# Patient Record
Sex: FEMALE | Race: WHITE | NOT HISPANIC OR LATINO | Employment: OTHER | URBAN - NONMETROPOLITAN AREA
[De-identification: names, ages, dates, MRNs, and addresses within clinical notes are randomized per-mention and may not be internally consistent; named-entity substitution may affect disease eponyms.]

---

## 2017-04-19 RX ORDER — CILOSTAZOL 50 MG/1
TABLET ORAL
Qty: 60 TABLET | Refills: 5 | Status: SHIPPED | OUTPATIENT
Start: 2017-04-19 | End: 2017-10-26 | Stop reason: SDUPTHER

## 2017-06-29 DIAGNOSIS — I73.9 PVD (PERIPHERAL VASCULAR DISEASE) (HCC): Primary | ICD-10-CM

## 2017-08-09 ENCOUNTER — OFFICE VISIT (OUTPATIENT)
Dept: CARDIAC SURGERY | Facility: CLINIC | Age: 47
End: 2017-08-09

## 2017-08-09 VITALS
HEIGHT: 63 IN | BODY MASS INDEX: 21.09 KG/M2 | DIASTOLIC BLOOD PRESSURE: 77 MMHG | WEIGHT: 119 LBS | HEART RATE: 104 BPM | SYSTOLIC BLOOD PRESSURE: 111 MMHG

## 2017-08-09 DIAGNOSIS — I73.9 PVD (PERIPHERAL VASCULAR DISEASE) (HCC): Primary | ICD-10-CM

## 2017-08-09 PROCEDURE — 99212 OFFICE O/P EST SF 10 MIN: CPT | Performed by: THORACIC SURGERY (CARDIOTHORACIC VASCULAR SURGERY)

## 2017-08-09 NOTE — PROGRESS NOTES
08/09/2017  Patient Information  Saskia Groves                                                                                          [address] [facility] [address]   1970  'PCP/Referring Physician'  Meagan Barlow, APRN  [phone]  No ref. provider found    Chief Complaint   Patient presents with   • Follow-up     6 month follow up with [facility], complains of legs and feet hurting.   • Peripheral Vascular Disease       History of Present Illness:  The patient returns today for follow-up of her peripheral vascular disease.  She states her legs are doing about the same as they were before.  She is smoking still a little bit.  She has had a hard time controlling her diabetes.  She has had no other new problems.      Patient Active Problem List   Diagnosis   • PVD (peripheral vascular disease)   • Lumbar radiculopathy   • Paresthesia   • Causalgia of lower limb   • Hiatal hernia   • Current smoker     Past Medical History:   Diagnosis Date   • Anxiety    • Arthritis    • COPD (chronic obstructive pulmonary disease)    • Depression    • Diabetes mellitus    • Disease of thyroid gland    • Dyslipidemia    • GERD (gastroesophageal reflux disease)    • Goiter    • Goiter    • Hiatal hernia    • Hyperlipidemia    • Hypertension    • Infectious viral hepatitis    • Lower back pain    • Migraine      Past Surgical History:   Procedure Laterality Date   • BACK SURGERY     • CHOLECYSTECTOMY     • HYSTERECTOMY     • KNEE SURGERY Left    • WRIST SURGERY Left        Current Outpatient Prescriptions:   •  albuterol (PROVENTIL) (2.5 MG/3ML) 0.083% nebulizer solution, Albuterol Sulfate (2.5 MG/3ML) 0.083% Inhalation Nebulization Solution; Patient Sig: Albuterol Sulfate (2.5 MG/3ML) 0.083% Inhalation Nebulization Solution ; 0; 01-Apr-2014; Active, Disp: , Rfl:   •  ALBUTEROL SULFATE HFA IN, Albuterol Sulfate HFA AERS; Patient Sig: Albuterol Sulfate HFA AERS ; 0; 01-Apr-2014; Active, Disp: , Rfl:   •  amLODIPine  "(NORVASC) 10 MG tablet, Take  by mouth 3 (three) times a day., Disp: , Rfl:   •  aspirin 81 MG tablet, Take 1 tablet by mouth daily., Disp: , Rfl:   •  beclomethasone (QVAR) 40 MCG/ACT inhaler, Qvar 40 MCG/ACT Inhalation Aerosol Solution; Patient Sig: Qvar 40 MCG/ACT Inhalation Aerosol Solution ; 0; 01-Apr-2014; Active, Disp: , Rfl:   •  benzonatate (TESSALON) 200 MG capsule, Take  by mouth., Disp: , Rfl:   •  cetirizine (ZYRTEC ALLERGY) 10 MG tablet, Take 1 tablet by mouth daily., Disp: , Rfl:   •  cilostazol (PLETAL) 50 MG tablet, TAKE ONE TABLET BY MOUTH TWICE A DAY, Disp: 60 tablet, Rfl: 5  •  citalopram (CELEXA) 40 MG tablet, Take 1 tablet by mouth daily., Disp: , Rfl:   •  fluticasone (FLONASE) 50 MCG/ACT nasal spray, into each nostril. Use as directed, Disp: , Rfl:   •  gabapentin (NEURONTIN) 600 MG tablet, Take  by mouth., Disp: , Rfl:   •  HYDROcodone-acetaminophen (LORTAB)  MG per tablet, Take  by mouth., Disp: , Rfl:   •  hydrOXYzine (ATARAX) 25 MG tablet, Take  by mouth., Disp: , Rfl:   •  insulin glargine (LANTUS) 100 UNIT/ML injection, 36 units at night, Disp: 10 mL, Rfl: 5  •  insulin lispro (HUMALOG) 100 UNIT/ML injection, 8 U ac bkfst, 16 U ac lunch & 18 U ac supper, Disp: 20 mL, Rfl: 5  •  Insulin Syringe-Needle U-100 30G X 5/16\" 0.5 ML misc, Uses X 4/day, Disp: 200 each, Rfl: 5  •  lansoprazole (PREVACID) 30 MG capsule, Take  by mouth., Disp: , Rfl:   •  losartan (COZAAR) 25 MG tablet, Take 1 tablet by mouth daily., Disp: , Rfl:   •  lovastatin (MEVACOR) 20 MG tablet, Take 1 tablet by mouth every night. At bedtime, Disp: , Rfl:   •  metFORMIN (GLUCOPHAGE) 500 MG tablet, Take 1 tablet by mouth daily., Disp: , Rfl:   •  metoprolol succinate XL (TOPROL XL) 50 MG 24 hr tablet, Take 1 tablet by mouth daily., Disp: , Rfl:   •  mometasone-formoterol (DULERA) 100-5 MCG/ACT inhaler, Dulera 100-5 MCG/ACT Inhalation Aerosol; Patient Sig: Dulera 100-5 MCG/ACT Inhalation Aerosol ; 0; 01-Apr-2014; Active, " Disp: , Rfl:   •  montelukast (SINGULAIR) 10 MG tablet, Take  by mouth., Disp: , Rfl:   •  oxybutynin XL (DITROPAN-XL) 5 MG 24 hr tablet, Take  by mouth., Disp: , Rfl:   •  pregabalin (LYRICA) 50 MG capsule, Take  by mouth., Disp: , Rfl:   •  rOPINIRole (REQUIP) 4 MG tablet, Take  by mouth., Disp: , Rfl:   •  theophylline (UNIPHYL) 400 MG 24 hr tablet, Take 1 tablet by mouth daily. As directed, Disp: , Rfl:   •  tiotropium (SPIRIVA HANDIHALER) 18 MCG per inhalation capsule, Spiriva HandiHaler 18 MCG Inhalation Capsule; Patient Sig: Spiriva HandiHaler 18 MCG Inhalation Capsule ; 0; 01-Apr-2014; Active, Disp: , Rfl:   •  tiZANidine (ZANAFLEX) 4 MG tablet, Take  by mouth., Disp: , Rfl:   Allergies   Allergen Reactions   • Codeine    • Penicillins      Social History     Social History   • Marital status:      Spouse name: N/A   • Number of children: 2   • Years of education: N/A     Occupational History   •  Disabled     Social History Main Topics   • Smoking status: Current Every Day Smoker     Packs/day: 1.50     Years: 25.00     Types: Cigarettes   • Smokeless tobacco: Never Used   • Alcohol use No   • Drug use: No   • Sexual activity: Not on file     Other Topics Concern   • Not on file     Social History Narrative     Family History   Problem Relation Age of Onset   • Diabetes Mother    • Hypertension Mother    • Hypertension Father    • Lung cancer Father    • Stroke Other      Review of Systems   Constitution: Positive for malaise/fatigue and night sweats. Negative for chills, fever and weight loss.   HENT: Positive for headaches. Negative for hearing loss, odynophagia and sore throat.    Cardiovascular: Positive for claudication, dyspnea on exertion and leg swelling. Negative for chest pain, orthopnea and palpitations.   Respiratory: Positive for cough. Negative for hemoptysis.    Endocrine: Negative for cold intolerance, heat intolerance, polydipsia, polyphagia and polyuria.   Hematologic/Lymphatic:  "Does not bruise/bleed easily.   Skin: Negative for itching and rash.   Musculoskeletal: Positive for back pain, joint pain and muscle cramps. Negative for joint swelling and myalgias.   Gastrointestinal: Positive for dysphagia, nausea and vomiting. Negative for abdominal pain, constipation, diarrhea, hematemesis, hematochezia and melena.   Genitourinary: Negative for dysuria, frequency and hematuria.   Neurological: Positive for loss of balance. Negative for focal weakness, numbness and seizures.   Psychiatric/Behavioral: Positive for depression. Negative for suicidal ideas.   All other systems reviewed and are negative.    Vitals:    08/09/17 0744   BP: 111/77   BP Location: Right arm   Pulse: 104   Weight: 119 lb (54 kg)   Height: 63\" (160 cm)      Physical Exam   Neck: Normal range of motion. Neck supple. No JVD present. No tracheal deviation present. No thyromegaly present.   Cardiovascular: Normal rate and regular rhythm.  Exam reveals no gallop and no friction rub.    No murmur heard.  Pulmonary/Chest: No stridor. No respiratory distress. She has no wheezes. She has no rales. She exhibits no tenderness.   Abdominal: Soft. Bowel sounds are normal. There is no tenderness.   Lymphadenopathy:     She has no cervical adenopathy.     Assessment/Plan:   I reviewed the patient's PV arterial study in detail.  She appears to be stable at this point.  She has good cap refill with no evidence of ulcer on her feet.  I have reviewed the importance of not smoking.  I will see her back in one year with a PV arterial study.        Patient Active Problem List   Diagnosis   • PVD (peripheral vascular disease)   • Lumbar radiculopathy   • Paresthesia   • Causalgia of lower limb   • Hiatal hernia   • Current smoker     Signed by: Deandre Oseguera M.D.    8/9/2017    CC:  LUIS Bravo, , editing for Deandre Oseguera M.D.    I, Deandre Oseguera MD, have read and agree with the editing done " by Tete Whittaker, .

## 2017-10-27 RX ORDER — CILOSTAZOL 50 MG/1
TABLET ORAL
Qty: 60 TABLET | Refills: 5 | Status: SHIPPED | OUTPATIENT
Start: 2017-10-27 | End: 2018-04-23 | Stop reason: SDUPTHER

## 2018-01-10 ENCOUNTER — OFFICE VISIT (OUTPATIENT)
Dept: CARDIAC SURGERY | Facility: CLINIC | Age: 48
End: 2018-01-10

## 2018-01-10 VITALS
WEIGHT: 120 LBS | SYSTOLIC BLOOD PRESSURE: 136 MMHG | HEART RATE: 113 BPM | BODY MASS INDEX: 21.26 KG/M2 | DIASTOLIC BLOOD PRESSURE: 88 MMHG | HEIGHT: 63 IN

## 2018-01-10 DIAGNOSIS — I65.23 BILATERAL CAROTID ARTERY STENOSIS: Primary | ICD-10-CM

## 2018-01-10 PROCEDURE — 99214 OFFICE O/P EST MOD 30 MIN: CPT | Performed by: THORACIC SURGERY (CARDIOTHORACIC VASCULAR SURGERY)

## 2018-01-10 PROCEDURE — 99406 BEHAV CHNG SMOKING 3-10 MIN: CPT | Performed by: THORACIC SURGERY (CARDIOTHORACIC VASCULAR SURGERY)

## 2018-01-10 NOTE — PROGRESS NOTES
01/10/2018  Patient Information  Saskia Groves                                                                                          [address] [facility] [address]   1970  'PCP/Referring Physician'  LUIS Deshpande  [phone]  No ref. provider found    Chief Complaint   Patient presents with   • Carotid Artery Disease     Possible carotid stenosis per LUIS Bravo- eye doctor saw plaque in eye       History of Present Illness:  The patient is referred at this time for evaluation of a amaurosis fugax to her right eye.  She was found to have abnormal vision in her right eye and was found to have a plaque on ophthalmologic exam of her eye.  She had a carotid duplex which reveals plaque.  She denies previous history of stroke or TIA events.  She has continued to smoke about a pack and half per day of cigarettes.      Patient Active Problem List   Diagnosis   • PVD (peripheral vascular disease)   • Lumbar radiculopathy   • Paresthesia   • Causalgia of lower limb   • Hiatal hernia   • Current smoker   • Bilateral carotid artery stenosis     Past Medical History:   Diagnosis Date   • Anxiety    • Arthritis    • COPD (chronic obstructive pulmonary disease)    • Depression    • Diabetes mellitus    • Disease of thyroid gland    • Dyslipidemia    • GERD (gastroesophageal reflux disease)    • Goiter    • Goiter    • Hiatal hernia    • Hyperlipidemia    • Hypertension    • Infectious viral hepatitis    • Lower back pain    • Migraine      Past Surgical History:   Procedure Laterality Date   • BACK SURGERY     • CHOLECYSTECTOMY     • HYSTERECTOMY     • KNEE SURGERY Left    • WRIST SURGERY Left        Current Outpatient Prescriptions:   •  albuterol (PROVENTIL) (2.5 MG/3ML) 0.083% nebulizer solution, Albuterol Sulfate (2.5 MG/3ML) 0.083% Inhalation Nebulization Solution; Patient Sig: Albuterol Sulfate (2.5 MG/3ML) 0.083% Inhalation Nebulization Solution ; 0; 01-Apr-2014; Active, Disp: , Rfl:   •   "ALBUTEROL SULFATE HFA IN, Albuterol Sulfate HFA AERS; Patient Sig: Albuterol Sulfate HFA AERS ; 0; 01-Apr-2014; Active, Disp: , Rfl:   •  amLODIPine (NORVASC) 10 MG tablet, Take  by mouth 3 (three) times a day., Disp: , Rfl:   •  aspirin 81 MG tablet, Take 1 tablet by mouth daily., Disp: , Rfl:   •  beclomethasone (QVAR) 40 MCG/ACT inhaler, Qvar 40 MCG/ACT Inhalation Aerosol Solution; Patient Sig: Qvar 40 MCG/ACT Inhalation Aerosol Solution ; 0; 01-Apr-2014; Active, Disp: , Rfl:   •  benzonatate (TESSALON) 200 MG capsule, Take  by mouth., Disp: , Rfl:   •  cetirizine (ZYRTEC ALLERGY) 10 MG tablet, Take 1 tablet by mouth daily., Disp: , Rfl:   •  cilostazol (PLETAL) 50 MG tablet, TAKE ONE TABLET BY MOUTH TWICE A DAY, Disp: 60 tablet, Rfl: 5  •  citalopram (CELEXA) 40 MG tablet, Take 1 tablet by mouth daily., Disp: , Rfl:   •  fluticasone (FLONASE) 50 MCG/ACT nasal spray, into each nostril. Use as directed, Disp: , Rfl:   •  gabapentin (NEURONTIN) 600 MG tablet, Take  by mouth., Disp: , Rfl:   •  HYDROcodone-acetaminophen (LORTAB)  MG per tablet, Take  by mouth., Disp: , Rfl:   •  hydrOXYzine (ATARAX) 25 MG tablet, Take  by mouth., Disp: , Rfl:   •  insulin lispro (HUMALOG) 100 UNIT/ML injection, 8 U ac bkfst, 16 U ac lunch & 18 U ac supper, Disp: 20 mL, Rfl: 5  •  Insulin Syringe-Needle U-100 30G X 5/16\" 0.5 ML misc, Uses X 4/day, Disp: 200 each, Rfl: 5  •  lansoprazole (PREVACID) 30 MG capsule, Take  by mouth., Disp: , Rfl:   •  losartan (COZAAR) 25 MG tablet, Take 1 tablet by mouth daily., Disp: , Rfl:   •  lovastatin (MEVACOR) 20 MG tablet, Take 1 tablet by mouth every night. At bedtime, Disp: , Rfl:   •  metFORMIN (GLUCOPHAGE) 500 MG tablet, Take 1 tablet by mouth daily., Disp: , Rfl:   •  metoprolol succinate XL (TOPROL XL) 50 MG 24 hr tablet, Take 1 tablet by mouth daily., Disp: , Rfl:   •  mometasone-formoterol (DULERA) 100-5 MCG/ACT inhaler, Dulera 100-5 MCG/ACT Inhalation Aerosol; Patient Sig: Dulera " 100-5 MCG/ACT Inhalation Aerosol ; 0; 01-Apr-2014; Active, Disp: , Rfl:   •  montelukast (SINGULAIR) 10 MG tablet, Take  by mouth., Disp: , Rfl:   •  oxybutynin XL (DITROPAN-XL) 5 MG 24 hr tablet, Take  by mouth., Disp: , Rfl:   •  pregabalin (LYRICA) 50 MG capsule, Take  by mouth., Disp: , Rfl:   •  rOPINIRole (REQUIP) 4 MG tablet, Take  by mouth., Disp: , Rfl:   •  theophylline (UNIPHYL) 400 MG 24 hr tablet, Take 1 tablet by mouth daily. As directed, Disp: , Rfl:   •  tiotropium (SPIRIVA HANDIHALER) 18 MCG per inhalation capsule, Spiriva HandiHaler 18 MCG Inhalation Capsule; Patient Sig: Spiriva HandiHaler 18 MCG Inhalation Capsule ; 0; 01-Apr-2014; Active, Disp: , Rfl:   •  tiZANidine (ZANAFLEX) 4 MG tablet, Take  by mouth., Disp: , Rfl:   Allergies   Allergen Reactions   • Codeine    • Penicillins      Social History     Social History   • Marital status:      Spouse name: N/A   • Number of children: 2   • Years of education: N/A     Occupational History   • [employer] Work Disabled     Back and Leg Pain     Social History Main Topics   • Smoking status: Current Every Day Smoker     Packs/day: 1.50     Years: 25.00     Types: Cigarettes   • Smokeless tobacco: Never Used   • Alcohol use No   • Drug use: No   • Sexual activity: Not on file     Other Topics Concern   • Not on file     Social History Narrative    Lives in [place], [place] alone     Family History   Problem Relation Age of Onset   • Diabetes Mother    • Hypertension Mother    • Hypertension Father    • Lung cancer Father    • Stroke Other      Review of Systems   Constitution: Positive for diaphoresis and weight loss (60-70 lbs over 4 years). Negative for chills, fever, malaise/fatigue and night sweats.   HENT: Negative for hearing loss, odynophagia and sore throat.    Eyes: Positive for blurred vision.   Cardiovascular: Positive for dyspnea on exertion. Negative for chest pain, leg swelling, orthopnea and palpitations.        Feet- numb and  "cold     Respiratory: Positive for cough. Negative for hemoptysis.    Endocrine: Negative for cold intolerance, heat intolerance, polydipsia, polyphagia and polyuria.   Hematologic/Lymphatic: Does not bruise/bleed easily.   Skin: Positive for poor wound healing. Negative for itching and rash.   Musculoskeletal: Positive for back pain and neck pain. Negative for joint pain, joint swelling and myalgias.   Gastrointestinal: Positive for diarrhea. Negative for abdominal pain, constipation, hematemesis, hematochezia, melena, nausea and vomiting.   Genitourinary: Negative for dysuria, frequency and hematuria.   Neurological: Negative for focal weakness, headaches, numbness and seizures.   Psychiatric/Behavioral: Negative for suicidal ideas.   All other systems reviewed and are negative.    Vitals:    01/10/18 0855   BP: 136/88   BP Location: Right arm   Patient Position: Sitting   Pulse: 113   Weight: 54.4 kg (120 lb)   Height: 160 cm (63\")      Physical Exam   Constitutional: She is oriented to person, place, and time. She appears well-developed and well-nourished. No distress.   HENT:   Head: Normocephalic.   Eyes: EOM are normal. Pupils are equal, round, and reactive to light.   Neck: Normal range of motion. Carotid bruit is not present. No thyromegaly present.   Cardiovascular: Normal rate and regular rhythm.  Exam reveals no gallop and no friction rub.    No murmur heard.  Pulses:       Carotid pulses are on the right side with bruit       Dorsalis pedis pulses are 1+ on the right side, and 1+ on the left side.        Posterior tibial pulses are 1+ on the right side, and 1+ on the left side.   Pulmonary/Chest: She has no wheezes. She has no rales.   Abdominal: Soft. Bowel sounds are normal. She exhibits no distension and no mass. There is no hepatomegaly. There is no tenderness.   Musculoskeletal: Normal range of motion. She exhibits no deformity.   Neurological: She is alert and oriented to person, place, and time. " She has normal strength. No cranial nerve deficit or sensory deficit.   Skin: No bruising and no petechiae noted. No cyanosis. Nails show no clubbing.   Psychiatric: She has a normal mood and affect.     Assessment/Plan:   I have obtained and reviewed her carotid ultrasound.  I concur with plaque being in a carotid arteries.  At this point, in view of her what appears to be an embolus to her right eye with plaque or possible amaurosis, we need to check her carotid arteries out.  I have recommended a carotid CTA to be done.  I have also spent a great deal of time, 3-5 minutes, talking to her about the importance of smoking cessation as well.  She has multiple medical problems which are currently outlined carefully above.  At this point we will proceed with CTA and I will see her back.  Her legs appear to be stable at this point.          Patient Active Problem List   Diagnosis   • PVD (peripheral vascular disease)   • Lumbar radiculopathy   • Paresthesia   • Causalgia of lower limb   • Hiatal hernia   • Current smoker   • Bilateral carotid artery stenosis     Signed by: Deandre Oseguera M.D.    1/10/2018    CC:  LUIS Bravo, , editing for Deandre Oseguera M.D.    I, Deandre Oseguera MD, have read and agree with the editing done by Tete Whittaker, .

## 2018-01-11 DIAGNOSIS — I65.23 CAROTID STENOSIS, BILATERAL: Primary | ICD-10-CM

## 2018-01-24 ENCOUNTER — OFFICE VISIT (OUTPATIENT)
Dept: CARDIAC SURGERY | Facility: CLINIC | Age: 48
End: 2018-01-24

## 2018-01-24 VITALS
DIASTOLIC BLOOD PRESSURE: 80 MMHG | OXYGEN SATURATION: 98 % | HEIGHT: 63 IN | HEART RATE: 96 BPM | SYSTOLIC BLOOD PRESSURE: 120 MMHG | BODY MASS INDEX: 21.26 KG/M2 | WEIGHT: 120 LBS

## 2018-01-24 DIAGNOSIS — I65.23 BILATERAL CAROTID ARTERY STENOSIS: Primary | ICD-10-CM

## 2018-01-24 PROCEDURE — 99212 OFFICE O/P EST SF 10 MIN: CPT | Performed by: THORACIC SURGERY (CARDIOTHORACIC VASCULAR SURGERY)

## 2018-01-24 RX ORDER — LACTULOSE 10 G/15ML
10 SOLUTION ORAL DAILY
COMMUNITY

## 2018-01-24 RX ORDER — BUDESONIDE AND FORMOTEROL FUMARATE DIHYDRATE 160; 4.5 UG/1; UG/1
2 AEROSOL RESPIRATORY (INHALATION)
Status: ON HOLD | COMMUNITY
End: 2020-01-17

## 2018-01-24 RX ORDER — INSULIN GLARGINE 100 [IU]/ML
INJECTION, SOLUTION SUBCUTANEOUS DAILY
Status: ON HOLD | COMMUNITY
End: 2020-01-17

## 2018-01-24 RX ORDER — IBUPROFEN 600 MG/1
600 TABLET ORAL EVERY 6 HOURS PRN
Status: ON HOLD | COMMUNITY
End: 2020-02-23

## 2018-01-24 RX ORDER — FLUCONAZOLE 150 MG/1
150 TABLET ORAL AS NEEDED
Status: ON HOLD | COMMUNITY
End: 2020-01-17

## 2018-01-24 RX ORDER — RANITIDINE 150 MG/1
200 TABLET ORAL 2 TIMES DAILY
COMMUNITY
End: 2021-04-13

## 2018-01-24 RX ORDER — FEXOFENADINE HCL 180 MG/1
180 TABLET ORAL DAILY
Status: ON HOLD | COMMUNITY
End: 2020-01-17

## 2018-01-24 RX ORDER — KETOTIFEN FUMARATE 0.35 MG/ML
1 SOLUTION/ DROPS OPHTHALMIC 2 TIMES DAILY
COMMUNITY

## 2018-01-24 RX ORDER — LANOLIN ALCOHOL/MO/W.PET/CERES
1000 CREAM (GRAM) TOPICAL DAILY
COMMUNITY
End: 2020-02-12

## 2018-01-24 NOTE — PROGRESS NOTES
01/24/2018  Patient Information  Saskia Groves                                                                                          [address] [facility] [address]   1970  'PCP/Referring Physician'  Meagan Barlow, APRN  [phone]  No ref. provider found    Chief Complaint   Patient presents with   • Follow-up     2 WK FU with CTA Carotids for Carotid Stenosis       History of Present Illness:  The patient returns today for follow-up of her carotid stenosis.  Since last visit she has been doing reasonably well.  She denies any amaurosis type events since last visit.  She did obtain her CTA of her carotid arteries.  She is continuing to smoke despite all my admonition.      Patient Active Problem List   Diagnosis   • PVD (peripheral vascular disease)   • Lumbar radiculopathy   • Paresthesia   • Causalgia of lower limb   • Hiatal hernia   • Current smoker   • Bilateral carotid artery stenosis     Past Medical History:   Diagnosis Date   • Anxiety    • Arthritis    • Carotid artery stenosis    • COPD (chronic obstructive pulmonary disease)    • Depression    • Diabetes mellitus    • Disease of thyroid gland    • Dyslipidemia    • GERD (gastroesophageal reflux disease)    • Goiter    • Goiter    • Hiatal hernia    • Hyperlipidemia    • Hypertension    • Infectious viral hepatitis    • Lower back pain    • Migraine      Past Surgical History:   Procedure Laterality Date   • BACK SURGERY     • CHOLECYSTECTOMY     • HYSTERECTOMY     • KNEE SURGERY Left    • WRIST SURGERY Left        Current Outpatient Prescriptions:   •  aclidinium bromide (TUDORZA PRESSAIR) 400 MCG/ACT aerosol powder  powder for inhalation, Inhale 1 puff 2 (Two) Times a Day., Disp: , Rfl:   •  albuterol (PROVENTIL) (2.5 MG/3ML) 0.083% nebulizer solution, Albuterol Sulfate (2.5 MG/3ML) 0.083% Inhalation Nebulization Solution; Patient Sig: Albuterol Sulfate (2.5 MG/3ML) 0.083% Inhalation Nebulization Solution ; 0; 01-Apr-2014; Active,  "Disp: , Rfl:   •  aspirin 81 MG tablet, Take 1 tablet by mouth daily., Disp: , Rfl:   •  benzonatate (TESSALON) 200 MG capsule, Take  by mouth., Disp: , Rfl:   •  budesonide-formoterol (SYMBICORT) 160-4.5 MCG/ACT inhaler, Inhale 2 puffs 2 (Two) Times a Day., Disp: , Rfl:   •  cilostazol (PLETAL) 50 MG tablet, TAKE ONE TABLET BY MOUTH TWICE A DAY, Disp: 60 tablet, Rfl: 5  •  fexofenadine (ALLEGRA) 180 MG tablet, Take 180 mg by mouth Daily., Disp: , Rfl:   •  fluconazole (DIFLUCAN) 150 MG tablet, Take 150 mg by mouth 1 (One) Time., Disp: , Rfl:   •  fluticasone (FLONASE) 50 MCG/ACT nasal spray, into each nostril. Use as directed, Disp: , Rfl:   •  HYDROcodone-acetaminophen (LORTAB)  MG per tablet, Take  by mouth., Disp: , Rfl:   •  hydrOXYzine (ATARAX) 25 MG tablet, Take  by mouth., Disp: , Rfl:   •  ibuprofen (ADVIL,MOTRIN) 600 MG tablet, Take 600 mg by mouth Every 6 (Six) Hours As Needed for Mild Pain ., Disp: , Rfl:   •  insulin glargine (LANTUS) 100 UNIT/ML injection, Inject  under the skin Daily., Disp: , Rfl:   •  insulin lispro (HUMALOG) 100 UNIT/ML injection, 8 U ac bkfst, 16 U ac lunch & 18 U ac supper, Disp: 20 mL, Rfl: 5  •  Insulin Syringe-Needle U-100 30G X 5/16\" 0.5 ML misc, Uses X 4/day, Disp: 200 each, Rfl: 5  •  ketotifen (ZADITOR) 0.025 % ophthalmic solution, 1 drop 2 (Two) Times a Day., Disp: , Rfl:   •  Lactobacillus (ACIDOPHILUS PO), Take  by mouth., Disp: , Rfl:   •  lactulose (CHRONULAC) 10 GM/15ML solution, Take 20 g by mouth 2 (Two) Times a Day As Needed., Disp: , Rfl:   •  losartan (COZAAR) 25 MG tablet, Take 1 tablet by mouth daily., Disp: , Rfl:   •  metFORMIN (GLUCOPHAGE) 500 MG tablet, Take 1 tablet by mouth daily., Disp: , Rfl:   •  metoprolol succinate XL (TOPROL XL) 50 MG 24 hr tablet, Take 1 tablet by mouth daily., Disp: , Rfl:   •  NYSTATIN PO, Take  by mouth., Disp: , Rfl:   •  pregabalin (LYRICA) 50 MG capsule, Take  by mouth., Disp: , Rfl:   •  raNITIdine (ZANTAC) 150 MG " tablet, Take 150 mg by mouth 2 (Two) Times a Day., Disp: , Rfl:   •  rOPINIRole (REQUIP) 4 MG tablet, Take  by mouth., Disp: , Rfl:   •  theophylline (UNIPHYL) 400 MG 24 hr tablet, Take 1 tablet by mouth daily. As directed, Disp: , Rfl:   •  tiZANidine (ZANAFLEX) 4 MG tablet, Take  by mouth., Disp: , Rfl:   •  vitamin B-12 (CYANOCOBALAMIN) 1000 MCG tablet, Take 1,000 mcg by mouth Daily., Disp: , Rfl:   •  ALBUTEROL SULFATE HFA IN, Albuterol Sulfate HFA AERS; Patient Sig: Albuterol Sulfate HFA AERS ; 0; 01-Apr-2014; Active, Disp: , Rfl:   •  amLODIPine (NORVASC) 10 MG tablet, Take  by mouth 3 (three) times a day., Disp: , Rfl:   •  beclomethasone (QVAR) 40 MCG/ACT inhaler, Qvar 40 MCG/ACT Inhalation Aerosol Solution; Patient Sig: Qvar 40 MCG/ACT Inhalation Aerosol Solution ; 0; 01-Apr-2014; Active, Disp: , Rfl:   •  cetirizine (ZYRTEC ALLERGY) 10 MG tablet, Take 1 tablet by mouth daily., Disp: , Rfl:   •  citalopram (CELEXA) 40 MG tablet, Take 1 tablet by mouth daily., Disp: , Rfl:   •  gabapentin (NEURONTIN) 600 MG tablet, Take  by mouth., Disp: , Rfl:   •  lansoprazole (PREVACID) 30 MG capsule, Take  by mouth., Disp: , Rfl:   •  lovastatin (MEVACOR) 20 MG tablet, Take 1 tablet by mouth every night. At bedtime, Disp: , Rfl:   •  mometasone-formoterol (DULERA) 100-5 MCG/ACT inhaler, Dulera 100-5 MCG/ACT Inhalation Aerosol; Patient Sig: Dulera 100-5 MCG/ACT Inhalation Aerosol ; 0; 01-Apr-2014; Active, Disp: , Rfl:   •  montelukast (SINGULAIR) 10 MG tablet, Take  by mouth., Disp: , Rfl:   •  oxybutynin XL (DITROPAN-XL) 5 MG 24 hr tablet, Take  by mouth., Disp: , Rfl:   •  tiotropium (SPIRIVA HANDIHALER) 18 MCG per inhalation capsule, Spiriva HandiHaler 18 MCG Inhalation Capsule; Patient Sig: Spiriva HandiHaler 18 MCG Inhalation Capsule ; 0; 01-Apr-2014; Active, Disp: , Rfl:   Allergies   Allergen Reactions   • Codeine    • Penicillins      Social History     Social History   • Marital status:      Spouse name:  N/A   • Number of children: 2   • Years of education: N/A     Occupational History   • 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 Negative for chills, fever, malaise/fatigue and weight loss.   HENT: Negative for hearing loss, odynophagia and sore throat.    Eyes: Positive for blurred vision.   Cardiovascular: Positive for dyspnea on exertion and palpitations. Negative for chest pain, leg swelling and orthopnea.   Respiratory: Negative for cough and hemoptysis.    Endocrine: Positive for cold intolerance. Negative for heat intolerance, polydipsia, polyphagia and polyuria.   Hematologic/Lymphatic: Does not bruise/bleed easily.   Skin: Negative for itching and rash.   Musculoskeletal: Positive for joint pain and muscle cramps. Negative for joint swelling and myalgias.   Gastrointestinal: Positive for diarrhea. Negative for abdominal pain, constipation, hematemesis, hematochezia, melena, nausea and vomiting.   Genitourinary: Positive for nocturia and urgency. Negative for dysuria, frequency and hematuria.   Neurological: Negative for focal weakness, headaches, numbness and seizures.   Psychiatric/Behavioral: Negative for suicidal ideas.   Allergic/Immunologic: Positive for persistent infections (yeast infections).   All other systems reviewed and are negative.    Vitals:    01/24/18 0938   BP: 120/80   BP Location: Left arm   Patient Position: Sitting  "  Pulse: 96   SpO2: 98%   Weight: 54.4 kg (120 lb)   Height: 160 cm (63\")      Physical Exam   Neck: Normal range of motion. Neck supple. No JVD present. No tracheal deviation present. No thyromegaly present.   Cardiovascular: Normal rate and regular rhythm.  Exam reveals no gallop and no friction rub.    No murmur heard.  Pulmonary/Chest: No stridor. No respiratory distress. She has no wheezes. She has no rales. She exhibits no tenderness.   Abdominal: Soft. Bowel sounds are normal. There is no tenderness.   Lymphadenopathy:     She has no cervical adenopathy.     Assessment/Plan:   Patient returns today for follow-up of her carotid stenosis.  Since last visit she has been stable.  She is on her 81 mg aspirin.  I have obtained and  reviewed her CT scan.  There appears be no evidence of any critical stenosis.  I think we will place her on Plavix 75 mg and aspirin and continue to follow her.  I have strongly encouraged her not smoke.  We will see her back in 4 months.      Patient Active Problem List   Diagnosis   • PVD (peripheral vascular disease)   • Lumbar radiculopathy   • Paresthesia   • Causalgia of lower limb   • Hiatal hernia   • Current smoker   • Bilateral carotid artery stenosis     Signed by: Deandre Oseguera M.D.    1/24/2018    CC:  LUIS Bravo, , editing for Deandre Oseguera M.D.    I, Deandre Oseguera MD, have read and agree with the editing done by Tete Whittaker, .  "

## 2018-01-26 ENCOUNTER — TRANSCRIBE ORDERS (OUTPATIENT)
Dept: CARDIAC SURGERY | Facility: CLINIC | Age: 48
End: 2018-01-26

## 2018-01-26 DIAGNOSIS — I65.23 CAROTID STENOSIS, BILATERAL: Primary | ICD-10-CM

## 2018-02-16 ENCOUNTER — TELEPHONE (OUTPATIENT)
Dept: CARDIAC SURGERY | Facility: CLINIC | Age: 48
End: 2018-02-16

## 2018-02-16 NOTE — TELEPHONE ENCOUNTER
Patient called asking for clearance at a surgical standpoint to have Left Knee surgery due to a torn meniscus?      UPDATE:    Per Dr. Oseguera it is okay for patient to proceed with surgery at a Carotid point of view. I called and explained this to the patient. Surgical clearance was faxed to [phone].       Please reference Dr. Oseguera response scanned into media.

## 2018-04-25 RX ORDER — CILOSTAZOL 50 MG/1
TABLET ORAL
Qty: 60 TABLET | Refills: 5 | Status: SHIPPED | OUTPATIENT
Start: 2018-04-25 | End: 2018-11-07 | Stop reason: SDUPTHER

## 2018-05-09 ENCOUNTER — TELEPHONE (OUTPATIENT)
Dept: CARDIAC SURGERY | Facility: CLINIC | Age: 48
End: 2018-05-09

## 2018-05-14 ENCOUNTER — TELEPHONE (OUTPATIENT)
Dept: CARDIAC SURGERY | Facility: CLINIC | Age: 48
End: 2018-05-14

## 2018-06-27 ENCOUNTER — OFFICE VISIT (OUTPATIENT)
Dept: CARDIAC SURGERY | Facility: CLINIC | Age: 48
End: 2018-06-27

## 2018-06-27 VITALS
HEART RATE: 97 BPM | DIASTOLIC BLOOD PRESSURE: 66 MMHG | SYSTOLIC BLOOD PRESSURE: 97 MMHG | BODY MASS INDEX: 20.55 KG/M2 | WEIGHT: 116 LBS | HEIGHT: 63 IN

## 2018-06-27 DIAGNOSIS — I65.23 BILATERAL CAROTID ARTERY STENOSIS: Primary | ICD-10-CM

## 2018-06-27 PROCEDURE — 99406 BEHAV CHNG SMOKING 3-10 MIN: CPT | Performed by: THORACIC SURGERY (CARDIOTHORACIC VASCULAR SURGERY)

## 2018-06-27 PROCEDURE — 99212 OFFICE O/P EST SF 10 MIN: CPT | Performed by: THORACIC SURGERY (CARDIOTHORACIC VASCULAR SURGERY)

## 2018-06-27 NOTE — PROGRESS NOTES
06/27/2018  Patient Information  Saskia Groves                                                                                          [address] [facility] [address]   1970  'PCP/Referring Physician'  Meagan Barlow, APRN  [phone]  No ref. provider found    Chief Complaint   Patient presents with   • Follow-up     Follow up for carotid stenosis,complains of neck pain.   • Carotid Artery Disease       History of Present Illness:  Patient is referred back at this time for follow-up of carotid artery disease.  She denies any TIA type of events.  She denies any strokes.  She does have some right neck pain.  She continues to smoke despite all my admonitions.  She has had no other new problems.  Patient Active Problem List   Diagnosis   • PVD (peripheral vascular disease)   • Lumbar radiculopathy   • Paresthesia   • Causalgia of lower limb   • Hiatal hernia   • Current smoker   • Bilateral carotid artery stenosis     Past Medical History:   Diagnosis Date   • Anxiety    • Arthritis    • Carotid artery stenosis    • COPD (chronic obstructive pulmonary disease)    • Depression    • Diabetes mellitus    • Disease of thyroid gland    • Dyslipidemia    • GERD (gastroesophageal reflux disease)    • Goiter    • Goiter    • Hiatal hernia    • Hyperlipidemia    • Hypertension    • Infectious viral hepatitis    • Lower back pain    • Migraine      Past Surgical History:   Procedure Laterality Date   • BACK SURGERY     • CHOLECYSTECTOMY     • HYSTERECTOMY     • KNEE SURGERY Left    • WRIST SURGERY Left        Current Outpatient Prescriptions:   •  aclidinium bromide (TUDORZA PRESSAIR) 400 MCG/ACT aerosol powder  powder for inhalation, Inhale 1 puff 2 (Two) Times a Day., Disp: , Rfl:   •  albuterol (PROVENTIL) (2.5 MG/3ML) 0.083% nebulizer solution, Albuterol Sulfate (2.5 MG/3ML) 0.083% Inhalation Nebulization Solution; Patient Sig: Albuterol Sulfate (2.5 MG/3ML) 0.083% Inhalation Nebulization Solution ; 0;  "01-Apr-2014; Active, Disp: , Rfl:   •  ALBUTEROL SULFATE HFA IN, Albuterol Sulfate HFA AERS; Patient Sig: Albuterol Sulfate HFA AERS ; 0; 01-Apr-2014; Active, Disp: , Rfl:   •  amLODIPine (NORVASC) 10 MG tablet, Take  by mouth 3 (three) times a day., Disp: , Rfl:   •  aspirin 81 MG tablet, Take 1 tablet by mouth daily., Disp: , Rfl:   •  beclomethasone (QVAR) 40 MCG/ACT inhaler, Qvar 40 MCG/ACT Inhalation Aerosol Solution; Patient Sig: Qvar 40 MCG/ACT Inhalation Aerosol Solution ; 0; 01-Apr-2014; Active, Disp: , Rfl:   •  benzonatate (TESSALON) 200 MG capsule, Take  by mouth., Disp: , Rfl:   •  budesonide-formoterol (SYMBICORT) 160-4.5 MCG/ACT inhaler, Inhale 2 puffs 2 (Two) Times a Day., Disp: , Rfl:   •  cetirizine (ZYRTEC ALLERGY) 10 MG tablet, Take 1 tablet by mouth daily., Disp: , Rfl:   •  cilostazol (PLETAL) 50 MG tablet, TAKE ONE TABLET BY MOUTH TWICE A DAY, Disp: 60 tablet, Rfl: 5  •  citalopram (CELEXA) 40 MG tablet, Take 1 tablet by mouth daily., Disp: , Rfl:   •  fexofenadine (ALLEGRA) 180 MG tablet, Take 180 mg by mouth Daily., Disp: , Rfl:   •  fluconazole (DIFLUCAN) 150 MG tablet, Take 150 mg by mouth 1 (One) Time., Disp: , Rfl:   •  fluticasone (FLONASE) 50 MCG/ACT nasal spray, into each nostril. Use as directed, Disp: , Rfl:   •  gabapentin (NEURONTIN) 600 MG tablet, Take  by mouth., Disp: , Rfl:   •  HYDROcodone-acetaminophen (LORTAB)  MG per tablet, Take  by mouth., Disp: , Rfl:   •  hydrOXYzine (ATARAX) 25 MG tablet, Take  by mouth., Disp: , Rfl:   •  ibuprofen (ADVIL,MOTRIN) 600 MG tablet, Take 600 mg by mouth Every 6 (Six) Hours As Needed for Mild Pain ., Disp: , Rfl:   •  insulin glargine (LANTUS) 100 UNIT/ML injection, Inject  under the skin Daily., Disp: , Rfl:   •  insulin lispro (HUMALOG) 100 UNIT/ML injection, 8 U ac bkfst, 16 U ac lunch & 18 U ac supper, Disp: 20 mL, Rfl: 5  •  Insulin Syringe-Needle U-100 30G X 5/16\" 0.5 ML misc, Uses X 4/day, Disp: 200 each, Rfl: 5  •  ketotifen " (ZADITOR) 0.025 % ophthalmic solution, 1 drop 2 (Two) Times a Day., Disp: , Rfl:   •  Lactobacillus (ACIDOPHILUS PO), Take  by mouth., Disp: , Rfl:   •  lactulose (CHRONULAC) 10 GM/15ML solution, Take 20 g by mouth 2 (Two) Times a Day As Needed., Disp: , Rfl:   •  lansoprazole (PREVACID) 30 MG capsule, Take  by mouth., Disp: , Rfl:   •  losartan (COZAAR) 25 MG tablet, Take 1 tablet by mouth daily., Disp: , Rfl:   •  lovastatin (MEVACOR) 20 MG tablet, Take 1 tablet by mouth every night. At bedtime, Disp: , Rfl:   •  metFORMIN (GLUCOPHAGE) 500 MG tablet, Take 1 tablet by mouth daily., Disp: , Rfl:   •  metoprolol succinate XL (TOPROL XL) 50 MG 24 hr tablet, Take 1 tablet by mouth daily., Disp: , Rfl:   •  mometasone-formoterol (DULERA) 100-5 MCG/ACT inhaler, Dulera 100-5 MCG/ACT Inhalation Aerosol; Patient Sig: Dulera 100-5 MCG/ACT Inhalation Aerosol ; 0; 01-Apr-2014; Active, Disp: , Rfl:   •  montelukast (SINGULAIR) 10 MG tablet, Take  by mouth., Disp: , Rfl:   •  NYSTATIN PO, Take  by mouth., Disp: , Rfl:   •  oxybutynin XL (DITROPAN-XL) 5 MG 24 hr tablet, Take  by mouth., Disp: , Rfl:   •  pregabalin (LYRICA) 50 MG capsule, Take  by mouth., Disp: , Rfl:   •  raNITIdine (ZANTAC) 150 MG tablet, Take 150 mg by mouth 2 (Two) Times a Day., Disp: , Rfl:   •  rOPINIRole (REQUIP) 4 MG tablet, Take  by mouth., Disp: , Rfl:   •  theophylline (UNIPHYL) 400 MG 24 hr tablet, Take 1 tablet by mouth daily. As directed, Disp: , Rfl:   •  tiotropium (SPIRIVA HANDIHALER) 18 MCG per inhalation capsule, Spiriva HandiHaler 18 MCG Inhalation Capsule; Patient Sig: Spiriva HandiHaler 18 MCG Inhalation Capsule ; 0; 01-Apr-2014; Active, Disp: , Rfl:   •  tiZANidine (ZANAFLEX) 4 MG tablet, Take  by mouth., Disp: , Rfl:   •  vitamin B-12 (CYANOCOBALAMIN) 1000 MCG tablet, Take 1,000 mcg by mouth Daily., Disp: , Rfl:   Allergies   Allergen Reactions   • Codeine Nausea Only   • Penicillins Nausea And Vomiting     Social History     Social  History   • Marital status:      Spouse name: N/A   • Number of children: 2   • Years of education: N/A     Occupational History   • Phone Cable Work Disabled     Back and Leg Pain     Social History Main Topics   • Smoking status: Current Every Day Smoker     Packs/day: 1.50     Years: 25.00     Types: Cigarettes   • Smokeless tobacco: Never Used   • Alcohol use No   • Drug use: No   • Sexual activity: Not on file     Other Topics Concern   • Not on file     Social History Narrative    Lives in [place], [facility]     Family History   Problem Relation Age of Onset   • Diabetes Mother    • Hypertension Mother    • Hypertension Father    • Lung cancer Father    • Stroke Other      Review of Systems   Constitution: Positive for weight loss. Negative for chills, fever, malaise/fatigue and night sweats.   HENT: Positive for sore throat. Negative for hearing loss and odynophagia.    Cardiovascular: Positive for dyspnea on exertion. Negative for chest pain, leg swelling, orthopnea and palpitations.   Respiratory: Positive for shortness of breath. Negative for cough and hemoptysis.    Endocrine: Negative for cold intolerance, heat intolerance, polydipsia, polyphagia and polyuria.   Hematologic/Lymphatic: Does not bruise/bleed easily.   Skin: Negative for itching and rash.   Musculoskeletal: Positive for back pain, falls, joint pain, joint swelling, muscle cramps, muscle weakness, neck pain and stiffness. Negative for myalgias.   Gastrointestinal: Positive for abdominal pain and vomiting. Negative for constipation, diarrhea, hematemesis, hematochezia, melena and nausea.   Genitourinary: Positive for incomplete emptying, nocturia and urgency. Negative for dysuria, frequency and hematuria.   Neurological: Positive for dizziness, headaches and numbness. Negative for focal weakness and seizures.   Psychiatric/Behavioral: Positive for depression. Negative for suicidal ideas. The patient is nervous/anxious.   "  Allergic/Immunologic: Positive for environmental allergies and persistent infections.   All other systems reviewed and are negative.    Vitals:    06/27/18 0936   BP: 97/66   BP Location: Right arm   Patient Position: Sitting   Pulse: 97   Weight: 52.6 kg (116 lb)   Height: 160 cm (63\")      Physical Exam   Neck: Normal range of motion. Neck supple. No JVD present. No tracheal deviation present. No thyromegaly present.   Cardiovascular: Normal rate and regular rhythm.  Exam reveals no gallop and no friction rub.    No murmur heard.  Pulmonary/Chest: No stridor. No respiratory distress. She has no wheezes. She has no rales. She exhibits no tenderness.   Abdominal: Soft. Bowel sounds are normal. There is no tenderness.   Lymphadenopathy:     She has no cervical adenopathy.     Assessment/Plan:   Patient appears to be stable from her carotid disease at this time with no new problems.  I spent a great deal of time, 3-5 minutes, talking to her about the importance of smoking cessation.  I'll see her back in one year with carotid duplex.      Patient Active Problem List   Diagnosis   • PVD (peripheral vascular disease)   • Lumbar radiculopathy   • Paresthesia   • Causalgia of lower limb   • Hiatal hernia   • Current smoker   • Bilateral carotid artery stenosis     CC: LUIS Bravo, , editing for Deandre Oseguera M.D.    I, Deandre Oseguera MD, have read and agree with the editing done by Tete Whittaker, .  "

## 2018-07-24 RX ORDER — CLOPIDOGREL BISULFATE 75 MG/1
TABLET ORAL
Qty: 30 TABLET | Refills: 4 | Status: SHIPPED | OUTPATIENT
Start: 2018-07-24 | End: 2019-01-10 | Stop reason: SDUPTHER

## 2018-11-08 RX ORDER — CILOSTAZOL 50 MG/1
TABLET ORAL
Qty: 60 TABLET | Refills: 4 | Status: SHIPPED | OUTPATIENT
Start: 2018-11-08 | End: 2019-04-02 | Stop reason: SDUPTHER

## 2019-01-11 RX ORDER — CLOPIDOGREL BISULFATE 75 MG/1
TABLET ORAL
Qty: 30 TABLET | Refills: 3 | Status: SHIPPED | OUTPATIENT
Start: 2019-01-11 | End: 2019-04-28 | Stop reason: SDUPTHER

## 2019-04-04 RX ORDER — CILOSTAZOL 50 MG/1
TABLET ORAL
Qty: 60 TABLET | Refills: 3 | Status: ON HOLD | OUTPATIENT
Start: 2019-04-04 | End: 2020-01-17

## 2019-04-29 RX ORDER — CLOPIDOGREL BISULFATE 75 MG/1
TABLET ORAL
Qty: 30 TABLET | Refills: 5 | Status: SHIPPED | OUTPATIENT
Start: 2019-04-29

## 2019-06-10 ENCOUNTER — TELEPHONE (OUTPATIENT)
Dept: CARDIAC SURGERY | Facility: CLINIC | Age: 49
End: 2019-06-10

## 2019-06-10 NOTE — TELEPHONE ENCOUNTER
PT RECEIVED RECALL LETTER FOR 1 yr f/u per enc 6/27/18 w/carotid duplex w/agr. Pt verified demo and ins.

## 2019-06-19 DIAGNOSIS — I65.23 BILATERAL CAROTID ARTERY STENOSIS: Primary | ICD-10-CM

## 2019-07-10 ENCOUNTER — OFFICE VISIT (OUTPATIENT)
Dept: CARDIAC SURGERY | Facility: CLINIC | Age: 49
End: 2019-07-10

## 2019-07-10 VITALS
BODY MASS INDEX: 23.04 KG/M2 | DIASTOLIC BLOOD PRESSURE: 64 MMHG | HEART RATE: 97 BPM | WEIGHT: 130 LBS | OXYGEN SATURATION: 99 % | SYSTOLIC BLOOD PRESSURE: 130 MMHG | HEIGHT: 63 IN

## 2019-07-10 DIAGNOSIS — I65.23 BILATERAL CAROTID ARTERY STENOSIS: Primary | ICD-10-CM

## 2019-07-10 PROCEDURE — 99212 OFFICE O/P EST SF 10 MIN: CPT | Performed by: THORACIC SURGERY (CARDIOTHORACIC VASCULAR SURGERY)

## 2019-07-10 PROCEDURE — 99406 BEHAV CHNG SMOKING 3-10 MIN: CPT | Performed by: THORACIC SURGERY (CARDIOTHORACIC VASCULAR SURGERY)

## 2019-07-10 NOTE — PROGRESS NOTES
07/10/2019  Patient Information  Saskia Groves                                                                                          [address] [facility] [address]   1970  'PCP/Referring Physician'  Meagan Barlow, APRN  [phone]  No ref. provider found    Chief Complaint   Patient presents with   • Follow-up     1 year follow up with carotid duplex for carotid artery disease   • Carotid Artery Disease       History of Present Illness:   The patient returns for follow-up of her carotid artery disease.  She continues to smoke.  She denies any TIA or CVA symptoms.  Her legs continue to give her some problems.      Patient Active Problem List   Diagnosis   • PVD (peripheral vascular disease) (CMS/Formerly [facility])   • Lumbar radiculopathy   • Paresthesia   • Causalgia of lower limb   • Hiatal hernia   • Current smoker   • Bilateral carotid artery stenosis     Past Medical History:   Diagnosis Date   • Anxiety    • Arthritis    • Carotid artery stenosis    • COPD (chronic obstructive pulmonary disease) (CMS/[facility]ly [facility])    • Depression    • Diabetes mellitus (CMS/[facility]ly [facility])    • Disease of thyroid gland    • Dyslipidemia    • GERD (gastroesophageal reflux disease)    • Goiter    • Goiter    • Hiatal hernia    • Hyperlipidemia    • Hypertension    • Infectious viral hepatitis    • Lower back pain    • Migraine      Past Surgical History:   Procedure Laterality Date   • BACK SURGERY     • CHOLECYSTECTOMY     • HYSTERECTOMY     • KNEE SURGERY Left    • WRIST SURGERY Left        Current Outpatient Medications:   •  aclidinium bromide (TUDORZA PRESSAIR) 400 MCG/ACT aerosol powder  powder for inhalation, Inhale 1 puff 2 (Two) Times a Day., Disp: , Rfl:   •  albuterol (PROVENTIL) (2.5 MG/3ML) 0.083% nebulizer solution, Albuterol Sulfate (2.5 MG/3ML) 0.083% Inhalation Nebulization Solution; Patient Sig: Albuterol Sulfate (2.5 MG/3ML) 0.083% Inhalation Nebulization Solution ; 0; 01-Apr-2014; Active, Disp: , Rfl:   •  ALBUTEROL  "SULFATE HFA IN, Albuterol Sulfate HFA AERS; Patient Sig: Albuterol Sulfate HFA AERS ; 0; 01-Apr-2014; Active, Disp: , Rfl:   •  amLODIPine (NORVASC) 10 MG tablet, Take  by mouth 3 (three) times a day., Disp: , Rfl:   •  aspirin 81 MG tablet, Take 1 tablet by mouth daily., Disp: , Rfl:   •  beclomethasone (QVAR) 40 MCG/ACT inhaler, Qvar 40 MCG/ACT Inhalation Aerosol Solution; Patient Sig: Qvar 40 MCG/ACT Inhalation Aerosol Solution ; 0; 01-Apr-2014; Active, Disp: , Rfl:   •  benzonatate (TESSALON) 200 MG capsule, Take  by mouth., Disp: , Rfl:   •  budesonide-formoterol (SYMBICORT) 160-4.5 MCG/ACT inhaler, Inhale 2 puffs 2 (Two) Times a Day., Disp: , Rfl:   •  cetirizine (ZYRTEC ALLERGY) 10 MG tablet, Take 1 tablet by mouth daily., Disp: , Rfl:   •  cilostazol (PLETAL) 50 MG tablet, TAKE ONE TABLET BY MOUTH TWICE A DAY, Disp: 60 tablet, Rfl: 3  •  citalopram (CELEXA) 40 MG tablet, Take 1 tablet by mouth daily., Disp: , Rfl:   •  clopidogrel (PLAVIX) 75 MG tablet, TAKE ONE TABLET BY MOUTH DAILY, Disp: 30 tablet, Rfl: 5  •  fexofenadine (ALLEGRA) 180 MG tablet, Take 180 mg by mouth Daily., Disp: , Rfl:   •  fluconazole (DIFLUCAN) 150 MG tablet, Take 150 mg by mouth 1 (One) Time., Disp: , Rfl:   •  fluticasone (FLONASE) 50 MCG/ACT nasal spray, into each nostril. Use as directed, Disp: , Rfl:   •  HYDROcodone-acetaminophen (LORTAB)  MG per tablet, Take  by mouth., Disp: , Rfl:   •  hydrOXYzine (ATARAX) 25 MG tablet, Take  by mouth., Disp: , Rfl:   •  ibuprofen (ADVIL,MOTRIN) 600 MG tablet, Take 600 mg by mouth Every 6 (Six) Hours As Needed for Mild Pain ., Disp: , Rfl:   •  insulin glargine (LANTUS) 100 UNIT/ML injection, Inject  under the skin Daily., Disp: , Rfl:   •  insulin lispro (HUMALOG) 100 UNIT/ML injection, 8 U ac bkfst, 16 U ac lunch & 18 U ac supper, Disp: 20 mL, Rfl: 5  •  Insulin Syringe-Needle U-100 30G X 5/16\" 0.5 ML misc, Uses X 4/day, Disp: 200 each, Rfl: 5  •  ketotifen (ZADITOR) 0.025 % " ophthalmic solution, 1 drop 2 (Two) Times a Day., Disp: , Rfl:   •  Lactobacillus (ACIDOPHILUS PO), Take  by mouth., Disp: , Rfl:   •  lansoprazole (PREVACID) 30 MG capsule, Take  by mouth., Disp: , Rfl:   •  losartan (COZAAR) 25 MG tablet, Take 1 tablet by mouth daily., Disp: , Rfl:   •  lovastatin (MEVACOR) 20 MG tablet, Take 1 tablet by mouth every night. At bedtime, Disp: , Rfl:   •  metFORMIN (GLUCOPHAGE) 500 MG tablet, Take 1 tablet by mouth daily., Disp: , Rfl:   •  metoprolol succinate XL (TOPROL XL) 50 MG 24 hr tablet, Take 1 tablet by mouth daily., Disp: , Rfl:   •  mometasone-formoterol (DULERA) 100-5 MCG/ACT inhaler, Dulera 100-5 MCG/ACT Inhalation Aerosol; Patient Sig: Dulera 100-5 MCG/ACT Inhalation Aerosol ; 0; 01-Apr-2014; Active, Disp: , Rfl:   •  montelukast (SINGULAIR) 10 MG tablet, Take  by mouth., Disp: , Rfl:   •  NYSTATIN PO, Take  by mouth., Disp: , Rfl:   •  oxybutynin XL (DITROPAN-XL) 5 MG 24 hr tablet, Take  by mouth., Disp: , Rfl:   •  pregabalin (LYRICA) 50 MG capsule, Take  by mouth., Disp: , Rfl:   •  raNITIdine (ZANTAC) 150 MG tablet, Take 150 mg by mouth 2 (Two) Times a Day., Disp: , Rfl:   •  rOPINIRole (REQUIP) 4 MG tablet, Take  by mouth., Disp: , Rfl:   •  tiotropium (SPIRIVA HANDIHALER) 18 MCG per inhalation capsule, Spiriva HandiHaler 18 MCG Inhalation Capsule; Patient Sig: Spiriva HandiHaler 18 MCG Inhalation Capsule ; 0; 01-Apr-2014; Active, Disp: , Rfl:   •  tiZANidine (ZANAFLEX) 4 MG tablet, Take  by mouth., Disp: , Rfl:   •  vitamin B-12 (CYANOCOBALAMIN) 1000 MCG tablet, Take 1,000 mcg by mouth Daily., Disp: , Rfl:   •  gabapentin (NEURONTIN) 600 MG tablet, Take  by mouth., Disp: , Rfl:   •  lactulose (CHRONULAC) 10 GM/15ML solution, Take 20 g by mouth 2 (Two) Times a Day As Needed., Disp: , Rfl:   •  theophylline (UNIPHYL) 400 MG 24 hr tablet, Take 1 tablet by mouth daily. As directed, Disp: , Rfl:   Allergies   Allergen Reactions   • Codeine Nausea Only   • Penicillins  Nausea And Vomiting     Social History     Socioeconomic History   • Marital status:      Spouse name: Not on file   • Number of children: 2   • Years of education: Not on file   • Highest education level: Not on file   Occupational History   • 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     Employer: DISABLED     Comment: Back and Leg Pain   Tobacco Use   • Smoking status: Current Every Day Smoker     Packs/day: 1.50     Years: 25.00     Pack years: 37.50     Types: Cigarettes   • Smokeless tobacco: Never Used   Substance and Sexual Activity   • Alcohol use: No   • Drug use: No   Social History Narrative    L[facility] in [facility]     Family History   Problem Relation Age of Onset   • Diabetes Mother    • Hypertension Mother    • Hypertension Father    • Lung cancer Father    • Stroke Other      Review of Systems   Constitution: Positive for malaise/fatigue and weight loss. Negative for chills, diaphoresis and fever.   HENT: Negative for congestion, hearing loss, hoarse voice and sore throat.    Eyes: Negative for blurred vision and double vision.   Cardiovascular: Positive for claudication, leg swelling and palpitations. Negative for chest pain, dyspnea on exertion and syncope.   Respiratory: Positive for cough and shortness of breath. Negative for hemoptysis and wheezing.    Hematologic/Lymphatic: Does not bruise/bleed easily.   Skin: Negative for itching, poor wound healing and rash.   Musculoskeletal: Positive for back pain, falls and muscle cramps. Negative for arthritis, joint pain, joint swelling, muscle weakness and neck pain.   Gastrointestinal: Positive for nausea and vomiting. Negative for abdominal pain, constipation, diarrhea and hematemesis.   Genitourinary: Negative for dysuria, frequency, hematuria, hesitancy, incomplete emptying and urgency.   Neurological: Positive for loss of balance and numbness. Negative for dizziness, headaches, light-headedness and vertigo.   Psychiatric/Behavioral: Negative for  "depression, memory loss and substance abuse. The patient is not nervous/anxious.    Allergic/Immunologic: Negative for environmental allergies and HIV exposure.     Vitals:    07/10/19 0803   BP: 130/64   Pulse: 97   SpO2: 99%   Weight: 59 kg (130 lb)   Height: 160 cm (63\")      Physical Exam   Neck: Normal range of motion. Neck supple. No JVD present. No tracheal deviation present. No thyromegaly present.   Cardiovascular: Normal rate and regular rhythm. Exam reveals no gallop and no friction rub.   No murmur heard.  Pulmonary/Chest: No stridor. No respiratory distress. She has no wheezes. She has no rales. She exhibits no tenderness.   Abdominal: Soft. Bowel sounds are normal. There is no tenderness.   Lymphadenopathy:     She has no cervical adenopathy.     Assessment/Plan:   The patient returns today for follow-up of her carotid stenosis.  She continues to smoke.  I spent 3 to 5 minutes talking to her about the importance of smoking cessation.  Her carotid duplex looks satisfactory.  At this point, I will continue to follow her and see her back in 1 year with a PV arterial and carotid duplex.    Patient Active Problem List   Diagnosis   • PVD (peripheral vascular disease) (CMS/HCC)   • Lumbar radiculopathy   • Paresthesia   • Causalgia of lower limb   • Hiatal hernia   • Current smoker   • Bilateral carotid artery stenosis     CC: LUIS Bravo, , editing for Deandre Oseguera M.D.    I, Deandre Oseguera MD, have read and agree with the editing done by Tete Whittaker, .  "

## 2020-01-06 ENCOUNTER — HOSPITAL ENCOUNTER (OUTPATIENT)
Dept: CARDIOLOGY | Facility: HOSPITAL | Age: 50
Discharge: HOME | End: 2020-01-06

## 2020-01-06 ENCOUNTER — OFFICE VISIT (OUTPATIENT)
Dept: CARDIAC SURGERY | Facility: CLINIC | Age: 50
End: 2020-01-06

## 2020-01-06 VITALS
DIASTOLIC BLOOD PRESSURE: 70 MMHG | HEIGHT: 63 IN | HEART RATE: 71 BPM | BODY MASS INDEX: 20.91 KG/M2 | WEIGHT: 118 LBS | OXYGEN SATURATION: 98 % | SYSTOLIC BLOOD PRESSURE: 118 MMHG | TEMPERATURE: 98.6 F

## 2020-01-06 DIAGNOSIS — Z00.6 ENCOUNTER FOR EXAMINATION FOR NORMAL COMPARISON OR CONTROL IN CLINICAL RESEARCH PROGRAM: ICD-10-CM

## 2020-01-06 DIAGNOSIS — I73.9 PVD (PERIPHERAL VASCULAR DISEASE) (HCC): Primary | ICD-10-CM

## 2020-01-06 DIAGNOSIS — Z00.6 EXAMINATION FOR NORMAL COMPARISON OR CONTROL IN CLINICAL RESEARCH: ICD-10-CM

## 2020-01-06 PROCEDURE — 99212 OFFICE O/P EST SF 10 MIN: CPT | Performed by: THORACIC SURGERY (CARDIOTHORACIC VASCULAR SURGERY)

## 2020-01-06 PROCEDURE — 99406 BEHAV CHNG SMOKING 3-10 MIN: CPT | Performed by: THORACIC SURGERY (CARDIOTHORACIC VASCULAR SURGERY)

## 2020-01-06 RX ORDER — LOVASTATIN 40 MG/1
40 TABLET ORAL NIGHTLY
COMMUNITY
Start: 2019-12-09

## 2020-01-06 RX ORDER — BENZONATATE 100 MG/1
100 CAPSULE ORAL 3 TIMES DAILY PRN
COMMUNITY
Start: 2019-12-13

## 2020-01-06 RX ORDER — ASPIRIN 81 MG/1
TABLET ORAL
Status: ON HOLD | COMMUNITY
Start: 2019-12-27 | End: 2020-01-17

## 2020-01-06 RX ORDER — LANCETS 33 GAUGE
EACH MISCELLANEOUS
COMMUNITY
Start: 2019-10-14

## 2020-01-06 RX ORDER — LEVOCETIRIZINE DIHYDROCHLORIDE 5 MG/1
TABLET, FILM COATED ORAL
Status: ON HOLD | COMMUNITY
Start: 2019-12-13 | End: 2020-01-17

## 2020-01-06 RX ORDER — ERGOCALCIFEROL 1.25 MG/1
50000 CAPSULE ORAL WEEKLY
COMMUNITY
Start: 2020-01-04

## 2020-01-06 RX ORDER — OMEGA-3-ACID ETHYL ESTERS 1 G/1
2 CAPSULE, LIQUID FILLED ORAL 2 TIMES DAILY
COMMUNITY
Start: 2019-12-22

## 2020-01-06 RX ORDER — TIOTROPIUM BROMIDE INHALATION SPRAY 3.12 UG/1
2 SPRAY, METERED RESPIRATORY (INHALATION)
COMMUNITY
Start: 2019-12-27

## 2020-01-06 RX ORDER — PREGABALIN 150 MG/1
150 CAPSULE ORAL 3 TIMES DAILY
COMMUNITY
Start: 2019-12-30 | End: 2021-04-13 | Stop reason: SDUPTHER

## 2020-01-06 RX ORDER — PANTOPRAZOLE SODIUM 40 MG/1
40 TABLET, DELAYED RELEASE ORAL DAILY
COMMUNITY
Start: 2019-12-27

## 2020-01-06 RX ORDER — CYANOCOBALAMIN 1000 UG/ML
1000 INJECTION, SOLUTION INTRAMUSCULAR; SUBCUTANEOUS
COMMUNITY
Start: 2019-12-07

## 2020-01-06 NOTE — H&P (VIEW-ONLY)
01/06/2020  Patient Information  Saskia Groves                                                                                          [address] [facility] [address]   1970  'PCP/Referring Physician'  Meagan Barlow, APRN  [phone]  No ref. provider found    Chief Complaint   Patient presents with   • Follow-up     F/U had a aortagram on 11/15/19 for CAD. Pt states that her legs are hard to use, they feel heavy and are very painful.  Pt is seeking surgery consult.        History of Present Illness:   The patient is a 49-year-old female who gives a history recently of increasing claudication of the left leg greater than the right.  However, both legs continue to bother her.  She is smoking 1/2 packs a day of cigarettes.  She apparently was seen by Dr. Donaldson and referred to Dr. Zamorano.  He apparently tried to cross the right SFA occlusion but was unsuccessful.  He tried to refer to another doctor for bypass surgery but she has been followed by me in the past and has come back to see me.      Patient Active Problem List   Diagnosis   • PVD (peripheral vascular disease) (CMS/HCC)   • Lumbar radiculopathy   • Paresthesia   • Causalgia of lower limb   • Hiatal hernia   • Current smoker   • Bilateral carotid artery stenosis     Past Medical History:   Diagnosis Date   • Anxiety    • Arthritis    • Carotid artery stenosis    • COPD (chronic obstructive pulmonary disease) (CMS/HCC)    • Depression    • Diabetes mellitus (CMS/HCC)    • Disease of thyroid gland    • Dyslipidemia    • GERD (gastroesophageal reflux disease)    • Goiter    • Goiter    • Hiatal hernia    • Hyperlipidemia    • Hypertension    • Infectious viral hepatitis    • Lower back pain    • Migraine      Past Surgical History:   Procedure Laterality Date   • BACK SURGERY     • CHOLECYSTECTOMY     • HYSTERECTOMY     • KNEE SURGERY Left    • WRIST SURGERY Left        Current Outpatient Medications:   •  albuterol (PROVENTIL) (2.5 MG/3ML)  "0.083% nebulizer solution, Albuterol Sulfate (2.5 MG/3ML) 0.083% Inhalation Nebulization Solution; Patient Sig: Albuterol Sulfate (2.5 MG/3ML) 0.083% Inhalation Nebulization Solution ; 0; 01-Apr-2014; Active, Disp: , Rfl:   •  ALBUTEROL SULFATE HFA IN, Albuterol Sulfate HFA AERS; Patient Sig: Albuterol Sulfate HFA AERS ; 0; 01-Apr-2014; Active, Disp: , Rfl:   •  amLODIPine (NORVASC) 10 MG tablet, Take  by mouth 3 (three) times a day., Disp: , Rfl:   •  aspirin 81 MG tablet, Take 1 tablet by mouth daily., Disp: , Rfl:   •  beclomethasone (QVAR) 40 MCG/ACT inhaler, Qvar 40 MCG/ACT Inhalation Aerosol Solution; Patient Sig: Qvar 40 MCG/ACT Inhalation Aerosol Solution ; 0; 01-Apr-2014; Active, Disp: , Rfl:   •  benzonatate (TESSALON) 200 MG capsule, Take  by mouth., Disp: , Rfl:   •  cetirizine (ZYRTEC ALLERGY) 10 MG tablet, Take 1 tablet by mouth daily., Disp: , Rfl:   •  citalopram (CELEXA) 40 MG tablet, Take 1 tablet by mouth daily., Disp: , Rfl:   •  clopidogrel (PLAVIX) 75 MG tablet, TAKE ONE TABLET BY MOUTH DAILY, Disp: 30 tablet, Rfl: 5  •  cyanocobalamin 1000 MCG/ML injection, , Disp: , Rfl:   •  fexofenadine (ALLEGRA) 180 MG tablet, Take 180 mg by mouth Daily., Disp: , Rfl:   •  fluticasone (FLONASE) 50 MCG/ACT nasal spray, into each nostril. Use as directed, Disp: , Rfl:   •  fluticasone-salmeterol (ADVAIR) 250-50 MCG/DOSE DISKUS, , Disp: , Rfl:   •  HYDROcodone-acetaminophen (LORTAB)  MG per tablet, Take  by mouth., Disp: , Rfl:   •  hydrOXYzine (ATARAX) 25 MG tablet, Take  by mouth., Disp: , Rfl:   •  insulin glargine (LANTUS) 100 UNIT/ML injection, Inject  under the skin Daily., Disp: , Rfl:   •  Insulin Syringe-Needle U-100 30G X 5/16\" 0.5 ML misc, Uses X 4/day, Disp: 200 each, Rfl: 5  •  ketotifen (ZADITOR) 0.025 % ophthalmic solution, 1 drop 2 (Two) Times a Day., Disp: , Rfl:   •  Lactobacillus (ACIDOPHILUS PO), Take  by mouth., Disp: , Rfl:   •  lactulose (CHRONULAC) 10 GM/15ML solution, Take 20 g " by mouth 2 (Two) Times a Day As Needed., Disp: , Rfl:   •  Lancets (ONETOUCH DELICA PLUS [id number]) misc, , Disp: , Rfl:   •  lansoprazole (PREVACID) 30 MG capsule, Take  by mouth., Disp: , Rfl:   •  levocetirizine (XYZAL) 5 MG tablet, , Disp: , Rfl:   •  lovastatin (MEVACOR) 20 MG tablet, Take 1 tablet by mouth every night. At bedtime, Disp: , Rfl:   •  lovastatin (MEVACOR) 40 MG tablet, , Disp: , Rfl:   •  metFORMIN (GLUCOPHAGE) 500 MG tablet, Take 1 tablet by mouth daily., Disp: , Rfl:   •  metoprolol succinate XL (TOPROL XL) 50 MG 24 hr tablet, Take 12 tablets by mouth Daily., Disp: , Rfl:   •  montelukast (SINGULAIR) 10 MG tablet, Take  by mouth., Disp: , Rfl:   •  omega-3 acid ethyl esters (LOVAZA) 1 g capsule, , Disp: , Rfl:   •  ONE TOUCH ULTRA TEST test strip, , Disp: , Rfl:   •  oxybutynin XL (DITROPAN-XL) 5 MG 24 hr tablet, Take  by mouth., Disp: , Rfl:   •  pantoprazole (PROTONIX) 40 MG EC tablet, , Disp: , Rfl:   •  pregabalin (LYRICA) 150 MG capsule, , Disp: , Rfl:   •  pregabalin (LYRICA) 50 MG capsule, Take  by mouth., Disp: , Rfl:   •  raNITIdine (ZANTAC) 150 MG tablet, Take 150 mg by mouth 2 (Two) Times a Day., Disp: , Rfl:   •  rOPINIRole (REQUIP) 4 MG tablet, Take  by mouth., Disp: , Rfl:   •  SPIRIVA RESPIMAT 2.5 MCG/ACT aerosol solution inhaler, , Disp: , Rfl:   •  tiotropium (SPIRIVA HANDIHALER) 18 MCG per inhalation capsule, Spiriva HandiHaler 18 MCG Inhalation Capsule; Patient Sig: Spiriva HandiHaler 18 MCG Inhalation Capsule ; 0; 01-Apr-2014; Active, Disp: , Rfl:   •  tiZANidine (ZANAFLEX) 4 MG tablet, Take  by mouth., Disp: , Rfl:   •  vitamin B-12 (CYANOCOBALAMIN) 1000 MCG tablet, Take 1,000 mcg by mouth Daily., Disp: , Rfl:   •  vitamin D (ERGOCALCIFEROL) 1.25 MG (86280 UT) capsule capsule, , Disp: , Rfl:   •  aclidinium bromide (TUDORZA PRESSAIR) 400 MCG/ACT aerosol powder  powder for inhalation, Inhale 1 puff 2 (Two) Times a Day., Disp: , Rfl:   •  ASPIRIN ADULT LOW STRENGTH 81 MG EC  tablet, , Disp: , Rfl:   •  benzonatate (TESSALON) 100 MG capsule, , Disp: , Rfl:   •  budesonide-formoterol (SYMBICORT) 160-4.5 MCG/ACT inhaler, Inhale 2 puffs 2 (Two) Times a Day., Disp: , Rfl:   •  cilostazol (PLETAL) 50 MG tablet, TAKE ONE TABLET BY MOUTH TWICE A DAY, Disp: 60 tablet, Rfl: 3  •  fluconazole (DIFLUCAN) 150 MG tablet, Take 150 mg by mouth 1 (One) Time., Disp: , Rfl:   •  gabapentin (NEURONTIN) 600 MG tablet, Take  by mouth., Disp: , Rfl:   •  ibuprofen (ADVIL,MOTRIN) 600 MG tablet, Take 600 mg by mouth Every 6 (Six) Hours As Needed for Mild Pain ., Disp: , Rfl:   •  insulin lispro (HUMALOG) 100 UNIT/ML injection, 8 U ac bkfst, 16 U ac lunch & 18 U ac supper, Disp: 20 mL, Rfl: 5  •  losartan (COZAAR) 25 MG tablet, Take 1 tablet by mouth daily., Disp: , Rfl:   •  mometasone-formoterol (DULERA) 100-5 MCG/ACT inhaler, Dulera 100-5 MCG/ACT Inhalation Aerosol; Patient Sig: Dulera 100-5 MCG/ACT Inhalation Aerosol ; 0; 01-Apr-2014; Active, Disp: , Rfl:   •  NYSTATIN PO, Take  by mouth., Disp: , Rfl:   •  theophylline (UNIPHYL) 400 MG 24 hr tablet, Take 1 tablet by mouth daily. 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     Employer: DISABLED     Comment: Back and Leg Pain   Tobacco Use   • Smoking status: Current Every Day Smoker     Packs/day: 1.00     Years: 25.00     Pack years: 25.00     Types: Cigarettes   • Smokeless tobacco: Never Used   Substance and Sexual Activity   • Alcohol use: No   • Drug use: No   Social History Narrative    Lives in [place], [place] alone     Family History   Problem Relation Age of Onset   •  "Diabetes Mother    • Hypertension Mother    • Hypertension Father    • Lung cancer Father    • Stroke Other      Review of Systems   Constitution: Positive for malaise/fatigue and night sweats. Negative for chills, fever and weight loss.   HENT: Positive for odynophagia. Negative for congestion, hearing loss and nosebleeds.    Cardiovascular: Positive for dyspnea on exertion, irregular heartbeat and leg swelling (bilateral leg the left is the worse). Negative for chest pain, claudication, orthopnea, palpitations and syncope.   Respiratory: Positive for cough. Negative for hemoptysis, shortness of breath and wheezing.    Endocrine: Positive for cold intolerance and heat intolerance. Negative for polydipsia, polyphagia and polyuria.   Hematologic/Lymphatic: Does not bruise/bleed easily.   Skin: Positive for poor wound healing. Negative for itching and rash.   Musculoskeletal: Positive for arthritis, back pain and joint pain. Negative for joint swelling and myalgias.   Gastrointestinal: Positive for diarrhea. Negative for abdominal pain, constipation, hematemesis, melena, nausea and vomiting.   Genitourinary: Negative for dysuria, frequency, hematuria, nocturia and urgency.   Neurological: Positive for loss of balance and numbness. Negative for dizziness and light-headedness.   Psychiatric/Behavioral: Positive for memory loss. Negative for depression and suicidal ideas. The patient is not nervous/anxious.    Allergic/Immunologic: Negative for environmental allergies and HIV exposure.     Vitals:    01/06/20 1542   BP: 118/70   Pulse: 71   Temp: 98.6 °F (37 °C)   TempSrc: Temporal   SpO2: 98%   Weight: 53.5 kg (118 lb)   Height: 160 cm (63\")      Physical Exam   Neck: Normal range of motion. Neck supple. No JVD present. No tracheal deviation present. No thyromegaly present.   Cardiovascular: Normal rate and regular rhythm. Exam reveals no gallop and no friction rub.   No murmur heard.  Pulses:       Dorsalis pedis " pulses are 0 on the right side, and 0 on the left side.        Posterior tibial pulses are 0 on the right side, and 0 on the left side.   Pulmonary/Chest: No stridor. No respiratory distress. She has no wheezes. She has no rales. She exhibits no tenderness.   Abdominal: Soft. Bowel sounds are normal. There is no tenderness.   Lymphadenopathy:     She has no cervical adenopathy.     Assessment/Plan:   The patient returns today with a new problem.  The patient appears to have decreased pulses bilaterally.  She has claudication, left greater than right.  I have obtained her abdominal aortogram which demonstrates bilateral common femoral arteries to be occluded and the iliacs bilaterally are occluded.  I recommended a right femoral endarterectomy with fem-fem bypass.  I discussed the procedure, risks and benefits including risk to her life, bleeding, infection as well as other risk factors.  She agreed to proceed with the procedure.  I extensively discussed consequences of smoking, namely cardiovascular, metabolic, lung cancer, mouth cancer, pulmonary disorder including COPD and the reduced quality of life.  At the end of the conversation, the patient voices understanding of the risks involved in smoking, and also understands the benefits of quitting.  During this visit, I spent 3-5 minutes counseling the patient regarding smoking cessation.    Patient Active Problem List   Diagnosis   • PVD (peripheral vascular disease) (CMS/HCC)   • Lumbar radiculopathy   • Paresthesia   • Causalgia of lower limb   • Hiatal hernia   • Current smoker   • Bilateral carotid artery stenosis     CC: LUIS Bravo, , editing for Deandre Oseguera M.D.    I, Deandre Oseguera MD, have read and agree with the editing done by Tete Whittaker, .

## 2020-01-06 NOTE — PROGRESS NOTES
01/06/2020  Patient Information  Saskia Groves                                                                                          [address] [facility] [address]   1970  'PCP/Referring Physician'  Meagan Barlow, APRN  [phone]  No ref. provider found    Chief Complaint   Patient presents with   • Follow-up     F/U had a aortagram on 11/15/19 for CAD. Pt states that her legs are hard to use, they feel heavy and are very painful.  Pt is seeking surgery consult.        History of Present Illness:   The patient is a 49-year-old female who gives a history recently of increasing claudication of the left leg greater than the right.  However, both legs continue to bother her.  She is smoking 1/2 packs a day of cigarettes.  She apparently was seen by Dr. Donaldson and referred to Dr. Zamorano.  He apparently tried to cross the right SFA occlusion but was unsuccessful.  He tried to refer to another doctor for bypass surgery but she has been followed by me in the past and has come back to see me.      Patient Active Problem List   Diagnosis   • PVD (peripheral vascular disease) (CMS/HCC)   • Lumbar radiculopathy   • Paresthesia   • Causalgia of lower limb   • Hiatal hernia   • Current smoker   • Bilateral carotid artery stenosis     Past Medical History:   Diagnosis Date   • Anxiety    • Arthritis    • Carotid artery stenosis    • COPD (chronic obstructive pulmonary disease) (CMS/HCC)    • Depression    • Diabetes mellitus (CMS/HCC)    • Disease of thyroid gland    • Dyslipidemia    • GERD (gastroesophageal reflux disease)    • Goiter    • Goiter    • Hiatal hernia    • Hyperlipidemia    • Hypertension    • Infectious viral hepatitis    • Lower back pain    • Migraine      Past Surgical History:   Procedure Laterality Date   • BACK SURGERY     • CHOLECYSTECTOMY     • HYSTERECTOMY     • KNEE SURGERY Left    • WRIST SURGERY Left        Current Outpatient Medications:   •  albuterol (PROVENTIL) (2.5 MG/3ML)  "0.083% nebulizer solution, Albuterol Sulfate (2.5 MG/3ML) 0.083% Inhalation Nebulization Solution; Patient Sig: Albuterol Sulfate (2.5 MG/3ML) 0.083% Inhalation Nebulization Solution ; 0; 01-Apr-2014; Active, Disp: , Rfl:   •  ALBUTEROL SULFATE HFA IN, Albuterol Sulfate HFA AERS; Patient Sig: Albuterol Sulfate HFA AERS ; 0; 01-Apr-2014; Active, Disp: , Rfl:   •  amLODIPine (NORVASC) 10 MG tablet, Take  by mouth 3 (three) times a day., Disp: , Rfl:   •  aspirin 81 MG tablet, Take 1 tablet by mouth daily., Disp: , Rfl:   •  beclomethasone (QVAR) 40 MCG/ACT inhaler, Qvar 40 MCG/ACT Inhalation Aerosol Solution; Patient Sig: Qvar 40 MCG/ACT Inhalation Aerosol Solution ; 0; 01-Apr-2014; Active, Disp: , Rfl:   •  benzonatate (TESSALON) 200 MG capsule, Take  by mouth., Disp: , Rfl:   •  cetirizine (ZYRTEC ALLERGY) 10 MG tablet, Take 1 tablet by mouth daily., Disp: , Rfl:   •  citalopram (CELEXA) 40 MG tablet, Take 1 tablet by mouth daily., Disp: , Rfl:   •  clopidogrel (PLAVIX) 75 MG tablet, TAKE ONE TABLET BY MOUTH DAILY, Disp: 30 tablet, Rfl: 5  •  cyanocobalamin 1000 MCG/ML injection, , Disp: , Rfl:   •  fexofenadine (ALLEGRA) 180 MG tablet, Take 180 mg by mouth Daily., Disp: , Rfl:   •  fluticasone (FLONASE) 50 MCG/ACT nasal spray, into each nostril. Use as directed, Disp: , Rfl:   •  fluticasone-salmeterol (ADVAIR) 250-50 MCG/DOSE DISKUS, , Disp: , Rfl:   •  HYDROcodone-acetaminophen (LORTAB)  MG per tablet, Take  by mouth., Disp: , Rfl:   •  hydrOXYzine (ATARAX) 25 MG tablet, Take  by mouth., Disp: , Rfl:   •  insulin glargine (LANTUS) 100 UNIT/ML injection, Inject  under the skin Daily., Disp: , Rfl:   •  Insulin Syringe-Needle U-100 30G X 5/16\" 0.5 ML misc, Uses X 4/day, Disp: 200 each, Rfl: 5  •  ketotifen (ZADITOR) 0.025 % ophthalmic solution, 1 drop 2 (Two) Times a Day., Disp: , Rfl:   •  Lactobacillus (ACIDOPHILUS PO), Take  by mouth., Disp: , Rfl:   •  lactulose (CHRONULAC) 10 GM/15ML solution, Take 20 g " by mouth 2 (Two) Times a Day As Needed., Disp: , Rfl:   •  Lancets (ONETOUCH DELICA PLUS [id number]) misc, , Disp: , Rfl:   •  lansoprazole (PREVACID) 30 MG capsule, Take  by mouth., Disp: , Rfl:   •  levocetirizine (XYZAL) 5 MG tablet, , Disp: , Rfl:   •  lovastatin (MEVACOR) 20 MG tablet, Take 1 tablet by mouth every night. At bedtime, Disp: , Rfl:   •  lovastatin (MEVACOR) 40 MG tablet, , Disp: , Rfl:   •  metFORMIN (GLUCOPHAGE) 500 MG tablet, Take 1 tablet by mouth daily., Disp: , Rfl:   •  metoprolol succinate XL (TOPROL XL) 50 MG 24 hr tablet, Take 12 tablets by mouth Daily., Disp: , Rfl:   •  montelukast (SINGULAIR) 10 MG tablet, Take  by mouth., Disp: , Rfl:   •  omega-3 acid ethyl esters (LOVAZA) 1 g capsule, , Disp: , Rfl:   •  ONE TOUCH ULTRA TEST test strip, , Disp: , Rfl:   •  oxybutynin XL (DITROPAN-XL) 5 MG 24 hr tablet, Take  by mouth., Disp: , Rfl:   •  pantoprazole (PROTONIX) 40 MG EC tablet, , Disp: , Rfl:   •  pregabalin (LYRICA) 150 MG capsule, , Disp: , Rfl:   •  pregabalin (LYRICA) 50 MG capsule, Take  by mouth., Disp: , Rfl:   •  raNITIdine (ZANTAC) 150 MG tablet, Take 150 mg by mouth 2 (Two) Times a Day., Disp: , Rfl:   •  rOPINIRole (REQUIP) 4 MG tablet, Take  by mouth., Disp: , Rfl:   •  SPIRIVA RESPIMAT 2.5 MCG/ACT aerosol solution inhaler, , Disp: , Rfl:   •  tiotropium (SPIRIVA HANDIHALER) 18 MCG per inhalation capsule, Spiriva HandiHaler 18 MCG Inhalation Capsule; Patient Sig: Spiriva HandiHaler 18 MCG Inhalation Capsule ; 0; 01-Apr-2014; Active, Disp: , Rfl:   •  tiZANidine (ZANAFLEX) 4 MG tablet, Take  by mouth., Disp: , Rfl:   •  vitamin B-12 (CYANOCOBALAMIN) 1000 MCG tablet, Take 1,000 mcg by mouth Daily., Disp: , Rfl:   •  vitamin D (ERGOCALCIFEROL) 1.25 MG (24999 UT) capsule capsule, , Disp: , Rfl:   •  aclidinium bromide (TUDORZA PRESSAIR) 400 MCG/ACT aerosol powder  powder for inhalation, Inhale 1 puff 2 (Two) Times a Day., Disp: , Rfl:   •  ASPIRIN ADULT LOW STRENGTH 81 MG EC  tablet, , Disp: , Rfl:   •  benzonatate (TESSALON) 100 MG capsule, , Disp: , Rfl:   •  budesonide-formoterol (SYMBICORT) 160-4.5 MCG/ACT inhaler, Inhale 2 puffs 2 (Two) Times a Day., Disp: , Rfl:   •  cilostazol (PLETAL) 50 MG tablet, TAKE ONE TABLET BY MOUTH TWICE A DAY, Disp: 60 tablet, Rfl: 3  •  fluconazole (DIFLUCAN) 150 MG tablet, Take 150 mg by mouth 1 (One) Time., Disp: , Rfl:   •  gabapentin (NEURONTIN) 600 MG tablet, Take  by mouth., Disp: , Rfl:   •  ibuprofen (ADVIL,MOTRIN) 600 MG tablet, Take 600 mg by mouth Every 6 (Six) Hours As Needed for Mild Pain ., Disp: , Rfl:   •  insulin lispro (HUMALOG) 100 UNIT/ML injection, 8 U ac bkfst, 16 U ac lunch & 18 U ac supper, Disp: 20 mL, Rfl: 5  •  losartan (COZAAR) 25 MG tablet, Take 1 tablet by mouth daily., Disp: , Rfl:   •  mometasone-formoterol (DULERA) 100-5 MCG/ACT inhaler, Dulera 100-5 MCG/ACT Inhalation Aerosol; Patient Sig: Dulera 100-5 MCG/ACT Inhalation Aerosol ; 0; 01-Apr-2014; Active, Disp: , Rfl:   •  NYSTATIN PO, Take  by mouth., Disp: , Rfl:   •  theophylline (UNIPHYL) 400 MG 24 hr tablet, Take 1 tablet by mouth daily. 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     Employer: DISABLED     Comment: Back and Leg Pain   Tobacco Use   • Smoking status: Current Every Day Smoker     Packs/day: 1.00     Years: 25.00     Pack years: 25.00     Types: Cigarettes   • Smokeless tobacco: Never Used   Substance and Sexual Activity   • Alcohol use: No   • Drug use: No   Social History Narrative    Lives in [place], [place] alone     Family History   Problem Relation Age of Onset   •  "Diabetes Mother    • Hypertension Mother    • Hypertension Father    • Lung cancer Father    • Stroke Other      Review of Systems   Constitution: Positive for malaise/fatigue and night sweats. Negative for chills, fever and weight loss.   HENT: Positive for odynophagia. Negative for congestion, hearing loss and nosebleeds.    Cardiovascular: Positive for dyspnea on exertion, irregular heartbeat and leg swelling (bilateral leg the left is the worse). Negative for chest pain, claudication, orthopnea, palpitations and syncope.   Respiratory: Positive for cough. Negative for hemoptysis, shortness of breath and wheezing.    Endocrine: Positive for cold intolerance and heat intolerance. Negative for polydipsia, polyphagia and polyuria.   Hematologic/Lymphatic: Does not bruise/bleed easily.   Skin: Positive for poor wound healing. Negative for itching and rash.   Musculoskeletal: Positive for arthritis, back pain and joint pain. Negative for joint swelling and myalgias.   Gastrointestinal: Positive for diarrhea. Negative for abdominal pain, constipation, hematemesis, melena, nausea and vomiting.   Genitourinary: Negative for dysuria, frequency, hematuria, nocturia and urgency.   Neurological: Positive for loss of balance and numbness. Negative for dizziness and light-headedness.   Psychiatric/Behavioral: Positive for memory loss. Negative for depression and suicidal ideas. The patient is not nervous/anxious.    Allergic/Immunologic: Negative for environmental allergies and HIV exposure.     Vitals:    01/06/20 1542   BP: 118/70   Pulse: 71   Temp: 98.6 °F (37 °C)   TempSrc: Temporal   SpO2: 98%   Weight: 53.5 kg (118 lb)   Height: 160 cm (63\")      Physical Exam   Neck: Normal range of motion. Neck supple. No JVD present. No tracheal deviation present. No thyromegaly present.   Cardiovascular: Normal rate and regular rhythm. Exam reveals no gallop and no friction rub.   No murmur heard.  Pulses:       Dorsalis pedis " pulses are 0 on the right side, and 0 on the left side.        Posterior tibial pulses are 0 on the right side, and 0 on the left side.   Pulmonary/Chest: No stridor. No respiratory distress. She has no wheezes. She has no rales. She exhibits no tenderness.   Abdominal: Soft. Bowel sounds are normal. There is no tenderness.   Lymphadenopathy:     She has no cervical adenopathy.     Assessment/Plan:   The patient returns today with a new problem.  The patient appears to have decreased pulses bilaterally.  She has claudication, left greater than right.  I have obtained her abdominal aortogram which demonstrates bilateral common femoral arteries to be occluded and the iliacs bilaterally are occluded.  I recommended a right femoral endarterectomy with fem-fem bypass.  I discussed the procedure, risks and benefits including risk to her life, bleeding, infection as well as other risk factors.  She agreed to proceed with the procedure.  I extensively discussed consequences of smoking, namely cardiovascular, metabolic, lung cancer, mouth cancer, pulmonary disorder including COPD and the reduced quality of life.  At the end of the conversation, the patient voices understanding of the risks involved in smoking, and also understands the benefits of quitting.  During this visit, I spent 3-5 minutes counseling the patient regarding smoking cessation.    Patient Active Problem List   Diagnosis   • PVD (peripheral vascular disease) (CMS/HCC)   • Lumbar radiculopathy   • Paresthesia   • Causalgia of lower limb   • Hiatal hernia   • Current smoker   • Bilateral carotid artery stenosis     CC: LUIS Bravo, , editing for Deandre Oseguera M.D.    I, Deandre Oseguera MD, have read and agree with the editing done by Tete Whittaker, .

## 2020-01-07 DIAGNOSIS — I73.9 PVD (PERIPHERAL VASCULAR DISEASE) (HCC): Primary | ICD-10-CM

## 2020-01-09 ENCOUNTER — PREP FOR SURGERY (OUTPATIENT)
Dept: OTHER | Facility: HOSPITAL | Age: 50
End: 2020-01-09

## 2020-01-09 DIAGNOSIS — I73.9 PVD (PERIPHERAL VASCULAR DISEASE) (HCC): Primary | ICD-10-CM

## 2020-01-09 RX ORDER — CHLORHEXIDINE GLUCONATE 500 MG/1
1 CLOTH TOPICAL EVERY 12 HOURS PRN
Status: CANCELLED | OUTPATIENT
Start: 2020-01-14

## 2020-01-14 ENCOUNTER — HOSPITAL ENCOUNTER (OUTPATIENT)
Dept: GENERAL RADIOLOGY | Facility: HOSPITAL | Age: 50
Discharge: HOME | End: 2020-01-14
Admitting: PHYSICIAN ASSISTANT

## 2020-01-14 ENCOUNTER — APPOINTMENT (OUTPATIENT)
Dept: PREADMISSION TESTING | Facility: HOSPITAL | Age: 50
End: 2020-01-14

## 2020-01-14 VITALS — HEIGHT: 63 IN | BODY MASS INDEX: 21.4 KG/M2 | OXYGEN SATURATION: 100 % | WEIGHT: 120.8 LBS

## 2020-01-14 DIAGNOSIS — I73.9 PVD (PERIPHERAL VASCULAR DISEASE) (HCC): ICD-10-CM

## 2020-01-14 LAB
ABO GROUP BLD: NORMAL
ANION GAP SERPL CALCULATED.3IONS-SCNC: 9 MMOL/L (ref 5–15)
APTT PPP: 27.7 SECONDS (ref 24–37)
BLD GP AB SCN SERPL QL: NEGATIVE
BUN BLD-MCNC: 13 MG/DL (ref 6–20)
BUN/CREAT SERPL: 21.7 (ref 7–25)
CALCIUM SPEC-SCNC: 9.8 MG/DL (ref 8.6–10.5)
CHLORIDE SERPL-SCNC: 98 MMOL/L (ref 98–107)
CO2 SERPL-SCNC: 27 MMOL/L (ref 22–29)
CREAT BLD-MCNC: 0.6 MG/DL (ref 0.57–1)
DEPRECATED RDW RBC AUTO: 41.3 FL (ref 37–54)
ERYTHROCYTE [DISTWIDTH] IN BLOOD BY AUTOMATED COUNT: 12 % (ref 12.3–15.4)
GFR SERPL CREATININE-BSD FRML MDRD: 106 ML/MIN/1.73
GLUCOSE BLD-MCNC: 424 MG/DL (ref 65–99)
HBA1C MFR BLD: 12.1 % (ref 4.8–5.6)
HCT VFR BLD AUTO: 41.2 % (ref 34–46.6)
HGB BLD-MCNC: 13.8 G/DL (ref 12–15.9)
INR PPP: 1.02 (ref 0.85–1.16)
MCH RBC QN AUTO: 31.2 PG (ref 26.6–33)
MCHC RBC AUTO-ENTMCNC: 33.5 G/DL (ref 31.5–35.7)
MCV RBC AUTO: 93 FL (ref 79–97)
PLATELET # BLD AUTO: 157 10*3/MM3 (ref 140–450)
PMV BLD AUTO: 11.1 FL (ref 6–12)
POTASSIUM BLD-SCNC: 5.2 MMOL/L (ref 3.5–5.2)
PROTHROMBIN TIME: 12.9 SECONDS (ref 11.2–14.3)
RBC # BLD AUTO: 4.43 10*6/MM3 (ref 3.77–5.28)
RH BLD: POSITIVE
SODIUM BLD-SCNC: 134 MMOL/L (ref 136–145)
T&S EXPIRATION DATE: NORMAL
WBC NRBC COR # BLD: 7.25 10*3/MM3 (ref 3.4–10.8)

## 2020-01-14 PROCEDURE — 85730 THROMBOPLASTIN TIME PARTIAL: CPT | Performed by: PHYSICIAN ASSISTANT

## 2020-01-14 PROCEDURE — 93005 ELECTROCARDIOGRAM TRACING: CPT

## 2020-01-14 PROCEDURE — 85027 COMPLETE CBC AUTOMATED: CPT | Performed by: PHYSICIAN ASSISTANT

## 2020-01-14 PROCEDURE — 85610 PROTHROMBIN TIME: CPT | Performed by: PHYSICIAN ASSISTANT

## 2020-01-14 PROCEDURE — 36415 COLL VENOUS BLD VENIPUNCTURE: CPT

## 2020-01-14 PROCEDURE — 83036 HEMOGLOBIN GLYCOSYLATED A1C: CPT | Performed by: PHYSICIAN ASSISTANT

## 2020-01-14 PROCEDURE — 93010 ELECTROCARDIOGRAM REPORT: CPT | Performed by: INTERNAL MEDICINE

## 2020-01-14 PROCEDURE — 86900 BLOOD TYPING SEROLOGIC ABO: CPT | Performed by: PHYSICIAN ASSISTANT

## 2020-01-14 PROCEDURE — 80048 BASIC METABOLIC PNL TOTAL CA: CPT | Performed by: PHYSICIAN ASSISTANT

## 2020-01-14 PROCEDURE — 86923 COMPATIBILITY TEST ELECTRIC: CPT

## 2020-01-14 PROCEDURE — 71046 X-RAY EXAM CHEST 2 VIEWS: CPT

## 2020-01-14 PROCEDURE — 86901 BLOOD TYPING SEROLOGIC RH(D): CPT | Performed by: PHYSICIAN ASSISTANT

## 2020-01-14 PROCEDURE — 86850 RBC ANTIBODY SCREEN: CPT | Performed by: PHYSICIAN ASSISTANT

## 2020-01-14 NOTE — PAT
Patient to apply Chlorhexadine wipes  to surgical area (as instructed) the night before procedure and the AM of procedure. Wipes provided.    BACTROBAN APPLIED TO EACH NOSTRIL DURING PAT VISIT

## 2020-01-14 NOTE — PAT
"Paged Vasyl VASQUEZ.  Avis VASQUEZ was covering for Vasyl and responded.  Reported glucose of 424 and a1c of 12.1 to Avis VASQUEZ.  Patient states blood sugar 'usually runs 200-400 this time of day but in the morning it will be .\"  Avis requested that the glucose be rechecked in preop upon patient arrival in the am, which is standard protocol for all diabetics to have a fingerstick glucose in preop.  No further testing at this time.  "

## 2020-01-15 ENCOUNTER — HOSPITAL ENCOUNTER (INPATIENT)
Facility: HOSPITAL | Age: 50
LOS: 2 days | Discharge: HOME | End: 2020-01-17
Attending: THORACIC SURGERY (CARDIOTHORACIC VASCULAR SURGERY) | Admitting: THORACIC SURGERY (CARDIOTHORACIC VASCULAR SURGERY)

## 2020-01-15 ENCOUNTER — ANESTHESIA (OUTPATIENT)
Dept: PERIOP | Facility: HOSPITAL | Age: 50
End: 2020-01-15

## 2020-01-15 ENCOUNTER — ANESTHESIA EVENT (OUTPATIENT)
Dept: PERIOP | Facility: HOSPITAL | Age: 50
End: 2020-01-15

## 2020-01-15 DIAGNOSIS — I73.9 PVD (PERIPHERAL VASCULAR DISEASE) (HCC): ICD-10-CM

## 2020-01-15 PROBLEM — J44.9 COPD (CHRONIC OBSTRUCTIVE PULMONARY DISEASE) (HCC): Status: ACTIVE | Noted: 2020-01-15

## 2020-01-15 PROBLEM — I25.10 CORONARY ARTERY DISEASE: Status: ACTIVE | Noted: 2020-01-15

## 2020-01-15 PROBLEM — E78.5 HYPERLIPIDEMIA: Status: ACTIVE | Noted: 2020-01-15

## 2020-01-15 PROBLEM — I10 HYPERTENSION: Status: ACTIVE | Noted: 2020-01-15

## 2020-01-15 PROBLEM — E11.9 DIABETES MELLITUS: Status: ACTIVE | Noted: 2020-01-15

## 2020-01-15 LAB
ABO GROUP BLD: NORMAL
GLUCOSE BLDC GLUCOMTR-MCNC: 114 MG/DL (ref 70–130)
GLUCOSE BLDC GLUCOMTR-MCNC: 170 MG/DL (ref 70–130)
GLUCOSE BLDC GLUCOMTR-MCNC: 207 MG/DL (ref 70–130)
GLUCOSE BLDC GLUCOMTR-MCNC: 233 MG/DL (ref 70–130)
RH BLD: POSITIVE

## 2020-01-15 PROCEDURE — 86900 BLOOD TYPING SEROLOGIC ABO: CPT

## 2020-01-15 PROCEDURE — 25010000002 HYDROMORPHONE PER 4 MG: Performed by: NURSE ANESTHETIST, CERTIFIED REGISTERED

## 2020-01-15 PROCEDURE — 88304 TISSUE EXAM BY PATHOLOGIST: CPT | Performed by: THORACIC SURGERY (CARDIOTHORACIC VASCULAR SURGERY)

## 2020-01-15 PROCEDURE — 35661 BPG FEMORAL-FEMORAL: CPT | Performed by: THORACIC SURGERY (CARDIOTHORACIC VASCULAR SURGERY)

## 2020-01-15 PROCEDURE — 25010000002 PROPOFOL 10 MG/ML EMULSION: Performed by: NURSE ANESTHETIST, CERTIFIED REGISTERED

## 2020-01-15 PROCEDURE — 86901 BLOOD TYPING SEROLOGIC RH(D): CPT

## 2020-01-15 PROCEDURE — 25010000002 FENTANYL CITRATE (PF) 100 MCG/2ML SOLUTION: Performed by: NURSE ANESTHETIST, CERTIFIED REGISTERED

## 2020-01-15 PROCEDURE — 25010000002 VANCOMYCIN PER 500 MG: Performed by: PHYSICIAN ASSISTANT

## 2020-01-15 PROCEDURE — 25010000002 HEPARIN (PORCINE) PER 1000 UNITS: Performed by: THORACIC SURGERY (CARDIOTHORACIC VASCULAR SURGERY)

## 2020-01-15 PROCEDURE — 25010000002 ONDANSETRON PER 1 MG: Performed by: NURSE ANESTHETIST, CERTIFIED REGISTERED

## 2020-01-15 PROCEDURE — 041L0JJ BYPASS LEFT FEMORAL ARTERY TO LEFT FEMORAL ARTERY WITH SYNTHETIC SUBSTITUTE, OPEN APPROACH: ICD-10-PCS | Performed by: THORACIC SURGERY (CARDIOTHORACIC VASCULAR SURGERY)

## 2020-01-15 PROCEDURE — 041K0JH BYPASS RIGHT FEMORAL ARTERY TO RIGHT FEMORAL ARTERY WITH SYNTHETIC SUBSTITUTE, OPEN APPROACH: ICD-10-PCS | Performed by: THORACIC SURGERY (CARDIOTHORACIC VASCULAR SURGERY)

## 2020-01-15 PROCEDURE — C1768 GRAFT, VASCULAR: HCPCS | Performed by: THORACIC SURGERY (CARDIOTHORACIC VASCULAR SURGERY)

## 2020-01-15 PROCEDURE — 04CK0ZZ EXTIRPATION OF MATTER FROM RIGHT FEMORAL ARTERY, OPEN APPROACH: ICD-10-PCS | Performed by: THORACIC SURGERY (CARDIOTHORACIC VASCULAR SURGERY)

## 2020-01-15 PROCEDURE — 63710000001 INSULIN LISPRO (HUMAN) PER 5 UNITS: Performed by: PHYSICIAN ASSISTANT

## 2020-01-15 PROCEDURE — 04CL0ZZ EXTIRPATION OF MATTER FROM LEFT FEMORAL ARTERY, OPEN APPROACH: ICD-10-PCS | Performed by: THORACIC SURGERY (CARDIOTHORACIC VASCULAR SURGERY)

## 2020-01-15 PROCEDURE — 25010000002 PHENYLEPHRINE PER 1 ML: Performed by: NURSE ANESTHETIST, CERTIFIED REGISTERED

## 2020-01-15 PROCEDURE — 88311 DECALCIFY TISSUE: CPT | Performed by: THORACIC SURGERY (CARDIOTHORACIC VASCULAR SURGERY)

## 2020-01-15 PROCEDURE — 25010000002 NEOSTIGMINE 10 MG/10ML SOLUTION: Performed by: NURSE ANESTHETIST, CERTIFIED REGISTERED

## 2020-01-15 PROCEDURE — 25010000002 HEPARIN (PORCINE) PER 1000 UNITS: Performed by: NURSE ANESTHETIST, CERTIFIED REGISTERED

## 2020-01-15 PROCEDURE — 25010000002 DEXAMETHASONE PER 1 MG: Performed by: NURSE ANESTHETIST, CERTIFIED REGISTERED

## 2020-01-15 PROCEDURE — 99232 SBSQ HOSP IP/OBS MODERATE 35: CPT | Performed by: INTERNAL MEDICINE

## 2020-01-15 PROCEDURE — 25010000002 PROTAMINE SULFATE PER 10 MG: Performed by: NURSE ANESTHETIST, CERTIFIED REGISTERED

## 2020-01-15 PROCEDURE — 82962 GLUCOSE BLOOD TEST: CPT

## 2020-01-15 DEVICE — GRFT VASC PROPAT THNSTRCH REMVRNG8X30X40: Type: IMPLANTABLE DEVICE | Site: GROIN | Status: FUNCTIONAL

## 2020-01-15 RX ORDER — CLOPIDOGREL BISULFATE 75 MG/1
75 TABLET ORAL DAILY
Status: DISCONTINUED | OUTPATIENT
Start: 2020-01-15 | End: 2020-01-17 | Stop reason: HOSPADM

## 2020-01-15 RX ORDER — ONDANSETRON 2 MG/ML
INJECTION INTRAMUSCULAR; INTRAVENOUS AS NEEDED
Status: DISCONTINUED | OUTPATIENT
Start: 2020-01-15 | End: 2020-01-15 | Stop reason: SURG

## 2020-01-15 RX ORDER — ONDANSETRON 2 MG/ML
4 INJECTION INTRAMUSCULAR; INTRAVENOUS EVERY 6 HOURS PRN
Status: DISCONTINUED | OUTPATIENT
Start: 2020-01-15 | End: 2020-01-17 | Stop reason: HOSPADM

## 2020-01-15 RX ORDER — HYDROMORPHONE HYDROCHLORIDE 1 MG/ML
0.5 INJECTION, SOLUTION INTRAMUSCULAR; INTRAVENOUS; SUBCUTANEOUS
Status: DISCONTINUED | OUTPATIENT
Start: 2020-01-15 | End: 2020-01-15 | Stop reason: HOSPADM

## 2020-01-15 RX ORDER — SODIUM CHLORIDE 0.9 % (FLUSH) 0.9 %
10 SYRINGE (ML) INJECTION AS NEEDED
Status: DISCONTINUED | OUTPATIENT
Start: 2020-01-15 | End: 2020-01-15 | Stop reason: HOSPADM

## 2020-01-15 RX ORDER — PROTAMINE SULFATE 10 MG/ML
INJECTION, SOLUTION INTRAVENOUS AS NEEDED
Status: DISCONTINUED | OUTPATIENT
Start: 2020-01-15 | End: 2020-01-15 | Stop reason: SURG

## 2020-01-15 RX ORDER — ONDANSETRON 4 MG/1
4 TABLET, FILM COATED ORAL EVERY 6 HOURS PRN
Status: DISCONTINUED | OUTPATIENT
Start: 2020-01-15 | End: 2020-01-17 | Stop reason: HOSPADM

## 2020-01-15 RX ORDER — FAMOTIDINE 20 MG/1
20 TABLET, FILM COATED ORAL
Status: COMPLETED | OUTPATIENT
Start: 2020-01-15 | End: 2020-01-15

## 2020-01-15 RX ORDER — L.ACID,PARA/B.BIFIDUM/S.THERM 8B CELL
1 CAPSULE ORAL DAILY
Status: DISCONTINUED | OUTPATIENT
Start: 2020-01-15 | End: 2020-01-17 | Stop reason: HOSPADM

## 2020-01-15 RX ORDER — FENTANYL CITRATE 50 UG/ML
50 INJECTION, SOLUTION INTRAMUSCULAR; INTRAVENOUS
Status: DISCONTINUED | OUTPATIENT
Start: 2020-01-15 | End: 2020-01-15 | Stop reason: HOSPADM

## 2020-01-15 RX ORDER — MONTELUKAST SODIUM 10 MG/1
10 TABLET ORAL DAILY
Status: DISCONTINUED | OUTPATIENT
Start: 2020-01-15 | End: 2020-01-17 | Stop reason: HOSPADM

## 2020-01-15 RX ORDER — BUDESONIDE AND FORMOTEROL FUMARATE DIHYDRATE 160; 4.5 UG/1; UG/1
2 AEROSOL RESPIRATORY (INHALATION)
Status: DISCONTINUED | OUTPATIENT
Start: 2020-01-15 | End: 2020-01-17 | Stop reason: HOSPADM

## 2020-01-15 RX ORDER — HEPARIN SODIUM 1000 [USP'U]/ML
INJECTION, SOLUTION INTRAVENOUS; SUBCUTANEOUS AS NEEDED
Status: DISCONTINUED | OUTPATIENT
Start: 2020-01-15 | End: 2020-01-15 | Stop reason: SURG

## 2020-01-15 RX ORDER — ASPIRIN 81 MG/1
81 TABLET ORAL DAILY
Status: DISCONTINUED | OUTPATIENT
Start: 2020-01-15 | End: 2020-01-17 | Stop reason: HOSPADM

## 2020-01-15 RX ORDER — NEOSTIGMINE METHYLSULFATE 1 MG/ML
INJECTION, SOLUTION INTRAVENOUS AS NEEDED
Status: DISCONTINUED | OUTPATIENT
Start: 2020-01-15 | End: 2020-01-15 | Stop reason: SURG

## 2020-01-15 RX ORDER — SODIUM CHLORIDE, SODIUM LACTATE, POTASSIUM CHLORIDE, CALCIUM CHLORIDE 600; 310; 30; 20 MG/100ML; MG/100ML; MG/100ML; MG/100ML
INJECTION, SOLUTION INTRAVENOUS CONTINUOUS PRN
Status: DISCONTINUED | OUTPATIENT
Start: 2020-01-15 | End: 2020-01-15 | Stop reason: SURG

## 2020-01-15 RX ORDER — DEXTROSE MONOHYDRATE 25 G/50ML
25 INJECTION, SOLUTION INTRAVENOUS
Status: DISCONTINUED | OUTPATIENT
Start: 2020-01-15 | End: 2020-01-17 | Stop reason: HOSPADM

## 2020-01-15 RX ORDER — CETIRIZINE HYDROCHLORIDE 10 MG/1
10 TABLET ORAL DAILY
Status: DISCONTINUED | OUTPATIENT
Start: 2020-01-15 | End: 2020-01-17 | Stop reason: HOSPADM

## 2020-01-15 RX ORDER — SODIUM CHLORIDE 0.9 % (FLUSH) 0.9 %
10 SYRINGE (ML) INJECTION EVERY 12 HOURS SCHEDULED
Status: DISCONTINUED | OUTPATIENT
Start: 2020-01-15 | End: 2020-01-15 | Stop reason: HOSPADM

## 2020-01-15 RX ORDER — HYDROXYZINE HYDROCHLORIDE 25 MG/1
25 TABLET, FILM COATED ORAL NIGHTLY
Status: DISCONTINUED | OUTPATIENT
Start: 2020-01-15 | End: 2020-01-17 | Stop reason: HOSPADM

## 2020-01-15 RX ORDER — ROCURONIUM BROMIDE 10 MG/ML
INJECTION, SOLUTION INTRAVENOUS AS NEEDED
Status: DISCONTINUED | OUTPATIENT
Start: 2020-01-15 | End: 2020-01-15 | Stop reason: SURG

## 2020-01-15 RX ORDER — LIDOCAINE HYDROCHLORIDE 10 MG/ML
0.5 INJECTION, SOLUTION EPIDURAL; INFILTRATION; INTRACAUDAL; PERINEURAL ONCE AS NEEDED
Status: COMPLETED | OUTPATIENT
Start: 2020-01-15 | End: 2020-01-15

## 2020-01-15 RX ORDER — METOPROLOL SUCCINATE 25 MG/1
12.5 TABLET, EXTENDED RELEASE ORAL DAILY
Status: DISCONTINUED | OUTPATIENT
Start: 2020-01-15 | End: 2020-01-17 | Stop reason: HOSPADM

## 2020-01-15 RX ORDER — GLYCOPYRROLATE 0.2 MG/ML
INJECTION INTRAMUSCULAR; INTRAVENOUS AS NEEDED
Status: DISCONTINUED | OUTPATIENT
Start: 2020-01-15 | End: 2020-01-15 | Stop reason: SURG

## 2020-01-15 RX ORDER — PREGABALIN 75 MG/1
150 CAPSULE ORAL EVERY 8 HOURS SCHEDULED
Status: DISCONTINUED | OUTPATIENT
Start: 2020-01-15 | End: 2020-01-17 | Stop reason: HOSPADM

## 2020-01-15 RX ORDER — SODIUM CHLORIDE, SODIUM LACTATE, POTASSIUM CHLORIDE, CALCIUM CHLORIDE 600; 310; 30; 20 MG/100ML; MG/100ML; MG/100ML; MG/100ML
9 INJECTION, SOLUTION INTRAVENOUS CONTINUOUS PRN
Status: DISCONTINUED | OUTPATIENT
Start: 2020-01-15 | End: 2020-01-15 | Stop reason: SDUPTHER

## 2020-01-15 RX ORDER — FENTANYL CITRATE 50 UG/ML
INJECTION, SOLUTION INTRAMUSCULAR; INTRAVENOUS AS NEEDED
Status: DISCONTINUED | OUTPATIENT
Start: 2020-01-15 | End: 2020-01-15 | Stop reason: SURG

## 2020-01-15 RX ORDER — PANTOPRAZOLE SODIUM 40 MG/1
40 TABLET, DELAYED RELEASE ORAL
Status: DISCONTINUED | OUTPATIENT
Start: 2020-01-15 | End: 2020-01-17 | Stop reason: HOSPADM

## 2020-01-15 RX ORDER — KETOTIFEN FUMARATE 0.35 MG/ML
1 SOLUTION/ DROPS OPHTHALMIC 2 TIMES DAILY
Status: DISCONTINUED | OUTPATIENT
Start: 2020-01-15 | End: 2020-01-17 | Stop reason: HOSPADM

## 2020-01-15 RX ORDER — SODIUM CHLORIDE 450 MG/100ML
75 INJECTION, SOLUTION INTRAVENOUS CONTINUOUS
Status: DISCONTINUED | OUTPATIENT
Start: 2020-01-15 | End: 2020-01-16

## 2020-01-15 RX ORDER — LIDOCAINE HYDROCHLORIDE 10 MG/ML
INJECTION, SOLUTION EPIDURAL; INFILTRATION; INTRACAUDAL; PERINEURAL AS NEEDED
Status: DISCONTINUED | OUTPATIENT
Start: 2020-01-15 | End: 2020-01-15 | Stop reason: SURG

## 2020-01-15 RX ORDER — MORPHINE SULFATE 4 MG/ML
2 INJECTION, SOLUTION INTRAMUSCULAR; INTRAVENOUS
Status: DISCONTINUED | OUTPATIENT
Start: 2020-01-15 | End: 2020-01-17 | Stop reason: HOSPADM

## 2020-01-15 RX ORDER — PROPOFOL 10 MG/ML
VIAL (ML) INTRAVENOUS AS NEEDED
Status: DISCONTINUED | OUTPATIENT
Start: 2020-01-15 | End: 2020-01-15 | Stop reason: SURG

## 2020-01-15 RX ORDER — ESMOLOL HYDROCHLORIDE 10 MG/ML
INJECTION INTRAVENOUS AS NEEDED
Status: DISCONTINUED | OUTPATIENT
Start: 2020-01-15 | End: 2020-01-15 | Stop reason: SURG

## 2020-01-15 RX ORDER — ISOSORBIDE DINITRATE 30 MG/1
30 TABLET ORAL NIGHTLY
Status: ON HOLD | COMMUNITY
End: 2020-01-17

## 2020-01-15 RX ORDER — LANOLIN ALCOHOL/MO/W.PET/CERES
1000 CREAM (GRAM) TOPICAL DAILY
Status: DISCONTINUED | OUTPATIENT
Start: 2020-01-15 | End: 2020-01-17 | Stop reason: HOSPADM

## 2020-01-15 RX ORDER — ALBUTEROL SULFATE 2.5 MG/3ML
2.5 SOLUTION RESPIRATORY (INHALATION) EVERY 6 HOURS PRN
Status: DISCONTINUED | OUTPATIENT
Start: 2020-01-15 | End: 2020-01-17 | Stop reason: HOSPADM

## 2020-01-15 RX ORDER — FLUTICASONE PROPIONATE 50 MCG
2 SPRAY, SUSPENSION (ML) NASAL DAILY
Status: DISCONTINUED | OUTPATIENT
Start: 2020-01-15 | End: 2020-01-17 | Stop reason: HOSPADM

## 2020-01-15 RX ORDER — LABETALOL HYDROCHLORIDE 5 MG/ML
5 INJECTION, SOLUTION INTRAVENOUS
Status: DISCONTINUED | OUTPATIENT
Start: 2020-01-15 | End: 2020-01-15 | Stop reason: HOSPADM

## 2020-01-15 RX ORDER — FAMOTIDINE 20 MG/1
20 TABLET, FILM COATED ORAL DAILY
Status: DISCONTINUED | OUTPATIENT
Start: 2020-01-15 | End: 2020-01-15

## 2020-01-15 RX ORDER — HYDROCODONE BITARTRATE AND ACETAMINOPHEN 7.5; 325 MG/1; MG/1
1 TABLET ORAL EVERY 4 HOURS PRN
Status: DISCONTINUED | OUTPATIENT
Start: 2020-01-15 | End: 2020-01-17 | Stop reason: HOSPADM

## 2020-01-15 RX ORDER — ATORVASTATIN CALCIUM 10 MG/1
10 TABLET, FILM COATED ORAL DAILY
Status: DISCONTINUED | OUTPATIENT
Start: 2020-01-15 | End: 2020-01-17 | Stop reason: HOSPADM

## 2020-01-15 RX ORDER — ROPINIROLE 2 MG/1
4 TABLET, FILM COATED ORAL NIGHTLY
Status: DISCONTINUED | OUTPATIENT
Start: 2020-01-15 | End: 2020-01-17 | Stop reason: HOSPADM

## 2020-01-15 RX ORDER — NICOTINE POLACRILEX 4 MG
15 LOZENGE BUCCAL
Status: DISCONTINUED | OUTPATIENT
Start: 2020-01-15 | End: 2020-01-17 | Stop reason: HOSPADM

## 2020-01-15 RX ORDER — CHLORHEXIDINE GLUCONATE 500 MG/1
1 CLOTH TOPICAL EVERY 12 HOURS PRN
Status: DISCONTINUED | OUTPATIENT
Start: 2020-01-15 | End: 2020-01-15 | Stop reason: HOSPADM

## 2020-01-15 RX ORDER — CITALOPRAM 40 MG/1
40 TABLET ORAL DAILY
Status: DISCONTINUED | OUTPATIENT
Start: 2020-01-15 | End: 2020-01-17 | Stop reason: HOSPADM

## 2020-01-15 RX ORDER — FAMOTIDINE 20 MG/1
20 TABLET, FILM COATED ORAL DAILY
Status: DISCONTINUED | OUTPATIENT
Start: 2020-01-16 | End: 2020-01-17 | Stop reason: HOSPADM

## 2020-01-15 RX ORDER — DEXAMETHASONE SODIUM PHOSPHATE 4 MG/ML
INJECTION, SOLUTION INTRA-ARTICULAR; INTRALESIONAL; INTRAMUSCULAR; INTRAVENOUS; SOFT TISSUE AS NEEDED
Status: DISCONTINUED | OUTPATIENT
Start: 2020-01-15 | End: 2020-01-15 | Stop reason: SURG

## 2020-01-15 RX ADMIN — PROPOFOL 200 MG: 10 INJECTION, EMULSION INTRAVENOUS at 12:51

## 2020-01-15 RX ADMIN — ISOSORBIDE DINITRATE 30 MG: 20 TABLET ORAL at 21:34

## 2020-01-15 RX ADMIN — PHENYLEPHRINE HYDROCHLORIDE 100 MCG: 10 INJECTION INTRAVENOUS at 13:43

## 2020-01-15 RX ADMIN — FENTANYL CITRATE 50 MCG: 50 INJECTION, SOLUTION INTRAMUSCULAR; INTRAVENOUS at 13:13

## 2020-01-15 RX ADMIN — FENTANYL CITRATE 50 MCG: 0.05 INJECTION, SOLUTION INTRAMUSCULAR; INTRAVENOUS at 16:15

## 2020-01-15 RX ADMIN — FLUTICASONE PROPIONATE 2 SPRAY: 50 SPRAY, METERED NASAL at 21:27

## 2020-01-15 RX ADMIN — PANTOPRAZOLE SODIUM 40 MG: 40 TABLET, DELAYED RELEASE ORAL at 21:16

## 2020-01-15 RX ADMIN — ROCURONIUM BROMIDE 50 MG: 10 INJECTION INTRAVENOUS at 12:51

## 2020-01-15 RX ADMIN — PHENYLEPHRINE HYDROCHLORIDE 100 MCG: 10 INJECTION INTRAVENOUS at 13:08

## 2020-01-15 RX ADMIN — CYANOCOBALAMIN TAB 1000 MCG 1000 MCG: 1000 TAB at 21:15

## 2020-01-15 RX ADMIN — KETOTIFEN FUMARATE 1 DROP: 0.35 SOLUTION/ DROPS OPHTHALMIC at 21:29

## 2020-01-15 RX ADMIN — PREGABALIN 150 MG: 75 CAPSULE ORAL at 21:15

## 2020-01-15 RX ADMIN — CLOPIDOGREL BISULFATE 75 MG: 75 TABLET ORAL at 21:16

## 2020-01-15 RX ADMIN — HYDROCODONE BITARTRATE AND ACETAMINOPHEN 1 TABLET: 7.5; 325 TABLET ORAL at 21:34

## 2020-01-15 RX ADMIN — HYDROXYZINE HYDROCHLORIDE 25 MG: 25 TABLET, FILM COATED ORAL at 21:16

## 2020-01-15 RX ADMIN — PHENYLEPHRINE HYDROCHLORIDE 100 MCG: 10 INJECTION INTRAVENOUS at 14:26

## 2020-01-15 RX ADMIN — FENTANYL CITRATE 50 MCG: 0.05 INJECTION, SOLUTION INTRAMUSCULAR; INTRAVENOUS at 16:05

## 2020-01-15 RX ADMIN — PHENYLEPHRINE HYDROCHLORIDE 50 MCG: 10 INJECTION INTRAVENOUS at 13:52

## 2020-01-15 RX ADMIN — SODIUM CHLORIDE 75 ML/HR: 4.5 INJECTION, SOLUTION INTRAVENOUS at 15:39

## 2020-01-15 RX ADMIN — HEPARIN SODIUM 8000 UNITS: 1000 INJECTION, SOLUTION INTRAVENOUS; SUBCUTANEOUS at 13:27

## 2020-01-15 RX ADMIN — LIDOCAINE HYDROCHLORIDE 50 MG: 10 INJECTION, SOLUTION EPIDURAL; INFILTRATION; INTRACAUDAL; PERINEURAL at 12:51

## 2020-01-15 RX ADMIN — ESMOLOL HYDROCHLORIDE 20 MG: 10 INJECTION INTRAVENOUS at 13:06

## 2020-01-15 RX ADMIN — CETIRIZINE HYDROCHLORIDE 10 MG: 10 TABLET, FILM COATED ORAL at 21:16

## 2020-01-15 RX ADMIN — ONDANSETRON 4 MG: 2 INJECTION INTRAMUSCULAR; INTRAVENOUS at 14:10

## 2020-01-15 RX ADMIN — PHENYLEPHRINE HYDROCHLORIDE 100 MCG: 10 INJECTION INTRAVENOUS at 14:20

## 2020-01-15 RX ADMIN — PHENYLEPHRINE HYDROCHLORIDE 100 MCG: 10 INJECTION INTRAVENOUS at 14:15

## 2020-01-15 RX ADMIN — CITALOPRAM HYDROBROMIDE 40 MG: 40 TABLET ORAL at 21:14

## 2020-01-15 RX ADMIN — FENTANYL CITRATE 50 MCG: 0.05 INJECTION, SOLUTION INTRAMUSCULAR; INTRAVENOUS at 15:37

## 2020-01-15 RX ADMIN — ATORVASTATIN CALCIUM 10 MG: 10 TABLET, FILM COATED ORAL at 21:16

## 2020-01-15 RX ADMIN — SODIUM CHLORIDE, POTASSIUM CHLORIDE, SODIUM LACTATE AND CALCIUM CHLORIDE: 600; 310; 30; 20 INJECTION, SOLUTION INTRAVENOUS at 12:45

## 2020-01-15 RX ADMIN — PHENYLEPHRINE HYDROCHLORIDE 100 MCG: 10 INJECTION INTRAVENOUS at 14:00

## 2020-01-15 RX ADMIN — PHENYLEPHRINE HYDROCHLORIDE 0.2 MCG/KG/MIN: 10 INJECTION INTRAVENOUS at 14:20

## 2020-01-15 RX ADMIN — INSULIN LISPRO 5 UNITS: 100 INJECTION, SOLUTION INTRAVENOUS; SUBCUTANEOUS at 21:28

## 2020-01-15 RX ADMIN — PHENYLEPHRINE HYDROCHLORIDE 100 MCG: 10 INJECTION INTRAVENOUS at 13:35

## 2020-01-15 RX ADMIN — MONTELUKAST SODIUM 10 MG: 10 TABLET, COATED ORAL at 21:15

## 2020-01-15 RX ADMIN — ROPINIROLE HYDROCHLORIDE 4 MG: 2 TABLET, FILM COATED ORAL at 21:27

## 2020-01-15 RX ADMIN — SODIUM CHLORIDE, POTASSIUM CHLORIDE, SODIUM LACTATE AND CALCIUM CHLORIDE 9 ML/HR: 600; 310; 30; 20 INJECTION, SOLUTION INTRAVENOUS at 10:23

## 2020-01-15 RX ADMIN — GLYCOPYRROLATE 0.4 MG: 0.2 INJECTION, SOLUTION INTRAMUSCULAR; INTRAVENOUS at 14:38

## 2020-01-15 RX ADMIN — FENTANYL CITRATE 50 MCG: 50 INJECTION, SOLUTION INTRAMUSCULAR; INTRAVENOUS at 14:05

## 2020-01-15 RX ADMIN — Medication 1 CAPSULE: at 21:14

## 2020-01-15 RX ADMIN — VANCOMYCIN HYDROCHLORIDE 750 MG: 750 INJECTION, SOLUTION INTRAVENOUS at 10:35

## 2020-01-15 RX ADMIN — ESMOLOL HYDROCHLORIDE 20 MG: 10 INJECTION INTRAVENOUS at 12:55

## 2020-01-15 RX ADMIN — PROTAMINE SULFATE 75 MG: 10 INJECTION, SOLUTION INTRAVENOUS at 14:26

## 2020-01-15 RX ADMIN — NEOSTIGMINE METHYLSULFATE 2.5 MG: 1 INJECTION, SOLUTION INTRAVENOUS at 14:38

## 2020-01-15 RX ADMIN — LIDOCAINE HYDROCHLORIDE 0.3 ML: 10 INJECTION, SOLUTION EPIDURAL; INFILTRATION; INTRACAUDAL; PERINEURAL at 10:23

## 2020-01-15 RX ADMIN — FAMOTIDINE 20 MG: 20 TABLET ORAL at 10:23

## 2020-01-15 RX ADMIN — DEXAMETHASONE SODIUM PHOSPHATE 8 MG: 4 INJECTION, SOLUTION INTRAMUSCULAR; INTRAVENOUS at 12:56

## 2020-01-15 RX ADMIN — HYDROMORPHONE HYDROCHLORIDE 0.5 MG: 1 INJECTION, SOLUTION INTRAMUSCULAR; INTRAVENOUS; SUBCUTANEOUS at 15:50

## 2020-01-15 RX ADMIN — PHENYLEPHRINE HYDROCHLORIDE 100 MCG: 10 INJECTION INTRAVENOUS at 12:51

## 2020-01-15 RX ADMIN — PHENYLEPHRINE HYDROCHLORIDE 100 MCG: 10 INJECTION INTRAVENOUS at 14:13

## 2020-01-15 RX ADMIN — ESMOLOL HYDROCHLORIDE 10 MG: 10 INJECTION INTRAVENOUS at 13:14

## 2020-01-15 RX ADMIN — FENTANYL CITRATE 50 MCG: 0.05 INJECTION, SOLUTION INTRAMUSCULAR; INTRAVENOUS at 15:30

## 2020-01-15 NOTE — ANESTHESIA PROCEDURE NOTES
Airway  Urgency: elective    Date/Time: 1/15/2020 12:53 PM  Airway not difficult    General Information and Staff    Patient location during procedure: OR  CRNA: Suzanna West CRNA    Indications and Patient Condition  Indications for airway management: airway protection    Preoxygenated: yes  MILS not maintained throughout  Mask difficulty assessment: 1 - vent by mask    Final Airway Details  Final airway type: endotracheal airway      Successful airway: ETT  Cuffed: yes   Successful intubation technique: direct laryngoscopy  Facilitating devices/methods: intubating stylet  Endotracheal tube insertion site: oral  Blade: Alhaji  Blade size: 3  ETT size (mm): 7.0  Cormack-Lehane Classification: grade I - full view of glottis  Placement verified by: chest auscultation and capnometry   Measured from: lips  ETT/EBT  to lips (cm): 21  Number of attempts at approach: 1    Additional Comments  Negative epigastric sounds, Breath sound equal bilaterally with symmetric chest rise and fall

## 2020-01-15 NOTE — INTERVAL H&P NOTE
"[facility] Pre-op    Full history and physical note from office is up to date.  See office note attached.    /94 (BP Location: Right arm, Patient Position: Lying)   Pulse 100   Temp 97.9 °F (36.6 °C) (Temporal)   Resp 18   Ht 160 cm (63\")   Wt 54.8 kg (120 lb 12.8 oz)   SpO2 99%   BMI 21.40 kg/m²        LAB Results:  Lab Results   Component Value Date    WBC 7.25 01/14/2020    HGB 13.8 01/14/2020    HCT 41.2 01/14/2020    MCV 93.0 01/14/2020     01/14/2020    GLUCOSE 424 (C) 01/14/2020    BUN 13 01/14/2020    CREATININE 0.60 01/14/2020    EGFRIFNONA 106 01/14/2020     (L) 01/14/2020    K 5.2 01/14/2020    CL 98 01/14/2020    CO2 27.0 01/14/2020    CALCIUM 9.8 01/14/2020     *Recheck of patient's blood glucose on 1/15/20= 114    Cancer Staging (if applicable)  Cancer Patient: __ yes _X_no __unknown__N/A; If yes, clinical stage T:__ N:__M:__, stage group or __N/A      Adamaris Garcia, APRN 1/15/2020 10:27 AM  "

## 2020-01-15 NOTE — ANESTHESIA POSTPROCEDURE EVALUATION
Patient: Saskia Groves    Procedure Summary     Date:  01/15/20 Room / Location:   [facility] [address] [place] [address] [facility]    Anesthesia Start:  1245 Anesthesia Stop:  1501    Procedures:       BILATERAL FEMORAL ENDARTERECTOMY (Bilateral Groin)      FEMORAL FEMORAL BYPASS (Right Groin) Diagnosis:       PVD (peripheral vascular disease) (CMS/HCC)      (PVD (peripheral vascular disease) (CMS/HCC) [I73.9])    Surgeon:  Deandre Oseguera MD Provider:  Sage Romero MD    Anesthesia Type:  general ASA Status:  4          Anesthesia Type: general    Vitals  Vitals Value Taken Time   BP     Temp     Pulse 110 1/15/2020  3:00 PM   Resp     SpO2     Vitals shown include unvalidated device data.        Post Anesthesia Care and Evaluation    Patient location during evaluation: PACU  Patient participation: complete - patient participated  Level of consciousness: awake and alert  Pain score: 0  Pain management: adequate  Airway patency: patent  Anesthetic complications: No anesthetic complications  PONV Status: none  Cardiovascular status: hemodynamically stable and acceptable  Respiratory status: nonlabored ventilation, acceptable and nasal cannula  Hydration status: acceptable

## 2020-01-15 NOTE — PROGRESS NOTES
INTENSIVIST   INITIAL HOSPITAL VISIT NOTE     Hospital:  LOS: 0 days     Ms. Saskia Groves, 49 y.o. female is seen for a Chief Complaint of:      PVD (peripheral vascular disease) (CMS/HCC)    COPD (chronic obstructive pulmonary disease) (CMS/HCC)    Coronary artery disease    Hypertension    Diabetes mellitus (CMS/HCC)    Current smoker    Hyperlipidemia      Subjective   S     Saskia Groves is a 49 y.o. female, current smoker, who has had worsening claudication which affected both legs L>R. She underwent right SFA bypass with Dr. Zamorano, but this was not successful. She was seen by Dr. Oseguera for consideration of surgical intervention and recommended right femoral endarterectomy with fem-fem bypass. Yesterday she was found with a blood sugar of 424, and A1c 12.1.     She underwent a right femoral endarterectomy, left femoral endarterectomy, and fem-fem bypass. She is transferred to ICU for ongoing care.    She has a past medical history of CAD, PVD, carotid stenosis, HTN, Diabetes, HL, and is a current smoker 1/2 ppd.    She currently has no complaints except that she is hungry.        PMH: She  has a past medical history of Anxiety, Arthritis, Carotid artery stenosis, COPD (chronic obstructive pulmonary disease) (CMS/HCC), Coronary artery disease, Depression, Diabetes mellitus (CMS/HCC), Disease of thyroid gland, Dyslipidemia, GERD (gastroesophageal reflux disease), Goiter, Goiter, Hiatal hernia, Hyperlipidemia, Hypertension, Infectious viral hepatitis, Lower back pain, Migraine, and Sleep apnea.   PSxH: She  has a past surgical history that includes Cholecystectomy; Hysterectomy; Knee surgery (Left); Wrist surgery (Left); Anterior cervical discectomy w/ fusion; Colonoscopy; Esophagogastroduodenoscopy; and Back surgery.      Medications:  No current facility-administered medications on file prior to encounter.      Current Outpatient Medications on File Prior to Encounter   Medication Sig   •  aclidinium bromide (TUDORZA PRESSAIR) 400 MCG/ACT aerosol powder  powder for inhalation Inhale 1 puff 2 (Two) Times a Day.   • albuterol (PROVENTIL) (2.5 MG/3ML) 0.083% nebulizer solution Take  by nebulization Every 6 (Six) Hours As Needed.   • ALBUTEROL SULFATE HFA IN Albuterol Sulfate HFA AERS; Patient Sig: Albuterol Sulfate HFA AERS ; 0; 01-Apr-2014; Active   • aspirin 81 MG tablet Take 1 tablet by mouth daily.   • beclomethasone (QVAR) 40 MCG/ACT inhaler Qvar 40 MCG/ACT Inhalation Aerosol Solution; Patient Sig: Qvar 40 MCG/ACT Inhalation Aerosol Solution ; 0; 01-Apr-2014; Active   • benzonatate (TESSALON) 100 MG capsule Take 100 mg by mouth 3 (Three) Times a Day As Needed.   • cetirizine (ZYRTEC ALLERGY) 10 MG tablet Take 10 mg by mouth Daily.   • citalopram (CELEXA) 40 MG tablet Take 40 mg by mouth Daily.   • clopidogrel (PLAVIX) 75 MG tablet TAKE ONE TABLET BY MOUTH DAILY (Patient taking differently: Take 75 mg by mouth Daily. Per Dr. Castaneda's office, do not stop for surgery.)   • fexofenadine (ALLEGRA) 180 MG tablet Take 180 mg by mouth Daily.   • fluconazole (DIFLUCAN) 150 MG tablet Take 150 mg by mouth Every 30 (Thirty) Days.   • fluticasone (FLONASE) 50 MCG/ACT nasal spray 2 sprays into the nostril(s) as directed by provider Daily. Use as directed   • fluticasone-salmeterol (ADVAIR) 250-50 MCG/DOSE DISKUS Inhale 1 puff 2 (Two) Times a Day.   • HYDROcodone-acetaminophen (LORTAB)  MG per tablet Take 1 tablet by mouth Every 8 (Eight) Hours As Needed.   • hydrOXYzine (ATARAX) 25 MG tablet Take 25 mg by mouth Every Night.   • ibuprofen (ADVIL,MOTRIN) 600 MG tablet Take 600 mg by mouth Every 6 (Six) Hours As Needed for Mild Pain .   • insulin aspart protamine & aspart (NOVOLOG) 70/30 100 unit/mL Inject 35 Units under the skin into the appropriate area as directed 2 (Two) Times a Day Before Meals.   • isosorbide dinitrate (ISORDIL) 30 MG tablet Take 30 mg by mouth Every Night.   • ketotifen (ZADITOR) 0.025 %  ophthalmic solution Administer 1 drop to both eyes 2 (Two) Times a Day.   • Lactobacillus (ACIDOPHILUS PO) Take  by mouth Daily.   • lactulose (CHRONULAC) 10 GM/15ML solution Take 20 g by mouth 2 (Two) Times a Day As Needed.   • lansoprazole (PREVACID) 30 MG capsule Take  by mouth.   • levocetirizine (XYZAL) 5 MG tablet    • lovastatin (MEVACOR) 40 MG tablet    • metFORMIN (GLUCOPHAGE) 500 MG tablet Take 1 tablet by mouth 2 (Two) Times a Day With Meals.   • metoprolol succinate XL (TOPROL XL) 25 MG 24 hr tablet Take 12.5 mg by mouth Daily.   • montelukast (SINGULAIR) 10 MG tablet Take 10 mg by mouth.   • omega-3 acid ethyl esters (LOVAZA) 1 g capsule 1 g Daily.   • pantoprazole (PROTONIX) 40 MG EC tablet Take 40 mg by mouth Daily.   • pregabalin (LYRICA) 150 MG capsule Take 150 mg by mouth 3 (Three) Times a Day.   • raNITIdine (ZANTAC) 150 MG tablet Take 150 mg by mouth 2 (Two) Times a Day.   • rOPINIRole (REQUIP) 4 MG tablet Take 4 mg by mouth Every Night.   • SPIRIVA RESPIMAT 2.5 MCG/ACT aerosol solution inhaler Inhale 2 puffs Daily.   • tiZANidine (ZANAFLEX) 4 MG tablet Take 4 mg by mouth Every 8 (Eight) Hours As Needed.   • vitamin B-12 (CYANOCOBALAMIN) 1000 MCG tablet Take 1,000 mcg by mouth Daily.   • vitamin D (ERGOCALCIFEROL) 1.25 MG (35351 UT) capsule capsule 50,000 Units 1 (One) Time Per Week. wednesdays   • amLODIPine (NORVASC) 10 MG tablet Take 10 mg by mouth 3 (Three) Times a Day.   • ASPIRIN ADULT LOW STRENGTH 81 MG EC tablet    • benzonatate (TESSALON) 200 MG capsule Take  by mouth.   • budesonide-formoterol (SYMBICORT) 160-4.5 MCG/ACT inhaler Inhale 2 puffs 2 (Two) Times a Day.   • cilostazol (PLETAL) 50 MG tablet TAKE ONE TABLET BY MOUTH TWICE A DAY   • cyanocobalamin 1000 MCG/ML injection 1,000 mcg Every 28 (Twenty-Eight) Days.   • gabapentin (NEURONTIN) 600 MG tablet Take  by mouth.   • insulin glargine (LANTUS) 100 UNIT/ML injection Inject  under the skin Daily.   • insulin lispro (HUMALOG) 100  "UNIT/ML injection 8 U ac bkfst, 16 U ac lunch & 18 U ac supper   • Insulin Syringe-Needle U-100 30G X 5/16\" 0.5 ML misc Uses X 4/day   • Lancets (ONETOUCH DELICA PLUS [id number]) misc    • losartan (COZAAR) 25 MG tablet Take 25 mg by mouth Daily.   • lovastatin (MEVACOR) 20 MG tablet Take 20 mg by mouth Every Night. At bedtime   • mometasone-formoterol (DULERA) 100-5 MCG/ACT inhaler Inhale 1 puff.   • NYSTATIN PO Take  by mouth As Needed.   • ONE TOUCH ULTRA TEST test strip    • oxybutynin XL (DITROPAN-XL) 5 MG 24 hr tablet Take 5 mg by mouth.   • pregabalin (LYRICA) 50 MG capsule Take  by mouth.   • theophylline (UNIPHYL) 400 MG 24 hr tablet Take 1 tablet by mouth daily. As directed   • tiotropium (SPIRIVA HANDIHALER) 18 MCG per inhalation capsule Spiriva HandiHaler 18 MCG Inhalation Capsule; Patient Sig: Spiriva HandiHaler 18 MCG Inhalation Capsule ; 0; 01-Apr-2014; Active       Allergies: She is allergic to levaquin [levofloxacin]; penicillins; codeine; and flagyl [metronidazole].   FH: Her family history includes Diabetes in her mother; Hypertension in her father and mother; Lung cancer in her father; Stroke in an other family member.   SH: She  reports that she has been smoking cigarettes. She has a 35.00 pack-year smoking history. She has never used smokeless tobacco. She reports that she drank alcohol. She reports that she does not use drugs.     The patient's relevant past medical, surgical and social history were reviewed and updated in Epic as appropriate.     ROS (14):   Review of Systems   Constitutional: Negative.    HENT: Negative.    Eyes: Negative.    Respiratory: Negative.    Cardiovascular: Negative.    Gastrointestinal: Negative.    Endocrine: Negative.    Genitourinary: Negative.    Musculoskeletal: Positive for gait problem and myalgias.   Skin: Negative.    Allergic/Immunologic: Negative.    Hematological: Negative.    Psychiatric/Behavioral: Negative.        Objective   O     Vitals    Temp  " Min: 97.4 °F (36.3 °C)  Max: 98 °F (36.7 °C)  BP  Min: 91/65  Max: 147/94  Pulse  Min: 100  Max: 114  Resp  Min: 16  Max: 18  SpO2  Min: 97 %  Max: 100 % No data recorded     I/O 24 hrs (7:00AM - 6:59 AM)  Intake/Output       01/15/20 0700 - 01/16/20 0659    Intake (ml) 700    Output (ml) --    Net (ml) 700    Last Weight  54.8 kg (120 lb 12.8 oz)          Medications (drips):    lactated ringers Last Rate: 9 mL/hr (01/15/20 1023)   niCARdipine    sodium chloride Last Rate: 75 mL/hr (01/15/20 1539)       Physical Examination    Telemetry: Normal sinus rhythm.    Constitutional:  No acute distress.  Conversant. Resting in bed.    HEENT: Normocephalic and atraumatic.   Neck:  Normal range of motion. Neck supple.   No JVD present.    Cardiovascular: Regular rate and rhythm.  No murmurs, rub or gallop.  No peripheral edema.   Respiratory: Normal respiratory effort.  Clear to ascultation.   Abdominal:  Soft. No masses.   Non-tender. No distension.   No hepatosplenomegaly.   MSK: Normal muscle strength and tone.   Extremities: No digital cyanosis or clubbing.   Skin: Skin is warm and dry.  No rashes, lesions or ulcers noted.    Neurological:   Alert and Oriented to person, place, and time.   Moves all extremities.   Psychiatric:  Normal affect.   Normal judgment.            Results from last 7 days   Lab Units 01/14/20  1415   WBC 10*3/mm3 7.25   HEMOGLOBIN g/dL 13.8   MCV fL 93.0   PLATELETS 10*3/mm3 157     Results from last 7 days   Lab Units 01/14/20  1415   SODIUM mmol/L 134*   POTASSIUM mmol/L 5.2   CO2 mmol/L 27.0   CREATININE mg/dL 0.60     Estimated Creatinine Clearance: 97 mL/min (by C-G formula based on SCr of 0.6 mg/dL).            Images:    Imaging Results (Last 24 Hours)     ** No results found for the last 24 hours. **        ECG/EMG Results (last 24 hours)     ** No results found for the last 24 hours. **          I reviewed the patient's new laboratory and imaging results.  I independently reviewed the  patient's new images.    Assessment/Plan   A / P     Ms. Groves is a 48yo F with a history of peripheral artery disease and tobacco abuse who presented with worsening claudication. She previously underwent a right SFA bypass with Dr. Zamorano but this was unsuccessful. She was evaluated by Dr. Oseguera who recommended surgical intervention. She presented on 1/15/20 and underwent a right femoral endarterectomy and femoral-femoral bypass. She is a recently diagnosed diabetic as she was found to have a blood sugar of 424 and an A1c of 12.1 on 1/14/20. She continues to smoke.     Nutrition:   No diet orders on file  Advance Directives:   There are no questions and answers to display.       Active Hospital Problems    Diagnosis   • **PVD (peripheral vascular disease) (CMS/[facility])   • COPD (chronic obstructive pulmonary disease) (CMS/[facility])   • Coronary artery disease     2 vessels with 50% blockage.  Sees Dr. Donaldson     • Hypertension   • Diabetes mellitus (CMS/[facility])   • Hyperlipidemia   • Current smoker       Assessment / Plan:    1. Follow in ICU  2. Orders per CT Surgery  3. Monitory BP, Nicardipine to keep SBP less than 140 mmHg  4. Monitor Blood Sugar, S/S insulin. May need to add on Levemir 10 units if blood glucose is high.    5. Tobacco Cessation Counseling  6. AM labs  7. GI Prophylaxis: Famotidine  8. Home medications reviewed and reordered as appropriate.     I discussed the patient's findings and my recommendations with patient, family and nursing staff      LUIS Romero  Pulmonary and Critical Care Medicine    I have personally seen, interviewed and examined the patient and verified all the key components of the history, physical examination, assessment and plan with LUIS Carmona, ACNP-BC and reviewed the note, which reflects my changes and contributions.    Briana V Manuel, DO  Pulmonary and Critical Care Medicine

## 2020-01-15 NOTE — ANESTHESIA PREPROCEDURE EVALUATION
Anesthesia Evaluation     NPO Solid Status: > 8 hours  NPO Liquid Status: > 8 hours           Airway   Mallampati: II  TM distance: >3 FB  Neck ROM: full  No difficulty expected  Dental    (+) edentulous    Pulmonary    (+) a smoker Current, COPD, asthma (uses 2 inhalers daily, lungs feel ok today),decreased breath sounds,   Cardiovascular     ECG reviewed  Rhythm: regular  Rate: normal    (+) hypertension, PVD,   (-) pacemaker, valvular problems/murmurs, angina, murmur, cardiac stents      Neuro/Psych  (-) seizures, TIA, CVA  GI/Hepatic/Renal/Endo    (+)   diabetes mellitus (IDDM),     Musculoskeletal     Abdominal    Substance History      OB/GYN          Other        ROS/Med Hx Other: Dr Donaldson is her cardiologist, seen 6 weeks ago and told to go ahead and get her circulation problem worked on .  She has two 50% coronary blockages she states.  Denies CP or pressure.  She always has a fast Heart rate she says                  Anesthesia Plan    ASA 4     general   (GA    Dr Donaldson note and echo reviewed.   %, CAD treated medically)  intravenous induction     Anesthetic plan, all risks, benefits, and alternatives have been provided, discussed and informed consent has been obtained with: patient.    Plan discussed with CRNA.

## 2020-01-15 NOTE — OP NOTE
Operative Report  Saskia Groves  [id number]  1970    Preop Diagnosis: Atherosclerotic peripheral vascular disease, bilateral claudication        Postop Diagnosis same        Procedure: #1 right femoral endarterectomy #2 left femoral endarterectomy #3 femoral-femoral bypass with 8 mm [place]-Slade graft        Surgeons: Deandre Oseguera        Assistant: Edgar Machado        Operative Findings:         Description: The patient was brought to the operating room placed under general anesthesia.  The patient's abdomen groins were prepped and draped in usual sterile fashion.  Vertical incision was made in both groins were proceeded to isolate the common profunda and superficial femoral arteries.  At this time the patient had a tunnel made across her suprapubic pubic area.  A 8 mm [place]-Slade graft was brought through the tunnel.  Patient was given 8000's of heparin.  The right common femoral profunda and superficial femoral arteries were clamped.  The arteries open extended down the SFA.  At this time a long endarterectomy was then done using endarterectomy spatula.  After completion of this all floaters were removed.  The graft was spatulated and end-to-side anastomosis with a lara over the endarterectomy was then done.  This was done with a running 6-0 Prolene suture.  After completion of this the Joaquina clamp was placed on the graft.  There was excellent continuous flow Doppler demonstrated in the right SFA and the right profunda.  The left common femoral profunda and superficial femoral arteries were clamped.  The left common femoral arteries open extended down the left SFA.  A routine endarterectomy is carried out using endarterectomy spatula.  After completion of this all floaters were removed.  The graft was spatulated the appropriate length and end-to-side anastomosis running 6-0 Prolene suture was constructed was tied down after it been flushed.  There was excellent continuous flow Doppler demonstrated  demonstrated in the left superficial femoral and left profunda.  The patient was reversed with 75 mg of protamine.  The groins were closed in 4 layers of running 2-0 Vicryl, 3-0 Vicryl, 3-0 Vicryl, 3-0 Monocryl subcuticular stitch.        EBL: 200 cc      Please note that portions of this note were completed with a voice recognition program. Efforts were made to edit the dictations, but words may be mistranscribed      Deandre Oseguera MD  01/15/20 3:08 PM

## 2020-01-16 LAB
ANION GAP SERPL CALCULATED.3IONS-SCNC: 9 MMOL/L (ref 5–15)
BASOPHILS # BLD AUTO: 0.03 10*3/MM3 (ref 0–0.2)
BASOPHILS NFR BLD AUTO: 0.3 % (ref 0–1.5)
BUN BLD-MCNC: 19 MG/DL (ref 6–20)
BUN/CREAT SERPL: 29.2 (ref 7–25)
CALCIUM SPEC-SCNC: 8.1 MG/DL (ref 8.6–10.5)
CHLORIDE SERPL-SCNC: 102 MMOL/L (ref 98–107)
CO2 SERPL-SCNC: 21 MMOL/L (ref 22–29)
CREAT BLD-MCNC: 0.65 MG/DL (ref 0.57–1)
DEPRECATED RDW RBC AUTO: 42.3 FL (ref 37–54)
EOSINOPHIL # BLD AUTO: 0.03 10*3/MM3 (ref 0–0.4)
EOSINOPHIL NFR BLD AUTO: 0.3 % (ref 0.3–6.2)
ERYTHROCYTE [DISTWIDTH] IN BLOOD BY AUTOMATED COUNT: 12.2 % (ref 12.3–15.4)
GFR SERPL CREATININE-BSD FRML MDRD: 96 ML/MIN/1.73
GLUCOSE BLD-MCNC: 291 MG/DL (ref 65–99)
GLUCOSE BLDC GLUCOMTR-MCNC: 270 MG/DL (ref 70–130)
GLUCOSE BLDC GLUCOMTR-MCNC: 327 MG/DL (ref 70–130)
GLUCOSE BLDC GLUCOMTR-MCNC: 360 MG/DL (ref 70–130)
GLUCOSE BLDC GLUCOMTR-MCNC: 381 MG/DL (ref 70–130)
HCT VFR BLD AUTO: 30.6 % (ref 34–46.6)
HGB BLD-MCNC: 10.1 G/DL (ref 12–15.9)
IMM GRANULOCYTES # BLD AUTO: 0.07 10*3/MM3 (ref 0–0.05)
IMM GRANULOCYTES NFR BLD AUTO: 0.8 % (ref 0–0.5)
LYMPHOCYTES # BLD AUTO: 0.9 10*3/MM3 (ref 0.7–3.1)
LYMPHOCYTES NFR BLD AUTO: 9.8 % (ref 19.6–45.3)
MCH RBC QN AUTO: 30.9 PG (ref 26.6–33)
MCHC RBC AUTO-ENTMCNC: 33 G/DL (ref 31.5–35.7)
MCV RBC AUTO: 93.6 FL (ref 79–97)
MONOCYTES # BLD AUTO: 0.61 10*3/MM3 (ref 0.1–0.9)
MONOCYTES NFR BLD AUTO: 6.6 % (ref 5–12)
NEUTROPHILS # BLD AUTO: 7.54 10*3/MM3 (ref 1.7–7)
NEUTROPHILS NFR BLD AUTO: 82.2 % (ref 42.7–76)
NRBC BLD AUTO-RTO: 0 /100 WBC (ref 0–0.2)
PLATELET # BLD AUTO: 130 10*3/MM3 (ref 140–450)
PMV BLD AUTO: 11.5 FL (ref 6–12)
POTASSIUM BLD-SCNC: 5.1 MMOL/L (ref 3.5–5.2)
RBC # BLD AUTO: 3.27 10*6/MM3 (ref 3.77–5.28)
SODIUM BLD-SCNC: 132 MMOL/L (ref 136–145)
TSH SERPL DL<=0.05 MIU/L-ACNC: 0.02 UIU/ML (ref 0.27–4.2)
WBC NRBC COR # BLD: 9.18 10*3/MM3 (ref 3.4–10.8)

## 2020-01-16 PROCEDURE — 80048 BASIC METABOLIC PNL TOTAL CA: CPT | Performed by: PHYSICIAN ASSISTANT

## 2020-01-16 PROCEDURE — 63710000001 INSULIN LISPRO (HUMAN) PER 5 UNITS: Performed by: INTERNAL MEDICINE

## 2020-01-16 PROCEDURE — 94799 UNLISTED PULMONARY SVC/PX: CPT

## 2020-01-16 PROCEDURE — 85025 COMPLETE CBC W/AUTO DIFF WBC: CPT | Performed by: PHYSICIAN ASSISTANT

## 2020-01-16 PROCEDURE — 25010000002 MORPHINE PER 10 MG: Performed by: PHYSICIAN ASSISTANT

## 2020-01-16 PROCEDURE — 99024 POSTOP FOLLOW-UP VISIT: CPT | Performed by: THORACIC SURGERY (CARDIOTHORACIC VASCULAR SURGERY)

## 2020-01-16 PROCEDURE — 63710000001 INSULIN LISPRO (HUMAN) PER 5 UNITS: Performed by: PHYSICIAN ASSISTANT

## 2020-01-16 PROCEDURE — 94640 AIRWAY INHALATION TREATMENT: CPT

## 2020-01-16 PROCEDURE — 99232 SBSQ HOSP IP/OBS MODERATE 35: CPT | Performed by: INTERNAL MEDICINE

## 2020-01-16 PROCEDURE — 84443 ASSAY THYROID STIM HORMONE: CPT | Performed by: NURSE PRACTITIONER

## 2020-01-16 PROCEDURE — 82962 GLUCOSE BLOOD TEST: CPT

## 2020-01-16 RX ADMIN — SODIUM CHLORIDE 75 ML/HR: 4.5 INJECTION, SOLUTION INTRAVENOUS at 06:25

## 2020-01-16 RX ADMIN — MORPHINE SULFATE 2 MG: 4 INJECTION, SOLUTION INTRAMUSCULAR; INTRAVENOUS at 00:47

## 2020-01-16 RX ADMIN — INSULIN LISPRO 10 UNITS: 100 INJECTION, SOLUTION INTRAVENOUS; SUBCUTANEOUS at 16:42

## 2020-01-16 RX ADMIN — ATORVASTATIN CALCIUM 10 MG: 10 TABLET, FILM COATED ORAL at 09:07

## 2020-01-16 RX ADMIN — PANTOPRAZOLE SODIUM 40 MG: 40 TABLET, DELAYED RELEASE ORAL at 06:16

## 2020-01-16 RX ADMIN — FAMOTIDINE 20 MG: 20 TABLET, FILM COATED ORAL at 09:06

## 2020-01-16 RX ADMIN — IPRATROPIUM BROMIDE 0.5 MG: 0.5 SOLUTION RESPIRATORY (INHALATION) at 16:30

## 2020-01-16 RX ADMIN — CITALOPRAM HYDROBROMIDE 40 MG: 40 TABLET ORAL at 09:07

## 2020-01-16 RX ADMIN — IPRATROPIUM BROMIDE 0.5 MG: 0.5 SOLUTION RESPIRATORY (INHALATION) at 12:33

## 2020-01-16 RX ADMIN — IPRATROPIUM BROMIDE 0.5 MG: 0.5 SOLUTION RESPIRATORY (INHALATION) at 07:26

## 2020-01-16 RX ADMIN — ROPINIROLE HYDROCHLORIDE 4 MG: 2 TABLET, FILM COATED ORAL at 20:38

## 2020-01-16 RX ADMIN — PREGABALIN 150 MG: 75 CAPSULE ORAL at 06:15

## 2020-01-16 RX ADMIN — HYDROCODONE BITARTRATE AND ACETAMINOPHEN 1 TABLET: 7.5; 325 TABLET ORAL at 06:25

## 2020-01-16 RX ADMIN — CETIRIZINE HYDROCHLORIDE 10 MG: 10 TABLET, FILM COATED ORAL at 09:08

## 2020-01-16 RX ADMIN — MONTELUKAST SODIUM 10 MG: 10 TABLET, COATED ORAL at 09:07

## 2020-01-16 RX ADMIN — INSULIN LISPRO 12 UNITS: 100 INJECTION, SOLUTION INTRAVENOUS; SUBCUTANEOUS at 11:50

## 2020-01-16 RX ADMIN — BUDESONIDE AND FORMOTEROL FUMARATE DIHYDRATE 2 PUFF: 160; 4.5 AEROSOL RESPIRATORY (INHALATION) at 07:26

## 2020-01-16 RX ADMIN — ASPIRIN 81 MG: 81 TABLET, COATED ORAL at 09:07

## 2020-01-16 RX ADMIN — Medication 1 CAPSULE: at 09:07

## 2020-01-16 RX ADMIN — IPRATROPIUM BROMIDE 0.5 MG: 0.5 SOLUTION RESPIRATORY (INHALATION) at 20:00

## 2020-01-16 RX ADMIN — INSULIN LISPRO 8 UNITS: 100 INJECTION, SOLUTION INTRAVENOUS; SUBCUTANEOUS at 09:04

## 2020-01-16 RX ADMIN — CYANOCOBALAMIN TAB 1000 MCG 1000 MCG: 1000 TAB at 09:08

## 2020-01-16 RX ADMIN — ISOSORBIDE DINITRATE 30 MG: 20 TABLET ORAL at 20:38

## 2020-01-16 RX ADMIN — HYDROCODONE BITARTRATE AND ACETAMINOPHEN 1 TABLET: 7.5; 325 TABLET ORAL at 11:49

## 2020-01-16 RX ADMIN — PREGABALIN 150 MG: 75 CAPSULE ORAL at 21:46

## 2020-01-16 RX ADMIN — BUDESONIDE AND FORMOTEROL FUMARATE DIHYDRATE 2 PUFF: 160; 4.5 AEROSOL RESPIRATORY (INHALATION) at 20:00

## 2020-01-16 RX ADMIN — HYDROXYZINE HYDROCHLORIDE 25 MG: 25 TABLET, FILM COATED ORAL at 20:33

## 2020-01-16 RX ADMIN — INSULIN LISPRO 12 UNITS: 100 INJECTION, SOLUTION INTRAVENOUS; SUBCUTANEOUS at 20:34

## 2020-01-16 RX ADMIN — KETOTIFEN FUMARATE 1 DROP: 0.35 SOLUTION/ DROPS OPHTHALMIC at 20:39

## 2020-01-16 RX ADMIN — HYDROCODONE BITARTRATE AND ACETAMINOPHEN 1 TABLET: 7.5; 325 TABLET ORAL at 20:33

## 2020-01-16 RX ADMIN — CLOPIDOGREL BISULFATE 75 MG: 75 TABLET ORAL at 09:07

## 2020-01-16 RX ADMIN — KETOTIFEN FUMARATE 1 DROP: 0.35 SOLUTION/ DROPS OPHTHALMIC at 09:11

## 2020-01-16 RX ADMIN — HYDROCODONE BITARTRATE AND ACETAMINOPHEN 1 TABLET: 7.5; 325 TABLET ORAL at 16:14

## 2020-01-16 RX ADMIN — PREGABALIN 150 MG: 75 CAPSULE ORAL at 16:13

## 2020-01-16 NOTE — PLAN OF CARE
Problem: Patient Care Overview  Goal: Plan of Care Review  Outcome: Ongoing (interventions implemented as appropriate)  Flowsheets (Taken 1/15/2020 1936)  Progress: improving  Outcome Summary: bp tends to be low other vs stable-drowsy upon arrival-chest clear-urine output per fc-pulses strong with doppler

## 2020-01-16 NOTE — PROGRESS NOTES
"                  Clinical Nutrition     Nutrition Education  Reason for Visit:   MDR, HgbA1C noted       Patient Name: Saskia Groves  YOB: 1970  MRN: [id number]  Date of Encounter: 01/16/20 12:23 PM  Admission date: 1/15/2020        Nutrition Assessment   Assessment       Admission diagnosis  PVD (peripheral vascular disease) (CMS/[facility])    Additional applicable diagnosis/conditions/procedures this adm:  1/15) s/p Bilateral femoral endarterectomies with femoral-femoral bypass    Applicable PMH/PSxH:   Current smoker  COPD (chronic obstructive pulmonary disease) (CMS/[facility])  Coronary artery disease  Hypertension  Hyperlipidemia  Diabetes mellitus (CMS/[facility])  CCY        Reported/Observed/Food/Nutrition Related History:      Pt states she is not newly diagnosed and that she was told she had diabetes when she was pregnant with her daughter. She states she takes her medications as prescribed and checks her blood sugar at home. She states her blood sugars run 200-500mg/dL. She states she knows what she is supposed to eat for her diabetes, is familiar with  Carbohydrate counting. She states she is not interested in receiving any further nutrition education at this time.       Anthropometrics     Height: 160 cm (63\")  Last filed wt: Weight: 54.8 kg (120 lb 12.8 oz) (01/15/20 1008)  Weight Method: (confirmed with pt)    BMI: BMI (Calculated): 21.4  Normal: 18.5-24.9kg/m2    Ideal Body Weight (IBW) (kg): 52.72      Labs reviewed     Results from last 7 days   Lab Units 01/16/20  0319 01/14/20  1415   GLUCOSE mg/dL 291* 424*   BUN mg/dL 19 13   CREATININE mg/dL 0.65 0.60   SODIUM mmol/L 132* 134*   CHLORIDE mmol/L 102 98   POTASSIUM mmol/L 5.1 5.2           Invalid input(s): PLAT      Results from last 7 days   Lab Units 01/16/20  1128 01/16/20  0705 01/15/20  2028 01/15/20  1818 01/15/20  1513 01/15/20  0940   GLUCOSE mg/dL 360* 270* 233* 207* 170* 114     Lab Results   Lab Value Date/Time    HGBA1C 12.10 (H) " 01/14/2020 1415    HGBA1C 12.8 08/22/2016 1000    HGBA1C 11.4 (H) 04/21/2016 1015         Medications reviewed   Pertinent:  SSI, B12, protonix, probiotic     Current Nutrition Prescription     PO: Diet Regular; Consistent Carbohydrate, Cardiac           Nutrition Diagnosis     1/16  Problem Altered nutrition related laboratory values   Etiology T2DM/?lifestyle and dietary choices   Signs/Symptoms HgbA1C 12.1%       Nutrition Intervention     1.  Follow treatment progress, Care plan reviewed  2. Education offered/refused. Education material from the [employer] provided to patient. RD contact information provided. Short and long term effect of hyperglycemia discussed with pt. She expressed understanding.       Goal:   General: Nutrition support treatment  Additional goals:  FSBG 140-180mg/dL  Trend toward HgbA1c of <7%    Monitoring/Evaluation:   Per protocol, Pertinent labs      Will Continue to follow per protocol      Lacie Rubalcava RDN, LD, CNSC  Time Spent: 30min

## 2020-01-16 NOTE — PROGRESS NOTES
Discharge Planning Assessment  [facility]     Patient Name: Saskia Groves  MRN: [id number]  Today's Date: 1/16/2020    Admit Date: 1/15/2020    Discharge Needs Assessment     Row Name 01/16/20 1221       Living Environment    Lives With  alone    Current Living Arrangements  home/apartment/condo    Primary Care Provided by  self    Provides Primary Care For  no one, unable/limited ability to care for self    Family Caregiver if Needed  child(mat), adult    Quality of Family Relationships  supportive    Able to Return to Prior Arrangements  yes       Resource/Environmental Concerns    Resource/Environmental Concerns  none    Transportation Concerns  car, none       Transition Planning    Patient/Family Anticipates Transition to  home    Patient/Family Anticipated Services at Transition         Discharge Needs Assessment    Readmission Within the Last 30 Days  no previous admission in last 30 days    Concerns to be Addressed  denies needs/concerns at this time    Equipment Currently Used at Home  glucometer;rollator;wheelchair;cane, straight    Anticipated Changes Related to Illness  none    Equipment Needed After Discharge  none        Discharge Plan     Row Name 01/16/20 1222       Plan    Plan  Home    Patient/Family in Agreement with Plan  yes    Plan Comments  Spoke with patient at bedside.  Patient resides in [facility] and she lives alone.  Patient states her  lives next door as well as her daughter, plus she has another daughter that lives down the street.  Prior to admission patient states she was independent with ADL's and mobility using an assistive device.  Patient states she has a walker, wheelchair, and cane at home and that she uses the cane the majority of the time.  Patient states she is hopeful now that she can feel her feet she won't need the cane.  Patient states she has never used home health and she will have assistance from her family as needed. Patient plans to go  home upon discharge and denies any needs.  CM will continue to follow.        Destination      Coordination has not been started for this encounter.      Durable Medical Equipment      Coordination has not been started for this encounter.      Dialysis/Infusion      Coordination has not been started for this encounter.      Home Medical Care      Coordination has not been started for this encounter.      Therapy      Coordination has not been started for this encounter.      Community Resources      Coordination has not been started for this encounter.        Expected Discharge Date and Time     Expected Discharge Date Expected Discharge Time    Jan 18, 2020         Demographic Summary     Row Name 01/16/20 1220       General Information    Admission Type  inpatient    Arrived From  home    Referral Source  admission list    Reason for Consult  discharge planning    Preferred Language  English       Contact Information    Permission Granted to Share Info With          Functional Status    No documentation.       Psychosocial    No documentation.       Abuse/Neglect    No documentation.       Legal    No documentation.       Substance Abuse    No documentation.       Patient Forms    No documentation.           Salud Valdes RN

## 2020-01-16 NOTE — PROGRESS NOTES
Cardiothoracic Surgery Progress Note      POD #:     LOS: 1 day      Subjective:    Chief Complaint:     Objective:  Vital Signs  Temp:  [98 °F (36.7 °C)-98.7 °F (37.1 °C)] 98.7 °F (37.1 °C)  Heart Rate:  [] 114  Resp:  [15-18] 16  BP: ()/(51-90) 104/72    Physical Exam:   General Appearance:    Lungs:   Heart:   Skin:   Incision:     Results:  Results from last 7 days   Lab Units 01/16/20  0319   WBC 10*3/mm3 9.18   HEMOGLOBIN g/dL 10.1*   HEMATOCRIT % 30.6*   PLATELETS 10*3/mm3 130*     Results from last 7 days   Lab Units 01/16/20  0319   SODIUM mmol/L 132*   POTASSIUM mmol/L 5.1   CHLORIDE mmol/L 102   CO2 mmol/L 21.0*   BUN mg/dL 19   CREATININE mg/dL 0.65   GLUCOSE mg/dL 291*   CALCIUM mg/dL 8.1*         Assessment:      Plan:      Louie Sam MD - 01/16/20 - 8:49 PM    CTS Progress Note      POD #1 s/p Bilateral femoral endarterectomies with femoral-femoral bypass      Subjective  Patient sitting in bed states that overall she is feeling well however does admit to incisional pain.  Patient denies any chest pain, dyspnea abdominal or low back pain.  Patient reports she is regaining sensation in her feet and is now able to wiggle her toes where she previously had been unable to do so.      Objective    Physical Exam:   Vital Signs   Temp:  [97.4 °F (36.3 °C)-98.9 °F (37.2 °C)] 98 °F (36.7 °C)  Heart Rate:  [] 92  Resp:  [16-18] 16  BP: ()/(51-94) 108/58   GEN: NAD   RESP: Clear to auscultation bilaterally no wheezes, rales or rhonchi    CV: Regular rate and rhythm no murmurs, rubs or gallops   ABD: Soft, nontender/nondistended with normoactive bowel sounds    EXT: Warm good color and well-perfused with no bilateral lower extremity edema with dopplerable biphasic dorsalis pedis and posterior tibialis pulses bilaterally   INT: Incision dressings c/d/i  Warm feet bilaterally excellent capillary fill of toes    Intake/Output Summary (Last 24 hours) at 1/16/2020 7660  Last data filed at  1/16/2020 0625  Gross per 24 hour   Intake 1264 ml   Output 795 ml   Net 469 ml     Results     Results from last 7 days   Lab Units 01/16/20  0319   WBC 10*3/mm3 9.18   HEMOGLOBIN g/dL 10.1*   HEMATOCRIT % 30.6*   PLATELETS 10*3/mm3 130*     Results from last 7 days   Lab Units 01/16/20  0319   SODIUM mmol/L 132*   POTASSIUM mmol/L 5.1   CHLORIDE mmol/L 102   CO2 mmol/L 21.0*   BUN mg/dL 19   CREATININE mg/dL 0.65   GLUCOSE mg/dL 291*   CALCIUM mg/dL 8.1*     Results from last 7 days   Lab Units 01/14/20  1415   INR  1.02   APTT seconds 27.7         Assessment/Plan       PVD (peripheral vascular disease) (CMS/HCC)    Current smoker    COPD (chronic obstructive pulmonary disease) (CMS/HCC)    Coronary artery disease    Hypertension    Hyperlipidemia    Diabetes mellitus (CMS/HCC)        Plan   Ambulate in the hallway with assistance  PT/OT  Aggressive pulmonary toilet  Leave in ICU today  Discharge home in a.m.-I agree    PEDRO Colindres  01/16/20  8:25 AM

## 2020-01-16 NOTE — PAYOR COMM NOTE
"Attention: Mirela Thornton, RN Utilization Review [phone]  Fax # [phone]    Ref # [id number]    Pt admitted for a scheduled surgery. Please see clinicals for continued IP stay.        Saskia Arteaga (49 y.o. Female)     Date of Birth Social Security Number Address Home Phone MRN    1970  [address] [place] [address] [phone] [id number]    Jew Marital Status          None        Admission Date Admission Type Admitting Provider Attending Provider Department, Room/Bed    1/15/20 Elective Deandre Oseguera MD Rogers, Anthony G, MD [facility] 2A ICU, N211/1    Discharge Date Discharge Disposition Discharge Destination                       Attending Provider:  Deandre Oseguera MD    Allergies:  Levaquin [Levofloxacin], Penicillins, Codeine, Flagyl [Metronidazole]    Isolation:  None   Infection:  None   Code Status:  CPR    Ht:  160 cm (63\")   Wt:  54.8 kg (120 lb 12.8 oz)    Admission Cmt:  None   Principal Problem:  PVD (peripheral vascular disease) (CMS/[facility]) [I73.9]                 Active Insurance as of 1/15/2020     Primary Coverage     Payor Plan Insurance Group Employer/Plan Group    [employer] [place] [employer] [place]      Payor Plan Address Payor Plan Phone Number Payor Plan Fax Number Effective Dates    [address] [address] [phone]  1/6/2020 - None Entered    [facility] [id number]       Subscriber Name Subscriber Birth Date Member ID       SASKIA ARTEAGA 1970 [id number]                 Emergency Contacts      (Rel.) Home Phone Work Phone Mobile Phone    Thuan Arteaga (Spouse) [phone] -- --               History & Physical      Adamaris Garcia, APRN at 01/15/20 1008          [facility] Pre-op    Full history and physical note from office is up to date.  See office note attached.    /94 (BP Location: Right arm, Patient Position: Lying)   Pulse 100   Temp 97.9 °F (36.6 °C) (Temporal)   Resp " "18   Ht 160 cm (63\")   Wt 54.8 kg (120 lb 12.8 oz)   SpO2 99%   BMI 21.40 kg/m²         LAB Results:  Lab Results   Component Value Date    WBC 7.25 01/14/2020    HGB 13.8 01/14/2020    HCT 41.2 01/14/2020    MCV 93.0 01/14/2020     01/14/2020    GLUCOSE 424 (C) 01/14/2020    BUN 13 01/14/2020    CREATININE 0.60 01/14/2020    EGFRIFNONA 106 01/14/2020     (L) 01/14/2020    K 5.2 01/14/2020    CL 98 01/14/2020    CO2 27.0 01/14/2020    CALCIUM 9.8 01/14/2020     *Recheck of patient's blood glucose on 1/15/20= 114    Cancer Staging (if applicable)  Cancer Patient: __ yes _X_no __unknown__N/A; If yes, clinical stage T:__ N:__M:__, stage group or __N/A      Adamaris Garcia, APRN 1/15/2020 10:27 AM    Electronically signed by Deandre Oseguera MD at 01/15/20 1514   Source Note          01/06/2020  Patient Information  Saskia GARCIA Germain                                                                                          [address] [id number]   1970  'PCP/Referring Physician'  Meagan Barlow, APRN  [phone]  No ref. provider found    Chief Complaint   Patient presents with   • Follow-up     F/U had a aortagram on 11/15/19 for CAD. Pt states that her legs are hard to use, they feel heavy and are very painful.  Pt is seeking surgery consult.        History of Present Illness:   The patient is a 49-year-old female who gives a history recently of increasing claudication of the left leg greater than the right.  However, both legs continue to bother her.  She is smoking 1/2 packs a day of cigarettes.  She apparently was seen by Dr. Donaldson and referred to Dr. Zamorano.  He apparently tried to cross the right SFA occlusion but was unsuccessful.  He tried to refer to another doctor for bypass surgery but she has been followed by me in the past and has come back to see me.      Patient Active Problem List   Diagnosis   • PVD (peripheral vascular disease) (CMS/HCC)   • Lumbar " radiculopathy   • Paresthesia   • Causalgia of lower limb   • Hiatal hernia   • Current smoker   • Bilateral carotid artery stenosis     Past Medical History:   Diagnosis Date   • Anxiety    • Arthritis    • Carotid artery stenosis    • COPD (chronic obstructive pulmonary disease) (CMS/HCC)    • Depression    • Diabetes mellitus (CMS/HCC)    • Disease of thyroid gland    • Dyslipidemia    • GERD (gastroesophageal reflux disease)    • Goiter    • Goiter    • Hiatal hernia    • Hyperlipidemia    • Hypertension    • Infectious viral hepatitis    • Lower back pain    • Migraine      Past Surgical History:   Procedure Laterality Date   • BACK SURGERY     • CHOLECYSTECTOMY     • HYSTERECTOMY     • KNEE SURGERY Left    • WRIST SURGERY Left        Current Outpatient Medications:   •  albuterol (PROVENTIL) (2.5 MG/3ML) 0.083% nebulizer solution, Albuterol Sulfate (2.5 MG/3ML) 0.083% Inhalation Nebulization Solution; Patient Sig: Albuterol Sulfate (2.5 MG/3ML) 0.083% Inhalation Nebulization Solution ; 0; 01-Apr-2014; Active, Disp: , Rfl:   •  ALBUTEROL SULFATE HFA IN, Albuterol Sulfate HFA AERS; Patient Sig: Albuterol Sulfate HFA AERS ; 0; 01-Apr-2014; Active, Disp: , Rfl:   •  amLODIPine (NORVASC) 10 MG tablet, Take  by mouth 3 (three) times a day., Disp: , Rfl:   •  aspirin 81 MG tablet, Take 1 tablet by mouth daily., Disp: , Rfl:   •  beclomethasone (QVAR) 40 MCG/ACT inhaler, Qvar 40 MCG/ACT Inhalation Aerosol Solution; Patient Sig: Qvar 40 MCG/ACT Inhalation Aerosol Solution ; 0; 01-Apr-2014; Active, Disp: , Rfl:   •  benzonatate (TESSALON) 200 MG capsule, Take  by mouth., Disp: , Rfl:   •  cetirizine (ZYRTEC ALLERGY) 10 MG tablet, Take 1 tablet by mouth daily., Disp: , Rfl:   •  citalopram (CELEXA) 40 MG tablet, Take 1 tablet by mouth daily., Disp: , Rfl:   •  clopidogrel (PLAVIX) 75 MG tablet, TAKE ONE TABLET BY MOUTH DAILY, Disp: 30 tablet, Rfl: 5  •  cyanocobalamin 1000 MCG/ML injection, , Disp: , Rfl:   •   "fexofenadine (ALLEGRA) 180 MG tablet, Take 180 mg by mouth Daily., Disp: , Rfl:   •  fluticasone (FLONASE) 50 MCG/ACT nasal spray, into each nostril. Use as directed, Disp: , Rfl:   •  fluticasone-salmeterol (ADVAIR) 250-50 MCG/DOSE DISKUS, , Disp: , Rfl:   •  HYDROcodone-acetaminophen (LORTAB)  MG per tablet, Take  by mouth., Disp: , Rfl:   •  hydrOXYzine (ATARAX) 25 MG tablet, Take  by mouth., Disp: , Rfl:   •  insulin glargine (LANTUS) 100 UNIT/ML injection, Inject  under the skin Daily., Disp: , Rfl:   •  Insulin Syringe-Needle U-100 30G X 5/16\" 0.5 ML misc, Uses X 4/day, Disp: 200 each, Rfl: 5  •  ketotifen (ZADITOR) 0.025 % ophthalmic solution, 1 drop 2 (Two) Times a Day., Disp: , Rfl:   •  Lactobacillus (ACIDOPHILUS PO), Take  by mouth., Disp: , Rfl:   •  lactulose (CHRONULAC) 10 GM/15ML solution, Take 20 g by mouth 2 (Two) Times a Day As Needed., Disp: , Rfl:   •  Lancets (ONETOUCH DELICA PLUS [id number]) misc, , Disp: , Rfl:   •  lansoprazole (PREVACID) 30 MG capsule, Take  by mouth., Disp: , Rfl:   •  levocetirizine (XYZAL) 5 MG tablet, , Disp: , Rfl:   •  lovastatin (MEVACOR) 20 MG tablet, Take 1 tablet by mouth every night. At bedtime, Disp: , Rfl:   •  lovastatin (MEVACOR) 40 MG tablet, , Disp: , Rfl:   •  metFORMIN (GLUCOPHAGE) 500 MG tablet, Take 1 tablet by mouth daily., Disp: , Rfl:   •  metoprolol succinate XL (TOPROL XL) 50 MG 24 hr tablet, Take 12 tablets by mouth Daily., Disp: , Rfl:   •  montelukast (SINGULAIR) 10 MG tablet, Take  by mouth., Disp: , Rfl:   •  omega-3 acid ethyl esters (LOVAZA) 1 g capsule, , Disp: , Rfl:   •  ONE TOUCH ULTRA TEST test strip, , Disp: , Rfl:   •  oxybutynin XL (DITROPAN-XL) 5 MG 24 hr tablet, Take  by mouth., Disp: , Rfl:   •  pantoprazole (PROTONIX) 40 MG EC tablet, , Disp: , Rfl:   •  pregabalin (LYRICA) 150 MG capsule, , Disp: , Rfl:   •  pregabalin (LYRICA) 50 MG capsule, Take  by mouth., Disp: , Rfl:   •  raNITIdine (ZANTAC) 150 MG tablet, Take 150 mg " by mouth 2 (Two) Times a Day., Disp: , Rfl:   •  rOPINIRole (REQUIP) 4 MG tablet, Take  by mouth., Disp: , Rfl:   •  SPIRIVA RESPIMAT 2.5 MCG/ACT aerosol solution inhaler, , Disp: , Rfl:   •  tiotropium (SPIRIVA HANDIHALER) 18 MCG per inhalation capsule, Spiriva HandiHaler 18 MCG Inhalation Capsule; Patient Sig: Spiriva HandiHaler 18 MCG Inhalation Capsule ; 0; 01-Apr-2014; Active, Disp: , Rfl:   •  tiZANidine (ZANAFLEX) 4 MG tablet, Take  by mouth., Disp: , Rfl:   •  vitamin B-12 (CYANOCOBALAMIN) 1000 MCG tablet, Take 1,000 mcg by mouth Daily., Disp: , Rfl:   •  vitamin D (ERGOCALCIFEROL) 1.25 MG (23271 UT) capsule capsule, , Disp: , Rfl:   •  aclidinium bromide (TUDORZA PRESSAIR) 400 MCG/ACT aerosol powder  powder for inhalation, Inhale 1 puff 2 (Two) Times a Day., Disp: , Rfl:   •  ASPIRIN ADULT LOW STRENGTH 81 MG EC tablet, , Disp: , Rfl:   •  benzonatate (TESSALON) 100 MG capsule, , Disp: , Rfl:   •  budesonide-formoterol (SYMBICORT) 160-4.5 MCG/ACT inhaler, Inhale 2 puffs 2 (Two) Times a Day., Disp: , Rfl:   •  cilostazol (PLETAL) 50 MG tablet, TAKE ONE TABLET BY MOUTH TWICE A DAY, Disp: 60 tablet, Rfl: 3  •  fluconazole (DIFLUCAN) 150 MG tablet, Take 150 mg by mouth 1 (One) Time., Disp: , Rfl:   •  gabapentin (NEURONTIN) 600 MG tablet, Take  by mouth., Disp: , Rfl:   •  ibuprofen (ADVIL,MOTRIN) 600 MG tablet, Take 600 mg by mouth Every 6 (Six) Hours As Needed for Mild Pain ., Disp: , Rfl:   •  insulin lispro (HUMALOG) 100 UNIT/ML injection, 8 U ac bkfst, 16 U ac lunch & 18 U ac supper, Disp: 20 mL, Rfl: 5  •  losartan (COZAAR) 25 MG tablet, Take 1 tablet by mouth daily., Disp: , Rfl:   •  mometasone-formoterol (DULERA) 100-5 MCG/ACT inhaler, Dulera 100-5 MCG/ACT Inhalation Aerosol; Patient Sig: Dulera 100-5 MCG/ACT Inhalation Aerosol ; 0; 01-Apr-2014; Active, Disp: , Rfl:   •  NYSTATIN PO, Take  by mouth., Disp: , Rfl:   •  theophylline (UNIPHYL) 400 MG 24 hr tablet, Take 1 tablet by mouth daily. 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     Employer: DISABLED     Comment: Back and Leg Pain   Tobacco Use   • Smoking status: Current Every Day Smoker     Packs/day: 1.00     Years: 25.00     Pack years: 25.00     Types: Cigarettes   • Smokeless tobacco: Never Used   Substance and Sexual Activity   • Alcohol use: No   • Drug use: No   Social History Narrative    Lives in [facility]     Family History   Problem Relation Age of Onset   • Diabetes Mother    • Hypertension Mother    • Hypertension Father    • Lung cancer Father    • Stroke Other      Review of Systems   Constitution: Positive for malaise/fatigue and night sweats. Negative for chills, fever and weight loss.   HENT: Positive for odynophagia. Negative for congestion, hearing loss and nosebleeds.    Cardiovascular: Positive for dyspnea on exertion, irregular heartbeat and leg swelling (bilateral leg the left is the worse). Negative for chest pain, claudication, orthopnea, palpitations and syncope.   Respiratory: Positive for cough. Negative for hemoptysis, shortness of breath and wheezing.    Endocrine: Positive for cold intolerance and heat intolerance. Negative for polydipsia, polyphagia and polyuria.   Hematologic/Lymphatic: Does not bruise/bleed easily.   Skin: Positive for poor wound healing. Negative for itching and rash.   Musculoskeletal: Positive for arthritis, back pain and joint pain. Negative for joint swelling and myalgias.   Gastrointestinal: Positive for diarrhea. Negative for abdominal pain, constipation, hematemesis, melena, nausea and vomiting.   Genitourinary:  "Negative for dysuria, frequency, hematuria, nocturia and urgency.   Neurological: Positive for loss of balance and numbness. Negative for dizziness and light-headedness.   Psychiatric/Behavioral: Positive for memory loss. Negative for depression and suicidal ideas. The patient is not nervous/anxious.    Allergic/Immunologic: Negative for environmental allergies and HIV exposure.     Vitals:    01/06/20 1542   BP: 118/70   Pulse: 71   Temp: 98.6 °F (37 °C)   TempSrc: Temporal   SpO2: 98%   Weight: 53.5 kg (118 lb)   Height: 160 cm (63\")      Physical Exam   Neck: Normal range of motion. Neck supple. No JVD present. No tracheal deviation present. No thyromegaly present.   Cardiovascular: Normal rate and regular rhythm. Exam reveals no gallop and no friction rub.   No murmur heard.  Pulses:       Dorsalis pedis pulses are 0 on the right side, and 0 on the left side.        Posterior tibial pulses are 0 on the right side, and 0 on the left side.   Pulmonary/Chest: No stridor. No respiratory distress. She has no wheezes. She has no rales. She exhibits no tenderness.   Abdominal: Soft. Bowel sounds are normal. There is no tenderness.   Lymphadenopathy:     She has no cervical adenopathy.     Assessment/Plan:   The patient returns today with a new problem.  The patient appears to have decreased pulses bilaterally.  She has claudication, left greater than right.  I have obtained her abdominal aortogram which demonstrates bilateral common femoral arteries to be occluded and the iliacs bilaterally are occluded.  I recommended a right femoral endarterectomy with fem-fem bypass.  I discussed the procedure, risks and benefits including risk to her life, bleeding, infection as well as other risk factors.  She agreed to proceed with the procedure.  I extensively discussed consequences of smoking, namely cardiovascular, metabolic, lung cancer, mouth cancer, pulmonary disorder including COPD and the reduced quality of life.  At the " end of the conversation, the patient voices understanding of the risks involved in smoking, and also understands the benefits of quitting.  During this visit, I spent 3-5 minutes counseling the patient regarding smoking cessation.    Patient Active Problem List   Diagnosis   • PVD (peripheral vascular disease) (CMS/HCC)   • Lumbar radiculopathy   • Paresthesia   • Causalgia of lower limb   • Hiatal hernia   • Current smoker   • Bilateral carotid artery stenosis     CC: LUIS Bravo, , editing for Deandre Oseguera M.D.    I, Deandre Oseguera MD, have read and agree with the editing done by Tete Whittaker, .    Electronically signed by Deandre Oseguera MD at 01/07/20 0962             Deandre Oseguera MD at 01/06/20 1515          01/06/2020  Patient Information  Saskia Groves                                                                                          [address] [facility] [address]   1970  'PCP/Referring Physician'  Meagan Barlow APRN  [phone]  No ref. provider found    Chief Complaint   Patient presents with   • Follow-up     F/U had a aortagram on 11/15/19 for CAD. Pt states that her legs are hard to use, they feel heavy and are very painful.  Pt is seeking surgery consult.        History of Present Illness:   The patient is a 49-year-old female who gives a history recently of increasing claudication of the left leg greater than the right.  However, both legs continue to bother her.  She is smoking 1/2 packs a day of cigarettes.  She apparently was seen by Dr. Donaldson and referred to Dr. Zamorano.  He apparently tried to cross the right SFA occlusion but was unsuccessful.  He tried to refer to another doctor for bypass surgery but she has been followed by me in the past and has come back to see me.      Patient Active Problem List   Diagnosis   • PVD (peripheral vascular disease) (CMS/HCC)   • Lumbar radiculopathy   •  Paresthesia   • Causalgia of lower limb   • Hiatal hernia   • Current smoker   • Bilateral carotid artery stenosis     Past Medical History:   Diagnosis Date   • Anxiety    • Arthritis    • Carotid artery stenosis    • COPD (chronic obstructive pulmonary disease) (CMS/[facility])    • Depression    • Diabetes mellitus (CMS/HCC)    • Disease of thyroid gland    • Dyslipidemia    • GERD (gastroesophageal reflux disease)    • Goiter    • Goiter    • Hiatal hernia    • Hyperlipidemia    • Hypertension    • Infectious viral hepatitis    • Lower back pain    • Migraine      Past Surgical History:   Procedure Laterality Date   • BACK SURGERY     • CHOLECYSTECTOMY     • HYSTERECTOMY     • KNEE SURGERY Left    • WRIST SURGERY Left        Current Outpatient Medications:   •  albuterol (PROVENTIL) (2.5 MG/3ML) 0.083% nebulizer solution, Albuterol Sulfate (2.5 MG/3ML) 0.083% Inhalation Nebulization Solution; Patient Sig: Albuterol Sulfate (2.5 MG/3ML) 0.083% Inhalation Nebulization Solution ; 0; 01-Apr-2014; Active, Disp: , Rfl:   •  ALBUTEROL SULFATE HFA IN, Albuterol Sulfate HFA AERS; Patient Sig: Albuterol Sulfate HFA AERS ; 0; 01-Apr-2014; Active, Disp: , Rfl:   •  amLODIPine (NORVASC) 10 MG tablet, Take  by mouth 3 (three) times a day., Disp: , Rfl:   •  aspirin 81 MG tablet, Take 1 tablet by mouth daily., Disp: , Rfl:   •  beclomethasone (QVAR) 40 MCG/ACT inhaler, Qvar 40 MCG/ACT Inhalation Aerosol Solution; Patient Sig: Qvar 40 MCG/ACT Inhalation Aerosol Solution ; 0; 01-Apr-2014; Active, Disp: , Rfl:   •  benzonatate (TESSALON) 200 MG capsule, Take  by mouth., Disp: , Rfl:   •  cetirizine (ZYRTEC ALLERGY) 10 MG tablet, Take 1 tablet by mouth daily., Disp: , Rfl:   •  citalopram (CELEXA) 40 MG tablet, Take 1 tablet by mouth daily., Disp: , Rfl:   •  clopidogrel (PLAVIX) 75 MG tablet, TAKE ONE TABLET BY MOUTH DAILY, Disp: 30 tablet, Rfl: 5  •  cyanocobalamin 1000 MCG/ML injection, , Disp: , Rfl:   •  fexofenadine (ALLEGRA) 180  "MG tablet, Take 180 mg by mouth Daily., Disp: , Rfl:   •  fluticasone (FLONASE) 50 MCG/ACT nasal spray, into each nostril. Use as directed, Disp: , Rfl:   •  fluticasone-salmeterol (ADVAIR) 250-50 MCG/DOSE DISKUS, , Disp: , Rfl:   •  HYDROcodone-acetaminophen (LORTAB)  MG per tablet, Take  by mouth., Disp: , Rfl:   •  hydrOXYzine (ATARAX) 25 MG tablet, Take  by mouth., Disp: , Rfl:   •  insulin glargine (LANTUS) 100 UNIT/ML injection, Inject  under the skin Daily., Disp: , Rfl:   •  Insulin Syringe-Needle U-100 30G X 5/16\" 0.5 ML misc, Uses X 4/day, Disp: 200 each, Rfl: 5  •  ketotifen (ZADITOR) 0.025 % ophthalmic solution, 1 drop 2 (Two) Times a Day., Disp: , Rfl:   •  Lactobacillus (ACIDOPHILUS PO), Take  by mouth., Disp: , Rfl:   •  lactulose (CHRONULAC) 10 GM/15ML solution, Take 20 g by mouth 2 (Two) Times a Day As Needed., Disp: , Rfl:   •  Lancets (ONETOUCH DELICA PLUS [id number]) misc, , Disp: , Rfl:   •  lansoprazole (PREVACID) 30 MG capsule, Take  by mouth., Disp: , Rfl:   •  levocetirizine (XYZAL) 5 MG tablet, , Disp: , Rfl:   •  lovastatin (MEVACOR) 20 MG tablet, Take 1 tablet by mouth every night. At bedtime, Disp: , Rfl:   •  lovastatin (MEVACOR) 40 MG tablet, , Disp: , Rfl:   •  metFORMIN (GLUCOPHAGE) 500 MG tablet, Take 1 tablet by mouth daily., Disp: , Rfl:   •  metoprolol succinate XL (TOPROL XL) 50 MG 24 hr tablet, Take 12 tablets by mouth Daily., Disp: , Rfl:   •  montelukast (SINGULAIR) 10 MG tablet, Take  by mouth., Disp: , Rfl:   •  omega-3 acid ethyl esters (LOVAZA) 1 g capsule, , Disp: , Rfl:   •  ONE TOUCH ULTRA TEST test strip, , Disp: , Rfl:   •  oxybutynin XL (DITROPAN-XL) 5 MG 24 hr tablet, Take  by mouth., Disp: , Rfl:   •  pantoprazole (PROTONIX) 40 MG EC tablet, , Disp: , Rfl:   •  pregabalin (LYRICA) 150 MG capsule, , Disp: , Rfl:   •  pregabalin (LYRICA) 50 MG capsule, Take  by mouth., Disp: , Rfl:   •  raNITIdine (ZANTAC) 150 MG tablet, Take 150 mg by mouth 2 (Two) Times a " Day., Disp: , Rfl:   •  rOPINIRole (REQUIP) 4 MG tablet, Take  by mouth., Disp: , Rfl:   •  SPIRIVA RESPIMAT 2.5 MCG/ACT aerosol solution inhaler, , Disp: , Rfl:   •  tiotropium (SPIRIVA HANDIHALER) 18 MCG per inhalation capsule, Spiriva HandiHaler 18 MCG Inhalation Capsule; Patient Sig: Spiriva HandiHaler 18 MCG Inhalation Capsule ; 0; 01-Apr-2014; Active, Disp: , Rfl:   •  tiZANidine (ZANAFLEX) 4 MG tablet, Take  by mouth., Disp: , Rfl:   •  vitamin B-12 (CYANOCOBALAMIN) 1000 MCG tablet, Take 1,000 mcg by mouth Daily., Disp: , Rfl:   •  vitamin D (ERGOCALCIFEROL) 1.25 MG (13617 UT) capsule capsule, , Disp: , Rfl:   •  aclidinium bromide (TUDORZA PRESSAIR) 400 MCG/ACT aerosol powder  powder for inhalation, Inhale 1 puff 2 (Two) Times a Day., Disp: , Rfl:   •  ASPIRIN ADULT LOW STRENGTH 81 MG EC tablet, , Disp: , Rfl:   •  benzonatate (TESSALON) 100 MG capsule, , Disp: , Rfl:   •  budesonide-formoterol (SYMBICORT) 160-4.5 MCG/ACT inhaler, Inhale 2 puffs 2 (Two) Times a Day., Disp: , Rfl:   •  cilostazol (PLETAL) 50 MG tablet, TAKE ONE TABLET BY MOUTH TWICE A DAY, Disp: 60 tablet, Rfl: 3  •  fluconazole (DIFLUCAN) 150 MG tablet, Take 150 mg by mouth 1 (One) Time., Disp: , Rfl:   •  gabapentin (NEURONTIN) 600 MG tablet, Take  by mouth., Disp: , Rfl:   •  ibuprofen (ADVIL,MOTRIN) 600 MG tablet, Take 600 mg by mouth Every 6 (Six) Hours As Needed for Mild Pain ., Disp: , Rfl:   •  insulin lispro (HUMALOG) 100 UNIT/ML injection, 8 U ac bkfst, 16 U ac lunch & 18 U ac supper, Disp: 20 mL, Rfl: 5  •  losartan (COZAAR) 25 MG tablet, Take 1 tablet by mouth daily., Disp: , Rfl:   •  mometasone-formoterol (DULERA) 100-5 MCG/ACT inhaler, Dulera 100-5 MCG/ACT Inhalation Aerosol; Patient Sig: Dulera 100-5 MCG/ACT Inhalation Aerosol ; 0; 01-Apr-2014; Active, Disp: , Rfl:   •  NYSTATIN PO, Take  by mouth., Disp: , Rfl:   •  theophylline (UNIPHYL) 400 MG 24 hr tablet, Take 1 tablet by mouth daily. As directed, Disp: , Rfl:   Allergies    Allergen Reactions   • Codeine Nausea Only   • Flagyl [Metronidazole] Nausea And Vomiting   • Penicillins Nausea And Vomiting     Social History     Socioeconomic History   • Marital status:      Spouse name: Not on file   • Number of children: 2   • Years of education: Not on file   • Highest education level: Not on file   Occupational History   • 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     Employer: DISABLED     Comment: Back and Leg Pain   Tobacco Use   • Smoking status: Current Every Day Smoker     Packs/day: 1.00     Years: 25.00     Pack years: 25.00     Types: Cigarettes   • Smokeless tobacco: Never Used   Substance and Sexual Activity   • Alcohol use: No   • Drug use: No   Social History Narrative    L[facility] in [facility]     Family History   Problem Relation Age of Onset   • Diabetes Mother    • Hypertension Mother    • Hypertension Father    • Lung cancer Father    • Stroke Other      Review of Systems   Constitution: Positive for malaise/fatigue and night sweats. Negative for chills, fever and weight loss.   HENT: Positive for odynophagia. Negative for congestion, hearing loss and nosebleeds.    Cardiovascular: Positive for dyspnea on exertion, irregular heartbeat and leg swelling (bilateral leg the left is the worse). Negative for chest pain, claudication, orthopnea, palpitations and syncope.   Respiratory: Positive for cough. Negative for hemoptysis, shortness of breath and wheezing.    Endocrine: Positive for cold intolerance and heat intolerance. Negative for polydipsia, polyphagia and polyuria.   Hematologic/Lymphatic: Does not bruise/bleed easily.   Skin: Positive for poor wound healing. Negative for itching and rash.   Musculoskeletal: Positive for arthritis, back pain and joint pain. Negative for joint swelling and myalgias.   Gastrointestinal: Positive for diarrhea. Negative for abdominal pain, constipation, hematemesis, melena, nausea and vomiting.   Genitourinary: Negative for dysuria,  "frequency, hematuria, nocturia and urgency.   Neurological: Positive for loss of balance and numbness. Negative for dizziness and light-headedness.   Psychiatric/Behavioral: Positive for memory loss. Negative for depression and suicidal ideas. The patient is not nervous/anxious.    Allergic/Immunologic: Negative for environmental allergies and HIV exposure.     Vitals:    01/06/20 1542   BP: 118/70   Pulse: 71   Temp: 98.6 °F (37 °C)   TempSrc: Temporal   SpO2: 98%   Weight: 53.5 kg (118 lb)   Height: 160 cm (63\")      Physical Exam   Neck: Normal range of motion. Neck supple. No JVD present. No tracheal deviation present. No thyromegaly present.   Cardiovascular: Normal rate and regular rhythm. Exam reveals no gallop and no friction rub.   No murmur heard.  Pulses:       Dorsalis pedis pulses are 0 on the right side, and 0 on the left side.        Posterior tibial pulses are 0 on the right side, and 0 on the left side.   Pulmonary/Chest: No stridor. No respiratory distress. She has no wheezes. She has no rales. She exhibits no tenderness.   Abdominal: Soft. Bowel sounds are normal. There is no tenderness.   Lymphadenopathy:     She has no cervical adenopathy.     Assessment/Plan:   The patient returns today with a new problem.  The patient appears to have decreased pulses bilaterally.  She has claudication, left greater than right.  I have obtained her abdominal aortogram which demonstrates bilateral common femoral arteries to be occluded and the iliacs bilaterally are occluded.  I recommended a right femoral endarterectomy with fem-fem bypass.  I discussed the procedure, risks and benefits including risk to her life, bleeding, infection as well as other risk factors.  She agreed to proceed with the procedure.  I extensively discussed consequences of smoking, namely cardiovascular, metabolic, lung cancer, mouth cancer, pulmonary disorder including COPD and the reduced quality of life.  At the end of the " conversation, the patient voices understanding of the risks involved in smoking, and also understands the benefits of quitting.  During this visit, I spent 3-5 minutes counseling the patient regarding smoking cessation.    Patient Active Problem List   Diagnosis   • PVD (peripheral vascular disease) (CMS/HCC)   • Lumbar radiculopathy   • Paresthesia   • Causalgia of lower limb   • Hiatal hernia   • Current smoker   • Bilateral carotid artery stenosis     CC: LUIS Bravo, , editing for Deandre Oseguera M.D.    I, Deandre Oseguera MD, have read and agree with the editing done by Tete Whittaker, .    Electronically signed by Deandre Oseguera MD at 01/07/20 0998       Vital Signs (last day)     Date/Time   Temp   Temp src   Pulse   Resp   BP   Patient Position   SpO2    01/16/20 0726   --   --   92   16   --   --   99    01/16/20 0645   --   --   94   --   108/58   --   98    01/16/20 0630   --   --   98   --   110/90   --   100    01/16/20 0615   --   --   96   --   105/81   --   99    01/16/20 0600   --   --   90   16   92/51   Lying   98    01/16/20 0545   --   --   92   --   93/54   --   98    01/16/20 0530   --   --   89   --   91/54   --   98    01/16/20 0445   --   --   96   --   104/65   --   98    01/16/20 0430   --   --   95   --   108/66   --   98    01/16/20 0415   --   --   95   --   102/66   --   98    01/16/20 0400   98 (36.7)   Oral   98   16   103/65   Lying   98    01/16/20 0345   --   --   94   --   96/64   --   98    01/16/20 0330   --   --   94   --   95/64   --   98    01/16/20 0315   --   --   95   --   94/66   --   98    01/16/20 0300   --   --   97   18   93/62   Lying   96    01/16/20 0245   --   --   96   --   93/62   --   97    01/16/20 0230   --   --   98   --   92/63   --   96    01/16/20 0215   --   --   97   --   93/63   --   97    01/16/20 0200   --   --   101   16   92/62   Lying   96 01/16/20 0130   --   --   99   --   (!)  82/56   --   96    01/16/20 0115   --   --   98   --   (!) 86/60   --   97    01/16/20 0100   --   --   101   16   101/62   Lying   96    01/16/20 0045   --   --   107   --   106/64   --   97    01/16/20 0015   --   --   106   --   111/54   --   98    01/15/20 2345   --   --   100   --   91/58   --   99    01/15/20 2215   --   --   102   --   103/54   --   97    01/15/20 2200   --   --   106   16   112/70   Lying   98    01/15/20 2145   --   --   105   --   137/86   --   100    01/15/20 2134   --   --   96   --   100/71   --   --    01/15/20 2130   --   --   92   --   100/71   --   100    01/15/20 2115   --   --   89   --   97/67   --   100    01/15/20 2100   --   --   89   18   95/67   Lying   100    01/15/20 2000   --   --   90   16   91/64   Lying   100    01/15/20 1915   --   --   107   --   114/91   --   100    01/15/20 1900   --   --   95   16   (!) 83/58   --   100    01/15/20 1845   --   --   102   --   (!) 87/61   --   100    01/15/20 1830   --   --   97   --   (!) 85/56   --   100    01/15/20 1815   --   --   99   --   (!) 84/57   --   100    01/15/20 1800   --   --   104   16   (!) 87/62   --   100    01/15/20 1745   --   --   106   --   (!) 75/57   --   100    01/15/20 1730   98.9 (37.2)   Oral   106   16   94/61   --   --    01/15/20 1723   --   --   107   16   95/67   --   98    01/15/20 1645   --   --   102   16   103/67   Lying   99    01/15/20 1630   97.8 (36.6)   Temporal   105   17   99/70   Lying   98    01/15/20 1615   --   --   101   16   91/65   Lying   100    01/15/20 1600   97.9 (36.6)   Temporal   104   17   95/70   Lying   100    01/15/20 1545   98 (36.7)   Temporal   104   16   99/66   Lying   97    01/15/20 1530   --   --   114   18   100/83   Lying   100    01/15/20 1515   97.6 (36.4)   Temporal   108   17   125/84   Lying   100    01/15/20 1505   --   --   106   17   107/74   Lying   98    01/15/20 1500   97.4 (36.3)   Temporal   104   16   110/67   Lying   --    01/15/20 1458   97.4  (36.3)   Temporal   110   16   96/74   Lying   99    01/15/20 1008   97.9 (36.6)   Temporal   100   18   147/94   Lying   99                Current Facility-Administered Medications   Medication Dose Route Frequency Provider Last Rate Last Dose   • albuterol (PROVENTIL) nebulizer solution 0.083% 2.5 mg/3mL  2.5 mg Nebulization Q6H PRN Edgar Machado PA       • aspirin EC tablet 81 mg  81 mg Oral Daily Edgar Machado PA   81 mg at 01/16/20 0907   • atorvastatin (LIPITOR) tablet 10 mg  10 mg Oral Daily Edgar Machado PA   10 mg at 01/16/20 0907   • budesonide-formoterol (SYMBICORT) 160-4.5 MCG/ACT inhaler 2 puff  2 puff Inhalation BID - RT Edgar Machado PA   2 puff at 01/16/20 0726   • cetirizine (zyrTEC) tablet 10 mg  10 mg Oral Daily Edgar Machado PA   10 mg at 01/16/20 0908   • citalopram (CeleXA) tablet 40 mg  40 mg Oral Daily Edgar Machado PA   40 mg at 01/16/20 0907   • clopidogrel (PLAVIX) tablet 75 mg  75 mg Oral Daily Edgar Machado PA   75 mg at 01/16/20 0907   • dextrose (D50W) 25 g/ 50mL Intravenous Solution 25 g  25 g Intravenous Q15 Min PRN Edgar Machado PA       • dextrose (GLUTOSE) oral gel 15 g  15 g Oral Q15 Min PRN Edgar Machado PA       • famotidine (PEPCID) tablet 20 mg  20 mg Oral Daily Deandre Oseguera MD   20 mg at 01/16/20 0906   • fluticasone (FLONASE) 50 MCG/ACT nasal spray 2 spray  2 spray Nasal Daily dEgar Machado PA   2 spray at 01/15/20 2127   • glucagon (human recombinant) (GLUCAGEN DIAGNOSTIC) injection 1 mg  1 mg Subcutaneous Q15 Min PRN Edgar Machado PA       • HYDROcodone-acetaminophen (NORCO) 7.5-325 MG per tablet 1 tablet  1 tablet Oral Q4H PRN Edgar Machado PA   1 tablet at 01/16/20 0625   • hydrOXYzine (ATARAX) tablet 25 mg  25 mg Oral Nightly Edgar Machado PA   25 mg at 01/15/20 2116   • insulin lispro (humaLOG) injection 0-14 Units  0-14 Units Subcutaneous 4x Daily With Meals & Nightly Edgar Machado PA   8 Units at 01/16/20 0904   • ipratropium  (ATROVENT) nebulizer solution 0.5 mg  0.5 mg Nebulization 4x Daily - RT Edgar Machado PA   0.5 mg at 01/16/20 0726   • isosorbide dinitrate (ISORDIL) tablet 30 mg  30 mg Oral Nightly Edgar Machado PA   30 mg at 01/15/20 2134   • ketotifen (ZADITOR) 0.025 % ophthalmic solution 1 drop  1 drop Both Eyes BID Edgar Machado PA   1 drop at 01/16/20 0911   • lactobacillus acidophilus (RISAQUAD) capsule 1 capsule  1 capsule Oral Daily Edgar Machado PA   1 capsule at 01/16/20 0907   • metoprolol succinate XL (TOPROL-XL) 24 hr tablet 12.5 mg  12.5 mg Oral Daily Edgar Machado PA   Stopped at 01/16/20 0910   • montelukast (SINGULAIR) tablet 10 mg  10 mg Oral Daily Edgar Machado PA   10 mg at 01/16/20 0907   • Morphine sulfate (PF) injection 2 mg  2 mg Intravenous Q3H PRN Edgar Machado PA   2 mg at 01/16/20 0047   • mupirocin (BACTROBAN) 2 % nasal ointment 1 application  1 application Each Nare Q12H Avis Richter PA-C       • niCARdipine (CARDENE) 25 mg in 250 mL NS (0.1 mg/mL) infusion  5-15 mg/hr Intravenous Once Edgar Machado PA       • ondansetron (ZOFRAN) tablet 4 mg  4 mg Oral Q6H PRN Edgar Machado PA        Or   • ondansetron (ZOFRAN) injection 4 mg  4 mg Intravenous Q6H PRN Edgar Machado PA       • pantoprazole (PROTONIX) EC tablet 40 mg  40 mg Oral Q AM Edgar Machado PA   40 mg at 01/16/20 0616   • pregabalin (LYRICA) capsule 150 mg  150 mg Oral Q8H Edgar Machado PA   150 mg at 01/16/20 0615   • rOPINIRole (REQUIP) tablet 4 mg  4 mg Oral Nightly Edgar Machado PA   4 mg at 01/15/20 2127   • sodium chloride 0.45 % infusion  75 mL/hr Intravenous Continuous Edgar Machado PA 75 mL/hr at 01/16/20 0625 75 mL/hr at 01/16/20 0625   • vitamin B-12 (CYANOCOBALAMIN) tablet 1,000 mcg  1,000 mcg Oral Daily Edgar Machado PA   1,000 mcg at 01/16/20 0908     Ventilator/Non-Invasive Ventilation Settings (From admission, onward)    None           Operative/Procedure Notes (all)      Deandre Oseguera MD at  01/15/20 1309  Version 1 of 1       Operative Report  Saskia Groves  [id number]  1970    Preop Diagnosis: Atherosclerotic peripheral vascular disease, bilateral claudication        Postop Diagnosis same        Procedure: #1 right femoral endarterectomy #2 left femoral endarterectomy #3 femoral-femoral bypass with 8 mm [place] graft        Surgeons: Deandre Oseguera        Assistant: Edgar Machado        Operative Findings:         Description: The patient was brought to the operating room placed under general anesthesia.  The patient's abdomen groins were prepped and draped in usual sterile fashion.  Vertical incision was made in both groins were proceeded to isolate the common profunda and superficial femoral arteries.  At this time the patient had a tunnel made across her suprapubic pubic area.  A 8 mm [place] graft was brought through the tunnel.  Patient was given 8000's of heparin.  The right common femoral profunda and superficial femoral arteries were clamped.  The arteries open extended down the SFA.  At this time a long endarterectomy was then done using endarterectomy spatula.  After completion of this all floaters were removed.  The graft was spatulated and end-to-side anastomosis with a lara over the endarterectomy was then done.  This was done with a running 6-0 Prolene suture.  After completion of this the Joaquina clamp was placed on the graft.  There was excellent continuous flow Doppler demonstrated in the right SFA and the right profunda.  The left common femoral profunda and superficial femoral arteries were clamped.  The left common femoral arteries open extended down the left SFA.  A routine endarterectomy is carried out using endarterectomy spatula.  After completion of this all floaters were removed.  The graft was spatulated the appropriate length and end-to-side anastomosis running 6-0 Prolene suture was constructed was tied down after it been flushed.  There was excellent continuous  flow Doppler demonstrated demonstrated in the left superficial femoral and left profunda.  The patient was reversed with 75 mg of protamine.  The groins were closed in 4 layers of running 2-0 Vicryl, 3-0 Vicryl, 3-0 Vicryl, 3-0 Monocryl subcuticular stitch.        EBL: 200 cc      Please note that portions of this note were completed with a voice recognition program. Efforts were made to edit the dictations, but words may be mistranscribed      Deandre Oseguera MD  01/15/20 3:08 PM    Electronically signed by Deandre Oseguera MD at 01/15/20 1511          Physician Progress Notes (all)      Vasyl Guadalupe PA at 01/16/20 0825          CTS Progress Note      POD #1 s/p Bilateral femoral endarterectomies with femoral-femoral bypass      Subjective  Patient sitting in bed states that overall she is feeling well however does admit to incisional pain.  Patient denies any chest pain, dyspnea abdominal or low back pain.  Patient reports she is regaining sensation in her feet and is now able to wiggle her toes where she previously had been unable to do so.      Objective    Physical Exam:   Vital Signs   Temp:  [97.4 °F (36.3 °C)-98.9 °F (37.2 °C)] 98 °F (36.7 °C)  Heart Rate:  [] 92  Resp:  [16-18] 16  BP: ()/(51-94) 108/58   GEN: NAD   RESP: Clear to auscultation bilaterally no wheezes, rales or rhonchi    CV: Regular rate and rhythm no murmurs, rubs or gallops   ABD: Soft, nontender/nondistended with normoactive bowel sounds    EXT: Warm good color and well-perfused with no bilateral lower extremity edema with dopplerable biphasic dorsalis pedis and posterior tibialis pulses bilaterally   INT: Incision dressings c/d/i      Intake/Output Summary (Last 24 hours) at 1/16/2020 0825  Last data filed at 1/16/2020 0625  Gross per 24 hour   Intake 1264 ml   Output 795 ml   Net 469 ml     Results     Results from last 7 days   Lab Units 01/16/20  0319   WBC 10*3/mm3 9.18   HEMOGLOBIN g/dL 10.1*   HEMATOCRIT %  30.6*   PLATELETS 10*3/mm3 130*     Results from last 7 days   Lab Units 01/16/20  0319   SODIUM mmol/L 132*   POTASSIUM mmol/L 5.1   CHLORIDE mmol/L 102   CO2 mmol/L 21.0*   BUN mg/dL 19   CREATININE mg/dL 0.65   GLUCOSE mg/dL 291*   CALCIUM mg/dL 8.1*     Results from last 7 days   Lab Units 01/14/20  1415   INR  1.02   APTT seconds 27.7         Assessment/Plan       PVD (peripheral vascular disease) (CMS/HCC)    Current smoker    COPD (chronic obstructive pulmonary disease) (CMS/HCC)    Coronary artery disease    Hypertension    Hyperlipidemia    Diabetes mellitus (CMS/HCC)        Plan   Ambulate in the hallway with assistance  PT/OT  Aggressive pulmonary toilet  Leave in ICU today  Discharge home in a.m.    PEDRO Colindres  01/16/20  8:25 AM                    Electronically signed by Vasyl Guadalupe PA at 01/16/20 0841     Briana Jackson DO at 01/15/20 1656          INTENSIVIST   INITIAL HOSPITAL VISIT NOTE     Hospital:  LOS: 0 days     Ms. Saskia Groves, 49 y.o. female is seen for a Chief Complaint of:      PVD (peripheral vascular disease) (CMS/HCC)    COPD (chronic obstructive pulmonary disease) (CMS/[facility])    Coronary artery disease    Hypertension    Diabetes mellitus (CMS/[facility])    Current smoker    Hyperlipidemia      Subjective   S     Saskia Groves is a 49 y.o. female, current smoker, who has had worsening claudication which affected both legs L>R. She underwent right SFA bypass with Dr. Zamorano, but this was not successful. She was seen by Dr. Oseguera for consideration of surgical intervention and recommended right femoral endarterectomy with fem-fem bypass. Yesterday she was found with a blood sugar of 424, and A1c 12.1.     She underwent a right femoral endarterectomy, left femoral endarterectomy, and fem-fem bypass. She is transferred to ICU for ongoing care.    She has a past medical history of CAD, PVD, carotid stenosis, HTN, Diabetes, HL, and is a current smoker 1/2 ppd.    She  currently has no complaints except that she is hungry.        PMH: She  has a past medical history of Anxiety, Arthritis, Carotid artery stenosis, COPD (chronic obstructive pulmonary disease) (CMS/HCC), Coronary artery disease, Depression, Diabetes mellitus (CMS/HCC), Disease of thyroid gland, Dyslipidemia, GERD (gastroesophageal reflux disease), Goiter, Goiter, Hiatal hernia, Hyperlipidemia, Hypertension, Infectious viral hepatitis, Lower back pain, Migraine, and Sleep apnea.   PSxH: She  has a past surgical history that includes Cholecystectomy; Hysterectomy; Knee surgery (Left); Wrist surgery (Left); Anterior cervical discectomy w/ fusion; Colonoscopy; Esophagogastroduodenoscopy; and Back surgery.      Medications:  No current facility-administered medications on file prior to encounter.      Current Outpatient Medications on File Prior to Encounter   Medication Sig   • aclidinium bromide (TUDORZA PRESSAIR) 400 MCG/ACT aerosol powder  powder for inhalation Inhale 1 puff 2 (Two) Times a Day.   • albuterol (PROVENTIL) (2.5 MG/3ML) 0.083% nebulizer solution Take  by nebulization Every 6 (Six) Hours As Needed.   • ALBUTEROL SULFATE HFA IN Albuterol Sulfate HFA AERS; Patient Sig: Albuterol Sulfate HFA AERS ; 0; 01-Apr-2014; Active   • aspirin 81 MG tablet Take 1 tablet by mouth daily.   • beclomethasone (QVAR) 40 MCG/ACT inhaler Qvar 40 MCG/ACT Inhalation Aerosol Solution; Patient Sig: Qvar 40 MCG/ACT Inhalation Aerosol Solution ; 0; 01-Apr-2014; Active   • benzonatate (TESSALON) 100 MG capsule Take 100 mg by mouth 3 (Three) Times a Day As Needed.   • cetirizine (ZYRTEC ALLERGY) 10 MG tablet Take 10 mg by mouth Daily.   • citalopram (CELEXA) 40 MG tablet Take 40 mg by mouth Daily.   • clopidogrel (PLAVIX) 75 MG tablet TAKE ONE TABLET BY MOUTH DAILY (Patient taking differently: Take 75 mg by mouth Daily. Per Dr. Castaneda's office, do not stop for surgery.)   • fexofenadine (ALLEGRA) 180 MG tablet Take 180 mg by mouth  Daily.   • fluconazole (DIFLUCAN) 150 MG tablet Take 150 mg by mouth Every 30 (Thirty) Days.   • fluticasone (FLONASE) 50 MCG/ACT nasal spray 2 sprays into the nostril(s) as directed by provider Daily. Use as directed   • fluticasone-salmeterol (ADVAIR) 250-50 MCG/DOSE DISKUS Inhale 1 puff 2 (Two) Times a Day.   • HYDROcodone-acetaminophen (LORTAB)  MG per tablet Take 1 tablet by mouth Every 8 (Eight) Hours As Needed.   • hydrOXYzine (ATARAX) 25 MG tablet Take 25 mg by mouth Every Night.   • ibuprofen (ADVIL,MOTRIN) 600 MG tablet Take 600 mg by mouth Every 6 (Six) Hours As Needed for Mild Pain .   • insulin aspart protamine & aspart (NOVOLOG) 70/30 100 unit/mL Inject 35 Units under the skin into the appropriate area as directed 2 (Two) Times a Day Before Meals.   • isosorbide dinitrate (ISORDIL) 30 MG tablet Take 30 mg by mouth Every Night.   • ketotifen (ZADITOR) 0.025 % ophthalmic solution Administer 1 drop to both eyes 2 (Two) Times a Day.   • Lactobacillus (ACIDOPHILUS PO) Take  by mouth Daily.   • lactulose (CHRONULAC) 10 GM/15ML solution Take 20 g by mouth 2 (Two) Times a Day As Needed.   • lansoprazole (PREVACID) 30 MG capsule Take  by mouth.   • levocetirizine (XYZAL) 5 MG tablet    • lovastatin (MEVACOR) 40 MG tablet    • metFORMIN (GLUCOPHAGE) 500 MG tablet Take 1 tablet by mouth 2 (Two) Times a Day With Meals.   • metoprolol succinate XL (TOPROL XL) 25 MG 24 hr tablet Take 12.5 mg by mouth Daily.   • montelukast (SINGULAIR) 10 MG tablet Take 10 mg by mouth.   • omega-3 acid ethyl esters (LOVAZA) 1 g capsule 1 g Daily.   • pantoprazole (PROTONIX) 40 MG EC tablet Take 40 mg by mouth Daily.   • pregabalin (LYRICA) 150 MG capsule Take 150 mg by mouth 3 (Three) Times a Day.   • raNITIdine (ZANTAC) 150 MG tablet Take 150 mg by mouth 2 (Two) Times a Day.   • rOPINIRole (REQUIP) 4 MG tablet Take 4 mg by mouth Every Night.   • SPIRIVA RESPIMAT 2.5 MCG/ACT aerosol solution inhaler Inhale 2 puffs Daily.   •  "tiZANidine (ZANAFLEX) 4 MG tablet Take 4 mg by mouth Every 8 (Eight) Hours As Needed.   • vitamin B-12 (CYANOCOBALAMIN) 1000 MCG tablet Take 1,000 mcg by mouth Daily.   • vitamin D (ERGOCALCIFEROL) 1.25 MG (86761 UT) capsule capsule 50,000 Units 1 (One) Time Per Week. wednesdays   • amLODIPine (NORVASC) 10 MG tablet Take 10 mg by mouth 3 (Three) Times a Day.   • ASPIRIN ADULT LOW STRENGTH 81 MG EC tablet    • benzonatate (TESSALON) 200 MG capsule Take  by mouth.   • budesonide-formoterol (SYMBICORT) 160-4.5 MCG/ACT inhaler Inhale 2 puffs 2 (Two) Times a Day.   • cilostazol (PLETAL) 50 MG tablet TAKE ONE TABLET BY MOUTH TWICE A DAY   • cyanocobalamin 1000 MCG/ML injection 1,000 mcg Every 28 (Twenty-Eight) Days.   • gabapentin (NEURONTIN) 600 MG tablet Take  by mouth.   • insulin glargine (LANTUS) 100 UNIT/ML injection Inject  under the skin Daily.   • insulin lispro (HUMALOG) 100 UNIT/ML injection 8 U ac bkfst, 16 U ac lunch & 18 U ac supper   • Insulin Syringe-Needle U-100 30G X 5/16\" 0.5 ML misc Uses X 4/day   • Lancets (ONETOUCH DELICA PLUS [id number]) misc    • losartan (COZAAR) 25 MG tablet Take 25 mg by mouth Daily.   • lovastatin (MEVACOR) 20 MG tablet Take 20 mg by mouth Every Night. At bedtime   • mometasone-formoterol (DULERA) 100-5 MCG/ACT inhaler Inhale 1 puff.   • NYSTATIN PO Take  by mouth As Needed.   • ONE TOUCH ULTRA TEST test strip    • oxybutynin XL (DITROPAN-XL) 5 MG 24 hr tablet Take 5 mg by mouth.   • pregabalin (LYRICA) 50 MG capsule Take  by mouth.   • theophylline (UNIPHYL) 400 MG 24 hr tablet Take 1 tablet by mouth daily. As directed   • tiotropium (SPIRIVA HANDIHALER) 18 MCG per inhalation capsule Spiriva HandiHaler 18 MCG Inhalation Capsule; Patient Sig: Spiriva HandiHaler 18 MCG Inhalation Capsule ; 0; 01-Apr-2014; Active       Allergies: She is allergic to levaquin [levofloxacin]; penicillins; codeine; and flagyl [metronidazole].   FH: Her family history includes Diabetes in her mother; " Hypertension in her father and mother; Lung cancer in her father; Stroke in an other family member.   SH: She  reports that she has been smoking cigarettes. She has a 35.00 pack-year smoking history. She has never used smokeless tobacco. She reports that she drank alcohol. She reports that she does not use drugs.     The patient's relevant past medical, surgical and social history were reviewed and updated in Epic as appropriate.     ROS (14):   Review of Systems   Constitutional: Negative.    HENT: Negative.    Eyes: Negative.    Respiratory: Negative.    Cardiovascular: Negative.    Gastrointestinal: Negative.    Endocrine: Negative.    Genitourinary: Negative.    Musculoskeletal: Positive for gait problem and myalgias.   Skin: Negative.    Allergic/Immunologic: Negative.    Hematological: Negative.    Psychiatric/Behavioral: Negative.        Objective   O     Vitals    Temp  Min: 97.4 °F (36.3 °C)  Max: 98 °F (36.7 °C)  BP  Min: 91/65  Max: 147/94  Pulse  Min: 100  Max: 114  Resp  Min: 16  Max: 18  SpO2  Min: 97 %  Max: 100 % No data recorded     I/O 24 hrs (7:00AM - 6:59 AM)  Intake/Output       01/15/20 0700 - 01/16/20 0659    Intake (ml) 700    Output (ml) --    Net (ml) 700    Last Weight  54.8 kg (120 lb 12.8 oz)          Medications (drips):    lactated ringers Last Rate: 9 mL/hr (01/15/20 1023)   niCARdipine    sodium chloride Last Rate: 75 mL/hr (01/15/20 1539)       Physical Examination    Telemetry: Normal sinus rhythm.    Constitutional:  No acute distress.  Conversant. Resting in bed.    HEENT: Normocephalic and atraumatic.   Neck:  Normal range of motion. Neck supple.   No JVD present.    Cardiovascular: Regular rate and rhythm.  No murmurs, rub or gallop.  No peripheral edema.   Respiratory: Normal respiratory effort.  Clear to ascultation.   Abdominal:  Soft. No masses.   Non-tender. No distension.   No hepatosplenomegaly.   MSK: Normal muscle strength and tone.   Extremities: No digital cyanosis  or clubbing.   Skin: Skin is warm and dry.  No rashes, lesions or ulcers noted.    Neurological:   Alert and Oriented to person, place, and time.   Moves all extremities.   Psychiatric:  Normal affect.   Normal judgment.            Results from last 7 days   Lab Units 01/14/20  1415   WBC 10*3/mm3 7.25   HEMOGLOBIN g/dL 13.8   MCV fL 93.0   PLATELETS 10*3/mm3 157     Results from last 7 days   Lab Units 01/14/20  1415   SODIUM mmol/L 134*   POTASSIUM mmol/L 5.2   CO2 mmol/L 27.0   CREATININE mg/dL 0.60     Estimated Creatinine Clearance: 97 mL/min (by C-G formula based on SCr of 0.6 mg/dL).            Images:    Imaging Results (Last 24 Hours)     ** No results found for the last 24 hours. **        ECG/EMG Results (last 24 hours)     ** No results found for the last 24 hours. **          I reviewed the patient's new laboratory and imaging results.  I independently reviewed the patient's new images.    Assessment/Plan   A / P     Ms. Groves is a 50yo F with a history of peripheral artery disease and tobacco abuse who presented with worsening claudication. She previously underwent a right SFA bypass with Dr. Zamorano but this was unsuccessful. She was evaluated by Dr. Oseguera who recommended surgical intervention. She presented on 1/15/20 and underwent a right femoral endarterectomy and femoral-femoral bypass. She is a recently diagnosed diabetic as she was found to have a blood sugar of 424 and an A1c of 12.1 on 1/14/20. She continues to smoke.     Nutrition:   No diet orders on file  Advance Directives:   There are no questions and answers to display.       Active Hospital Problems    Diagnosis   • **PVD (peripheral vascular disease) (CMS/HCC)   • COPD (chronic obstructive pulmonary disease) (CMS/HCC)   • Coronary artery disease     2 vessels with 50% blockage.  Sees Dr. Donaldson     • Hypertension   • Diabetes mellitus (CMS/HCC)   • Hyperlipidemia   • Current smoker       Assessment / Plan:    1. Follow in  ICU  2. Orders per CT Surgery  3. Monitory BP, Nicardipine to keep SBP less than 140 mmHg  4. Monitor Blood Sugar, S/S insulin. May need to add on Levemir 10 units if blood glucose is high.    5. Tobacco Cessation Counseling  6. AM labs  7. GI Prophylaxis: Famotidine  8. Home medications reviewed and reordered as appropriate.     I discussed the patient's findings and my recommendations with patient, family and nursing staff      LUIS Romero  Pulmonary and Critical Care Medicine    I have personally seen, interviewed and examined the patient and verified all the key components of the history, physical examination, assessment and plan with LUIS Carmona, ACNP-BC and reviewed the note, which reflects my changes and contributions.    Briana Jackson DO  Pulmonary and Critical Care Medicine    Electronically signed by Briana Jackson DO at 01/15/20 1935       Consult Notes (all)    No notes of this type exist for this encounter.

## 2020-01-16 NOTE — PLAN OF CARE
Problem: Patient Care Overview  Goal: Plan of Care Review  Outcome: Ongoing (interventions implemented as appropriate)  Flowsheets  Taken 1/16/2020 0351  Progress: improving  Outcome Summary: VSS. Pain managed well with prn medications.UOP adequate. Fem sites c/d/i. Pulses +2 with doppler. Will continue to monitor closely.

## 2020-01-16 NOTE — CONSULTS
Attempted to see patient for diabetes education. Patient refused education at this time. Please re consult should patient request our services in the future . Thank you for this consult.

## 2020-01-16 NOTE — PROGRESS NOTES
"Critical Care Note     LOS: 1 day   Patient Care Team:  Meagan Barlow APRN as PCP - General (Family Medicine)  Tyson Donaldson DO as Consulting Physician (Cardiology)    Chief Complaint/Reason for visit:    Peripheral vascular disease  Uncontrolled diabetes  COPD  Hypertension  Coronary artery disease    Subjective     49-year-old woman, current smoker presenting with lower extremity claudication.  She underwent right SFA bypass that was unsuccessful.  January 15 she underwent right femoral endarterectomy with femorofemoral bypass.  This factors include tobacco use, high blood pressure, dyslipidemia, uncontrolled diabetes.  Her preop hemoglobin A1c was 12.1.    Interval History:     Up in the chair this morning with minimal pain  Denies chest pain, edema, cough, wheeze  Denies nausea or vomiting  Afebrile.  Room air saturation 100%  Bilateral Doppler pulses    Review of Systems:    All systems were reviewed and negative except as noted in subjective.    Medical history, surgical history, social history, family history reviewed    Objective     Intake/Output:    Intake/Output Summary (Last 24 hours) at 1/16/2020 1603  Last data filed at 1/16/2020 1033  Gross per 24 hour   Intake 1905 ml   Output 795 ml   Net 1110 ml       Nutrition:  Diet Regular; Consistent Carbohydrate, Cardiac    Infusions:       Telemetry: Sinus rhythm             Vital Signs  Blood pressure 119/78, pulse 111, temperature 98.4 °F (36.9 °C), temperature source Oral, resp. rate 16, height 160 cm (63\"), weight 54.8 kg (120 lb 12.8 oz), SpO2 100 %.    Physical Exam:  General Appearance:   Thin middle-aged woman in no distress   Head:   Normocephalic, atraumatic   Eyes:          Nipples equal and reactive to light   Ears:     Throat:  Oral mucosa moist   Neck:  Trachea midline, neck veins flat   Back:      Lungs:    Symmetric chest expansion.  Prolonged expiration.  No wheeze or rhonchi    Heart:   Regular rhythm, S1, S2 auscultated, without " murmur   Abdomen:    Nondistended, bowel sounds present, soft   Rectal:   Deferred   Extremities:  Femoral incisions intact without hematoma, no pitting edema   Pulses:  Doppler pedal pulses   Skin:  Warm and dry   Lymph nodes:    Neurologic:  Alert and cooperative, no focal weakness      Results Review:     I reviewed the patient's new clinical results.   Results from last 7 days   Lab Units 01/16/20  0319 01/14/20  1415   SODIUM mmol/L 132* 134*   POTASSIUM mmol/L 5.1 5.2   CHLORIDE mmol/L 102 98   CO2 mmol/L 21.0* 27.0   BUN mg/dL 19 13   CREATININE mg/dL 0.65 0.60   CALCIUM mg/dL 8.1* 9.8   GLUCOSE mg/dL 291* 424*     Results from last 7 days   Lab Units 01/16/20  0319 01/14/20  1415   WBC 10*3/mm3 9.18 7.25   HEMOGLOBIN g/dL 10.1* 13.8   HEMATOCRIT % 30.6* 41.2   PLATELETS 10*3/mm3 130* 157         No results found for: BLOODCX  No results found for: URINECX    I reviewed the patient's new imaging including images and reports.  No new x-rays    All medications reviewed.     aspirin 81 mg Oral Daily   atorvastatin 10 mg Oral Daily   budesonide-formoterol 2 puff Inhalation BID - RT   cetirizine 10 mg Oral Daily   citalopram 40 mg Oral Daily   clopidogrel 75 mg Oral Daily   famotidine 20 mg Oral Daily   fluticasone 2 spray Nasal Daily   hydrOXYzine 25 mg Oral Nightly   insulin lispro 0-14 Units Subcutaneous 4x Daily With Meals & Nightly   ipratropium 0.5 mg Nebulization 4x Daily - RT   isosorbide dinitrate 30 mg Oral Nightly   ketotifen 1 drop Both Eyes BID   lactobacillus acidophilus 1 capsule Oral Daily   metoprolol succinate XL 12.5 mg Oral Daily   montelukast 10 mg Oral Daily   mupirocin 1 application Each Nare Q12H   niCARdipine 5-15 mg/hr Intravenous Once   pantoprazole 40 mg Oral Q AM   pregabalin 150 mg Oral Q8H   rOPINIRole 4 mg Oral Nightly   vitamin B-12 1,000 mcg Oral Daily         Assessment/Plan       PVD (peripheral vascular disease) (CMS/HCC)    Current smoker    COPD (chronic obstructive pulmonary  disease) (CMS/[facility])    Coronary artery disease    Hypertension    Hyperlipidemia    Diabetes mellitus (CMS/[facility])    49-year-old woman with peripheral vascular disease who underwent endarterectomy and femoral-femoral bypass.  She does have bilateral pedal pulses.  Extremities are warm and viable.  Postoperative hemoglobin is 10.1.  Risk factors include ongoing tobacco abuse, high blood pressure, dyslipidemia and uncontrolled diabetes.  Sadly her A1c is 12.1.  She readily admits that her blood sugars run 3 and 400 at home.  She has a poor understanding of her disease and no interest in education.      PLAN:  Add long-acting insulin and meal coverage  Aspirin, statin, Plavix, beta-blocker  Symbicort inhaler  Nebulized bronchodilators  Control blood pressure, ideally systolic less than 1 20-1 30  Diabetes educator  Monitor incision sites for bleeding        Yessenia Valero MD  01/16/20  4:03 PM      Time: 20 minutes

## 2020-01-17 VITALS
BODY MASS INDEX: 21.4 KG/M2 | HEIGHT: 63 IN | OXYGEN SATURATION: 98 % | HEART RATE: 113 BPM | RESPIRATION RATE: 18 BRPM | DIASTOLIC BLOOD PRESSURE: 80 MMHG | TEMPERATURE: 98.1 F | SYSTOLIC BLOOD PRESSURE: 116 MMHG | WEIGHT: 120.8 LBS

## 2020-01-17 LAB
CYTO UR: NORMAL
GLUCOSE BLDC GLUCOMTR-MCNC: 273 MG/DL (ref 70–130)
GLUCOSE BLDC GLUCOMTR-MCNC: 338 MG/DL (ref 70–130)
LAB AP CASE REPORT: NORMAL
LAB AP CLINICAL INFORMATION: NORMAL
PATH REPORT.FINAL DX SPEC: NORMAL
PATH REPORT.GROSS SPEC: NORMAL

## 2020-01-17 PROCEDURE — 99024 POSTOP FOLLOW-UP VISIT: CPT | Performed by: PHYSICIAN ASSISTANT

## 2020-01-17 PROCEDURE — 63710000001 INSULIN LISPRO (HUMAN) PER 5 UNITS: Performed by: INTERNAL MEDICINE

## 2020-01-17 PROCEDURE — 94799 UNLISTED PULMONARY SVC/PX: CPT

## 2020-01-17 PROCEDURE — 82962 GLUCOSE BLOOD TEST: CPT

## 2020-01-17 RX ORDER — FLUCONAZOLE 100 MG/1
100 TABLET ORAL DAILY
COMMUNITY
End: 2020-01-17 | Stop reason: HOSPADM

## 2020-01-17 RX ORDER — ISOSORBIDE MONONITRATE 30 MG/1
30 TABLET, EXTENDED RELEASE ORAL DAILY
COMMUNITY

## 2020-01-17 RX ORDER — LOSARTAN POTASSIUM 25 MG/1
25 TABLET ORAL DAILY
COMMUNITY
End: 2020-03-11 | Stop reason: HOSPADM

## 2020-01-17 RX ORDER — LEVOCETIRIZINE DIHYDROCHLORIDE 5 MG/1
5 TABLET, FILM COATED ORAL EVERY EVENING
COMMUNITY

## 2020-01-17 RX ORDER — AMITRIPTYLINE HYDROCHLORIDE 10 MG/1
10 TABLET, FILM COATED ORAL NIGHTLY
COMMUNITY

## 2020-01-17 RX ADMIN — ASPIRIN 81 MG: 81 TABLET, COATED ORAL at 08:05

## 2020-01-17 RX ADMIN — CLOPIDOGREL BISULFATE 75 MG: 75 TABLET ORAL at 08:05

## 2020-01-17 RX ADMIN — METOPROLOL SUCCINATE 12.5 MG: 25 TABLET, EXTENDED RELEASE ORAL at 08:05

## 2020-01-17 RX ADMIN — INSULIN LISPRO 10 UNITS: 100 INJECTION, SOLUTION INTRAVENOUS; SUBCUTANEOUS at 08:06

## 2020-01-17 RX ADMIN — MONTELUKAST SODIUM 10 MG: 10 TABLET, COATED ORAL at 08:05

## 2020-01-17 RX ADMIN — INSULIN LISPRO 8 UNITS: 100 INJECTION, SOLUTION INTRAVENOUS; SUBCUTANEOUS at 12:24

## 2020-01-17 RX ADMIN — FLUTICASONE PROPIONATE 2 SPRAY: 50 SPRAY, METERED NASAL at 08:07

## 2020-01-17 RX ADMIN — PANTOPRAZOLE SODIUM 40 MG: 40 TABLET, DELAYED RELEASE ORAL at 06:07

## 2020-01-17 RX ADMIN — BUDESONIDE AND FORMOTEROL FUMARATE DIHYDRATE 2 PUFF: 160; 4.5 AEROSOL RESPIRATORY (INHALATION) at 07:01

## 2020-01-17 RX ADMIN — HYDROCODONE BITARTRATE AND ACETAMINOPHEN 1 TABLET: 7.5; 325 TABLET ORAL at 06:07

## 2020-01-17 RX ADMIN — CYANOCOBALAMIN TAB 1000 MCG 1000 MCG: 1000 TAB at 08:08

## 2020-01-17 RX ADMIN — Medication 1 CAPSULE: at 08:05

## 2020-01-17 RX ADMIN — IPRATROPIUM BROMIDE 0.5 MG: 0.5 SOLUTION RESPIRATORY (INHALATION) at 07:00

## 2020-01-17 RX ADMIN — IPRATROPIUM BROMIDE 0.5 MG: 0.5 SOLUTION RESPIRATORY (INHALATION) at 12:17

## 2020-01-17 RX ADMIN — CETIRIZINE HYDROCHLORIDE 10 MG: 10 TABLET, FILM COATED ORAL at 08:05

## 2020-01-17 RX ADMIN — ATORVASTATIN CALCIUM 10 MG: 10 TABLET, FILM COATED ORAL at 08:05

## 2020-01-17 RX ADMIN — KETOTIFEN FUMARATE 1 DROP: 0.35 SOLUTION/ DROPS OPHTHALMIC at 08:07

## 2020-01-17 RX ADMIN — PREGABALIN 150 MG: 75 CAPSULE ORAL at 06:07

## 2020-01-17 RX ADMIN — FAMOTIDINE 20 MG: 20 TABLET, FILM COATED ORAL at 08:05

## 2020-01-17 RX ADMIN — HYDROCODONE BITARTRATE AND ACETAMINOPHEN 1 TABLET: 7.5; 325 TABLET ORAL at 02:08

## 2020-01-17 NOTE — PROGRESS NOTES
Clinical Nutrition     Multidisciplinary Rounds      Patient Name: Saskia Groves  Date of Encounter: 01/17/20 9:12 AM  MRN: [id number]  Admission date: 1/15/2020      Reason for visit: MDR. RD to continue to follow per protocol.     Additional information obtained during MDR: Plans to d/c pt home today.     Current diet: Diet Regular; Consistent Carbohydrate, Cardiac  No active supplement orders      EMR reviewed     Intervention:  Follow treatment plan  Care plan reviewed    Follow up:   Per protocol      Lacie Rubalcava RDN, AVE  9:12 AM  Time: 10min

## 2020-01-17 NOTE — PROGRESS NOTES
Case Management Discharge Note      Final Note: Home, family to transport.         Destination      No service has been selected for the patient.      Durable Medical Equipment      No service has been selected for the patient.      Dialysis/Infusion      No service has been selected for the patient.      Home Medical Care      No service has been selected for the patient.      Therapy      No service has been selected for the patient.      Community Resources      No service has been selected for the patient.             Final Discharge Disposition Code: 01 - home or self-care

## 2020-01-17 NOTE — PLAN OF CARE
Problem: Patient Care Overview  Goal: Plan of Care Review  Outcome: Ongoing (interventions implemented as appropriate)  Flowsheets (Taken 1/16/2020 1952)  Progress: improving  Plan of Care Reviewed With: patient; spouse  Outcome Summary: vs stable -alert and eating well with good apetite-up to BSC to void adequate amounts-iv fluids disc today-was up in chair all day then ambulated 2 laps around unit this afternoon-medicated x2 for groin soreness-wanting to go home in am-blood sugars remain elevated in spite of insulin coverage

## 2020-01-17 NOTE — PROGRESS NOTES
"Saskia Groves  [id number]  1970     LOS: 2 days   Patient Care Team:  Meagan Barlow APRN as PCP - General (Family Medicine)  Tyson Donaldson DO as Consulting Physician (Cardiology)    Chief Complaint: Peripheral vascular disease      Subjective: No complaints wants to go home    Objective:     Vital Sign Min/Max for last 24 hours  Temp  Min: 97.7 °F (36.5 °C)  Max: 98.9 °F (37.2 °C)   BP  Min: 88/55  Max: 136/99   Pulse  Min: 92  Max: 130   Resp  Min: 14  Max: 22   SpO2  Min: 90 %  Max: 100 %   No data recorded   No data recorded     Flowsheet Rows      First Filed Value   Admission Height  160 cm (63\") Documented at 01/15/2020 1008   Admission Weight  54.8 kg (120 lb 12.8 oz) Documented at 01/15/2020 1008          Physical Exam:    Wound: Satisfactory pulses intact    Pulses:     Mediastinal and Chest Tube Drainage:       Results Review:   Results from last 7 days   Lab Units 01/16/20  0319   WBC 10*3/mm3 9.18   HEMOGLOBIN g/dL 10.1*   HEMATOCRIT % 30.6*   PLATELETS 10*3/mm3 130*     Results from last 7 days   Lab Units 01/16/20  0319   SODIUM mmol/L 132*   POTASSIUM mmol/L 5.1   CHLORIDE mmol/L 102   CO2 mmol/L 21.0*   BUN mg/dL 19   CREATININE mg/dL 0.65   GLUCOSE mg/dL 291*   CALCIUM mg/dL 8.1*             Assessment      PVD (peripheral vascular disease) (CMS/[facility])    Current smoker    COPD (chronic obstructive pulmonary disease) (CMS/[facility])    Coronary artery disease    Hypertension    Hyperlipidemia    Diabetes mellitus (CMS/[facility])      Discharge patient.  See back in my [place] clinic in 2 weeks.        Deandre Oseguera MD  01/17/20  6:58 AM      Please note that portions of this note were completed with a voice recognition program. Efforts were made to edit the dictations, but words may be mistranscribed  "

## 2020-01-17 NOTE — PLAN OF CARE
Problem: Patient Care Overview  Goal: Plan of Care Review  Outcome: Ongoing (interventions implemented as appropriate)  Flowsheets  Taken 1/16/2020 1952 by Bette Guerin RN  Progress: improving  Taken 1/17/2020 0400 by Fermín Gr, RN  Plan of Care Reviewed With: patient  Taken 1/17/2020 0445 by Fermín Gr, RN  Outcome Summary: VSS on RA. Pain controlled with prn lortab. Palpable pedal pulses. Bilateral groin aquacels C/D/I. Pt walked 800 feet in hallway prior to bed and rested well.

## 2020-01-17 NOTE — DISCHARGE INSTR - APPOINTMENTS
Meagan Barlow, LUIS  PCP - General Family Medicine  Meagan Barlow, LUIS  PCP - General Family Medicine [phone] [address] [facility] [address]     Next Steps: Follow up on 1/31/2020      Instructions: Appointment scheduled on 01/31/2019 at 09:15 AM EDT. Please arrive 15 minutes prior to your appointment.          [phone] [address] [facility] [address]     Next Steps: Follow up on 1/31/2020      Instructions: Appointment scheduled on 01/31/2019 at 09:15 AM EDT. Please arrive 15 minutes prior to your appointment.

## 2020-01-18 ENCOUNTER — READMISSION MANAGEMENT (OUTPATIENT)
Dept: CALL CENTER | Facility: HOSPITAL | Age: 50
End: 2020-01-18

## 2020-01-18 LAB
ABO + RH BLD: NORMAL
ABO + RH BLD: NORMAL
BH BB BLOOD EXPIRATION DATE: NORMAL
BH BB BLOOD EXPIRATION DATE: NORMAL
BH BB BLOOD TYPE BARCODE: 5100
BH BB BLOOD TYPE BARCODE: 5100
BH BB DISPENSE STATUS: NORMAL
BH BB DISPENSE STATUS: NORMAL
BH BB PRODUCT CODE: NORMAL
BH BB PRODUCT CODE: NORMAL
BH BB UNIT NUMBER: NORMAL
BH BB UNIT NUMBER: NORMAL
UNIT  ABO: NORMAL
UNIT  ABO: NORMAL
UNIT  RH: NORMAL
UNIT  RH: NORMAL

## 2020-01-18 NOTE — OUTREACH NOTE
Prep Survey      Responses   Facility patient discharged from?  [facility]   Is patient eligible?  Yes   Discharge diagnosis  PVD (type of surgery not listed)   Does the patient have one of the following disease processes/diagnoses(primary or secondary)?  General Surgery   Does the patient have Home health ordered?  No   Is there a DME ordered?  No   Comments regarding appointments  see AVS   Prep survey completed?  Yes          Otilia Oliver RN

## 2020-01-20 ENCOUNTER — READMISSION MANAGEMENT (OUTPATIENT)
Dept: CALL CENTER | Facility: HOSPITAL | Age: 50
End: 2020-01-20

## 2020-01-20 NOTE — OUTREACH NOTE
General Surgery Week 1 Survey      Responses   Facility patient discharged from?  [facility]   Does the patient have one of the following disease processes/diagnoses(primary or secondary)?  General Surgery   Is there a successful TCM telephone encounter documented?  No   Week 1 attempt successful?  Yes   Call start time  1238   Call end time  1241   Discharge diagnosis  PVD (type of surgery not listed)   Is patient permission given to speak with other caregiver?  No   Meds reviewed with patient/caregiver?  Yes   Is the patient having any side effects they believe may be caused by any medication additions or changes?  No   Does the patient have all medications related to this admission filled (includes all antibiotics, pain medications, etc.)  N/A   Is the patient taking all medications as directed (includes completed medication regime)?  Yes   Has home health visited the patient within 72 hours of discharge?  N/A   Psychosocial issues?  No   Did the patient receive a copy of their discharge instructions?  Yes   Nursing interventions  Reviewed instructions with patient, Educated on MyChart   What is the patient's perception of their health status since discharge?  Improving   Is the patient /caregiver able to teach back basic post-op care?  Practice 'cough and deep breath', Continue use of incentive spirometry at least 1 week post discharge, Drive as instructed by MD in discharge instructions, Take showers only when approved by MD-sponge bathe until then, No tub bath, swimming, or hot tub until instructed by MD, Keep incision areas clean,dry and protected, Lifting as instructed by MD in discharge instructions   Is the patient/caregiver able to teach back signs and symptoms of incisional infection?  Increased redness, swelling or pain at the incisonal site, Increased drainage or bleeding, Incisional warmth, Pus or odor from incision, Fever   Is the patient/caregiver able to teach back steps to recovery at home?  Make a  list of questions for surgeon's appointment   If the patient is a current smoker, are they able to teach back resources for cessation?  Smoking cessation medications   Is the patient/caregiver able to teach back the hierarchy of who to call/visit for symptoms/problems? PCP, Specialist, Home health nurse, Urgent Care, ED, 911  Yes   Week 1 call completed?  Yes          Bianka Aldridge RN

## 2020-01-20 NOTE — DISCHARGE SUMMARY
CTS Discharge Summary    Patient Care Team:  Meagan Barlow APRN as PCP - General (Family Medicine)  Tyson Donaldson DO as Consulting Physician (Cardiology)  Consults:   Consults     No orders found from 12/17/2019 to 1/16/2020.          Date of Admission: 1/15/2020  8:55 AM  Date of Discharge:  1/17/2020    Discharge Diagnosis  Patient Active Problem List   Diagnosis   • PVD (peripheral vascular disease) (CMS/HCC)   • Lumbar radiculopathy   • Paresthesia   • Causalgia of lower limb   • Hiatal hernia   • Current smoker   • Bilateral carotid artery stenosis   • COPD (chronic obstructive pulmonary disease) (CMS/HCC)   • Coronary artery disease   • Hypertension   • Hyperlipidemia   • Diabetes mellitus (CMS/HCC)       Past Medical History:   Diagnosis Date   • Anxiety    • Arthritis    • Carotid artery stenosis    • COPD (chronic obstructive pulmonary disease) (CMS/HCC)    • Coronary artery disease     2 vessels with 50% blockage.  Sees Dr. Donaldson   • Depression    • Diabetes mellitus (CMS/HCC)    • Disease of thyroid gland    • Dyslipidemia    • GERD (gastroesophageal reflux disease)    • Goiter    • Goiter    • Hiatal hernia    • Hyperlipidemia    • Hypertension    • Infectious viral hepatitis    • Lower back pain    • Migraine    • Sleep apnea     can't tolerate the cpap mask     PVD (peripheral vascular disease) (CMS/HCC) [I73.9]  PVD (peripheral vascular disease) (CMS/HCC) [I73.9]     Procedures Performed 01/15/20   Procedure: #1 right femoral endarterectomy #2 left femoral endarterectomy #3 femoral-femoral bypass with 8 mm Roscoe-Slade graft           Surgeons: Deandre Oseguera  Operative Pathology:  Final Diagnosis    1. PLAQUE, RIGHT FEMORAL, REMOVAL:  Atheromatous plaque present.   2. PLAQUE, LEFT FEMORAL, REMOVAL:  Atheromatous plaque with thrombus and recanalization.            History of Present Illness  Patient is a 49 y.o. female who gives a history recently of increasing claudication of the left leg  greater than the right.  However, both legs continue to bother her.  She is smoking 1/2 packs a day of cigarettes.  She apparently was seen by Dr. Donaldson and referred to Dr. Zamorano.  He apparently tried to cross the right SFA occlusion but was unsuccessful.  He tried to refer to another doctor for bypass surgery but she has been followed by me in the past and has come back to see me.The patient returns today with a new problem.  The patient appears to have decreased pulses bilaterally.  She has claudication, left greater than right.  I have obtained her abdominal aortogram which demonstrates bilateral common femoral arteries to be occluded and the iliacs bilaterally are occluded.  I recommended a right femoral endarterectomy with fem-fem bypass.  I discussed the procedure, risks and benefits including risk to her life, bleeding, infection as well as other risk factors.  She agreed to proceed with the procedure.  I extensively discussed consequences of smoking, namely cardiovascular, metabolic, lung cancer, mouth cancer, pulmonary disorder including COPD and the reduced quality of life.  At the end of the conversation, the patient voices understanding of the risks involved in smoking, and also understands the benefits of quitting.  During this visit, I spent 3-5 minutes counseling the patient regarding smoking cessation.         Hospital Course  Admitted on 1/15/2020 and underwent the above-mentioned surgical procedure.  Tolerated procedure well was transferred to the recovery room, extubated.  Once PACU criteria was met the patient was transferred to the ICU.  Once in the ICU the patient was followed by the hospital intensivist service per hospital protocol.  Postoperative IV medicines ordered to maintain tight blood pressure parameters.  Followed on daily rounds.  Postoperative day1: Patient found to be in satisfactory condition.  Orders for ambulation placed.  Physical and Occupational Therapy orders.  Case  manager evaluation in anticipation of discharge needs.  Diabetes educator asked to see.  Dopplerable pulses noted.  POD 2: Patient remained in satisfactory postoperative condition.  Again pulses noted.  Patient requesting to be discharged home.  Discharge orders placed follow-up appointments made.      Discharge Medications     Discharge Medications      Continue These Medications      Instructions Start Date   ACIDOPHILUS PO  Notes to patient:  Restart tomorrow morning 1/18/2020   1 tablet, Oral, Daily      albuterol (2.5 MG/3ML) 0.083% nebulizer solution  Commonly known as:  PROVENTIL  Notes to patient:  As needed    Nebulization, Every 6 Hours PRN      ALBUTEROL SULFATE HFA IN  Notes to patient:  As needed   1 puff, Inhalation, Every 6 Hours PRN      amitriptyline 10 MG tablet  Commonly known as:  ELAVIL  Notes to patient:  Restart tonight 1/17/2020   10 mg, Oral, Nightly      aspirin 81 MG tablet  Notes to patient:  Restart tomorrow morning 1/18/2020   1 tablet, Oral, Daily      benzonatate 100 MG capsule  Commonly known as:  TESSALON  Notes to patient:  As needed    100 mg, Oral, 3 Times Daily PRN      clopidogrel 75 MG tablet  Commonly known as:  PLAVIX  Notes to patient:  Restart tomorrow morning 1/18/2020   TAKE ONE TABLET BY MOUTH DAILY      FLONASE 50 MCG/ACT nasal spray  Generic drug:  fluticasone  Notes to patient:  Restart tomorrow morning 1/18/2020   2 sprays, Nasal, Daily, Use as directed      fluticasone-salmeterol 250-50 MCG/DOSE DISKUS  Commonly known as:  ADVAIR  Notes to patient:  Restart tomorrow morning 1/18/2020   1 puff, Inhalation, 2 Times Daily - RT      hydrOXYzine 25 MG tablet  Commonly known as:  ATARAX  Notes to patient:  Restart tonight 1/17/2020   25 mg, Oral, Nightly      ibuprofen 600 MG tablet  Commonly known as:  ADVIL,MOTRIN  Notes to patient:  As needed    600 mg, Oral, Every 6 Hours PRN      insulin aspart prot & aspart (70-30) 100 UNIT/ML suspension pen-injector  "injection  Commonly known as:  NOVOLOG  Notes to patient:  Restart tonight 1/17/2020   35 Units, Subcutaneous, 2 Times Daily Before Meals      Insulin Syringe-Needle U-100 30G X 5/16\" 0.5 ML misc  Notes to patient:  As previous   Uses X 4/day      isosorbide mononitrate 30 MG 24 hr tablet  Commonly known as:  IMDUR  Notes to patient:  Restart tomorrow morning 1/18/2020   30 mg, Oral, Daily      ketotifen 0.025 % ophthalmic solution  Commonly known as:  ZADITOR  Notes to patient:  Restart tonight 1/17/2020   1 drop, Both Eyes, 2 Times Daily      lactulose 10 GM/15ML solution  Commonly known as:  CHRONULAC  Notes to patient:  Restart tomorrow morning 1/18/2020   10 g, Oral, Daily      levocetirizine 5 MG tablet  Commonly known as:  XYZAL  Notes to patient:  Restart tonight 1/17/2020   5 mg, Oral, Every Evening      LORTAB  MG per tablet  Generic drug:  HYDROcodone-acetaminophen  Notes to patient:  As needed   1 tablet, Oral, Every 8 Hours PRN      losartan 25 MG tablet  Commonly known as:  COZAAR  Notes to patient:  Restart tomorrow morning 1/18/2020   25 mg, Oral, Daily      lovastatin 40 MG tablet  Commonly known as:  MEVACOR  Notes to patient:  Restart tonight 1/17/2020   40 mg, Oral, Nightly      metFORMIN 500 MG tablet  Commonly known as:  GLUCOPHAGE  Notes to patient:  Restart tonight 1/17/2020   1 tablet, Oral, 2 Times Daily With Meals      metoprolol tartrate 25 MG tablet  Commonly known as:  LOPRESSOR  Notes to patient:  Restart tonight 1/17/2020   25 mg, Oral, 2 Times Daily      omega-3 acid ethyl esters 1 g capsule  Commonly known as:  LOVAZA  Notes to patient:  Restart tomorrow morning 1/18/2020   2 g, 2 Times Daily      ONE TOUCH ULTRA TEST test strip  Generic drug:  glucose blood  Notes to patient:  Resume as previous   No dose, route, or frequency recorded.      ONETOUCH DELICA PLUS [id number] misc  Notes to patient:  Resume as previous   No dose, route, or frequency recorded.      pantoprazole 40 " MG EC tablet  Commonly known as:  PROTONIX  Notes to patient:  Restart tomorrow morning 1/18/2020   40 mg, Oral, Daily      pregabalin 150 MG capsule  Commonly known as:  LYRICA  Notes to patient:  Next dose this evening 1/17/2020   150 mg, Oral, 3 Times Daily      raNITIdine 150 MG tablet  Commonly known as:  ZANTAC  Notes to patient:  Restart tonight 1/17/2020   150 mg, Oral, 2 Times Daily      rOPINIRole 4 MG tablet  Commonly known as:  REQUIP  Notes to patient:  Restart tonight 1/17/2020   4 mg, Oral, Nightly      SINGULAIR 10 MG tablet  Generic drug:  montelukast   10 mg, Oral, Nightly      SPIRIVA RESPIMAT 2.5 MCG/ACT aerosol solution inhaler  Generic drug:  tiotropium bromide monohydrate  Notes to patient:  Restart tomorrow morning 1/18/2020   2 puffs, Inhalation, Daily - RT      vitamin B-12 1000 MCG tablet  Commonly known as:  CYANOCOBALAMIN  Notes to patient:  Restart tomorrow morning 1/18/2020   1,000 mcg, Oral, Daily      cyanocobalamin 1000 MCG/ML injection  Notes to patient:  Resume as previous   1,000 mcg, Every 28 Days      vitamin D 1.25 MG (33612 UT) capsule capsule  Commonly known as:  ERGOCALCIFEROL  Notes to patient:  Resume as previous    50,000 Units, Weekly, wednesdays      ZANAFLEX 4 MG tablet  Generic drug:  tiZANidine  Notes to patient:  As needed    4 mg, Oral, Every 8 Hours PRN         Stop These Medications    fluconazole 100 MG tablet  Commonly known as:  DIFLUCAN     NYSTATIN PO            Discharge Diet:   Diet Instructions     Diet: Regular, Consistent Carbohydrate, Cardiac      Discharge Diet:   Regular  Consistent Carbohydrate  Cardiac             Activity at Discharge:   Activity Instructions     Driving Restrictions      Type of Restriction:  Driving    Driving Restrictions:  No Driving While Taking Narcotics    Lifting Restrictions      Type of Restriction:  Lifting    Lifting Restrictions:  Lifting Restriction (Indicate Limit)    Weight Limit (Pounds):  10    Length of Lifting  Restriction:  Until released and follow-up appointment    Work Restrictions      Type of Restriction:  Work    May Return to Work:  After Next Appointment    With / Without Restrictions:  With Restrictions    Explain Work Restrictions:  To be addressed in follow-up appointment          Follow-up Appointments  Follow-up with Dr. Oseguera in 2 weeks following discharge in [facility].  If patient has not heard from Dr. Oseguera office in 1 week patient should call Dr. Oseguera office and inquire.  Future Appointments   Date Time Provider Department [place]   2/12/2020 12:15 PM Pamella Donohue MD [facility] None        WOUND CARE:Monitor surgical wounds daily. Keep clean and dry. Call Dr Oseguera office with questions or concerns.  Specifically let Dr. Oseguera office know if noted redness, drainage, open up incision or increasing pain.    PEDRO Calloway  01/20/20  12:45 PM

## 2020-01-22 ENCOUNTER — TELEPHONE (OUTPATIENT)
Dept: CARDIAC SURGERY | Facility: CLINIC | Age: 50
End: 2020-01-22

## 2020-01-22 NOTE — TELEPHONE ENCOUNTER
Mrs. Groves called today wanting to know when she would be able to take a tub bath. I explained to her that she could take a five minute shower and that she should not let the water saturate her incision site. Furthermore, I explained to her that she should pat any incision sites and that no tub bath should be taken until after her f/u appt. With  Dr. Oseguera on 2/12/2020. She agreed that would be the best thing to do. I encouraged her to call our office if she had any more problems.

## 2020-01-28 ENCOUNTER — READMISSION MANAGEMENT (OUTPATIENT)
Dept: CALL CENTER | Facility: HOSPITAL | Age: 50
End: 2020-01-28

## 2020-01-28 NOTE — OUTREACH NOTE
General Surgery Week 2 Survey      Responses   Facility patient discharged from?  [facility]   Does the patient have one of the following disease processes/diagnoses(primary or secondary)?  General Surgery   Week 2 attempt successful?  Yes   Call start time  0828   Call end time  0831   Meds reviewed with patient/caregiver?  Yes   Is the patient taking all medications as directed (includes completed medication regime)?  Yes   Has the patient kept scheduled appointments due by today?  N/A   What is the patient's perception of their health status since discharge?  Improving   Is the patient /caregiver able to teach back basic post-op care?  Practice 'cough and deep breath', Continue use of incentive spirometry at least 1 week post discharge, Drive as instructed by MD in discharge instructions, Take showers only when approved by MD-sponge bathe until then, No tub bath, swimming, or hot tub until instructed by MD, Keep incision areas clean,dry and protected, Lifting as instructed by MD in discharge instructions   Is the patient/caregiver able to teach back signs and symptoms of incisional infection?  Increased redness, swelling or pain at the incisonal site, Increased drainage or bleeding, Incisional warmth, Pus or odor from incision, Fever   If the patient is a current smoker, are they able to teach back resources for cessation?  [id number]   Week 2 call completed?  Yes   Wrap up additional comments  She states that she went to the ER due to her incision being red.  She states that she was given an antibiotic cream, denies drainage or fever.  Instructed her call surgeon for earlier appointment if the incision appearance does not improve.          Ranjana Coelho RN

## 2020-01-30 ENCOUNTER — OFFICE VISIT (OUTPATIENT)
Dept: CARDIAC SURGERY | Facility: CLINIC | Age: 50
End: 2020-01-30

## 2020-01-30 VITALS
TEMPERATURE: 98.3 F | HEART RATE: 106 BPM | BODY MASS INDEX: 21.26 KG/M2 | DIASTOLIC BLOOD PRESSURE: 79 MMHG | SYSTOLIC BLOOD PRESSURE: 111 MMHG | HEIGHT: 63 IN | WEIGHT: 120 LBS | OXYGEN SATURATION: 98 %

## 2020-01-30 DIAGNOSIS — I73.9 PVD (PERIPHERAL VASCULAR DISEASE) (HCC): Primary | ICD-10-CM

## 2020-01-30 PROCEDURE — 99024 POSTOP FOLLOW-UP VISIT: CPT | Performed by: NURSE PRACTITIONER

## 2020-01-30 RX ORDER — DOXYCYCLINE HYCLATE 100 MG/1
100 CAPSULE ORAL 2 TIMES DAILY
Qty: 14 CAPSULE | Refills: 0 | Status: ON HOLD | OUTPATIENT
Start: 2020-01-30 | End: 2020-02-23

## 2020-01-30 NOTE — PROGRESS NOTES
[facility] Cardiothoracic Surgery Follow-Up Note    Name:  Saskia Groves  MRN Number:  [id number]  Date of Encounter:  01/30/2020    Referred By:  No ref. provider found  PCP:  Meagan Barlow APRN    Chief Complaint:    Chief Complaint   Patient presents with   • Wound Check     Hospital follow-up s/p bilateral femoral endarterectomy 1/15/20. Follow up per [facility] to evaluate right groin incision. Went to [facility] ER for redness, yellow-green drainage from right groin incision       History of Present Illness:    Ms. Saskia Groves is a pleasant 49 y.o. female with a history of peripheral vascular disease status post bilateral femoral endarterectomy with femoral-femoral bypass 1/15/2020 per Dr. Oseguera who returns the office today for postoperative exam.  Since the time of her surgery, the patient has had trouble with pain in her right groin incision.  She was recently seen at the [facility] emergency department for erythema and yellow-green drainage from the right groin incision site.  She denies fever, chills or difficulty with ambulation.  She is scheduled to see Dr. Oseguera 2/12/2020 in [facility].    Review of Systems:  Review of Systems   Constitution: Negative for chills and fever.   Cardiovascular: Positive for leg swelling. Negative for claudication.   Respiratory: Negative for shortness of breath.    Hematologic/Lymphatic: Negative for adenopathy.   Skin: Positive for poor wound healing.   Gastrointestinal: Negative for abdominal pain.   Psychiatric/Behavioral: Negative for altered mental status.       Past Medical History:    Past Medical History:   Diagnosis Date   • Anxiety    • Arthritis    • Carotid artery stenosis    • COPD (chronic obstructive pulmonary disease) (CMS/HCC)    • Coronary artery disease     2 vessels with 50% blockage.  Sees Dr. Donaldson   • Depression    • Diabetes mellitus (CMS/HCC)    • Disease of thyroid gland    • Dyslipidemia    • GERD  (gastroesophageal reflux disease)    • Goiter    • Goiter    • Hiatal hernia    • Hyperlipidemia    • Hypertension    • Infectious viral hepatitis    • Lower back pain    • Migraine    • Sleep apnea     can't tolerate the cpap mask       Past Surgical History:    Past Surgical History:   Procedure Laterality Date   • ANTERIOR CERVICAL DISCECTOMY W/ FUSION     • BACK SURGERY      lumbar   • CHOLECYSTECTOMY     • COLONOSCOPY     • ENDOSCOPY     • FEMORAL ENDARTERECTOMY Bilateral 1/15/2020    Procedure: FEMORAL ENDARTERECTOMY BILATERAL;  Surgeon: Deandre Oseguera MD;  Location:  [facility];  Service: Vascular   • FEMORAL FEMORAL BYPASS Right 1/15/2020    Procedure: FEMORAL FEMORAL BYPASS;  Surgeon: Deandre Oseguera MD;  Location:  [facility];  Service: Vascular   • HYSTERECTOMY     • KNEE SURGERY Left    • WRIST SURGERY Left        Patient Active Problem List   Diagnosis   • PVD (peripheral vascular disease) (CMS/[facility])   • Lumbar radiculopathy   • Paresthesia   • Causalgia of lower limb   • Hiatal hernia   • Current smoker   • Bilateral carotid artery stenosis   • COPD (chronic obstructive pulmonary disease) (CMS/[facility])   • Coronary artery disease   • Hypertension   • Hyperlipidemia   • Diabetes mellitus (CMS/[facility])     Social History     Tobacco Use   • Smoking status: Current Every Day Smoker     Packs/day: 1.00     Years: 35.00     Pack years: 35.00     Types: Cigarettes   • Smokeless tobacco: Never Used   Substance Use Topics   • Alcohol use: Not Currently   • Drug use: No     Family History   Problem Relation Age of Onset   • Diabetes Mother    • Hypertension Mother    • Hypertension Father    • Lung cancer Father    • Stroke Other        Medications:      Current Outpatient Medications:   •  albuterol (PROVENTIL) (2.5 MG/3ML) 0.083% nebulizer solution, Take  by nebulization Every 6 (Six) Hours As Needed., Disp: , Rfl:   •  ALBUTEROL SULFATE HFA IN, Inhale 1 puff Every 6 (Six) Hours As Needed (as needed for wheezing  "and sob)., Disp: , Rfl:   •  amitriptyline (ELAVIL) 10 MG tablet, Take 10 mg by mouth Every Night., Disp: , Rfl:   •  aspirin 81 MG tablet, Take 1 tablet by mouth daily., Disp: , Rfl:   •  benzonatate (TESSALON) 100 MG capsule, Take 100 mg by mouth 3 (Three) Times a Day As Needed., Disp: , Rfl:   •  clopidogrel (PLAVIX) 75 MG tablet, TAKE ONE TABLET BY MOUTH DAILY, Disp: 30 tablet, Rfl: 5  •  cyanocobalamin 1000 MCG/ML injection, 1,000 mcg Every 28 (Twenty-Eight) Days., Disp: , Rfl:   •  fluticasone (FLONASE) 50 MCG/ACT nasal spray, 2 sprays into the nostril(s) as directed by provider Daily. Use as directed, Disp: , Rfl:   •  fluticasone-salmeterol (ADVAIR) 250-50 MCG/DOSE DISKUS, Inhale 1 puff 2 (Two) Times a Day., Disp: , Rfl:   •  HYDROcodone-acetaminophen (LORTAB)  MG per tablet, Take 1 tablet by mouth Every 8 (Eight) Hours As Needed., Disp: , Rfl:   •  hydrOXYzine (ATARAX) 25 MG tablet, Take 25 mg by mouth Every Night., Disp: , Rfl:   •  ibuprofen (ADVIL,MOTRIN) 600 MG tablet, Take 600 mg by mouth Every 6 (Six) Hours As Needed for Mild Pain ., Disp: , Rfl:   •  insulin aspart protamine & aspart (NOVOLOG) 70/30 100 unit/mL, Inject 35 Units under the skin into the appropriate area as directed 2 (Two) Times a Day Before Meals., Disp: , Rfl:   •  Insulin Syringe-Needle U-100 30G X 5/16\" 0.5 ML misc, Uses X 4/day, Disp: 200 each, Rfl: 5  •  isosorbide mononitrate (IMDUR) 30 MG 24 hr tablet, Take 30 mg by mouth Daily., Disp: , Rfl:   •  ketotifen (ZADITOR) 0.025 % ophthalmic solution, Administer 1 drop to both eyes 2 (Two) Times a Day., Disp: , Rfl:   •  Lactobacillus (ACIDOPHILUS PO), Take 1 tablet by mouth Daily., Disp: , Rfl:   •  lactulose (CHRONULAC) 10 GM/15ML solution, Take 10 g by mouth Daily., Disp: , Rfl:   •  Lancets (ONETOUCH DELICA PLUS [id number]) misc, , Disp: , Rfl:   •  levocetirizine (XYZAL) 5 MG tablet, Take 5 mg by mouth Every Evening., Disp: , Rfl:   •  losartan (COZAAR) 25 MG tablet, Take " "25 mg by mouth Daily., Disp: , Rfl:   •  lovastatin (MEVACOR) 40 MG tablet, Take 40 mg by mouth Every Night., Disp: , Rfl:   •  metFORMIN (GLUCOPHAGE) 500 MG tablet, Take 1 tablet by mouth 2 (Two) Times a Day With Meals., Disp: , Rfl:   •  metoprolol tartrate (LOPRESSOR) 25 MG tablet, Take 25 mg by mouth 2 (Two) Times a Day., Disp: , Rfl:   •  montelukast (SINGULAIR) 10 MG tablet, Take 10 mg by mouth Every Night., Disp: , Rfl:   •  omega-3 acid ethyl esters (LOVAZA) 1 g capsule, 2 g 2 (Two) Times a Day., Disp: , Rfl:   •  ONE TOUCH ULTRA TEST test strip, , Disp: , Rfl:   •  pantoprazole (PROTONIX) 40 MG EC tablet, Take 40 mg by mouth Daily., Disp: , Rfl:   •  pregabalin (LYRICA) 150 MG capsule, Take 150 mg by mouth 3 (Three) Times a Day., Disp: , Rfl:   •  raNITIdine (ZANTAC) 150 MG tablet, Take 150 mg by mouth 2 (Two) Times a Day., Disp: , Rfl:   •  rOPINIRole (REQUIP) 4 MG tablet, Take 4 mg by mouth Every Night., Disp: , Rfl:   •  SPIRIVA RESPIMAT 2.5 MCG/ACT aerosol solution inhaler, Inhale 2 puffs Daily., Disp: , Rfl:   •  tiZANidine (ZANAFLEX) 4 MG tablet, Take 4 mg by mouth Every 8 (Eight) Hours As Needed., Disp: , Rfl:   •  vitamin B-12 (CYANOCOBALAMIN) 1000 MCG tablet, Take 1,000 mcg by mouth Daily., Disp: , Rfl:   •  vitamin D (ERGOCALCIFEROL) 1.25 MG (52782 UT) capsule capsule, 50,000 Units 1 (One) Time Per Week. wednesdays, Disp: , Rfl:     Allergies:  Allergies   Allergen Reactions   • Levaquin [Levofloxacin] Nausea And Vomiting   • Penicillins Nausea And Vomiting   • Codeine Nausea Only   • Flagyl [Metronidazole] Nausea And Vomiting       Physical Exam:  Vital Signs:    Vitals:    01/30/20 1110   BP: 111/79   BP Location: Right arm   Patient Position: Sitting   Pulse: 106   Temp: 98.3 °F (36.8 °C)   TempSrc: Temporal   SpO2: 98%   Weight: 54.4 kg (120 lb)   Height: 160 cm (63\")       Physical Exam   Gen- NAD, pleasant, cooperative  CV- Regular rate and rhythm, no murmur, gallop or rub  Pulm- Clear to " auscultation bilateral without wheeze or rhonchi  GI- Soft, normoactive bowel sounds, non-tender  Ext- Without edema,   Neuro- CN II- XII grossly intact, tongue midline, voice normal.    Labs/Imaging:  No imaging was obtained in the office today.    Assessment / Plan:  Ms. Saskia Groves is a pleasant 49 y.o. female with a history of peripheral vascular disease status post bilateral femoral endarterectomy with femoral-femoral bypass 1/15/2020 per Dr. Oseguera who returns the office today for postoperative exam.  On physical exam, the left groin incision is intact with no erythema, drainage or dehiscence noted.  The right groin incision does have area of dehiscence with fibrinous exudate noted at the top of the incision with some maureen-wound erythema.  No drainage is noted on exam.  The area is extremely tender and sensitive to the touch.  The patient has been applying Neosporin to this area as she was directed to do at the [facility] emergency department.  The patient was advised to stop using the neosporin and to only wash the area with soap and water.  She may cover with a dry gauze if she notices drainage, otherwise she can leave this open to air.  I will prescribe a 7 day course of Doxycycline.  She is to keep her regularly scheduled follow up appointment with Dr Oseguera.    Patient Education:  Post-Op Education:  I have advised the patient to avoid sitting at a 90 degree angle and to keep her legs elevated while not in use.  I have encouraged the patient to be up and walking as much as possible.  Wound Care:  Patient educated regarding care of post-operative wounds.  Patient is to wash with plain soap and water.  Patient is not to use salves on the incision sites.  Patient instructed regarding the signs and symptoms of infection and when to call the office.    Follow Up:  As regularly scheduled.    Please note, this document was produced using voice recognition software.    LUIS Wall  Buddhist  [facility] Cardiothoracic Surgery

## 2020-02-05 ENCOUNTER — READMISSION MANAGEMENT (OUTPATIENT)
Dept: CALL CENTER | Facility: HOSPITAL | Age: 50
End: 2020-02-05

## 2020-02-05 NOTE — OUTREACH NOTE
"General Surgery Week 3 Survey      Responses   Facility patient discharged from?  [facility]   Does the patient have one of the following disease processes/diagnoses(primary or secondary)?  General Surgery   Week 3 attempt successful?  Yes   Call start time  1343   Call end time  1348   Discharge diagnosis  Femoral Endarterectomy   Meds reviewed with patient/caregiver?  Yes   Is the patient having any side effects they believe may be caused by any medication additions or changes?  No   Does the patient have all medications related to this admission filled (includes all antibiotics, pain medications, etc.)  N/A   Is the patient taking all medications as directed (includes completed medication regime)?  Yes   Does the patient have a follow up appointment scheduled with their surgeon?  Yes   Has the patient kept scheduled appointments due by today?  Yes   Has home health visited the patient within 72 hours of discharge?  N/A   Psychosocial issues?  No   Comments  Pt reports she is having yellow drainage out of surgical wound. Reports she takes her \"last antibiotic pill\" tonight. Denies any fevers. Advised to calll Dr. Oseguera office to let them know her wound status and that she is finishing her antibiotic regime.    Did the patient receive a copy of their discharge instructions?  Yes   Nursing interventions  Reviewed instructions with patient, Educated on MyChart   What is the patient's perception of their health status since discharge?  Improving   Nursing interventions  Nurse provided patient education   Is the patient /caregiver able to teach back basic post-op care?  Practice 'cough and deep breath', Continue use of incentive spirometry at least 1 week post discharge, Drive as instructed by MD in discharge instructions, Take showers only when approved by MD-sponge bathe until then, No tub bath, swimming, or hot tub until instructed by MD, Keep incision areas clean,dry and protected, Lifting as instructed by MD in " discharge instructions   Is the patient/caregiver able to teach back signs and symptoms of incisional infection?  Increased redness, swelling or pain at the incisonal site, Increased drainage or bleeding, Incisional warmth, Pus or odor from incision, Fever   Is the patient/caregiver able to teach back steps to recovery at home?  Make a list of questions for surgeon's appointment   If the patient is a current smoker, are they able to teach back resources for cessation?  [id number]   Is the patient/caregiver able to teach back the hierarchy of who to call/visit for symptoms/problems? PCP, Specialist, Home health nurse, Urgent Care, ED, 911  Yes   Week 3 call completed?  Yes          Zack Harper, RN

## 2020-02-06 ENCOUNTER — TELEPHONE (OUTPATIENT)
Dept: CARDIAC SURGERY | Facility: CLINIC | Age: 50
End: 2020-02-06

## 2020-02-06 NOTE — TELEPHONE ENCOUNTER
Mrs. Groves received a call from one of [facility]'s outreach nurses who in turn wanted her to call our office to let us know that she has finished the round of antibiotic and the incision has not gotten better or worse. Still has opening and small amount of drainage from incision. I told Mrs. Groves to keep incision clean and dry and to come to appointment with Dr. Oseguera on 2/12/2020 in [facility]. She understood, and will call the office back if there are any worsening changes in wound.

## 2020-02-12 ENCOUNTER — OFFICE VISIT (OUTPATIENT)
Dept: ENDOCRINOLOGY | Facility: CLINIC | Age: 50
End: 2020-02-12

## 2020-02-12 ENCOUNTER — OFFICE VISIT (OUTPATIENT)
Dept: CARDIAC SURGERY | Facility: CLINIC | Age: 50
End: 2020-02-12

## 2020-02-12 VITALS
HEIGHT: 63 IN | SYSTOLIC BLOOD PRESSURE: 130 MMHG | WEIGHT: 126 LBS | DIASTOLIC BLOOD PRESSURE: 76 MMHG | HEART RATE: 101 BPM | OXYGEN SATURATION: 99 % | BODY MASS INDEX: 22.32 KG/M2

## 2020-02-12 VITALS
DIASTOLIC BLOOD PRESSURE: 70 MMHG | OXYGEN SATURATION: 99 % | SYSTOLIC BLOOD PRESSURE: 158 MMHG | HEIGHT: 63 IN | BODY MASS INDEX: 21.26 KG/M2 | HEART RATE: 114 BPM | WEIGHT: 120 LBS

## 2020-02-12 DIAGNOSIS — I73.9 PVD (PERIPHERAL VASCULAR DISEASE) (HCC): Primary | ICD-10-CM

## 2020-02-12 DIAGNOSIS — E04.2 MULTINODULAR GOITER: Primary | ICD-10-CM

## 2020-02-12 DIAGNOSIS — E05.90 SUBCLINICAL HYPERTHYROIDISM: ICD-10-CM

## 2020-02-12 PROCEDURE — 99204 OFFICE O/P NEW MOD 45 MIN: CPT | Performed by: INTERNAL MEDICINE

## 2020-02-12 PROCEDURE — 99024 POSTOP FOLLOW-UP VISIT: CPT | Performed by: THORACIC SURGERY (CARDIOTHORACIC VASCULAR SURGERY)

## 2020-02-12 PROCEDURE — 76536 US EXAM OF HEAD AND NECK: CPT | Performed by: INTERNAL MEDICINE

## 2020-02-12 NOTE — PROGRESS NOTES
02/12/2020  Patient Information  Saskia Groves                                                                                          [address] [facility] [address]   1970  'PCP/Referring Physician'  Meagan Barlow, APRN  [phone]  No ref. provider found    Chief Complaint   Patient presents with   • Post-op Follow-up     FU for Bilateral Femoral Endarterectomies 1/15/2020-Eval Right Groin Incision-Numbness in 4 Left Lower Extremity Digits   • Peripheral Vascular Disease       History of Present Illness:   The patient returns today for follow-up of her peripheral vascular disease.  Since last visit, she has been taking care of her wounds religiously and getting a shower and washing with Dial soap.  She has had no fever and chills or other major problems.      Patient Active Problem List   Diagnosis   • PVD (peripheral vascular disease) (CMS/HCC)   • Lumbar radiculopathy   • Paresthesia   • Causalgia of lower limb   • Hiatal hernia   • Current smoker   • Bilateral carotid artery stenosis   • COPD (chronic obstructive pulmonary disease) (CMS/HCC)   • Coronary artery disease   • Hypertension   • Hyperlipidemia   • Diabetes mellitus (CMS/HCC)     Past Medical History:   Diagnosis Date   • Anxiety    • Arthritis    • Asthma    • Carotid artery stenosis    • Chronic bronchitis (CMS/HCC)    • COPD (chronic obstructive pulmonary disease) (CMS/HCC)    • Coronary artery disease     2 vessels with 50% blockage.  Sees Dr. Donaldson   • Depression    • Diabetes mellitus (CMS/HCC)    • Disease of thyroid gland    • Dyslipidemia    • GERD (gastroesophageal reflux disease)    • Goiter    • Goiter    • Hiatal hernia    • Hyperlipidemia    • Hypertension    • Infectious viral hepatitis    • Lower back pain    • Migraine    • Sleep apnea     can't tolerate the cpap mask   • Thyroid nodule      Past Surgical History:   Procedure Laterality Date   • ANTERIOR CERVICAL DISCECTOMY W/ FUSION     • BACK SURGERY       lumbar   •  SECTION  19940    x 2    • CHOLECYSTECTOMY     • COLONOSCOPY     • ENDOSCOPY     • FEMORAL ENDARTERECTOMY Bilateral 1/15/2020    Procedure: FEMORAL ENDARTERECTOMY BILATERAL;  Surgeon: Deandre Oseguera MD;  Location:  [facility];  Service: Vascular   • FEMORAL FEMORAL BYPASS Right 1/15/2020    Procedure: FEMORAL FEMORAL BYPASS;  Surgeon: Deandre Oseguera MD;  Location:  [facility];  Service: Vascular   • HYSTERECTOMY     • KNEE SURGERY Left    • WRIST SURGERY Left        Current Outpatient Medications:   •  albuterol (PROVENTIL) (2.5 MG/3ML) 0.083% nebulizer solution, Take  by nebulization Every 6 (Six) Hours As Needed., Disp: , Rfl:   •  ALBUTEROL SULFATE HFA IN, Inhale 1 puff Every 6 (Six) Hours As Needed (as needed for wheezing and sob)., Disp: , Rfl:   •  amitriptyline (ELAVIL) 10 MG tablet, Take 10 mg by mouth Every Night., Disp: , Rfl:   •  aspirin 81 MG tablet, Take 1 tablet by mouth daily., Disp: , Rfl:   •  benzonatate (TESSALON) 100 MG capsule, Take 100 mg by mouth 3 (Three) Times a Day As Needed., Disp: , Rfl:   •  clopidogrel (PLAVIX) 75 MG tablet, TAKE ONE TABLET BY MOUTH DAILY, Disp: 30 tablet, Rfl: 5  •  cyanocobalamin 1000 MCG/ML injection, 1,000 mcg Every 28 (Twenty-Eight) Days., Disp: , Rfl:   •  fluticasone (FLONASE) 50 MCG/ACT nasal spray, 2 sprays into the nostril(s) as directed by provider Daily. Use as directed, Disp: , Rfl:   •  fluticasone-salmeterol (ADVAIR) 250-50 MCG/DOSE DISKUS, Inhale 1 puff 2 (Two) Times a Day., Disp: , Rfl:   •  HYDROcodone-acetaminophen (LORTAB)  MG per tablet, Take 1 tablet by mouth Every 8 (Eight) Hours As Needed., Disp: , Rfl:   •  hydrOXYzine (ATARAX) 25 MG tablet, Take 25 mg by mouth Every Night., Disp: , Rfl:   •  ibuprofen (ADVIL,MOTRIN) 600 MG tablet, Take 600 mg by mouth Every 6 (Six) Hours As Needed for Mild Pain ., Disp: , Rfl:   •  insulin aspart protamine & aspart (NOVOLOG) 70/30 100 unit/mL, Inject 35 Units under the skin  "into the appropriate area as directed 2 (Two) Times a Day Before Meals., Disp: , Rfl:   •  Insulin Syringe-Needle U-100 30G X 5/16\" 0.5 ML misc, Uses X 4/day, Disp: 200 each, Rfl: 5  •  isosorbide mononitrate (IMDUR) 30 MG 24 hr tablet, Take 30 mg by mouth Daily., Disp: , Rfl:   •  ketotifen (ZADITOR) 0.025 % ophthalmic solution, Administer 1 drop to both eyes 2 (Two) Times a Day., Disp: , Rfl:   •  Lactobacillus (ACIDOPHILUS PO), Take 1 tablet by mouth Daily., Disp: , Rfl:   •  lactulose (CHRONULAC) 10 GM/15ML solution, Take 10 g by mouth Daily., Disp: , Rfl:   •  Lancets (ONETOUCH DELICA PLUS [id number]) misc, , Disp: , Rfl:   •  levocetirizine (XYZAL) 5 MG tablet, Take 5 mg by mouth Every Evening., Disp: , Rfl:   •  losartan (COZAAR) 25 MG tablet, Take 25 mg by mouth Daily., Disp: , Rfl:   •  lovastatin (MEVACOR) 40 MG tablet, Take 40 mg by mouth Every Night., Disp: , Rfl:   •  metFORMIN (GLUCOPHAGE) 500 MG tablet, Take 1 tablet by mouth 2 (Two) Times a Day With Meals., Disp: , Rfl:   •  metoprolol tartrate (LOPRESSOR) 25 MG tablet, Take 25 mg by mouth 2 (Two) Times a Day., Disp: , Rfl:   •  montelukast (SINGULAIR) 10 MG tablet, Take 10 mg by mouth Every Night., Disp: , Rfl:   •  omega-3 acid ethyl esters (LOVAZA) 1 g capsule, 2 g 2 (Two) Times a Day., Disp: , Rfl:   •  ONE TOUCH ULTRA TEST test strip, , Disp: , Rfl:   •  pantoprazole (PROTONIX) 40 MG EC tablet, Take 40 mg by mouth Daily., Disp: , Rfl:   •  pregabalin (LYRICA) 150 MG capsule, Take 150 mg by mouth 3 (Three) Times a Day., Disp: , Rfl:   •  raNITIdine (ZANTAC) 150 MG tablet, Take 150 mg by mouth 2 (Two) Times a Day., Disp: , Rfl:   •  rOPINIRole (REQUIP) 4 MG tablet, Take 4 mg by mouth Every Night., Disp: , Rfl:   •  SPIRIVA RESPIMAT 2.5 MCG/ACT aerosol solution inhaler, Inhale 2 puffs Daily., Disp: , Rfl:   •  tiZANidine (ZANAFLEX) 4 MG tablet, Take 4 mg by mouth Every 8 (Eight) Hours As Needed., Disp: , Rfl:   •  vitamin D (ERGOCALCIFEROL) 1.25 MG " "(25555 UT) capsule capsule, 50,000 Units 1 (One) Time Per Week. 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     Employer: DISABLED     Comment: Back and Leg Pain   Tobacco Use   • Smoking status: Current Every Day Smoker     Packs/day: 1.00     Years: 35.00     Pack years: 35.00     Types: Cigarettes   • Smokeless tobacco: Never Used   Substance and Sexual Activity   • Alcohol use: Not Currently   • Drug use: No   • Sexual activity: Yes     Partners: Male     Comment:    Social History Narrative    Lives in [facility]     Family History   Problem Relation Age of Onset   • Diabetes Mother    • Hypertension Mother    • Hypertension Father    • Lung cancer Father    • Stroke Other      ROS  Vitals:    02/12/20 0830   BP: 158/70   BP Location: Right arm   Patient Position: Sitting   Pulse: 114   SpO2: 99%   Weight: 54.4 kg (120 lb)   Height: 160 cm (63\")      Physical Exam   Neck: Normal range of motion. Neck supple. No JVD present. No tracheal deviation present. No thyromegaly present.   Cardiovascular: Normal rate and regular rhythm. Exam reveals no gallop and no friction rub.   No murmur heard.  Pulses:       Dorsalis pedis pulses are 1+ on the right side, and 1+ on the left side.        Posterior tibial pulses are 1+ on the right side, and 1+ on the left side.   Pulmonary/Chest: No stridor. No respiratory distress. She has no wheezes. She has no rales. She exhibits " no tenderness.   Abdominal: Soft. Bowel sounds are normal. There is no tenderness.   Lymphadenopathy:     She has no cervical adenopathy.     Assessment/Plan:   The patient returns for follow up of her peripheral vascular disease.  The patient's wounds look excellent today and she has pulses in her feet.  We will see her back in 3 months for follow-up.    Patient Active Problem List   Diagnosis   • PVD (peripheral vascular disease) (CMS/HCC)   • Lumbar radiculopathy   • Paresthesia   • Causalgia of lower limb   • Hiatal hernia   • Current smoker   • Bilateral carotid artery stenosis   • COPD (chronic obstructive pulmonary disease) (CMS/HCC)   • Coronary artery disease   • Hypertension   • Hyperlipidemia   • Diabetes mellitus (CMS/HCC)     CC: LUIS Bravo, , editing for Deandre Oseguera M.D.    I, Deandre Oseguera MD, have read and agree with the editing done by Tete Whittaker, .

## 2020-02-12 NOTE — PROGRESS NOTES
Chief Complaint   Patient presents with   • Thyroid nodule     Referred by Meagan SMITH        HPI:   Saskia Groves is a 49 y.o.female sent in consultation by LUIS Adames for further evaluation of her multinodular goiter. Her history is as follows:    1) multinodular goiter:  - initially found on physical exam in 2008 by [facility]. Had serial Thyroid US exam and multiple US-Guided FNA of right lobe nodules.  US-Guided FNA of right lobe nodules were performed in 11/2008, 01/2013, 04/2015, and 07/15/2019. All cytologies were consistent with benign colloid nodules.    - Pt was sent in consultation to [facility] in 2019 for possible total thyroidectomy as pt reported mild obstructive symptoms and had persistent subclinical hyperthyroidism. Surgery was not recommended until her diabetes was better control.     - She has been followed by [facility] for her Type 2 diabetes for several years. She has a long h/o poor compliance with her insulin injections. She has been on a basal-bolus regimen in the past but was then switched to pre-mixed insulin due to her difficulty with medication compliance.     FMH: no known thyroid cancer, no known thyroid disease  PMH: no h/o irradiation of head, neck, or chest      Review of Systems   Constitutional: Positive for fatigue and unexpected weight change.        Weight loss   HENT: Positive for sore throat and trouble swallowing.    Eyes: Positive for pain, redness and itching.   Respiratory: Positive for cough and shortness of breath (with exertion).    Cardiovascular: Negative for chest pain and palpitations.   Gastrointestinal: Positive for abdominal pain and diarrhea.        Heartburn   Endocrine: Positive for heat intolerance and polydipsia.   Genitourinary: Positive for frequency and pelvic pain.   Musculoskeletal: Positive for arthralgias, back pain and myalgias.   Neurological: Positive for weakness and numbness.    Psychiatric/Behavioral:        Depression, anxiety       Past Medical History:   Diagnosis Date   • Anxiety    • Arthritis    • Asthma    • Carotid artery stenosis    • Chronic bronchitis (CMS/HCC)    • COPD (chronic obstructive pulmonary disease) (CMS/HCC)    • Coronary artery disease     2 vessels with 50% blockage.  Sees Dr. Donaldson   • Depression    • Diabetes mellitus (CMS/HCC)    • Dyslipidemia    • GERD (gastroesophageal reflux disease)    • Hiatal hernia    • Hyperlipidemia    • Hypertension    • Infectious viral hepatitis    • Lower back pain    • Migraine    • Multinodular goiter    • Sleep apnea     can't tolerate the cpap mask   • Subclinical hyperthyroidism      family history includes Arthritis in her mother; Cancer in her father, maternal grandmother, and maternal uncle; Diabetes in her mother; Heart attack in her maternal aunt and maternal uncle; Hyperlipidemia in her maternal grandmother and mother; Hypertension in her father and mother; Lung cancer in her father; Migraines in her maternal aunt and mother; Obesity in her daughter and maternal grandmother; Osteoporosis in her maternal aunt; Stroke in her maternal uncle and another family member; Thyroid disease in her daughter, maternal aunt, and mother.  Past Surgical History:   Procedure Laterality Date   • ANTERIOR CERVICAL DISCECTOMY W/ FUSION     • BACK SURGERY      lumbar   •  SECTION  19940    x 2    • CHOLECYSTECTOMY     • COLONOSCOPY     • ENDOSCOPY     • FEMORAL ENDARTERECTOMY Bilateral 1/15/2020    Procedure: FEMORAL ENDARTERECTOMY BILATERAL;  Surgeon: Deandre Oseguera MD;  Location: [facility] OR;  Service: Vascular   • FEMORAL FEMORAL BYPASS Right 1/15/2020    Procedure: FEMORAL FEMORAL BYPASS;  Surgeon: Deandre Oseguera MD;  Location: [facility] OR;  Service: Vascular   • HYSTERECTOMY     • KNEE SURGERY Left    • WRIST SURGERY Left      Social History     Tobacco Use   • Smoking status: Current Every Day Smoker     Packs/day:  "1.00     Years: 35.00     Pack years: 35.00     Types: Cigarettes   • Smokeless tobacco: Never Used   Substance Use Topics   • Alcohol use: Not Currently   • Drug use: No     No facility-administered medications prior to visit.      Outpatient Medications Prior to Visit   Medication Sig Dispense Refill   • albuterol (PROVENTIL) (2.5 MG/3ML) 0.083% nebulizer solution Take  by nebulization Every 6 (Six) Hours As Needed.     • ALBUTEROL SULFATE HFA IN Inhale 1 puff Every 6 (Six) Hours As Needed (as needed for wheezing and sob).     • amitriptyline (ELAVIL) 10 MG tablet Take 10 mg by mouth Every Night.     • aspirin 81 MG tablet Take 1 tablet by mouth daily.     • benzonatate (TESSALON) 100 MG capsule Take 100 mg by mouth 3 (Three) Times a Day As Needed.     • clopidogrel (PLAVIX) 75 MG tablet TAKE ONE TABLET BY MOUTH DAILY 30 tablet 5   • cyanocobalamin 1000 MCG/ML injection 1,000 mcg Every 28 (Twenty-Eight) Days.     • fluticasone (FLONASE) 50 MCG/ACT nasal spray 2 sprays into the nostril(s) as directed by provider Daily. Use as directed     • fluticasone-salmeterol (ADVAIR) 250-50 MCG/DOSE DISKUS Inhale 1 puff 2 (Two) Times a Day.     • HYDROcodone-acetaminophen (LORTAB)  MG per tablet Take 1 tablet by mouth Every 8 (Eight) Hours As Needed.     • hydrOXYzine (ATARAX) 25 MG tablet Take 25 mg by mouth Every Night.     • insulin aspart protamine & aspart (NOVOLOG) 70/30 100 unit/mL Inject 35 Units under the skin into the appropriate area as directed 2 (Two) Times a Day Before Meals.     • Insulin Syringe-Needle U-100 30G X 5/16\" 0.5 ML misc Uses X 4/day 200 each 5   • isosorbide mononitrate (IMDUR) 30 MG 24 hr tablet Take 30 mg by mouth Daily.     • ketotifen (ZADITOR) 0.025 % ophthalmic solution Administer 1 drop to both eyes 2 (Two) Times a Day.     • lactulose (CHRONULAC) 10 GM/15ML solution Take 10 g by mouth Daily.     • Lancets (ONETOUCH DELICA PLUS [id number]) misc      • levocetirizine (XYZAL) 5 MG tablet " "Take 5 mg by mouth Every Evening.     • losartan (COZAAR) 25 MG tablet Take 25 mg by mouth Daily.     • lovastatin (MEVACOR) 40 MG tablet Take 40 mg by mouth Every Night.     • metFORMIN (GLUCOPHAGE) 500 MG tablet Take 1 tablet by mouth 2 (Two) Times a Day With Meals.     • metoprolol tartrate (LOPRESSOR) 25 MG tablet Take 12.5 mg by mouth 2 (Two) Times a Day.     • montelukast (SINGULAIR) 10 MG tablet Take 10 mg by mouth Every Night.     • omega-3 acid ethyl esters (LOVAZA) 1 g capsule 2 g 2 (Two) Times a Day.     • ONE TOUCH ULTRA TEST test strip      • pantoprazole (PROTONIX) 40 MG EC tablet Take 40 mg by mouth Daily.     • pregabalin (LYRICA) 150 MG capsule Take 150 mg by mouth 3 (Three) Times a Day.     • raNITIdine (ZANTAC) 150 MG tablet Take 150 mg by mouth 2 (Two) Times a Day.     • rOPINIRole (REQUIP) 4 MG tablet Take 4 mg by mouth Every Night.     • SPIRIVA RESPIMAT 2.5 MCG/ACT aerosol solution inhaler Inhale 2 puffs Daily.     • tiZANidine (ZANAFLEX) 4 MG tablet Take 4 mg by mouth Every 8 (Eight) Hours As Needed.     • vitamin D (ERGOCALCIFEROL) 1.25 MG (61531 UT) capsule capsule 50,000 Units 1 (One) Time Per Week. wednesdays     • doxycycline (VIBRAMYCIN) 100 MG capsule Take 1 capsule by mouth 2 (Two) Times a Day. 14 capsule 0   • ibuprofen (ADVIL,MOTRIN) 600 MG tablet Take 600 mg by mouth Every 6 (Six) Hours As Needed for Mild Pain .     • Lactobacillus (ACIDOPHILUS PO) Take 1 tablet by mouth Daily.     • vitamin B-12 (CYANOCOBALAMIN) 1000 MCG tablet Take 1,000 mcg by mouth Daily.       Allergies   Allergen Reactions   • Levaquin [Levofloxacin] Nausea And Vomiting   • Penicillins Nausea And Vomiting   • Codeine Nausea Only   • Flagyl [Metronidazole] Nausea And Vomiting       /76   Pulse 101   Ht 160 cm (63\")   Wt 57.2 kg (126 lb)   SpO2 99%   BMI 22.32 kg/m²   Physical Exam   Constitutional: She is oriented to person, place, and time. She appears well-developed. No distress.   HENT:   Head: " Normocephalic.   Mouth/Throat: Oropharynx is clear and moist.   Eyes: Pupils are equal, round, and reactive to light. Conjunctivae and EOM are normal.   Neck: No tracheal deviation present. Thyromegaly (right lobed enlarged, left lobe normal in size) present.   Multiple palpable thyroid nodules in the right lobe.      Cardiovascular: Normal rate, regular rhythm and normal heart sounds.   No murmur heard.  Pulmonary/Chest: Effort normal and breath sounds normal. No respiratory distress.   Lymphadenopathy:     She has no cervical adenopathy.   Neurological: She is alert and oriented to person, place, and time. No cranial nerve deficit.   Skin: Skin is warm and dry. She is not diaphoretic. No erythema.   Psychiatric: She has a normal mood and affect. Her behavior is normal.   Vitals reviewed.      LABS/IMAGING: outside records reviewed and summarized in HPI  Lab Results   Component Value Date    TSH 0.022 (L) 01/16/2020       PROCEDURES (in office):  Thyroid Ultrasound Report  [facility]  [place], [place]    Date: 02/12/2020  Indication: multinodular goiter   Comparison Imaging: none  Clinical History: 49 y.o. With h/o subclinical hyperthyroidism and multinodular goiter. Has had multiple outside thyroid US's at [facility]. Imaging not available for review. Has had multiple US Guided FNA of the right thyroid lobe nodules at [facility] in 11/2008, 01/2013, 04/2015, and 07/15/2019 - all cytology reports were consistent with benign colloid nodules.     Real time high resolution imaging of the thyroid gland was performed in transverse and longitudinal planes.  The right lobe measured 5.77 cm in Length x 2.32 cm in AP diameter x 2.75 cm in TV dimension.  The isthmus measured 0.19 cm in thickness.  The left thyroid lobe measured 5.08 cm in length x 1.72 cm in AP diameter x 1.98 cm in TV dimension.    The thyroid gland appeared overall isoechoic with heterogeneous echotexture.     Multiple thyroid cysts and mixed cystic and  solid  thyroid nodules were seen throughout both lobes.    In the right superior lobe, two isoechoic, mixed solid and cystic nodules were seen. The superior pole nodule measured 1.11(L) x 0.87(AP) x 0.90(T) cm. The adjacent nodule measured 1.02(L) x 1.05(AP) x 0.91(T) cm. Both nodules had a smooth margin, minimal peripheral vascularity, and no microcalcifications.     In the right mid lobe, a conglomerate of three isoechoic mixed cystic and solid nodules were identified. The predominantly cystic nodules had smooth margins, no vascularity, and no microcalcifications.    In the right mid lobe anteriorly, a 2.51(L) x 1.18(AP) x 1.73(T) cm hypoechoic, solid nodule was identified. The nodule had a smooth margin, minimal peripheral vascularity, and no microcalcifications.    Multiple similar appearing, subcentimeter, mixed cystic and solid nodules were seen scattered throughout the left lobe. The nodules had a smooth margins, minimal peripheral vascularity, and no microcalcifications.    No pathologic lymph nodes were seen.     IMPRESSION:   1) Asymetric enlarged thyroid gland with the right lobe enlarged and the left lobe normal in size.  2) Multiple thyroid cysts and mixed cystic and solid  thyroid nodules were seen throughout both lobes as described above.  3) The multiple mixed cystic and solid nodules lacked suspicious US characteristics and did not meet criteria for FNA.   4) In the right mid lobe anteriorly, a 2.51(L) x 1.18(AP) x 1.73(T) cm hypoechoic, solid nodule was identified. The nodule had a smooth margin, minimal peripheral vascularity, and no microcalcifications. This nodule did meet criteria for FNA by imaging characteristics. However, she has had multiple US Guided FNA of the right thyroid lobe nodules at [facility] in 11/2008, 01/2013, 04/2015, and 07/15/2019 - all cytology reports were consistent with benign colloid nodules. Repeat FNA not indicated.    RECOMMENDATIONS: Repeat Thyroid US recommended if  changes in the gland / neck are noted on physical exam.        Signed: Pamella Donohue MD      ASSESSMENT/PLAN:  1) multinodular goiter: Thyroid US completed today showed -   - Asymetric enlarged thyroid gland with the right lobe enlarged and the left lobe normal in size.  - Multiple thyroid cysts and mixed cystic and solid  thyroid nodules were seen throughout both lobes as described above.  - The multiple mixed cystic and solid nodules lacked suspicious US characteristics and did not meet criteria for FNA.   - In the right mid lobe anteriorly, a 2.51(L) x 1.18(AP) x 1.73(T) cm hypoechoic, solid nodule was identified. The nodule had a smooth margin, minimal peripheral vascularity, and no microcalcifications. This nodule did meet criteria for FNA by imaging characteristics. However, she has had multiple US Guided FNA of the right thyroid lobe nodules at [facility] in 11/2008, 01/2013, 04/2015, and 07/15/2019 - all cytology reports were consistent with benign colloid nodules. Repeat FNA not indicated.  RECOMMENDATIONS: Repeat Thyroid US recommended if changes in the gland / neck are noted on physical exam.        Pt with mild obstructive symptoms from her MNG and subclinical hyperthyroidism. Discussed with patient that I agree with the other providers she has seen that total thyroidectomy is a reasonable treatment option if her diabetes were better controlled. I also discussed with her that I do not know if another surgeon would be agreeable to performing a total thyroidectomy given her poorly controlled diabetes, but I would enquire with ENT.     2) subclinical hyperthyroidism due to multinodular goiter  - clinically euthyroid on exam  - no indication for medication at this time    3) uncontrolled Type 2 diabetes with complication and long term insulin use:   - although pt was not referred for management of her diabetes, I had a long discussion with patient that I agree with Endocrine Surgery that her diabetes needs to be  better controlled before pursuing total thyroidectomy. I also discussed with her that I do not know if another surgeon would be agreeable to performing a total thyroidectomy given her poorly controlled diabetes, but I would enquire with ENT.   - Pt was agreeable to retuning to clinic in April for evaluation of her diabetes    Signed: Pamella Donohue MD

## 2020-02-12 NOTE — PROGRESS NOTES
Thyroid Ultrasound Report  [facility]  [place], [place]    Date: 02/12/2020  Indication: multinodular goiter   Comparison Imaging: none  Clinical History: 49 y.o. With h/o subclinical hyperthyroidism and multinodular goiter. Has had multiple outside thyroid US's at [facility]. Imaging not available for review. Has had multiple US Guided FNA of the right thyroid lobe nodules at [facility] in 11/2008, 01/2013, 04/2015, and 07/15/2019 - all cytology reports were consistent with benign colloid nodules.     Real time high resolution imaging of the thyroid gland was performed in transverse and longitudinal planes.  The right lobe measured 5.77 cm in Length x 2.32 cm in AP diameter x 2.75 cm in TV dimension.  The isthmus measured 0.19 cm in thickness.  The left thyroid lobe measured 5.08 cm in length x 1.72 cm in AP diameter x 1.98 cm in TV dimension.    The thyroid gland appeared overall isoechoic with heterogeneous echotexture.     Multiple thyroid cysts and mixed cystic and solid  thyroid nodules were seen throughout both lobes.    In the right superior lobe, two isoechoic, mixed solid and cystic nodules were seen. The superior pole nodule measured 1.11(L) x 0.87(AP) x 0.90(T) cm. The adjacent nodule measured 1.02(L) x 1.05(AP) x 0.91(T) cm. Both nodules had a smooth margin, minimal peripheral vascularity, and no microcalcifications.     In the right mid lobe, a conglomerate of three isoechoic mixed cystic and solid nodules were identified. The predominantly cystic nodules had smooth margins, no vascularity, and no microcalcifications.    In the right mid lobe anteriorly, a 2.51(L) x 1.18(AP) x 1.73(T) cm hypoechoic, solid nodule was identified. The nodule had a smooth margin, minimal peripheral vascularity, and no microcalcifications.    Multiple similar appearing, subcentimeter, mixed cystic and solid nodules were seen scattered throughout the left lobe. The nodules had a smooth margins, minimal peripheral  vascularity, and no microcalcifications.    No pathologic lymph nodes were seen.     IMPRESSION:   1) Asymetric enlarged thyroid gland with the right lobe enlarged and the left lobe normal in size.  2) Multiple thyroid cysts and mixed cystic and solid  thyroid nodules were seen throughout both lobes as described above.  3) The multiple mixed cystic and solid nodules lacked suspicious US characteristics and did not meet criteria for FNA.   4) In the right mid lobe anteriorly, a 2.51(L) x 1.18(AP) x 1.73(T) cm hypoechoic, solid nodule was identified. The nodule had a smooth margin, minimal peripheral vascularity, and no microcalcifications. This nodule did meet criteria for FNA by imaging characteristics. However, she has had multiple US Guided FNA of the right thyroid lobe nodules at [facility] in 11/2008, 01/2013, 04/2015, and 07/15/2019 - all cytology reports were consistent with benign colloid nodules. Repeat FNA not indicated.    RECOMMENDATIONS: Repeat Thyroid US recommended if changes in the gland / neck are noted on physical exam.        Signed: Pamella Donohue MD

## 2020-02-13 ENCOUNTER — READMISSION MANAGEMENT (OUTPATIENT)
Dept: CALL CENTER | Facility: HOSPITAL | Age: 50
End: 2020-02-13

## 2020-02-13 NOTE — OUTREACH NOTE
General Surgery Week 4 Survey      Responses   Facility patient discharged from?  [facility]   Does the patient have one of the following disease processes/diagnoses(primary or secondary)?  General Surgery   Week 4 attempt successful?  Yes   Call start time  1501   Call end time  1506   Discharge diagnosis  Femoral Endarterectomy   Is the patient taking all medications as directed (includes completed medication regime)?  Yes   Has the patient kept scheduled appointments due by today?  Yes   Is the patient still receiving Home Health Services?  N/A   Psychosocial issues?  No   Comments  pt states lower extr edematous, has seen MD's and fluid will need time to resolve, incisions free of infection   What is the patient's perception of their health status since discharge?  Improving   Nursing interventions  Nurse provided patient education   Is the patient/caregiver able to teach back steps to recovery at home?  Set small, achievable goals for return to baseline health, Rest and rebuild strength, gradually increase activity   Is the patient/caregiver able to teach back the hierarchy of who to call/visit for symptoms/problems? PCP, Specialist, Home health nurse, Urgent Care, ED, 911  Yes   Additional teach back comments  swelling in lower extr continues, pt elevating legs, will call MD if worsens   Week 4 call completed?  Yes   Would the patient like one additional call?  Yes          Ana Umanzor RN

## 2020-02-20 ENCOUNTER — READMISSION MANAGEMENT (OUTPATIENT)
Dept: CALL CENTER | Facility: HOSPITAL | Age: 50
End: 2020-02-20

## 2020-02-20 NOTE — OUTREACH NOTE
Medical Week 5 Survey      Responses   Facility patient discharged from?  [place] [facility]   Does the patient have one of the following disease processes/diagnoses(primary or secondary)?  Other   Call start time  1154   Call end time  1159   Medication alerts for this patient   has been given an antibiotic   Is the patient taking all medications as directed (includes completed medication regime)?  Yes   Has the patient kept scheduled appointments due by today?  Yes   Comments  had an ultrasound yesterday,  of the leg   Is the patient still receiving Home Health Services?  N/A   What is the patient's perception of their health status since discharge?  New symptoms unrelated to diagnosis [patient had an ultrasound yesterday, now leg is swollen and painful, unable to bear weight, and has been started on an antibiotic which causes nause]   Graduated  Yes   Did the patient feel the follow up calls were helpful during their recovery period?  Yes   Was the number of calls appropriate?  Yes   Wrap up additional comments  left leg is swollen  and antibiotic is making her nauseated, patient advise to call her surgeon          Jayla Kirkland RN

## 2020-02-23 ENCOUNTER — APPOINTMENT (OUTPATIENT)
Dept: CT IMAGING | Facility: HOSPITAL | Age: 50
End: 2020-02-23

## 2020-02-23 ENCOUNTER — HOSPITAL ENCOUNTER (INPATIENT)
Facility: HOSPITAL | Age: 50
LOS: 17 days | Discharge: HOME HEALTH | End: 2020-03-11
Attending: INTERNAL MEDICINE | Admitting: INTERNAL MEDICINE

## 2020-02-23 DIAGNOSIS — J90 PLEURAL EFFUSION: ICD-10-CM

## 2020-02-23 DIAGNOSIS — I73.9 PVD (PERIPHERAL VASCULAR DISEASE) (HCC): Primary | ICD-10-CM

## 2020-02-23 DIAGNOSIS — Z95.828 S/P FEMORAL-FEMORAL BYPASS SURGERY: ICD-10-CM

## 2020-02-23 DIAGNOSIS — J41.1 MUCOPURULENT CHRONIC BRONCHITIS (HCC): ICD-10-CM

## 2020-02-23 PROBLEM — A41.9 SEPSIS (HCC): Status: ACTIVE | Noted: 2020-02-23

## 2020-02-23 PROCEDURE — 99223 1ST HOSP IP/OBS HIGH 75: CPT | Performed by: INTERNAL MEDICINE

## 2020-02-23 RX ORDER — NICOTINE POLACRILEX 4 MG
15 LOZENGE BUCCAL
Status: DISCONTINUED | OUTPATIENT
Start: 2020-02-23 | End: 2020-03-11 | Stop reason: HOSPADM

## 2020-02-23 RX ORDER — ACETAMINOPHEN 325 MG/1
650 TABLET ORAL EVERY 4 HOURS PRN
Status: DISCONTINUED | OUTPATIENT
Start: 2020-02-23 | End: 2020-03-11 | Stop reason: HOSPADM

## 2020-02-23 RX ORDER — LOSARTAN POTASSIUM 25 MG/1
25 TABLET ORAL DAILY
Status: DISCONTINUED | OUTPATIENT
Start: 2020-02-24 | End: 2020-02-29

## 2020-02-23 RX ORDER — BUDESONIDE AND FORMOTEROL FUMARATE DIHYDRATE 160; 4.5 UG/1; UG/1
2 AEROSOL RESPIRATORY (INHALATION)
Status: DISCONTINUED | OUTPATIENT
Start: 2020-02-24 | End: 2020-02-28

## 2020-02-23 RX ORDER — ROPINIROLE 2 MG/1
4 TABLET, FILM COATED ORAL NIGHTLY
Status: DISCONTINUED | OUTPATIENT
Start: 2020-02-24 | End: 2020-02-29

## 2020-02-23 RX ORDER — HYDROXYZINE HYDROCHLORIDE 25 MG/1
25 TABLET, FILM COATED ORAL NIGHTLY
Status: DISCONTINUED | OUTPATIENT
Start: 2020-02-24 | End: 2020-02-29

## 2020-02-23 RX ORDER — LACTULOSE 10 G/15ML
10 SOLUTION ORAL DAILY
Status: DISCONTINUED | OUTPATIENT
Start: 2020-02-24 | End: 2020-03-05

## 2020-02-23 RX ORDER — TIZANIDINE 4 MG/1
4 TABLET ORAL EVERY 8 HOURS PRN
Status: DISCONTINUED | OUTPATIENT
Start: 2020-02-23 | End: 2020-02-29

## 2020-02-23 RX ORDER — PREGABALIN 75 MG/1
150 CAPSULE ORAL 3 TIMES DAILY
Status: DISCONTINUED | OUTPATIENT
Start: 2020-02-24 | End: 2020-02-29

## 2020-02-23 RX ORDER — ASPIRIN 81 MG/1
81 TABLET ORAL DAILY
Status: DISCONTINUED | OUTPATIENT
Start: 2020-02-24 | End: 2020-02-29

## 2020-02-23 RX ORDER — FAMOTIDINE 20 MG/1
20 TABLET, FILM COATED ORAL
Status: DISCONTINUED | OUTPATIENT
Start: 2020-02-24 | End: 2020-03-11 | Stop reason: HOSPADM

## 2020-02-23 RX ORDER — SODIUM CHLORIDE 0.9 % (FLUSH) 0.9 %
10 SYRINGE (ML) INJECTION AS NEEDED
Status: DISCONTINUED | OUTPATIENT
Start: 2020-02-23 | End: 2020-03-01

## 2020-02-23 RX ORDER — DEXTROSE MONOHYDRATE 25 G/50ML
25 INJECTION, SOLUTION INTRAVENOUS
Status: DISCONTINUED | OUTPATIENT
Start: 2020-02-23 | End: 2020-03-11 | Stop reason: HOSPADM

## 2020-02-23 RX ORDER — MONTELUKAST SODIUM 10 MG/1
10 TABLET ORAL NIGHTLY
Status: DISCONTINUED | OUTPATIENT
Start: 2020-02-24 | End: 2020-02-29

## 2020-02-23 RX ORDER — SODIUM CHLORIDE 9 MG/ML
100 INJECTION, SOLUTION INTRAVENOUS CONTINUOUS
Status: DISCONTINUED | OUTPATIENT
Start: 2020-02-24 | End: 2020-02-25

## 2020-02-23 RX ORDER — ACETAMINOPHEN 160 MG/5ML
650 SOLUTION ORAL EVERY 4 HOURS PRN
Status: DISCONTINUED | OUTPATIENT
Start: 2020-02-23 | End: 2020-03-11 | Stop reason: HOSPADM

## 2020-02-23 RX ORDER — BENZONATATE 100 MG/1
100 CAPSULE ORAL 3 TIMES DAILY PRN
Status: DISCONTINUED | OUTPATIENT
Start: 2020-02-23 | End: 2020-02-29

## 2020-02-23 RX ORDER — ISOSORBIDE MONONITRATE 30 MG/1
30 TABLET, EXTENDED RELEASE ORAL DAILY
Status: DISCONTINUED | OUTPATIENT
Start: 2020-02-24 | End: 2020-03-02

## 2020-02-23 RX ORDER — HYDROCODONE BITARTRATE AND ACETAMINOPHEN 10; 325 MG/1; MG/1
1 TABLET ORAL EVERY 8 HOURS PRN
Status: DISCONTINUED | OUTPATIENT
Start: 2020-02-23 | End: 2020-03-11 | Stop reason: HOSPADM

## 2020-02-23 RX ORDER — SODIUM CHLORIDE 0.9 % (FLUSH) 0.9 %
10 SYRINGE (ML) INJECTION EVERY 12 HOURS SCHEDULED
Status: DISCONTINUED | OUTPATIENT
Start: 2020-02-24 | End: 2020-03-01

## 2020-02-23 RX ORDER — ONDANSETRON 4 MG/1
4 TABLET, FILM COATED ORAL EVERY 6 HOURS PRN
Status: DISCONTINUED | OUTPATIENT
Start: 2020-02-23 | End: 2020-03-01

## 2020-02-23 RX ORDER — CLOPIDOGREL BISULFATE 75 MG/1
75 TABLET ORAL DAILY
Status: DISCONTINUED | OUTPATIENT
Start: 2020-02-24 | End: 2020-03-11 | Stop reason: HOSPADM

## 2020-02-23 RX ORDER — FLUTICASONE PROPIONATE 50 MCG
2 SPRAY, SUSPENSION (ML) NASAL DAILY
Status: DISCONTINUED | OUTPATIENT
Start: 2020-02-24 | End: 2020-02-29

## 2020-02-23 RX ORDER — PANTOPRAZOLE SODIUM 40 MG/1
40 TABLET, DELAYED RELEASE ORAL DAILY
Status: DISCONTINUED | OUTPATIENT
Start: 2020-02-24 | End: 2020-02-28

## 2020-02-23 RX ORDER — ONDANSETRON 2 MG/ML
4 INJECTION INTRAMUSCULAR; INTRAVENOUS EVERY 6 HOURS PRN
Status: DISCONTINUED | OUTPATIENT
Start: 2020-02-23 | End: 2020-03-01

## 2020-02-23 RX ORDER — ACETAMINOPHEN 650 MG/1
650 SUPPOSITORY RECTAL EVERY 4 HOURS PRN
Status: DISCONTINUED | OUTPATIENT
Start: 2020-02-23 | End: 2020-03-11 | Stop reason: HOSPADM

## 2020-02-23 RX ORDER — ALBUTEROL SULFATE 2.5 MG/3ML
2.5 SOLUTION RESPIRATORY (INHALATION) EVERY 6 HOURS PRN
Status: DISCONTINUED | OUTPATIENT
Start: 2020-02-23 | End: 2020-03-11 | Stop reason: HOSPADM

## 2020-02-23 RX ORDER — ATORVASTATIN CALCIUM 10 MG/1
10 TABLET, FILM COATED ORAL DAILY
Status: DISCONTINUED | OUTPATIENT
Start: 2020-02-24 | End: 2020-03-11 | Stop reason: HOSPADM

## 2020-02-23 RX ORDER — KETOTIFEN FUMARATE 0.35 MG/ML
1 SOLUTION/ DROPS OPHTHALMIC 2 TIMES DAILY
Status: DISCONTINUED | OUTPATIENT
Start: 2020-02-24 | End: 2020-03-11 | Stop reason: HOSPADM

## 2020-02-23 RX ORDER — AMITRIPTYLINE HYDROCHLORIDE 10 MG/1
10 TABLET, FILM COATED ORAL NIGHTLY
Status: DISCONTINUED | OUTPATIENT
Start: 2020-02-24 | End: 2020-02-29

## 2020-02-24 ENCOUNTER — APPOINTMENT (OUTPATIENT)
Dept: CT IMAGING | Facility: HOSPITAL | Age: 50
End: 2020-02-24

## 2020-02-24 LAB
ALBUMIN SERPL-MCNC: 2.4 G/DL (ref 3.5–5.2)
ALBUMIN/GLOB SERPL: 0.7 G/DL
ALP SERPL-CCNC: 154 U/L (ref 39–117)
ALT SERPL W P-5'-P-CCNC: 25 U/L (ref 1–33)
ANION GAP SERPL CALCULATED.3IONS-SCNC: 10 MMOL/L (ref 5–15)
AST SERPL-CCNC: 61 U/L (ref 1–32)
BASOPHILS # BLD AUTO: 0.02 10*3/MM3 (ref 0–0.2)
BASOPHILS NFR BLD AUTO: 0.2 % (ref 0–1.5)
BILIRUB SERPL-MCNC: 0.2 MG/DL (ref 0.2–1.2)
BUN BLD-MCNC: 11 MG/DL (ref 6–20)
BUN/CREAT SERPL: 15.5 (ref 7–25)
CALCIUM SPEC-SCNC: 7.8 MG/DL (ref 8.6–10.5)
CHLORIDE SERPL-SCNC: 99 MMOL/L (ref 98–107)
CO2 SERPL-SCNC: 26 MMOL/L (ref 22–29)
CREAT BLD-MCNC: 0.71 MG/DL (ref 0.57–1)
D-LACTATE SERPL-SCNC: 0.7 MMOL/L (ref 0.5–2)
DEPRECATED RDW RBC AUTO: 40.6 FL (ref 37–54)
EOSINOPHIL # BLD AUTO: 0.05 10*3/MM3 (ref 0–0.4)
EOSINOPHIL NFR BLD AUTO: 0.5 % (ref 0.3–6.2)
ERYTHROCYTE [DISTWIDTH] IN BLOOD BY AUTOMATED COUNT: 11.9 % (ref 12.3–15.4)
GFR SERPL CREATININE-BSD FRML MDRD: 87 ML/MIN/1.73
GLOBULIN UR ELPH-MCNC: 3.6 GM/DL
GLUCOSE BLD-MCNC: 386 MG/DL (ref 65–99)
GLUCOSE BLDC GLUCOMTR-MCNC: 142 MG/DL (ref 70–130)
GLUCOSE BLDC GLUCOMTR-MCNC: 210 MG/DL (ref 70–130)
GLUCOSE BLDC GLUCOMTR-MCNC: 220 MG/DL (ref 70–130)
GLUCOSE BLDC GLUCOMTR-MCNC: 433 MG/DL (ref 70–130)
HCT VFR BLD AUTO: 27.1 % (ref 34–46.6)
HGB BLD-MCNC: 9.1 G/DL (ref 12–15.9)
IMM GRANULOCYTES # BLD AUTO: 0.07 10*3/MM3 (ref 0–0.05)
IMM GRANULOCYTES NFR BLD AUTO: 0.8 % (ref 0–0.5)
INR PPP: 1.2 (ref 0.85–1.16)
LYMPHOCYTES # BLD AUTO: 1.19 10*3/MM3 (ref 0.7–3.1)
LYMPHOCYTES NFR BLD AUTO: 13.1 % (ref 19.6–45.3)
MCH RBC QN AUTO: 31.3 PG (ref 26.6–33)
MCHC RBC AUTO-ENTMCNC: 33.6 G/DL (ref 31.5–35.7)
MCV RBC AUTO: 93.1 FL (ref 79–97)
MONOCYTES # BLD AUTO: 0.73 10*3/MM3 (ref 0.1–0.9)
MONOCYTES NFR BLD AUTO: 8 % (ref 5–12)
NEUTROPHILS # BLD AUTO: 7.05 10*3/MM3 (ref 1.7–7)
NEUTROPHILS NFR BLD AUTO: 77.4 % (ref 42.7–76)
NRBC BLD AUTO-RTO: 0 /100 WBC (ref 0–0.2)
PLATELET # BLD AUTO: 176 10*3/MM3 (ref 140–450)
PMV BLD AUTO: 10.8 FL (ref 6–12)
POTASSIUM BLD-SCNC: 3.6 MMOL/L (ref 3.5–5.2)
PROCALCITONIN SERPL-MCNC: 0.18 NG/ML (ref 0.1–0.25)
PROT SERPL-MCNC: 6 G/DL (ref 6–8.5)
PROTHROMBIN TIME: 14.6 SECONDS (ref 11.2–14.3)
RBC # BLD AUTO: 2.91 10*6/MM3 (ref 3.77–5.28)
SODIUM BLD-SCNC: 135 MMOL/L (ref 136–145)
VANCOMYCIN SERPL-MCNC: 12.3 MCG/ML (ref 5–40)
WBC NRBC COR # BLD: 9.11 10*3/MM3 (ref 3.4–10.8)

## 2020-02-24 PROCEDURE — 63710000001 INSULIN LISPRO (HUMAN) PER 5 UNITS: Performed by: NURSE PRACTITIONER

## 2020-02-24 PROCEDURE — 74176 CT ABD & PELVIS W/O CONTRAST: CPT

## 2020-02-24 PROCEDURE — 82962 GLUCOSE BLOOD TEST: CPT

## 2020-02-24 PROCEDURE — 83605 ASSAY OF LACTIC ACID: CPT | Performed by: NURSE PRACTITIONER

## 2020-02-24 PROCEDURE — 80202 ASSAY OF VANCOMYCIN: CPT

## 2020-02-24 PROCEDURE — 25010000002 CEFEPIME PER 500 MG: Performed by: NURSE PRACTITIONER

## 2020-02-24 PROCEDURE — 87040 BLOOD CULTURE FOR BACTERIA: CPT | Performed by: NURSE PRACTITIONER

## 2020-02-24 PROCEDURE — 94640 AIRWAY INHALATION TREATMENT: CPT

## 2020-02-24 PROCEDURE — 80053 COMPREHEN METABOLIC PANEL: CPT | Performed by: NURSE PRACTITIONER

## 2020-02-24 PROCEDURE — 25010000002 ENOXAPARIN PER 10 MG: Performed by: NURSE PRACTITIONER

## 2020-02-24 PROCEDURE — 84145 PROCALCITONIN (PCT): CPT | Performed by: NURSE PRACTITIONER

## 2020-02-24 PROCEDURE — 87147 CULTURE TYPE IMMUNOLOGIC: CPT | Performed by: INTERNAL MEDICINE

## 2020-02-24 PROCEDURE — 25010000002 VANCOMYCIN PER 500 MG

## 2020-02-24 PROCEDURE — 85610 PROTHROMBIN TIME: CPT | Performed by: NURSE PRACTITIONER

## 2020-02-24 PROCEDURE — 85025 COMPLETE CBC W/AUTO DIFF WBC: CPT | Performed by: NURSE PRACTITIONER

## 2020-02-24 PROCEDURE — 25010000002 MEROPENEM PER 100 MG: Performed by: NURSE PRACTITIONER

## 2020-02-24 PROCEDURE — 87186 SC STD MICRODIL/AGAR DIL: CPT | Performed by: INTERNAL MEDICINE

## 2020-02-24 PROCEDURE — 87205 SMEAR GRAM STAIN: CPT | Performed by: INTERNAL MEDICINE

## 2020-02-24 PROCEDURE — 99233 SBSQ HOSP IP/OBS HIGH 50: CPT | Performed by: INTERNAL MEDICINE

## 2020-02-24 PROCEDURE — 87070 CULTURE OTHR SPECIMN AEROBIC: CPT | Performed by: INTERNAL MEDICINE

## 2020-02-24 PROCEDURE — 94799 UNLISTED PULMONARY SVC/PX: CPT

## 2020-02-24 RX ORDER — VANCOMYCIN HYDROCHLORIDE 1 G/200ML
1000 INJECTION, SOLUTION INTRAVENOUS EVERY 12 HOURS
Status: DISCONTINUED | OUTPATIENT
Start: 2020-02-24 | End: 2020-02-25

## 2020-02-24 RX ADMIN — MEROPENEM 1 G: 1 INJECTION, POWDER, FOR SOLUTION INTRAVENOUS at 02:09

## 2020-02-24 RX ADMIN — INSULIN LISPRO 5 UNITS: 100 INJECTION, SOLUTION INTRAVENOUS; SUBCUTANEOUS at 12:36

## 2020-02-24 RX ADMIN — ROPINIROLE HYDROCHLORIDE 4 MG: 2 TABLET, FILM COATED ORAL at 00:38

## 2020-02-24 RX ADMIN — ACETAMINOPHEN 325 MG: 325 TABLET, FILM COATED ORAL at 13:13

## 2020-02-24 RX ADMIN — PREGABALIN 150 MG: 75 CAPSULE ORAL at 21:09

## 2020-02-24 RX ADMIN — ISOSORBIDE MONONITRATE 30 MG: 30 TABLET, EXTENDED RELEASE ORAL at 08:33

## 2020-02-24 RX ADMIN — HYDROCODONE BITARTRATE AND ACETAMINOPHEN 1 TABLET: 10; 325 TABLET ORAL at 00:38

## 2020-02-24 RX ADMIN — VANCOMYCIN HYDROCHLORIDE 500 MG: 500 INJECTION, POWDER, LYOPHILIZED, FOR SOLUTION INTRAVENOUS at 04:11

## 2020-02-24 RX ADMIN — ATORVASTATIN CALCIUM 10 MG: 10 TABLET, FILM COATED ORAL at 08:34

## 2020-02-24 RX ADMIN — SODIUM CHLORIDE, PRESERVATIVE FREE 10 ML: 5 INJECTION INTRAVENOUS at 00:39

## 2020-02-24 RX ADMIN — CLOPIDOGREL BISULFATE 75 MG: 75 TABLET ORAL at 08:34

## 2020-02-24 RX ADMIN — BUDESONIDE AND FORMOTEROL FUMARATE DIHYDRATE 2 PUFF: 160; 4.5 AEROSOL RESPIRATORY (INHALATION) at 20:06

## 2020-02-24 RX ADMIN — SODIUM CHLORIDE 100 ML/HR: 9 INJECTION, SOLUTION INTRAVENOUS at 00:37

## 2020-02-24 RX ADMIN — BENZONATATE 100 MG: 100 CAPSULE ORAL at 12:36

## 2020-02-24 RX ADMIN — INSULIN LISPRO 5 UNITS: 100 INJECTION, SOLUTION INTRAVENOUS; SUBCUTANEOUS at 21:09

## 2020-02-24 RX ADMIN — LOSARTAN POTASSIUM 25 MG: 25 TABLET, FILM COATED ORAL at 08:34

## 2020-02-24 RX ADMIN — KETOTIFEN FUMARATE 1 DROP: 0.35 SOLUTION/ DROPS OPHTHALMIC at 11:28

## 2020-02-24 RX ADMIN — HYDROXYZINE HYDROCHLORIDE 25 MG: 25 TABLET, FILM COATED ORAL at 21:09

## 2020-02-24 RX ADMIN — LACTULOSE 10 G: 10 SOLUTION ORAL at 08:34

## 2020-02-24 RX ADMIN — INSULIN LISPRO 14 UNITS: 100 INJECTION, SOLUTION INTRAVENOUS; SUBCUTANEOUS at 08:33

## 2020-02-24 RX ADMIN — ROPINIROLE HYDROCHLORIDE 4 MG: 2 TABLET, FILM COATED ORAL at 21:09

## 2020-02-24 RX ADMIN — FAMOTIDINE 20 MG: 20 TABLET ORAL at 06:14

## 2020-02-24 RX ADMIN — AMITRIPTYLINE HYDROCHLORIDE 10 MG: 10 TABLET, FILM COATED ORAL at 00:38

## 2020-02-24 RX ADMIN — VANCOMYCIN HYDROCHLORIDE 1000 MG: 1 INJECTION, SOLUTION INTRAVENOUS at 12:36

## 2020-02-24 RX ADMIN — PANTOPRAZOLE SODIUM 40 MG: 40 TABLET, DELAYED RELEASE ORAL at 06:14

## 2020-02-24 RX ADMIN — ASPIRIN 81 MG: 81 TABLET, COATED ORAL at 08:33

## 2020-02-24 RX ADMIN — PREGABALIN 150 MG: 75 CAPSULE ORAL at 18:49

## 2020-02-24 RX ADMIN — HYDROCODONE BITARTRATE AND ACETAMINOPHEN 1 TABLET: 10; 325 TABLET ORAL at 12:36

## 2020-02-24 RX ADMIN — MONTELUKAST SODIUM 10 MG: 10 TABLET, COATED ORAL at 00:39

## 2020-02-24 RX ADMIN — AMITRIPTYLINE HYDROCHLORIDE 10 MG: 10 TABLET, FILM COATED ORAL at 21:09

## 2020-02-24 RX ADMIN — VANCOMYCIN HYDROCHLORIDE 1000 MG: 1 INJECTION, SOLUTION INTRAVENOUS at 23:27

## 2020-02-24 RX ADMIN — IPRATROPIUM BROMIDE 0.5 MG: 0.5 SOLUTION RESPIRATORY (INHALATION) at 15:59

## 2020-02-24 RX ADMIN — IPRATROPIUM BROMIDE 0.5 MG: 0.5 SOLUTION RESPIRATORY (INHALATION) at 12:12

## 2020-02-24 RX ADMIN — MONTELUKAST SODIUM 10 MG: 10 TABLET, COATED ORAL at 21:09

## 2020-02-24 RX ADMIN — FLUTICASONE PROPIONATE 2 SPRAY: 50 SPRAY, METERED NASAL at 08:34

## 2020-02-24 RX ADMIN — HYDROXYZINE HYDROCHLORIDE 25 MG: 25 TABLET, FILM COATED ORAL at 00:38

## 2020-02-24 RX ADMIN — SODIUM CHLORIDE, PRESERVATIVE FREE 10 ML: 5 INJECTION INTRAVENOUS at 21:11

## 2020-02-24 RX ADMIN — BUDESONIDE AND FORMOTEROL FUMARATE DIHYDRATE 2 PUFF: 160; 4.5 AEROSOL RESPIRATORY (INHALATION) at 12:12

## 2020-02-24 RX ADMIN — METOPROLOL TARTRATE 12.5 MG: 25 TABLET, FILM COATED ORAL at 21:09

## 2020-02-24 RX ADMIN — CEFEPIME HYDROCHLORIDE 2 G: 2 INJECTION, POWDER, FOR SOLUTION INTRAVENOUS at 18:55

## 2020-02-24 RX ADMIN — ENOXAPARIN SODIUM 40 MG: 40 INJECTION SUBCUTANEOUS at 06:14

## 2020-02-24 RX ADMIN — KETOTIFEN FUMARATE 1 DROP: 0.35 SOLUTION/ DROPS OPHTHALMIC at 21:09

## 2020-02-24 RX ADMIN — FAMOTIDINE 20 MG: 20 TABLET ORAL at 18:49

## 2020-02-24 RX ADMIN — IPRATROPIUM BROMIDE 0.5 MG: 0.5 SOLUTION RESPIRATORY (INHALATION) at 20:06

## 2020-02-24 RX ADMIN — PREGABALIN 150 MG: 75 CAPSULE ORAL at 08:34

## 2020-02-24 RX ADMIN — METOPROLOL TARTRATE 12.5 MG: 25 TABLET, FILM COATED ORAL at 08:34

## 2020-02-24 RX ADMIN — CEFEPIME HYDROCHLORIDE 2 G: 2 INJECTION, POWDER, FOR SOLUTION INTRAVENOUS at 11:27

## 2020-02-25 LAB
AMMONIA BLD-SCNC: 43 UMOL/L (ref 11–51)
AMPHET+METHAMPHET UR QL: NEGATIVE
AMPHETAMINES UR QL: NEGATIVE
BACTERIA UR QL AUTO: ABNORMAL /HPF
BARBITURATES UR QL SCN: NEGATIVE
BASOPHILS # BLD AUTO: 0.05 10*3/MM3 (ref 0–0.2)
BASOPHILS NFR BLD AUTO: 0.5 % (ref 0–1.5)
BENZODIAZ UR QL SCN: NEGATIVE
BILIRUB UR QL STRIP: NEGATIVE
BUPRENORPHINE SERPL-MCNC: NEGATIVE NG/ML
CANNABINOIDS SERPL QL: NEGATIVE
CLARITY UR: CLEAR
COCAINE UR QL: NEGATIVE
COLOR UR: YELLOW
DEPRECATED RDW RBC AUTO: 42.6 FL (ref 37–54)
EOSINOPHIL # BLD AUTO: 0.18 10*3/MM3 (ref 0–0.4)
EOSINOPHIL NFR BLD AUTO: 1.9 % (ref 0.3–6.2)
ERYTHROCYTE [DISTWIDTH] IN BLOOD BY AUTOMATED COUNT: 12.3 % (ref 12.3–15.4)
GLUCOSE BLDC GLUCOMTR-MCNC: 213 MG/DL (ref 70–130)
GLUCOSE BLDC GLUCOMTR-MCNC: 226 MG/DL (ref 70–130)
GLUCOSE BLDC GLUCOMTR-MCNC: 252 MG/DL (ref 70–130)
GLUCOSE UR STRIP-MCNC: NEGATIVE MG/DL
HCT VFR BLD AUTO: 27.2 % (ref 34–46.6)
HGB BLD-MCNC: 8.6 G/DL (ref 12–15.9)
HGB UR QL STRIP.AUTO: ABNORMAL
HYALINE CASTS UR QL AUTO: ABNORMAL /LPF
IMM GRANULOCYTES # BLD AUTO: 0.06 10*3/MM3 (ref 0–0.05)
IMM GRANULOCYTES NFR BLD AUTO: 0.6 % (ref 0–0.5)
IRON 24H UR-MRATE: 14 MCG/DL (ref 37–145)
IRON SATN MFR SERPL: 11 % (ref 20–50)
KETONES UR QL STRIP: NEGATIVE
LEUKOCYTE ESTERASE UR QL STRIP.AUTO: ABNORMAL
LYMPHOCYTES # BLD AUTO: 1.1 10*3/MM3 (ref 0.7–3.1)
LYMPHOCYTES NFR BLD AUTO: 11.8 % (ref 19.6–45.3)
MCH RBC QN AUTO: 29.9 PG (ref 26.6–33)
MCHC RBC AUTO-ENTMCNC: 31.6 G/DL (ref 31.5–35.7)
MCV RBC AUTO: 94.4 FL (ref 79–97)
METHADONE UR QL SCN: NEGATIVE
MONOCYTES # BLD AUTO: 0.63 10*3/MM3 (ref 0.1–0.9)
MONOCYTES NFR BLD AUTO: 6.8 % (ref 5–12)
NEUTROPHILS # BLD AUTO: 7.28 10*3/MM3 (ref 1.7–7)
NEUTROPHILS NFR BLD AUTO: 78.4 % (ref 42.7–76)
NITRITE UR QL STRIP: NEGATIVE
NRBC BLD AUTO-RTO: 0 /100 WBC (ref 0–0.2)
OPIATES UR QL: POSITIVE
OXYCODONE UR QL SCN: NEGATIVE
PCP UR QL SCN: NEGATIVE
PH UR STRIP.AUTO: 5.5 [PH] (ref 5–8)
PLATELET # BLD AUTO: 212 10*3/MM3 (ref 140–450)
PMV BLD AUTO: 11 FL (ref 6–12)
PROPOXYPH UR QL: NEGATIVE
PROT UR QL STRIP: ABNORMAL
RBC # BLD AUTO: 2.88 10*6/MM3 (ref 3.77–5.28)
RBC # UR: ABNORMAL /HPF
REF LAB TEST METHOD: ABNORMAL
SP GR UR STRIP: 1.01 (ref 1–1.03)
SQUAMOUS #/AREA URNS HPF: ABNORMAL /HPF
TIBC SERPL-MCNC: 127 MCG/DL (ref 298–536)
TRANSFERRIN SERPL-MCNC: 85 MG/DL (ref 200–360)
TRICYCLICS UR QL SCN: NEGATIVE
UROBILINOGEN UR QL STRIP: ABNORMAL
VANCOMYCIN TROUGH SERPL-MCNC: 21.1 MCG/ML (ref 5–20)
WBC NRBC COR # BLD: 9.3 10*3/MM3 (ref 3.4–10.8)
WBC UR QL AUTO: ABNORMAL /HPF
YEAST URNS QL MICRO: ABNORMAL /HPF

## 2020-02-25 PROCEDURE — 81001 URINALYSIS AUTO W/SCOPE: CPT | Performed by: NURSE PRACTITIONER

## 2020-02-25 PROCEDURE — 94799 UNLISTED PULMONARY SVC/PX: CPT

## 2020-02-25 PROCEDURE — 99232 SBSQ HOSP IP/OBS MODERATE 35: CPT | Performed by: INTERNAL MEDICINE

## 2020-02-25 PROCEDURE — 25010000002 ENOXAPARIN PER 10 MG: Performed by: NURSE PRACTITIONER

## 2020-02-25 PROCEDURE — 80306 DRUG TEST PRSMV INSTRMNT: CPT | Performed by: INTERNAL MEDICINE

## 2020-02-25 PROCEDURE — 82962 GLUCOSE BLOOD TEST: CPT

## 2020-02-25 PROCEDURE — 80202 ASSAY OF VANCOMYCIN: CPT

## 2020-02-25 PROCEDURE — G0108 DIAB MANAGE TRN  PER INDIV: HCPCS

## 2020-02-25 PROCEDURE — 84466 ASSAY OF TRANSFERRIN: CPT | Performed by: INTERNAL MEDICINE

## 2020-02-25 PROCEDURE — 25010000002 VANCOMYCIN PER 500 MG

## 2020-02-25 PROCEDURE — 83540 ASSAY OF IRON: CPT | Performed by: INTERNAL MEDICINE

## 2020-02-25 PROCEDURE — 25010000002 CEFEPIME PER 500 MG: Performed by: NURSE PRACTITIONER

## 2020-02-25 PROCEDURE — 85025 COMPLETE CBC W/AUTO DIFF WBC: CPT | Performed by: INTERNAL MEDICINE

## 2020-02-25 PROCEDURE — 82140 ASSAY OF AMMONIA: CPT | Performed by: INTERNAL MEDICINE

## 2020-02-25 RX ORDER — GUAIFENESIN/DEXTROMETHORPHAN 100-10MG/5
5 SYRUP ORAL EVERY 4 HOURS PRN
Status: DISCONTINUED | OUTPATIENT
Start: 2020-02-25 | End: 2020-02-25

## 2020-02-25 RX ORDER — GUAIFENESIN/DEXTROMETHORPHAN 100-10MG/5
5 SYRUP ORAL EVERY 6 HOURS PRN
Status: DISCONTINUED | OUTPATIENT
Start: 2020-02-25 | End: 2020-03-11 | Stop reason: HOSPADM

## 2020-02-25 RX ADMIN — INSULIN LISPRO 5 UNITS: 100 INJECTION, SOLUTION INTRAVENOUS; SUBCUTANEOUS at 21:31

## 2020-02-25 RX ADMIN — ENOXAPARIN SODIUM 40 MG: 40 INJECTION SUBCUTANEOUS at 05:23

## 2020-02-25 RX ADMIN — INSULIN LISPRO 8 UNITS: 100 INJECTION, SOLUTION INTRAVENOUS; SUBCUTANEOUS at 12:29

## 2020-02-25 RX ADMIN — IPRATROPIUM BROMIDE 0.5 MG: 0.5 SOLUTION RESPIRATORY (INHALATION) at 19:24

## 2020-02-25 RX ADMIN — SODIUM CHLORIDE 100 ML/HR: 9 INJECTION, SOLUTION INTRAVENOUS at 05:23

## 2020-02-25 RX ADMIN — VANCOMYCIN HYDROCHLORIDE 750 MG: 750 INJECTION, SOLUTION INTRAVENOUS at 14:47

## 2020-02-25 RX ADMIN — HYDROCODONE BITARTRATE AND ACETAMINOPHEN 1 TABLET: 10; 325 TABLET ORAL at 14:19

## 2020-02-25 RX ADMIN — CEFEPIME HYDROCHLORIDE 2 G: 2 INJECTION, POWDER, FOR SOLUTION INTRAVENOUS at 17:54

## 2020-02-25 RX ADMIN — IPRATROPIUM BROMIDE 0.5 MG: 0.5 SOLUTION RESPIRATORY (INHALATION) at 13:12

## 2020-02-25 RX ADMIN — PREGABALIN 150 MG: 75 CAPSULE ORAL at 09:23

## 2020-02-25 RX ADMIN — AMITRIPTYLINE HYDROCHLORIDE 10 MG: 10 TABLET, FILM COATED ORAL at 21:22

## 2020-02-25 RX ADMIN — KETOTIFEN FUMARATE 1 DROP: 0.35 SOLUTION/ DROPS OPHTHALMIC at 09:25

## 2020-02-25 RX ADMIN — CLOPIDOGREL BISULFATE 75 MG: 75 TABLET ORAL at 09:23

## 2020-02-25 RX ADMIN — LOSARTAN POTASSIUM 25 MG: 25 TABLET, FILM COATED ORAL at 09:24

## 2020-02-25 RX ADMIN — ATORVASTATIN CALCIUM 10 MG: 10 TABLET, FILM COATED ORAL at 09:22

## 2020-02-25 RX ADMIN — MONTELUKAST SODIUM 10 MG: 10 TABLET, COATED ORAL at 21:22

## 2020-02-25 RX ADMIN — FLUTICASONE PROPIONATE 2 SPRAY: 50 SPRAY, METERED NASAL at 09:24

## 2020-02-25 RX ADMIN — METOPROLOL TARTRATE 12.5 MG: 25 TABLET, FILM COATED ORAL at 09:23

## 2020-02-25 RX ADMIN — ASPIRIN 81 MG: 81 TABLET, COATED ORAL at 09:22

## 2020-02-25 RX ADMIN — FAMOTIDINE 20 MG: 20 TABLET ORAL at 17:54

## 2020-02-25 RX ADMIN — INSULIN LISPRO 5 UNITS: 100 INJECTION, SOLUTION INTRAVENOUS; SUBCUTANEOUS at 09:27

## 2020-02-25 RX ADMIN — PREGABALIN 150 MG: 75 CAPSULE ORAL at 21:22

## 2020-02-25 RX ADMIN — ROPINIROLE HYDROCHLORIDE 4 MG: 2 TABLET, FILM COATED ORAL at 21:23

## 2020-02-25 RX ADMIN — CEFEPIME HYDROCHLORIDE 2 G: 2 INJECTION, POWDER, FOR SOLUTION INTRAVENOUS at 01:11

## 2020-02-25 RX ADMIN — BENZONATATE 100 MG: 100 CAPSULE ORAL at 14:54

## 2020-02-25 RX ADMIN — PREGABALIN 150 MG: 75 CAPSULE ORAL at 16:38

## 2020-02-25 RX ADMIN — IPRATROPIUM BROMIDE 0.5 MG: 0.5 SOLUTION RESPIRATORY (INHALATION) at 08:07

## 2020-02-25 RX ADMIN — METOPROLOL TARTRATE 12.5 MG: 25 TABLET, FILM COATED ORAL at 21:22

## 2020-02-25 RX ADMIN — HYDROXYZINE HYDROCHLORIDE 25 MG: 25 TABLET, FILM COATED ORAL at 21:22

## 2020-02-25 RX ADMIN — TIZANIDINE 4 MG: 4 TABLET ORAL at 21:23

## 2020-02-25 RX ADMIN — FAMOTIDINE 20 MG: 20 TABLET ORAL at 05:23

## 2020-02-25 RX ADMIN — PANTOPRAZOLE SODIUM 40 MG: 40 TABLET, DELAYED RELEASE ORAL at 05:23

## 2020-02-25 RX ADMIN — CEFEPIME HYDROCHLORIDE 2 G: 2 INJECTION, POWDER, FOR SOLUTION INTRAVENOUS at 09:22

## 2020-02-25 RX ADMIN — VANCOMYCIN HYDROCHLORIDE 750 MG: 750 INJECTION, SOLUTION INTRAVENOUS at 23:42

## 2020-02-25 RX ADMIN — ISOSORBIDE MONONITRATE 30 MG: 30 TABLET, EXTENDED RELEASE ORAL at 09:24

## 2020-02-25 RX ADMIN — SODIUM CHLORIDE, PRESERVATIVE FREE 10 ML: 5 INJECTION INTRAVENOUS at 09:29

## 2020-02-26 ENCOUNTER — APPOINTMENT (OUTPATIENT)
Dept: MRI IMAGING | Facility: HOSPITAL | Age: 50
End: 2020-02-26

## 2020-02-26 LAB
ANION GAP SERPL CALCULATED.3IONS-SCNC: 11 MMOL/L (ref 5–15)
BASOPHILS # BLD AUTO: 0.05 10*3/MM3 (ref 0–0.2)
BASOPHILS NFR BLD AUTO: 0.5 % (ref 0–1.5)
BUN BLD-MCNC: 12 MG/DL (ref 6–20)
BUN/CREAT SERPL: 21.4 (ref 7–25)
CALCIUM SPEC-SCNC: 7.9 MG/DL (ref 8.6–10.5)
CHLORIDE SERPL-SCNC: 103 MMOL/L (ref 98–107)
CO2 SERPL-SCNC: 24 MMOL/L (ref 22–29)
CREAT BLD-MCNC: 0.56 MG/DL (ref 0.57–1)
DEPRECATED RDW RBC AUTO: 42 FL (ref 37–54)
EOSINOPHIL # BLD AUTO: 0.17 10*3/MM3 (ref 0–0.4)
EOSINOPHIL NFR BLD AUTO: 1.7 % (ref 0.3–6.2)
ERYTHROCYTE [DISTWIDTH] IN BLOOD BY AUTOMATED COUNT: 12.3 % (ref 12.3–15.4)
GFR SERPL CREATININE-BSD FRML MDRD: 115 ML/MIN/1.73
GLUCOSE BLD-MCNC: 126 MG/DL (ref 65–99)
GLUCOSE BLDC GLUCOMTR-MCNC: 175 MG/DL (ref 70–130)
GLUCOSE BLDC GLUCOMTR-MCNC: 244 MG/DL (ref 70–130)
GLUCOSE BLDC GLUCOMTR-MCNC: 269 MG/DL (ref 70–130)
GLUCOSE BLDC GLUCOMTR-MCNC: 307 MG/DL (ref 70–130)
GLUCOSE BLDC GLUCOMTR-MCNC: 98 MG/DL (ref 70–130)
HCT VFR BLD AUTO: 26.5 % (ref 34–46.6)
HGB BLD-MCNC: 8.8 G/DL (ref 12–15.9)
IMM GRANULOCYTES # BLD AUTO: 0.12 10*3/MM3 (ref 0–0.05)
IMM GRANULOCYTES NFR BLD AUTO: 1.2 % (ref 0–0.5)
LYMPHOCYTES # BLD AUTO: 1.24 10*3/MM3 (ref 0.7–3.1)
LYMPHOCYTES NFR BLD AUTO: 12.3 % (ref 19.6–45.3)
MCH RBC QN AUTO: 30.9 PG (ref 26.6–33)
MCHC RBC AUTO-ENTMCNC: 33.2 G/DL (ref 31.5–35.7)
MCV RBC AUTO: 93 FL (ref 79–97)
MONOCYTES # BLD AUTO: 0.97 10*3/MM3 (ref 0.1–0.9)
MONOCYTES NFR BLD AUTO: 9.6 % (ref 5–12)
NEUTROPHILS # BLD AUTO: 7.55 10*3/MM3 (ref 1.7–7)
NEUTROPHILS NFR BLD AUTO: 74.7 % (ref 42.7–76)
NRBC BLD AUTO-RTO: 0 /100 WBC (ref 0–0.2)
PLATELET # BLD AUTO: 305 10*3/MM3 (ref 140–450)
PMV BLD AUTO: 10.4 FL (ref 6–12)
POTASSIUM BLD-SCNC: 3.8 MMOL/L (ref 3.5–5.2)
RBC # BLD AUTO: 2.85 10*6/MM3 (ref 3.77–5.28)
SODIUM BLD-SCNC: 138 MMOL/L (ref 136–145)
WBC NRBC COR # BLD: 10.1 10*3/MM3 (ref 3.4–10.8)

## 2020-02-26 PROCEDURE — 25010000002 CEFEPIME PER 500 MG: Performed by: NURSE PRACTITIONER

## 2020-02-26 PROCEDURE — 63710000001 PREDNISONE PER 1 MG: Performed by: INTERNAL MEDICINE

## 2020-02-26 PROCEDURE — 94799 UNLISTED PULMONARY SVC/PX: CPT

## 2020-02-26 PROCEDURE — 0 GADOBENATE DIMEGLUMINE 529 MG/ML SOLUTION: Performed by: INTERNAL MEDICINE

## 2020-02-26 PROCEDURE — A9577 INJ MULTIHANCE: HCPCS | Performed by: INTERNAL MEDICINE

## 2020-02-26 PROCEDURE — 82962 GLUCOSE BLOOD TEST: CPT

## 2020-02-26 PROCEDURE — 80048 BASIC METABOLIC PNL TOTAL CA: CPT

## 2020-02-26 PROCEDURE — 25010000002 ENOXAPARIN PER 10 MG: Performed by: NURSE PRACTITIONER

## 2020-02-26 PROCEDURE — 25010000002 VANCOMYCIN PER 500 MG

## 2020-02-26 PROCEDURE — 99233 SBSQ HOSP IP/OBS HIGH 50: CPT | Performed by: INTERNAL MEDICINE

## 2020-02-26 PROCEDURE — 85025 COMPLETE CBC W/AUTO DIFF WBC: CPT | Performed by: INTERNAL MEDICINE

## 2020-02-26 PROCEDURE — 72156 MRI NECK SPINE W/O & W/DYE: CPT

## 2020-02-26 RX ORDER — PREDNISONE 20 MG/1
40 TABLET ORAL
Status: DISCONTINUED | OUTPATIENT
Start: 2020-02-27 | End: 2020-02-26

## 2020-02-26 RX ORDER — PREDNISONE 20 MG/1
40 TABLET ORAL DAILY
Status: DISCONTINUED | OUTPATIENT
Start: 2020-02-26 | End: 2020-02-28

## 2020-02-26 RX ORDER — FERROUS SULFATE 325(65) MG
325 TABLET ORAL
Status: DISCONTINUED | OUTPATIENT
Start: 2020-02-27 | End: 2020-02-29

## 2020-02-26 RX ADMIN — CLOPIDOGREL BISULFATE 75 MG: 75 TABLET ORAL at 09:02

## 2020-02-26 RX ADMIN — MONTELUKAST SODIUM 10 MG: 10 TABLET, COATED ORAL at 22:10

## 2020-02-26 RX ADMIN — ROPINIROLE HYDROCHLORIDE 4 MG: 2 TABLET, FILM COATED ORAL at 22:09

## 2020-02-26 RX ADMIN — HYDROCODONE BITARTRATE AND ACETAMINOPHEN 1 TABLET: 10; 325 TABLET ORAL at 15:51

## 2020-02-26 RX ADMIN — PANTOPRAZOLE SODIUM 40 MG: 40 TABLET, DELAYED RELEASE ORAL at 05:35

## 2020-02-26 RX ADMIN — PREGABALIN 150 MG: 75 CAPSULE ORAL at 17:43

## 2020-02-26 RX ADMIN — ATORVASTATIN CALCIUM 10 MG: 10 TABLET, FILM COATED ORAL at 09:03

## 2020-02-26 RX ADMIN — CEFEPIME HYDROCHLORIDE 2 G: 2 INJECTION, POWDER, FOR SOLUTION INTRAVENOUS at 01:49

## 2020-02-26 RX ADMIN — PREDNISONE 40 MG: 20 TABLET ORAL at 17:44

## 2020-02-26 RX ADMIN — HYDROXYZINE HYDROCHLORIDE 25 MG: 25 TABLET, FILM COATED ORAL at 22:09

## 2020-02-26 RX ADMIN — CEFEPIME HYDROCHLORIDE 2 G: 2 INJECTION, POWDER, FOR SOLUTION INTRAVENOUS at 09:01

## 2020-02-26 RX ADMIN — METOPROLOL TARTRATE 25 MG: 25 TABLET, FILM COATED ORAL at 09:02

## 2020-02-26 RX ADMIN — METOPROLOL TARTRATE 25 MG: 25 TABLET, FILM COATED ORAL at 22:09

## 2020-02-26 RX ADMIN — INSULIN LISPRO 3 UNITS: 100 INJECTION, SOLUTION INTRAVENOUS; SUBCUTANEOUS at 09:01

## 2020-02-26 RX ADMIN — AMITRIPTYLINE HYDROCHLORIDE 10 MG: 10 TABLET, FILM COATED ORAL at 22:09

## 2020-02-26 RX ADMIN — FLUTICASONE PROPIONATE 2 SPRAY: 50 SPRAY, METERED NASAL at 09:02

## 2020-02-26 RX ADMIN — GADOBENATE DIMEGLUMINE 10 ML: 529 INJECTION, SOLUTION INTRAVENOUS at 22:45

## 2020-02-26 RX ADMIN — TIZANIDINE 4 MG: 4 TABLET ORAL at 22:09

## 2020-02-26 RX ADMIN — INSULIN LISPRO 8 UNITS: 100 INJECTION, SOLUTION INTRAVENOUS; SUBCUTANEOUS at 12:50

## 2020-02-26 RX ADMIN — INSULIN LISPRO 10 UNITS: 100 INJECTION, SOLUTION INTRAVENOUS; SUBCUTANEOUS at 22:21

## 2020-02-26 RX ADMIN — GUAIFENESIN AND DEXTROMETHORPHAN 5 ML: 100; 10 SYRUP ORAL at 09:02

## 2020-02-26 RX ADMIN — FAMOTIDINE 20 MG: 20 TABLET ORAL at 09:02

## 2020-02-26 RX ADMIN — LACTULOSE 10 G: 10 SOLUTION ORAL at 09:03

## 2020-02-26 RX ADMIN — INSULIN LISPRO 5 UNITS: 100 INJECTION, SOLUTION INTRAVENOUS; SUBCUTANEOUS at 17:44

## 2020-02-26 RX ADMIN — IPRATROPIUM BROMIDE 0.5 MG: 0.5 SOLUTION RESPIRATORY (INHALATION) at 19:21

## 2020-02-26 RX ADMIN — VANCOMYCIN HYDROCHLORIDE 750 MG: 750 INJECTION, SOLUTION INTRAVENOUS at 12:30

## 2020-02-26 RX ADMIN — PREGABALIN 150 MG: 75 CAPSULE ORAL at 22:09

## 2020-02-26 RX ADMIN — ASPIRIN 81 MG: 81 TABLET, COATED ORAL at 09:02

## 2020-02-26 RX ADMIN — ISOSORBIDE MONONITRATE 30 MG: 30 TABLET, EXTENDED RELEASE ORAL at 09:02

## 2020-02-26 RX ADMIN — LOSARTAN POTASSIUM 25 MG: 25 TABLET, FILM COATED ORAL at 09:06

## 2020-02-26 RX ADMIN — ENOXAPARIN SODIUM 40 MG: 40 INJECTION SUBCUTANEOUS at 05:35

## 2020-02-26 RX ADMIN — VANCOMYCIN HYDROCHLORIDE 750 MG: 750 INJECTION, SOLUTION INTRAVENOUS at 23:54

## 2020-02-26 RX ADMIN — KETOTIFEN FUMARATE 1 DROP: 0.35 SOLUTION/ DROPS OPHTHALMIC at 09:01

## 2020-02-26 RX ADMIN — FAMOTIDINE 20 MG: 20 TABLET ORAL at 17:44

## 2020-02-26 RX ADMIN — PREGABALIN 150 MG: 75 CAPSULE ORAL at 09:02

## 2020-02-27 ENCOUNTER — APPOINTMENT (OUTPATIENT)
Dept: CT IMAGING | Facility: HOSPITAL | Age: 50
End: 2020-02-27

## 2020-02-27 LAB
ABO GROUP BLD: NORMAL
ALBUMIN SERPL-MCNC: 2.5 G/DL (ref 3.5–5.2)
ALBUMIN/GLOB SERPL: 0.6 G/DL
ALP SERPL-CCNC: 103 U/L (ref 39–117)
ALT SERPL W P-5'-P-CCNC: 12 U/L (ref 1–33)
ANION GAP SERPL CALCULATED.3IONS-SCNC: 13 MMOL/L (ref 5–15)
ARTERIAL PATENCY WRIST A: ABNORMAL
AST SERPL-CCNC: 20 U/L (ref 1–32)
ATMOSPHERIC PRESS: ABNORMAL MM[HG]
BACTERIA SPEC AEROBE CULT: ABNORMAL
BASE EXCESS BLDA CALC-SCNC: -1.1 MMOL/L (ref 0–2)
BASOPHILS # BLD AUTO: 0.03 10*3/MM3 (ref 0–0.2)
BASOPHILS # BLD AUTO: 0.03 10*3/MM3 (ref 0–0.2)
BASOPHILS NFR BLD AUTO: 0.1 % (ref 0–1.5)
BASOPHILS NFR BLD AUTO: 0.2 % (ref 0–1.5)
BDY SITE: ABNORMAL
BILIRUB SERPL-MCNC: <0.2 MG/DL (ref 0.2–1.2)
BLD GP AB SCN SERPL QL: NEGATIVE
BODY TEMPERATURE: 37 C
BUN BLD-MCNC: 20 MG/DL (ref 6–20)
BUN/CREAT SERPL: 25.3 (ref 7–25)
CALCIUM SPEC-SCNC: 8.8 MG/DL (ref 8.6–10.5)
CHLORIDE SERPL-SCNC: 101 MMOL/L (ref 98–107)
CO2 BLDA-SCNC: 24 MMOL/L (ref 22–33)
CO2 SERPL-SCNC: 23 MMOL/L (ref 22–29)
COHGB MFR BLD: 0.9 % (ref 0–2)
CREAT BLD-MCNC: 0.79 MG/DL (ref 0.57–1)
DEPRECATED RDW RBC AUTO: 41.5 FL (ref 37–54)
DEPRECATED RDW RBC AUTO: 41.7 FL (ref 37–54)
EOSINOPHIL # BLD AUTO: 0 10*3/MM3 (ref 0–0.4)
EOSINOPHIL # BLD AUTO: 0.01 10*3/MM3 (ref 0–0.4)
EOSINOPHIL NFR BLD AUTO: 0 % (ref 0.3–6.2)
EOSINOPHIL NFR BLD AUTO: 0 % (ref 0.3–6.2)
EPAP: 0
ERYTHROCYTE [DISTWIDTH] IN BLOOD BY AUTOMATED COUNT: 12.4 % (ref 12.3–15.4)
ERYTHROCYTE [DISTWIDTH] IN BLOOD BY AUTOMATED COUNT: 12.5 % (ref 12.3–15.4)
GFR SERPL CREATININE-BSD FRML MDRD: 77 ML/MIN/1.73
GLOBULIN UR ELPH-MCNC: 4.2 GM/DL
GLUCOSE BLD-MCNC: 282 MG/DL (ref 65–99)
GLUCOSE BLDC GLUCOMTR-MCNC: 316 MG/DL (ref 70–130)
GLUCOSE BLDC GLUCOMTR-MCNC: 389 MG/DL (ref 70–130)
GLUCOSE BLDC GLUCOMTR-MCNC: 420 MG/DL (ref 70–130)
GLUCOSE BLDC GLUCOMTR-MCNC: 427 MG/DL (ref 70–130)
GRAM STN SPEC: ABNORMAL
GRAM STN SPEC: ABNORMAL
HCO3 BLDA-SCNC: 22.9 MMOL/L (ref 20–26)
HCT VFR BLD AUTO: 25.7 % (ref 34–46.6)
HCT VFR BLD AUTO: 27.5 % (ref 34–46.6)
HCT VFR BLD CALC: 26.2 %
HGB BLD-MCNC: 8.7 G/DL (ref 12–15.9)
HGB BLD-MCNC: 8.9 G/DL (ref 12–15.9)
HGB BLDA-MCNC: 8.5 G/DL (ref 14–18)
HOROWITZ INDEX BLD+IHG-RTO: 60 %
IMM GRANULOCYTES # BLD AUTO: 0.17 10*3/MM3 (ref 0–0.05)
IMM GRANULOCYTES # BLD AUTO: 0.24 10*3/MM3 (ref 0–0.05)
IMM GRANULOCYTES NFR BLD AUTO: 1.1 % (ref 0–0.5)
IMM GRANULOCYTES NFR BLD AUTO: 1.4 % (ref 0–0.5)
IPAP: 0
LYMPHOCYTES # BLD AUTO: 0.57 10*3/MM3 (ref 0.7–3.1)
LYMPHOCYTES # BLD AUTO: 0.81 10*3/MM3 (ref 0.7–3.1)
LYMPHOCYTES NFR BLD AUTO: 3.6 % (ref 19.6–45.3)
LYMPHOCYTES NFR BLD AUTO: 4.5 % (ref 19.6–45.3)
MCH RBC QN AUTO: 30.1 PG (ref 26.6–33)
MCH RBC QN AUTO: 30.9 PG (ref 26.6–33)
MCHC RBC AUTO-ENTMCNC: 32.4 G/DL (ref 31.5–35.7)
MCHC RBC AUTO-ENTMCNC: 33.9 G/DL (ref 31.5–35.7)
MCV RBC AUTO: 91.1 FL (ref 79–97)
MCV RBC AUTO: 92.9 FL (ref 79–97)
METHGB BLD QL: 0.7 % (ref 0–1.5)
MODALITY: ABNORMAL
MONOCYTES # BLD AUTO: 0.63 10*3/MM3 (ref 0.1–0.9)
MONOCYTES # BLD AUTO: 1.2 10*3/MM3 (ref 0.1–0.9)
MONOCYTES NFR BLD AUTO: 5 % (ref 5–12)
MONOCYTES NFR BLD AUTO: 5.3 % (ref 5–12)
NEUTROPHILS # BLD AUTO: 11.16 10*3/MM3 (ref 1.7–7)
NEUTROPHILS # BLD AUTO: 20.24 10*3/MM3 (ref 1.7–7)
NEUTROPHILS NFR BLD AUTO: 88.9 % (ref 42.7–76)
NEUTROPHILS NFR BLD AUTO: 89.9 % (ref 42.7–76)
NOTE: ABNORMAL
NRBC BLD AUTO-RTO: 0 /100 WBC (ref 0–0.2)
NRBC BLD AUTO-RTO: 0 /100 WBC (ref 0–0.2)
OXYHGB MFR BLDV: 97.5 % (ref 94–99)
PAW @ PEAK INSP FLOW SETTING VENT: 0 CMH2O
PCO2 BLDA: 34.3 MM HG (ref 35–45)
PCO2 TEMP ADJ BLD: 34.3 MM HG (ref 35–45)
PH BLDA: 7.43 PH UNITS (ref 7.35–7.45)
PH, TEMP CORRECTED: 7.43 PH UNITS
PLAT MORPH BLD: NORMAL
PLATELET # BLD AUTO: 369 10*3/MM3 (ref 140–450)
PLATELET # BLD AUTO: 416 10*3/MM3 (ref 140–450)
PMV BLD AUTO: 10.1 FL (ref 6–12)
PMV BLD AUTO: 10.4 FL (ref 6–12)
PO2 BLDA: 118 MM HG (ref 83–108)
PO2 TEMP ADJ BLD: 118 MM HG (ref 83–108)
POTASSIUM BLD-SCNC: 3.9 MMOL/L (ref 3.5–5.2)
PROT SERPL-MCNC: 6.7 G/DL (ref 6–8.5)
RBC # BLD AUTO: 2.82 10*6/MM3 (ref 3.77–5.28)
RBC # BLD AUTO: 2.96 10*6/MM3 (ref 3.77–5.28)
RBC MORPH BLD: NORMAL
RH BLD: POSITIVE
SODIUM BLD-SCNC: 137 MMOL/L (ref 136–145)
T&S EXPIRATION DATE: NORMAL
TOTAL RATE: 0 BREATHS/MINUTE
TROPONIN T SERPL-MCNC: 0.02 NG/ML (ref 0–0.03)
VANCOMYCIN TROUGH SERPL-MCNC: 19.1 MCG/ML (ref 5–20)
WBC MORPH BLD: NORMAL
WBC NRBC COR # BLD: 12.56 10*3/MM3 (ref 3.4–10.8)
WBC NRBC COR # BLD: 22.53 10*3/MM3 (ref 3.4–10.8)

## 2020-02-27 PROCEDURE — 86901 BLOOD TYPING SEROLOGIC RH(D): CPT | Performed by: PLASTIC SURGERY

## 2020-02-27 PROCEDURE — 63710000001 PREDNISONE PER 1 MG: Performed by: INTERNAL MEDICINE

## 2020-02-27 PROCEDURE — 82805 BLOOD GASES W/O2 SATURATION: CPT

## 2020-02-27 PROCEDURE — 63710000001 INSULIN DETEMIR PER 5 UNITS: Performed by: INTERNAL MEDICINE

## 2020-02-27 PROCEDURE — 86923 COMPATIBILITY TEST ELECTRIC: CPT

## 2020-02-27 PROCEDURE — 85007 BL SMEAR W/DIFF WBC COUNT: CPT | Performed by: NURSE PRACTITIONER

## 2020-02-27 PROCEDURE — 80053 COMPREHEN METABOLIC PANEL: CPT | Performed by: NURSE PRACTITIONER

## 2020-02-27 PROCEDURE — 99291 CRITICAL CARE FIRST HOUR: CPT | Performed by: NURSE PRACTITIONER

## 2020-02-27 PROCEDURE — 80202 ASSAY OF VANCOMYCIN: CPT

## 2020-02-27 PROCEDURE — 99024 POSTOP FOLLOW-UP VISIT: CPT | Performed by: THORACIC SURGERY (CARDIOTHORACIC VASCULAR SURGERY)

## 2020-02-27 PROCEDURE — 25010000002 VANCOMYCIN PER 500 MG

## 2020-02-27 PROCEDURE — 86850 RBC ANTIBODY SCREEN: CPT | Performed by: PLASTIC SURGERY

## 2020-02-27 PROCEDURE — 85025 COMPLETE CBC W/AUTO DIFF WBC: CPT | Performed by: INTERNAL MEDICINE

## 2020-02-27 PROCEDURE — 84484 ASSAY OF TROPONIN QUANT: CPT | Performed by: NURSE PRACTITIONER

## 2020-02-27 PROCEDURE — 36600 WITHDRAWAL OF ARTERIAL BLOOD: CPT

## 2020-02-27 PROCEDURE — 93005 ELECTROCARDIOGRAM TRACING: CPT | Performed by: INTERNAL MEDICINE

## 2020-02-27 PROCEDURE — 94799 UNLISTED PULMONARY SVC/PX: CPT

## 2020-02-27 PROCEDURE — 82962 GLUCOSE BLOOD TEST: CPT

## 2020-02-27 PROCEDURE — 85025 COMPLETE CBC W/AUTO DIFF WBC: CPT | Performed by: NURSE PRACTITIONER

## 2020-02-27 PROCEDURE — 83880 ASSAY OF NATRIURETIC PEPTIDE: CPT | Performed by: NURSE PRACTITIONER

## 2020-02-27 PROCEDURE — 93010 ELECTROCARDIOGRAM REPORT: CPT | Performed by: INTERNAL MEDICINE

## 2020-02-27 PROCEDURE — 99232 SBSQ HOSP IP/OBS MODERATE 35: CPT | Performed by: INTERNAL MEDICINE

## 2020-02-27 PROCEDURE — 86900 BLOOD TYPING SEROLOGIC ABO: CPT | Performed by: PLASTIC SURGERY

## 2020-02-27 PROCEDURE — 84145 PROCALCITONIN (PCT): CPT | Performed by: NURSE PRACTITIONER

## 2020-02-27 PROCEDURE — 71275 CT ANGIOGRAPHY CHEST: CPT

## 2020-02-27 PROCEDURE — 25010000002 ENOXAPARIN PER 10 MG: Performed by: NURSE PRACTITIONER

## 2020-02-27 PROCEDURE — 0 IOPAMIDOL PER 1 ML: Performed by: INTERNAL MEDICINE

## 2020-02-27 PROCEDURE — 83735 ASSAY OF MAGNESIUM: CPT | Performed by: NURSE PRACTITIONER

## 2020-02-27 RX ORDER — FUROSEMIDE 10 MG/ML
40 INJECTION INTRAMUSCULAR; INTRAVENOUS ONCE
Status: COMPLETED | OUTPATIENT
Start: 2020-02-27 | End: 2020-02-28

## 2020-02-27 RX ADMIN — KETOTIFEN FUMARATE 1 DROP: 0.35 SOLUTION/ DROPS OPHTHALMIC at 09:05

## 2020-02-27 RX ADMIN — GUAIFENESIN AND DEXTROMETHORPHAN 5 ML: 100; 10 SYRUP ORAL at 09:20

## 2020-02-27 RX ADMIN — INSULIN LISPRO 14 UNITS: 100 INJECTION, SOLUTION INTRAVENOUS; SUBCUTANEOUS at 13:02

## 2020-02-27 RX ADMIN — ROPINIROLE HYDROCHLORIDE 4 MG: 2 TABLET, FILM COATED ORAL at 21:44

## 2020-02-27 RX ADMIN — FERROUS SULFATE TAB 325 MG (65 MG ELEMENTAL FE) 325 MG: 325 (65 FE) TAB at 09:02

## 2020-02-27 RX ADMIN — FAMOTIDINE 20 MG: 20 TABLET ORAL at 09:02

## 2020-02-27 RX ADMIN — ASPIRIN 81 MG: 81 TABLET, COATED ORAL at 09:02

## 2020-02-27 RX ADMIN — INSULIN LISPRO 14 UNITS: 100 INJECTION, SOLUTION INTRAVENOUS; SUBCUTANEOUS at 17:24

## 2020-02-27 RX ADMIN — CLOPIDOGREL BISULFATE 75 MG: 75 TABLET ORAL at 09:03

## 2020-02-27 RX ADMIN — METOPROLOL TARTRATE 25 MG: 25 TABLET, FILM COATED ORAL at 09:02

## 2020-02-27 RX ADMIN — INSULIN LISPRO 10 UNITS: 100 INJECTION, SOLUTION INTRAVENOUS; SUBCUTANEOUS at 09:07

## 2020-02-27 RX ADMIN — PREGABALIN 150 MG: 75 CAPSULE ORAL at 17:23

## 2020-02-27 RX ADMIN — IPRATROPIUM BROMIDE 0.5 MG: 0.5 SOLUTION RESPIRATORY (INHALATION) at 08:17

## 2020-02-27 RX ADMIN — METOPROLOL TARTRATE 25 MG: 25 TABLET, FILM COATED ORAL at 21:44

## 2020-02-27 RX ADMIN — ISOSORBIDE MONONITRATE 30 MG: 30 TABLET, EXTENDED RELEASE ORAL at 09:02

## 2020-02-27 RX ADMIN — VANCOMYCIN HYDROCHLORIDE 750 MG: 750 INJECTION, SOLUTION INTRAVENOUS at 13:02

## 2020-02-27 RX ADMIN — PREDNISONE 40 MG: 20 TABLET ORAL at 09:03

## 2020-02-27 RX ADMIN — ATORVASTATIN CALCIUM 10 MG: 10 TABLET, FILM COATED ORAL at 09:03

## 2020-02-27 RX ADMIN — ENOXAPARIN SODIUM 40 MG: 40 INJECTION SUBCUTANEOUS at 05:16

## 2020-02-27 RX ADMIN — IOPAMIDOL 85 ML: 755 INJECTION, SOLUTION INTRAVENOUS at 22:30

## 2020-02-27 RX ADMIN — INSULIN LISPRO 12 UNITS: 100 INJECTION, SOLUTION INTRAVENOUS; SUBCUTANEOUS at 21:47

## 2020-02-27 RX ADMIN — FAMOTIDINE 20 MG: 20 TABLET ORAL at 17:23

## 2020-02-27 RX ADMIN — INSULIN DETEMIR 10 UNITS: 100 INJECTION, SOLUTION SUBCUTANEOUS at 21:48

## 2020-02-27 RX ADMIN — PREGABALIN 150 MG: 75 CAPSULE ORAL at 21:44

## 2020-02-27 RX ADMIN — AMITRIPTYLINE HYDROCHLORIDE 10 MG: 10 TABLET, FILM COATED ORAL at 21:44

## 2020-02-27 RX ADMIN — HYDROCODONE BITARTRATE AND ACETAMINOPHEN 1 TABLET: 10; 325 TABLET ORAL at 09:20

## 2020-02-27 RX ADMIN — PANTOPRAZOLE SODIUM 40 MG: 40 TABLET, DELAYED RELEASE ORAL at 05:17

## 2020-02-27 RX ADMIN — HYDROXYZINE HYDROCHLORIDE 25 MG: 25 TABLET, FILM COATED ORAL at 21:44

## 2020-02-27 RX ADMIN — MONTELUKAST SODIUM 10 MG: 10 TABLET, COATED ORAL at 21:44

## 2020-02-27 RX ADMIN — PREGABALIN 150 MG: 75 CAPSULE ORAL at 09:02

## 2020-02-27 RX ADMIN — LOSARTAN POTASSIUM 25 MG: 25 TABLET, FILM COATED ORAL at 09:03

## 2020-02-28 ENCOUNTER — APPOINTMENT (OUTPATIENT)
Dept: CARDIOLOGY | Facility: HOSPITAL | Age: 50
End: 2020-02-28

## 2020-02-28 ENCOUNTER — ANESTHESIA (OUTPATIENT)
Dept: PERIOP | Facility: HOSPITAL | Age: 50
End: 2020-02-28

## 2020-02-28 ENCOUNTER — APPOINTMENT (OUTPATIENT)
Dept: GENERAL RADIOLOGY | Facility: HOSPITAL | Age: 50
End: 2020-02-28

## 2020-02-28 ENCOUNTER — ANESTHESIA EVENT (OUTPATIENT)
Dept: PERIOP | Facility: HOSPITAL | Age: 50
End: 2020-02-28

## 2020-02-28 PROBLEM — J90 PLEURAL EFFUSION: Status: ACTIVE | Noted: 2020-02-28

## 2020-02-28 PROBLEM — J96.01 ACUTE HYPOXEMIC RESPIRATORY FAILURE (HCC): Status: ACTIVE | Noted: 2020-02-28

## 2020-02-28 PROBLEM — J81.0 ACUTE PULMONARY EDEMA: Status: ACTIVE | Noted: 2020-02-28

## 2020-02-28 LAB
ALBUMIN SERPL-MCNC: 2.4 G/DL (ref 3.5–5.2)
ALBUMIN SERPL-MCNC: 3.2 G/DL (ref 3.5–5.2)
ALBUMIN/GLOB SERPL: 0.5 G/DL
ALBUMIN/GLOB SERPL: 1 G/DL
ALP SERPL-CCNC: 103 U/L (ref 39–117)
ALP SERPL-CCNC: 139 U/L (ref 39–117)
ALT SERPL W P-5'-P-CCNC: 12 U/L (ref 1–33)
ALT SERPL W P-5'-P-CCNC: 28 U/L (ref 1–33)
AMMONIA BLD-SCNC: 41 UMOL/L (ref 11–51)
ANION GAP SERPL CALCULATED.3IONS-SCNC: 12 MMOL/L (ref 5–15)
ANION GAP SERPL CALCULATED.3IONS-SCNC: 13 MMOL/L (ref 5–15)
APTT PPP: 32.5 SECONDS (ref 24–37)
ARTERIAL PATENCY WRIST A: ABNORMAL
AST SERPL-CCNC: 24 U/L (ref 1–32)
AST SERPL-CCNC: 75 U/L (ref 1–32)
ATMOSPHERIC PRESS: ABNORMAL MM[HG]
BACTERIA UR QL AUTO: ABNORMAL /HPF
BASE EXCESS BLDA CALC-SCNC: -3.3 MMOL/L (ref 0–2)
BASE EXCESS BLDA CALC-SCNC: -4.2 MMOL/L (ref 0–2)
BASE EXCESS BLDA CALC-SCNC: 1.4 MMOL/L (ref 0–2)
BASE EXCESS BLDA CALC-SCNC: 2.7 MMOL/L (ref 0–2)
BASOPHILS # BLD AUTO: 0.05 10*3/MM3 (ref 0–0.2)
BASOPHILS NFR BLD AUTO: 0.2 % (ref 0–1.5)
BDY SITE: ABNORMAL
BH CV ECHO MEAS - AO MAX PG (FULL): 3.5 MMHG
BH CV ECHO MEAS - AO MAX PG: 6.6 MMHG
BH CV ECHO MEAS - AO ROOT AREA (BSA CORRECTED): 1.8
BH CV ECHO MEAS - AO ROOT AREA: 6.6 CM^2
BH CV ECHO MEAS - AO ROOT DIAM: 2.9 CM
BH CV ECHO MEAS - AO V2 MAX: 128 CM/SEC
BH CV ECHO MEAS - AVA(V,A): 2.4 CM^2
BH CV ECHO MEAS - AVA(V,D): 2.4 CM^2
BH CV ECHO MEAS - BSA(HAYCOCK): 1.6 M^2
BH CV ECHO MEAS - BSA: 1.6 M^2
BH CV ECHO MEAS - BZI_BMI: 23.6 KILOGRAMS/M^2
BH CV ECHO MEAS - BZI_METRIC_HEIGHT: 160 CM
BH CV ECHO MEAS - BZI_METRIC_WEIGHT: 60.3 KG
BH CV ECHO MEAS - EDV(CUBED): 75.2 ML
BH CV ECHO MEAS - EDV(MOD-SP2): 57 ML
BH CV ECHO MEAS - EDV(MOD-SP4): 77 ML
BH CV ECHO MEAS - EDV(TEICH): 79.5 ML
BH CV ECHO MEAS - EF(CUBED): 49.5 %
BH CV ECHO MEAS - EF(MOD-BP): 45 %
BH CV ECHO MEAS - EF(MOD-SP2): 43.9 %
BH CV ECHO MEAS - EF(MOD-SP4): 51.9 %
BH CV ECHO MEAS - EF(TEICH): 42 %
BH CV ECHO MEAS - ESV(CUBED): 37.9 ML
BH CV ECHO MEAS - ESV(MOD-SP2): 32 ML
BH CV ECHO MEAS - ESV(MOD-SP4): 37 ML
BH CV ECHO MEAS - ESV(TEICH): 46.1 ML
BH CV ECHO MEAS - FS: 20.4 %
BH CV ECHO MEAS - IVS/LVPW: 0.77
BH CV ECHO MEAS - IVSD: 0.7 CM
BH CV ECHO MEAS - LA DIMENSION: 3.6 CM
BH CV ECHO MEAS - LA/AO: 1.2
BH CV ECHO MEAS - LAD MAJOR: 5.5 CM
BH CV ECHO MEAS - LAT PEAK E' VEL: 10.5 CM/SEC
BH CV ECHO MEAS - LATERAL E/E' RATIO: 11.6
BH CV ECHO MEAS - LV DIASTOLIC VOL/BSA (35-75): 47.4 ML/M^2
BH CV ECHO MEAS - LV MASS(C)D: 102.5 GRAMS
BH CV ECHO MEAS - LV MASS(C)DI: 63 GRAMS/M^2
BH CV ECHO MEAS - LV MAX PG: 3.1 MMHG
BH CV ECHO MEAS - LV MEAN PG: 2 MMHG
BH CV ECHO MEAS - LV SYSTOLIC VOL/BSA (12-30): 22.8 ML/M^2
BH CV ECHO MEAS - LV V1 MAX: 88 CM/SEC
BH CV ECHO MEAS - LV V1 MEAN: 57.8 CM/SEC
BH CV ECHO MEAS - LV V1 VTI: 17.1 CM
BH CV ECHO MEAS - LVIDD: 4.2 CM
BH CV ECHO MEAS - LVIDS: 3.4 CM
BH CV ECHO MEAS - LVLD AP2: 7.7 CM
BH CV ECHO MEAS - LVLD AP4: 7.7 CM
BH CV ECHO MEAS - LVLS AP2: 6.5 CM
BH CV ECHO MEAS - LVLS AP4: 7.1 CM
BH CV ECHO MEAS - LVOT AREA (M): 3.5 CM^2
BH CV ECHO MEAS - LVOT AREA: 3.5 CM^2
BH CV ECHO MEAS - LVOT DIAM: 2.1 CM
BH CV ECHO MEAS - LVPWD: 0.91 CM
BH CV ECHO MEAS - MED PEAK E' VEL: 8.3 CM/SEC
BH CV ECHO MEAS - MEDIAL E/E' RATIO: 14.6
BH CV ECHO MEAS - MV A MAX VEL: 65.6 CM/SEC
BH CV ECHO MEAS - MV DEC TIME: 0.12 SEC
BH CV ECHO MEAS - MV E MAX VEL: 122 CM/SEC
BH CV ECHO MEAS - MV E/A: 1.9
BH CV ECHO MEAS - MV MAX PG: 7 MMHG
BH CV ECHO MEAS - MV MEAN PG: 4 MMHG
BH CV ECHO MEAS - MV V2 MAX: 132 CM/SEC
BH CV ECHO MEAS - MV V2 MEAN: 89.9 CM/SEC
BH CV ECHO MEAS - MV V2 VTI: 23.1 CM
BH CV ECHO MEAS - MVA(VTI): 2.6 CM^2
BH CV ECHO MEAS - PA ACC SLOPE: 422 CM/SEC^2
BH CV ECHO MEAS - PA ACC TIME: 0.16 SEC
BH CV ECHO MEAS - PA PR(ACCEL): 8.6 MMHG
BH CV ECHO MEAS - PULM A REVS VEL: 26.2 CM/SEC
BH CV ECHO MEAS - PULM DIAS VEL: 41.5 CM/SEC
BH CV ECHO MEAS - PULM S/D: 1.2
BH CV ECHO MEAS - PULM SYS VEL: 48.4 CM/SEC
BH CV ECHO MEAS - SI(CUBED): 22.9 ML/M^2
BH CV ECHO MEAS - SI(LVOT): 36.4 ML/M^2
BH CV ECHO MEAS - SI(MOD-SP2): 15.4 ML/M^2
BH CV ECHO MEAS - SI(MOD-SP4): 24.6 ML/M^2
BH CV ECHO MEAS - SI(TEICH): 20.5 ML/M^2
BH CV ECHO MEAS - SV(CUBED): 37.2 ML
BH CV ECHO MEAS - SV(LVOT): 59.2 ML
BH CV ECHO MEAS - SV(MOD-SP2): 25 ML
BH CV ECHO MEAS - SV(MOD-SP4): 40 ML
BH CV ECHO MEAS - SV(TEICH): 33.4 ML
BH CV ECHO MEAS - TAPSE (>1.6): 2 CM2
BH CV ECHO MEASUREMENTS AVERAGE E/E' RATIO: 12.98
BH CV VAS BP LEFT ARM: NORMAL MMHG
BH CV XLRA - RV BASE: 2.8 CM
BH CV XLRA - RV LENGTH: 7.7 CM
BH CV XLRA - RV MID: 2.9 CM
BH CV XLRA - TDI S': 9.1 CM/SEC
BILIRUB SERPL-MCNC: 0.2 MG/DL (ref 0.2–1.2)
BILIRUB SERPL-MCNC: 3.6 MG/DL (ref 0.2–1.2)
BILIRUB UR QL STRIP: NEGATIVE
BODY TEMPERATURE: 37 C
BUN BLD-MCNC: 19 MG/DL (ref 6–20)
BUN BLD-MCNC: 23 MG/DL (ref 6–20)
BUN/CREAT SERPL: 23.8 (ref 7–25)
BUN/CREAT SERPL: 26.1 (ref 7–25)
CALCIUM SPEC-SCNC: 8.9 MG/DL (ref 8.6–10.5)
CALCIUM SPEC-SCNC: 8.9 MG/DL (ref 8.6–10.5)
CHLORIDE SERPL-SCNC: 103 MMOL/L (ref 98–107)
CHLORIDE SERPL-SCNC: 99 MMOL/L (ref 98–107)
CLARITY UR: CLEAR
CO2 BLDA-SCNC: 22.2 MMOL/L (ref 22–33)
CO2 BLDA-SCNC: 26.1 MMOL/L (ref 22–33)
CO2 BLDA-SCNC: 26.9 MMOL/L (ref 22–33)
CO2 BLDA-SCNC: 27.8 MMOL/L (ref 22–33)
CO2 SERPL-SCNC: 23 MMOL/L (ref 22–29)
CO2 SERPL-SCNC: 24 MMOL/L (ref 22–29)
COHGB MFR BLD: 0.7 % (ref 0–2)
COHGB MFR BLD: 0.8 % (ref 0–2)
COHGB MFR BLD: 1.2 % (ref 0–2)
COHGB MFR BLD: 1.4 % (ref 0–2)
COLOR UR: YELLOW
CREAT BLD-MCNC: 0.8 MG/DL (ref 0.57–1)
CREAT BLD-MCNC: 0.88 MG/DL (ref 0.57–1)
D-LACTATE SERPL-SCNC: 1.5 MMOL/L (ref 0.5–2)
DEPRECATED RDW RBC AUTO: 41.7 FL (ref 37–54)
DEPRECATED RDW RBC AUTO: 50.7 FL (ref 37–54)
EOSINOPHIL # BLD AUTO: 0.08 10*3/MM3 (ref 0–0.4)
EOSINOPHIL NFR BLD AUTO: 0.3 % (ref 0.3–6.2)
EPAP: 0
ERYTHROCYTE [DISTWIDTH] IN BLOOD BY AUTOMATED COUNT: 12.7 % (ref 12.3–15.4)
ERYTHROCYTE [DISTWIDTH] IN BLOOD BY AUTOMATED COUNT: 15.6 % (ref 12.3–15.4)
FINE GRAN CASTS URNS QL MICRO: ABNORMAL /LPF
GFR SERPL CREATININE-BSD FRML MDRD: 68 ML/MIN/1.73
GFR SERPL CREATININE-BSD FRML MDRD: 76 ML/MIN/1.73
GLOBULIN UR ELPH-MCNC: 3.3 GM/DL
GLOBULIN UR ELPH-MCNC: 4.4 GM/DL
GLUCOSE BLD-MCNC: 180 MG/DL (ref 65–99)
GLUCOSE BLD-MCNC: 247 MG/DL (ref 65–99)
GLUCOSE BLDC GLUCOMTR-MCNC: 190 MG/DL (ref 70–130)
GLUCOSE BLDC GLUCOMTR-MCNC: 203 MG/DL (ref 70–130)
GLUCOSE BLDC GLUCOMTR-MCNC: 206 MG/DL (ref 70–130)
GLUCOSE BLDC GLUCOMTR-MCNC: 239 MG/DL (ref 70–130)
GLUCOSE BLDC GLUCOMTR-MCNC: 250 MG/DL (ref 70–130)
GLUCOSE UR STRIP-MCNC: ABNORMAL MG/DL
HCO3 BLDA-SCNC: 21 MMOL/L (ref 20–26)
HCO3 BLDA-SCNC: 25.1 MMOL/L (ref 20–26)
HCO3 BLDA-SCNC: 25.1 MMOL/L (ref 20–26)
HCO3 BLDA-SCNC: 26.7 MMOL/L (ref 20–26)
HCT VFR BLD AUTO: 28.1 % (ref 34–46.6)
HCT VFR BLD AUTO: 35.2 % (ref 34–46.6)
HCT VFR BLD CALC: 25.8 %
HCT VFR BLD CALC: 28.3 %
HCT VFR BLD CALC: 37 %
HCT VFR BLD CALC: 39.7 %
HGB BLD-MCNC: 11.6 G/DL (ref 12–15.9)
HGB BLD-MCNC: 9.2 G/DL (ref 12–15.9)
HGB BLDA-MCNC: 12.1 G/DL (ref 14–18)
HGB BLDA-MCNC: 13 G/DL (ref 14–18)
HGB BLDA-MCNC: 8.4 G/DL (ref 14–18)
HGB BLDA-MCNC: 9.2 G/DL (ref 14–18)
HGB UR QL STRIP.AUTO: ABNORMAL
HOROWITZ INDEX BLD+IHG-RTO: 100 %
HOROWITZ INDEX BLD+IHG-RTO: 100 %
HOROWITZ INDEX BLD+IHG-RTO: 80 %
HOROWITZ INDEX BLD+IHG-RTO: 80 %
HYALINE CASTS UR QL AUTO: ABNORMAL /LPF
IMM GRANULOCYTES # BLD AUTO: 0.25 10*3/MM3 (ref 0–0.05)
IMM GRANULOCYTES NFR BLD AUTO: 0.9 % (ref 0–0.5)
INR PPP: 1.21 (ref 0.85–1.16)
IPAP: 0
KETONES UR QL STRIP: NEGATIVE
LEFT ATRIUM VOLUME INDEX: 28.3 ML/M^2
LEFT ATRIUM VOLUME: 46 ML
LEUKOCYTE ESTERASE UR QL STRIP.AUTO: NEGATIVE
LYMPHOCYTES # BLD AUTO: 1.4 10*3/MM3 (ref 0.7–3.1)
LYMPHOCYTES NFR BLD AUTO: 5.1 % (ref 19.6–45.3)
MAGNESIUM SERPL-MCNC: 2 MG/DL (ref 1.6–2.6)
MAXIMAL PREDICTED HEART RATE: 170 BPM
MCH RBC QN AUTO: 29.2 PG (ref 26.6–33)
MCH RBC QN AUTO: 29.6 PG (ref 26.6–33)
MCHC RBC AUTO-ENTMCNC: 32.7 G/DL (ref 31.5–35.7)
MCHC RBC AUTO-ENTMCNC: 33 G/DL (ref 31.5–35.7)
MCV RBC AUTO: 88.7 FL (ref 79–97)
MCV RBC AUTO: 90.4 FL (ref 79–97)
METHGB BLD QL: 0.9 % (ref 0–1.5)
METHGB BLD QL: 0.9 % (ref 0–1.5)
METHGB BLD QL: 1 % (ref 0–1.5)
METHGB BLD QL: 1.4 % (ref 0–1.5)
MODALITY: ABNORMAL
MONOCYTES # BLD AUTO: 2.09 10*3/MM3 (ref 0.1–0.9)
MONOCYTES NFR BLD AUTO: 7.7 % (ref 5–12)
NEUTROPHILS # BLD AUTO: 23.43 10*3/MM3 (ref 1.7–7)
NEUTROPHILS NFR BLD AUTO: 85.8 % (ref 42.7–76)
NITRITE UR QL STRIP: NEGATIVE
NOTE: ABNORMAL
NRBC BLD AUTO-RTO: 0 /100 WBC (ref 0–0.2)
NT-PROBNP SERPL-MCNC: 3222 PG/ML (ref 5–900)
OXYHGB MFR BLDV: 94.4 % (ref 94–99)
OXYHGB MFR BLDV: 95.5 % (ref 94–99)
OXYHGB MFR BLDV: 96 % (ref 94–99)
OXYHGB MFR BLDV: 96 % (ref 94–99)
PAW @ PEAK INSP FLOW SETTING VENT: 0 CMH2O
PAW @ PEAK INSP FLOW SETTING VENT: 25 CMH2O
PCO2 BLDA: 34.8 MM HG (ref 35–45)
PCO2 BLDA: 37.6 MM HG (ref 35–45)
PCO2 BLDA: 38.3 MM HG (ref 35–45)
PCO2 BLDA: 60.1 MM HG (ref 35–45)
PCO2 TEMP ADJ BLD: 34.8 MM HG (ref 35–45)
PCO2 TEMP ADJ BLD: 37.6 MM HG (ref 35–45)
PCO2 TEMP ADJ BLD: 38.3 MM HG (ref 35–45)
PCO2 TEMP ADJ BLD: 60.1 MM HG (ref 35–45)
PEEP RESPIRATORY: 5 CM[H2O]
PH BLDA: 7.23 PH UNITS (ref 7.35–7.45)
PH BLDA: 7.35 PH UNITS (ref 7.35–7.45)
PH BLDA: 7.46 PH UNITS (ref 7.35–7.45)
PH BLDA: 7.47 PH UNITS (ref 7.35–7.45)
PH UR STRIP.AUTO: <=5 [PH] (ref 5–8)
PH, TEMP CORRECTED: 7.23 PH UNITS
PH, TEMP CORRECTED: 7.35 PH UNITS
PH, TEMP CORRECTED: 7.46 PH UNITS
PH, TEMP CORRECTED: 7.47 PH UNITS
PLATELET # BLD AUTO: 329 10*3/MM3 (ref 140–450)
PLATELET # BLD AUTO: 535 10*3/MM3 (ref 140–450)
PMV BLD AUTO: 10 FL (ref 6–12)
PMV BLD AUTO: 9.9 FL (ref 6–12)
PO2 BLDA: 104 MM HG (ref 83–108)
PO2 BLDA: 152 MM HG (ref 83–108)
PO2 BLDA: 72.5 MM HG (ref 83–108)
PO2 BLDA: 83 MM HG (ref 83–108)
PO2 TEMP ADJ BLD: 104 MM HG (ref 83–108)
PO2 TEMP ADJ BLD: 152 MM HG (ref 83–108)
PO2 TEMP ADJ BLD: 72.5 MM HG (ref 83–108)
PO2 TEMP ADJ BLD: 83 MM HG (ref 83–108)
POTASSIUM BLD-SCNC: 3.6 MMOL/L (ref 3.5–5.2)
POTASSIUM BLD-SCNC: 4.4 MMOL/L (ref 3.5–5.2)
PROCALCITONIN SERPL-MCNC: 0.27 NG/ML (ref 0.1–0.25)
PROT SERPL-MCNC: 6.5 G/DL (ref 6–8.5)
PROT SERPL-MCNC: 6.8 G/DL (ref 6–8.5)
PROT UR QL STRIP: ABNORMAL
PROTHROMBIN TIME: 15 SECONDS (ref 11.5–14)
RBC # BLD AUTO: 3.11 10*6/MM3 (ref 3.77–5.28)
RBC # BLD AUTO: 3.97 10*6/MM3 (ref 3.77–5.28)
RBC # UR: ABNORMAL /HPF
REF LAB TEST METHOD: ABNORMAL
SET MECH RESP RATE: 16
SODIUM BLD-SCNC: 134 MMOL/L (ref 136–145)
SODIUM BLD-SCNC: 140 MMOL/L (ref 136–145)
SP GR UR STRIP: 1.02 (ref 1–1.03)
SQUAMOUS #/AREA URNS HPF: ABNORMAL /HPF
STRESS TARGET HR: 145 BPM
TOTAL RATE: 0 BREATHS/MINUTE
TOTAL RATE: 16 BREATHS/MINUTE
TROPONIN T SERPL-MCNC: <0.01 NG/ML (ref 0–0.03)
UROBILINOGEN UR QL STRIP: ABNORMAL
VENTILATOR MODE: ABNORMAL
VT ON VENT VENT: 385 ML
WBC NRBC COR # BLD: 21.37 10*3/MM3 (ref 3.4–10.8)
WBC NRBC COR # BLD: 27.3 10*3/MM3 (ref 3.4–10.8)
WBC UR QL AUTO: ABNORMAL /HPF

## 2020-02-28 PROCEDURE — P9016 RBC LEUKOCYTES REDUCED: HCPCS

## 2020-02-28 PROCEDURE — 36600 WITHDRAWAL OF ARTERIAL BLOOD: CPT

## 2020-02-28 PROCEDURE — 87075 CULTR BACTERIA EXCEPT BLOOD: CPT | Performed by: THORACIC SURGERY (CARDIOTHORACIC VASCULAR SURGERY)

## 2020-02-28 PROCEDURE — 04UL0JZ SUPPLEMENT LEFT FEMORAL ARTERY WITH SYNTHETIC SUBSTITUTE, OPEN APPROACH: ICD-10-PCS | Performed by: THORACIC SURGERY (CARDIOTHORACIC VASCULAR SURGERY)

## 2020-02-28 PROCEDURE — 87205 SMEAR GRAM STAIN: CPT | Performed by: THORACIC SURGERY (CARDIOTHORACIC VASCULAR SURGERY)

## 2020-02-28 PROCEDURE — 0BH17EZ INSERTION OF ENDOTRACHEAL AIRWAY INTO TRACHEA, VIA NATURAL OR ARTIFICIAL OPENING: ICD-10-PCS | Performed by: INTERNAL MEDICINE

## 2020-02-28 PROCEDURE — 25010000002 FUROSEMIDE PER 20 MG: Performed by: NURSE PRACTITIONER

## 2020-02-28 PROCEDURE — 71045 X-RAY EXAM CHEST 1 VIEW: CPT

## 2020-02-28 PROCEDURE — 25010000002 PROPOFOL 10 MG/ML EMULSION: Performed by: NURSE ANESTHETIST, CERTIFIED REGISTERED

## 2020-02-28 PROCEDURE — 82962 GLUCOSE BLOOD TEST: CPT

## 2020-02-28 PROCEDURE — 25010000002 PROTAMINE SULFATE PER 10 MG: Performed by: NURSE ANESTHETIST, CERTIFIED REGISTERED

## 2020-02-28 PROCEDURE — 80053 COMPREHEN METABOLIC PANEL: CPT | Performed by: THORACIC SURGERY (CARDIOTHORACIC VASCULAR SURGERY)

## 2020-02-28 PROCEDURE — 82140 ASSAY OF AMMONIA: CPT | Performed by: NURSE PRACTITIONER

## 2020-02-28 PROCEDURE — 31622 DX BRONCHOSCOPE/WASH: CPT | Performed by: THORACIC SURGERY (CARDIOTHORACIC VASCULAR SURGERY)

## 2020-02-28 PROCEDURE — 83605 ASSAY OF LACTIC ACID: CPT | Performed by: NURSE PRACTITIONER

## 2020-02-28 PROCEDURE — P9035 PLATELET PHERES LEUKOREDUCED: HCPCS

## 2020-02-28 PROCEDURE — P9017 PLASMA 1 DONOR FRZ W/IN 8 HR: HCPCS

## 2020-02-28 PROCEDURE — 84484 ASSAY OF TROPONIN QUANT: CPT | Performed by: NURSE PRACTITIONER

## 2020-02-28 PROCEDURE — 0W9930Z DRAINAGE OF RIGHT PLEURAL CAVITY WITH DRAINAGE DEVICE, PERCUTANEOUS APPROACH: ICD-10-PCS | Performed by: THORACIC SURGERY (CARDIOTHORACIC VASCULAR SURGERY)

## 2020-02-28 PROCEDURE — C1729 CATH, DRAINAGE: HCPCS | Performed by: THORACIC SURGERY (CARDIOTHORACIC VASCULAR SURGERY)

## 2020-02-28 PROCEDURE — C1894 INTRO/SHEATH, NON-LASER: HCPCS

## 2020-02-28 PROCEDURE — C1751 CATH, INF, PER/CENT/MIDLINE: HCPCS

## 2020-02-28 PROCEDURE — 35903 EXCISION GRAFT EXTREMITY: CPT | Performed by: THORACIC SURGERY (CARDIOTHORACIC VASCULAR SURGERY)

## 2020-02-28 PROCEDURE — 87176 TISSUE HOMOGENIZATION CULTR: CPT | Performed by: THORACIC SURGERY (CARDIOTHORACIC VASCULAR SURGERY)

## 2020-02-28 PROCEDURE — 63710000001 INSULIN DETEMIR PER 5 UNITS: Performed by: PHYSICIAN ASSISTANT

## 2020-02-28 PROCEDURE — 86900 BLOOD TYPING SEROLOGIC ABO: CPT

## 2020-02-28 PROCEDURE — 80053 COMPREHEN METABOLIC PANEL: CPT | Performed by: NURSE PRACTITIONER

## 2020-02-28 PROCEDURE — 87116 MYCOBACTERIA CULTURE: CPT | Performed by: THORACIC SURGERY (CARDIOTHORACIC VASCULAR SURGERY)

## 2020-02-28 PROCEDURE — 81001 URINALYSIS AUTO W/SCOPE: CPT | Performed by: FAMILY MEDICINE

## 2020-02-28 PROCEDURE — 63710000001 INSULIN LISPRO (HUMAN) PER 5 UNITS: Performed by: PHYSICIAN ASSISTANT

## 2020-02-28 PROCEDURE — P9041 ALBUMIN (HUMAN),5%, 50ML: HCPCS | Performed by: NURSE ANESTHETIST, CERTIFIED REGISTERED

## 2020-02-28 PROCEDURE — 04UK0JZ SUPPLEMENT RIGHT FEMORAL ARTERY WITH SYNTHETIC SUBSTITUTE, OPEN APPROACH: ICD-10-PCS | Performed by: THORACIC SURGERY (CARDIOTHORACIC VASCULAR SURGERY)

## 2020-02-28 PROCEDURE — 99291 CRITICAL CARE FIRST HOUR: CPT | Performed by: INTERNAL MEDICINE

## 2020-02-28 PROCEDURE — 36430 TRANSFUSION BLD/BLD COMPNT: CPT

## 2020-02-28 PROCEDURE — 94799 UNLISTED PULMONARY SVC/PX: CPT

## 2020-02-28 PROCEDURE — 87186 SC STD MICRODIL/AGAR DIL: CPT | Performed by: THORACIC SURGERY (CARDIOTHORACIC VASCULAR SURGERY)

## 2020-02-28 PROCEDURE — 93306 TTE W/DOPPLER COMPLETE: CPT | Performed by: INTERNAL MEDICINE

## 2020-02-28 PROCEDURE — 63710000001 INSULIN LISPRO (HUMAN) PER 5 UNITS: Performed by: NURSE ANESTHETIST, CERTIFIED REGISTERED

## 2020-02-28 PROCEDURE — 94660 CPAP INITIATION&MGMT: CPT

## 2020-02-28 PROCEDURE — 25010000002 HEPARIN (PORCINE) PER 1000 UNITS: Performed by: THORACIC SURGERY (CARDIOTHORACIC VASCULAR SURGERY)

## 2020-02-28 PROCEDURE — 86927 PLASMA FRESH FROZEN: CPT

## 2020-02-28 PROCEDURE — 93306 TTE W/DOPPLER COMPLETE: CPT

## 2020-02-28 PROCEDURE — 25010000002 PROPOFOL 10 MG/ML EMULSION: Performed by: INTERNAL MEDICINE

## 2020-02-28 PROCEDURE — 25010000002 FENTANYL CITRATE (PF) 250 MCG/5ML SOLUTION: Performed by: NURSE ANESTHETIST, CERTIFIED REGISTERED

## 2020-02-28 PROCEDURE — 25010000002 PHENYLEPHRINE PER 1 ML: Performed by: NURSE ANESTHETIST, CERTIFIED REGISTERED

## 2020-02-28 PROCEDURE — 94640 AIRWAY INHALATION TREATMENT: CPT

## 2020-02-28 PROCEDURE — C1757 CATH, THROMBECTOMY/EMBOLECT: HCPCS | Performed by: THORACIC SURGERY (CARDIOTHORACIC VASCULAR SURGERY)

## 2020-02-28 PROCEDURE — 25010000002 ALBUMIN HUMAN 5% PER 50 ML: Performed by: NURSE ANESTHETIST, CERTIFIED REGISTERED

## 2020-02-28 PROCEDURE — 25010000002 MEROPENEM PER 100 MG: Performed by: INTERNAL MEDICINE

## 2020-02-28 PROCEDURE — 0BJ08ZZ INSPECTION OF TRACHEOBRONCHIAL TREE, VIA NATURAL OR ARTIFICIAL OPENING ENDOSCOPIC: ICD-10-PCS | Performed by: THORACIC SURGERY (CARDIOTHORACIC VASCULAR SURGERY)

## 2020-02-28 PROCEDURE — P9040 RBC LEUKOREDUCED IRRADIATED: HCPCS

## 2020-02-28 PROCEDURE — 99024 POSTOP FOLLOW-UP VISIT: CPT | Performed by: PHYSICIAN ASSISTANT

## 2020-02-28 PROCEDURE — 25010000002 HEPARIN (PORCINE) PER 1000 UNITS: Performed by: NURSE ANESTHETIST, CERTIFIED REGISTERED

## 2020-02-28 PROCEDURE — 85610 PROTHROMBIN TIME: CPT | Performed by: NURSE PRACTITIONER

## 2020-02-28 PROCEDURE — 0KUT07Z SUPPLEMENT LEFT LOWER LEG MUSCLE WITH AUTOLOGOUS TISSUE SUBSTITUTE, OPEN APPROACH: ICD-10-PCS | Performed by: PLASTIC SURGERY

## 2020-02-28 PROCEDURE — C1768 GRAFT, VASCULAR: HCPCS | Performed by: THORACIC SURGERY (CARDIOTHORACIC VASCULAR SURGERY)

## 2020-02-28 PROCEDURE — 85730 THROMBOPLASTIN TIME PARTIAL: CPT | Performed by: NURSE PRACTITIONER

## 2020-02-28 PROCEDURE — 82805 BLOOD GASES W/O2 SATURATION: CPT

## 2020-02-28 PROCEDURE — 02HV33Z INSERTION OF INFUSION DEVICE INTO SUPERIOR VENA CAVA, PERCUTANEOUS APPROACH: ICD-10-PCS | Performed by: INTERNAL MEDICINE

## 2020-02-28 PROCEDURE — 87070 CULTURE OTHR SPECIMN AEROBIC: CPT | Performed by: THORACIC SURGERY (CARDIOTHORACIC VASCULAR SURGERY)

## 2020-02-28 PROCEDURE — 5A1945Z RESPIRATORY VENTILATION, 24-96 CONSECUTIVE HOURS: ICD-10-PCS | Performed by: INTERNAL MEDICINE

## 2020-02-28 PROCEDURE — 25010000002 METHYLPREDNISOLONE PER 125 MG: Performed by: NURSE PRACTITIONER

## 2020-02-28 PROCEDURE — 85027 COMPLETE CBC AUTOMATED: CPT | Performed by: THORACIC SURGERY (CARDIOTHORACIC VASCULAR SURGERY)

## 2020-02-28 PROCEDURE — 87102 FUNGUS ISOLATION CULTURE: CPT | Performed by: THORACIC SURGERY (CARDIOTHORACIC VASCULAR SURGERY)

## 2020-02-28 PROCEDURE — 85025 COMPLETE CBC W/AUTO DIFF WBC: CPT | Performed by: FAMILY MEDICINE

## 2020-02-28 PROCEDURE — 25010000002 VANCOMYCIN PER 500 MG

## 2020-02-28 PROCEDURE — 87147 CULTURE TYPE IMMUNOLOGIC: CPT | Performed by: THORACIC SURGERY (CARDIOTHORACIC VASCULAR SURGERY)

## 2020-02-28 PROCEDURE — 25010000002 VANCOMYCIN PER 500 MG: Performed by: PHYSICIAN ASSISTANT

## 2020-02-28 PROCEDURE — 25010000002 KETOROLAC TROMETHAMINE PER 15 MG: Performed by: NURSE PRACTITIONER

## 2020-02-28 PROCEDURE — 87015 SPECIMEN INFECT AGNT CONCNTJ: CPT | Performed by: THORACIC SURGERY (CARDIOTHORACIC VASCULAR SURGERY)

## 2020-02-28 PROCEDURE — 87040 BLOOD CULTURE FOR BACTERIA: CPT | Performed by: PHYSICIAN ASSISTANT

## 2020-02-28 PROCEDURE — 0KUS07Z SUPPLEMENT RIGHT LOWER LEG MUSCLE WITH AUTOLOGOUS TISSUE SUBSTITUTE, OPEN APPROACH: ICD-10-PCS | Performed by: PLASTIC SURGERY

## 2020-02-28 PROCEDURE — 87206 SMEAR FLUORESCENT/ACID STAI: CPT | Performed by: THORACIC SURGERY (CARDIOTHORACIC VASCULAR SURGERY)

## 2020-02-28 PROCEDURE — 04PY0JZ REMOVAL OF SYNTHETIC SUBSTITUTE FROM LOWER ARTERY, OPEN APPROACH: ICD-10-PCS | Performed by: THORACIC SURGERY (CARDIOTHORACIC VASCULAR SURGERY)

## 2020-02-28 DEVICE — VASCU-GUARD PERIPHERAL VASCULAR PATCH IS PREPARED FROM BOVINE PERICARDIUM WHICH IS CROSS-LINKED WITH GLUTARALDEHYDE. THE PERICARDIUM IS PROCURED FROM CATTLE ORIGINATING IN THE UNITED STATES. VASCU-GUARD PERIPHERAL VASCULAR PATCH IS CHEMICALLY STERILIZED USING ETHANOL AND PROPYLENE OXIDE. VASCU-GUARD PERIPHERAL VASCULAR PATCH HAS BEEN TREATED WITH 1 MOLAR SODIUM HYDROXIDE FOR A MINIMUM OF 60 MINUTES AT 20 - 25 C.  VASCU-GUARD PERIPHERAL VASCULAR PATCH IS PACKAGED IN A CONTAINER FILLED WITH STERILE, NON-PYROGENIC WATER CONTAINING PROPYLENE OXIDE. THE CONTENTS OF THE UNOPENED, UNDAMAGED CONTAINER ARE STERILE.
Type: IMPLANTABLE DEVICE | Site: ARTERY FEMORAL | Status: FUNCTIONAL
Brand: VASCU-GUARD PERIPHERAL VASCULAR PATCH

## 2020-02-28 RX ORDER — ACETAMINOPHEN 650 MG/1
325 SUPPOSITORY RECTAL ONCE
Status: DISCONTINUED | OUTPATIENT
Start: 2020-02-28 | End: 2020-03-01

## 2020-02-28 RX ORDER — PROTAMINE SULFATE 10 MG/ML
INJECTION, SOLUTION INTRAVENOUS AS NEEDED
Status: DISCONTINUED | OUTPATIENT
Start: 2020-02-28 | End: 2020-02-28 | Stop reason: SURG

## 2020-02-28 RX ORDER — METHYLPREDNISOLONE SODIUM SUCCINATE 125 MG/2ML
80 INJECTION, POWDER, LYOPHILIZED, FOR SOLUTION INTRAMUSCULAR; INTRAVENOUS EVERY 8 HOURS SCHEDULED
Status: DISCONTINUED | OUTPATIENT
Start: 2020-02-28 | End: 2020-02-28

## 2020-02-28 RX ORDER — ALBUMIN, HUMAN INJ 5% 5 %
SOLUTION INTRAVENOUS CONTINUOUS PRN
Status: DISCONTINUED | OUTPATIENT
Start: 2020-02-28 | End: 2020-02-28 | Stop reason: SURG

## 2020-02-28 RX ORDER — FENTANYL CITRATE 50 UG/ML
50 INJECTION, SOLUTION INTRAMUSCULAR; INTRAVENOUS
Status: DISCONTINUED | OUTPATIENT
Start: 2020-02-28 | End: 2020-02-29

## 2020-02-28 RX ORDER — ROCURONIUM BROMIDE 10 MG/ML
INJECTION, SOLUTION INTRAVENOUS AS NEEDED
Status: DISCONTINUED | OUTPATIENT
Start: 2020-02-28 | End: 2020-02-28 | Stop reason: SURG

## 2020-02-28 RX ORDER — HEPARIN SODIUM 1000 [USP'U]/ML
INJECTION, SOLUTION INTRAVENOUS; SUBCUTANEOUS AS NEEDED
Status: DISCONTINUED | OUTPATIENT
Start: 2020-02-28 | End: 2020-02-28 | Stop reason: SURG

## 2020-02-28 RX ORDER — SODIUM CHLORIDE 0.9 % (FLUSH) 0.9 %
10 SYRINGE (ML) INJECTION AS NEEDED
Status: DISCONTINUED | OUTPATIENT
Start: 2020-02-28 | End: 2020-03-05

## 2020-02-28 RX ORDER — PROPOFOL 10 MG/ML
VIAL (ML) INTRAVENOUS AS NEEDED
Status: DISCONTINUED | OUTPATIENT
Start: 2020-02-28 | End: 2020-02-28 | Stop reason: SURG

## 2020-02-28 RX ORDER — PROMETHAZINE HYDROCHLORIDE 25 MG/1
25 TABLET ORAL ONCE AS NEEDED
Status: DISCONTINUED | OUTPATIENT
Start: 2020-02-28 | End: 2020-03-01

## 2020-02-28 RX ORDER — PROMETHAZINE HYDROCHLORIDE 25 MG/ML
6.25 INJECTION, SOLUTION INTRAMUSCULAR; INTRAVENOUS ONCE AS NEEDED
Status: DISCONTINUED | OUTPATIENT
Start: 2020-02-28 | End: 2020-03-01

## 2020-02-28 RX ORDER — LIDOCAINE HYDROCHLORIDE 10 MG/ML
INJECTION, SOLUTION EPIDURAL; INFILTRATION; INTRACAUDAL; PERINEURAL AS NEEDED
Status: DISCONTINUED | OUTPATIENT
Start: 2020-02-28 | End: 2020-02-28 | Stop reason: SURG

## 2020-02-28 RX ORDER — SODIUM CHLORIDE 0.9 % (FLUSH) 0.9 %
10 SYRINGE (ML) INJECTION EVERY 12 HOURS SCHEDULED
Status: DISCONTINUED | OUTPATIENT
Start: 2020-02-28 | End: 2020-03-05

## 2020-02-28 RX ORDER — IPRATROPIUM BROMIDE AND ALBUTEROL SULFATE 2.5; .5 MG/3ML; MG/3ML
3 SOLUTION RESPIRATORY (INHALATION)
Status: DISCONTINUED | OUTPATIENT
Start: 2020-02-28 | End: 2020-03-04

## 2020-02-28 RX ORDER — SODIUM CHLORIDE 9 MG/ML
INJECTION, SOLUTION INTRAVENOUS CONTINUOUS PRN
Status: DISCONTINUED | OUTPATIENT
Start: 2020-02-28 | End: 2020-02-28 | Stop reason: SURG

## 2020-02-28 RX ORDER — CALCIUM CHLORIDE 100 MG/ML
INJECTION INTRAVENOUS; INTRAVENTRICULAR AS NEEDED
Status: DISCONTINUED | OUTPATIENT
Start: 2020-02-28 | End: 2020-02-28 | Stop reason: SURG

## 2020-02-28 RX ORDER — SODIUM CHLORIDE 9 MG/ML
INJECTION, SOLUTION INTRAVENOUS AS NEEDED
Status: DISCONTINUED | OUTPATIENT
Start: 2020-02-28 | End: 2020-02-28 | Stop reason: HOSPADM

## 2020-02-28 RX ORDER — PROMETHAZINE HYDROCHLORIDE 25 MG/1
25 SUPPOSITORY RECTAL ONCE AS NEEDED
Status: DISCONTINUED | OUTPATIENT
Start: 2020-02-28 | End: 2020-03-01

## 2020-02-28 RX ORDER — METHYLPREDNISOLONE SODIUM SUCCINATE 125 MG/2ML
80 INJECTION, POWDER, LYOPHILIZED, FOR SOLUTION INTRAMUSCULAR; INTRAVENOUS EVERY 8 HOURS
Status: DISCONTINUED | OUTPATIENT
Start: 2020-02-28 | End: 2020-02-28

## 2020-02-28 RX ORDER — MORPHINE SULFATE 1 MG/ML
2 INJECTION, SOLUTION EPIDURAL; INTRATHECAL; INTRAVENOUS
Status: DISCONTINUED | OUTPATIENT
Start: 2020-02-28 | End: 2020-02-29

## 2020-02-28 RX ORDER — ONDANSETRON 2 MG/ML
4 INJECTION INTRAMUSCULAR; INTRAVENOUS ONCE AS NEEDED
Status: DISCONTINUED | OUTPATIENT
Start: 2020-02-28 | End: 2020-03-05

## 2020-02-28 RX ORDER — METHYLPREDNISOLONE SODIUM SUCCINATE 125 MG/2ML
125 INJECTION, POWDER, LYOPHILIZED, FOR SOLUTION INTRAMUSCULAR; INTRAVENOUS ONCE
Status: COMPLETED | OUTPATIENT
Start: 2020-02-28 | End: 2020-02-28

## 2020-02-28 RX ORDER — PROPOFOL 10 MG/ML
VIAL (ML) INTRAVENOUS CONTINUOUS PRN
Status: DISCONTINUED | OUTPATIENT
Start: 2020-02-28 | End: 2020-02-28 | Stop reason: SURG

## 2020-02-28 RX ORDER — METHYLPREDNISOLONE SODIUM SUCCINATE 125 MG/2ML
60 INJECTION, POWDER, LYOPHILIZED, FOR SOLUTION INTRAMUSCULAR; INTRAVENOUS EVERY 8 HOURS
Status: DISCONTINUED | OUTPATIENT
Start: 2020-02-28 | End: 2020-02-28

## 2020-02-28 RX ORDER — BUDESONIDE 0.5 MG/2ML
0.5 INHALANT ORAL
Status: DISCONTINUED | OUTPATIENT
Start: 2020-02-28 | End: 2020-03-11 | Stop reason: HOSPADM

## 2020-02-28 RX ORDER — CHLORHEXIDINE GLUCONATE 0.12 MG/ML
15 RINSE ORAL EVERY 12 HOURS SCHEDULED
Status: DISCONTINUED | OUTPATIENT
Start: 2020-02-28 | End: 2020-03-03

## 2020-02-28 RX ORDER — FENTANYL CITRATE 50 UG/ML
INJECTION, SOLUTION INTRAMUSCULAR; INTRAVENOUS AS NEEDED
Status: DISCONTINUED | OUTPATIENT
Start: 2020-02-28 | End: 2020-02-28 | Stop reason: SURG

## 2020-02-28 RX ORDER — PANTOPRAZOLE SODIUM 40 MG/10ML
40 INJECTION, POWDER, LYOPHILIZED, FOR SOLUTION INTRAVENOUS
Status: DISCONTINUED | OUTPATIENT
Start: 2020-02-28 | End: 2020-03-09 | Stop reason: ALTCHOICE

## 2020-02-28 RX ORDER — KETOROLAC TROMETHAMINE 15 MG/ML
15 INJECTION, SOLUTION INTRAMUSCULAR; INTRAVENOUS ONCE
Status: COMPLETED | OUTPATIENT
Start: 2020-02-28 | End: 2020-02-28

## 2020-02-28 RX ORDER — METHYLPREDNISOLONE SODIUM SUCCINATE 125 MG/2ML
60 INJECTION, POWDER, LYOPHILIZED, FOR SOLUTION INTRAMUSCULAR; INTRAVENOUS EVERY 8 HOURS
Status: DISCONTINUED | OUTPATIENT
Start: 2020-02-29 | End: 2020-02-28

## 2020-02-28 RX ORDER — SODIUM CHLORIDE 0.9 % (FLUSH) 0.9 %
20 SYRINGE (ML) INJECTION AS NEEDED
Status: DISCONTINUED | OUTPATIENT
Start: 2020-02-28 | End: 2020-03-01

## 2020-02-28 RX ORDER — HYDROMORPHONE HYDROCHLORIDE 1 MG/ML
0.5 INJECTION, SOLUTION INTRAMUSCULAR; INTRAVENOUS; SUBCUTANEOUS
Status: COMPLETED | OUTPATIENT
Start: 2020-02-28 | End: 2020-02-29

## 2020-02-28 RX ADMIN — PANTOPRAZOLE SODIUM 40 MG: 40 INJECTION, POWDER, FOR SOLUTION INTRAVENOUS at 11:41

## 2020-02-28 RX ADMIN — ROCURONIUM BROMIDE 10 MG: 10 SOLUTION INTRAVENOUS at 14:20

## 2020-02-28 RX ADMIN — PHENYLEPHRINE HYDROCHLORIDE 100 MCG: 10 INJECTION INTRAVENOUS at 13:00

## 2020-02-28 RX ADMIN — AMITRIPTYLINE HYDROCHLORIDE 10 MG: 10 TABLET, FILM COATED ORAL at 20:09

## 2020-02-28 RX ADMIN — PHENYLEPHRINE HYDROCHLORIDE 100 MCG: 10 INJECTION INTRAVENOUS at 13:25

## 2020-02-28 RX ADMIN — VANCOMYCIN HYDROCHLORIDE 750 MG: 750 INJECTION, SOLUTION INTRAVENOUS at 11:54

## 2020-02-28 RX ADMIN — SODIUM CHLORIDE, PRESERVATIVE FREE 10 ML: 5 INJECTION INTRAVENOUS at 20:31

## 2020-02-28 RX ADMIN — INSULIN DETEMIR 10 UNITS: 100 INJECTION, SOLUTION SUBCUTANEOUS at 20:08

## 2020-02-28 RX ADMIN — ALBUMIN HUMAN: 0.05 INJECTION, SOLUTION INTRAVENOUS at 14:06

## 2020-02-28 RX ADMIN — CALCIUM CHLORIDE 250 MG: 100 INJECTION INTRAVENOUS; INTRAVENTRICULAR at 15:27

## 2020-02-28 RX ADMIN — ROCURONIUM BROMIDE 50 MG: 10 SOLUTION INTRAVENOUS at 12:41

## 2020-02-28 RX ADMIN — SODIUM CHLORIDE: 9 INJECTION, SOLUTION INTRAVENOUS at 14:38

## 2020-02-28 RX ADMIN — HYDROXYZINE HYDROCHLORIDE 25 MG: 25 TABLET, FILM COATED ORAL at 20:10

## 2020-02-28 RX ADMIN — FENTANYL CITRATE 100 MCG: 50 INJECTION, SOLUTION INTRAMUSCULAR; INTRAVENOUS at 12:41

## 2020-02-28 RX ADMIN — METHYLPREDNISOLONE SODIUM SUCCINATE 125 MG: 125 INJECTION, POWDER, FOR SOLUTION INTRAMUSCULAR; INTRAVENOUS at 02:12

## 2020-02-28 RX ADMIN — MEROPENEM 1 G: 1 INJECTION, POWDER, FOR SOLUTION INTRAVENOUS at 19:34

## 2020-02-28 RX ADMIN — KETOTIFEN FUMARATE 1 DROP: 0.35 SOLUTION/ DROPS OPHTHALMIC at 20:10

## 2020-02-28 RX ADMIN — CALCIUM CHLORIDE 250 MG: 100 INJECTION INTRAVENOUS; INTRAVENTRICULAR at 14:43

## 2020-02-28 RX ADMIN — PHENYLEPHRINE HYDROCHLORIDE 100 MCG: 10 INJECTION INTRAVENOUS at 13:18

## 2020-02-28 RX ADMIN — PHENYLEPHRINE HYDROCHLORIDE 100 MCG: 10 INJECTION INTRAVENOUS at 12:54

## 2020-02-28 RX ADMIN — INSULIN LISPRO 8 UNITS: 100 INJECTION, SOLUTION INTRAVENOUS; SUBCUTANEOUS at 18:16

## 2020-02-28 RX ADMIN — MEROPENEM 1 G: 1 INJECTION, POWDER, FOR SOLUTION INTRAVENOUS at 08:51

## 2020-02-28 RX ADMIN — PHENYLEPHRINE HYDROCHLORIDE 100 MCG: 10 INJECTION INTRAVENOUS at 13:31

## 2020-02-28 RX ADMIN — ROCURONIUM BROMIDE 10 MG: 10 SOLUTION INTRAVENOUS at 16:12

## 2020-02-28 RX ADMIN — ROCURONIUM BROMIDE 50 MG: 10 SOLUTION INTRAVENOUS at 16:58

## 2020-02-28 RX ADMIN — KETOROLAC TROMETHAMINE 15 MG: 15 INJECTION, SOLUTION INTRAMUSCULAR; INTRAVENOUS at 04:44

## 2020-02-28 RX ADMIN — SODIUM CHLORIDE, PRESERVATIVE FREE 10 ML: 5 INJECTION INTRAVENOUS at 11:45

## 2020-02-28 RX ADMIN — SODIUM CHLORIDE: 9 INJECTION, SOLUTION INTRAVENOUS at 14:00

## 2020-02-28 RX ADMIN — PHENYLEPHRINE HYDROCHLORIDE 0.1 MCG/KG/MIN: 10 INJECTION INTRAVENOUS at 13:47

## 2020-02-28 RX ADMIN — CALCIUM CHLORIDE 250 MG: 100 INJECTION INTRAVENOUS; INTRAVENTRICULAR at 15:21

## 2020-02-28 RX ADMIN — SODIUM CHLORIDE: 9 INJECTION, SOLUTION INTRAVENOUS at 12:48

## 2020-02-28 RX ADMIN — ROCURONIUM BROMIDE 10 MG: 10 SOLUTION INTRAVENOUS at 16:24

## 2020-02-28 RX ADMIN — FUROSEMIDE 40 MG: 10 INJECTION, SOLUTION INTRAMUSCULAR; INTRAVENOUS at 00:01

## 2020-02-28 RX ADMIN — IPRATROPIUM BROMIDE AND ALBUTEROL SULFATE 3 ML: 2.5; .5 SOLUTION RESPIRATORY (INHALATION) at 20:29

## 2020-02-28 RX ADMIN — PREGABALIN 150 MG: 75 CAPSULE ORAL at 20:09

## 2020-02-28 RX ADMIN — VANCOMYCIN HYDROCHLORIDE 750 MG: 750 INJECTION, SOLUTION INTRAVENOUS at 23:12

## 2020-02-28 RX ADMIN — PROPOFOL 150 MG: 10 INJECTION, EMULSION INTRAVENOUS at 12:41

## 2020-02-28 RX ADMIN — SODIUM CHLORIDE: 9 INJECTION, SOLUTION INTRAVENOUS at 12:34

## 2020-02-28 RX ADMIN — PROTAMINE SULFATE 100 MG: 10 INJECTION, SOLUTION INTRAVENOUS at 15:18

## 2020-02-28 RX ADMIN — CALCIUM CHLORIDE 250 MG: 100 INJECTION INTRAVENOUS; INTRAVENTRICULAR at 16:24

## 2020-02-28 RX ADMIN — PROPOFOL 50 MCG/KG/MIN: 10 INJECTION, EMULSION INTRAVENOUS at 16:56

## 2020-02-28 RX ADMIN — INSULIN LISPRO 5 UNITS: 100 INJECTION, SOLUTION INTRAVENOUS; SUBCUTANEOUS at 11:42

## 2020-02-28 RX ADMIN — ACETAMINOPHEN 650 MG: 325 TABLET, FILM COATED ORAL at 02:12

## 2020-02-28 RX ADMIN — PHENYLEPHRINE HYDROCHLORIDE 100 MCG: 10 INJECTION INTRAVENOUS at 13:13

## 2020-02-28 RX ADMIN — FENTANYL CITRATE 50 MCG: 50 INJECTION, SOLUTION INTRAMUSCULAR; INTRAVENOUS at 16:09

## 2020-02-28 RX ADMIN — ROCURONIUM BROMIDE 10 MG: 10 SOLUTION INTRAVENOUS at 15:27

## 2020-02-28 RX ADMIN — METOPROLOL TARTRATE 25 MG: 25 TABLET, FILM COATED ORAL at 20:10

## 2020-02-28 RX ADMIN — ROPINIROLE HYDROCHLORIDE 4 MG: 2 TABLET, FILM COATED ORAL at 20:10

## 2020-02-28 RX ADMIN — PHENYLEPHRINE HYDROCHLORIDE 100 MCG: 10 INJECTION INTRAVENOUS at 13:08

## 2020-02-28 RX ADMIN — ACETAMINOPHEN 650 MG: 650 SUPPOSITORY RECTAL at 04:29

## 2020-02-28 RX ADMIN — VANCOMYCIN HYDROCHLORIDE 750 MG: 750 INJECTION, SOLUTION INTRAVENOUS at 00:00

## 2020-02-28 RX ADMIN — HEPARIN SODIUM 10000 UNITS: 1000 INJECTION, SOLUTION INTRAVENOUS; SUBCUTANEOUS at 14:01

## 2020-02-28 RX ADMIN — LIDOCAINE HYDROCHLORIDE 50 MG: 10 INJECTION, SOLUTION EPIDURAL; INFILTRATION; INTRACAUDAL; PERINEURAL at 12:41

## 2020-02-28 RX ADMIN — CHLORHEXIDINE GLUCONATE 0.12% ORAL RINSE 15 ML: 1.2 LIQUID ORAL at 21:07

## 2020-02-28 RX ADMIN — BUDESONIDE 0.5 MG: 0.5 INHALANT RESPIRATORY (INHALATION) at 20:29

## 2020-02-28 RX ADMIN — MONTELUKAST SODIUM 10 MG: 10 TABLET, COATED ORAL at 20:10

## 2020-02-28 RX ADMIN — ROCURONIUM BROMIDE 10 MG: 10 SOLUTION INTRAVENOUS at 13:31

## 2020-02-28 RX ADMIN — PROPOFOL 60 MCG/KG/MIN: 10 INJECTION, EMULSION INTRAVENOUS at 20:51

## 2020-02-28 NOTE — ANESTHESIA PROCEDURE NOTES
Arterial Line      Patient reassessed immediately prior to procedure    Patient location during procedure: pre-op   Line placed for hemodynamic monitoring.  Performed By   Anesthesiologist: Sidney Lowe MD  Preanesthetic Checklist  Completed: patient identified, site marked, surgical consent, pre-op evaluation, timeout performed, IV checked, risks and benefits discussed and monitors and equipment checked  Arterial Line Prep   Sterile Tech: cap, gloves and sterile barriers  Prep: ChloraPrep  Patient monitoring: blood pressure monitoring, continuous pulse oximetry and EKG  Arterial Line Procedure   Laterality:left  Location:  radial artery  Catheter size: 20 G   Guidance: palpation technique  Number of attempts: 2  Successful placement: yes  Post Assessment   Dressing Type: line sutured, occlusive dressing applied, secured with tape and wrist guard applied.   Complications no  Circ/Move/Sens Assessment: normal and unchanged.   Patient Tolerance: patient tolerated the procedure well with no apparent complications

## 2020-02-28 NOTE — ANESTHESIA PROCEDURE NOTES
Airway  Urgency: elective    Date/Time: 2/28/2020 12:44 PM  Airway not difficult    General Information and Staff    Patient location during procedure: OR  CRNA: Ced Gómez CRNA    Indications and Patient Condition  Indications for airway management: airway protection    Preoxygenated: yes  MILS not maintained throughout  Mask difficulty assessment: 2 - vent by mask + OA or adjuvant +/- NMBA    Final Airway Details  Final airway type: endotracheal airway      Successful airway: ETT  Cuffed: yes   Successful intubation technique: direct laryngoscopy  Facilitating devices/methods: intubating stylet  Endotracheal tube insertion site: oral  Blade: Barnes  Blade size: 2  ETT size (mm): 8.0  Cormack-Lehane Classification: grade I - full view of glottis  Placement verified by: chest auscultation and capnometry   Cuff volume (mL): 6  Measured from: lips  ETT/EBT  to lips (cm): 21  Number of attempts at approach: 1  Assessment: lips, teeth, and gum same as pre-op and atraumatic intubation    Additional Comments  Negative epigastric sounds, Breath sound equal bilaterally with symmetric chest rise and fall

## 2020-02-28 NOTE — ANESTHESIA PREPROCEDURE EVALUATION
Anesthesia Evaluation     Patient summary reviewed and Nursing notes reviewed   no history of anesthetic complications:  NPO Solid Status: > 8 hours  NPO Liquid Status: > 2 hours           Airway   Mallampati: II  TM distance: >3 FB  Neck ROM: full  No difficulty expected  Dental - normal exam     Pulmonary    (+) pleural effusion (Bilateral), COPD, sleep apnea,     PE comment: Coarse and distant breath sounds  Cardiovascular - normal exam    ECG reviewed  Rhythm: regular  Rate: normal    (+) hypertension, CAD, PVD (s/p fem/fem bypass, now infected),       Neuro/Psych  GI/Hepatic/Renal/Endo    (+)  hiatal hernia,  liver disease, diabetes mellitus,     Musculoskeletal     Abdominal    Substance History      OB/GYN          Other   arthritis,                      Anesthesia Plan    ASA 4     general   (Possible post-operative ventilation)  intravenous induction     Anesthetic plan, all risks, benefits, and alternatives have been provided, discussed and informed consent has been obtained with: patient.    Plan discussed with CRNA.

## 2020-02-28 NOTE — ANESTHESIA POSTPROCEDURE EVALUATION
Patient: Saskia Groves    Procedure Summary     Date:  02/28/20 Room / Location:   [facility] [address] [place] [address] [facility]    Anesthesia Start:  1234 Anesthesia Stop:      Procedures:       BILATERAL FEMORAL ARTERY CUTDOWN WITH REMOVAL OF FEMORAL FEMORAL BYPASS GRAFT (Bilateral Thigh)      BILATERAL GROIN MUSCLE FLAP (Left ) Diagnosis:      Surgeon:  Deandre Oseguera MD; Modesto Whittaker MD Provider:  Sidney Lowe MD    Anesthesia Type:  general ASA Status:  4          Anesthesia Type: general    Vitals  Vitals Value Taken Time   BP     Temp     Pulse 74 2/28/2020  5:27 PM   Resp     SpO2 95 % 2/28/2020  5:27 PM   Vitals shown include unvalidated device data.        Post Anesthesia Care and Evaluation    Patient location during evaluation: ICU  Level of consciousness: obtunded/minimal responses  Pain score: 0  Pain management: adequate  Airway patency: patent  Anesthetic complications: No anesthetic complications  PONV Status: none  Cardiovascular status: acceptable, hemodynamically stable and stable  Respiratory status: acceptable, intubated, ETT and ventilator  Hydration status: acceptable

## 2020-02-29 ENCOUNTER — APPOINTMENT (OUTPATIENT)
Dept: GENERAL RADIOLOGY | Facility: HOSPITAL | Age: 50
End: 2020-02-29

## 2020-02-29 LAB
ABO + RH BLD: NORMAL
ALBUMIN SERPL-MCNC: 2.6 G/DL (ref 3.5–5.2)
ALBUMIN SERPL-MCNC: 2.7 G/DL (ref 3.5–5.2)
ALBUMIN/GLOB SERPL: 0.8 G/DL
ALP SERPL-CCNC: 145 U/L (ref 39–117)
ALP SERPL-CCNC: 150 U/L (ref 39–117)
ALT SERPL W P-5'-P-CCNC: 35 U/L (ref 1–33)
ALT SERPL W P-5'-P-CCNC: 36 U/L (ref 1–33)
ANION GAP SERPL CALCULATED.3IONS-SCNC: 11 MMOL/L (ref 5–15)
ANION GAP SERPL CALCULATED.3IONS-SCNC: 13 MMOL/L (ref 5–15)
ARTERIAL PATENCY WRIST A: ABNORMAL
AST SERPL-CCNC: 63 U/L (ref 1–32)
AST SERPL-CCNC: 68 U/L (ref 1–32)
ATMOSPHERIC PRESS: ABNORMAL MM[HG]
BACTERIA SPEC AEROBE CULT: NORMAL
BACTERIA SPEC AEROBE CULT: NORMAL
BASE EXCESS BLDA CALC-SCNC: -0.1 MMOL/L (ref 0–2)
BASOPHILS # BLD AUTO: 0.04 10*3/MM3 (ref 0–0.2)
BASOPHILS NFR BLD AUTO: 0.3 % (ref 0–1.5)
BDY SITE: ABNORMAL
BH BB BLOOD EXPIRATION DATE: NORMAL
BH BB BLOOD TYPE BARCODE: 5100
BH BB BLOOD TYPE BARCODE: 6200
BH BB BLOOD TYPE BARCODE: 6200
BH BB BLOOD TYPE BARCODE: 9500
BH BB DISPENSE STATUS: NORMAL
BH BB PRODUCT CODE: NORMAL
BH BB UNIT NUMBER: NORMAL
BILIRUB SERPL-MCNC: 0.7 MG/DL (ref 0.2–1.2)
BILIRUB SERPL-MCNC: 0.9 MG/DL (ref 0.2–1.2)
BODY TEMPERATURE: 37 C
BUN BLD-MCNC: 25 MG/DL (ref 6–20)
BUN BLD-MCNC: 25 MG/DL (ref 6–20)
BUN/CREAT SERPL: 28.7 (ref 7–25)
CALCIUM SPEC-SCNC: 7.8 MG/DL (ref 8.6–10.5)
CALCIUM SPEC-SCNC: 8.3 MG/DL (ref 8.6–10.5)
CHLORIDE SERPL-SCNC: 104 MMOL/L (ref 98–107)
CHLORIDE SERPL-SCNC: 107 MMOL/L (ref 98–107)
CHOLEST SERPL-MCNC: 97 MG/DL (ref 0–200)
CO2 BLDA-SCNC: 27.5 MMOL/L (ref 22–33)
CO2 SERPL-SCNC: 22 MMOL/L (ref 22–29)
CO2 SERPL-SCNC: 24 MMOL/L (ref 22–29)
COHGB MFR BLD: 1.4 % (ref 0–2)
CREAT BLD-MCNC: 0.84 MG/DL (ref 0.57–1)
CREAT BLD-MCNC: 0.87 MG/DL (ref 0.57–1)
CRP SERPL-MCNC: 8.05 MG/DL (ref 0–0.5)
CYTO UR: NORMAL
DEPRECATED RDW RBC AUTO: 52.2 FL (ref 37–54)
EOSINOPHIL # BLD AUTO: 0.02 10*3/MM3 (ref 0–0.4)
EOSINOPHIL NFR BLD AUTO: 0.1 % (ref 0.3–6.2)
ERYTHROCYTE [DISTWIDTH] IN BLOOD BY AUTOMATED COUNT: 16 % (ref 12.3–15.4)
GFR SERPL CREATININE-BSD FRML MDRD: 69 ML/MIN/1.73
GLOBULIN UR ELPH-MCNC: 3.2 GM/DL
GLUCOSE BLD-MCNC: 168 MG/DL (ref 65–99)
GLUCOSE BLD-MCNC: 93 MG/DL (ref 65–99)
GLUCOSE BLDC GLUCOMTR-MCNC: 113 MG/DL (ref 70–130)
GLUCOSE BLDC GLUCOMTR-MCNC: 138 MG/DL (ref 70–130)
GLUCOSE BLDC GLUCOMTR-MCNC: 95 MG/DL (ref 70–130)
HCO3 BLDA-SCNC: 26 MMOL/L (ref 20–26)
HCT VFR BLD AUTO: 36.9 % (ref 34–46.6)
HCT VFR BLD CALC: 37.6 %
HGB BLD-MCNC: 11.9 G/DL (ref 12–15.9)
HGB BLDA-MCNC: 12.3 G/DL (ref 14–18)
HOROWITZ INDEX BLD+IHG-RTO: 60 %
IMM GRANULOCYTES # BLD AUTO: 0.22 10*3/MM3 (ref 0–0.05)
IMM GRANULOCYTES NFR BLD AUTO: 1.4 % (ref 0–0.5)
LAB AP CASE REPORT: NORMAL
LYMPHOCYTES # BLD AUTO: 1.45 10*3/MM3 (ref 0.7–3.1)
LYMPHOCYTES NFR BLD AUTO: 9.5 % (ref 19.6–45.3)
MAGNESIUM SERPL-MCNC: 1.8 MG/DL (ref 1.6–2.6)
MAGNESIUM SERPL-MCNC: 2 MG/DL (ref 1.6–2.6)
MCH RBC QN AUTO: 28.5 PG (ref 26.6–33)
MCHC RBC AUTO-ENTMCNC: 32.2 G/DL (ref 31.5–35.7)
MCV RBC AUTO: 88.5 FL (ref 79–97)
METHGB BLD QL: 1.2 % (ref 0–1.5)
MODALITY: ABNORMAL
MONOCYTES # BLD AUTO: 1.25 10*3/MM3 (ref 0.1–0.9)
MONOCYTES NFR BLD AUTO: 8.2 % (ref 5–12)
NEUTROPHILS # BLD AUTO: 12.26 10*3/MM3 (ref 1.7–7)
NEUTROPHILS NFR BLD AUTO: 80.5 % (ref 42.7–76)
NOTE: ABNORMAL
NRBC BLD AUTO-RTO: 0 /100 WBC (ref 0–0.2)
OXYHGB MFR BLDV: 92 % (ref 94–99)
PATH REPORT.FINAL DX SPEC: NORMAL
PATH REPORT.GROSS SPEC: NORMAL
PAW @ PEAK INSP FLOW SETTING VENT: 12 CMH2O
PCO2 BLDA: 47.4 MM HG (ref 35–45)
PCO2 TEMP ADJ BLD: 47.4 MM HG (ref 35–45)
PEEP RESPIRATORY: 5 CM[H2O]
PH BLDA: 7.35 PH UNITS (ref 7.35–7.45)
PH, TEMP CORRECTED: 7.35 PH UNITS
PHOSPHATE SERPL-MCNC: 3 MG/DL (ref 2.5–4.5)
PHOSPHATE SERPL-MCNC: 4.5 MG/DL (ref 2.5–4.5)
PLATELET # BLD AUTO: 342 10*3/MM3 (ref 140–450)
PMV BLD AUTO: 10.1 FL (ref 6–12)
PO2 BLDA: 73 MM HG (ref 83–108)
PO2 TEMP ADJ BLD: 73 MM HG (ref 83–108)
POTASSIUM BLD-SCNC: 3.9 MMOL/L (ref 3.5–5.2)
POTASSIUM BLD-SCNC: 4.1 MMOL/L (ref 3.5–5.2)
PREALB SERPL-MCNC: 12.8 MG/DL (ref 20–40)
PROT SERPL-MCNC: 5.9 G/DL (ref 6–8.5)
PROT SERPL-MCNC: 6.1 G/DL (ref 6–8.5)
RBC # BLD AUTO: 4.17 10*6/MM3 (ref 3.77–5.28)
SET MECH RESP RATE: 20
SODIUM BLD-SCNC: 139 MMOL/L (ref 136–145)
SODIUM BLD-SCNC: 142 MMOL/L (ref 136–145)
TOTAL RATE: 25 BREATHS/MINUTE
TRIGL SERPL-MCNC: 233 MG/DL (ref 0–150)
UNIT  ABO: NORMAL
UNIT  RH: NORMAL
VENTILATOR MODE: ABNORMAL
VT ON VENT VENT: 365 ML
WBC NRBC COR # BLD: 15.24 10*3/MM3 (ref 3.4–10.8)

## 2020-02-29 PROCEDURE — 25010000002 MEROPENEM PER 100 MG: Performed by: INTERNAL MEDICINE

## 2020-02-29 PROCEDURE — 82962 GLUCOSE BLOOD TEST: CPT

## 2020-02-29 PROCEDURE — 85025 COMPLETE CBC W/AUTO DIFF WBC: CPT | Performed by: PHYSICIAN ASSISTANT

## 2020-02-29 PROCEDURE — 80053 COMPREHEN METABOLIC PANEL: CPT | Performed by: INTERNAL MEDICINE

## 2020-02-29 PROCEDURE — 25010000002 VANCOMYCIN PER 500 MG: Performed by: INTERNAL MEDICINE

## 2020-02-29 PROCEDURE — 80053 COMPREHEN METABOLIC PANEL: CPT | Performed by: PHYSICIAN ASSISTANT

## 2020-02-29 PROCEDURE — 25010000002 PROPOFOL 10 MG/ML EMULSION: Performed by: INTERNAL MEDICINE

## 2020-02-29 PROCEDURE — 25010000002 FENTANYL CITRATE (PF) 100 MCG/2ML SOLUTION: Performed by: NURSE ANESTHETIST, CERTIFIED REGISTERED

## 2020-02-29 PROCEDURE — 94799 UNLISTED PULMONARY SVC/PX: CPT

## 2020-02-29 PROCEDURE — 99291 CRITICAL CARE FIRST HOUR: CPT | Performed by: INTERNAL MEDICINE

## 2020-02-29 PROCEDURE — 87205 SMEAR GRAM STAIN: CPT | Performed by: INTERNAL MEDICINE

## 2020-02-29 PROCEDURE — 63710000001 INSULIN DETEMIR PER 5 UNITS: Performed by: PHYSICIAN ASSISTANT

## 2020-02-29 PROCEDURE — 84134 ASSAY OF PREALBUMIN: CPT | Performed by: INTERNAL MEDICINE

## 2020-02-29 PROCEDURE — 82465 ASSAY BLD/SERUM CHOLESTEROL: CPT | Performed by: INTERNAL MEDICINE

## 2020-02-29 PROCEDURE — 94770: CPT

## 2020-02-29 PROCEDURE — 86140 C-REACTIVE PROTEIN: CPT | Performed by: INTERNAL MEDICINE

## 2020-02-29 PROCEDURE — 83735 ASSAY OF MAGNESIUM: CPT | Performed by: INTERNAL MEDICINE

## 2020-02-29 PROCEDURE — 87070 CULTURE OTHR SPECIMN AEROBIC: CPT | Performed by: INTERNAL MEDICINE

## 2020-02-29 PROCEDURE — 71045 X-RAY EXAM CHEST 1 VIEW: CPT

## 2020-02-29 PROCEDURE — 63710000001 INSULIN REGULAR HUMAN PER 5 UNITS: Performed by: INTERNAL MEDICINE

## 2020-02-29 PROCEDURE — 84478 ASSAY OF TRIGLYCERIDES: CPT | Performed by: INTERNAL MEDICINE

## 2020-02-29 PROCEDURE — 25010000002 FUROSEMIDE PER 20 MG: Performed by: INTERNAL MEDICINE

## 2020-02-29 PROCEDURE — 94003 VENT MGMT INPAT SUBQ DAY: CPT

## 2020-02-29 PROCEDURE — 83735 ASSAY OF MAGNESIUM: CPT | Performed by: PHYSICIAN ASSISTANT

## 2020-02-29 PROCEDURE — 82805 BLOOD GASES W/O2 SATURATION: CPT

## 2020-02-29 PROCEDURE — 25010000002 HYDROMORPHONE PER 4 MG: Performed by: NURSE ANESTHETIST, CERTIFIED REGISTERED

## 2020-02-29 PROCEDURE — 84100 ASSAY OF PHOSPHORUS: CPT | Performed by: INTERNAL MEDICINE

## 2020-02-29 PROCEDURE — 84100 ASSAY OF PHOSPHORUS: CPT | Performed by: PHYSICIAN ASSISTANT

## 2020-02-29 RX ORDER — METOPROLOL TARTRATE 50 MG/1
50 TABLET, FILM COATED ORAL EVERY 12 HOURS SCHEDULED
Status: DISCONTINUED | OUTPATIENT
Start: 2020-02-29 | End: 2020-03-03

## 2020-02-29 RX ORDER — FUROSEMIDE 10 MG/ML
40 INJECTION INTRAMUSCULAR; INTRAVENOUS EVERY 12 HOURS
Status: COMPLETED | OUTPATIENT
Start: 2020-02-29 | End: 2020-03-02

## 2020-02-29 RX ORDER — POTASSIUM CHLORIDE 1.5 G/1.77G
20 POWDER, FOR SOLUTION ORAL EVERY 12 HOURS
Status: COMPLETED | OUTPATIENT
Start: 2020-02-29 | End: 2020-03-02

## 2020-02-29 RX ORDER — ASPIRIN 325 MG
325 TABLET ORAL DAILY
Status: DISCONTINUED | OUTPATIENT
Start: 2020-02-29 | End: 2020-03-11 | Stop reason: HOSPADM

## 2020-02-29 RX ORDER — LOSARTAN POTASSIUM 50 MG/1
50 TABLET ORAL DAILY
Status: DISCONTINUED | OUTPATIENT
Start: 2020-03-01 | End: 2020-03-09

## 2020-02-29 RX ADMIN — FAMOTIDINE 20 MG: 20 TABLET ORAL at 17:51

## 2020-02-29 RX ADMIN — BUDESONIDE 0.5 MG: 0.5 INHALANT RESPIRATORY (INHALATION) at 07:52

## 2020-02-29 RX ADMIN — FAMOTIDINE 20 MG: 20 TABLET ORAL at 08:06

## 2020-02-29 RX ADMIN — IPRATROPIUM BROMIDE AND ALBUTEROL SULFATE 3 ML: 2.5; .5 SOLUTION RESPIRATORY (INHALATION) at 20:55

## 2020-02-29 RX ADMIN — CHLORHEXIDINE GLUCONATE 0.12% ORAL RINSE 15 ML: 1.2 LIQUID ORAL at 21:09

## 2020-02-29 RX ADMIN — IPRATROPIUM BROMIDE AND ALBUTEROL SULFATE 3 ML: 2.5; .5 SOLUTION RESPIRATORY (INHALATION) at 15:45

## 2020-02-29 RX ADMIN — MEROPENEM 1 G: 1 INJECTION, POWDER, FOR SOLUTION INTRAVENOUS at 04:13

## 2020-02-29 RX ADMIN — CHLORHEXIDINE GLUCONATE 0.12% ORAL RINSE 15 ML: 1.2 LIQUID ORAL at 08:05

## 2020-02-29 RX ADMIN — METOPROLOL TARTRATE 25 MG: 25 TABLET, FILM COATED ORAL at 14:01

## 2020-02-29 RX ADMIN — FUROSEMIDE 40 MG: 10 INJECTION, SOLUTION INTRAMUSCULAR; INTRAVENOUS at 14:01

## 2020-02-29 RX ADMIN — SODIUM CHLORIDE, PRESERVATIVE FREE 10 ML: 5 INJECTION INTRAVENOUS at 08:07

## 2020-02-29 RX ADMIN — FENTANYL CITRATE 50 MCG: 0.05 INJECTION, SOLUTION INTRAMUSCULAR; INTRAVENOUS at 00:51

## 2020-02-29 RX ADMIN — PREGABALIN 150 MG: 75 CAPSULE ORAL at 08:06

## 2020-02-29 RX ADMIN — VANCOMYCIN HYDROCHLORIDE 750 MG: 750 INJECTION, SOLUTION INTRAVENOUS at 11:14

## 2020-02-29 RX ADMIN — INSULIN HUMAN 5 UNITS: 100 INJECTION, SOLUTION PARENTERAL at 00:40

## 2020-02-29 RX ADMIN — METOPROLOL TARTRATE 25 MG: 25 TABLET, FILM COATED ORAL at 08:05

## 2020-02-29 RX ADMIN — HYDROMORPHONE HYDROCHLORIDE 0.5 MG: 1 INJECTION, SOLUTION INTRAMUSCULAR; INTRAVENOUS; SUBCUTANEOUS at 02:01

## 2020-02-29 RX ADMIN — PROPOFOL 50 MCG/KG/MIN: 10 INJECTION, EMULSION INTRAVENOUS at 02:01

## 2020-02-29 RX ADMIN — LOSARTAN POTASSIUM 25 MG: 25 TABLET, FILM COATED ORAL at 08:06

## 2020-02-29 RX ADMIN — PROPOFOL 50 MCG/KG/MIN: 10 INJECTION, EMULSION INTRAVENOUS at 21:09

## 2020-02-29 RX ADMIN — INSULIN DETEMIR 10 UNITS: 100 INJECTION, SOLUTION SUBCUTANEOUS at 21:10

## 2020-02-29 RX ADMIN — HYDROMORPHONE HYDROCHLORIDE 0.5 MG: 1 INJECTION, SOLUTION INTRAMUSCULAR; INTRAVENOUS; SUBCUTANEOUS at 04:31

## 2020-02-29 RX ADMIN — IPRATROPIUM BROMIDE AND ALBUTEROL SULFATE 3 ML: 2.5; .5 SOLUTION RESPIRATORY (INHALATION) at 07:52

## 2020-02-29 RX ADMIN — PROPOFOL 50 MCG/KG/MIN: 10 INJECTION, EMULSION INTRAVENOUS at 06:25

## 2020-02-29 RX ADMIN — ASPIRIN 325 MG ORAL TABLET 325 MG: 325 PILL ORAL at 08:10

## 2020-02-29 RX ADMIN — MEROPENEM 1 G: 1 INJECTION, POWDER, FOR SOLUTION INTRAVENOUS at 12:12

## 2020-02-29 RX ADMIN — SODIUM CHLORIDE 5 MG/HR: 9 INJECTION, SOLUTION INTRAVENOUS at 09:01

## 2020-02-29 RX ADMIN — BUDESONIDE 0.5 MG: 0.5 INHALANT RESPIRATORY (INHALATION) at 20:55

## 2020-02-29 RX ADMIN — HYDROMORPHONE HYDROCHLORIDE 0.5 MG: 1 INJECTION, SOLUTION INTRAMUSCULAR; INTRAVENOUS; SUBCUTANEOUS at 20:11

## 2020-02-29 RX ADMIN — HYDROCODONE BITARTRATE AND ACETAMINOPHEN 1 TABLET: 10; 325 TABLET ORAL at 02:01

## 2020-02-29 RX ADMIN — PANTOPRAZOLE SODIUM 40 MG: 40 INJECTION, POWDER, FOR SOLUTION INTRAVENOUS at 06:20

## 2020-02-29 RX ADMIN — CLOPIDOGREL BISULFATE 75 MG: 75 TABLET ORAL at 08:06

## 2020-02-29 RX ADMIN — KETOTIFEN FUMARATE 1 DROP: 0.35 SOLUTION/ DROPS OPHTHALMIC at 21:14

## 2020-02-29 RX ADMIN — IPRATROPIUM BROMIDE AND ALBUTEROL SULFATE 3 ML: 2.5; .5 SOLUTION RESPIRATORY (INHALATION) at 11:34

## 2020-02-29 RX ADMIN — POTASSIUM CHLORIDE 20 MEQ: 1.5 POWDER, FOR SOLUTION ORAL at 14:01

## 2020-02-29 RX ADMIN — KETOTIFEN FUMARATE 1 DROP: 0.35 SOLUTION/ DROPS OPHTHALMIC at 08:22

## 2020-02-29 RX ADMIN — HYDROMORPHONE HYDROCHLORIDE 0.5 MG: 1 INJECTION, SOLUTION INTRAMUSCULAR; INTRAVENOUS; SUBCUTANEOUS at 11:24

## 2020-02-29 RX ADMIN — FERROUS SULFATE TAB 325 MG (65 MG ELEMENTAL FE) 325 MG: 325 (65 FE) TAB at 08:06

## 2020-02-29 RX ADMIN — ACETAMINOPHEN 649.6 MG: 650 SOLUTION ORAL at 21:14

## 2020-02-29 RX ADMIN — PROPOFOL 50 MCG/KG/MIN: 10 INJECTION, EMULSION INTRAVENOUS at 16:26

## 2020-02-29 RX ADMIN — ATORVASTATIN CALCIUM 10 MG: 10 TABLET, FILM COATED ORAL at 08:05

## 2020-02-29 RX ADMIN — MEROPENEM 1 G: 1 INJECTION, POWDER, FOR SOLUTION INTRAVENOUS at 20:05

## 2020-03-01 ENCOUNTER — APPOINTMENT (OUTPATIENT)
Dept: GENERAL RADIOLOGY | Facility: HOSPITAL | Age: 50
End: 2020-03-01

## 2020-03-01 LAB
ANION GAP SERPL CALCULATED.3IONS-SCNC: 9 MMOL/L (ref 5–15)
ARTERIAL PATENCY WRIST A: ABNORMAL
ATMOSPHERIC PRESS: ABNORMAL MM[HG]
BACTERIA SPEC AEROBE CULT: ABNORMAL
BASE EXCESS BLDA CALC-SCNC: 2.4 MMOL/L (ref 0–2)
BASOPHILS # BLD AUTO: 0.04 10*3/MM3 (ref 0–0.2)
BASOPHILS NFR BLD AUTO: 0.3 % (ref 0–1.5)
BDY SITE: ABNORMAL
BODY TEMPERATURE: 37 C
BUN BLD-MCNC: 28 MG/DL (ref 6–20)
BUN/CREAT SERPL: 31.8 (ref 7–25)
CALCIUM SPEC-SCNC: 8.1 MG/DL (ref 8.6–10.5)
CHLORIDE SERPL-SCNC: 107 MMOL/L (ref 98–107)
CO2 BLDA-SCNC: 27.3 MMOL/L (ref 22–33)
CO2 SERPL-SCNC: 24 MMOL/L (ref 22–29)
COHGB MFR BLD: 1.1 % (ref 0–2)
CREAT BLD-MCNC: 0.88 MG/DL (ref 0.57–1)
DEPRECATED RDW RBC AUTO: 53.1 FL (ref 37–54)
EOSINOPHIL # BLD AUTO: 0.22 10*3/MM3 (ref 0–0.4)
EOSINOPHIL NFR BLD AUTO: 1.7 % (ref 0.3–6.2)
ERYTHROCYTE [DISTWIDTH] IN BLOOD BY AUTOMATED COUNT: 16.3 % (ref 12.3–15.4)
GFR SERPL CREATININE-BSD FRML MDRD: 68 ML/MIN/1.73
GLUCOSE BLD-MCNC: 148 MG/DL (ref 65–99)
GLUCOSE BLDC GLUCOMTR-MCNC: 136 MG/DL (ref 70–130)
GLUCOSE BLDC GLUCOMTR-MCNC: 141 MG/DL (ref 70–130)
GLUCOSE BLDC GLUCOMTR-MCNC: 151 MG/DL (ref 70–130)
GLUCOSE BLDC GLUCOMTR-MCNC: 151 MG/DL (ref 70–130)
GLUCOSE BLDC GLUCOMTR-MCNC: 172 MG/DL (ref 70–130)
GRAM STN SPEC: ABNORMAL
GRAM STN SPEC: ABNORMAL
HCO3 BLDA-SCNC: 26.1 MMOL/L (ref 20–26)
HCT VFR BLD AUTO: 35.4 % (ref 34–46.6)
HCT VFR BLD CALC: 37.3 %
HGB BLD-MCNC: 11.7 G/DL (ref 12–15.9)
HGB BLDA-MCNC: 12.2 G/DL (ref 14–18)
HOROWITZ INDEX BLD+IHG-RTO: 60 %
IMM GRANULOCYTES # BLD AUTO: 0.49 10*3/MM3 (ref 0–0.05)
IMM GRANULOCYTES NFR BLD AUTO: 3.8 % (ref 0–0.5)
LYMPHOCYTES # BLD AUTO: 1.49 10*3/MM3 (ref 0.7–3.1)
LYMPHOCYTES NFR BLD AUTO: 11.5 % (ref 19.6–45.3)
MAGNESIUM SERPL-MCNC: 1.8 MG/DL (ref 1.6–2.6)
MCH RBC QN AUTO: 29.2 PG (ref 26.6–33)
MCHC RBC AUTO-ENTMCNC: 33.1 G/DL (ref 31.5–35.7)
MCV RBC AUTO: 88.3 FL (ref 79–97)
METHGB BLD QL: 1.1 % (ref 0–1.5)
MODALITY: ABNORMAL
MONOCYTES # BLD AUTO: 0.58 10*3/MM3 (ref 0.1–0.9)
MONOCYTES NFR BLD AUTO: 4.5 % (ref 5–12)
NEUTROPHILS # BLD AUTO: 10.16 10*3/MM3 (ref 1.7–7)
NEUTROPHILS NFR BLD AUTO: 78.2 % (ref 42.7–76)
NOTE: ABNORMAL
NRBC BLD AUTO-RTO: 0 /100 WBC (ref 0–0.2)
OXYHGB MFR BLDV: 95.6 % (ref 94–99)
PAW @ PEAK INSP FLOW SETTING VENT: 20 CMH2O
PCO2 BLDA: 36.6 MM HG (ref 35–45)
PCO2 TEMP ADJ BLD: 36.6 MM HG (ref 35–45)
PEEP RESPIRATORY: 8 CM[H2O]
PH BLDA: 7.46 PH UNITS (ref 7.35–7.45)
PH, TEMP CORRECTED: 7.46 PH UNITS
PHOSPHATE SERPL-MCNC: 2.7 MG/DL (ref 2.5–4.5)
PLATELET # BLD AUTO: 313 10*3/MM3 (ref 140–450)
PMV BLD AUTO: 10.1 FL (ref 6–12)
PO2 BLDA: 89.1 MM HG (ref 83–108)
PO2 TEMP ADJ BLD: 89.1 MM HG (ref 83–108)
POTASSIUM BLD-SCNC: 4.2 MMOL/L (ref 3.5–5.2)
POTASSIUM BLD-SCNC: 4.3 MMOL/L (ref 3.5–5.2)
RBC # BLD AUTO: 4.01 10*6/MM3 (ref 3.77–5.28)
SET MECH RESP RATE: 20
SODIUM BLD-SCNC: 140 MMOL/L (ref 136–145)
TOTAL RATE: 32 BREATHS/MINUTE
VENTILATOR MODE: ABNORMAL
VT ON VENT VENT: 365 ML
WBC NRBC COR # BLD: 12.98 10*3/MM3 (ref 3.4–10.8)

## 2020-03-01 PROCEDURE — 94799 UNLISTED PULMONARY SVC/PX: CPT

## 2020-03-01 PROCEDURE — 94770: CPT

## 2020-03-01 PROCEDURE — 82962 GLUCOSE BLOOD TEST: CPT

## 2020-03-01 PROCEDURE — 25010000002 HYDRALAZINE PER 20 MG: Performed by: INTERNAL MEDICINE

## 2020-03-01 PROCEDURE — 25010000002 MEROPENEM PER 100 MG: Performed by: INTERNAL MEDICINE

## 2020-03-01 PROCEDURE — 84100 ASSAY OF PHOSPHORUS: CPT | Performed by: INTERNAL MEDICINE

## 2020-03-01 PROCEDURE — 71045 X-RAY EXAM CHEST 1 VIEW: CPT

## 2020-03-01 PROCEDURE — 85025 COMPLETE CBC W/AUTO DIFF WBC: CPT | Performed by: INTERNAL MEDICINE

## 2020-03-01 PROCEDURE — 82805 BLOOD GASES W/O2 SATURATION: CPT

## 2020-03-01 PROCEDURE — 83735 ASSAY OF MAGNESIUM: CPT | Performed by: INTERNAL MEDICINE

## 2020-03-01 PROCEDURE — 25010000002 PROPOFOL 10 MG/ML EMULSION: Performed by: INTERNAL MEDICINE

## 2020-03-01 PROCEDURE — 99291 CRITICAL CARE FIRST HOUR: CPT | Performed by: INTERNAL MEDICINE

## 2020-03-01 PROCEDURE — 25010000002 VANCOMYCIN PER 500 MG: Performed by: INTERNAL MEDICINE

## 2020-03-01 PROCEDURE — 25010000002 FUROSEMIDE PER 20 MG: Performed by: INTERNAL MEDICINE

## 2020-03-01 PROCEDURE — 63710000001 INSULIN DETEMIR PER 5 UNITS: Performed by: PHYSICIAN ASSISTANT

## 2020-03-01 PROCEDURE — 80048 BASIC METABOLIC PNL TOTAL CA: CPT

## 2020-03-01 PROCEDURE — 94003 VENT MGMT INPAT SUBQ DAY: CPT

## 2020-03-01 PROCEDURE — 84132 ASSAY OF SERUM POTASSIUM: CPT | Performed by: INTERNAL MEDICINE

## 2020-03-01 RX ORDER — HYDRALAZINE HYDROCHLORIDE 20 MG/ML
10 INJECTION INTRAMUSCULAR; INTRAVENOUS EVERY 6 HOURS PRN
Status: DISCONTINUED | OUTPATIENT
Start: 2020-03-01 | End: 2020-03-11 | Stop reason: HOSPADM

## 2020-03-01 RX ADMIN — INSULIN DETEMIR 10 UNITS: 100 INJECTION, SOLUTION SUBCUTANEOUS at 20:46

## 2020-03-01 RX ADMIN — MEROPENEM 1 G: 1 INJECTION, POWDER, FOR SOLUTION INTRAVENOUS at 11:21

## 2020-03-01 RX ADMIN — VANCOMYCIN HYDROCHLORIDE 750 MG: 750 INJECTION, SOLUTION INTRAVENOUS at 11:21

## 2020-03-01 RX ADMIN — HYDROCODONE BITARTRATE AND ACETAMINOPHEN 1 TABLET: 10; 325 TABLET ORAL at 20:12

## 2020-03-01 RX ADMIN — FUROSEMIDE 40 MG: 10 INJECTION, SOLUTION INTRAMUSCULAR; INTRAVENOUS at 14:35

## 2020-03-01 RX ADMIN — BUDESONIDE 0.5 MG: 0.5 INHALANT RESPIRATORY (INHALATION) at 07:12

## 2020-03-01 RX ADMIN — ASPIRIN 325 MG ORAL TABLET 325 MG: 325 PILL ORAL at 08:33

## 2020-03-01 RX ADMIN — METOPROLOL TARTRATE 50 MG: 50 TABLET, FILM COATED ORAL at 08:33

## 2020-03-01 RX ADMIN — KETOTIFEN FUMARATE 1 DROP: 0.35 SOLUTION/ DROPS OPHTHALMIC at 08:34

## 2020-03-01 RX ADMIN — SODIUM CHLORIDE, PRESERVATIVE FREE 10 ML: 5 INJECTION INTRAVENOUS at 08:34

## 2020-03-01 RX ADMIN — CLOPIDOGREL BISULFATE 75 MG: 75 TABLET ORAL at 08:33

## 2020-03-01 RX ADMIN — IPRATROPIUM BROMIDE AND ALBUTEROL SULFATE 3 ML: 2.5; .5 SOLUTION RESPIRATORY (INHALATION) at 21:00

## 2020-03-01 RX ADMIN — ATORVASTATIN CALCIUM 10 MG: 10 TABLET, FILM COATED ORAL at 08:33

## 2020-03-01 RX ADMIN — FAMOTIDINE 20 MG: 20 TABLET ORAL at 17:13

## 2020-03-01 RX ADMIN — METOPROLOL TARTRATE 50 MG: 50 TABLET, FILM COATED ORAL at 20:12

## 2020-03-01 RX ADMIN — VANCOMYCIN HYDROCHLORIDE 750 MG: 750 INJECTION, SOLUTION INTRAVENOUS at 23:59

## 2020-03-01 RX ADMIN — POTASSIUM CHLORIDE 20 MEQ: 1.5 POWDER, FOR SOLUTION ORAL at 13:53

## 2020-03-01 RX ADMIN — INSULIN HUMAN 3 UNITS: 100 INJECTION, SOLUTION PARENTERAL at 11:52

## 2020-03-01 RX ADMIN — PROPOFOL 50 MCG/KG/MIN: 10 INJECTION, EMULSION INTRAVENOUS at 20:47

## 2020-03-01 RX ADMIN — INSULIN HUMAN 3 UNITS: 100 INJECTION, SOLUTION PARENTERAL at 23:59

## 2020-03-01 RX ADMIN — FAMOTIDINE 20 MG: 20 TABLET ORAL at 08:33

## 2020-03-01 RX ADMIN — KETOTIFEN FUMARATE 1 DROP: 0.35 SOLUTION/ DROPS OPHTHALMIC at 20:12

## 2020-03-01 RX ADMIN — HYDRALAZINE HYDROCHLORIDE 10 MG: 20 INJECTION INTRAMUSCULAR; INTRAVENOUS at 11:45

## 2020-03-01 RX ADMIN — PROPOFOL 50 MCG/KG/MIN: 10 INJECTION, EMULSION INTRAVENOUS at 16:03

## 2020-03-01 RX ADMIN — LACTULOSE 10 G: 10 SOLUTION ORAL at 08:32

## 2020-03-01 RX ADMIN — CHLORHEXIDINE GLUCONATE 0.12% ORAL RINSE 15 ML: 1.2 LIQUID ORAL at 08:32

## 2020-03-01 RX ADMIN — PANTOPRAZOLE SODIUM 40 MG: 40 INJECTION, POWDER, FOR SOLUTION INTRAVENOUS at 06:21

## 2020-03-01 RX ADMIN — HYDRALAZINE HYDROCHLORIDE 10 MG: 20 INJECTION INTRAMUSCULAR; INTRAVENOUS at 18:02

## 2020-03-01 RX ADMIN — POTASSIUM CHLORIDE 20 MEQ: 1.5 POWDER, FOR SOLUTION ORAL at 02:06

## 2020-03-01 RX ADMIN — CHLORHEXIDINE GLUCONATE 0.12% ORAL RINSE 15 ML: 1.2 LIQUID ORAL at 20:12

## 2020-03-01 RX ADMIN — FUROSEMIDE 40 MG: 10 INJECTION, SOLUTION INTRAMUSCULAR; INTRAVENOUS at 02:06

## 2020-03-01 RX ADMIN — ACETAMINOPHEN 649.6 MG: 650 SOLUTION ORAL at 02:05

## 2020-03-01 RX ADMIN — IPRATROPIUM BROMIDE AND ALBUTEROL SULFATE 3 ML: 2.5; .5 SOLUTION RESPIRATORY (INHALATION) at 07:12

## 2020-03-01 RX ADMIN — MEROPENEM 1 G: 1 INJECTION, POWDER, FOR SOLUTION INTRAVENOUS at 04:14

## 2020-03-01 RX ADMIN — LOSARTAN POTASSIUM 50 MG: 50 TABLET ORAL at 08:32

## 2020-03-01 RX ADMIN — MEROPENEM 1 G: 1 INJECTION, POWDER, FOR SOLUTION INTRAVENOUS at 20:12

## 2020-03-01 RX ADMIN — PROPOFOL 40 MCG/KG/MIN: 10 INJECTION, EMULSION INTRAVENOUS at 02:05

## 2020-03-01 RX ADMIN — VANCOMYCIN HYDROCHLORIDE 750 MG: 750 INJECTION, SOLUTION INTRAVENOUS at 00:05

## 2020-03-01 RX ADMIN — INSULIN HUMAN 3 UNITS: 100 INJECTION, SOLUTION PARENTERAL at 06:21

## 2020-03-01 RX ADMIN — BUDESONIDE 0.5 MG: 0.5 INHALANT RESPIRATORY (INHALATION) at 21:00

## 2020-03-01 RX ADMIN — IPRATROPIUM BROMIDE AND ALBUTEROL SULFATE 3 ML: 2.5; .5 SOLUTION RESPIRATORY (INHALATION) at 11:13

## 2020-03-01 NOTE — PLAN OF CARE
Problem: Patient Care Overview  Goal: Plan of Care Review  Outcome: Ongoing (interventions implemented as appropriate)  Flowsheets  Taken 3/1/2020 1634  Progress: improving  Taken 3/1/2020 0800  Plan of Care Reviewed With: spouse  Note:   Sed/vac done at start of shift, able to BERMAN, follow commands and nod head. Able to wean FiO2 and PEEP today- on 40% and 6 respectively. Maintaining sat of 98%. PRN hydralazine ordered for SBP over 140, cardene dc'd.  One dose given. Tolerating TF, continues at goal rate 50. BM's started today, so far she has had three and they are progressively liquid and large. Much care has been done to protect the wound vac sites.  Meticulous catheter care done frequently. Scant amt out of ETT, still suctioning fair amount from back of mouth.  Afebrile. Daughter at bedside most of afternoon, appropriate. Pulses per doppler. Overall pt improving and anticipate extubation tomorrow.

## 2020-03-01 NOTE — PLAN OF CARE
Problem: Patient Care Overview  Goal: Plan of Care Review  Outcome: Ongoing (interventions implemented as appropriate)  Flowsheets (Taken 3/1/2020 0751)  Progress: no change  Plan of Care Reviewed With: patient  Note:   Pt remains sedated on mechanical vent overnight. Overbreathes consistently, ABG improved this morning. No vasoactive gtts. Wound vac remains. Moderate MELISSA drainage, minimal pleural drainage. Tolerating tube feeds.

## 2020-03-01 NOTE — PLAN OF CARE
Pt may possibly be able to wean from the ventilator today if she is able to tolerate a decrease in FIO2 and PEEP.

## 2020-03-01 NOTE — PROGRESS NOTES
"INFECTIOUS DISEASE PROGRESS NOTE     Saskia Groves  1970  [id number]      Admission Date: 2/23/2020    Antibiotic therapy:   Daptomycin    Requesting Provider: Deandre Oseguera MD     Reason for Consultation: Left groin wound infection     History of present illness:  2/24/20:  Patient is a 49 y.o. female seen today for a left groin wound infection.  She underwent femoral endardectomy, and femorofemoral  Bypass with gortex graft on 1/15.  She has been having fevers 100 degrees and above since 1/17. She was seen last week by her PCP and given Bactrim but she couldn't tolerate it and Ceftin was called in. Her left groin continued to remain swollen and warm. She had a venous duplex to rule out DVT and then developed a \"knot\" of her left groin. She presented to the ED at [facility], was given Vancomycin and Merrem and transferred to [facility]. Tmax of 100.5 degrees without leukocytosis, normal lactate and PCT. She is currently on Vancomycin and Merrem and we were consulted for evaluation and treatment.   Labs at [facility] with WBC of 10.4, normal lactate, PCT.  She was tachycardic with tmax of 102.3.  She had fevers at home documented to over 103 degrees.  2/25/20 : She spontaneously drained a copious amount of slightly cloudy straw-colored fluid from her left groin yesterday and has continued to drain.  She complains of cough \"other than that I feel great\".  No n/v//d.  Left groin less tender.   2/26/20:  Graft to be removed on Friday.   She remains afebrile. She doesn't \"feel very good today\".  Family at bedside.   2/27/20:  Surgery scheduled for tomorrow.  She is tearful and afraid she will lose her leg.  She remains afebrile.    2/28/2020: She deteriorated this morning with hypoxemia and bilateral pulmonary opacifications with pleural effusions.  He had a fever to 101 degrees last night.  I discussed her complex situation with Dr. Deandre Oseguera early today and also this evening.  She was taken to surgery " today and her left femoral graft was removed.  A muscle flap was placed.  She is now endotracheally intubated and mechanically ventilated.  She underwent a thoracentesis revealing clear fluid.  Intravenous Merrem was added to her vancomycin this morning due to concerns about possible aspiration pneumonia.  She does have a history of penicillin allergy.  2020: She remains on ventilatory support.  She has only modest respiratory secretions.  She is still requiring an FiO2 of 60%.  She has been afebrile overnight.  She remains sedated.    Following for Dr. Parag Gordon:  3/1/20: Remains sedated on vent.  Tmax 99.3, FiO2 40% and PEEP 6.  She has a wound VAC on right ground with MELISSA drain with bloody drainage.  She has a right chest tube.  Small amount of clear to red-streaked ETT secretions. On tube feeding.         Past Medical History:   Diagnosis Date   • Anxiety    • Arthritis    • Asthma    • Carotid artery stenosis    • Chronic bronchitis (CMS/HCC)    • COPD (chronic obstructive pulmonary disease) (CMS/HCC)    • Coronary artery disease     2 vessels with 50% blockage.  Sees Dr. Donaldson   • Depression    • Diabetes mellitus (CMS/HCC)    • Dyslipidemia    • GERD (gastroesophageal reflux disease)    • Hiatal hernia    • Hyperlipidemia    • Hypertension    • Infectious viral hepatitis    • Lower back pain    • Migraine    • Multinodular goiter    • S/P thyroid biopsy     2008, 2013, 2015, and 07/15/2019 at [facility], right lobe nodules - all cytologies were benign   • Sleep apnea     can't tolerate the cpap mask   • Subclinical hyperthyroidism        Past Surgical History:   Procedure Laterality Date   • ANTERIOR CERVICAL DISCECTOMY W/ FUSION     • BACK SURGERY      lumbar   •  SECTION  19940    x 2    • CHOLECYSTECTOMY     • COLONOSCOPY     • ENDOSCOPY     • FEMORAL ENDARTERECTOMY Bilateral 1/15/2020    Procedure: FEMORAL ENDARTERECTOMY BILATERAL;  Surgeon: Deandre Oseguera MD;  Location:   [facility];  Service: Vascular   • FEMORAL FEMORAL BYPASS Right 1/15/2020    Procedure: FEMORAL FEMORAL BYPASS;  Surgeon: Deandre Oseguera MD;  Location:  [facility];  Service: Vascular   • HYSTERECTOMY     • KNEE SURGERY Left    • WRIST SURGERY Left        Family History   Problem Relation Age of Onset   • Diabetes Mother    • Hypertension Mother    • Arthritis Mother    • Hyperlipidemia Mother    • Migraines Mother    • Thyroid disease Mother    • Hypertension Father    • Lung cancer Father    • Cancer Father    • Stroke Other    • Migraines Maternal Aunt    • Thyroid disease Maternal Aunt    • Heart attack Maternal Aunt    • Osteoporosis Maternal Aunt    • Cancer Maternal Uncle    • Heart attack Maternal Uncle    • Stroke Maternal Uncle    • Hyperlipidemia Maternal Grandmother    • Cancer Maternal Grandmother    • Obesity Maternal Grandmother    • Thyroid disease Daughter    • Obesity Daughter        Social History     Socioeconomic History   • Marital status:      Spouse name: Not on file   • Number of children: 2   • Years of education: Not on file   • Highest education level: Not on file   Occupational History   • Occupation: [email or web address]     Employer: DISABLED     Comment: Back and Leg Pain   Tobacco Use   • Smoking status: Current Every Day Smoker     Packs/day: 1.00     Years: 35.00     Pack years: 35.00     Types: Cigarettes   • Smokeless tobacco: Never Used   Substance and Sexual Activity   • Alcohol use: Not Currently   • Drug use: No   • Sexual activity: Yes     Partners: Male     Comment:    Social History Narrative    Lives in [place], [place] alone       Allergies   Allergen Reactions   • Levaquin [Levofloxacin] Nausea And Vomiting   • Penicillins Nausea And Vomiting   • Codeine Nausea Only   • Flagyl [Metronidazole] Nausea And Vomiting         Medication:    Current Facility-Administered Medications:   •  acetaminophen (TYLENOL) tablet 650 mg, 650 mg, Oral, Q4H PRN, 650 mg at 02/28/20  0212 **OR** acetaminophen (TYLENOL) 160 MG/5ML solution 650 mg, 650 mg, Oral, Q4H PRN, 649.6 mg at 03/01/20 0205 **OR** acetaminophen (TYLENOL) suppository 650 mg, 650 mg, Rectal, Q4H PRN, Edgar Machado PA, 650 mg at 02/28/20 0429  •  albuterol (PROVENTIL) nebulizer solution 0.083% 2.5 mg/3mL, 2.5 mg, Nebulization, Q6H PRN, Edgar Machado PA  •  aspirin tablet 325 mg, 325 mg, Oral, Daily, Deandre Oseguera MD, 325 mg at 03/01/20 0833  •  atorvastatin (LIPITOR) tablet 10 mg, 10 mg, Oral, Daily, Edgar Machado PA, 10 mg at 03/01/20 0833  •  budesonide (PULMICORT) nebulizer solution 0.5 mg, 0.5 mg, Nebulization, BID - RT, Rian Sifuentes DO, 0.5 mg at 03/01/20 0712  •  chlorhexidine (PERIDEX) 0.12 % solution 15 mL, 15 mL, Mouth/Throat, Q12H, Rian Sifuentes DO, 15 mL at 03/01/20 0832  •  clopidogrel (PLAVIX) tablet 75 mg, 75 mg, Oral, Daily, Edgar Machado PA, 75 mg at 03/01/20 0833  •  dextrose (D50W) 25 g/ 50mL Intravenous Solution 25 g, 25 g, Intravenous, Q15 Min PRN, Edgar Machado PA  •  dextrose (GLUTOSE) oral gel 15 g, 15 g, Oral, Q15 Min PRN, Edgar Machado PA  •  famotidine (PEPCID) tablet 20 mg, 20 mg, Oral, BID AC, Edgar Machado PA, 20 mg at 03/01/20 0833  •  furosemide (LASIX) injection 40 mg, 40 mg, Intravenous, Q12H, Yessenia Valero MD, 40 mg at 03/01/20 0206  •  glucagon (human recombinant) (GLUCAGEN DIAGNOSTIC) injection 1 mg, 1 mg, Subcutaneous, Q15 Min PRN, Edgar Machado PA  •  guaiFENesin-dextromethorphan (ROBITUSSIN DM) 100-10 MG/5ML syrup 5 mL, 5 mL, Oral, Q6H PRN, Edgar Machado PA, 5 mL at 02/27/20 0920  •  hydrALAZINE (APRESOLINE) injection 10 mg, 10 mg, Intravenous, Q6H PRN, Yessenia Valero MD, 10 mg at 03/01/20 1145  •  HYDROcodone-acetaminophen (NORCO)  MG per tablet 1 tablet, 1 tablet, Oral, Q8H PRN, Edgar Machado PA, 1 tablet at 02/29/20 0201  •  insulin detemir (LEVEMIR) injection 10 Units, 10 Units, Subcutaneous, Nightly, Black,  PEDRO Castellanos, 10 Units at 02/29/20 2110  •  insulin regular (humuLIN R,novoLIN R) injection 0-14 Units, 0-14 Units, Subcutaneous, Q6H, Rian Sifuentes DO, 3 Units at 03/01/20 1152  •  ipratropium-albuterol (DUO-NEB) nebulizer solution 3 mL, 3 mL, Nebulization, 4x Daily - RT, Rian Sifuentes, , 3 mL at 03/01/20 1113  •  isosorbide mononitrate (IMDUR) 24 hr tablet 30 mg, 30 mg, Oral, Daily, Edgar Machado PA, 30 mg at 02/27/20 0902  •  ketotifen (ZADITOR) 0.025 % ophthalmic solution 1 drop, 1 drop, Both Eyes, BID, Edgar Machado PA, 1 drop at 03/01/20 0834  •  lactulose (CHRONULAC) 10 GM/15ML solution 10 g, 10 g, Oral, Daily, Edgar Machado PA, 10 g at 03/01/20 0832  •  losartan (COZAAR) tablet 50 mg, 50 mg, Oral, Daily, Yessenia Valero MD, 50 mg at 03/01/20 0832  •  meropenem (MERREM) 1 g/100 mL 0.9% NS VTB (mbp), 1 g, Intravenous, Q8H, Parag Gordon MD, 1 g at 03/01/20 1121  •  metoprolol tartrate (LOPRESSOR) tablet 50 mg, 50 mg, Oral, Q12H, Yessenia Valero MD, 50 mg at 03/01/20 0833  •  ondansetron (ZOFRAN) injection 4 mg, 4 mg, Intravenous, Once PRN, Ced Gómez CRNA  •  pantoprazole (PROTONIX) injection 40 mg, 40 mg, Intravenous, Q AM, Edgar Machado PA, 40 mg at 03/01/20 0621  •  Pharmacy to dose vancomycin, , Does not apply, Continuous PRN, Parag Gordon MD  •  potassium chloride (KLOR-CON) packet 20 mEq, 20 mEq, Oral, Q12H, Yessenia Valero MD, 20 mEq at 03/01/20 0206  •  propofol (DIPRIVAN) infusion 10 mg/mL 100 mL, 5-50 mcg/kg/min, Intravenous, Continuous, Rian Sifuentes DO, Last Rate: 18.09 mL/hr at 03/01/20 0900, 50 mcg/kg/min at 03/01/20 0900  •  sodium chloride 0.9 % flush 10 mL, 10 mL, Intravenous, Q12H, Edgar Machado PA, 10 mL at 03/01/20 0834  •  sodium chloride 0.9 % flush 10 mL, 10 mL, Intravenous, PRN, Edgar Machado PA  •  vancomycin in dextrose 5% 150 mL (VANCOCIN) IVPB 750 mg, 750 mg, Intravenous, Q12H, Parag Gordon  MD HARVEY, 750 mg at 20 1121    Antibiotics:  Anti-Infectives (From admission, onward)    Ordered     Dose/Rate Route Frequency Start Stop    20 1813  vancomycin in dextrose 5% 150 mL (VANCOCIN) IVPB 750 mg  Review   Ordering Provider:  Parag Gordon MD    750 mg  over 60 Minutes Intravenous Every 12 Hours Scheduled 20 0000 20 2359    20 1802  meropenem (MERREM) 1 g/100 mL 0.9% NS VTB (mbp)  Review   Ordering Provider:  Parag Gordon MD    1 g  over 3 Hours Intravenous Every 8 Hours 20 1900 20 1959    20 1803  Pharmacy to dose vancomycin  Review   Ordering Provider:  Parag Gordon MD     Does not apply Continuous PRN 20 1802 20 1901    20 0746  meropenem (MERREM) 1 g/100 mL 0.9% NS VTB (mbp)     Ordering Provider:  Parag Gordon MD    1 g  over 30 Minutes Intravenous Once 20 0845 20 0921    20 0831  cefepime (MAXIPIME) 2 g/100 mL 0.9% NS (mbp)     Ordering Provider:  Ranjana Easley APRN    2 g  200 mL/hr over 30 Minutes Intravenous Once 20 0930 20 1157    20 0203  vancomycin 500 mg/100 mL 0.9% NS IVPB (mbp)     Ordering Provider:  Ced Kenny RPH    500 mg  100 mL/hr over 60 Minutes Intravenous Once 20 0300 20 0511                Physical Exam:   Vital Signs  Temp (24hrs), Av.3 °F (37.4 °C), Min:98.1 °F (36.7 °C), Max:100.4 °F (38 °C)    Temp  Min: 98.1 °F (36.7 °C)  Max: 100.4 °F (38 °C)  BP  Min: 93/63  Max: 151/85  Pulse  Min: 90  Max: 116  Resp  Min: 18  Max: 35  SpO2  Min: 91 %  Max: 99 %    GENERAL: Sedated and endotracheally intubated.  HEENT: Normocephalic, atraumatic.  No conjunctival injection. No icterus.   HEART: RRR; No murmur, rubs, gallops.   LUNGS: diminished at lung bases bilaterally   ABDOMEN: Soft, nontender, nondistended. Positive bowel sounds. No rebound or guarding.   MSK:  No joint effusions   SKIN: Warm and dry without cutaneous eruptions on  Inspection/palpation.   NEURO: sedated  Left groin with a wound VAC in place MELISSA drains in place with bloody drainage.    Right Chest tube in place    Laboratory Data    Results from last 7 days   Lab Units 03/01/20  0404 02/29/20  0411 02/28/20  1818   WBC 10*3/mm3 12.98* 15.24* 21.37*   HEMOGLOBIN g/dL 11.7* 11.9* 11.6*   HEMATOCRIT % 35.4 36.9 35.2   PLATELETS 10*3/mm3 313 342 329     Results from last 7 days   Lab Units 03/01/20  0404   SODIUM mmol/L 140   POTASSIUM mmol/L 4.2   CHLORIDE mmol/L 107   CO2 mmol/L 24.0   BUN mg/dL 28*   CREATININE mg/dL 0.88   GLUCOSE mg/dL 148*   CALCIUM mg/dL 8.1*     Results from last 7 days   Lab Units 02/29/20  1557   ALK PHOS U/L 150*   BILIRUBIN mg/dL 0.7   ALT (SGPT) U/L 35*   AST (SGOT) U/L 63*         Results from last 7 days   Lab Units 02/29/20  1604   CRP mg/dL 8.05*     Results from last 7 days   Lab Units 02/28/20  0243   LACTATE mmol/L 1.5         Results from last 7 days   Lab Units 02/27/20  1137 02/25/20  1113 02/24/20  0057   VANCOMYCIN TR mcg/mL 19.10 21.10*  --    VANCOMYCIN RM mcg/mL  --   --  12.30     Estimated Creatinine Clearance: 72.8 mL/min (by C-G formula based on SCr of 0.88 mg/dL).      Microbiology:  2/24: BCx NG    2/24: wound few WBCs, GPC in pairs -- MRSA   2/28/2020: Operative cultures are growing MRSA  2/29/2020: Sputum culture is NGSF      Radiology:  Imaging Results (Last 72 Hours)     Procedure Component Value Units Date/Time    XR Chest 1 View [[id number]] Collected:  03/01/20 0757     Updated:  03/01/20 0758    Narrative:          EXAMINATION: XR CHEST 1 VW-      INDICATION: edema; I73.9-Peripheral vascular disease, unspecified;  A19-Akmrtmd effusion, not elsewhere classified; Z95.828-Presence of  other vascular implants and grafts      COMPARISON: 02/29/2020     FINDINGS: Support hardware unchanged and in satisfactory positioning.  Cardiac silhouette enlarged with diffuse bilateral pulmonary  opacifications slightly decreased in density  from prior comparison  particularly within the right mid and upper lung suggesting improving  airspace disease. No pneumothorax however trace volume left pleural  effusion similar to prior.           Impression:       Cardiac silhouette enlarged with diffuse bilateral pulmonary  opacifications slightly decreased in density from prior comparison  particularly within the right mid and upper lung suggesting improving  airspace disease. No pneumothorax however trace volume left pleural  effusion similar to prior.              XR Chest 1 View [[id number]] Collected:  02/29/20 0723     Updated:  02/29/20 1907    Narrative:          EXAMINATION: XR CHEST 1 VW - 02/29/2020     INDICATION:  I73.9-Peripheral vascular disease, unspecified; V42-Kpvyplm  effusion, not elsewhere classified; Z95.828-Presence of other vascular  implants and grafts.      COMPARISON: 02/28/2020     FINDINGS: Support hardware unchanged and in satisfactory positioning.  Cardiac size borderline enlarged and mostly obscured due to increase in  already dense bilateral pulmonary opacifications from prior of diffuse  airspace disease without pneumothorax or large effusion present.           Impression:       Increased density of diffuse bilateral pulmonary  opacifications with a left perihilar predominance of involvement with  air bronchograms present at this site of diffuse bilateral airspace  disease without significant effusion. Support hardware unchanged. No  pneumothorax.     DICTATED:   02/29/2020  EDITED/ls :   02/29/2020      This report was finalized on 2/29/2020 7:04 PM by Dr. Valdez Hill.       XR Chest 1 View [[id number]] Collected:  02/28/20 0841     Updated:  02/28/20 2154    Narrative:       EXAMINATION: XR CHEST 1 VW-     INDICATION: Dyspnea; I73.9-Peripheral vascular disease, unspecified.      COMPARISON: 01/14/2020.     FINDINGS: Apparatus shadow superimposed over the right upper neck and  chest. Heart is normal in size but there has been  interval development  of dense groundglass and finely reticular disease, with some patchy  airspace disease of the lower lungs. There appears to be a skinfold  shadow superimposed over the right base. No pneumothorax is suspected.       Impression:       Interval development of extensive pulmonary interstitial  disease, more typical in appearance for developing ARDS, than for  pulmonary edema. Patchy multifocal bibasilar airspace disease, possibly  pneumonia.      D:  02/28/2020  E:  02/28/2020     This report was finalized on 2/28/2020 9:51 PM by Dr. Jaswant Frank MD.       XR Chest 1 View [[id number]] Collected:  02/28/20 1932     Updated:  02/28/20 1934    Narrative:       CHEST X-RAY, 2/28/2020 (18:47)    HISTORY:    . Patient on ventilator. Follow-up cardiopulmonary status.      TECHNIQUE:  AP portable semiupright chest x-ray.    COMPARISON:  *  Chest x-ray earlier today.    FINDINGS:  Endotracheal tube tip in the mid thoracic trachea 5.0 cm above the you. Right arm PICC tip in the low SVC. NG tube in good position in the distal stomach.    Right pleural drain in place with no visible pneumothorax.    Moderate diffuse interstitial and alveolar pulmonary edema and probable small pleural effusions without significant change since earlier today. Heart size is normal.      Impression:       1. Support equipment in good position as detailed above.  2. Right-sided chest drain with no visible pneumothorax.  3. Pulmonary findings are unchanged since earlier today.    Signer Name: Tyson Yu MD   Signed: 2/28/2020 7:32 PM   Workstation Name: [email or web address]    [facility]    CT Angiogram Chest With & Without Contrast [[id number]] Collected:  02/27/20 2150     Updated:  02/27/20 2152    Narrative:       CT ANGIOGRAM CHEST W CONTRAST    INDICATION:   Dyspnea of suspected cardiac origin. Shortness of air. Peripheral vascular disease.    TECHNIQUE:   CT angiogram of the chest with iodinated IV  contrast. 3-D reconstructions were obtained and reviewed.   Radiation dose reduction techniques included automated exposure control or exposure modulation based on body size. Count of known CT and cardiac nuc  med studies performed in previous 12 months: 1.     COMPARISON:   None available.    FINDINGS:   Study is slightly motion degraded but felt to be diagnostic. Extensive bilateral perihilar alveolar infiltrates which appear relatively symmetric and most consistent with acute pulmonary edema. Moderate bilateral pleural effusions measuring over 5 cm on  the right and over 6 cm on the left. Compressive atelectasis both lung bases. Trachea and bronchi unremarkable.    No evidence of pulmonary embolus. Aortic atherosclerotic changes without dissection or aneurysm. Heart size within normal limits. Enlarged mediastinal lymph nodes the largest in the region of the AP window measuring 2.2 x 1.7 cm and may be reactive.  Appropriate follow-up recommended.    1.9 cm left adrenal nodule. Postcholecystectomy. Postsurgical changes lower cervical spine. Nonaggressive sclerotic bone lesion T8 vertebral body may represent a bone island. Generalized body wall edema suggesting third spacing of fluid      Impression:       Extensive bilateral perihilar alveolar infiltrates most likely representing pulmonary edema. Moderate-sized bilateral pleural effusions.    No evidence of pulmonary embolus.    Suspected anasarca with generalized body wall edema.    Signer Name: HARVEY Oliver MD   Signed: 2/27/2020 9:50 PM   Workstation Name: [facility]    [facility]        I read her radiographic studies    Impression:   1.  MRSA left groin abscess/cellulitis/graft infection-status post graft removal.  She will require a prolonged course of intravenous antibiotic therapy directed toward MRSA.  I will leave her on vancomycin for now but I may switch her to daptomycin prior to her discharge.  2.  Bilateral pulmonary  infiltrates/pleural effusions-this may be secondary to edema or aspiration pneumonia.  I have added intravenous Merrem pending culture data and further evaluation.  3.  Acute hypoxic respiratory failure-requiring endotracheal intubation and mechanical ventilation  4.  S/p femorofemoral bypass with gortex graft, 1/15  5.  Type 2 Diabetes Mellitus  6.  Penicillin allergy  7.  Type 2 diabetes mellitus-with very poor control  8.  Malnutrition    PLAN/RECOMMENDATIONS:   1.  Continue IV vancomycin   2.  Graft resection/debridement-done  3.  Sputum Gram stain and culture--NGSF  4.  Continue intravenous Merrem    Discussed with daughter    Collette Melo MD saw and examined patient, verified hx and PE, read all radiographic studies, reviewed labs and micro data, and formulated dx, plan for treatment and all medical decision making.      PEDRO Herron-C for MD Kale Rivera PA  3/1/2020  12:48 PM

## 2020-03-01 NOTE — PROGRESS NOTES
Plastic Surgery Progress Note      Admission Date:  2020  LOS:  7  Patient Care Team:  Meagan Barlow APRN as PCP - General (Family Medicine)  Tyson Donaldson DO as Consulting Physician (Cardiology)    Patient Name:  Saskia Groves  :  1970  MRN:  [id number]    Date:  3/1/2020      Subjective:  Patient intubated and sedated. No acute events, WBC decreasing, vitals stable,       History:   Past Medical History:   Diagnosis Date   • Anxiety    • Arthritis    • Asthma    • Carotid artery stenosis    • Chronic bronchitis (CMS/HCC)    • COPD (chronic obstructive pulmonary disease) (CMS/HCC)    • Coronary artery disease     2 vessels with 50% blockage.  Sees Dr. Donaldson   • Depression    • Diabetes mellitus (CMS/HCC)    • Dyslipidemia    • GERD (gastroesophageal reflux disease)    • Hiatal hernia    • Hyperlipidemia    • Hypertension    • Infectious viral hepatitis    • Lower back pain    • Migraine    • Multinodular goiter    • S/P thyroid biopsy     2008, 2013, 2015, and 07/15/2019 at [facility], right lobe nodules - all cytologies were benign   • Sleep apnea     can't tolerate the cpap mask   • Subclinical hyperthyroidism      Past Surgical History:   Procedure Laterality Date   • ANTERIOR CERVICAL DISCECTOMY W/ FUSION     • BACK SURGERY      lumbar   •  SECTION  19940    x 2    • CHOLECYSTECTOMY     • COLONOSCOPY     • ENDOSCOPY     • FEMORAL ENDARTERECTOMY Bilateral 1/15/2020    Procedure: FEMORAL ENDARTERECTOMY BILATERAL;  Surgeon: Deandre Oseguera MD;  Location:  [facility];  Service: Vascular   • FEMORAL FEMORAL BYPASS Right 1/15/2020    Procedure: FEMORAL FEMORAL BYPASS;  Surgeon: Deandre Oseguera MD;  Location:  [facility];  Service: Vascular   • HYSTERECTOMY     • KNEE SURGERY Left    • WRIST SURGERY Left      Family History   Problem Relation Age of Onset   • Diabetes Mother    • Hypertension Mother    • Arthritis Mother    • Hyperlipidemia Mother    • Migraines Mother     • Thyroid disease Mother    • Hypertension Father    • Lung cancer Father    • Cancer Father    • Stroke Other    • Migraines Maternal Aunt    • Thyroid disease Maternal Aunt    • Heart attack Maternal Aunt    • Osteoporosis Maternal Aunt    • Cancer Maternal Uncle    • Heart attack Maternal Uncle    • Stroke Maternal Uncle    • Hyperlipidemia Maternal Grandmother    • Cancer Maternal Grandmother    • Obesity Maternal Grandmother    • Thyroid disease Daughter    • Obesity Daughter      Social History     Socioeconomic History   • Marital status:      Spouse name: Not on file   • Number of children: 2   • Years of education: Not on file   • Highest education level: Not on file   Occupational History   • Occupation: [employer]     Employer: DISABLED     Comment: Back and Leg Pain   Tobacco Use   • Smoking status: Current Every Day Smoker     Packs/day: 1.00     Years: 35.00     Pack years: 35.00     Types: Cigarettes   • Smokeless tobacco: Never Used   Substance and Sexual Activity   • Alcohol use: Not Currently   • Drug use: No   • Sexual activity: Yes     Partners: Male     Comment:    Social History Narrative    Lives in [facility]     Allergies   Allergen Reactions   • Levaquin [Levofloxacin] Nausea And Vomiting   • Penicillins Nausea And Vomiting   • Codeine Nausea Only   • Flagyl [Metronidazole] Nausea And Vomiting       Medication:    Current Facility-Administered Medications:   •  acetaminophen (TYLENOL) tablet 650 mg, 650 mg, Oral, Q4H PRN, 650 mg at 02/28/20 0212 **OR** acetaminophen (TYLENOL) 160 MG/5ML solution 650 mg, 650 mg, Oral, Q4H PRN, 649.6 mg at 03/01/20 0205 **OR** acetaminophen (TYLENOL) suppository 650 mg, 650 mg, Rectal, Q4H PRN, Edgar Machado PA, 650 mg at 02/28/20 0429  •  albuterol (PROVENTIL) nebulizer solution 0.083% 2.5 mg/3mL, 2.5 mg, Nebulization, Q6H PRN, Edgar Machado PA  •  aspirin tablet 325 mg, 325 mg, Oral, Daily, Deandre Oseguera MD, 325 mg at  03/01/20 0833  •  atorvastatin (LIPITOR) tablet 10 mg, 10 mg, Oral, Daily, Edgar Machado PA, 10 mg at 03/01/20 0833  •  budesonide (PULMICORT) nebulizer solution 0.5 mg, 0.5 mg, Nebulization, BID - RT, Rian Sifuentes, DO, 0.5 mg at 03/01/20 0712  •  chlorhexidine (PERIDEX) 0.12 % solution 15 mL, 15 mL, Mouth/Throat, Q12H, Rian Sifuentes, DO, 15 mL at 03/01/20 0832  •  clopidogrel (PLAVIX) tablet 75 mg, 75 mg, Oral, Daily, dEgar Machado PA, 75 mg at 03/01/20 0833  •  dextrose (D50W) 25 g/ 50mL Intravenous Solution 25 g, 25 g, Intravenous, Q15 Min PRN, Edgar Machado PA  •  dextrose (GLUTOSE) oral gel 15 g, 15 g, Oral, Q15 Min PRN, Edgar Machado PA  •  famotidine (PEPCID) tablet 20 mg, 20 mg, Oral, BID AC, Edgar Machado PA, 20 mg at 03/01/20 0833  •  furosemide (LASIX) injection 40 mg, 40 mg, Intravenous, Q12H, Yessenia Valero MD, 40 mg at 03/01/20 0206  •  glucagon (human recombinant) (GLUCAGEN DIAGNOSTIC) injection 1 mg, 1 mg, Subcutaneous, Q15 Min PRN, Edgar Machado PA  •  guaiFENesin-dextromethorphan (ROBITUSSIN DM) 100-10 MG/5ML syrup 5 mL, 5 mL, Oral, Q6H PRN, Edgar Machado PA, 5 mL at 02/27/20 0920  •  HYDROcodone-acetaminophen (NORCO)  MG per tablet 1 tablet, 1 tablet, Oral, Q8H PRN, Edgar Machado PA, 1 tablet at 02/29/20 0201  •  insulin detemir (LEVEMIR) injection 10 Units, 10 Units, Subcutaneous, Nightly, Edgar Machado PA, 10 Units at 02/29/20 2110  •  insulin regular (humuLIN R,novoLIN R) injection 0-14 Units, 0-14 Units, Subcutaneous, Q6H, Rian Sifuentes, , 3 Units at 03/01/20 0621  •  ipratropium-albuterol (DUO-NEB) nebulizer solution 3 mL, 3 mL, Nebulization, 4x Daily - RT, Rian Sifuentes, , 3 mL at 03/01/20 0712  •  isosorbide mononitrate (IMDUR) 24 hr tablet 30 mg, 30 mg, Oral, Daily, Edgar Machado PA, 30 mg at 02/27/20 0902  •  ketotifen (ZADITOR) 0.025 % ophthalmic solution 1 drop, 1 drop, Both Eyes, BID, Black,  PEDRO Castellanos, 1 drop at 03/01/20 0834  •  lactulose (CHRONULAC) 10 GM/15ML solution 10 g, 10 g, Oral, Daily, Edgar Machado PA, 10 g at 03/01/20 0832  •  losartan (COZAAR) tablet 50 mg, 50 mg, Oral, Daily, Yessenia Valero MD, 50 mg at 03/01/20 0832  •  meropenem (MERREM) 1 g/100 mL 0.9% NS VTB (mbp), 1 g, Intravenous, Q8H, Parag Gordon MD, 1 g at 03/01/20 0414  •  metoprolol tartrate (LOPRESSOR) tablet 50 mg, 50 mg, Oral, Q12H, Yessenia Valero MD, 50 mg at 03/01/20 0833  •  niCARdipine (CARDENE) 25 mg in 250 mL NS (0.1 mg/mL) infusion, 5-15 mg/hr, Intravenous, Titrated, Edgar Machado PA, Last Rate: 50 mL/hr at 03/01/20 0715, 5 mg/hr at 03/01/20 0715  •  ondansetron (ZOFRAN) injection 4 mg, 4 mg, Intravenous, Once PRN, Ced Gómez CRNA  •  pantoprazole (PROTONIX) injection 40 mg, 40 mg, Intravenous, Q AM, Edgar Machado PA, 40 mg at 03/01/20 0621  •  Pharmacy to dose vancomycin, , Does not apply, Continuous PRN, Parag Gordon MD  •  potassium chloride (KLOR-CON) packet 20 mEq, 20 mEq, Oral, Q12H, Yessenia Vaelro MD, 20 mEq at 03/01/20 0206  •  propofol (DIPRIVAN) infusion 10 mg/mL 100 mL, 5-50 mcg/kg/min, Intravenous, Continuous, Rian Sifuentes DO, Last Rate: 18.09 mL/hr at 03/01/20 0900, 50 mcg/kg/min at 03/01/20 0900  •  sodium chloride 0.9 % flush 10 mL, 10 mL, Intravenous, Q12H, Edgar Machado PA, 10 mL at 03/01/20 0834  •  sodium chloride 0.9 % flush 10 mL, 10 mL, Intravenous, PRN, Edgar Machado PA  •  vancomycin in dextrose 5% 150 mL (VANCOCIN) IVPB 750 mg, 750 mg, Intravenous, Q12H, Parag Gordon MD, 750 mg at 03/01/20 0005    Antibiotics:  Anti-Infectives (From admission, onward)    Ordered     Dose/Rate Route Frequency Start Stop    02/28/20 1813  vancomycin in dextrose 5% 150 mL (VANCOCIN) IVPB 750 mg  Review   Ordering Provider:  Parag Gordon MD    750 mg  over 60 Minutes Intravenous Every 12 Hours Scheduled 02/29/20 0000 04/08/20 5203     20 1802  meropenem (MERREM) 1 g/100 mL 0.9% NS VTB (mbp)  Review   Ordering Provider:  Parag Gordon MD    1 g  over 3 Hours Intravenous Every 8 Hours 20 1900 20 1959    20 1803  Pharmacy to dose vancomycin  Review   Ordering Provider:  Parag Gordon MD     Does not apply Continuous PRN 20 1802 20 1901    20 0746  meropenem (MERREM) 1 g/100 mL 0.9% NS VTB (mbp)     Ordering Provider:  Parag Gordon MD    1 g  over 30 Minutes Intravenous Once 20 0845 20 0921    20 0831  cefepime (MAXIPIME) 2 g/100 mL 0.9% NS (mbp)     Ordering Provider:  Ranjana Easley APRN    2 g  200 mL/hr over 30 Minutes Intravenous Once 20 0930 20 1157    20 0203  vancomycin 500 mg/100 mL 0.9% NS IVPB (mbp)     Ordering Provider:  Ced Kenny RPH    500 mg  100 mL/hr over 60 Minutes Intravenous Once 20 0300 20 0511            Objective:    Physical Exam:   Vital Signs:  Temp (24hrs), Av.3 °F (37.4 °C), Min:98.1 °F (36.7 °C), Max:100.4 °F (38 °C)    Temp  Min: 98.1 °F (36.7 °C)  Max: 100.4 °F (38 °C)  BP  Min: 93/63  Max: 151/85  Pulse  Min: 91  Max: 116  Resp  Min: 18  Max: 35  SpO2  Min: 91 %  Max: 98 %    GENERAL: Awake and alert, in no acute distress. Intubated  HEART: RRR;   LUNGS: Nonlabored. No dullness. intubated  ABDOMEN: Soft, nontender, nondistended. No rebound or guarding. NO mass or HSM.  EXT:  No cyanosis, clubbing or edema. No cord.  : Benavidez catheter in place.  SKIN: Warm and dry without cutaneous eruptions on Inspection/palpation.    NEURO: Oriented to PPT. No focal deficits on motor/sensory exam at arms/legs.  PSYCHIATRIC: Normal insight and judgement. Cooperative with PE  Bilateral Groins: incisional wound vac in place to suction, drains appropriate output, no seroma or hematoma on exam, soft, appropriate tenderness to palpation, no ecchymosis     Laboratory Data:  Results from last 7 days   Lab Units  03/01/20  0404 02/29/20  0411 02/28/20  1818   WBC 10*3/mm3 12.98* 15.24* 21.37*   HEMOGLOBIN g/dL 11.7* 11.9* 11.6*   HEMATOCRIT % 35.4 36.9 35.2   PLATELETS 10*3/mm3 313 342 329     Results from last 7 days   Lab Units 03/01/20  0404   SODIUM mmol/L 140   POTASSIUM mmol/L 4.2   CHLORIDE mmol/L 107   CO2 mmol/L 24.0   BUN mg/dL 28*   CREATININE mg/dL 0.88   GLUCOSE mg/dL 148*   CALCIUM mg/dL 8.1*     Results from last 7 days   Lab Units 02/29/20  1557   ALK PHOS U/L 150*   BILIRUBIN mg/dL 0.7   ALT (SGPT) U/L 35*   AST (SGOT) U/L 63*         Results from last 7 days   Lab Units 02/29/20  1604   CRP mg/dL 8.05*     Results from last 7 days   Lab Units 02/28/20  0243   LACTATE mmol/L 1.5         Results from last 7 days   Lab Units 02/27/20  1137 02/25/20  1113 02/24/20  0057   VANCOMYCIN TR mcg/mL 19.10 21.10*  --    VANCOMYCIN RM mcg/mL  --   --  12.30     Estimated Creatinine Clearance: 72.8 mL/min (by C-G formula based on SCr of 0.88 mg/dL).    Microbiology:  Blood Culture   Date Value Ref Range Status   02/28/2020 No growth at 2 days  Preliminary     No results found for: BCIDPCR, CXREFLEX, CSFCX, CULTURETIS  No results found for: CULTURES, HSVCX, URCX  No results found for: EYECULTURE, GCCX, LABHSV  No results found for: LEGIONELLA, MRSACX, MUMPSCX, MYCOPLASCX  No results found for: NOCARDIACX, STOOLCX  No results found for: THROATCX, UNSTIMCULT, URINECX, CULTURE, VZVCULTUR  Wound Culture   Date Value Ref Range Status   02/24/2020 Light growth (2+) Staphylococcus aureus, MRSA (A)  Final     Comment:     Methicillin resistant Staphylococcus aureus, Patient may be an isolation risk.        Assessment: 49yoF s/p 12/28/20 diagnostic fiberoptic bronchoscopy, Rtchest tube placement, removal of femoral-femoral bypass, bilateral CFA pericardial patches, bilateral Sartorious muscle flaps for groin graft coverage.         Plan:  - no acute surgery at this point in time, incisional wound vac in place to suction, no seroma  or hematoma on PE, Lower extremity perfused,   - continue abx per ID  - ICU care per ICU team  - continue incisional wound vac  - record and strip the MEILSSA drains  - if patient extubates may sit up in chair per Plastic surgery  - will continue to follow      Modesto Whittaker MD  03/01/20  10:26 AM

## 2020-03-01 NOTE — PROGRESS NOTES
"Saskia Groves  [id number]  1970     LOS: 7 days   Patient Care Team:  Meagan Barlow APRN as PCP - General (Family Medicine)  Tyson Donaldson DO as Consulting Physician (Cardiology)    Chief Complaint: Infected femoral-femoral bypass      Subjective: Remains intubated and sedated    Objective:     Vital Sign Min/Max for last 24 hours  Temp  Min: 97.5 °F (36.4 °C)  Max: 100.4 °F (38 °C)   BP  Min: 93/63  Max: 151/85   Pulse  Min: 73  Max: 116   Resp  Min: 18  Max: 35   SpO2  Min: 89 %  Max: 98 %   No data recorded   No data recorded     Flowsheet Rows      First Filed Value   Admission Height  160 cm (62.99\") Documented at 02/23/2020 2240   Admission Weight  60.4 kg (133 lb 1.6 oz) Documented at 02/23/2020 2240          Physical Exam:    Wound: Satisfactory wound vacs in place    Pulses:     Mediastinal and Chest Tube Drainage:       Results Review:   Results from last 7 days   Lab Units 03/01/20  0404   WBC 10*3/mm3 12.98*   HEMOGLOBIN g/dL 11.7*   HEMATOCRIT % 35.4   PLATELETS 10*3/mm3 313     Results from last 7 days   Lab Units 03/01/20  0404   SODIUM mmol/L 140   POTASSIUM mmol/L 4.2   CHLORIDE mmol/L 107   CO2 mmol/L 24.0   BUN mg/dL 28*   CREATININE mg/dL 0.88   GLUCOSE mg/dL 148*   CALCIUM mg/dL 8.1*     Results from last 7 days   Lab Units 03/01/20  0428   PH, ARTERIAL pH units 7.463*   PO2 ART mm Hg 89.1   PCO2, ARTERIAL mm Hg 36.6   HCO3 ART mmol/L 26.1*         Assessment      PVD (peripheral vascular disease) (CMS/[facility])    Current smoker    COPD (chronic obstructive pulmonary disease) (CMS/HCC)    Coronary artery disease    Hypertension    Hyperlipidemia    Diabetes mellitus (CMS/HCC)    Sepsis (CMS/HCC)    Pleural effusion - bilateral mod/large pleural effusions on CT chest 2/27/2020.     Acute pulmonary edema (CMS/HCC)    Acute hypoxemic respiratory failure (CMS/HCC)      Tinea supportive care.        Deandre Oseguera MD  03/01/20  7:00 AM      Please note that portions of this note " were completed with a voice recognition program. Efforts were made to edit the dictations, but words may be mistranscribed

## 2020-03-01 NOTE — NURSING NOTE
Pt able to follow commands, mouth words, BERMAN during Sed/Vac this AM.  However, unable to wean O2 low enough to wean/extubate. Actually had to go up on PEEP. Put back on propofol and will re-eval in AM.  Cardene started for short time while propofol was off, it is off now. Lasix given for wet lungs, diuresed 600. Sputum sent. HR tachy despite all home BP meds and increase in BB. BP stable.  TF started per dietician recs. Right groin specimen sent from surgery positive for MRSA.  Family in and out all day, updated.   has lots of questions suitable for an MD.  Suggested he be here early to speak with one. MELISSA and PT drainage fairly minimal.  Pedal pulses per doppler. Will continue to support and wean vent as tolerated.

## 2020-03-01 NOTE — PROGRESS NOTES
Pulmonary/Critical Care Follow-up     LOS: 7 days   Patient Care Team:  Meagan Barlow APRN as PCP - General (Family Medicine)  Tyson Donaldson DO as Consulting Physician (Cardiology)    Chief Complaint: sepsis      Subjective      49 y.o. female with history of ongoing tobacco abuse, COPD, hypertension, type 2 diabetes, hyperlipidemia, peripheral vascular disease who had bilateral femoral endarterectomies and a femoral-femoral bypass graft on 1/15/2020.  The patient has subsequently developed left groin pain and was found to have a left inguinal abscess and infected graft.  She was readmitted on 2/23/2020 and has been treated with antibiotics per infectious disease.    2/27 evening, the patient developed worsening dyspnea and hypoxemia requiring BiPAP.  She had a CT scan which showed large bilateral pleural effusions and bilateral pulmonary infiltrates most consistent with pulmonary edema.  She was given 40 mg of IV Lasix.  2/28 she was transferred to the intensive care unit for further management.   She was taken to the operating room for removal of the infected graft, muscle flap/reconstruction per plastic surgery, and placement of bilateral chest tubes for pleural effusions.  Blood cultures have been negative but her wound culture was positive for MRSA.  She has been on meropenem and vancomycin.  Initially transferred to the ICU February 28.  She was on BiPAP and drowsy but arousable.  She required intubation for worsening hypoxia.    Interval History:     She remains intubated and on mechanical ventilation.  On a PEEP of 8 and 40% her saturation is 94%.  Improved from yesterday  Blood pressure elevated yesterday and she was on Cardene.  Beta-blocker and ARB increased and blood pressure was in the 90s all night.  This morning systolic is back up to 150 and Cardene drip was restarted.  She remains afebrile.  Received diuretics yesterday, input 1926, output 2617; 180 from chest tube  Wound vacs in  place  Remains in sinus rhythm    History taken from: Nursing, chart    PMH/FH/Social History were reviewed and updated appropriately in the electronic medical record.     Review of Systems:    Review of 14 systems was completed with positives and pertinent negatives noted in the subjective section.  All other systems reviewed and are negative.   Exceptions are noted below:    Unable to obtain secondary to drowsiness.      Objective     Vital Signs  Temp:  [98.1 °F (36.7 °C)-100.4 °F (38 °C)] 98.1 °F (36.7 °C)  Heart Rate:  [] 95  Resp:  [18-35] 31  BP: ()/(56-85) 120/72  Arterial Line BP: ()/() 126/68  FiO2 (%):  [40 %-60 %] 40 %  02/29 0701 - 03/01 0700  In: 1926 [I.V.:584]  Out: 2617 [Urine:1907; Drains:430]  Body mass index is 23.57 kg/m².  FiO2 (%):  [40 %-60 %] 40 %  S RR:  [20] 20  PEEP/CPAP (cm H2O):  [8 cm H20] 8 cm H20  MD SUP:  [0 cm H20] 0 cm H20  MAP (cm H2O):  [11-14] 11  IV drips:    niCARdipine Last Rate: 5 mg/hr (03/01/20 0715)   Pharmacy to dose vancomycin    propofol Last Rate: 50 mcg/kg/min (03/01/20 0900)      Physical Exam:     Constitutional:    sedated, intubated   Head:   Normocephalic, without obvious abnormality, atraumatic   Eyes:           Lids and lashes normal, conjunctivae and sclerae normal.  No icterus, no pallor, corneas clear, PER   ENMT:  Ears appear intact with no abnormalities noted     Orally intubated   Neck:  No adenopathy, supple, trachea midline, no thyromegaly, no JVD   Lungs/Resp:     Chest tubes in place with serosanguineous output, right.  Diminished breath sounds lower lobes, improved breath sounds posterior chest bilaterally            Heart/CV:   Regular rhythm and normal rate, normal S1 and S2, no            murmur   Abdomen/GI:    Normal bowel sounds, no masses, no organomegaly, soft        non-tender, non-distended   :     Bilateral groin wound VAC    Extremities/MSK:  No clubbing or cyanosis.  tr+ bilateral lower extremity edema.   Normal tone.    No deformities.    Pulses:  Pulses palpable and equal bilaterally   Skin:  No bleeding, bruising or rash   Heme/Lymph:  No cervical or supraclavicular adenopathy.   Neurologic:    Psychiatric:    Sedated    The above physical exam findings were reviewed and reflect exam findings as of today's exam.     Yessenia Valero MD 03/01/20 10:46 AM    Results Review:     I reviewed the patient's new clinical results.   Results from last 7 days   Lab Units 03/01/20 0404 02/29/20  1557 02/29/20 0411 02/28/20  1818   SODIUM mmol/L 140 142 139 140   POTASSIUM mmol/L 4.2 4.1 3.9 4.4   CHLORIDE mmol/L 107 107 104 103   CO2 mmol/L 24.0 24.0 22.0 24.0   BUN mg/dL 28* 25* 25* 23*   CREATININE mg/dL 0.88 0.84 0.87 0.88   CALCIUM mg/dL 8.1* 7.8* 8.3* 8.9   BILIRUBIN mg/dL  --  0.7 0.9 3.6*   ALK PHOS U/L  --  150* 145* 139*   ALT (SGPT) U/L  --  35* 36* 28   AST (SGOT) U/L  --  63* 68* 75*   GLUCOSE mg/dL 148* 93 168* 247*     Results from last 7 days   Lab Units 03/01/20  0404 02/29/20 0411 02/28/20  1818   WBC 10*3/mm3 12.98* 15.24* 21.37*   HEMOGLOBIN g/dL 11.7* 11.9* 11.6*   HEMATOCRIT % 35.4 36.9 35.2   PLATELETS 10*3/mm3 313 342 329     Results from last 7 days   Lab Units 03/01/20  0428   PH, ARTERIAL pH units 7.463*   PO2 ART mm Hg 89.1   PCO2, ARTERIAL mm Hg 36.6   HCO3 ART mmol/L 26.1*     Results from last 7 days   Lab Units 03/01/20  0404 02/29/20 1557 02/29/20 0411   MAGNESIUM mg/dL 1.8 1.8 2.0   PHOSPHORUS mg/dL 2.7 3.0 4.5     February 28 culture of hardware positive for MRSA  February 29 sputum no organisms seen  February 28 right femoral wound, MRSA  February 24 left femoral wound, MRSA    I reviewed the patient's new imaging including images and reports.    Chest x-ray viewed and edema improved.  Right pigtail in place with no effusion.  Small left effusion and persistent bilateral infiltrates    Medication Review:     aspirin 325 mg Oral Daily   atorvastatin 10 mg Oral Daily   budesonide  0.5 mg Nebulization BID - RT   chlorhexidine 15 mL Mouth/Throat Q12H   clopidogrel 75 mg Oral Daily   famotidine 20 mg Oral BID AC   furosemide 40 mg Intravenous Q12H   insulin detemir 10 Units Subcutaneous Nightly   insulin regular 0-14 Units Subcutaneous Q6H   ipratropium-albuterol 3 mL Nebulization 4x Daily - RT   isosorbide mononitrate 30 mg Oral Daily   ketotifen 1 drop Both Eyes BID   lactulose 10 g Oral Daily   losartan 50 mg Oral Daily   meropenem 1 g Intravenous Q8H   metoprolol tartrate 50 mg Oral Q12H   pantoprazole 40 mg Intravenous Q AM   potassium chloride 20 mEq Oral Q12H   sodium chloride 10 mL Intravenous Q12H   vancomycin 750 mg Intravenous Q12H       niCARdipine 5-15 mg/hr Last Rate: 5 mg/hr (03/01/20 0715)   Pharmacy to dose vancomycin     propofol 5-50 mcg/kg/min Last Rate: 50 mcg/kg/min (03/01/20 0900)       Assessment/Plan       PVD (peripheral vascular disease) (CMS/[facility])    Current smoker    COPD (chronic obstructive pulmonary disease) (CMS/[facility])    Coronary artery disease    Hypertension    Hyperlipidemia    Diabetes mellitus (CMS/[facility])    Sepsis (CMS/[facility])    Pleural effusion - bilateral mod/large pleural effusions on CT chest 2/27/2020.     Acute pulmonary edema (CMS/[facility])    Acute hypoxemic respiratory failure (CMS/[facility])    49 y.o. smoker with COPD, peripheral vascular disease, coronary artery disease, hypertension, hyperlipidemia, poorly controlled diabetes admitted on 2/23/2020 after developing groin pain and found to have suspected infection of her femorofemoral bypass graft.  Wound grew MRSA.  She has been treated with antibiotics including meropenem and vancomycin per infectious disease.  T-max 100.4, white blood cell count 12.98, improving    The patient developed worsening respiratory failure overnight 2/27 and was transferred to the intensive care unit.  She has moderate to large bilateral pleural effusions and evidence of pulmonary edema.  She had not been treated with diuretics up to  this point.  She had an elevated BNP.  Blood sugars have been elevated.  2/28 she was taken to the operating room today for removal of infected femorofemoral bypass graft as well as placement of bilateral pleural tubes for drainage of pleural effusions and muscle flap/reconstruction per plastic surgery of the infected groin sites. Bilateral wound vac placed.     She is currently on mechanical ventilation with a PEEP of 8 and an FiO2 of 40%.  This is significantly improved compared to yesterday.  Chest x-ray also reveals less edema.  Right chest tube output 180 mL's in 24 hours.   She does have an ejection fraction of 45% so there might be a component of volume overload.  If weight is to believable she is likely up around 13 pounds.     She has been off pressors and has actually required Cardene for hypertension.  Blood pressure continues to be labile, in the 90s and then up to 160.        Plan:  1.  Maintain mechanical ventilation  2.  Nebulized bronchodilators and steroids  3.  Scheduled furosemide  4.  Replace potassium  5.  Aspirin, statin  6.  Cozaar, metoprolol  7.  PRN hydralazine and stop Cardene drip  8.  Antibiotics per infectious disease, vancomycin, Merrem  9.  Anticipate that she will wean and extubate tomorrow  10.  Consider draining left effusion  11.  Wound vac  12.  Support with tube feeding  13.  Levemir, sliding scale insulin    I discussed the situation with the patient's  at the bedside.      Yessenia Valero MD  03/01/20  10:46 AM    Critical care time : 35 minutes.(This excludes time spent performing separately reportable procedures and services). including high complexity decision making to assess, manipulate, and support vital organ system failure in this individual who has impairment of one or more vital organ systems such that there is a high probability of imminent or life threatening deterioration in the patient’s condition.    *. Please note that portions of this note were  completed with [employer] - a voice recognition program.

## 2020-03-02 LAB
ABO + RH BLD: NORMAL
ABO + RH BLD: NORMAL
ANION GAP SERPL CALCULATED.3IONS-SCNC: 12 MMOL/L (ref 5–15)
BACTERIA FLD CULT: NORMAL
BACTERIA SPEC AEROBE CULT: NORMAL
BACTERIA SPEC RESP CULT: NO GROWTH
BH BB BLOOD EXPIRATION DATE: NORMAL
BH BB BLOOD EXPIRATION DATE: NORMAL
BH BB BLOOD TYPE BARCODE: 5100
BH BB BLOOD TYPE BARCODE: 5100
BH BB DISPENSE STATUS: NORMAL
BH BB DISPENSE STATUS: NORMAL
BH BB PRODUCT CODE: NORMAL
BH BB PRODUCT CODE: NORMAL
BH BB UNIT NUMBER: NORMAL
BH BB UNIT NUMBER: NORMAL
BUN BLD-MCNC: 29 MG/DL (ref 6–20)
BUN/CREAT SERPL: 47.5 (ref 7–25)
CALCIUM SPEC-SCNC: 8.2 MG/DL (ref 8.6–10.5)
CHLORIDE SERPL-SCNC: 104 MMOL/L (ref 98–107)
CO2 SERPL-SCNC: 25 MMOL/L (ref 22–29)
CREAT BLD-MCNC: 0.61 MG/DL (ref 0.57–1)
DEPRECATED RDW RBC AUTO: 53.3 FL (ref 37–54)
ERYTHROCYTE [DISTWIDTH] IN BLOOD BY AUTOMATED COUNT: 15.8 % (ref 12.3–15.4)
GFR SERPL CREATININE-BSD FRML MDRD: 104 ML/MIN/1.73
GLUCOSE BLD-MCNC: 184 MG/DL (ref 65–99)
GLUCOSE BLDC GLUCOMTR-MCNC: 179 MG/DL (ref 70–130)
GLUCOSE BLDC GLUCOMTR-MCNC: 188 MG/DL (ref 70–130)
GLUCOSE BLDC GLUCOMTR-MCNC: 218 MG/DL (ref 70–130)
GLUCOSE BLDC GLUCOMTR-MCNC: 219 MG/DL (ref 70–130)
GRAM STN SPEC: NORMAL
HCT VFR BLD AUTO: 39.4 % (ref 34–46.6)
HGB BLD-MCNC: 12.6 G/DL (ref 12–15.9)
MCH RBC QN AUTO: 28.9 PG (ref 26.6–33)
MCHC RBC AUTO-ENTMCNC: 32 G/DL (ref 31.5–35.7)
MCV RBC AUTO: 90.4 FL (ref 79–97)
PLATELET # BLD AUTO: 331 10*3/MM3 (ref 140–450)
PMV BLD AUTO: 10.1 FL (ref 6–12)
POTASSIUM BLD-SCNC: 4.2 MMOL/L (ref 3.5–5.2)
RBC # BLD AUTO: 4.36 10*6/MM3 (ref 3.77–5.28)
SODIUM BLD-SCNC: 141 MMOL/L (ref 136–145)
UNIT  ABO: NORMAL
UNIT  ABO: NORMAL
UNIT  RH: NORMAL
UNIT  RH: NORMAL
VANCOMYCIN TROUGH SERPL-MCNC: 38.5 MCG/ML (ref 5–20)
WBC NRBC COR # BLD: 15.55 10*3/MM3 (ref 3.4–10.8)

## 2020-03-02 PROCEDURE — 97605 NEG PRS WND THER DME<=50SQCM: CPT

## 2020-03-02 PROCEDURE — 25010000002 FUROSEMIDE PER 20 MG: Performed by: INTERNAL MEDICINE

## 2020-03-02 PROCEDURE — 25010000002 FENTANYL CITRATE (PF) 100 MCG/2ML SOLUTION: Performed by: NURSE PRACTITIONER

## 2020-03-02 PROCEDURE — 94003 VENT MGMT INPAT SUBQ DAY: CPT

## 2020-03-02 PROCEDURE — 85027 COMPLETE CBC AUTOMATED: CPT | Performed by: THORACIC SURGERY (CARDIOTHORACIC VASCULAR SURGERY)

## 2020-03-02 PROCEDURE — 25010000002 PROPOFOL 10 MG/ML EMULSION: Performed by: INTERNAL MEDICINE

## 2020-03-02 PROCEDURE — 94799 UNLISTED PULMONARY SVC/PX: CPT

## 2020-03-02 PROCEDURE — 63710000001 INSULIN DETEMIR PER 5 UNITS: Performed by: PHYSICIAN ASSISTANT

## 2020-03-02 PROCEDURE — 97161 PT EVAL LOW COMPLEX 20 MIN: CPT

## 2020-03-02 PROCEDURE — 80048 BASIC METABOLIC PNL TOTAL CA: CPT

## 2020-03-02 PROCEDURE — 25010000002 MEROPENEM PER 100 MG: Performed by: INTERNAL MEDICINE

## 2020-03-02 PROCEDURE — 99233 SBSQ HOSP IP/OBS HIGH 50: CPT | Performed by: INTERNAL MEDICINE

## 2020-03-02 PROCEDURE — 82962 GLUCOSE BLOOD TEST: CPT

## 2020-03-02 PROCEDURE — 25010000002 HYDRALAZINE PER 20 MG: Performed by: INTERNAL MEDICINE

## 2020-03-02 PROCEDURE — 80202 ASSAY OF VANCOMYCIN: CPT

## 2020-03-02 PROCEDURE — 94770: CPT

## 2020-03-02 RX ORDER — ISOSORBIDE MONONITRATE 30 MG/1
30 TABLET, EXTENDED RELEASE ORAL
Status: DISCONTINUED | OUTPATIENT
Start: 2020-03-03 | End: 2020-03-05

## 2020-03-02 RX ORDER — BUMETANIDE 0.25 MG/ML
2 INJECTION INTRAMUSCULAR; INTRAVENOUS ONCE
Status: COMPLETED | OUTPATIENT
Start: 2020-03-02 | End: 2020-03-02

## 2020-03-02 RX ORDER — HYDRALAZINE HYDROCHLORIDE 10 MG/1
10 TABLET, FILM COATED ORAL EVERY 8 HOURS SCHEDULED
Status: DISCONTINUED | OUTPATIENT
Start: 2020-03-02 | End: 2020-03-03

## 2020-03-02 RX ORDER — FENTANYL CITRATE 50 UG/ML
50 INJECTION, SOLUTION INTRAMUSCULAR; INTRAVENOUS ONCE
Status: COMPLETED | OUTPATIENT
Start: 2020-03-02 | End: 2020-03-02

## 2020-03-02 RX ADMIN — MEROPENEM 1 G: 1 INJECTION, POWDER, FOR SOLUTION INTRAVENOUS at 12:20

## 2020-03-02 RX ADMIN — HYDRALAZINE HYDROCHLORIDE 10 MG: 10 TABLET ORAL at 13:02

## 2020-03-02 RX ADMIN — FUROSEMIDE 40 MG: 10 INJECTION, SOLUTION INTRAMUSCULAR; INTRAVENOUS at 02:03

## 2020-03-02 RX ADMIN — IPRATROPIUM BROMIDE AND ALBUTEROL SULFATE 3 ML: 2.5; .5 SOLUTION RESPIRATORY (INHALATION) at 12:24

## 2020-03-02 RX ADMIN — PROPOFOL 50 MCG/KG/MIN: 10 INJECTION, EMULSION INTRAVENOUS at 01:20

## 2020-03-02 RX ADMIN — KETOTIFEN FUMARATE 1 DROP: 0.35 SOLUTION/ DROPS OPHTHALMIC at 20:30

## 2020-03-02 RX ADMIN — POTASSIUM CHLORIDE 20 MEQ: 1.5 POWDER, FOR SOLUTION ORAL at 02:03

## 2020-03-02 RX ADMIN — SODIUM CHLORIDE, PRESERVATIVE FREE 10 ML: 5 INJECTION INTRAVENOUS at 20:30

## 2020-03-02 RX ADMIN — HYDROCODONE BITARTRATE AND ACETAMINOPHEN 1 TABLET: 10; 325 TABLET ORAL at 07:38

## 2020-03-02 RX ADMIN — METOPROLOL TARTRATE 50 MG: 50 TABLET, FILM COATED ORAL at 08:12

## 2020-03-02 RX ADMIN — ATORVASTATIN CALCIUM 10 MG: 10 TABLET, FILM COATED ORAL at 08:13

## 2020-03-02 RX ADMIN — CHLORHEXIDINE GLUCONATE 0.12% ORAL RINSE 15 ML: 1.2 LIQUID ORAL at 08:13

## 2020-03-02 RX ADMIN — HYDRALAZINE HYDROCHLORIDE 10 MG: 20 INJECTION INTRAMUSCULAR; INTRAVENOUS at 07:38

## 2020-03-02 RX ADMIN — INSULIN HUMAN 5 UNITS: 100 INJECTION, SOLUTION PARENTERAL at 17:50

## 2020-03-02 RX ADMIN — ASPIRIN 325 MG ORAL TABLET 325 MG: 325 PILL ORAL at 08:13

## 2020-03-02 RX ADMIN — INSULIN HUMAN 3 UNITS: 100 INJECTION, SOLUTION PARENTERAL at 05:56

## 2020-03-02 RX ADMIN — INSULIN DETEMIR 10 UNITS: 100 INJECTION, SOLUTION SUBCUTANEOUS at 20:31

## 2020-03-02 RX ADMIN — FAMOTIDINE 20 MG: 20 TABLET ORAL at 17:54

## 2020-03-02 RX ADMIN — FENTANYL CITRATE 50 MCG: 50 INJECTION INTRAMUSCULAR; INTRAVENOUS at 05:50

## 2020-03-02 RX ADMIN — IPRATROPIUM BROMIDE AND ALBUTEROL SULFATE 3 ML: 2.5; .5 SOLUTION RESPIRATORY (INHALATION) at 07:11

## 2020-03-02 RX ADMIN — CHLORHEXIDINE GLUCONATE 0.12% ORAL RINSE 15 ML: 1.2 LIQUID ORAL at 20:29

## 2020-03-02 RX ADMIN — HYDRALAZINE HYDROCHLORIDE 10 MG: 20 INJECTION INTRAMUSCULAR; INTRAVENOUS at 22:03

## 2020-03-02 RX ADMIN — METOPROLOL TARTRATE 50 MG: 50 TABLET, FILM COATED ORAL at 20:29

## 2020-03-02 RX ADMIN — FAMOTIDINE 20 MG: 20 TABLET ORAL at 08:13

## 2020-03-02 RX ADMIN — BUMETANIDE 2 MG: 0.25 INJECTION INTRAMUSCULAR; INTRAVENOUS at 10:14

## 2020-03-02 RX ADMIN — MEROPENEM 1 G: 1 INJECTION, POWDER, FOR SOLUTION INTRAVENOUS at 03:23

## 2020-03-02 RX ADMIN — IPRATROPIUM BROMIDE AND ALBUTEROL SULFATE 3 ML: 2.5; .5 SOLUTION RESPIRATORY (INHALATION) at 15:18

## 2020-03-02 RX ADMIN — HYDRALAZINE HYDROCHLORIDE 10 MG: 20 INJECTION INTRAMUSCULAR; INTRAVENOUS at 02:16

## 2020-03-02 RX ADMIN — IPRATROPIUM BROMIDE AND ALBUTEROL SULFATE 3 ML: 2.5; .5 SOLUTION RESPIRATORY (INHALATION) at 18:41

## 2020-03-02 RX ADMIN — ACETAMINOPHEN 650 MG: 325 TABLET, FILM COATED ORAL at 21:28

## 2020-03-02 RX ADMIN — BUDESONIDE 0.5 MG: 0.5 INHALANT RESPIRATORY (INHALATION) at 18:41

## 2020-03-02 RX ADMIN — INSULIN HUMAN 3 UNITS: 100 INJECTION, SOLUTION PARENTERAL at 12:26

## 2020-03-02 RX ADMIN — HYDRALAZINE HYDROCHLORIDE 10 MG: 20 INJECTION INTRAMUSCULAR; INTRAVENOUS at 15:39

## 2020-03-02 RX ADMIN — PROPOFOL 50 MCG/KG/MIN: 10 INJECTION, EMULSION INTRAVENOUS at 06:48

## 2020-03-02 RX ADMIN — PANTOPRAZOLE SODIUM 40 MG: 40 INJECTION, POWDER, FOR SOLUTION INTRAVENOUS at 05:50

## 2020-03-02 RX ADMIN — MEROPENEM 1 G: 1 INJECTION, POWDER, FOR SOLUTION INTRAVENOUS at 20:30

## 2020-03-02 RX ADMIN — BUDESONIDE 0.5 MG: 0.5 INHALANT RESPIRATORY (INHALATION) at 07:11

## 2020-03-02 RX ADMIN — LOSARTAN POTASSIUM 50 MG: 50 TABLET ORAL at 08:13

## 2020-03-02 RX ADMIN — CLOPIDOGREL BISULFATE 75 MG: 75 TABLET ORAL at 08:13

## 2020-03-02 RX ADMIN — HYDRALAZINE HYDROCHLORIDE 10 MG: 10 TABLET ORAL at 21:20

## 2020-03-02 NOTE — THERAPY EVALUATION
Acute Care - Wound/Debridement Initial Evaluation  [facility]     Patient Name: Saskia Groves  : 1970  MRN: [id number]  Today's Date: 3/2/2020                Admit Date: 2020    Visit Dx:    ICD-10-CM ICD-9-CM   1. PVD (peripheral vascular disease) (CMS/HCC) I73.9 443.9   2. Pleural effusion J90 511.9   3. S/P femoral-femoral bypass surgery Z95.828 V45.89       Patient Active Problem List   Diagnosis   • PVD (peripheral vascular disease) (CMS/HCC)   • Lumbar radiculopathy   • Paresthesia   • Causalgia of lower limb   • Hiatal hernia   • Current smoker   • Bilateral carotid artery stenosis   • COPD (chronic obstructive pulmonary disease) (CMS/HCC)   • Coronary artery disease   • Hypertension   • Hyperlipidemia   • Diabetes mellitus (CMS/HCC)   • Sepsis (CMS/HCC)   • Pleural effusion - bilateral mod/large pleural effusions on CT chest 2020.    • Acute pulmonary edema (CMS/HCC)   • Acute hypoxemic respiratory failure (CMS/HCC)        Past Medical History:   Diagnosis Date   • Anxiety    • Arthritis    • Asthma    • Carotid artery stenosis    • Chronic bronchitis (CMS/HCC)    • COPD (chronic obstructive pulmonary disease) (CMS/HCC)    • Coronary artery disease     2 vessels with 50% blockage.  Sees Dr. Donaldson   • Depression    • Diabetes mellitus (CMS/HCC)    • Dyslipidemia    • GERD (gastroesophageal reflux disease)    • Hiatal hernia    • Hyperlipidemia    • Hypertension    • Infectious viral hepatitis    • Lower back pain    • Migraine    • Multinodular goiter    • S/P thyroid biopsy     2008, 2013, 2015, and 07/15/2019 at [facility], right lobe nodules - all cytologies were benign   • Sleep apnea     can't tolerate the cpap mask   • Subclinical hyperthyroidism         Past Surgical History:   Procedure Laterality Date   • ANTERIOR CERVICAL DISCECTOMY W/ FUSION     • BACK SURGERY      lumbar   •  SECTION  19940    x 2    • CHOLECYSTECTOMY     • COLONOSCOPY     • ENDOSCOPY      • FEMORAL ENDARTERECTOMY Bilateral 1/15/2020    Procedure: FEMORAL ENDARTERECTOMY BILATERAL;  Surgeon: Deandre Oseguera MD;  Location:  [facility];  Service: Vascular   • FEMORAL FEMORAL BYPASS Right 1/15/2020    Procedure: FEMORAL FEMORAL BYPASS;  Surgeon: Deandre Oseguera MD;  Location:  [facility];  Service: Vascular   • HYSTERECTOMY     • KNEE SURGERY Left    • WRIST SURGERY Left            Wound 02/23/20 2230 Right groin Incision (Active)   Dressing Appearance dry;intact 3/2/2020 10:00 AM   Closure Adhesive bandage;GILLIAN 3/2/2020 10:00 AM   Care, Wound negative pressure wound therapy 3/2/2020  8:15 AM   Wound Output (mL) 0 3/2/2020  8:15 AM       Wound 02/23/20 2302 Left groin Incision (Active)   Dressing Appearance dry;intact 3/2/2020 10:00 AM   Closure Adhesive bandage;GILLIAN 3/2/2020 10:00 AM         WOUND DEBRIDEMENT                        PT ASSESSMENT (last 12 hours)      Physical Therapy Evaluation     Row Name 03/02/20 0815          PT Evaluation Time/Intention    Subjective Information  -- intubated  -     Document Type  evaluation;therapy note (daily note);wound care  -     Mode of Treatment  individual therapy;physical therapy  -     Row Name 03/02/20 0815          General Information    Patient Profile Reviewed?  yes  -     Patient Observations  unable to respond intubated  -     Pertinent History of Current Functional Problem  Pt s/p surgical management of groin wound. now with prevena wound vac dressing.   -     Risks Reviewed  patient:;increased discomfort  -     Benefits Reviewed  patient:;improve skin integrity  -     Barriers to Rehab  medically complex;previous functional deficit  -     Row Name 03/02/20 0815          Cognitive Assessment/Intervention- PT/OT    Orientation Status (Cognition)  unable/difficult to assess  -     Row Name 03/02/20 0815          Pain Assessment    Additional Documentation  Pain Scale: FACES Pre/Post-Treatment (Group)  -     Row Name 03/02/20  0815          Pain Scale: Numbers Pre/Post-Treatment    Pain Location - Orientation  generalized  -MF     Pain Intervention(s)  Repositioned  -     Row Name 03/02/20 0815          Pain Scale: FACES Pre/Post-Treatment    Pain: FACES Scale, Pretreatment  0-->no hurt  -MF     Pain: FACES Scale, Post-Treatment  0-->no hurt  -MF     Row Name 03/02/20 0815          Wound 02/23/20 2230 Right groin Incision    Wound - Properties Group Date first assessed: 02/23/20  -EB Time first assessed: 2230  -EB Present on Hospital Admission: Y  -EB Side: Right  -EB Location: groin  -EB Primary Wound Type: Incision  -EB    Dressing Appearance  dry;intact  -     Care, Wound  negative pressure wound therapy wound vac intact  -     Wound Output (mL)  0  -MF     Row Name             Wound 02/23/20 2302 Left groin Incision    Wound - Properties Group Date first assessed: 02/23/20  -EB Time first assessed: 2302  -EB Present on Hospital Admission: Y  -EB Side: Left  -EB Location: groin  -EB Primary Wound Type: Incision  -EB    Row Name 03/02/20 0815          Coping    Observed Emotional State  calm  -     Verbalized Emotional State  other (see comments) intubated  -     Row Name 03/02/20 0815          Plan of Care Review    Plan of Care Reviewed With  patient;other (see comments) nsg  -     Outcome Summary  Pt presents with complex open wound s/p surgical management. Wound vac intact with no issues noted to this point.  PT will cont with vac checks to help trouble shoot / minimize complications with wound vac.   -     Row Name 03/02/20 0815          Physical Therapy Clinical Impression    PT Diagnosis (PT Clinical Impression)  groin wound  -     Criteria for Skilled Interventions Met (PT Clinical Impression)  yes;treatment indicated  -     Pathology/Pathophysiology Noted (Describe Specifically for Each System)  integumentary  -     Rehab Potential (PT Clinical Summary)  fair, will monitor progress closely  -     Care Plan  Review (PT)  evaluation/treatment results reviewed;care plan/treatment goals reviewed;risks/benefits reviewed;current/potential barriers reviewed;patient/other agree to care plan  -     Row Name 03/02/20 0815          Physical Therapy Goals    Wound Care Goal Selection (PT)  wound care, PT goal 1  -     Additional Documentation  Wound Care Goal Selection (PT) (Row)  -     Row Name 03/02/20 0815          Wound Care Goal 1 (PT)    Wound Care Goal 1 (PT)  Pt to have no drainage from wound   -     Time Frame (Wound Care Goal 1, PT)  10 days  -     Row Name 03/02/20 0815          Positioning and Restraints    Pre-Treatment Position  in bed  -     Post Treatment Position  bed  -     In Bed  supine;notified nsg  -       User Key  (r) = Recorded By, (t) = Taken By, (c) = Cosigned By    Initials Name Provider Type    Arturo Lopez, PT Physical Therapist    Collette Berman RN Registered Nurse            Recommendation and Plan  Planned Therapy Interventions (PT Eval): wound care, patient/family education  Plan of Care Reviewed With: patient           Outcome Summary: Pt presents with complex open wound s/p surgical management. Wound vac intact with no issues noted to this point.  PT will cont with vac checks to help trouble shoot / minimize complications with wound vac.   Plan of Care Reviewed With: patient            Time Calculation  PT Charges     Row Name 03/02/20 0815             Time Calculation    Start Time  0815  -      PT Goal Re-Cert Due Date  03/12/20  -        User Key  (r) = Recorded By, (t) = Taken By, (c) = Cosigned By    Initials Name Provider Type    Arturo Lopez, PT Physical Therapist            Therapy Charges for Today     Code Description Service Date Service Provider Modifiers Qty    82540206482 HC PT EVAL LOW COMPLEXITY 4 3/2/2020 Arturo Madrid, PT GP 1    04595692430 HC PT NEG PRESS WOUND TO 50SQCM DME1 3/2/2020 Arturo Madrid, PT  1                     Arturo Madrid, PT  3/2/2020

## 2020-03-02 NOTE — PROGRESS NOTES
Intensive Care Follow-up     Hospital:  LOS: 8 days   Ms. Saskia Groves, 49 y.o. female is followed for:   Acute hypoxemic respiratory failure (CMS/HCC)        Subjective   Interval History:  The chart has been reviewed.  The patient has been off the Cardene drip however has required supplementation with IV hydralazine to maintain a blood pressure below 140 mmHg systolic.  Apparently she has been following commands when sedation has been lessened.  She has remained afebrile.    The patient's past medical, surgical and social history were reviewed and updated in Epic as appropriate.        Objective     Infusions:    Pharmacy to dose vancomycin     propofol 5-50 mcg/kg/min Last Rate: Stopped (03/02/20 1000)     Medications:    Pharmacy Consult  Does not apply Once   aspirin 325 mg Oral Daily   atorvastatin 10 mg Oral Daily   budesonide 0.5 mg Nebulization BID - RT   chlorhexidine 15 mL Mouth/Throat Q12H   clopidogrel 75 mg Oral Daily   famotidine 20 mg Oral BID AC   hydrALAZINE 10 mg Oral Q8H   insulin detemir 10 Units Subcutaneous Nightly   insulin regular 0-14 Units Subcutaneous Q6H   ipratropium-albuterol 3 mL Nebulization 4x Daily - RT   [START ON 3/3/2020] isosorbide mononitrate 30 mg Oral Q24H   ketotifen 1 drop Both Eyes BID   lactulose 10 g Oral Daily   losartan 50 mg Oral Daily   meropenem 1 g Intravenous Q8H   metoprolol tartrate 50 mg Oral Q12H   pantoprazole 40 mg Intravenous Q AM   sodium chloride 10 mL Intravenous Q12H   vancomycin 750 mg Intravenous Q12H       Vital Sign Min/Max for last 24 hours  Temp  Min: 98.1 °F (36.7 °C)  Max: 99.3 °F (37.4 °C)   BP  Min: 101/61  Max: 157/83   Pulse  Min: 87  Max: 117   Resp  Min: 18  Max: 32   SpO2  Min: 93 %  Max: 99 %   No data recorded       Input/Output for last 24 hour shift  03/01 0701 - 03/02 0700  In: 2704 [I.V.:641]  Out: 4520 [Urine:4215; Drains:215]   FiO2 (%):  [40 %] 40 %  S RR:  [16] 16  PEEP/CPAP (cm H2O):  [5 cm H20-6 cm H20] 5 cm H20  PA  "SUP:  [0 cm H20-10 cm H20] 10 cm H20  MAP (cm H2O):  [7.3-14] 8.3  Objective:  General Appearance:  Uncomfortable, ill-appearing and in no acute distress (Sedated but arousable on the ventilator).    Vital signs: (most recent): Blood pressure 115/65, pulse 96, temperature 98.8 °F (37.1 °C), temperature source Oral, resp. rate 21, height 160 cm (62.99\"), weight 60.3 kg (133 lb), SpO2 98 %, not currently breastfeeding.    HEENT: (Endotracheal tube in place.)    Lungs:  Normal effort and normal respiratory rate.  No stridor.  There are decreased breath sounds.  No rales, wheezes or rhonchi.    Heart: Normal rate.  Regular rhythm.  S1 normal and S2 normal.  No murmur.   Abdomen: Abdomen is soft and non-distended.  Bowel sounds are normal.   There is no abdominal tenderness.     Extremities: Normal range of motion.  (There is 1+ pitting edema to the lower extremities.  Drains in place.)  Neurological: (Arousable.).    Pupils:  Pupils are equal, round, and reactive to light.  Pupils are equal.   Skin:  Warm.              Results from last 7 days   Lab Units 03/02/20  0348 03/01/20  0404 02/29/20  0411   WBC 10*3/mm3 15.55* 12.98* 15.24*   HEMOGLOBIN g/dL 12.6 11.7* 11.9*   PLATELETS 10*3/mm3 331 313 342     Results from last 7 days   Lab Units 03/02/20  0348 03/01/20  1758 03/01/20  0404 02/29/20  1557 02/29/20  0411   SODIUM mmol/L 141  --  140 142 139   POTASSIUM mmol/L 4.2 4.3 4.2 4.1 3.9   CO2 mmol/L 25.0  --  24.0 24.0 22.0   BUN mg/dL 29*  --  28* 25* 25*   CREATININE mg/dL 0.61  --  0.88 0.84 0.87   MAGNESIUM mg/dL  --   --  1.8 1.8 2.0   PHOSPHORUS mg/dL  --   --  2.7 3.0 4.5   GLUCOSE mg/dL 184*  --  148* 93 168*     Estimated Creatinine Clearance: 105 mL/min (by C-G formula based on SCr of 0.61 mg/dL).    Results from last 7 days   Lab Units 03/01/20  0428   PH, ARTERIAL pH units 7.463*   PCO2, ARTERIAL mm Hg 36.6   PO2 ART mm Hg 89.1         I reviewed the patient's results and images.     Assessment/Plan "   Impression        Acute hypoxemic respiratory failure (CMS/HCC)    PVD (peripheral vascular disease) (CMS/HCC)    Current smoker    COPD (chronic obstructive pulmonary disease) (CMS/HCC)    Coronary artery disease    Hypertension    Hyperlipidemia    Diabetes mellitus (CMS/HCC)    Sepsis (CMS/HCC)    Pleural effusion - bilateral mod/large pleural effusions on CT chest 2/27/2020.     Acute pulmonary edema (CMS/[facility])       Plan        Start hydralazine 10 mg p.o. every 8 hours and we will adjust as necessary to maintain an adequate blood pressure.  We will diurese again today with Bumex 2 mg IV x1.  We will lessen her sedation and attempt extubation after checking mechanics.  Current antibiotics will continue.  Continue current glucose control.  The patient remains at high risk for worsening secondary to respiratory failure and systemic infection.    Plan of care and goals reviewed with mulitdisciplinary/antibiotic stewardship team during rounds.   I discussed the patient's findings and my recommendations with patient, family and nursing staff     High level of risk due to:  severe exacerbation of chronic illness, illness with threat to life or bodily function and parenteral controlled substances.      Lorenzo Anderson MD, [facility]  Pulmonology and Critical Care Medicine

## 2020-03-02 NOTE — PLAN OF CARE
Problem: Patient Care Overview  Goal: Plan of Care Review  Outcome: Ongoing (interventions implemented as appropriate)  Flowsheets (Taken 3/2/2020 7961)  Plan of Care Reviewed With: patient; daughter  Note:   Pt remains on mechanical ventilator overnight. Minimal ETT secretions, copious clear oral secretions, with increasing RR and cough frequency this morning. Becoming increasingly hypertensive, PRN hydralazine given x1. Tube feeds continue, large liquid BM x2. Minimal serous PT drainage, moderate serosang MELISSA drainage. Excellent diuresis with Lasix. Plan to attempt vent weaning today.

## 2020-03-02 NOTE — PROGRESS NOTES
"INFECTIOUS DISEASE PROGRESS NOTE     Saskia Groves  1970  [id number]      Admission Date: 2/23/2020    Antibiotic therapy:   Daptomycin    Requesting Provider: eDandre Oseguera MD     Reason for Consultation: Left groin wound infection     History of present illness:  2/24/20:  Patient is a 49 y.o. female seen today for a left groin wound infection.  She underwent femoral endardectomy, and femorofemoral  Bypass with gortex graft on 1/15.  She has been having fevers 100 degrees and above since 1/17. She was seen last week by her PCP and given Bactrim but she couldn't tolerate it and Ceftin was called in. Her left groin continued to remain swollen and warm. She had a venous duplex to rule out DVT and then developed a \"knot\" of her left groin. She presented to the ED at [facility], was given Vancomycin and Merrem and transferred to [facility]. Tmax of 100.5 degrees without leukocytosis, normal lactate and PCT. She is currently on Vancomycin and Merrem and we were consulted for evaluation and treatment.   Labs at [facility] with WBC of 10.4, normal lactate, PCT.  She was tachycardic with tmax of 102.3.  She had fevers at home documented to over 103 degrees.  2/25/20 : She spontaneously drained a copious amount of slightly cloudy straw-colored fluid from her left groin yesterday and has continued to drain.  She complains of cough \"other than that I feel great\".  No n/v//d.  Left groin less tender.   2/26/20:  Graft to be removed on Friday.   She remains afebrile. She doesn't \"feel very good today\".  Family at bedside.   2/27/20:  Surgery scheduled for tomorrow.  She is tearful and afraid she will lose her leg.  She remains afebrile.    2/28/2020: She deteriorated this morning with hypoxemia and bilateral pulmonary opacifications with pleural effusions.  He had a fever to 101 degrees last night.  I discussed her complex situation with Dr. Deandre Oseguera early today and also this evening.  She was taken to surgery " today and her left femoral graft was removed.  A muscle flap was placed.  She is now endotracheally intubated and mechanically ventilated.  She underwent a thoracentesis revealing clear fluid.  Intravenous Merrem was added to her vancomycin this morning due to concerns about possible aspiration pneumonia.  She does have a history of penicillin allergy.  2020: She remains on ventilatory support.  She has only modest respiratory secretions.  She is still requiring an FiO2 of 60%.  She has been afebrile overnight.  She remains sedated.    Following for Dr. Parag Gordon:  3/1/20: Remains sedated on vent.  Tmax 99.3, FiO2 40% and PEEP 6.  She has a wound VAC on right ground with MELISSA drain with bloody drainage.  She has a right chest tube.  Small amount of clear to red-streaked ETT secretions. On tube feeding.   3/2  Extubated, afebrile, intermittently cooperative  Ongoing issues with hypertension; 90 from chest tube yesterday Day# 8 merrem (intermittent dosing);  vanc trough 38        Past Medical History:   Diagnosis Date   • Anxiety    • Arthritis    • Asthma    • Carotid artery stenosis    • Chronic bronchitis (CMS/HCC)    • COPD (chronic obstructive pulmonary disease) (CMS/HCC)    • Coronary artery disease     2 vessels with 50% blockage.  Sees Dr. Donaldson   • Depression    • Diabetes mellitus (CMS/HCC)    • Dyslipidemia    • GERD (gastroesophageal reflux disease)    • Hiatal hernia    • Hyperlipidemia    • Hypertension    • Infectious viral hepatitis    • Lower back pain    • Migraine    • Multinodular goiter    • S/P thyroid biopsy     2008, 2013, 2015, and 07/15/2019 at [facility], right lobe nodules - all cytologies were benign   • Sleep apnea     can't tolerate the cpap mask   • Subclinical hyperthyroidism        Past Surgical History:   Procedure Laterality Date   • ANTERIOR CERVICAL DISCECTOMY W/ FUSION     • BACK SURGERY      lumbar   •  SECTION  19940    x 2    • CHOLECYSTECTOMY     •  COLONOSCOPY     • ENDOSCOPY     • FEMORAL ARTERY CUTDOWN Bilateral 2/28/2020    Procedure: FEMORAL ARTERY CUTDOWN WITH REMOVAL OF FEMORAL FEMORAL BYPASS GRAFT BILATERAL;  Surgeon: Deandre Oseguera MD;  Location:  [facility];  Service: Vascular;  Laterality: Bilateral;   • FEMORAL ENDARTERECTOMY Bilateral 1/15/2020    Procedure: FEMORAL ENDARTERECTOMY BILATERAL;  Surgeon: Deandre Oseguera MD;  Location:  [facility];  Service: Vascular   • FEMORAL FEMORAL BYPASS Right 1/15/2020    Procedure: FEMORAL FEMORAL BYPASS;  Surgeon: Deandre Oseguera MD;  Location:  [facility];  Service: Vascular   • HYSTERECTOMY     • KNEE SURGERY Left    • LEG EXCISION LESION/CYST Left 2/28/2020    Procedure: GROIN MUSCLE FLAP BILATERAL;  Surgeon: Modesto Whittaker MD;  Location:  [facility];  Service: Plastics;  Laterality: Left;   • WRIST SURGERY Left        Family History   Problem Relation Age of Onset   • Diabetes Mother    • Hypertension Mother    • Arthritis Mother    • Hyperlipidemia Mother    • Migraines Mother    • Thyroid disease Mother    • Hypertension Father    • Lung cancer Father    • Cancer Father    • Stroke Other    • Migraines Maternal Aunt    • Thyroid disease Maternal Aunt    • Heart attack Maternal Aunt    • Osteoporosis Maternal Aunt    • Cancer Maternal Uncle    • Heart attack Maternal Uncle    • Stroke Maternal Uncle    • Hyperlipidemia Maternal Grandmother    • Cancer Maternal Grandmother    • Obesity Maternal Grandmother    • Thyroid disease Daughter    • Obesity Daughter        Social History     Socioeconomic History   • Marital status:      Spouse name: Not on file   • Number of children: 2   • Years of education: Not on file   • Highest education level: Not on file   Occupational History   • 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     Employer: DISABLED     Comment: Back and Leg Pain   Tobacco Use   • Smoking status: Current Every Day Smoker     Packs/day: 1.00     Years: 35.00     Pack years: 35.00     Types:  Cigarettes   • Smokeless tobacco: Never Used   Substance and Sexual Activity   • Alcohol use: Not Currently   • Drug use: No   • Sexual activity: Yes     Partners: Male     Comment:    Social History Narrative    Lives in [place], [place] alone       Allergies   Allergen Reactions   • Levaquin [Levofloxacin] Nausea And Vomiting   • Penicillins Nausea And Vomiting     Has tolerated cefepime 2/2020   • Codeine Nausea Only   • Flagyl [Metronidazole] Nausea And Vomiting         Medication:    Current Facility-Administered Medications:   •  acetaminophen (TYLENOL) tablet 650 mg, 650 mg, Oral, Q4H PRN, 650 mg at 02/28/20 0212 **OR** acetaminophen (TYLENOL) 160 MG/5ML solution 650 mg, 650 mg, Oral, Q4H PRN, 649.6 mg at 03/01/20 0205 **OR** acetaminophen (TYLENOL) suppository 650 mg, 650 mg, Rectal, Q4H PRN, Edgar Machado PA, 650 mg at 02/28/20 0429  •  albuterol (PROVENTIL) nebulizer solution 0.083% 2.5 mg/3mL, 2.5 mg, Nebulization, Q6H PRN, Edgar Machado PA  •  aspirin tablet 325 mg, 325 mg, Oral, Daily, Deandre Oseguera MD, 325 mg at 03/02/20 0813  •  atorvastatin (LIPITOR) tablet 10 mg, 10 mg, Oral, Daily, Edgar Machado PA, 10 mg at 03/02/20 0813  •  budesonide (PULMICORT) nebulizer solution 0.5 mg, 0.5 mg, Nebulization, BID - RT, Rian Sifuentes DO, 0.5 mg at 03/02/20 0711  •  chlorhexidine (PERIDEX) 0.12 % solution 15 mL, 15 mL, Mouth/Throat, Q12H, Rian Sifuentes DO, 15 mL at 03/02/20 0813  •  clopidogrel (PLAVIX) tablet 75 mg, 75 mg, Oral, Daily, Edgar Machado PA, 75 mg at 03/02/20 0813  •  dextrose (D50W) 25 g/ 50mL Intravenous Solution 25 g, 25 g, Intravenous, Q15 Min PRN, Edgar Machado PA  •  dextrose (GLUTOSE) oral gel 15 g, 15 g, Oral, Q15 Min PRN, Edgar Machado PA  •  famotidine (PEPCID) tablet 20 mg, 20 mg, Oral, BID AC, Edgar Machado PA, 20 mg at 03/02/20 0813  •  glucagon (human recombinant) (GLUCAGEN DIAGNOSTIC) injection 1 mg, 1 mg, Subcutaneous, Q15 Min PRN,  Black, Edgar P, PA  •  guaiFENesin-dextromethorphan (ROBITUSSIN DM) 100-10 MG/5ML syrup 5 mL, 5 mL, Oral, Q6H PRN, Edgar Machado PA, 5 mL at 02/27/20 0920  •  hydrALAZINE (APRESOLINE) injection 10 mg, 10 mg, Intravenous, Q6H PRN, Yessenia Valero MD, 10 mg at 03/02/20 0738  •  hydrALAZINE (APRESOLINE) tablet 10 mg, 10 mg, Oral, Q8H, Lorenzo Anderson MD, 10 mg at 03/02/20 1302  •  HYDROcodone-acetaminophen (NORCO)  MG per tablet 1 tablet, 1 tablet, Oral, Q8H PRN, Edgar Machado PA, 1 tablet at 03/02/20 0738  •  insulin detemir (LEVEMIR) injection 10 Units, 10 Units, Subcutaneous, Nightly, Edgar Machado PA, 10 Units at 03/01/20 2046  •  insulin regular (humuLIN R,novoLIN R) injection 0-14 Units, 0-14 Units, Subcutaneous, Q6H, Rian Sifuentes DO, 3 Units at 03/02/20 1226  •  ipratropium-albuterol (DUO-NEB) nebulizer solution 3 mL, 3 mL, Nebulization, 4x Daily - RT, Rian Sifuentes, DO, 3 mL at 03/02/20 1224  •  [START ON 3/3/2020] isosorbide mononitrate (IMDUR) 24 hr tablet 30 mg, 30 mg, Oral, Q24H, Kip Mckeon [facility]  •  ketotifen (ZADITOR) 0.025 % ophthalmic solution 1 drop, 1 drop, Both Eyes, BID, Edgar Machado PA, 1 drop at 03/01/20 2012  •  lactulose (CHRONULAC) 10 GM/15ML solution 10 g, 10 g, Oral, Daily, Edgar Machado PA, 10 g at 03/01/20 0832  •  losartan (COZAAR) tablet 50 mg, 50 mg, Oral, Daily, Yessenia Valero MD, 50 mg at 03/02/20 0813  •  meropenem (MERREM) 1 g/100 mL 0.9% NS VTB (mbp), 1 g, Intravenous, Q8H, Parag Gordon MD, 1 g at 03/02/20 1220  •  metoprolol tartrate (LOPRESSOR) tablet 50 mg, 50 mg, Oral, Q12H, Yessenia Valero MD, 50 mg at 03/02/20 0812  •  niCARdipine (CARDENE) 40 mg in 200 mL NS (0.2 mg/mL) infusion, 5-15 mg/hr, Intravenous, Titrated, Lorenzo Anderson MD  •  ondansetron (ZOFRAN) injection 4 mg, 4 mg, Intravenous, Once PRN, Ced Gómez CRNA  •  pantoprazole (PROTONIX) injection 40 mg, 40 mg, Intravenous, Q  AM, Edgar Machado PA, 40 mg at 20 0550  •  Pharmacy to dose vancomycin, , Does not apply, Continuous PRN, Parag Gordon MD  •  propofol (DIPRIVAN) infusion 10 mg/mL 100 mL, 5-50 mcg/kg/min, Intravenous, Continuous, Rian Sifuentes, , Stopped at 20 1000  •  sodium chloride 0.9 % flush 10 mL, 10 mL, Intravenous, Q12H, Edgar Machado PA, 10 mL at 20 0834  •  sodium chloride 0.9 % flush 10 mL, 10 mL, Intravenous, PRN, Edgar Machado PA  •  [START ON 3/3/2020] vancomycin (dosing per levels), , Does not apply, Daily, Kip Mckeon RPH    Antibiotics:  Anti-Infectives (From admission, onward)    Ordered     Dose/Rate Route Frequency Start Stop    20 1240  vancomycin (dosing per levels)     Ordering Provider:  Kip Mckeon RPH     Does not apply Daily 20 0900 03/10/20 0859    20 1802  meropenem (MERREM) 1 g/100 mL 0.9% NS VTB (mbp)     Kip Mckeon RPH reviewed the order on 20 0905.   Ordering Provider:  Parag Gordon MD    1 g  over 3 Hours Intravenous Every 8 Hours 20 1900 20 1959    20 1803  Pharmacy to dose vancomycin  Review   Ordering Provider:  Parag oGrdon MD     Does not apply Continuous PRN 20 1802 20 1901    20 0746  meropenem (MERREM) 1 g/100 mL 0.9% NS VTB (mbp)     Ordering Provider:  Parag Gordon MD    1 g  over 30 Minutes Intravenous Once 20 0845 20 0921    20 0831  cefepime (MAXIPIME) 2 g/100 mL 0.9% NS (mbp)     Ordering Provider:  Ranjana Easley APRN    2 g  200 mL/hr over 30 Minutes Intravenous Once 20 0930 20 1157    20 0203  vancomycin 500 mg/100 mL 0.9% NS IVPB (mbp)     Ordering Provider:  Ced Kenny RPH    500 mg  100 mL/hr over 60 Minutes Intravenous Once 20 0300 20 0511                Physical Exam:   Vital Signs  Temp (24hrs), Av.5 °F (36.9 °C), Min:98.1 °F (36.7 °C), Max:98.8 °F (37.1 °C)    Temp   Min: 98.1 °F (36.7 °C)  Max: 98.8 °F (37.1 °C)  BP  Min: 101/61  Max: 157/83  Pulse  Min: 87  Max: 117  Resp  Min: 18  Max: 32  SpO2  Min: 93 %  Max: 99 %    GENERAL: extubated, tearful, follows commands intermittently  HEENT: Normocephalic, atraumatic.  No conjunctival injection. No icterus.   HEART: RRR; No murmur, rubs, gallops.   LUNGS: diminished at lung bases bilaterally   ABDOMEN: Soft, nontender, nondistended. Positive bowel sounds. No rebound or guarding.   MSK:  No joint effusions   SKIN: Warm and dry without cutaneous eruptions on Inspection/palpation.   NEURO: sedated  Left groin with a wound VAC in place MELISSA drains in place with bloody drainage.    Right Chest tube in place    Laboratory Data    Results from last 7 days   Lab Units 03/02/20  0348 03/01/20  0404 02/29/20  0411   WBC 10*3/mm3 15.55* 12.98* 15.24*   HEMOGLOBIN g/dL 12.6 11.7* 11.9*   HEMATOCRIT % 39.4 35.4 36.9   PLATELETS 10*3/mm3 331 313 342     Results from last 7 days   Lab Units 03/02/20  0348   SODIUM mmol/L 141   POTASSIUM mmol/L 4.2   CHLORIDE mmol/L 104   CO2 mmol/L 25.0   BUN mg/dL 29*   CREATININE mg/dL 0.61   GLUCOSE mg/dL 184*   CALCIUM mg/dL 8.2*     Results from last 7 days   Lab Units 02/29/20  1557   ALK PHOS U/L 150*   BILIRUBIN mg/dL 0.7   ALT (SGPT) U/L 35*   AST (SGOT) U/L 63*         Results from last 7 days   Lab Units 02/29/20  1604   CRP mg/dL 8.05*     Results from last 7 days   Lab Units 02/28/20  0243   LACTATE mmol/L 1.5         Results from last 7 days   Lab Units 03/02/20  1131 02/27/20  1137 02/25/20  1113   VANCOMYCIN TR mcg/mL 38.50* 19.10 21.10*     Estimated Creatinine Clearance: 105 mL/min (by C-G formula based on SCr of 0.61 mg/dL).      Microbiology:  2/24: BCx NG    2/24: wound few WBCs, GPC in pairs -- MRSA   2/28/2020: Operative cultures are growing MRSA  2/29/2020: Sputum culture is NGSF      Radiology:  Imaging Results (Last 72 Hours)     Procedure Component Value Units Date/Time    XR Chest 1  View [[id number]] Collected:  03/01/20 0757     Updated:  03/02/20 0817    Narrative:          EXAMINATION: XR CHEST 1 VW - 03/01/2020     INDICATION: I73.9-Peripheral vascular disease, unspecified; H08-Fqnbvcp  effusion, not elsewhere classified; Z95.828-Presence of other vascular  implants and grafts.      COMPARISON: 02/29/2020     FINDINGS: Support hardware unchanged and in satisfactory positioning.  Cardiac silhouette enlarged with diffuse bilateral pulmonary  opacifications slightly decreased in density from prior comparison  particularly within the right mid and upper lung suggesting improving  airspace disease. No pneumothorax however trace volume left pleural  effusion similar to prior.           Impression:       Cardiac silhouette enlarged with diffuse bilateral pulmonary  opacifications slightly decreased in density from prior comparison  particularly within the right mid and upper lung suggesting improving  airspace disease. No pneumothorax however trace volume left pleural  effusion similar to prior.        DICTATED:   03/01/2020  EDITED/ls :   03/01/2020      This report was finalized on 3/2/2020 8:14 AM by Dr. Valdez Hill.       XR Chest 1 View [[id number]] Collected:  02/29/20 0723     Updated:  02/29/20 1907    Narrative:          EXAMINATION: XR CHEST 1 VW - 02/29/2020     INDICATION:  I73.9-Peripheral vascular disease, unspecified; M19-Fhykhkf  effusion, not elsewhere classified; Z95.828-Presence of other vascular  implants and grafts.      COMPARISON: 02/28/2020     FINDINGS: Support hardware unchanged and in satisfactory positioning.  Cardiac size borderline enlarged and mostly obscured due to increase in  already dense bilateral pulmonary opacifications from prior of diffuse  airspace disease without pneumothorax or large effusion present.           Impression:       Increased density of diffuse bilateral pulmonary  opacifications with a left perihilar predominance of involvement with  air  bronchograms present at this site of diffuse bilateral airspace  disease without significant effusion. Support hardware unchanged. No  pneumothorax.     DICTATED:   02/29/2020  EDITED/ls :   02/29/2020      This report was finalized on 2/29/2020 7:04 PM by Dr. Valdez Hill.       XR Chest 1 View [[id number]] Collected:  02/28/20 0841     Updated:  02/28/20 2154    Narrative:       EXAMINATION: XR CHEST 1 VW-     INDICATION: Dyspnea; I73.9-Peripheral vascular disease, unspecified.      COMPARISON: 01/14/2020.     FINDINGS: Apparatus shadow superimposed over the right upper neck and  chest. Heart is normal in size but there has been interval development  of dense groundglass and finely reticular disease, with some patchy  airspace disease of the lower lungs. There appears to be a skinfold  shadow superimposed over the right base. No pneumothorax is suspected.       Impression:       Interval development of extensive pulmonary interstitial  disease, more typical in appearance for developing ARDS, than for  pulmonary edema. Patchy multifocal bibasilar airspace disease, possibly  pneumonia.      D:  02/28/2020  E:  02/28/2020     This report was finalized on 2/28/2020 9:51 PM by Dr. Jaswant Frank MD.       XR Chest 1 View [[id number]] Collected:  02/28/20 1932     Updated:  02/28/20 1934    Narrative:       CHEST X-RAY, 2/28/2020 (18:47)    HISTORY:    . Patient on ventilator. Follow-up cardiopulmonary status.      TECHNIQUE:  AP portable semiupright chest x-ray.    COMPARISON:  *  Chest x-ray earlier today.    FINDINGS:  Endotracheal tube tip in the mid thoracic trachea 5.0 cm above the you. Right arm PICC tip in the low SVC. NG tube in good position in the distal stomach.    Right pleural drain in place with no visible pneumothorax.    Moderate diffuse interstitial and alveolar pulmonary edema and probable small pleural effusions without significant change since earlier today. Heart size is normal.      Impression:        1. Support equipment in good position as detailed above.  2. Right-sided chest drain with no visible pneumothorax.  3. Pulmonary findings are unchanged since earlier today.    Signer Name: Tyson Yu MD   Signed: 2/28/2020 7:32 PM   Workstation Name: [email or web address]    [facility]        I read her radiographic studies    Impression:   1.  MRSA left groin abscess/cellulitis/graft infection-status post graft removal 2/28.  She will require a prolonged course of intravenous antibiotic therapy directed toward MRSA.  I will leave her on vancomycin for now but I may switch her to daptomycin prior to her discharge.  2.  Bilateral pulmonary infiltrates/pleural effusions-this may be secondary to edema or aspiration pneumonia; she has been receiving intermittent  Merrem since 2/23  procalcitonin negative x 2  3.  Acute hypoxic respiratory failure-requiring endotracheal intubation and mechanical ventilation  4.  S/p femorofemoral bypass with gortex graft, 1/15  5.  Type 2 Diabetes Mellitus  6.  Penicillin allergy  7.  Type 2 diabetes mellitus-with very poor control  8.  Malnutrition    PLAN/RECOMMENDATIONS:   1.  Since Dr Gordon had planned to switch from vancomycin to daptomycin I will plan to do this  2.  Graft resection/debridement-done  3.  Sputum Gram stain and culture--NGSF  4.  Continue intravenous Merrem for now- may be able to stop soon based on cultures and pulmonary status    Discussed with daughter and sister  Discussed with TIANNA Melo MD  3/2/2020  2:15 PM

## 2020-03-02 NOTE — PLAN OF CARE
Problem: Patient Care Overview  Goal: Plan of Care Review  Outcome: Ongoing (interventions implemented as appropriate)  Flowsheets  Taken 3/2/2020 1038  Plan of Care Reviewed With: patient  Taken 3/2/2020 0815  Outcome Summary: Pt presents with complex open wound s/p surgical management. Wound vac intact with no issues noted to this point.  PT will cont with vac checks to help trouble shoot / minimize complications with wound vac.

## 2020-03-02 NOTE — PLAN OF CARE
Pt extubated to 6 LT NC @ 1325.  She arouses to voice, follows commands and /wiggles toes.  Sinus tachycardic (- 120 bpm) with SBP between 120-150mmhg Hyrdralazine 10 mg IV PRN given twice.  Bumex 2 mg given around 1500ml urine output.

## 2020-03-02 NOTE — PROGRESS NOTES
Plastic Surgery Progress Note      Admission Date:  2020  LOS:  8  Patient Care Team:  Meagan Barlow APRN as PCP - General (Family Medicine)  Tyson Donaldson DO as Consulting Physician (Cardiology)    Patient Name:  Saskia Groves  :  1970  MRN:  [id number]    Date:  3/2/2020      Subjective:  No acute events overnight      History:   Past Medical History:   Diagnosis Date   • Anxiety    • Arthritis    • Asthma    • Carotid artery stenosis    • Chronic bronchitis (CMS/HCC)    • COPD (chronic obstructive pulmonary disease) (CMS/HCC)    • Coronary artery disease     2 vessels with 50% blockage.  Sees Dr. Donaldson   • Depression    • Diabetes mellitus (CMS/HCC)    • Dyslipidemia    • GERD (gastroesophageal reflux disease)    • Hiatal hernia    • Hyperlipidemia    • Hypertension    • Infectious viral hepatitis    • Lower back pain    • Migraine    • Multinodular goiter    • S/P thyroid biopsy     2008, 2013, 2015, and 07/15/2019 at [facility], right lobe nodules - all cytologies were benign   • Sleep apnea     can't tolerate the cpap mask   • Subclinical hyperthyroidism      Past Surgical History:   Procedure Laterality Date   • ANTERIOR CERVICAL DISCECTOMY W/ FUSION     • BACK SURGERY      lumbar   •  SECTION  19940    x 2    • CHOLECYSTECTOMY     • COLONOSCOPY     • ENDOSCOPY     • FEMORAL ENDARTERECTOMY Bilateral 1/15/2020    Procedure: FEMORAL ENDARTERECTOMY BILATERAL;  Surgeon: Deandre Oseguera MD;  Location:  [facility];  Service: Vascular   • FEMORAL FEMORAL BYPASS Right 1/15/2020    Procedure: FEMORAL FEMORAL BYPASS;  Surgeon: Deandre Oseguera MD;  Location:  [facility];  Service: Vascular   • HYSTERECTOMY     • KNEE SURGERY Left    • WRIST SURGERY Left      Family History   Problem Relation Age of Onset   • Diabetes Mother    • Hypertension Mother    • Arthritis Mother    • Hyperlipidemia Mother    • Migraines Mother    • Thyroid disease Mother    • Hypertension Father     • Lung cancer Father    • Cancer Father    • Stroke Other    • Migraines Maternal Aunt    • Thyroid disease Maternal Aunt    • Heart attack Maternal Aunt    • Osteoporosis Maternal Aunt    • Cancer Maternal Uncle    • Heart attack Maternal Uncle    • Stroke Maternal Uncle    • Hyperlipidemia Maternal Grandmother    • Cancer Maternal Grandmother    • Obesity Maternal Grandmother    • Thyroid disease Daughter    • Obesity Daughter      Social History     Socioeconomic History   • Marital status:      Spouse name: Not on file   • Number of children: 2   • Years of education: Not on file   • Highest education level: Not on file   Occupational History   • Occupation: [employer] Work     Employer: DISABLED     Comment: Back and Leg Pain   Tobacco Use   • Smoking status: Current Every Day Smoker     Packs/day: 1.00     Years: 35.00     Pack years: 35.00     Types: Cigarettes   • Smokeless tobacco: Never Used   Substance and Sexual Activity   • Alcohol use: Not Currently   • Drug use: No   • Sexual activity: Yes     Partners: Male     Comment:    Social History Narrative    Lives in [facility]     Allergies   Allergen Reactions   • Levaquin [Levofloxacin] Nausea And Vomiting   • Penicillins Nausea And Vomiting   • Codeine Nausea Only   • Flagyl [Metronidazole] Nausea And Vomiting       Medication:    Current Facility-Administered Medications:   •  acetaminophen (TYLENOL) tablet 650 mg, 650 mg, Oral, Q4H PRN, 650 mg at 02/28/20 0212 **OR** acetaminophen (TYLENOL) 160 MG/5ML solution 650 mg, 650 mg, Oral, Q4H PRN, 649.6 mg at 03/01/20 0205 **OR** acetaminophen (TYLENOL) suppository 650 mg, 650 mg, Rectal, Q4H PRN, Edgar Machado PA, 650 mg at 02/28/20 0429  •  albuterol (PROVENTIL) nebulizer solution 0.083% 2.5 mg/3mL, 2.5 mg, Nebulization, Q6H PRN, Edgar Machado PA  •  aspirin tablet 325 mg, 325 mg, Oral, Daily, Deandre Oseguera MD, 325 mg at 03/01/20 0833  •  atorvastatin (LIPITOR) tablet 10  mg, 10 mg, Oral, Daily, Edgar Machado PA, 10 mg at 03/01/20 0833  •  budesonide (PULMICORT) nebulizer solution 0.5 mg, 0.5 mg, Nebulization, BID - RT, Rian Sifuentes, , 0.5 mg at 03/02/20 0711  •  chlorhexidine (PERIDEX) 0.12 % solution 15 mL, 15 mL, Mouth/Throat, Q12H, Rian Sifuentes DO, 15 mL at 03/01/20 2012  •  clopidogrel (PLAVIX) tablet 75 mg, 75 mg, Oral, Daily, Edgar Machado PA, 75 mg at 03/01/20 0833  •  dextrose (D50W) 25 g/ 50mL Intravenous Solution 25 g, 25 g, Intravenous, Q15 Min PRN, Edgar Machado PA  •  dextrose (GLUTOSE) oral gel 15 g, 15 g, Oral, Q15 Min PRN, Edgar Machado PA  •  famotidine (PEPCID) tablet 20 mg, 20 mg, Oral, BID AC, Edgar Machado PA, 20 mg at 03/01/20 1713  •  glucagon (human recombinant) (GLUCAGEN DIAGNOSTIC) injection 1 mg, 1 mg, Subcutaneous, Q15 Min PRN, Edgar Machado PA  •  guaiFENesin-dextromethorphan (ROBITUSSIN DM) 100-10 MG/5ML syrup 5 mL, 5 mL, Oral, Q6H PRN, Edgar Machado PA, 5 mL at 02/27/20 0920  •  hydrALAZINE (APRESOLINE) injection 10 mg, 10 mg, Intravenous, Q6H PRN, Yessenia Valero MD, 10 mg at 03/02/20 0216  •  HYDROcodone-acetaminophen (NORCO)  MG per tablet 1 tablet, 1 tablet, Oral, Q8H PRN, Edgar Machado PA, 1 tablet at 03/01/20 2012  •  insulin detemir (LEVEMIR) injection 10 Units, 10 Units, Subcutaneous, Nightly, Edgar Machado PA, 10 Units at 03/01/20 2046  •  insulin regular (humuLIN R,novoLIN R) injection 0-14 Units, 0-14 Units, Subcutaneous, Q6H, Rian Sifuentes, , 3 Units at 03/02/20 0556  •  ipratropium-albuterol (DUO-NEB) nebulizer solution 3 mL, 3 mL, Nebulization, 4x Daily - RT, Rian Sifuentes, , 3 mL at 03/02/20 0711  •  isosorbide mononitrate (IMDUR) 24 hr tablet 30 mg, 30 mg, Oral, Daily, Edgar Machado PA, 30 mg at 02/27/20 0902  •  ketotifen (ZADITOR) 0.025 % ophthalmic solution 1 drop, 1 drop, Both Eyes, BID, Edgar Machado PA, 1 drop at 03/01/20 2012  •   lactulose (CHRONULAC) 10 GM/15ML solution 10 g, 10 g, Oral, Daily, Edgar Machado PA, 10 g at 03/01/20 0832  •  losartan (COZAAR) tablet 50 mg, 50 mg, Oral, Daily, Yessenia Valero MD, 50 mg at 03/01/20 0832  •  meropenem (MERREM) 1 g/100 mL 0.9% NS VTB (mbp), 1 g, Intravenous, Q8H, Parag Gordon MD, 1 g at 03/02/20 0323  •  metoprolol tartrate (LOPRESSOR) tablet 50 mg, 50 mg, Oral, Q12H, Yessenia Valero MD, 50 mg at 03/01/20 2012  •  ondansetron (ZOFRAN) injection 4 mg, 4 mg, Intravenous, Once PRN, Ced Gómez CRNA  •  pantoprazole (PROTONIX) injection 40 mg, 40 mg, Intravenous, Q AM, Edgar Machado PA, 40 mg at 03/02/20 0550  •  Pharmacy to dose vancomycin, , Does not apply, Continuous PRN, Parag Gordon MD  •  propofol (DIPRIVAN) infusion 10 mg/mL 100 mL, 5-50 mcg/kg/min, Intravenous, Continuous, Rian Sifuentes, DO, Last Rate: 18.09 mL/hr at 03/02/20 0648, 50 mcg/kg/min at 03/02/20 0648  •  sodium chloride 0.9 % flush 10 mL, 10 mL, Intravenous, Q12H, Edgar Machado PA, 10 mL at 03/01/20 0834  •  sodium chloride 0.9 % flush 10 mL, 10 mL, Intravenous, PRN, Edgar Machado PA  •  vancomycin in dextrose 5% 150 mL (VANCOCIN) IVPB 750 mg, 750 mg, Intravenous, Q12H, Parag Gordon MD, 750 mg at 03/01/20 1329    Antibiotics:  Anti-Infectives (From admission, onward)    Ordered     Dose/Rate Route Frequency Start Stop    02/28/20 1813  vancomycin in dextrose 5% 150 mL (VANCOCIN) IVPB 750 mg  Review   Ordering Provider:  Parag Gordon MD    750 mg  over 60 Minutes Intravenous Every 12 Hours Scheduled 02/29/20 0000 04/08/20 2359    02/28/20 1802  meropenem (MERREM) 1 g/100 mL 0.9% NS VTB (mbp)  Review   Ordering Provider:  Parag Gordon MD    1 g  over 3 Hours Intravenous Every 8 Hours 02/28/20 1900 03/09/20 1959 02/28/20 1803  Pharmacy to dose vancomycin  Review   Ordering Provider:  Parag Gordon MD     Does not apply Continuous PRN 02/28/20 1802 04/08/20 1901     20 0746  meropenem (MERREM) 1 g/100 mL 0.9% NS VTB (mbp)     Ordering Provider:  Parag Gordon MD    1 g  over 30 Minutes Intravenous Once 20 0845 20 0921    20 0831  cefepime (MAXIPIME) 2 g/100 mL 0.9% NS (mbp)     Ordering Provider:  Ranjana Easley APRN    2 g  200 mL/hr over 30 Minutes Intravenous Once 20 0930 20 1157    20 0203  vancomycin 500 mg/100 mL 0.9% NS IVPB (mbp)     Ordering Provider:  Ced Kenny RPH    500 mg  100 mL/hr over 60 Minutes Intravenous Once 20 0300 20 0511            Objective:    Physical Exam:   Vital Signs:  Temp (24hrs), Av.5 °F (36.9 °C), Min:98.1 °F (36.7 °C), Max:99.3 °F (37.4 °C)    Temp  Min: 98.1 °F (36.7 °C)  Max: 99.3 °F (37.4 °C)  BP  Min: 101/61  Max: 157/87  Pulse  Min: 87  Max: 116  Resp  Min: 18  Max: 32  SpO2  Min: 93 %  Max: 99 %    GENERAL: Awake and alert, in no acute distress. Intubated  HEART: RRR;   LUNGS: Nonlabored. No dullness. intubated  ABDOMEN: Soft, nontender, nondistended. No rebound or guarding. NO mass or HSM.  EXT:  No cyanosis, clubbing or edema. No cord.  : Benavidez catheter in place.  SKIN: Warm and dry without cutaneous eruptions on Inspection/palpation.    NEURO: Oriented to PPT. No focal deficits on motor/sensory exam at arms/legs.  PSYCHIATRIC: Normal insight and judgement. Cooperative with PE  Bilateral Groins: incisional wound vac in place to suction, drains appropriate output, no seroma or hematoma on exam, soft, appropriate tenderness to palpation, no ecchymosis       Laboratory Data:  Results from last 7 days   Lab Units 20  0348 20  0404 20  0411   WBC 10*3/mm3 15.55* 12.98* 15.24*   HEMOGLOBIN g/dL 12.6 11.7* 11.9*   HEMATOCRIT % 39.4 35.4 36.9   PLATELETS 10*3/mm3 331 313 342     Results from last 7 days   Lab Units 20  0348   SODIUM mmol/L 141   POTASSIUM mmol/L 4.2   CHLORIDE mmol/L 104   CO2 mmol/L 25.0   BUN mg/dL 29*   CREATININE mg/dL  0.61   GLUCOSE mg/dL 184*   CALCIUM mg/dL 8.2*     Results from last 7 days   Lab Units 02/29/20  1557   ALK PHOS U/L 150*   BILIRUBIN mg/dL 0.7   ALT (SGPT) U/L 35*   AST (SGOT) U/L 63*         Results from last 7 days   Lab Units 02/29/20  1604   CRP mg/dL 8.05*     Results from last 7 days   Lab Units 02/28/20  0243   LACTATE mmol/L 1.5         Results from last 7 days   Lab Units 02/27/20  1137 02/25/20  1113   VANCOMYCIN TR mcg/mL 19.10 21.10*     Estimated Creatinine Clearance: 105 mL/min (by C-G formula based on SCr of 0.61 mg/dL).    Microbiology:  Blood Culture   Date Value Ref Range Status   02/28/2020 No growth at 2 days  Preliminary     No results found for: BCIDPCR, CXREFLEX, CSFCX, CULTURETIS  No results found for: CULTURES, HSVCX, URCX  No results found for: EYECULTURE, GCCX, LABHSV  No results found for: LEGIONELLA, MRSACX, MUMPSCX, MYCOPLASCX  No results found for: NOCARDIACX, STOOLCX  No results found for: THROATCX, UNSTIMCULT, URINECX, CULTURE, VZVCULTUR  Wound Culture   Date Value Ref Range Status   02/24/2020 Light growth (2+) Staphylococcus aureus, MRSA (A)  Final     Comment:     Methicillin resistant Staphylococcus aureus, Patient may be an isolation risk.         Assessment: 49yoF s/p 12/28/20 diagnostic fiberoptic bronchoscopy, Rtchest tube placement, removal of femoral-femoral bypass, bilateral CFA pericardial patches, bilateral Sartorious muscle flaps for groin graft coverage.         Plan:  - no acute surgery at this point in time, incisional wound vac in place to suction, no seroma or hematoma on PE, Lower extremity perfused,   - continue abx per ID  - ICU care per ICU team  - continue incisional wound vac  - record and strip the MELISSA drains  - if patient extubates may sit up in chair per Plastic surgery  - will continue to follow      Modesto Whittaker MD  03/02/20  7:26 AM

## 2020-03-02 NOTE — PAYOR COMM NOTE
"Sade Cantor RN  Utilization Review  P: [phone]  F: [phone]    Ref # [id number]  Updated clinicals for continued stay      Saskia Arteaga (49 y.o. Female)     Date of Birth Social Security Number Address Home Phone MRN    1970  [address] [place] [address] [phone] [id number]    Hinduism Marital Status          None        Admission Date Admission Type Admitting Provider Attending Provider Department, Room/Bed    20 Urgent Elian Lopez MD Mueller, Joseph C, MD [facility] 2HSIC, S253/1    Discharge Date Discharge Disposition Discharge Destination                       Attending Provider:  Elian Lopez MD    Allergies:  Levaquin [Levofloxacin], Penicillins, Codeine, Flagyl [Metronidazole]    Isolation:  None   Infection:  MRSA (20)   Code Status:  CPR    Ht:  160 cm (62.99\")   Wt:  60.3 kg (133 lb)    Admission Cmt:  None   Principal Problem:  None                Active Insurance as of 2020     Primary Coverage     Payor Plan Insurance Group Employer/Plan Group    [employer] [place] [employer] [place]ICA[place]      Payor Plan Address Payor Plan Phone Number Payor Plan Fax Number Effective Dates    [address] [address] [phone]  2020 - None Entered    [facility] [id number]       Subscriber Name Subscriber Birth Date Member ID       SASKIA ARTEAGA 1970 [id number]                 Emergency Contacts      (Rel.) Home Phone Work Phone Mobile Phone    Thuan Arteaga (Spouse) [phone] -- --               Physician Progress Notes (last 24 hours) (Notes from 20 0851 through 20 0851)      Modesto Whittaker MD at 20 0725            Plastic Surgery Progress Note      Admission Date:  2020  LOS:  8  Patient Care Team:  Meagan Barlow APRN as PCP - General (Family Medicine)  Tyson Donaldson DO as Consulting Physician (Cardiology)    Patient Name:  Saskia Arteaga  :  1970  MRN:  [id number]    Date:  " 3/2/2020      Subjective:  No acute events overnight      History:   Past Medical History:   Diagnosis Date   • Anxiety    • Arthritis    • Asthma    • Carotid artery stenosis    • Chronic bronchitis (CMS/HCC)    • COPD (chronic obstructive pulmonary disease) (CMS/HCC)    • Coronary artery disease     2 vessels with 50% blockage.  Sees Dr. Donaldson   • Depression    • Diabetes mellitus (CMS/HCC)    • Dyslipidemia    • GERD (gastroesophageal reflux disease)    • Hiatal hernia    • Hyperlipidemia    • Hypertension    • Infectious viral hepatitis    • Lower back pain    • Migraine    • Multinodular goiter    • S/P thyroid biopsy     2008, 2013, 2015, and 07/15/2019 at [facility], right lobe nodules - all cytologies were benign   • Sleep apnea     can't tolerate the cpap mask   • Subclinical hyperthyroidism      Past Surgical History:   Procedure Laterality Date   • ANTERIOR CERVICAL DISCECTOMY W/ FUSION     • BACK SURGERY      lumbar   •  SECTION  19940    x 2    • CHOLECYSTECTOMY     • COLONOSCOPY     • ENDOSCOPY     • FEMORAL ENDARTERECTOMY Bilateral 1/15/2020    Procedure: FEMORAL ENDARTERECTOMY BILATERAL;  Surgeon: Deandre Oseguera MD;  Location:  [facility];  Service: Vascular   • FEMORAL FEMORAL BYPASS Right 1/15/2020    Procedure: FEMORAL FEMORAL BYPASS;  Surgeon: Deandre Oseguera MD;  Location:  [facility];  Service: Vascular   • HYSTERECTOMY     • KNEE SURGERY Left    • WRIST SURGERY Left      Family History   Problem Relation Age of Onset   • Diabetes Mother    • Hypertension Mother    • Arthritis Mother    • Hyperlipidemia Mother    • Migraines Mother    • Thyroid disease Mother    • Hypertension Father    • Lung cancer Father    • Cancer Father    • Stroke Other    • Migraines Maternal Aunt    • Thyroid disease Maternal Aunt    • Heart attack Maternal Aunt    • Osteoporosis Maternal Aunt    • Cancer Maternal Uncle    • Heart attack Maternal Uncle    • Stroke Maternal Uncle    • Hyperlipidemia  Maternal Grandmother    • Cancer Maternal Grandmother    • Obesity Maternal Grandmother    • Thyroid disease Daughter    • Obesity Daughter      Social History     Socioeconomic History   • Marital status:      Spouse name: Not on file   • Number of children: 2   • Years of education: Not on file   • Highest education level: Not on file   Occupational History   • Occupation: [employer] Work     Employer: DISABLED     Comment: Back and Leg Pain   Tobacco Use   • Smoking status: Current Every Day Smoker     Packs/day: 1.00     Years: 35.00     Pack years: 35.00     Types: Cigarettes   • Smokeless tobacco: Never Used   Substance and Sexual Activity   • Alcohol use: Not Currently   • Drug use: No   • Sexual activity: Yes     Partners: Male     Comment:    Social History Narrative    Lives in [facility]     Allergies   Allergen Reactions   • Levaquin [Levofloxacin] Nausea And Vomiting   • Penicillins Nausea And Vomiting   • Codeine Nausea Only   • Flagyl [Metronidazole] Nausea And Vomiting       Medication:    Current Facility-Administered Medications:   •  acetaminophen (TYLENOL) tablet 650 mg, 650 mg, Oral, Q4H PRN, 650 mg at 02/28/20 0212 **OR** acetaminophen (TYLENOL) 160 MG/5ML solution 650 mg, 650 mg, Oral, Q4H PRN, 649.6 mg at 03/01/20 0205 **OR** acetaminophen (TYLENOL) suppository 650 mg, 650 mg, Rectal, Q4H PRN, Edgar Machado PA, 650 mg at 02/28/20 0429  •  albuterol (PROVENTIL) nebulizer solution 0.083% 2.5 mg/3mL, 2.5 mg, Nebulization, Q6H PRN, Edgar Machado PA  •  aspirin tablet 325 mg, 325 mg, Oral, Daily, Deandre Oseguera MD, 325 mg at 03/01/20 0833  •  atorvastatin (LIPITOR) tablet 10 mg, 10 mg, Oral, Daily, Edgar Machado PA, 10 mg at 03/01/20 0833  •  budesonide (PULMICORT) nebulizer solution 0.5 mg, 0.5 mg, Nebulization, BID - RT, Rian Sifuentes DO, 0.5 mg at 03/02/20 0711  •  chlorhexidine (PERIDEX) 0.12 % solution 15 mL, 15 mL, Mouth/Throat, Q12H, Bear  Rian Snow DO, 15 mL at 03/01/20 2012  •  clopidogrel (PLAVIX) tablet 75 mg, 75 mg, Oral, Daily, Edgar Machado PA, 75 mg at 03/01/20 0833  •  dextrose (D50W) 25 g/ 50mL Intravenous Solution 25 g, 25 g, Intravenous, Q15 Min PRN, Edgar Machado PA  •  dextrose (GLUTOSE) oral gel 15 g, 15 g, Oral, Q15 Min PRN, Edgar Machado PA  •  famotidine (PEPCID) tablet 20 mg, 20 mg, Oral, BID AC, Edgar Machado PA, 20 mg at 03/01/20 1713  •  glucagon (human recombinant) (GLUCAGEN DIAGNOSTIC) injection 1 mg, 1 mg, Subcutaneous, Q15 Min PRN, Edgar Machado PA  •  guaiFENesin-dextromethorphan (ROBITUSSIN DM) 100-10 MG/5ML syrup 5 mL, 5 mL, Oral, Q6H PRN, Edgar Machado PA, 5 mL at 02/27/20 0920  •  hydrALAZINE (APRESOLINE) injection 10 mg, 10 mg, Intravenous, Q6H PRN, Yessenia Valero MD, 10 mg at 03/02/20 0216  •  HYDROcodone-acetaminophen (NORCO)  MG per tablet 1 tablet, 1 tablet, Oral, Q8H PRN, Edgar Machado PA, 1 tablet at 03/01/20 2012  •  insulin detemir (LEVEMIR) injection 10 Units, 10 Units, Subcutaneous, Nightly, Edgar Machado PA, 10 Units at 03/01/20 2046  •  insulin regular (humuLIN R,novoLIN R) injection 0-14 Units, 0-14 Units, Subcutaneous, Q6H, Rian Sifuentes DO, 3 Units at 03/02/20 0556  •  ipratropium-albuterol (DUO-NEB) nebulizer solution 3 mL, 3 mL, Nebulization, 4x Daily - RT, Rian Sifuentes, DO, 3 mL at 03/02/20 0711  •  isosorbide mononitrate (IMDUR) 24 hr tablet 30 mg, 30 mg, Oral, Daily, Black, Edgar P, PA, 30 mg at 02/27/20 0902  •  ketotifen (ZADITOR) 0.025 % ophthalmic solution 1 drop, 1 drop, Both Eyes, BID, Edgar Machado PA, 1 drop at 03/01/20 2012  •  lactulose (CHRONULAC) 10 GM/15ML solution 10 g, 10 g, Oral, Daily, Edgar Machado PA, 10 g at 03/01/20 0832  •  losartan (COZAAR) tablet 50 mg, 50 mg, Oral, Daily, Yessenia Valero MD, 50 mg at 03/01/20 0832  •  meropenem (MERREM) 1 g/100 mL 0.9% NS VTB (mbp), 1 g, Intravenous, Q8H, Evan,  Parag GABRIEL MD, 1 g at 03/02/20 0323  •  metoprolol tartrate (LOPRESSOR) tablet 50 mg, 50 mg, Oral, Q12H, Yessenia Valero MD, 50 mg at 03/01/20 2012  •  ondansetron (ZOFRAN) injection 4 mg, 4 mg, Intravenous, Once PRN, Ced Gómez CRNA  •  pantoprazole (PROTONIX) injection 40 mg, 40 mg, Intravenous, Q AM, Edgar Machado PA, 40 mg at 03/02/20 0550  •  Pharmacy to dose vancomycin, , Does not apply, Continuous PRN, Parag Gordon MD  •  propofol (DIPRIVAN) infusion 10 mg/mL 100 mL, 5-50 mcg/kg/min, Intravenous, Continuous, Rian Sifuentes DO, Last Rate: 18.09 mL/hr at 03/02/20 0648, 50 mcg/kg/min at 03/02/20 0648  •  sodium chloride 0.9 % flush 10 mL, 10 mL, Intravenous, Q12H, Edgar Machado PA, 10 mL at 03/01/20 0834  •  sodium chloride 0.9 % flush 10 mL, 10 mL, Intravenous, PRN, Edgar Machado PA  •  vancomycin in dextrose 5% 150 mL (VANCOCIN) IVPB 750 mg, 750 mg, Intravenous, Q12H, Parag Gordon MD, 750 mg at 03/01/20 2359    Antibiotics:  Anti-Infectives (From admission, onward)    Ordered     Dose/Rate Route Frequency Start Stop    02/28/20 1813  vancomycin in dextrose 5% 150 mL (VANCOCIN) IVPB 750 mg  Review   Ordering Provider:  Parag Gordon MD    750 mg  over 60 Minutes Intravenous Every 12 Hours Scheduled 02/29/20 0000 04/08/20 2359    02/28/20 1802  meropenem (MERREM) 1 g/100 mL 0.9% NS VTB (mbp)  Review   Ordering Provider:  Parag Gordon MD    1 g  over 3 Hours Intravenous Every 8 Hours 02/28/20 1900 03/09/20 1959    02/28/20 1803  Pharmacy to dose vancomycin  Review   Ordering Provider:  Parag Gordon MD     Does not apply Continuous PRN 02/28/20 1802 04/08/20 1901    02/28/20 0746  meropenem (MERREM) 1 g/100 mL 0.9% NS VTB (mbp)     Ordering Provider:  Parag Gordon MD    1 g  over 30 Minutes Intravenous Once 02/28/20 0845 02/28/20 0921    02/24/20 0831  cefepime (MAXIPIME) 2 g/100 mL 0.9% NS (mbp)     Ordering Provider:  Ranjana Easley, LUIS    2  g  200 mL/hr over 30 Minutes Intravenous Once 20 0930 20 1157    20 0203  vancomycin 500 mg/100 mL 0.9% NS IVPB (mbp)     Ordering Provider:  Efraín Ced G, RPH    500 mg  100 mL/hr over 60 Minutes Intravenous Once 20 0300 20 0511            Objective:    Physical Exam:   Vital Signs:  Temp (24hrs), Av.5 °F (36.9 °C), Min:98.1 °F (36.7 °C), Max:99.3 °F (37.4 °C)    Temp  Min: 98.1 °F (36.7 °C)  Max: 99.3 °F (37.4 °C)  BP  Min: 101/61  Max: 157/87  Pulse  Min: 87  Max: 116  Resp  Min: 18  Max: 32  SpO2  Min: 93 %  Max: 99 %    GENERAL: Awake and alert, in no acute distress. Intubated  HEART: RRR;   LUNGS: Nonlabored. No dullness. intubated  ABDOMEN: Soft, nontender, nondistended. No rebound or guarding. NO mass or HSM.  EXT:  No cyanosis, clubbing or edema. No cord.  : Benavidez catheter in place.  SKIN: Warm and dry without cutaneous eruptions on Inspection/palpation.    NEURO: Oriented to PPT. No focal deficits on motor/sensory exam at arms/legs.  PSYCHIATRIC: Normal insight and judgement. Cooperative with PE  Bilateral Groins: incisional wound vac in place to suction, drains appropriate output, no seroma or hematoma on exam, soft, appropriate tenderness to palpation, no ecchymosis       Laboratory Data:  Results from last 7 days   Lab Units 20  0348 20  0404 20  0411   WBC 10*3/mm3 15.55* 12.98* 15.24*   HEMOGLOBIN g/dL 12.6 11.7* 11.9*   HEMATOCRIT % 39.4 35.4 36.9   PLATELETS 10*3/mm3 331 313 342     Results from last 7 days   Lab Units 20  0348   SODIUM mmol/L 141   POTASSIUM mmol/L 4.2   CHLORIDE mmol/L 104   CO2 mmol/L 25.0   BUN mg/dL 29*   CREATININE mg/dL 0.61   GLUCOSE mg/dL 184*   CALCIUM mg/dL 8.2*     Results from last 7 days   Lab Units 20  1557   ALK PHOS U/L 150*   BILIRUBIN mg/dL 0.7   ALT (SGPT) U/L 35*   AST (SGOT) U/L 63*         Results from last 7 days   Lab Units 20  1604   CRP mg/dL 8.05*     Results from last 7 days    Lab Units 02/28/20  0243   LACTATE mmol/L 1.5         Results from last 7 days   Lab Units 02/27/20  1137 02/25/20  1113   VANCOMYCIN TR mcg/mL 19.10 21.10*     Estimated Creatinine Clearance: 105 mL/min (by C-G formula based on SCr of 0.61 mg/dL).    Microbiology:  Blood Culture   Date Value Ref Range Status   02/28/2020 No growth at 2 days  Preliminary     No results found for: BCIDPCR, CXREFLEX, CSFCX, CULTURETIS  No results found for: CULTURES, HSVCX, URCX  No results found for: EYECULTURE, GCCX, LABHSV  No results found for: LEGIONELLA, MRSACX, MUMPSCX, MYCOPLASCX  No results found for: NOCARDIACX, STOOLCX  No results found for: THROATCX, UNSTIMCULT, URINECX, CULTURE, VZVCULTUR  Wound Culture   Date Value Ref Range Status   02/24/2020 Light growth (2+) Staphylococcus aureus, MRSA (A)  Final     Comment:     Methicillin resistant Staphylococcus aureus, Patient may be an isolation risk.         Assessment: 49yoF s/p 12/28/20 diagnostic fiberoptic bronchoscopy, Rtchest tube placement, removal of femoral-femoral bypass, bilateral CFA pericardial patches, bilateral Sartorious muscle flaps for groin graft coverage.         Plan:  - no acute surgery at this point in time, incisional wound vac in place to suction, no seroma or hematoma on PE, Lower extremity perfused,   - continue abx per ID  - ICU care per ICU team  - continue incisional wound vac  - record and strip the MELISSA drains  - if patient extubates may sit up in chair per Plastic surgery  - will continue to follow      Modesto Whittaker MD  03/02/20  7:26 AM        Electronically signed by Modesto Whittaker MD at 03/02/20 0727     Deandre Oseguera MD at 03/02/20 0619          Saskia Groves  [id number]  1970     LOS: 8 days   Patient Care Team:  Meagan Barlow APRN as PCP - General (Family Medicine)  Tyson Donaldson DO as Consulting Physician (Cardiology)    Chief Complaint: Infected femorofemoral graft      Subjective: Remains on the  "ventilator    Objective:     Vital Sign Min/Max for last 24 hours  Temp  Min: 98.1 °F (36.7 °C)  Max: 99.3 °F (37.4 °C)   BP  Min: 101/61  Max: 157/87   Pulse  Min: 87  Max: 116   Resp  Min: 18  Max: 32   SpO2  Min: 93 %  Max: 99 %   No data recorded   No data recorded     Flowsheet Rows      First Filed Value   Admission Height  160 cm (62.99\") Documented at 02/23/2020 2240   Admission Weight  60.4 kg (133 lb 1.6 oz) Documented at 02/23/2020 2240          Physical Exam:    Wound:    Pulses:     Mediastinal and Chest Tube Drainage:       Results Review:   Results from last 7 days   Lab Units 03/02/20  0348   WBC 10*3/mm3 15.55*   HEMOGLOBIN g/dL 12.6   HEMATOCRIT % 39.4   PLATELETS 10*3/mm3 331     Results from last 7 days   Lab Units 03/02/20  0348   SODIUM mmol/L 141   POTASSIUM mmol/L 4.2   CHLORIDE mmol/L 104   CO2 mmol/L 25.0   BUN mg/dL 29*   CREATININE mg/dL 0.61   GLUCOSE mg/dL 184*   CALCIUM mg/dL 8.2*     Results from last 7 days   Lab Units 03/01/20  0428   PH, ARTERIAL pH units 7.463*   PO2 ART mm Hg 89.1   PCO2, ARTERIAL mm Hg 36.6   HCO3 ART mmol/L 26.1*         Assessment      PVD (peripheral vascular disease) (CMS/[facility])    Current smoker    COPD (chronic obstructive pulmonary disease) (CMS/[facility])    Coronary artery disease    Hypertension    Hyperlipidemia    Diabetes mellitus (CMS/[facility])    Sepsis (CMS/[facility])    Pleural effusion - bilateral mod/large pleural effusions on CT chest 2/27/2020.     Acute pulmonary edema (CMS/[facility])    Acute hypoxemic respiratory failure (CMS/[facility])      Hopefully wean and extubate today.        Deandre Oseguera MD  03/02/20  6:19 AM      Please note that portions of this note were completed with a voice recognition program. Efforts were made to edit the dictations, but words may be mistranscribed    Electronically signed by Deandre Oseguera MD at 03/02/20 0620     Collette Melo MD at 03/01/20 1248          INFECTIOUS DISEASE PROGRESS NOTE     Saskia Mckeon " "Germain  1970  [id number]      Admission Date: 2/23/2020    Antibiotic therapy:   Daptomycin    Requesting Provider: Deandre Oseguera MD     Reason for Consultation: Left groin wound infection     History of present illness:  2/24/20:  Patient is a 49 y.o. female seen today for a left groin wound infection.  She underwent femoral endardectomy, and femorofemoral  Bypass with gortex graft on 1/15.  She has been having fevers 100 degrees and above since 1/17. She was seen last week by her PCP and given Bactrim but she couldn't tolerate it and Ceftin was called in. Her left groin continued to remain swollen and warm. She had a venous duplex to rule out DVT and then developed a \"knot\" of her left groin. She presented to the ED at [facility], was given Vancomycin and Merrem and transferred to [facility]. Tmax of 100.5 degrees without leukocytosis, normal lactate and PCT. She is currently on Vancomycin and Merrem and we were consulted for evaluation and treatment.   Labs at [facility] with WBC of 10.4, normal lactate, PCT.  She was tachycardic with tmax of 102.3.  She had fevers at home documented to over 103 degrees.  2/25/20 : She spontaneously drained a copious amount of slightly cloudy straw-colored fluid from her left groin yesterday and has continued to drain.  She complains of cough \"other than that I feel great\".  No n/v//d.  Left groin less tender.   2/26/20:  Graft to be removed on Friday.   She remains afebrile. She doesn't \"feel very good today\".  Family at bedside.   2/27/20:  Surgery scheduled for tomorrow.  She is tearful and afraid she will lose her leg.  She remains afebrile.    2/28/2020: She deteriorated this morning with hypoxemia and bilateral pulmonary opacifications with pleural effusions.  He had a fever to 101 degrees last night.  I discussed her complex situation with Dr. Deandre Oseguera early today and also this evening.  She was taken to surgery today and her left femoral graft was removed.  A " muscle flap was placed.  She is now endotracheally intubated and mechanically ventilated.  She underwent a thoracentesis revealing clear fluid.  Intravenous Merrem was added to her vancomycin this morning due to concerns about possible aspiration pneumonia.  She does have a history of penicillin allergy.  2020: She remains on ventilatory support.  She has only modest respiratory secretions.  She is still requiring an FiO2 of 60%.  She has been afebrile overnight.  She remains sedated.    Following for Dr. Parag Gordon:  3/1/20: Remains sedated on vent.  Tmax 99.3, FiO2 40% and PEEP 6.  She has a wound VAC on right ground with MELISSA drain with bloody drainage.  She has a right chest tube.  Small amount of clear to red-streaked ETT secretions. On tube feeding.         Past Medical History:   Diagnosis Date   • Anxiety    • Arthritis    • Asthma    • Carotid artery stenosis    • Chronic bronchitis (CMS/HCC)    • COPD (chronic obstructive pulmonary disease) (CMS/HCC)    • Coronary artery disease     2 vessels with 50% blockage.  Sees Dr. Donaldson   • Depression    • Diabetes mellitus (CMS/HCC)    • Dyslipidemia    • GERD (gastroesophageal reflux disease)    • Hiatal hernia    • Hyperlipidemia    • Hypertension    • Infectious viral hepatitis    • Lower back pain    • Migraine    • Multinodular goiter    • S/P thyroid biopsy     2008, 2013, 2015, and [facility]15/2019 at [facility], right lobe nodules - all cytologies were benign   • Sleep apnea     can't tolerate the cpap mask   • Subclinical hyperthyroidism        Past Surgical History:   Procedure Laterality Date   • ANTERIOR CERVICAL DISCECTOMY W/ FUSION     • BACK SURGERY      lumbar   •  SECTION  19940    x 2    • CHOLECYSTECTOMY     • COLONOSCOPY     • ENDOSCOPY     • FEMORAL ENDARTERECTOMY Bilateral 1/15/2020    Procedure: FEMORAL ENDARTERECTOMY BILATERAL;  Surgeon: Deandre Oseguera MD;  Location: [facility];  Service: Vascular   • FEMORAL FEMORAL  BYPASS Right 1/15/2020    Procedure: FEMORAL FEMORAL BYPASS;  Surgeon: Deandre Oseguera MD;  Location: [facility];  Service: Vascular   • HYSTERECTOMY     • KNEE SURGERY Left    • WRIST SURGERY Left        Family History   Problem Relation Age of Onset   • Diabetes Mother    • Hypertension Mother    • Arthritis Mother    • Hyperlipidemia Mother    • Migraines Mother    • Thyroid disease Mother    • Hypertension Father    • Lung cancer Father    • Cancer Father    • Stroke Other    • Migraines Maternal Aunt    • Thyroid disease Maternal Aunt    • Heart attack Maternal Aunt    • Osteoporosis Maternal Aunt    • Cancer Maternal Uncle    • Heart attack Maternal Uncle    • Stroke Maternal Uncle    • Hyperlipidemia Maternal Grandmother    • Cancer Maternal Grandmother    • Obesity Maternal Grandmother    • Thyroid disease Daughter    • Obesity Daughter        Social History     Socioeconomic History   • Marital status:      Spouse name: Not on file   • Number of children: 2   • Years of education: Not on file   • Highest education level: Not on file   Occupational History   • Occupation: [employer] Work     Employer: DISABLED     Comment: Back and Leg Pain   Tobacco Use   • Smoking status: Current Every Day Smoker     Packs/day: 1.00     Years: 35.00     Pack years: 35.00     Types: Cigarettes   • Smokeless tobacco: Never Used   Substance and Sexual Activity   • Alcohol use: Not Currently   • Drug use: No   • Sexual activity: Yes     Partners: Male     Comment:    Social History Narrative    Lives in [place], [place] alone       Allergies   Allergen Reactions   • Levaquin [Levofloxacin] Nausea And Vomiting   • Penicillins Nausea And Vomiting   • Codeine Nausea Only   • Flagyl [Metronidazole] Nausea And Vomiting         Medication:    Current Facility-Administered Medications:   •  acetaminophen (TYLENOL) tablet 650 mg, 650 mg, Oral, Q4H PRN, 650 mg at 02/28/20 0212 **OR** acetaminophen (TYLENOL) 160 MG/5ML  solution 650 mg, 650 mg, Oral, Q4H PRN, 649.6 mg at 03/01/20 0205 **OR** acetaminophen (TYLENOL) suppository 650 mg, 650 mg, Rectal, Q4H PRN, Edgar Machado PA, 650 mg at 02/28/20 0429  •  albuterol (PROVENTIL) nebulizer solution 0.083% 2.5 mg/3mL, 2.5 mg, Nebulization, Q6H PRN, Edgar Machado PA  •  aspirin tablet 325 mg, 325 mg, Oral, Daily, Deandre Oseguera MD, 325 mg at 03/01/20 0833  •  atorvastatin (LIPITOR) tablet 10 mg, 10 mg, Oral, Daily, Edgar Machado PA, 10 mg at 03/01/20 0833  •  budesonide (PULMICORT) nebulizer solution 0.5 mg, 0.5 mg, Nebulization, BID - RT, Rian Sifuentes DO, 0.5 mg at 03/01/20 0712  •  chlorhexidine (PERIDEX) 0.12 % solution 15 mL, 15 mL, Mouth/Throat, Q12H, Rian Sifuentes DO, 15 mL at 03/01/20 0832  •  clopidogrel (PLAVIX) tablet 75 mg, 75 mg, Oral, Daily, Edgar Machado PA, 75 mg at 03/01/20 0833  •  dextrose (D50W) 25 g/ 50mL Intravenous Solution 25 g, 25 g, Intravenous, Q15 Min PRN, Edgar Machado PA  •  dextrose (GLUTOSE) oral gel 15 g, 15 g, Oral, Q15 Min PRN, Edgar Machado PA  •  famotidine (PEPCID) tablet 20 mg, 20 mg, Oral, BID AC, Edgar Machado PA, 20 mg at 03/01/20 0833  •  furosemide (LASIX) injection 40 mg, 40 mg, Intravenous, Q12H, Yessenia Valero MD, 40 mg at 03/01/20 0206  •  glucagon (human recombinant) (GLUCAGEN DIAGNOSTIC) injection 1 mg, 1 mg, Subcutaneous, Q15 Min PRN, Edgar Machado PA  •  guaiFENesin-dextromethorphan (ROBITUSSIN DM) 100-10 MG/5ML syrup 5 mL, 5 mL, Oral, Q6H PRN, Edgar Machado PA, 5 mL at 02/27/20 0920  •  hydrALAZINE (APRESOLINE) injection 10 mg, 10 mg, Intravenous, Q6H PRN, Yessenia Valero MD, 10 mg at 03/01/20 1145  •  HYDROcodone-acetaminophen (NORCO)  MG per tablet 1 tablet, 1 tablet, Oral, Q8H PRN, Edgar Machado PA, 1 tablet at 02/29/20 0201  •  insulin detemir (LEVEMIR) injection 10 Units, 10 Units, Subcutaneous, Nightly, Edgar Machado PA, 10 Units at 02/29/20 2110  •   insulin regular (humuLIN R,novoLIN R) injection 0-14 Units, 0-14 Units, Subcutaneous, Q6H, Rian Sifuentes, DO, 3 Units at 03/01/20 1152  •  ipratropium-albuterol (DUO-NEB) nebulizer solution 3 mL, 3 mL, Nebulization, 4x Daily - RT, Rian Sifuentes, DO, 3 mL at 03/01/20 1113  •  isosorbide mononitrate (IMDUR) 24 hr tablet 30 mg, 30 mg, Oral, Daily, Edgar Machado PA, 30 mg at 02/27/20 0902  •  ketotifen (ZADITOR) 0.025 % ophthalmic solution 1 drop, 1 drop, Both Eyes, BID, Edgar Machado PA, 1 drop at 03/01/20 0834  •  lactulose (CHRONULAC) 10 GM/15ML solution 10 g, 10 g, Oral, Daily, Edgar Machado PA, 10 g at 03/01/20 0832  •  losartan (COZAAR) tablet 50 mg, 50 mg, Oral, Daily, Yessenia Valero MD, 50 mg at 03/01/20 0832  •  meropenem (MERREM) 1 g/100 mL 0.9% NS VTB (mbp), 1 g, Intravenous, Q8H, Parag Gordon MD, 1 g at 03/01/20 1121  •  metoprolol tartrate (LOPRESSOR) tablet 50 mg, 50 mg, Oral, Q12H, Yessenia Valero MD, 50 mg at 03/01/20 0833  •  ondansetron (ZOFRAN) injection 4 mg, 4 mg, Intravenous, Once PRN, Ced Gómez CRNA  •  pantoprazole (PROTONIX) injection 40 mg, 40 mg, Intravenous, Q AM, Edgar Machado PA, 40 mg at 03/01/20 0621  •  Pharmacy to dose vancomycin, , Does not apply, Continuous PRN, Parag Gordon MD  •  potassium chloride (KLOR-CON) packet 20 mEq, 20 mEq, Oral, Q12H, Yessenia Valero MD, 20 mEq at 03/01/20 0206  •  propofol (DIPRIVAN) infusion 10 mg/mL 100 mL, 5-50 mcg/kg/min, Intravenous, Continuous, Rian Sifuentes, , Last Rate: 18.09 mL/hr at 03/01/20 0900, 50 mcg/kg/min at 03/01/20 0900  •  sodium chloride 0.9 % flush 10 mL, 10 mL, Intravenous, Q12H, Edgar Machado PA, 10 mL at 03/01/20 0834  •  sodium chloride 0.9 % flush 10 mL, 10 mL, Intravenous, PRN, Edgar Machado PA  •  vancomycin in dextrose 5% 150 mL (VANCOCIN) IVPB 750 mg, 750 mg, Intravenous, Q12H, Parag Gordon MD, 750 mg at 03/01/20  1121    Antibiotics:  Anti-Infectives (From admission, onward)    Ordered     Dose/Rate Route Frequency Start Stop    20 1813  vancomycin in dextrose 5% 150 mL (VANCOCIN) IVPB 750 mg  Review   Ordering Provider:  Parag Gordon MD    750 mg  over 60 Minutes Intravenous Every 12 Hours Scheduled 20 0000 20 2359    20 1802  meropenem (MERREM) 1 g/100 mL 0.9% NS VTB (mbp)  Review   Ordering Provider:  Parag Gordon MD    1 g  over 3 Hours Intravenous Every 8 Hours 20 1900 20 1959    20 1803  Pharmacy to dose vancomycin  Review   Ordering Provider:  Parag Gordon MD     Does not apply Continuous PRN 20 1802 20 1901    20 0746  meropenem (MERREM) 1 g/100 mL 0.9% NS VTB (mbp)     Ordering Provider:  Parag Gordon MD    1 g  over 30 Minutes Intravenous Once 20 0845 20 0921    20 0831  cefepime (MAXIPIME) 2 g/100 mL 0.9% NS (mbp)     Ordering Provider:  Ranjana Easley APRN    2 g  200 mL/hr over 30 Minutes Intravenous Once 20 0930 20 1157    20 0203  vancomycin 500 mg/100 mL 0.9% NS IVPB (mbp)     Ordering Provider:  Ced Kenny RPH    500 mg  100 mL/hr over 60 Minutes Intravenous Once 20 0300 20 0511                Physical Exam:   Vital Signs  Temp (24hrs), Av.3 °F (37.4 °C), Min:98.1 °F (36.7 °C), Max:100.4 °F (38 °C)    Temp  Min: 98.1 °F (36.7 °C)  Max: 100.4 °F (38 °C)  BP  Min: 93/63  Max: 151/85  Pulse  Min: 90  Max: 116  Resp  Min: 18  Max: 35  SpO2  Min: 91 %  Max: 99 %    GENERAL: Sedated and endotracheally intubated.  HEENT: Normocephalic, atraumatic.  No conjunctival injection. No icterus.   HEART: RRR; No murmur, rubs, gallops.   LUNGS: diminished at lung bases bilaterally   ABDOMEN: Soft, nontender, nondistended. Positive bowel sounds. No rebound or guarding.   MSK:  No joint effusions   SKIN: Warm and dry without cutaneous eruptions on Inspection/palpation.   NEURO:  sedated  Left groin with a wound VAC in place MELISSA drains in place with bloody drainage.    Right Chest tube in place    Laboratory Data    Results from last 7 days   Lab Units 03/01/20  0404 02/29/20  0411 02/28/20  1818   WBC 10*3/mm3 12.98* 15.24* 21.37*   HEMOGLOBIN g/dL 11.7* 11.9* 11.6*   HEMATOCRIT % 35.4 36.9 35.2   PLATELETS 10*3/mm3 313 342 329     Results from last 7 days   Lab Units 03/01/20  0404   SODIUM mmol/L 140   POTASSIUM mmol/L 4.2   CHLORIDE mmol/L 107   CO2 mmol/L 24.0   BUN mg/dL 28*   CREATININE mg/dL 0.88   GLUCOSE mg/dL 148*   CALCIUM mg/dL 8.1*     Results from last 7 days   Lab Units 02/29/20  1557   ALK PHOS U/L 150*   BILIRUBIN mg/dL 0.7   ALT (SGPT) U/L 35*   AST (SGOT) U/L 63*         Results from last 7 days   Lab Units 02/29/20  1604   CRP mg/dL 8.05*     Results from last 7 days   Lab Units 02/28/20  0243   LACTATE mmol/L 1.5         Results from last 7 days   Lab Units 02/27/20  1137 02/25/20  1113 02/24/20  0057   VANCOMYCIN TR mcg/mL 19.10 21.10*  --    VANCOMYCIN RM mcg/mL  --   --  12.30     Estimated Creatinine Clearance: 72.8 mL/min (by C-G formula based on SCr of 0.88 mg/dL).      Microbiology:  2/24: BCx NG    2/24: wound few WBCs, GPC in pairs -- MRSA   2/28/2020: Operative cultures are growing MRSA  2/29/2020: Sputum culture is NGSF      Radiology:  Imaging Results (Last 72 Hours)     Procedure Component Value Units Date/Time    XR Chest 1 View [[id number]] Collected:  03/01/20 0757     Updated:  03/01/20 0758    Narrative:          EXAMINATION: XR CHEST 1 VW-      INDICATION: edema; I73.9-Peripheral vascular disease, unspecified;  H74-Lbgrjtr effusion, not elsewhere classified; Z95.828-Presence of  other vascular implants and grafts      COMPARISON: 02/29/2020     FINDINGS: Support hardware unchanged and in satisfactory positioning.  Cardiac silhouette enlarged with diffuse bilateral pulmonary  opacifications slightly decreased in density from prior  comparison  particularly within the right mid and upper lung suggesting improving  airspace disease. No pneumothorax however trace volume left pleural  effusion similar to prior.           Impression:       Cardiac silhouette enlarged with diffuse bilateral pulmonary  opacifications slightly decreased in density from prior comparison  particularly within the right mid and upper lung suggesting improving  airspace disease. No pneumothorax however trace volume left pleural  effusion similar to prior.              XR Chest 1 View [[id number]] Collected:  02/29/20 0723     Updated:  02/29/20 1907    Narrative:          EXAMINATION: XR CHEST 1 VW - 02/29/2020     INDICATION:  I73.9-Peripheral vascular disease, unspecified; W37-Hksifpn  effusion, not elsewhere classified; Z95.828-Presence of other vascular  implants and grafts.      COMPARISON: 02/28/2020     FINDINGS: Support hardware unchanged and in satisfactory positioning.  Cardiac size borderline enlarged and mostly obscured due to increase in  already dense bilateral pulmonary opacifications from prior of diffuse  airspace disease without pneumothorax or large effusion present.           Impression:       Increased density of diffuse bilateral pulmonary  opacifications with a left perihilar predominance of involvement with  air bronchograms present at this site of diffuse bilateral airspace  disease without significant effusion. Support hardware unchanged. No  pneumothorax.     DICTATED:   02/29/2020  EDITED/ls :   02/29/2020      This report was finalized on 2/29/2020 7:04 PM by Dr. Valdez Hill.       XR Chest 1 View [[id number]] Collected:  02/28/20 0841     Updated:  02/28/20 2154    Narrative:       EXAMINATION: XR CHEST 1 VW-     INDICATION: Dyspnea; I73.9-Peripheral vascular disease, unspecified.      COMPARISON: 01/14/2020.     FINDINGS: Apparatus shadow superimposed over the right upper neck and  chest. Heart is normal in size but there has been interval  development  of dense groundglass and finely reticular disease, with some patchy  airspace disease of the lower lungs. There appears to be a skinfold  shadow superimposed over the right base. No pneumothorax is suspected.       Impression:       Interval development of extensive pulmonary interstitial  disease, more typical in appearance for developing ARDS, than for  pulmonary edema. Patchy multifocal bibasilar airspace disease, possibly  pneumonia.      D:  02/28/2020  E:  02/28/2020     This report was finalized on 2/28/2020 9:51 PM by Dr. Jaswant Frank MD.       XR Chest 1 View [[id number]] Collected:  02/28/20 1932     Updated:  02/28/20 1934    Narrative:       CHEST X-RAY, 2/28/2020 (18:47)    HISTORY:    . Patient on ventilator. Follow-up cardiopulmonary status.      TECHNIQUE:  AP portable semiupright chest x-ray.    COMPARISON:  *  Chest x-ray earlier today.    FINDINGS:  Endotracheal tube tip in the mid thoracic trachea 5.0 cm above the you. Right arm PICC tip in the low SVC. NG tube in good position in the distal stomach.    Right pleural drain in place with no visible pneumothorax.    Moderate diffuse interstitial and alveolar pulmonary edema and probable small pleural effusions without significant change since earlier today. Heart size is normal.      Impression:       1. Support equipment in good position as detailed above.  2. Right-sided chest drain with no visible pneumothorax.  3. Pulmonary findings are unchanged since earlier today.    Signer Name: Tyson Yu MD   Signed: 2/28/2020 7:32 PM   Workstation [facility]    [facility]    CT Angiogram Chest With & Without Contrast [[id number]] Collected:  02/27/20 2150     Updated:  02/27/20 2152    Narrative:       CT ANGIOGRAM CHEST W CONTRAST    INDICATION:   Dyspnea of suspected cardiac origin. Shortness of air. Peripheral vascular disease.    TECHNIQUE:   CT angiogram of the chest with iodinated IV contrast.  3-D reconstructions were obtained and reviewed.   Radiation dose reduction techniques included automated exposure control or exposure modulation based on body size. Count of known CT and cardiac nuc  med studies performed in previous 12 months: 1.     COMPARISON:   None available.    FINDINGS:   Study is slightly motion degraded but felt to be diagnostic. Extensive bilateral perihilar alveolar infiltrates which appear relatively symmetric and most consistent with acute pulmonary edema. Moderate bilateral pleural effusions measuring over 5 cm on  the right and over 6 cm on the left. Compressive atelectasis both lung bases. Trachea and bronchi unremarkable.    No evidence of pulmonary embolus. Aortic atherosclerotic changes without dissection or aneurysm. Heart size within normal limits. Enlarged mediastinal lymph nodes the largest in the region of the AP window measuring 2.2 x 1.7 cm and may be reactive.  Appropriate follow-up recommended.    1.9 cm left adrenal nodule. Postcholecystectomy. Postsurgical changes lower cervical spine. Nonaggressive sclerotic bone lesion T8 vertebral body may represent a bone island. Generalized body wall edema suggesting third spacing of fluid      Impression:       Extensive bilateral perihilar alveolar infiltrates most likely representing pulmonary edema. Moderate-sized bilateral pleural effusions.    No evidence of pulmonary embolus.    Suspected anasarca with generalized body wall edema.    Signer Name: HARVEY Oliver MD   Signed: 2/27/2020 9:50 PM   Workstation [facility]: [facility]    [facility] [place]        I read her radiographic studies    Impression:   1.  MRSA left groin abscess/cellulitis/graft infection-status post graft removal.  She will require a prolonged course of intravenous antibiotic therapy directed toward MRSA.  I will leave her on vancomycin for now but I may switch her to daptomycin prior to her discharge.  2.  Bilateral pulmonary  infiltrates/pleural effusions-this may be secondary to edema or aspiration pneumonia.  I have added intravenous Merrem pending culture data and further evaluation.  3.  Acute hypoxic respiratory failure-requiring endotracheal intubation and mechanical ventilation  4.  S/p femorofemoral bypass with gortex graft, 1/15  5.  Type 2 Diabetes Mellitus  6.  Penicillin allergy  7.  Type 2 diabetes mellitus-with very poor control  8.  Malnutrition    PLAN/RECOMMENDATIONS:   1.  Continue IV vancomycin   2.  Graft resection/debridement-done  3.  Sputum Gram stain and culture--NGSF  4.  Continue intravenous Merrem    Discussed with daughter    Collette Melo MD saw and examined patient, verified hx and PE, read all radiographic studies, reviewed labs and micro data, and formulated dx, plan for treatment and all medical decision making.      PEDRO Herron-C for MD Kale Rivera PA  3/1/2020  12:48 PM                  Electronically signed by Collette Melo MD at 03/01/20 2022     Yessenia Valero MD at 03/01/20 1046          Pulmonary/Critical Care Follow-up     LOS: 7 days   Patient Care Team:  Meagan Barlow APRN as PCP - General (Family Medicine)  Tyson Donaldson DO as Consulting Physician (Cardiology)    Chief Complaint: sepsis      Subjective      49 y.o. female with history of ongoing tobacco abuse, COPD, hypertension, type 2 diabetes, hyperlipidemia, peripheral vascular disease who had bilateral femoral endarterectomies and a femoral-femoral bypass graft on 1/15/2020.  The patient has subsequently developed left groin pain and was found to have a left inguinal abscess and infected graft.  She was readmitted on 2/23/2020 and has been treated with antibiotics per infectious disease.    2/27 evening, the patient developed worsening dyspnea and hypoxemia requiring BiPAP.  She had a CT scan which showed large bilateral pleural effusions and bilateral pulmonary infiltrates most  consistent with pulmonary edema.  She was given 40 mg of IV Lasix.  2/28 she was transferred to the intensive care unit for further management.   She was taken to the operating room for removal of the infected graft, muscle flap/reconstruction per plastic surgery, and placement of bilateral chest tubes for pleural effusions.  Blood cultures have been negative but her wound culture was positive for MRSA.  She has been on meropenem and vancomycin.  Initially transferred to the ICU February 28.  She was on BiPAP and drowsy but arousable.  She required intubation for worsening hypoxia.    Interval History:     She remains intubated and on mechanical ventilation.  On a PEEP of 8 and 40% her saturation is 94%.  Improved from yesterday  Blood pressure elevated yesterday and she was on Cardene.  Beta-blocker and ARB increased and blood pressure was in the 90s all night.  This morning systolic is back up to 150 and Cardene drip was restarted.  She remains afebrile.  Received diuretics yesterday, input 1926, output 2617; 180 from chest tube  Wound vacs in place  Remains in sinus rhythm    History taken from: Nursing, chart    PMH/FH/Social History were reviewed and updated appropriately in the electronic medical record.     Review of Systems:    Review of 14 systems was completed with positives and pertinent negatives noted in the subjective section.  All other systems reviewed and are negative.   Exceptions are noted below:    Unable to obtain secondary to drowsiness.      Objective     Vital Signs  Temp:  [98.1 °F (36.7 °C)-100.4 °F (38 °C)] 98.1 °F (36.7 °C)  Heart Rate:  [] 95  Resp:  [18-35] 31  BP: ()/(56-85) 120/72  Arterial Line BP: ()/() 126/68  FiO2 (%):  [40 %-60 %] 40 %  02/29 0701 - 03/01 0700  In: 1926 [I.V.:584]  Out: 2617 [Urine:1907; Drains:430]  Body mass index is 23.57 kg/m².  FiO2 (%):  [40 %-60 %] 40 %  S RR:  [20] 20  PEEP/CPAP (cm H2O):  [8 cm H20] 8 cm H20  PA SUP:  [0 cm H20] 0  cm H20  MAP (cm H2O):  [11-14] 11  IV drips:    niCARdipine Last Rate: 5 mg/hr (03/01/20 0715)   Pharmacy to dose vancomycin    propofol Last Rate: 50 mcg/kg/min (03/01/20 0900)      Physical Exam:     Constitutional:    sedated, intubated   Head:   Normocephalic, without obvious abnormality, atraumatic   Eyes:           Lids and lashes normal, conjunctivae and sclerae normal.  No icterus, no pallor, corneas clear, PER   ENMT:  Ears appear intact with no abnormalities noted     Orally intubated   Neck:  No adenopathy, supple, trachea midline, no thyromegaly, no JVD   Lungs/Resp:     Chest tubes in place with serosanguineous output, right.  Diminished breath sounds lower lobes, improved breath sounds posterior chest bilaterally            Heart/CV:   Regular rhythm and normal rate, normal S1 and S2, no            murmur   Abdomen/GI:    Normal bowel sounds, no masses, no organomegaly, soft        non-tender, non-distended   :     Bilateral groin wound VAC    Extremities/MSK:  No clubbing or cyanosis.  tr+ bilateral lower extremity edema.  Normal tone.    No deformities.    Pulses:  Pulses palpable and equal bilaterally   Skin:  No bleeding, bruising or rash   Heme/Lymph:  No cervical or supraclavicular adenopathy.   Neurologic:    Psychiatric:    Sedated    The above physical exam findings were reviewed and reflect exam findings as of today's exam.     Yessenia Valero MD 03/01/20 10:46 AM    Results Review:     I reviewed the patient's new clinical results.   Results from last 7 days   Lab Units 03/01/20  0404 02/29/20  1557 02/29/20  0411 02/28/20  1818   SODIUM mmol/L 140 142 139 140   POTASSIUM mmol/L 4.2 4.1 3.9 4.4   CHLORIDE mmol/L 107 107 104 103   CO2 mmol/L 24.0 24.0 22.0 24.0   BUN mg/dL 28* 25* 25* 23*   CREATININE mg/dL 0.88 0.84 0.87 0.88   CALCIUM mg/dL 8.1* 7.8* 8.3* 8.9   BILIRUBIN mg/dL  --  0.7 0.9 3.6*   ALK PHOS U/L  --  150* 145* 139*   ALT (SGPT) U/L  --  35* 36* 28   AST (SGOT) U/L  --   63* 68* 75*   GLUCOSE mg/dL 148* 93 168* 247*     Results from last 7 days   Lab Units 03/01/20  0404 02/29/20  0411 02/28/20  1818   WBC 10*3/mm3 12.98* 15.24* 21.37*   HEMOGLOBIN g/dL 11.7* 11.9* 11.6*   HEMATOCRIT % 35.4 36.9 35.2   PLATELETS 10*3/mm3 313 342 329     Results from last 7 days   Lab Units 03/01/20  0428   PH, ARTERIAL pH units 7.463*   PO2 ART mm Hg 89.1   PCO2, ARTERIAL mm Hg 36.6   HCO3 ART mmol/L 26.1*     Results from last 7 days   Lab Units 03/01/20  0404 02/29/20  1557 02/29/20  0411   MAGNESIUM mg/dL 1.8 1.8 2.0   PHOSPHORUS mg/dL 2.7 3.0 4.5     February 28 culture of hardware positive for MRSA  February 29 sputum no organisms seen  February 28 right femoral wound, MRSA  February 24 left femoral wound, MRSA    I reviewed the patient's new imaging including images and reports.    Chest x-ray viewed and edema improved.  Right pigtail in place with no effusion.  Small left effusion and persistent bilateral infiltrates    Medication Review:     aspirin 325 mg Oral Daily   atorvastatin 10 mg Oral Daily   budesonide 0.5 mg Nebulization BID - RT   chlorhexidine 15 mL Mouth/Throat Q12H   clopidogrel 75 mg Oral Daily   famotidine 20 mg Oral BID AC   furosemide 40 mg Intravenous Q12H   insulin detemir 10 Units Subcutaneous Nightly   insulin regular 0-14 Units Subcutaneous Q6H   ipratropium-albuterol 3 mL Nebulization 4x Daily - RT   isosorbide mononitrate 30 mg Oral Daily   ketotifen 1 drop Both Eyes BID   lactulose 10 g Oral Daily   losartan 50 mg Oral Daily   meropenem 1 g Intravenous Q8H   metoprolol tartrate 50 mg Oral Q12H   pantoprazole 40 mg Intravenous Q AM   potassium chloride 20 mEq Oral Q12H   sodium chloride 10 mL Intravenous Q12H   vancomycin 750 mg Intravenous Q12H       niCARdipine 5-15 mg/hr Last Rate: 5 mg/hr (03/01/20 0715)   Pharmacy to dose vancomycin     propofol 5-50 mcg/kg/min Last Rate: 50 mcg/kg/min (03/01/20 0900)       Assessment/Plan       PVD (peripheral vascular  disease) (CMS/HCC)    Current smoker    COPD (chronic obstructive pulmonary disease) (CMS/HCC)    Coronary artery disease    Hypertension    Hyperlipidemia    Diabetes mellitus (CMS/HCC)    Sepsis (CMS/HCC)    Pleural effusion - bilateral mod/large pleural effusions on CT chest 2/27/2020.     Acute pulmonary edema (CMS/HCC)    Acute hypoxemic respiratory failure (CMS/HCC)    49 y.o. smoker with COPD, peripheral vascular disease, coronary artery disease, hypertension, hyperlipidemia, poorly controlled diabetes admitted on 2/23/2020 after developing groin pain and found to have suspected infection of her femorofemoral bypass graft.  Wound grew MRSA.  She has been treated with antibiotics including meropenem and vancomycin per infectious disease.  T-max 100.4, white blood cell count 12.98, improving    The patient developed worsening respiratory failure overnight 2/27 and was transferred to the intensive care unit.  She has moderate to large bilateral pleural effusions and evidence of pulmonary edema.  She had not been treated with diuretics up to this point.  She had an elevated BNP.  Blood sugars have been elevated.  2/28 she was taken to the operating room today for removal of infected femorofemoral bypass graft as well as placement of bilateral pleural tubes for drainage of pleural effusions and muscle flap/reconstruction per plastic surgery of the infected groin sites. Bilateral wound vac placed.     She is currently on mechanical ventilation with a PEEP of 8 and an FiO2 of 40%.  This is significantly improved compared to yesterday.  Chest x-ray also reveals less edema.  Right chest tube output 180 mL's in 24 hours.   She does have an ejection fraction of 45% so there might be a component of volume overload.  If weight is to believable she is likely up around 13 pounds.     She has been off pressors and has actually required Cardene for hypertension.  Blood pressure continues to be labile, in the 90s and then up  to 160.        Plan:  1.  Maintain mechanical ventilation  2.  Nebulized bronchodilators and steroids  3.  Scheduled furosemide  4.  Replace potassium  5.  Aspirin, statin  6.  Cozaar, metoprolol  7.  PRN hydralazine and stop Cardene drip  8.  Antibiotics per infectious disease, vancomycin, Merrem  9.  Anticipate that she will wean and extubate tomorrow  10.  Consider draining left effusion  11.  Wound vac  12.  Support with tube feeding  13.  Levemir, sliding scale insulin    I discussed the situation with the patient's  at the bedside.      Yessenia Valero MD  20  10:46 AM    Critical care time : 35 minutes.(This excludes time spent performing separately reportable procedures and services). including high complexity decision making to assess, manipulate, and support vital organ system failure in this individual who has impairment of one or more vital organ systems such that there is a high probability of imminent or life threatening deterioration in the patient’s condition.    *. Please note that portions of this note were completed with [employer] - a voice recognition program.     Electronically signed by Yessenia Valero MD at 20 1104     Modesto Whittaker MD at 20 1026            Plastic Surgery Progress Note      Admission Date:  2020  LOS:  7  Patient Care Team:  Meagan Barlow APRN as PCP - General (Family Medicine)  Tyson Donaldson DO as Consulting Physician (Cardiology)    Patient Name:  Saskia Groves  :  1970  MRN:  [id number]    Date:  3/1/2020      Subjective:  Patient intubated and sedated. No acute events, WBC decreasing, vitals stable,       History:   Past Medical History:   Diagnosis Date   • Anxiety    • Arthritis    • Asthma    • Carotid artery stenosis    • Chronic bronchitis (CMS/HCC)    • COPD (chronic obstructive pulmonary disease) (CMS/HCC)    • Coronary artery disease     2 vessels with 50% blockage.  Sees Dr. Donaldson   •  Depression    • Diabetes mellitus (CMS/HCC)    • Dyslipidemia    • GERD (gastroesophageal reflux disease)    • Hiatal hernia    • Hyperlipidemia    • Hypertension    • Infectious viral hepatitis    • Lower back pain    • Migraine    • Multinodular goiter    • S/P thyroid biopsy     2008, 2013, 2015, and [facility]/15/2019 at [facility], right lobe nodules - all cytologies were benign   • Sleep apnea     can't tolerate the cpap mask   • Subclinical hyperthyroidism      Past Surgical History:   Procedure Laterality Date   • ANTERIOR CERVICAL DISCECTOMY W/ FUSION     • BACK SURGERY      lumbar   •  SECTION  19940    x 2    • CHOLECYSTECTOMY     • COLONOSCOPY     • ENDOSCOPY     • FEMORAL ENDARTERECTOMY Bilateral 1/15/2020    Procedure: FEMORAL ENDARTERECTOMY BILATERAL;  Surgeon: Deandre Oseguera MD;  Location:  [facility];  Service: Vascular   • FEMORAL FEMORAL BYPASS Right 1/15/2020    Procedure: FEMORAL FEMORAL BYPASS;  Surgeon: Deandre Oseguera MD;  Location:  [facility];  Service: Vascular   • HYSTERECTOMY     • KNEE SURGERY Left    • WRIST SURGERY Left      Family History   Problem Relation Age of Onset   • Diabetes Mother    • Hypertension Mother    • Arthritis Mother    • Hyperlipidemia Mother    • Migraines Mother    • Thyroid disease Mother    • Hypertension Father    • Lung cancer Father    • Cancer Father    • Stroke Other    • Migraines Maternal Aunt    • Thyroid disease Maternal Aunt    • Heart attack Maternal Aunt    • Osteoporosis Maternal Aunt    • Cancer Maternal Uncle    • Heart attack Maternal Uncle    • Stroke Maternal Uncle    • Hyperlipidemia Maternal Grandmother    • Cancer Maternal Grandmother    • Obesity Maternal Grandmother    • Thyroid disease Daughter    • Obesity Daughter      Social History     Socioeconomic History   • Marital status:      Spouse name: Not on file   • Number of children: 2   • Years of education: Not on file   • Highest education level: Not on file    Occupational History   • Occupation: [employer] Work     Employer: DISABLED     Comment: Back and Leg Pain   Tobacco Use   • Smoking status: Current Every Day Smoker     Packs/day: 1.00     Years: 35.00     Pack years: 35.00     Types: Cigarettes   • Smokeless tobacco: Never Used   Substance and Sexual Activity   • Alcohol use: Not Currently   • Drug use: No   • Sexual activity: Yes     Partners: Male     Comment:    Social History Narrative    Lives in [place], [facility]     Allergies   Allergen Reactions   • Levaquin [Levofloxacin] Nausea And Vomiting   • Penicillins Nausea And Vomiting   • Codeine Nausea Only   • Flagyl [Metronidazole] Nausea And Vomiting       Medication:    Current Facility-Administered Medications:   •  acetaminophen (TYLENOL) tablet 650 mg, 650 mg, Oral, Q4H PRN, 650 mg at 02/28/20 0212 **OR** acetaminophen (TYLENOL) 160 MG/5ML solution 650 mg, 650 mg, Oral, Q4H PRN, 649.6 mg at 03/01/20 0205 **OR** acetaminophen (TYLENOL) suppository 650 mg, 650 mg, Rectal, Q4H PRN, Edgar Machado PA, 650 mg at 02/28/20 0429  •  albuterol (PROVENTIL) nebulizer solution 0.083% 2.5 mg/3mL, 2.5 mg, Nebulization, Q6H PRN, Edgar Machado PA  •  aspirin tablet 325 mg, 325 mg, Oral, Daily, Deandre Oseguera MD, 325 mg at 03/01/20 0833  •  atorvastatin (LIPITOR) tablet 10 mg, 10 mg, Oral, Daily, Edgar Machado PA, 10 mg at 03/01/20 0833  •  budesonide (PULMICORT) nebulizer solution 0.5 mg, 0.5 mg, Nebulization, BID - RT, Rian Sifuentes DO, 0.5 mg at 03/01/20 0712  •  chlorhexidine (PERIDEX) 0.12 % solution 15 mL, 15 mL, Mouth/Throat, Q12H, Rian Sifuentes DO, 15 mL at 03/01/20 0832  •  clopidogrel (PLAVIX) tablet 75 mg, 75 mg, Oral, Daily, Edgar Machado PA, 75 mg at 03/01/20 0833  •  dextrose (D50W) 25 g/ 50mL Intravenous Solution 25 g, 25 g, Intravenous, Q15 Min PRN, Edgar Machado, PA  •  dextrose (GLUTOSE) oral gel 15 g, 15 g, Oral, Q15 Min PRN, Edgar Machado, PA  •   famotidine (PEPCID) tablet 20 mg, 20 mg, Oral, BID AC, Edgar Machado PA, 20 mg at 03/01/20 0833  •  furosemide (LASIX) injection 40 mg, 40 mg, Intravenous, Q12H, Yessenia Valero MD, 40 mg at 03/01/20 0206  •  glucagon (human recombinant) (GLUCAGEN DIAGNOSTIC) injection 1 mg, 1 mg, Subcutaneous, Q15 Min PRN, Edgra Machado PA  •  guaiFENesin-dextromethorphan (ROBITUSSIN DM) 100-10 MG/5ML syrup 5 mL, 5 mL, Oral, Q6H PRN, Edgar Machado PA, 5 mL at 02/27/20 0920  •  HYDROcodone-acetaminophen (NORCO)  MG per tablet 1 tablet, 1 tablet, Oral, Q8H PRN, Edgar Machado PA, 1 tablet at 02/29/20 0201  •  insulin detemir (LEVEMIR) injection 10 Units, 10 Units, Subcutaneous, Nightly, Edgar Machado PA, 10 Units at 02/29/20 2110  •  insulin regular (humuLIN R,novoLIN R) injection 0-14 Units, 0-14 Units, Subcutaneous, Q6H, Rian Sifuentes, DO, 3 Units at 03/01/20 0621  •  ipratropium-albuterol (DUO-NEB) nebulizer solution 3 mL, 3 mL, Nebulization, 4x Daily - RT, Rian Sifuentes, DO, 3 mL at 03/01/20 0712  •  isosorbide mononitrate (IMDUR) 24 hr tablet 30 mg, 30 mg, Oral, Daily, Edgar Machado PA, 30 mg at 02/27/20 0902  •  ketotifen (ZADITOR) 0.025 % ophthalmic solution 1 drop, 1 drop, Both Eyes, BID, Edgar Machado PA, 1 drop at 03/01/20 0834  •  lactulose (CHRONULAC) 10 GM/15ML solution 10 g, 10 g, Oral, Daily, Edgar Machado PA, 10 g at 03/01/20 0832  •  losartan (COZAAR) tablet 50 mg, 50 mg, Oral, Daily, Gerhardstein, Yessenia C, MD, 50 mg at 03/01/20 0832  •  meropenem (MERREM) 1 g/100 mL 0.9% NS VTB (mbp), 1 g, Intravenous, Q8H, Parag Gordon MD, 1 g at 03/01/20 0414  •  metoprolol tartrate (LOPRESSOR) tablet 50 mg, 50 mg, Oral, Q12H, Yessenia Valero MD, 50 mg at 03/01/20 0833  •  niCARdipine (CARDENE) 25 mg in 250 mL NS (0.1 mg/mL) infusion, 5-15 mg/hr, Intravenous, Titrated, Edgar Machado PA, Last Rate: 50 mL/hr at 03/01/20 0715, 5 mg/hr at 03/01/20 0715  •   ondansetron (ZOFRAN) injection 4 mg, 4 mg, Intravenous, Once PRN, Ced Gómez CRNA  •  pantoprazole (PROTONIX) injection 40 mg, 40 mg, Intravenous, Q AM, Edgar Machado PA, 40 mg at 03/01/20 0621  •  Pharmacy to dose vancomycin, , Does not apply, Continuous PRN, Parag Gordon MD  •  potassium chloride (KLOR-CON) packet 20 mEq, 20 mEq, Oral, Q12H, Yessenia Valero MD, 20 mEq at 03/01/20 0206  •  propofol (DIPRIVAN) infusion 10 mg/mL 100 mL, 5-50 mcg/kg/min, Intravenous, Continuous, Rian Sifuentes DO, Last Rate: 18.09 mL/hr at 03/01/20 0900, 50 mcg/kg/min at 03/01/20 0900  •  sodium chloride 0.9 % flush 10 mL, 10 mL, Intravenous, Q12H, Edgar Machado PA, 10 mL at 03/01/20 0834  •  sodium chloride 0.9 % flush 10 mL, 10 mL, Intravenous, PRN, Edgar Machado PA  •  vancomycin in dextrose 5% 150 mL (VANCOCIN) IVPB 750 mg, 750 mg, Intravenous, Q12H, Parag Gordon MD, 750 mg at 03/01/20 0005    Antibiotics:  Anti-Infectives (From admission, onward)    Ordered     Dose/Rate Route Frequency Start Stop    02/28/20 1813  vancomycin in dextrose 5% 150 mL (VANCOCIN) IVPB 750 mg  Review   Ordering Provider:  Parag Gordon MD    750 mg  over 60 Minutes Intravenous Every 12 Hours Scheduled 02/29/20 0000 04/08/20 2359    02/28/20 1802  meropenem (MERREM) 1 g/100 mL 0.9% NS VTB (mbp)  Review   Ordering Provider:  Parag Gordon MD    1 g  over 3 Hours Intravenous Every 8 Hours 02/28/20 1900 03/09/20 1959    02/28/20 1803  Pharmacy to dose vancomycin  Review   Ordering Provider:  Parag Gordon MD     Does not apply Continuous PRN 02/28/20 1802 04/08/20 1901    02/28/20 0746  meropenem (MERREM) 1 g/100 mL 0.9% NS VTB (mbp)     Ordering Provider:  Parag Gordon MD    1 g  over 30 Minutes Intravenous Once 02/28/20 0845 02/28/20 0921    02/24/20 0831  cefepime (MAXIPIME) 2 g/100 mL 0.9% NS (mbp)     Ordering Provider:  Ranjana Easley APRN    2 g  200 mL/hr over 30 Minutes  Intravenous Once 20 0930 20 1157    20 0203  vancomycin 500 mg/100 mL 0.9% NS IVPB (mbp)     Ordering Provider:  Ced Kenny RPH    500 mg  100 mL/hr over 60 Minutes Intravenous Once 20 0300 20 0511            Objective:    Physical Exam:   Vital Signs:  Temp (24hrs), Av.3 °F (37.4 °C), Min:98.1 °F (36.7 °C), Max:100.4 °F (38 °C)    Temp  Min: 98.1 °F (36.7 °C)  Max: 100.4 °F (38 °C)  BP  Min: 93/63  Max: 151/85  Pulse  Min: 91  Max: 116  Resp  Min: 18  Max: 35  SpO2  Min: 91 %  Max: 98 %    GENERAL: Awake and alert, in no acute distress. Intubated  HEART: RRR;   LUNGS: Nonlabored. No dullness. intubated  ABDOMEN: Soft, nontender, nondistended. No rebound or guarding. NO mass or HSM.  EXT:  No cyanosis, clubbing or edema. No cord.  : Benavidez catheter in place.  SKIN: Warm and dry without cutaneous eruptions on Inspection/palpation.    NEURO: Oriented to PPT. No focal deficits on motor/sensory exam at arms/legs.  PSYCHIATRIC: Normal insight and judgement. Cooperative with PE  Bilateral Groins: incisional wound vac in place to suction, drains appropriate output, no seroma or hematoma on exam, soft, appropriate tenderness to palpation, no ecchymosis     Laboratory Data:  Results from last 7 days   Lab Units 20  0404 20  0411 20  1818   WBC 10*3/mm3 12.98* 15.24* 21.37*   HEMOGLOBIN g/dL 11.7* 11.9* 11.6*   HEMATOCRIT % 35.4 36.9 35.2   PLATELETS 10*3/mm3 313 342 329     Results from last 7 days   Lab Units 20  0404   SODIUM mmol/L 140   POTASSIUM mmol/L 4.2   CHLORIDE mmol/L 107   CO2 mmol/L 24.0   BUN mg/dL 28*   CREATININE mg/dL 0.88   GLUCOSE mg/dL 148*   CALCIUM mg/dL 8.1*     Results from last 7 days   Lab Units 20  1557   ALK PHOS U/L 150*   BILIRUBIN mg/dL 0.7   ALT (SGPT) U/L 35*   AST (SGOT) U/L 63*         Results from last 7 days   Lab Units 20  1604   CRP mg/dL 8.05*     Results from last 7 days   Lab Units 20  0243   LACTATE  mmol/L 1.5         Results from last 7 days   Lab Units 02/27/20  1137 02/25/20  1113 02/24/20  0057   VANCOMYCIN TR mcg/mL 19.10 21.10*  --    VANCOMYCIN RM mcg/mL  --   --  12.30     Estimated Creatinine Clearance: 72.8 mL/min (by C-G formula based on SCr of 0.88 mg/dL).    Microbiology:  Blood Culture   Date Value Ref Range Status   02/28/2020 No growth at 2 days  Preliminary     No results found for: BCIDPCR, CXREFLEX, CSFCX, CULTURETIS  No results found for: CULTURES, HSVCX, URCX  No results found for: EYECULTURE, GCCX, LABHSV  No results found for: LEGIONELLA, MRSACX, MUMPSCX, MYCOPLASCX  No results found for: NOCARDIACX, STOOLCX  No results found for: THROATCX, UNSTIMCULT, URINECX, CULTURE, VZVCULTUR  Wound Culture   Date Value Ref Range Status   02/24/2020 Light growth (2+) Staphylococcus aureus, MRSA (A)  Final     Comment:     Methicillin resistant Staphylococcus aureus, Patient may be an isolation risk.        Assessment: 49yoF s/p 12/28/20 diagnostic fiberoptic bronchoscopy, Rtchest tube placement, removal of femoral-femoral bypass, bilateral CFA pericardial patches, bilateral Sartorious muscle flaps for groin graft coverage.         Plan:  - no acute surgery at this point in time, incisional wound vac in place to suction, no seroma or hematoma on PE, Lower extremity perfused,   - continue abx per ID  - ICU care per ICU team  - continue incisional wound vac  - record and strip the MELISSA drains  - if patient extubates may sit up in chair per Plastic surgery  - will continue to follow      Modesto Whittaker MD  03/01/20  10:26 AM        Electronically signed by Modesto Whittaker MD at 03/01/20 1028       Consult Notes (last 24 hours) (Notes from 03/01/20 0854 through 03/02/20 0851)    No notes of this type exist for this encounter.

## 2020-03-02 NOTE — PROGRESS NOTES
Pharmacy Consult-Vancomycin Dosing  Saskia Groves is a  49 y.o. female receiving vancomycin therapy.     Indication: Sepsis, Vascular graft infection  Consulting Provider: Parag Gordon MD  ID Consult: yes  Goal Trough:15-20 mcg/ml    Current Antimicrobial Therapy  Vancomycin PTD  Meropenem 1g IV q8h    Allergies  Allergies as of 02/23/2020 - Reviewed 02/12/2020   Allergen Reaction Noted    Levaquin [levofloxacin] Nausea And Vomiting 01/14/2020    Penicillins Nausea And Vomiting 05/02/2016    Codeine Nausea Only 05/02/2016    Flagyl [metronidazole] Nausea And Vomiting 01/06/2020     Labs    Results from last 7 days   Lab Units 03/02/20  0348 03/01/20  0404 02/29/20  1557   BUN mg/dL 29* 28* 25*   CREATININE mg/dL 0.61 0.88 0.84     Results from last 7 days   Lab Units 03/02/20  0348 03/01/20  0404 02/29/20  0411   WBC 10*3/mm3 15.55* 12.98* 15.24*     Evaluation of Dosing     Is Patient on Dialysis or Renal Replacement: no    Ht - 160 cm (63 in)  Wt - 60.3 kg (133 lb)    Estimated Creatinine Clearance: 105 mL/min (by C-G formula based on SCr of 0.61 mg/dL).    I/O last 3 completed shifts:  In: 3771 [I.V.:892; Other:459; NG/GT:1491; IV Piggyback:929]  Out: 5865 [Urine:5305; Drains:390; Chest Tube:170]    Microbiology and Radiology  Microbiology Results (last 10 days)       Procedure Component Value - Date/Time    Respiratory Culture - Sputum, ET Suction [[id number]] Collected:  02/29/20 0850    Lab Status:  Final result Specimen:  Sputum from ET Suction Updated:  03/02/20 0902     Respiratory Culture No growth     Gram Stain Few (2+) Epithelial cells per low power field      Few (2+) WBCs seen      No organisms seen    Anaerobic Culture - Hardware / Foreign Body, Groin, right [[id number]] Collected:  02/28/20 1500    Lab Status:  Preliminary result Specimen:  Hardware / Foreign Body from Groin, right Updated:  03/02/20 1028     Anaerobic Culture No anaerobes isolated at 3 days    Hardware / Foreign Body  Culture - Hardware / Foreign Body, Groin, right [[id number]]  (Abnormal)  (Susceptibility) Collected:  02/28/20 1500    Lab Status:  Final result Specimen:  Hardware / Foreign Body from Groin, right Updated:  03/01/20 0645     Hardware / Foreign Body Culture Scant growth (1+) Staphylococcus aureus, MRSA     Comment:   Methicillin resistant Staphylococcus aureus, Patient may be an isolation risk.        Gram Stain Many (4+) WBCs seen      No organisms seen    Susceptibility        Staphylococcus aureus, MRSA     CADENCE     Clindamycin Susceptible     Erythromycin Resistant     Inducible Clindamycin Resistance Negative     Oxacillin Resistant     Penicillin G Resistant     Rifampin Susceptible     Tetracycline Susceptible     Trimethoprim + Sulfamethoxazole Susceptible     Vancomycin Susceptible                  Susceptibility Comments       Staphylococcus aureus, MRSA    This isolate does not demonstrate inducible clindamycin resistance in vitro.                 Anaerobic Culture - Tissue, Groin, right [[id number]] Collected:  02/28/20 1417    Lab Status:  Preliminary result Specimen:  Tissue from Groin, right Updated:  03/02/20 1028     Anaerobic Culture No anaerobes isolated at 3 days    Anaerobic Culture - Body Fluid, Groin, right [[id number]] Collected:  02/28/20 1417    Lab Status:  Preliminary result Specimen:  Body Fluid from Groin, right Updated:  03/02/20 1028     Anaerobic Culture No anaerobes isolated at 3 days    Body Fluid Culture - Body Fluid, Groin, right [[id number]]  (Abnormal) Collected:  02/28/20 1417    Lab Status:  Preliminary result Specimen:  Body Fluid from Groin, right Updated:  03/02/20 0736     Body Fluid Culture Rare Staphylococcus aureus     Comment:   Methicillin resistant Staphylococcus aureus, Patient may be an isolation risk.        Gram Stain Many (4+) WBCs seen      No organisms seen    Narrative:       Organism under investigation.      Tissue / Bone Culture - Tissue, Groin, right  [[id number]] Collected:  02/28/20 1417    Lab Status:  Final result Specimen:  Tissue from Groin, right Updated:  03/02/20 0809     Tissue Culture No growth at 3 days     Gram Stain Many (4+) WBCs seen      No organisms seen    AFB Culture - Tissue, Groin, right [[id number]] Collected:  02/28/20 1417    Lab Status:  Preliminary result Specimen:  Tissue from Groin, right Updated:  02/29/20 1321     AFB Stain No acid fast bacilli seen on concentrated smear    AFB Culture - Body Fluid, Groin, right [[id number]] Collected:  02/28/20 1417    Lab Status:  Preliminary result Specimen:  Body Fluid from Groin, right Updated:  02/29/20 1320     AFB Stain No acid fast bacilli seen on concentrated smear    Anaerobic Culture - Body Fluid, Pleural Cavity [[id number]] Collected:  02/28/20 1341    Lab Status:  Preliminary result Specimen:  Body Fluid from Pleural Cavity Updated:  03/02/20 1028     Anaerobic Culture No anaerobes isolated at 3 days    Body Fluid Culture - Body Fluid, Pleural Cavity [[id number]] Collected:  02/28/20 1341    Lab Status:  Final result Specimen:  Body Fluid from Pleural Cavity Updated:  03/02/20 0808     Body Fluid Culture No growth at 3 days     Gram Stain Many (4+) WBCs seen      No organisms seen    AFB Culture - Body Fluid, Pleural Cavity [[id number]] Collected:  02/28/20 1341    Lab Status:  Preliminary result Specimen:  Body Fluid from Pleural Cavity Updated:  02/29/20 1323     AFB Stain No acid fast bacilli seen on concentrated smear    Blood Culture - Blood, Wrist, Right [[id number]] Collected:  02/28/20 0750    Lab Status:  Preliminary result Specimen:  Blood from Wrist, Right Updated:  03/02/20 0815     Blood Culture No growth at 3 days    Wound Culture - Wound, Thigh, Left [[id number]]  (Abnormal)  (Susceptibility) Collected:  02/24/20 1849    Lab Status:  Final result Specimen:  Wound from Thigh, Left Updated:  02/27/20 0723     Wound Culture Light growth (2+) Staphylococcus aureus, MRSA     Comment:  Methicillin resistant Staphylococcus aureus, Patient may be an isolation risk.        Gram Stain Few (2+) WBCs seen      Rare (1+) Gram positive cocci in pairs    Susceptibility        Staphylococcus aureus, MRSA     CADENCE     Clindamycin Susceptible     Erythromycin Resistant     Inducible Clindamycin Resistance Negative     Oxacillin Resistant     Penicillin G Resistant     Rifampin Susceptible     Tetracycline Susceptible     Trimethoprim + Sulfamethoxazole Susceptible     Vancomycin Susceptible                  Susceptibility Comments       Staphylococcus aureus, MRSA    This isolate does not demonstrate inducible clindamycin resistance in vitro.                 Blood Culture - Blood, Arm, Left [[id number]] Collected:  02/24/20 0057    Lab Status:  Final result Specimen:  Blood from Arm, Left Updated:  02/29/20 0545     Blood Culture No growth at 5 days    Blood Culture - Blood, Hand, Left [[id number]] Collected:  02/24/20 0057    Lab Status:  Final result Specimen:  Blood from Hand, Left Updated:  02/29/20 0545     Blood Culture No growth at 5 days          Evaluation of Level    Results from last 7 days   Lab Units 03/02/20  1131 02/27/20  1137 02/25/20  1113   VANCOMYCIN TR mcg/mL 38.50* 19.10 21.10*     Assessment/Plan:  Vancomycin trough subtherapeutic with significant accumulation noted.  Dose not given this afternoon as discussed with TIANNA Vidal.  Last dose documented as appropriate (~12 hours ago).  Will hold vancomycin for now.  Assess random vancomycin level in the AM.  It is clear that patient will not tolerate q12h vancomycin in the future.  Continue current dose of meropenem per ID recommendations.  Pharmacy will continue to follow and adjust dose based on renal function, drug levels, and clinical status.    Thanks,  Kip Mckeon, PharmD, BCPS, BCCCP  #[id number]  03/02/20  12:49 PM

## 2020-03-02 NOTE — PROGRESS NOTES
"Saskia Groves  [id number]  1970     LOS: 8 days   Patient Care Team:  Meagan Barlow APRN as PCP - General (Family Medicine)  Tyson Donaldson DO as Consulting Physician (Cardiology)    Chief Complaint: Infected femorofemoral graft      Subjective: Remains on the ventilator    Objective:     Vital Sign Min/Max for last 24 hours  Temp  Min: 98.1 °F (36.7 °C)  Max: 99.3 °F (37.4 °C)   BP  Min: 101/61  Max: 157/87   Pulse  Min: 87  Max: 116   Resp  Min: 18  Max: 32   SpO2  Min: 93 %  Max: 99 %   No data recorded   No data recorded     Flowsheet Rows      First Filed Value   Admission Height  160 cm (62.99\") Documented at 02/23/2020 2240   Admission Weight  60.4 kg (133 lb 1.6 oz) Documented at 02/23/2020 2240          Physical Exam:    Wound:    Pulses:     Mediastinal and Chest Tube Drainage:       Results Review:   Results from last 7 days   Lab Units 03/02/20  0348   WBC 10*3/mm3 15.55*   HEMOGLOBIN g/dL 12.6   HEMATOCRIT % 39.4   PLATELETS 10*3/mm3 331     Results from last 7 days   Lab Units 03/02/20  0348   SODIUM mmol/L 141   POTASSIUM mmol/L 4.2   CHLORIDE mmol/L 104   CO2 mmol/L 25.0   BUN mg/dL 29*   CREATININE mg/dL 0.61   GLUCOSE mg/dL 184*   CALCIUM mg/dL 8.2*     Results from last 7 days   Lab Units 03/01/20  0428   PH, ARTERIAL pH units 7.463*   PO2 ART mm Hg 89.1   PCO2, ARTERIAL mm Hg 36.6   HCO3 ART mmol/L 26.1*         Assessment      PVD (peripheral vascular disease) (CMS/HCC)    Current smoker    COPD (chronic obstructive pulmonary disease) (CMS/HCC)    Coronary artery disease    Hypertension    Hyperlipidemia    Diabetes mellitus (CMS/HCC)    Sepsis (CMS/HCC)    Pleural effusion - bilateral mod/large pleural effusions on CT chest 2/27/2020.     Acute pulmonary edema (CMS/HCC)    Acute hypoxemic respiratory failure (CMS/HCC)      Hopefully wean and extubate today.        Deandre Oseguera MD  03/02/20  6:19 AM      Please note that portions of this note were completed with a voice " recognition program. Efforts were made to edit the dictations, but words may be mistranscribed

## 2020-03-02 NOTE — PROGRESS NOTES
"                  Clinical Nutrition     Nutrition Support Assessment  Reason for Visit:   Follow-up protocol, EN      Patient Name: Saskia Groves  YOB: 1970  MRN: [id number]  Date of Encounter: 03/02/20 7:58 AM  Admission date: 2/23/2020        Nutrition Assessment   Assessment     Admission diagnosis  Sepsis    Additional applicable diagnosis/conditions/procedures this adm:  Femorofemoral graft infection  MRSA  Acute pulmonary edema (CMS/HCC)  Acute hypoxemic respiratory failure (CMS/HCC)  Bilateral pleural effusions and bilateral pulmonary infiltrates   (2/28) transferred to ICU  (2/28) S/p Intubation   (2/28) S/p removal of infected femorofemoral bypass graft, placement of bilateral pleural tubes for drainage of pleural effusions and muscle flap/reconstruction per plastic surgery of the infected groin sites  Incisional wound vac in place x 2    Applicable PMH/PSxH:   (1/15) S/p bilateral femoral endarterectomies and a femoral-femoral bypass graft   PVD (peripheral vascular disease) (CMS/HCC)  Current smoker  COPD (chronic obstructive pulmonary disease) (CMS/HCC)  Coronary artery disease  Hypertension  Hyperlipidemia  Hiatal  hernia  Type 2 Diabetes mellitus (CMS/HCC)  CCY  Back surgery       Nutrition Support:   (2/29) EN initiated       Reported/Observed/Food/Nutrition Related History:      Pt tolerating EN well, propofol continues, plans to attempt to wean from ventilator today. Pt has been diuresed, plans for additional diuresis today.       Anthropometrics     Height: 160 cm (62.99\")  Last filed wt: Weight: 60.3 kg (133 lb) (02/28/20 1044)  Weight Method: Standing scale    BMI: BMI (Calculated): 23.6  Normal: 18.5-24.9kg/m2    Ideal Body Weight (IBW) (kg): 52.7  Admission wt:  Method obtained:    Last 15 Recorded Weights   Weight Weight (kg) Weight (lbs) Weight Method   2/28/2020 60.328 kg 133 lb -   2/25/2020 - - Standing scale   2/23/2020 60.374 kg 133 lb 1.6 oz -   2/23/2020 60.374 kg " 133 lb 1.6 oz Standing scale   2/12/2020 57.153 kg 126 lb -   2/12/2020 54.432 kg 120 lb -   1/30/2020 54.432 kg 120 lb -   1/15/2020 54.795 kg 120 lb 12.8 oz (No Data)    1/14/2020 54.795 kg 120 lb 12.8 oz Standing scale   1/6/2020 53.524 kg 118 lb -   7/10/2019 58.968 kg 130 lb -   6/27/2018 52.617 kg 116 lb -   1/24/2018 54.432 kg 120 lb -   1/10/2018 54.432 kg 120 lb -   8/9/2017 53.978 kg 119 lb -       Weight Change   UBW:  Weight change:   % wt change:   Time frame of weight loss:     Labs reviewed     Results from last 7 days   Lab Units 03/02/20  0348 03/01/20  1758 03/01/20  0404 02/29/20  1557 02/29/20  0411 02/28/20  1818   GLUCOSE mg/dL 184*  --  148* 93 168* 247*   BUN mg/dL 29*  --  28* 25* 25* 23*   CREATININE mg/dL 0.61  --  0.88 0.84 0.87 0.88   SODIUM mmol/L 141  --  140 142 139 140   CHLORIDE mmol/L 104  --  107 107 104 103   POTASSIUM mmol/L 4.2 4.3 4.2 4.1 3.9 4.4   PHOSPHORUS mg/dL  --   --  2.7 3.0 4.5  --    MAGNESIUM mg/dL  --   --  1.8 1.8 2.0  --    ALT (SGPT) U/L  --   --   --  35* 36* 28     Results from last 7 days   Lab Units 02/29/20  1604 02/29/20  1557 02/29/20  0411 02/28/20  1818   ALBUMIN g/dL  --  2.60* 2.70* 3.20*   PREALBUMIN mg/dL  --  12.8*  --   --    CRP mg/dL 8.05*  --   --   --    CHOLESTEROL mg/dL  --  97  --   --    TRIGLYCERIDES mg/dL  --  233*  --   --        Results from last 7 days   Lab Units 03/02/20  0555 03/01/20  2355 03/01/20  1722 03/01/20  1152 03/01/20  0502 03/01/20  0005   GLUCOSE mg/dL 179* 151* 141* 172* 151* 136*     Lab Results   Lab Value Date/Time    HGBA1C 12.10 (H) 01/14/2020 1415    HGBA1C 12.8 08/22/2016 1000    HGBA1C 11.4 (H) 04/21/2016 1015         Medications reviewed   Pertinent:  GTT: propofol @18.9mL/hr    Other: ABX, protonix, lactulose, SSI, levemir     Intake/Ouptut 24 hrs (7:00AM - 6:59 AM)     Intake & Output (last day)       03/01 0701 - 03/02 0700 03/02 0701 - 03/03 0700    I.V. (mL/kg) 641 (10.6)     Other 347     NG/GT 1092      IV Piggyback 624     Total Intake(mL/kg) 2704 (44.8)     Urine (mL/kg/hr) 4215 (2.9)     Emesis/NG output      Drains 215     Stool 0     Chest Tube 90     Total Output 4520     Net -1816           Stool Unmeasured Occurrence 5 x         Needs Assessment (2/28)          Height used 63in   Weight used 133lb            Estimated need Method/Equation used Result    Energy/Calorie need   kcals/d    MSJ x 1.2 1433kcal     25kcal per kg 1511kcal           Protein   g/day 1.2-1.5g protein per kg 73-91g protein         Current Nutrition Prescription     PO: NPO Diet        Nutrition Support:  Diet, Tube Feeding Tube Feeding Formula: Peptamen Intense VHP (Peptide Based, Very High Protein) goal rate 50mL/hr. Free water 10mL/hr      EN regimen to provide/ EN received per I/O's      At Target Goal Volume    % Est needs Received per I/O's    % Est needs   Volume  1000ml  1092mL  109%    Energy/kcals via EN 1000kcals  1092kcals %   Energy/kcals via propofol 499kcals  393kcals    Total  1499kcals 100% 1392kcals 93%   Protein  92g pro 112% 101g pro 123%   Fiber 4g                   4.4g    Fluid   W/Free water 840ml  1040ml  917mL  1264mL                20hr goal volume used based on anticipated interruptions in EN delivery/administration in ICU patient.          Nutrition Diagnosis       3/2/20  Problem Altered nutrition related laboratory values   Etiology ?lifestyle, medication adherence  and dietary choices   Signs/Symptoms HgbA1C 12.1%     2-28-20, revised 3/2  Problem Inadequate oral intake   Etiology Per Clinical Status/Resp status    Signs/Symptoms Pt requiring mechanical ventilation/NPO       2-28-20  Problem Increased nutrient needs: protein   Etiology Poor Skin Integrity   Signs/Symptoms L. Groin wound infection       Nutrition Intervention     1.  Follow treatment progress, Care plan reviewed  2. Recommendations include continuing current nutrition support regimen. EN will require adjustment pending continuance of  propofol.  3. RD notes pt has been offered nutrition education on previous RD visit 1/16/20, pt refused. RD to offer nutrition education again once clinically appropriate.     Goal:     General: Nutrition support treatment  EN/PN: Maintain EN    Monitoring/Evaluation:   Per protocol, I&O, EN delivery/tolerance, Skin status      Will Continue to follow per protocol      Lacie Rubalcava, BENI, LD, CNSC  Time Spent: 60min

## 2020-03-02 NOTE — PROGRESS NOTES
Continued Stay Note  [facility]     Patient Name: Saskia Groves  MRN: [id number]  Today's Date: 3/2/2020    Admit Date: 2/23/2020    Discharge Plan     Row Name 03/02/20 1017       Plan    Plan Comments  Pt is currently on vent with tube feeding and likely IV antibiotics. If family and pt agreeable [facility]  [facility].        Discharge Codes    No documentation.       Expected Discharge Date and Time     Expected Discharge Date Expected Discharge Time    Mar 2, 2020             Toyin Dixon RN

## 2020-03-03 ENCOUNTER — APPOINTMENT (OUTPATIENT)
Dept: GENERAL RADIOLOGY | Facility: HOSPITAL | Age: 50
End: 2020-03-03

## 2020-03-03 LAB
ANION GAP SERPL CALCULATED.3IONS-SCNC: 12 MMOL/L (ref 5–15)
BACTERIA FLD CULT: ABNORMAL
BASOPHILS # BLD AUTO: 0.06 10*3/MM3 (ref 0–0.2)
BASOPHILS NFR BLD AUTO: 0.5 % (ref 0–1.5)
BUN BLD-MCNC: 38 MG/DL (ref 6–20)
BUN/CREAT SERPL: 62.3 (ref 7–25)
CALCIUM SPEC-SCNC: 8.1 MG/DL (ref 8.6–10.5)
CHLORIDE SERPL-SCNC: 105 MMOL/L (ref 98–107)
CO2 SERPL-SCNC: 25 MMOL/L (ref 22–29)
CREAT BLD-MCNC: 0.61 MG/DL (ref 0.57–1)
DEPRECATED RDW RBC AUTO: 51.3 FL (ref 37–54)
EOSINOPHIL # BLD AUTO: 0.32 10*3/MM3 (ref 0–0.4)
EOSINOPHIL NFR BLD AUTO: 2.8 % (ref 0.3–6.2)
ERYTHROCYTE [DISTWIDTH] IN BLOOD BY AUTOMATED COUNT: 15.4 % (ref 12.3–15.4)
GFR SERPL CREATININE-BSD FRML MDRD: 104 ML/MIN/1.73
GLUCOSE BLD-MCNC: 252 MG/DL (ref 65–99)
GLUCOSE BLDC GLUCOMTR-MCNC: 135 MG/DL (ref 70–130)
GLUCOSE BLDC GLUCOMTR-MCNC: 270 MG/DL (ref 70–130)
GLUCOSE BLDC GLUCOMTR-MCNC: 281 MG/DL (ref 70–130)
GRAM STN SPEC: ABNORMAL
GRAM STN SPEC: ABNORMAL
HCT VFR BLD AUTO: 36.1 % (ref 34–46.6)
HGB BLD-MCNC: 11.4 G/DL (ref 12–15.9)
IMM GRANULOCYTES # BLD AUTO: 0.29 10*3/MM3 (ref 0–0.05)
IMM GRANULOCYTES NFR BLD AUTO: 2.5 % (ref 0–0.5)
LYMPHOCYTES # BLD AUTO: 1.52 10*3/MM3 (ref 0.7–3.1)
LYMPHOCYTES NFR BLD AUTO: 13.1 % (ref 19.6–45.3)
MAGNESIUM SERPL-MCNC: 1.8 MG/DL (ref 1.6–2.6)
MCH RBC QN AUTO: 28.9 PG (ref 26.6–33)
MCHC RBC AUTO-ENTMCNC: 31.6 G/DL (ref 31.5–35.7)
MCV RBC AUTO: 91.4 FL (ref 79–97)
MONOCYTES # BLD AUTO: 0.89 10*3/MM3 (ref 0.1–0.9)
MONOCYTES NFR BLD AUTO: 7.7 % (ref 5–12)
NEUTROPHILS # BLD AUTO: 8.5 10*3/MM3 (ref 1.7–7)
NEUTROPHILS NFR BLD AUTO: 73.4 % (ref 42.7–76)
NRBC BLD AUTO-RTO: 0 /100 WBC (ref 0–0.2)
PLATELET # BLD AUTO: 272 10*3/MM3 (ref 140–450)
PMV BLD AUTO: 10.3 FL (ref 6–12)
POTASSIUM BLD-SCNC: 4.3 MMOL/L (ref 3.5–5.2)
RBC # BLD AUTO: 3.95 10*6/MM3 (ref 3.77–5.28)
SODIUM BLD-SCNC: 142 MMOL/L (ref 136–145)
VANCOMYCIN SERPL-MCNC: 26.1 MCG/ML (ref 5–40)
WBC NRBC COR # BLD: 11.58 10*3/MM3 (ref 3.4–10.8)

## 2020-03-03 PROCEDURE — 80048 BASIC METABOLIC PNL TOTAL CA: CPT

## 2020-03-03 PROCEDURE — 94799 UNLISTED PULMONARY SVC/PX: CPT

## 2020-03-03 PROCEDURE — 97530 THERAPEUTIC ACTIVITIES: CPT

## 2020-03-03 PROCEDURE — 25010000002 HYDRALAZINE PER 20 MG: Performed by: INTERNAL MEDICINE

## 2020-03-03 PROCEDURE — 85025 COMPLETE CBC W/AUTO DIFF WBC: CPT | Performed by: INTERNAL MEDICINE

## 2020-03-03 PROCEDURE — 25010000002 MEROPENEM PER 100 MG: Performed by: INTERNAL MEDICINE

## 2020-03-03 PROCEDURE — 92610 EVALUATE SWALLOWING FUNCTION: CPT

## 2020-03-03 PROCEDURE — 97605 NEG PRS WND THER DME<=50SQCM: CPT

## 2020-03-03 PROCEDURE — 63710000001 INSULIN REGULAR HUMAN PER 5 UNITS: Performed by: INTERNAL MEDICINE

## 2020-03-03 PROCEDURE — 80202 ASSAY OF VANCOMYCIN: CPT

## 2020-03-03 PROCEDURE — 71045 X-RAY EXAM CHEST 1 VIEW: CPT

## 2020-03-03 PROCEDURE — 63710000001 INSULIN DETEMIR PER 5 UNITS: Performed by: INTERNAL MEDICINE

## 2020-03-03 PROCEDURE — 99233 SBSQ HOSP IP/OBS HIGH 50: CPT | Performed by: INTERNAL MEDICINE

## 2020-03-03 PROCEDURE — 82962 GLUCOSE BLOOD TEST: CPT

## 2020-03-03 PROCEDURE — 83735 ASSAY OF MAGNESIUM: CPT | Performed by: INTERNAL MEDICINE

## 2020-03-03 PROCEDURE — 97164 PT RE-EVAL EST PLAN CARE: CPT

## 2020-03-03 RX ORDER — METOPROLOL TARTRATE 50 MG/1
100 TABLET, FILM COATED ORAL EVERY 12 HOURS SCHEDULED
Status: DISCONTINUED | OUTPATIENT
Start: 2020-03-03 | End: 2020-03-11 | Stop reason: HOSPADM

## 2020-03-03 RX ORDER — AMITRIPTYLINE HYDROCHLORIDE 10 MG/1
10 TABLET, FILM COATED ORAL NIGHTLY
Status: DISCONTINUED | OUTPATIENT
Start: 2020-03-03 | End: 2020-03-11 | Stop reason: HOSPADM

## 2020-03-03 RX ORDER — METOPROLOL TARTRATE 50 MG/1
50 TABLET, FILM COATED ORAL ONCE
Status: COMPLETED | OUTPATIENT
Start: 2020-03-03 | End: 2020-03-03

## 2020-03-03 RX ORDER — PREGABALIN 75 MG/1
150 CAPSULE ORAL EVERY 8 HOURS SCHEDULED
Status: DISCONTINUED | OUTPATIENT
Start: 2020-03-03 | End: 2020-03-11 | Stop reason: HOSPADM

## 2020-03-03 RX ORDER — ROPINIROLE 2 MG/1
4 TABLET, FILM COATED ORAL NIGHTLY
Status: DISCONTINUED | OUTPATIENT
Start: 2020-03-03 | End: 2020-03-11 | Stop reason: HOSPADM

## 2020-03-03 RX ORDER — L.ACID,PARA/B.BIFIDUM/S.THERM 8B CELL
1 CAPSULE ORAL DAILY
Status: DISCONTINUED | OUTPATIENT
Start: 2020-03-03 | End: 2020-03-11 | Stop reason: HOSPADM

## 2020-03-03 RX ADMIN — MEROPENEM 1 G: 1 INJECTION, POWDER, FOR SOLUTION INTRAVENOUS at 03:47

## 2020-03-03 RX ADMIN — SODIUM CHLORIDE, PRESERVATIVE FREE 10 ML: 5 INJECTION INTRAVENOUS at 20:02

## 2020-03-03 RX ADMIN — KETOTIFEN FUMARATE 1 DROP: 0.35 SOLUTION/ DROPS OPHTHALMIC at 08:46

## 2020-03-03 RX ADMIN — METOPROLOL TARTRATE 50 MG: 50 TABLET, FILM COATED ORAL at 10:34

## 2020-03-03 RX ADMIN — INSULIN HUMAN 5 UNITS: 100 INJECTION, SOLUTION PARENTERAL at 01:33

## 2020-03-03 RX ADMIN — LACTULOSE 10 G: 10 SOLUTION ORAL at 08:43

## 2020-03-03 RX ADMIN — IPRATROPIUM BROMIDE AND ALBUTEROL SULFATE 3 ML: 2.5; .5 SOLUTION RESPIRATORY (INHALATION) at 15:19

## 2020-03-03 RX ADMIN — INSULIN HUMAN 8 UNITS: 100 INJECTION, SOLUTION PARENTERAL at 12:19

## 2020-03-03 RX ADMIN — HYDRALAZINE HYDROCHLORIDE 10 MG: 20 INJECTION INTRAMUSCULAR; INTRAVENOUS at 12:29

## 2020-03-03 RX ADMIN — PREGABALIN 150 MG: 75 CAPSULE ORAL at 21:58

## 2020-03-03 RX ADMIN — HYDROCODONE BITARTRATE AND ACETAMINOPHEN 1 TABLET: 10; 325 TABLET ORAL at 19:58

## 2020-03-03 RX ADMIN — ASPIRIN 325 MG ORAL TABLET 325 MG: 325 PILL ORAL at 08:43

## 2020-03-03 RX ADMIN — BUDESONIDE 0.5 MG: 0.5 INHALANT RESPIRATORY (INHALATION) at 06:42

## 2020-03-03 RX ADMIN — FAMOTIDINE 20 MG: 20 TABLET ORAL at 06:45

## 2020-03-03 RX ADMIN — IPRATROPIUM BROMIDE AND ALBUTEROL SULFATE 3 ML: 2.5; .5 SOLUTION RESPIRATORY (INHALATION) at 19:23

## 2020-03-03 RX ADMIN — HYDRALAZINE HYDROCHLORIDE 10 MG: 10 TABLET ORAL at 06:44

## 2020-03-03 RX ADMIN — BUDESONIDE 0.5 MG: 0.5 INHALANT RESPIRATORY (INHALATION) at 19:23

## 2020-03-03 RX ADMIN — IPRATROPIUM BROMIDE AND ALBUTEROL SULFATE 3 ML: 2.5; .5 SOLUTION RESPIRATORY (INHALATION) at 06:42

## 2020-03-03 RX ADMIN — METOPROLOL TARTRATE 50 MG: 50 TABLET, FILM COATED ORAL at 08:42

## 2020-03-03 RX ADMIN — LOSARTAN POTASSIUM 50 MG: 50 TABLET ORAL at 08:43

## 2020-03-03 RX ADMIN — IPRATROPIUM BROMIDE AND ALBUTEROL SULFATE 3 ML: 2.5; .5 SOLUTION RESPIRATORY (INHALATION) at 11:44

## 2020-03-03 RX ADMIN — INSULIN HUMAN 8 UNITS: 100 INJECTION, SOLUTION PARENTERAL at 05:52

## 2020-03-03 RX ADMIN — HYDRALAZINE HYDROCHLORIDE 10 MG: 20 INJECTION INTRAMUSCULAR; INTRAVENOUS at 04:28

## 2020-03-03 RX ADMIN — PREGABALIN 150 MG: 75 CAPSULE ORAL at 15:05

## 2020-03-03 RX ADMIN — ROPINIROLE HYDROCHLORIDE 4 MG: 2 TABLET, FILM COATED ORAL at 20:03

## 2020-03-03 RX ADMIN — INSULIN DETEMIR 15 UNITS: 100 INJECTION, SOLUTION SUBCUTANEOUS at 20:03

## 2020-03-03 RX ADMIN — FAMOTIDINE 20 MG: 20 TABLET ORAL at 17:26

## 2020-03-03 RX ADMIN — MEROPENEM 1 G: 1 INJECTION, POWDER, FOR SOLUTION INTRAVENOUS at 12:17

## 2020-03-03 RX ADMIN — METOPROLOL TARTRATE 100 MG: 50 TABLET, FILM COATED ORAL at 20:02

## 2020-03-03 RX ADMIN — CHLORHEXIDINE GLUCONATE 0.12% ORAL RINSE 15 ML: 1.2 LIQUID ORAL at 08:43

## 2020-03-03 RX ADMIN — AMITRIPTYLINE HYDROCHLORIDE 10 MG: 10 TABLET, FILM COATED ORAL at 20:02

## 2020-03-03 RX ADMIN — KETOTIFEN FUMARATE 1 DROP: 0.35 SOLUTION/ DROPS OPHTHALMIC at 20:02

## 2020-03-03 RX ADMIN — CLOPIDOGREL BISULFATE 75 MG: 75 TABLET ORAL at 08:42

## 2020-03-03 RX ADMIN — PANTOPRAZOLE SODIUM 40 MG: 40 INJECTION, POWDER, FOR SOLUTION INTRAVENOUS at 05:52

## 2020-03-03 RX ADMIN — Medication 1 CAPSULE: at 21:21

## 2020-03-03 RX ADMIN — SODIUM CHLORIDE, PRESERVATIVE FREE 10 ML: 5 INJECTION INTRAVENOUS at 10:35

## 2020-03-03 RX ADMIN — INSULIN DETEMIR 15 UNITS: 100 INJECTION, SOLUTION SUBCUTANEOUS at 10:30

## 2020-03-03 RX ADMIN — ATORVASTATIN CALCIUM 10 MG: 10 TABLET, FILM COATED ORAL at 08:43

## 2020-03-03 NOTE — PROGRESS NOTES
Plastic Surgery Progress Note      Admission Date:  2020  LOS:  9  Patient Care Team:  Meagan Barlow APRN as PCP - General (Family Medicine)  Tyson Donaldson DO as Consulting Physician (Cardiology)    Patient Name:  Saskia Groves  :  1970  MRN:  [id number]    Date:  3/3/2020    Subjective:  No acute events, patient is extubated, wbc decreasing,       History:   Past Medical History:   Diagnosis Date   • Anxiety    • Arthritis    • Asthma    • Carotid artery stenosis    • Chronic bronchitis (CMS/HCC)    • COPD (chronic obstructive pulmonary disease) (CMS/HCC)    • Coronary artery disease     2 vessels with 50% blockage.  Sees Dr. Donaldson   • Depression    • Diabetes mellitus (CMS/HCC)    • Dyslipidemia    • GERD (gastroesophageal reflux disease)    • Hiatal hernia    • Hyperlipidemia    • Hypertension    • Infectious viral hepatitis    • Lower back pain    • Migraine    • Multinodular goiter    • S/P thyroid biopsy     2008, 2013, 2015, and 07/15/2019 at [facility], right lobe nodules - all cytologies were benign   • Sleep apnea     can't tolerate the cpap mask   • Subclinical hyperthyroidism      Past Surgical History:   Procedure Laterality Date   • ANTERIOR CERVICAL DISCECTOMY W/ FUSION     • BACK SURGERY      lumbar   •  SECTION  19940    x 2    • CHOLECYSTECTOMY     • COLONOSCOPY     • ENDOSCOPY     • FEMORAL ARTERY CUTDOWN Bilateral 2020    Procedure: FEMORAL ARTERY CUTDOWN WITH REMOVAL OF FEMORAL FEMORAL BYPASS GRAFT BILATERAL;  Surgeon: Deandre Oseguera MD;  Location:  [facility];  Service: Vascular;  Laterality: Bilateral;   • FEMORAL ENDARTERECTOMY Bilateral 1/15/2020    Procedure: FEMORAL ENDARTERECTOMY BILATERAL;  Surgeon: Deandre Oseguera MD;  Location:  [facility];  Service: Vascular   • FEMORAL FEMORAL BYPASS Right 1/15/2020    Procedure: FEMORAL FEMORAL BYPASS;  Surgeon: Deandre Oseguera MD;  Location:  [facility];  Service: Vascular   • HYSTERECTOMY      • KNEE SURGERY Left    • LEG EXCISION LESION/CYST Left 2/28/2020    Procedure: GROIN MUSCLE FLAP BILATERAL;  Surgeon: Modesto Whittaker MD;  Location: [facility];  Service: Plastics;  Laterality: Left;   • WRIST SURGERY Left      Family History   Problem Relation Age of Onset   • Diabetes Mother    • Hypertension Mother    • Arthritis Mother    • Hyperlipidemia Mother    • Migraines Mother    • Thyroid disease Mother    • Hypertension Father    • Lung cancer Father    • Cancer Father    • Stroke Other    • Migraines Maternal Aunt    • Thyroid disease Maternal Aunt    • Heart attack Maternal Aunt    • Osteoporosis Maternal Aunt    • Cancer Maternal Uncle    • Heart attack Maternal Uncle    • Stroke Maternal Uncle    • Hyperlipidemia Maternal Grandmother    • Cancer Maternal Grandmother    • Obesity Maternal Grandmother    • Thyroid disease Daughter    • Obesity Daughter      Social History     Socioeconomic History   • Marital status:      Spouse name: Not on file   • Number of children: 2   • Years of education: Not on file   • Highest education level: Not on file   Occupational History   • Occupation: Socialite Work     Employer: DISABLED     Comment: Back and Leg Pain   Tobacco Use   • Smoking status: Current Every Day Smoker     Packs/day: 1.00     Years: 35.00     Pack years: 35.00     Types: Cigarettes   • Smokeless tobacco: Never Used   Substance and Sexual Activity   • Alcohol use: Not Currently   • Drug use: No   • Sexual activity: Yes     Partners: Male     Comment:    Social History Narrative    Lives in [place], [place] alone     Allergies   Allergen Reactions   • Levaquin [Levofloxacin] Nausea And Vomiting   • Penicillins Nausea And Vomiting     Has tolerated cefepime 2/2020   • Codeine Nausea Only   • Flagyl [Metronidazole] Nausea And Vomiting       Medication:    Current Facility-Administered Medications:   •  acetaminophen (TYLENOL) tablet 650 mg, 650 mg, Oral, Q4H PRN, 650 mg at 03/02/20  2128 **OR** acetaminophen (TYLENOL) 160 MG/5ML solution 650 mg, 650 mg, Oral, Q4H PRN, 649.6 mg at 03/01/20 0205 **OR** acetaminophen (TYLENOL) suppository 650 mg, 650 mg, Rectal, Q4H PRN, Edgar Machado PA, 650 mg at 02/28/20 0429  •  albuterol (PROVENTIL) nebulizer solution 0.083% 2.5 mg/3mL, 2.5 mg, Nebulization, Q6H PRN, Edgar Machado PA  •  amitriptyline (ELAVIL) tablet 10 mg, 10 mg, Oral, Nightly, Lorenzo Anderson MD  •  aspirin tablet 325 mg, 325 mg, Oral, Daily, Deandre Oseguera MD, 325 mg at 03/03/20 0843  •  atorvastatin (LIPITOR) tablet 10 mg, 10 mg, Oral, Daily, Edgar Machado PA, 10 mg at 03/03/20 0843  •  budesonide (PULMICORT) nebulizer solution 0.5 mg, 0.5 mg, Nebulization, BID - RT, Rian Sifuentes, DO, 0.5 mg at 03/03/20 0642  •  chlorhexidine (PERIDEX) 0.12 % solution 15 mL, 15 mL, Mouth/Throat, Q12H, Rian Sifuentes, DO, 15 mL at 03/03/20 0843  •  clopidogrel (PLAVIX) tablet 75 mg, 75 mg, Oral, Daily, Edgar Machado PA, 75 mg at 03/03/20 0842  •  dextrose (D50W) 25 g/ 50mL Intravenous Solution 25 g, 25 g, Intravenous, Q15 Min PRN, Edgar Machado PA  •  dextrose (GLUTOSE) oral gel 15 g, 15 g, Oral, Q15 Min PRN, Edgar Machado PA  •  famotidine (PEPCID) tablet 20 mg, 20 mg, Oral, BID AC, Edgar Machado PA, 20 mg at 03/03/20 0645  •  glucagon (human recombinant) (GLUCAGEN DIAGNOSTIC) injection 1 mg, 1 mg, Subcutaneous, Q15 Min PRN, Edgar Machado PA  •  guaiFENesin-dextromethorphan (ROBITUSSIN DM) 100-10 MG/5ML syrup 5 mL, 5 mL, Oral, Q6H PRN, Edgar Machado PA, 5 mL at 02/27/20 0920  •  hydrALAZINE (APRESOLINE) injection 10 mg, 10 mg, Intravenous, Q6H PRN, Yessenia Valero MD, 10 mg at 03/03/20 0428  •  HYDROcodone-acetaminophen (NORCO)  MG per tablet 1 tablet, 1 tablet, Oral, Q8H PRN, Edgar Machado PA, 1 tablet at 03/02/20 0738  •  insulin detemir (LEVEMIR) injection 15 Units, 15 Units, Subcutaneous, Q12H, Lorenzo Anderson MD  •  insulin  regular (humuLIN R,novoLIN R) injection 0-14 Units, 0-14 Units, Subcutaneous, Q6H, Rian Sifuentes, DO, 8 Units at 03/03/20 0552  •  ipratropium-albuterol (DUO-NEB) nebulizer solution 3 mL, 3 mL, Nebulization, 4x Daily - RT, Rian Sifuentes, DO, 3 mL at 03/03/20 0642  •  isosorbide mononitrate (IMDUR) 24 hr tablet 30 mg, 30 mg, Oral, Q24H, Kip Mckeon, H  •  ketotifen (ZADITOR) 0.025 % ophthalmic solution 1 drop, 1 drop, Both Eyes, BID, Edgar Machado PA, 1 drop at 03/03/20 0846  •  lactulose (CHRONULAC) 10 GM/15ML solution 10 g, 10 g, Oral, Daily, Edgar Machado PA, 10 g at 03/03/20 0843  •  losartan (COZAAR) tablet 50 mg, 50 mg, Oral, Daily, Yessenia Valero MD, 50 mg at 03/03/20 0843  •  meropenem (MERREM) 1 g/100 mL 0.9% NS VTB (mbp), 1 g, Intravenous, Q8H, Parag Gordon MD, 1 g at 03/03/20 0347  •  metoprolol tartrate (LOPRESSOR) tablet 100 mg, 100 mg, Oral, Q12H, Lorenzo Anderson MD  •  metoprolol tartrate (LOPRESSOR) tablet 50 mg, 50 mg, Oral, Once, Lorenzo Anderson MD  •  niCARdipine (CARDENE) 40 mg in 200 mL NS (0.2 mg/mL) infusion, 5-15 mg/hr, Intravenous, Titrated, Edgar Machado PA  •  ondansetron (ZOFRAN) injection 4 mg, 4 mg, Intravenous, Once PRN, Ced Gómez CRNA  •  pantoprazole (PROTONIX) injection 40 mg, 40 mg, Intravenous, Q AM, Edgar Machado PA, 40 mg at 03/03/20 0552  •  Pharmacy to dose vancomycin, , Does not apply, Continuous PRN, Praag Gordon MD  •  pregabalin (LYRICA) capsule 150 mg, 150 mg, Oral, Q8H, Lorenzo Anderson MD  •  propofol (DIPRIVAN) infusion 10 mg/mL 100 mL, 5-50 mcg/kg/min, Intravenous, Continuous, Rian Sifuentes, DO, Stopped at 03/02/20 1000  •  rOPINIRole (REQUIP) tablet 4 mg, 4 mg, Oral, Nightly, Lorenzo Anderson MD  •  sodium chloride 0.9 % flush 10 mL, 10 mL, Intravenous, Q12H, Edgar Machado PA, 10 mL at 03/02/20 2030  •  sodium chloride 0.9 % flush 10 mL, 10 mL, Intravenous, PRN, Black,  PEDRO Castellanos  •  vancomycin (dosing per levels), , Does not apply, Daily, Kip Mckeon [facility]    Antibiotics:  Anti-Infectives (From admission, onward)    Ordered     Dose/Rate Route Frequency Start Stop    20 1240  vancomycin (dosing per levels)     Ordering Provider:  Kip Mckeon RPH     Does not apply Daily 20 0900 03/10/20 0859    20 1802  meropenem (MERREM) 1 g/100 mL 0.9% NS VTB (mbp)     Kip Mckeon [facility] reviewed the order on 20 0905.   Ordering Provider:  Parag Gordon MD    1 g  over 3 Hours Intravenous Every 8 Hours 20 1900 20 1959    20 1803  Pharmacy to dose vancomycin     Kip Mckeon RPH reviewed the order on 20 1522.   Ordering Provider:  Parag Gordon MD     Does not apply Continuous PRN 20 1802 20 1901    20 0746  meropenem (MERREM) 1 g/100 mL 0.9% NS VTB (mbp)     Ordering Provider:  Parag Gordon MD    1 g  over 30 Minutes Intravenous Once 20 0845 20 0921    20 0831  cefepime (MAXIPIME) 2 g/100 mL 0.9% NS (mbp)     Ordering Provider:  Ranjana Easley APRN    2 g  200 mL/hr over 30 Minutes Intravenous Once 20 0930 20 1157    20 0203  vancomycin 500 mg/100 mL 0.9% NS IVPB (mbp)     Ordering Provider:  Ced Kenny RPH    500 mg  100 mL/hr over 60 Minutes Intravenous Once 20 0300 20 0511            Objective:    Physical Exam:   Vital Signs:  Temp (24hrs), Av.3 °F (37.4 °C), Min:98.3 °F (36.8 °C), Max:99.9 °F (37.7 °C)    Temp  Min: 98.3 °F (36.8 °C)  Max: 99.9 °F (37.7 °C)  BP  Min: 111/66  Max: 160/62  Pulse  Min: 96  Max: 121  Resp  Min: 18  Max: 22  SpO2  Min: 90 %  Max: 99 %    GENERAL: Awake and alert, in no acute distress. extubated  HEART: RRR;   LUNGS: Nonlabored. No dullness. intubated  ABDOMEN: Soft, nontender, nondistended. No rebound or guarding. NO mass or HSM.  EXT:  No cyanosis, clubbing or edema. No cord.  : Benavidez  catheter in place.  SKIN: Warm and dry without cutaneous eruptions on Inspection/palpation.    NEURO: Oriented to PPT. No focal deficits on motor/sensory exam at arms/legs.  PSYCHIATRIC: Normal insight and judgement. Cooperative with PE  Bilateral Groins: incisional wound vac in place to suction, drains appropriate output, no seroma or hematoma on exam, soft, appropriate tenderness to palpation, no ecchymosis       Laboratory Data:  Results from last 7 days   Lab Units 03/03/20  0338 03/02/20  0348 03/01/20  0404   WBC 10*3/mm3 11.58* 15.55* 12.98*   HEMOGLOBIN g/dL 11.4* 12.6 11.7*   HEMATOCRIT % 36.1 39.4 35.4   PLATELETS 10*3/mm3 272 331 313     Results from last 7 days   Lab Units 03/03/20  0338   SODIUM mmol/L 142   POTASSIUM mmol/L 4.3   CHLORIDE mmol/L 105   CO2 mmol/L 25.0   BUN mg/dL 38*   CREATININE mg/dL 0.61   GLUCOSE mg/dL 252*   CALCIUM mg/dL 8.1*     Results from last 7 days   Lab Units 02/29/20  1557   ALK PHOS U/L 150*   BILIRUBIN mg/dL 0.7   ALT (SGPT) U/L 35*   AST (SGOT) U/L 63*         Results from last 7 days   Lab Units 02/29/20  1604   CRP mg/dL 8.05*     Results from last 7 days   Lab Units 02/28/20  0243   LACTATE mmol/L 1.5         Results from last 7 days   Lab Units 03/03/20  0338 03/02/20  1131 02/27/20  1137 02/25/20  1113   VANCOMYCIN TR mcg/mL  --  38.50* 19.10 21.10*   VANCOMYCIN RM mcg/mL 26.10  --   --   --      Estimated Creatinine Clearance: 105 mL/min (by C-G formula based on SCr of 0.61 mg/dL).    Microbiology:  Blood Culture   Date Value Ref Range Status   02/28/2020 No growth at 4 days  Preliminary     No results found for: BCIDPCR, CXREFLEX, CSFCX, CULTURETIS  No results found for: CULTURES, HSVCX, URCX  No results found for: EYECULTURE, GCCX, LABHSV  No results found for: LEGIONELLA, MRSACX, MUMPSCX, MYCOPLASCX  No results found for: NOCARDIACX, STOOLCX  No results found for: THROATCX, UNSTIMCULT, URINECX, CULTURE, VZVCULTUR  No results found for: VIRALCULTU,  WOUNDCX      Assessment: 49yoF s/p 12/28/20 diagnostic fiberoptic bronchoscopy, Rtchest tube placement, removal of femoral-femoral bypass, bilateral CFA pericardial patches, bilateral Sartorious muscle flaps for groin graft coverage.         Plan:  - no acute surgery at this point in time, incisional wound vac in place to suction, no seroma or hematoma on PE, Lower extremity perfused,   - continue abx per ID  - ICU care per ICU team  - continue incisional wound vac  - record and strip the MELISSA drains  - may sit up in chair, may stand, per plastic surgery  - will continue to follow             Modesto Whittaker MD  03/03/20  9:28 AM

## 2020-03-03 NOTE — PROGRESS NOTES
Clinical Nutrition     Multidisciplinary Rounds      Patient Name: Saskia Groves  Date of Encounter: 03/03/20 8:46 AM  MRN: [id number]  Admission date: 2/23/2020    SHEILA LUND to continue to follow per protocol.     pt extubated, resting in bed, on nasal cannula, lethargic, thirsty    Per RN: pt tolerating TF    Current diet: NPO Diet  Orders Placed This Encounter      Diet, Tube Feeding Tube Feeding Formula: Peptamen Intense VHP (Peptide Based, Very High Protein)  goal rate @50ml/hr, free water @10ml/hr    Intake:  1164ml, 116% goal volume    Intervention:  Follow treatment plan  Care plan reviewed    Dysphagia eval if appropriate    If fails eval, rec change TF to Peptamen AF @60ml/hr as pt off propofol, increase free water to 25ml/hr    Follow up:   Per protocol      Lainey Villa RD  8:46 AM  Time: 20min

## 2020-03-03 NOTE — PLAN OF CARE
Pt  intact neurologically.  Able to stand/pivot from bed to chair and back to bed.  She also stood twice in order to have bedpan placed under her. Room air kept sats > 95%.  Right pleural tube had about 100-125 ml serous drainage.  Cardiac- Sinus Tachycardia (-593 bpm) with -155 mmhg.  Lopressor increased to 100 mg BID.  Bowel movement x 3-4.  Benavidez d/c'd- total 450-500 ml urine.

## 2020-03-03 NOTE — PROGRESS NOTES
"INFECTIOUS DISEASE PROGRESS NOTE     Saskia Groves  1970  [id number]      Admission Date: 2/23/2020    Antibiotic therapy:   Daptomycin    Requesting Provider: Deandre Oseguera MD     Reason for Consultation: Left groin wound infection     History of present illness:  2/24/20:  Patient is a 49 y.o. female seen today for a left groin wound infection.  She underwent femoral endardectomy, and femorofemoral  Bypass with gortex graft on 1/15.  She has been having fevers 100 degrees and above since 1/17. She was seen last week by her PCP and given Bactrim but she couldn't tolerate it and Ceftin was called in. Her left groin continued to remain swollen and warm. She had a venous duplex to rule out DVT and then developed a \"knot\" of her left groin. She presented to the ED at [facility], was given Vancomycin and Merrem and transferred to [facility]. Tmax of 100.5 degrees without leukocytosis, normal lactate and PCT. She is currently on Vancomycin and Merrem and we were consulted for evaluation and treatment.   Labs at [facility] with WBC of 10.4, normal lactate, PCT.  She was tachycardic with tmax of 102.3.  She had fevers at home documented to over 103 degrees.  2/25/20 : She spontaneously drained a copious amount of slightly cloudy straw-colored fluid from her left groin yesterday and has continued to drain.  She complains of cough \"other than that I feel great\".  No n/v//d.  Left groin less tender.   2/26/20:  Graft to be removed on Friday.   She remains afebrile. She doesn't \"feel very good today\".  Family at bedside.   2/27/20:  Surgery scheduled for tomorrow.  She is tearful and afraid she will lose her leg.  She remains afebrile.    2/28/2020: She deteriorated this morning with hypoxemia and bilateral pulmonary opacifications with pleural effusions.  He had a fever to 101 degrees last night.  I discussed her complex situation with Dr. Deandre Oseguera early today and also this evening.  She was taken to surgery " today and her left femoral graft was removed.  A muscle flap was placed.  She is now endotracheally intubated and mechanically ventilated.  She underwent a thoracentesis revealing clear fluid.  Intravenous Merrem was added to her vancomycin this morning due to concerns about possible aspiration pneumonia.  She does have a history of penicillin allergy.  2/29/2020: She remains on ventilatory support.  She has only modest respiratory secretions.  She is still requiring an FiO2 of 60%.  She has been afebrile overnight.  She remains sedated.    Following for Dr. Parag Gordon:  3/1/20: Remains sedated on vent.  Tmax 99.3, FiO2 40% and PEEP 6.  She has a wound VAC on right ground with MELISSA drain with bloody drainage.  She has a right chest tube.  Small amount of clear to red-streaked ETT secretions. On tube feeding.   3/2  Extubated, afebrile, intermittently cooperative  Ongoing issues with hypertension; 90 from chest tube yesterday Day# 8 merrem (intermittent dosing);  vanc trough 38  3/3  Afebrile, reluctant to cooperate with attempts to mobilize, alert, 60 from CT yesterday, no new + cultures, random vanc 26        Past Medical History:   Diagnosis Date   • Anxiety    • Arthritis    • Asthma    • Carotid artery stenosis    • Chronic bronchitis (CMS/HCC)    • COPD (chronic obstructive pulmonary disease) (CMS/HCC)    • Coronary artery disease     2 vessels with 50% blockage.  Sees Dr. Donaldson   • Depression    • Diabetes mellitus (CMS/HCC)    • Dyslipidemia    • GERD (gastroesophageal reflux disease)    • Hiatal hernia    • Hyperlipidemia    • Hypertension    • Infectious viral hepatitis    • Lower back pain    • Migraine    • Multinodular goiter    • S/P thyroid biopsy     11/2008, 01/2013, 04/2015, and 07/15/2019 at [facility], right lobe nodules - all cytologies were benign   • Sleep apnea     can't tolerate the cpap mask   • Subclinical hyperthyroidism        Past Surgical History:   Procedure Laterality Date   • ANTERIOR  CERVICAL DISCECTOMY W/ FUSION     • BACK SURGERY      lumbar   •  SECTION  19940    x 2    • CHOLECYSTECTOMY     • COLONOSCOPY     • ENDOSCOPY     • FEMORAL ARTERY CUTDOWN Bilateral 2020    Procedure: FEMORAL ARTERY CUTDOWN WITH REMOVAL OF FEMORAL FEMORAL BYPASS GRAFT BILATERAL;  Surgeon: Deandre Oseguera MD;  Location:  [facility];  Service: Vascular;  Laterality: Bilateral;   • FEMORAL ENDARTERECTOMY Bilateral 1/15/2020    Procedure: FEMORAL ENDARTERECTOMY BILATERAL;  Surgeon: Deandre Oseguera MD;  Location:  [facility];  Service: Vascular   • FEMORAL FEMORAL BYPASS Right 1/15/2020    Procedure: FEMORAL FEMORAL BYPASS;  Surgeon: Deandre Oseguera MD;  Location:  [facility];  Service: Vascular   • HYSTERECTOMY     • KNEE SURGERY Left    • LEG EXCISION LESION/CYST Left 2020    Procedure: GROIN MUSCLE FLAP BILATERAL;  Surgeon: Modesto Whittaker MD;  Location:  [facility];  Service: Plastics;  Laterality: Left;   • WRIST SURGERY Left        Family History   Problem Relation Age of Onset   • Diabetes Mother    • Hypertension Mother    • Arthritis Mother    • Hyperlipidemia Mother    • Migraines Mother    • Thyroid disease Mother    • Hypertension Father    • Lung cancer Father    • Cancer Father    • Stroke Other    • Migraines Maternal Aunt    • Thyroid disease Maternal Aunt    • Heart attack Maternal Aunt    • Osteoporosis Maternal Aunt    • Cancer Maternal Uncle    • Heart attack Maternal Uncle    • Stroke Maternal Uncle    • Hyperlipidemia Maternal Grandmother    • Cancer Maternal Grandmother    • Obesity Maternal Grandmother    • Thyroid disease Daughter    • Obesity Daughter        Social History     Socioeconomic History   • Marital status:      Spouse name: Not on file   • Number of children: 2   • Years of education: Not on file   • Highest education level: Not on file   Occupational History   • 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     Employer: DISABLED     Comment: Back and Leg Pain   Tobacco  Use   • Smoking status: Current Every Day Smoker     Packs/day: 1.00     Years: 35.00     Pack years: 35.00     Types: Cigarettes   • Smokeless tobacco: Never Used   Substance and Sexual Activity   • Alcohol use: Not Currently   • Drug use: No   • Sexual activity: Yes     Partners: Male     Comment:    Social History Narrative    Lives in [facility]       Allergies   Allergen Reactions   • Levaquin [Levofloxacin] Nausea And Vomiting   • Penicillins Nausea And Vomiting     Has tolerated cefepime 2/2020   • Codeine Nausea Only   • Flagyl [Metronidazole] Nausea And Vomiting         Medication:    Current Facility-Administered Medications:   •  acetaminophen (TYLENOL) tablet 650 mg, 650 mg, Oral, Q4H PRN, 650 mg at 03/02/20 2128 **OR** acetaminophen (TYLENOL) 160 MG/5ML solution 650 mg, 650 mg, Oral, Q4H PRN, 649.6 mg at 03/01/20 0205 **OR** acetaminophen (TYLENOL) suppository 650 mg, 650 mg, Rectal, Q4H PRN, Edgar Machado PA, 650 mg at 02/28/20 0429  •  albuterol (PROVENTIL) nebulizer solution 0.083% 2.5 mg/3mL, 2.5 mg, Nebulization, Q6H PRN, Edgar Machado PA  •  amitriptyline (ELAVIL) tablet 10 mg, 10 mg, Oral, Nightly, Lorenzo Anderson MD  •  aspirin tablet 325 mg, 325 mg, Oral, Daily, Deandre Oseguera MD, 325 mg at 03/03/20 0843  •  atorvastatin (LIPITOR) tablet 10 mg, 10 mg, Oral, Daily, Edgar Machado PA, 10 mg at 03/03/20 0843  •  budesonide (PULMICORT) nebulizer solution 0.5 mg, 0.5 mg, Nebulization, BID - RT, Rian Sifuentes DO, 0.5 mg at 03/03/20 0642  •  clopidogrel (PLAVIX) tablet 75 mg, 75 mg, Oral, Daily, Edgar Machado PA, 75 mg at 03/03/20 0842  •  dextrose (D50W) 25 g/ 50mL Intravenous Solution 25 g, 25 g, Intravenous, Q15 Min PRN, Edgar Machado PA  •  dextrose (GLUTOSE) oral gel 15 g, 15 g, Oral, Q15 Min PRNCarter Jamie P, PA  •  famotidine (PEPCID) tablet 20 mg, 20 mg, Oral, BID ACCarter Jamie P, PA, 20 mg at 03/03/20 0645  •  glucagon (human recombinant)  (GLUCAGEN DIAGNOSTIC) injection 1 mg, 1 mg, Subcutaneous, Q15 Min PRN, Edgar Machado PA  •  guaiFENesin-dextromethorphan (ROBITUSSIN DM) 100-10 MG/5ML syrup 5 mL, 5 mL, Oral, Q6H PRN, Edgar Machado PA, 5 mL at 02/27/20 0920  •  hydrALAZINE (APRESOLINE) injection 10 mg, 10 mg, Intravenous, Q6H PRN, Yessenia Valero MD, 10 mg at 03/03/20 1229  •  HYDROcodone-acetaminophen (NORCO)  MG per tablet 1 tablet, 1 tablet, Oral, Q8H PRN, Edgar Machado PA, 1 tablet at 03/02/20 0738  •  insulin detemir (LEVEMIR) injection 15 Units, 15 Units, Subcutaneous, Q12H, Lorenzo Anderson MD, 15 Units at 03/03/20 1030  •  insulin regular (humuLIN R,novoLIN R) injection 0-14 Units, 0-14 Units, Subcutaneous, Q6H, Rian Sifuentes DO, 8 Units at 03/03/20 1219  •  ipratropium-albuterol (DUO-NEB) nebulizer solution 3 mL, 3 mL, Nebulization, 4x Daily - RT, Rian Sifuentes DO, 3 mL at 03/03/20 1144  •  isosorbide mononitrate (IMDUR) 24 hr tablet 30 mg, 30 mg, Oral, Q24H, Kip Mckeon RP  •  ketotifen (ZADITOR) 0.025 % ophthalmic solution 1 drop, 1 drop, Both Eyes, BID, Edgar Machado PA, 1 drop at 03/03/20 0846  •  lactulose (CHRONULAC) 10 GM/15ML solution 10 g, 10 g, Oral, Daily, Edgar Machado PA, 10 g at 03/03/20 0843  •  losartan (COZAAR) tablet 50 mg, 50 mg, Oral, Daily, Yessenia Valero MD, 50 mg at 03/03/20 0843  •  meropenem (MERREM) 1 g/100 mL 0.9% NS VTB (mbp), 1 g, Intravenous, Q8H, Parag Grodon MD, 1 g at 03/03/20 1217  •  metoprolol tartrate (LOPRESSOR) tablet 100 mg, 100 mg, Oral, Q12H, Lorenzo Anderson MD  •  niCARdipine (CARDENE) 40 mg in 200 mL NS (0.2 mg/mL) infusion, 5-15 mg/hr, Intravenous, Titrated, Edgar Machado PA  •  ondansetron (ZOFRAN) injection 4 mg, 4 mg, Intravenous, Once PRN, Ced Gómez CRNA  •  pantoprazole (PROTONIX) injection 40 mg, 40 mg, Intravenous, Q AM, Edgar Machado, PA, 40 mg at 03/03/20 0590  •  Pharmacy to dose vancomycin, , Does  not apply, Continuous PRN, Parag Gordon MD  •  pregabalin (LYRICA) capsule 150 mg, 150 mg, Oral, Q8H, Lorenzo Anderson MD  •  propofol (DIPRIVAN) infusion 10 mg/mL 100 mL, 5-50 mcg/kg/min, Intravenous, Continuous, Rian Sifuentes DO, Stopped at 03/02/20 1000  •  rOPINIRole (REQUIP) tablet 4 mg, 4 mg, Oral, Nightly, Lorenzo Anderson MD  •  sodium chloride 0.9 % flush 10 mL, 10 mL, Intravenous, Q12H, Edgar Machado PA, 10 mL at 03/03/20 1035  •  sodium chloride 0.9 % flush 10 mL, 10 mL, Intravenous, PRN, Edgar Machado PA  •  vancomycin (dosing per levels), , Does not apply, Daily, Kip Mckeon TRINITY    Antibiotics:  Anti-Infectives (From admission, onward)    Ordered     Dose/Rate Route Frequency Start Stop    03/02/20 1240  vancomycin (dosing per levels)     Kip Mckeon [facility] reviewed the order on 03/03/20 1001.   Ordering Provider:  Kip Mckeon RPH     Does not apply Daily 03/03/20 0900 03/10/20 0859    02/28/20 1802  meropenem (MERREM) 1 g/100 mL 0.9% NS VTB (mbp)     Kip Mckeon [facility] reviewed the order on 03/02/20 0905.   Ordering Provider:  Parag Gordon MD    1 g  over 3 Hours Intravenous Every 8 Hours 02/28/20 1900 03/09/20 1959    02/28/20 1803  Pharmacy to dose vancomycin     Kip Mckeon [facility] reviewed the order on 03/02/20 1522.   Ordering Provider:  Parag Gordon MD     Does not apply Continuous PRN 02/28/20 1802 04/08/20 1901    02/28/20 0746  meropenem (MERREM) 1 g/100 mL 0.9% NS VTB (mbp)     Ordering Provider:  Parag Gordon MD    1 g  over 30 Minutes Intravenous Once 02/28/20 0845 02/28/20 0921    02/24/20 0831  cefepime (MAXIPIME) 2 g/100 mL 0.9% NS (mbp)     Ordering Provider:  Ranjana Easley APRN    2 g  200 mL/hr over 30 Minutes Intravenous Once 02/24/20 0930 02/24/20 1157    02/24/20 0203  vancomycin 500 mg/100 mL 0.9% NS IVPB (mbp)     Ordering Provider:  Ced Kenny RPH    500 mg  100 mL/hr over 60 Minutes  Intravenous Once 20 0300 20 0511                Physical Exam:   Vital Signs  Temp (24hrs), Av.4 °F (37.4 °C), Min:98.3 °F (36.8 °C), Max:99.9 °F (37.7 °C)    Temp  Min: 98.3 °F (36.8 °C)  Max: 99.9 °F (37.7 °C)  BP  Min: 124/69  Max: 169/84  Pulse  Min: 98  Max: 121  Resp  Min: 18  Max: 22  SpO2  Min: 90 %  Max: 99 %    GENERAL: extubated,sitting in chair, asking to go home  HEENT: Normocephalic, atraumatic.  No conjunctival injection. No icterus.   HEART: RRR; No murmur, rubs, gallops.   LUNGS: diminished at lung bases bilaterally   ABDOMEN: Soft, nontender, nondistended. Positive bowel sounds. No rebound or guarding.   MSK:  No joint effusions   SKIN: Warm and dry without cutaneous eruptions on Inspection/palpation.   NEURO: sedated  Left groin with a wound VAC in place MELISSA drains in place with bloody drainage.    Right Chest tube in place    Laboratory Data    Results from last 7 days   Lab Units 20  0338 20  0348 20  0404   WBC 10*3/mm3 11.58* 15.55* 12.98*   HEMOGLOBIN g/dL 11.4* 12.6 11.7*   HEMATOCRIT % 36.1 39.4 35.4   PLATELETS 10*3/mm3 272 331 313     Results from last 7 days   Lab Units 20  0338   SODIUM mmol/L 142   POTASSIUM mmol/L 4.3   CHLORIDE mmol/L 105   CO2 mmol/L 25.0   BUN mg/dL 38*   CREATININE mg/dL 0.61   GLUCOSE mg/dL 252*   CALCIUM mg/dL 8.1*     Results from last 7 days   Lab Units 20  1557   ALK PHOS U/L 150*   BILIRUBIN mg/dL 0.7   ALT (SGPT) U/L 35*   AST (SGOT) U/L 63*         Results from last 7 days   Lab Units 20  1604   CRP mg/dL 8.05*     Results from last 7 days   Lab Units 20  0243   LACTATE mmol/L 1.5         Results from last 7 days   Lab Units 20  0338 20  1131 20  1137   VANCOMYCIN TR mcg/mL  --  38.50* 19.10   VANCOMYCIN RM mcg/mL 26.10  --   --      Estimated Creatinine Clearance: 105 mL/min (by C-G formula based on SCr of 0.61 mg/dL).      Microbiology:  : BCx NG    : wound few WBCs, GPC  in pairs -- MRSA   2/28/2020: Operative cultures are growing MRSA  2/29/2020: Sputum culture is NGSF      Radiology:  Imaging Results (Last 72 Hours)     Procedure Component Value Units Date/Time    XR Chest 1 View [[id number]] Collected:  03/03/20 0854     Updated:  03/03/20 1245    Narrative:       EXAMINATION: XR CHEST 1 VW-03/03/2020:     INDICATION: Respiratory failure; I73.9-Peripheral vascular disease,  unspecified; Z40-Zjoobor effusion, not elsewhere classified;  Z95.828-Presence of other vascular implants and grafts.     COMPARISON: 03/01/2020.     FINDINGS: ET tube has been removed. NG tube, PICC line, and right  pleural drain remain in place. The heart is normal in size. Diffuse  pulmonary interstitial disease has improved, presumably resolving edema.  No lung consolidation, effusion, or pneumothorax is seen.       Impression:       Mild remaining pulmonary interstitial disease. No evidence  of pneumothorax or other new chest pathology.     D:  03/03/2020  E:  03/03/2020              XR Chest 1 View [[id number]] Collected:  03/01/20 0757     Updated:  03/02/20 0817    Narrative:          EXAMINATION: XR CHEST 1 VW - 03/01/2020     INDICATION: I73.9-Peripheral vascular disease, unspecified; B52-Hspgebj  effusion, not elsewhere classified; Z95.828-Presence of other vascular  implants and grafts.      COMPARISON: 02/29/2020     FINDINGS: Support hardware unchanged and in satisfactory positioning.  Cardiac silhouette enlarged with diffuse bilateral pulmonary  opacifications slightly decreased in density from prior comparison  particularly within the right mid and upper lung suggesting improving  airspace disease. No pneumothorax however trace volume left pleural  effusion similar to prior.           Impression:       Cardiac silhouette enlarged with diffuse bilateral pulmonary  opacifications slightly decreased in density from prior comparison  particularly within the right mid and upper lung suggesting  improving  airspace disease. No pneumothorax however trace volume left pleural  effusion similar to prior.        DICTATED:   03/01/2020  EDITED/ls :   03/01/2020      This report was finalized on 3/2/2020 8:14 AM by Dr. Valdez Hill.       XR Chest 1 View [[id number]] Collected:  02/29/20 0723     Updated:  02/29/20 1907    Narrative:          EXAMINATION: XR CHEST 1 VW - 02/29/2020     INDICATION:  I73.9-Peripheral vascular disease, unspecified; Q12-Ljzdnlb  effusion, not elsewhere classified; Z95.828-Presence of other vascular  implants and grafts.      COMPARISON: 02/28/2020     FINDINGS: Support hardware unchanged and in satisfactory positioning.  Cardiac size borderline enlarged and mostly obscured due to increase in  already dense bilateral pulmonary opacifications from prior of diffuse  airspace disease without pneumothorax or large effusion present.           Impression:       Increased density of diffuse bilateral pulmonary  opacifications with a left perihilar predominance of involvement with  air bronchograms present at this site of diffuse bilateral airspace  disease without significant effusion. Support hardware unchanged. No  pneumothorax.     DICTATED:   02/29/2020  EDITED/ls :   02/29/2020      This report was finalized on 2/29/2020 7:04 PM by Dr. Valdez Hill.           I read her radiographic studies    Impression:   1.  MRSA left groin abscess/cellulitis/graft infection-status post graft removal 2/28.  She will require a prolonged course of intravenous antibiotic therapy directed toward MRSA.  I will leave her on vancomycin for now but I may switch her to daptomycin prior to her discharge.  2.  Bilateral pulmonary infiltrates/pleural effusions-this may be secondary to edema or aspiration pneumonia; she has been receiving intermittent  Merrem since 2/23  procalcitonin negative x 2  3.  Acute hypoxic respiratory failure-requiring endotracheal intubation and mechanical ventilation  4.  S/p  femorofemoral bypass with gortex graft, 1/15  5.  Type 2 Diabetes Mellitus  6.  Penicillin allergy  7.  Type 2 diabetes mellitus-with very poor control  8.  Malnutrition    PLAN/RECOMMENDATIONS:   1.  Since Dr Gordon had planned to switch from vancomycin to daptomycin I will plan to do this when vanc level lower  2.  Graft resection/debridement-done  3.  Sputum Gram stain and culture--NGSF  4.  stop Merrem     Discussed with daughter and sister  Discussed with TIANNA Melo MD  3/3/2020  1:03 PM

## 2020-03-03 NOTE — PLAN OF CARE
Problem: Patient Care Overview  Goal: Plan of Care Review  Outcome: Ongoing (interventions implemented as appropriate)  Flowsheets (Taken 3/3/2020 9571)  Plan of Care Reviewed With: patient; daughter  Note:   SLP evaluation completed. Will address concerns for pharyngeal dysphagia by completing FEES. Please see note for further details and recommendations.

## 2020-03-03 NOTE — THERAPY WOUND CARE TREATMENT
Acute Care - Wound/Debridement Treatment Note  [facility]     Patient Name: Saskia Groves  : 1970  MRN: [id number]  Today's Date: 3/3/2020                  Admit Date: 2020    Visit Dx:    ICD-10-CM ICD-9-CM   1. PVD (peripheral vascular disease) (CMS/HCC) I73.9 443.9   2. Pleural effusion J90 511.9   3. S/P femoral-femoral bypass surgery Z95.828 V45.89       Patient Active Problem List   Diagnosis   • PVD (peripheral vascular disease) (CMS/HCC)   • Lumbar radiculopathy   • Paresthesia   • Causalgia of lower limb   • Hiatal hernia   • Current smoker   • Bilateral carotid artery stenosis   • COPD (chronic obstructive pulmonary disease) (CMS/[facility])   • Coronary artery disease   • Hypertension   • Hyperlipidemia   • Diabetes mellitus (CMS/[facility])   • Sepsis (CMS/[facility])   • Pleural effusion - bilateral mod/large pleural effusions on CT chest 2020.    • Acute pulmonary edema (CMS/[facility])   • Acute hypoxemic respiratory failure (CMS/[facility])           Wound 20 2230 Right groin Incision (Active)   Dressing Appearance dry;intact 3/3/2020 12:00 PM   Closure Adhesive bandage;GILLIAN 3/3/2020 12:00 PM   Wound Output (mL) 0 3/3/2020  8:15 AM       Wound 20 2302 Left groin Incision (Active)   Dressing Appearance dry;intact 3/3/2020 12:00 PM   Closure Adhesive bandage;GILLIAN 3/3/2020 12:00 PM   Wound Output (mL) 0 3/3/2020  8:15 AM       NPWT (Negative Pressure Wound Therapy) 20 1200 groin (Active)   Therapy Setting continuous therapy 3/3/2020  8:15 AM   Pressure Setting 125 mmHg 3/3/2020  8:15 AM         WOUND DEBRIDEMENT                  Therapy Treatment    Rehabilitation Treatment Summary     Row Name 20 0815             Treatment Time/Intention    Discipline  physical therapist  -MF      Document Type  therapy note (daily note);wound care  -MF      Subjective Information  no complaints  -MF      Recorded by [MF] Arturo Madrid, PT 20 1329      Row Name 20 0815              Positioning and Restraints    Pre-Treatment Position  in bed  -MF      Post Treatment Position  bed  -MF      In Bed  supine;call light within reach  -MF      Recorded by [MF] Arturo Madrid, PT 03/03/20 1329      Row Name 03/03/20 0815             Pain Scale: Numbers Pre/Post-Treatment    Pain Location - Orientation  generalized  -MF      Recorded by [MF] Arturo Madrid, PT 03/03/20 1329      Row Name 03/03/20 0815             Pain Scale: FACES Pre/Post-Treatment    Pain: FACES Scale, Pretreatment  0-->no hurt  -MF      Pain: FACES Scale, Post-Treatment  0-->no hurt  -MF      Recorded by [MF] Arturo Madrid, PT 03/03/20 1329      Row Name 03/03/20 0815             Wound 02/23/20 2230 Right groin Incision    Wound - Properties Group Date first assessed: 02/23/20 [EB] Time first assessed: 2230 [EB] Present on Hospital Admission: Y [EB] Side: Right [EB] Location: groin [EB] Primary Wound Type: Incision [EB] Recorded by:  [EB] Collette Hardy RN 02/23/20 2302    Dressing Appearance  dry;intact  -MF      Wound Output (mL)  0  -MF      Recorded by [MF] Arturo Madrid, PT 03/03/20 1329      Row Name 03/03/20 0815             Wound 02/23/20 2302 Left groin Incision    Wound - Properties Group Date first assessed: 02/23/20 [EB] Time first assessed: 2302 [EB] Present on Hospital Admission: Y [EB] Side: Left [EB] Location: groin [EB] Primary Wound Type: Incision [EB] Recorded by:  [EB] Collette Hardy RN 02/23/20 2303    Dressing Appearance  dry;intact  -MF      Wound Output (mL)  0  -MF      Recorded by [MF] Arturo Madrid, PT 03/03/20 1329      Row Name 03/03/20 0815             NPWT (Negative Pressure Wound Therapy) 02/28/20 1200 groin    NPWT (Negative Pressure Wound Therapy) - Properties Group Placement Date: 02/28/20 [MF] Placement Time: 1200 [MF] Location: groin [MF] Recorded by:  [MF] Arturo Madrid, PT 03/02/20 1129    Therapy Setting  continuous therapy  -MF      Pressure Setting   125 mmHg  -MF      Recorded by [] Arturo Madrid, PT 03/03/20 1329      Row Name 03/03/20 0815             Coping    Observed Emotional State  accepting  -MF      Verbalized Emotional State  acceptance  -MF      Recorded by [] Arturo Madrid, PT 03/03/20 1329      Row Name 03/03/20 0815             Plan of Care Review    Plan of Care Reviewed With  patient  -MF      Outcome Summary  B prevena wound vacs intact with no issues noted. PT to cont with wound vac checks daily until pt ready for d/c   -MF      Recorded by [] Arturo Madrid, PT 03/03/20 1329        User Key  (r) = Recorded By, (t) = Taken By, (c) = Cosigned By    Initials Name Effective Dates Discipline    Arturo Lopez, PT 06/19/15 -  PT    Collette Berman RN 11/14/19 -  Nurse                PT Recommendation and Plan  Planned Therapy Interventions (PT Eval): wound care, patient/family education               Outcome Summary: B prevena wound vacs intact with no issues noted. PT to cont with wound vac checks daily until pt ready for d/c   Plan of Care Reviewed With: patient            Time Calculation  PT Charges     Row Name 03/03/20 0815             Time Calculation    Start Time  0815  -      PT Goal Re-Cert Due Date  03/12/20  -        User Key  (r) = Recorded By, (t) = Taken By, (c) = Cosigned By    Initials Name Provider Type    Arturo Lopez, PT Physical Therapist           Therapy Charges for Today     Code Description Service Date Service Provider Modifiers Qty    44000712211 HC PT EVAL LOW COMPLEXITY 4 3/2/2020 Arturo Madrid, PT GP 1    37996215533 HC PT NEG PRESS WOUND TO 50SQCM DME1 3/2/2020 Arturo Madrid, PT  1    86104942071 HC PT NEG PRESS WOUND TO 50SQCM DME1 3/3/2020 Arturo Madrid, PT  1                     Arturo Madrid, PT  3/3/2020

## 2020-03-03 NOTE — PLAN OF CARE
Problem: Patient Care Overview  Goal: Plan of Care Review  Outcome: Ongoing (interventions implemented as appropriate)  Flowsheets  Taken 3/3/2020 1330  Plan of Care Reviewed With: patient  Taken 3/3/2020 0815  Outcome Summary: B prevena wound vacs intact with no issues noted. PT to cont with wound vac checks daily until pt ready for d/c

## 2020-03-03 NOTE — PLAN OF CARE
Problem: Patient Care Overview  Goal: Plan of Care Review  Outcome: Ongoing (interventions implemented as appropriate)  Note:   Pt. more alert.  Pt. appears to be restless/depressed.  Pt. sbp 116-160 hydralazine IV prn given x 2. Pt. on 3-4L NC sat > 94%. will continue to monitor. at 3/3/20 0658

## 2020-03-03 NOTE — PLAN OF CARE
Problem: Patient Care Overview  Goal: Plan of Care Review  Outcome: Ongoing (interventions implemented as appropriate)  Flowsheets (Taken 3/3/2020 1034)  Plan of Care Reviewed With: patient  Outcome Summary: PT initial evaluation completed for pt s/p femoral artery cutdown presenting with generalized weakness, impaired balance, and decreased functional mobility. Pt required modA x2 for STS transfers and maxA x2 to ambulate from bed to chair. Pt's decreased independence warrants PT skilled care. Recommend D/C to SNF.

## 2020-03-03 NOTE — PROGRESS NOTES
Intensive Care Follow-up     Hospital:  LOS: 9 days   Ms. Saskia Groves, 49 y.o. female is followed for:   Acute hypoxemic respiratory failure (CMS/HCC)        Subjective   Interval History:  The chart has been reviewed.  The patient was extubated yesterday without difficulty.  She is having mild shortness of breath.  She remains somewhat lethargic.    The patient's past medical, surgical and social history were reviewed and updated in Epic as appropriate.        Objective     Infusions:    niCARdipine 5-15 mg/hr    Pharmacy to dose vancomycin     propofol 5-50 mcg/kg/min Last Rate: Stopped (03/02/20 1000)     Medications:    amitriptyline 10 mg Oral Nightly   aspirin 325 mg Oral Daily   atorvastatin 10 mg Oral Daily   budesonide 0.5 mg Nebulization BID - RT   clopidogrel 75 mg Oral Daily   famotidine 20 mg Oral BID AC   insulin detemir 15 Units Subcutaneous Q12H   insulin regular 0-14 Units Subcutaneous Q6H   ipratropium-albuterol 3 mL Nebulization 4x Daily - RT   isosorbide mononitrate 30 mg Oral Q24H   ketotifen 1 drop Both Eyes BID   lactulose 10 g Oral Daily   losartan 50 mg Oral Daily   meropenem 1 g Intravenous Q8H   metoprolol tartrate 100 mg Oral Q12H   pantoprazole 40 mg Intravenous Q AM   pregabalin 150 mg Oral Q8H   rOPINIRole 4 mg Oral Nightly   sodium chloride 10 mL Intravenous Q12H   vancomycin (dosing per levels)  Does not apply Daily       Vital Sign Min/Max for last 24 hours  Temp  Min: 98.3 °F (36.8 °C)  Max: 99.9 °F (37.7 °C)   BP  Min: 124/69  Max: 169/84   Pulse  Min: 98  Max: 121   Resp  Min: 18  Max: 22   SpO2  Min: 90 %  Max: 99 %   Flow (L/min)  Min: 2  Max: 6       Input/Output for last 24 hour shift  03/02 0701 - 03/03 0700  In: 1877.4 [I.V.:128.4]  Out: 2505 [Urine:2320; Drains:125]      Objective:  General Appearance:  Uncomfortable, ill-appearing and in no acute distress.    Vital signs: (most recent): Blood pressure 169/84, pulse 99, temperature 99.8 °F (37.7 °C), temperature  "source Oral, resp. rate 18, height 160 cm (62.99\"), weight 60.3 kg (133 lb), SpO2 97 %, not currently breastfeeding.    Lungs:  Normal effort and normal respiratory rate.  No stridor.  There are decreased breath sounds.  No rales, wheezes or rhonchi.    Heart: Normal rate.  Regular rhythm.  S1 normal and S2 normal.  No murmur.   Chest: (Right-sided thoracostomy tube in place.  No air leak.)  Abdomen: Abdomen is soft and non-distended.  Bowel sounds are normal.   There is no abdominal tenderness.     Extremities: Normal range of motion.  (There is 1+ pitting edema to the lower extremities.  Drains in place.)  Neurological: (Patient is drowsy but arousable and follows all commands.  She is oriented x3.).    Pupils:  Pupils are equal, round, and reactive to light.  Pupils are equal.   Skin:  Warm.              Results from last 7 days   Lab Units 03/03/20  0338 03/02/20  0348 03/01/20  0404   WBC 10*3/mm3 11.58* 15.55* 12.98*   HEMOGLOBIN g/dL 11.4* 12.6 11.7*   PLATELETS 10*3/mm3 272 331 313     Results from last 7 days   Lab Units 03/03/20  0338 03/02/20  0348 03/01/20  1758 03/01/20  0404 02/29/20  1557 02/29/20  0411   SODIUM mmol/L 142 141  --  140 142 139   POTASSIUM mmol/L 4.3 4.2 4.3 4.2 4.1 3.9   CO2 mmol/L 25.0 25.0  --  24.0 24.0 22.0   BUN mg/dL 38* 29*  --  28* 25* 25*   CREATININE mg/dL 0.61 0.61  --  0.88 0.84 0.87   MAGNESIUM mg/dL 1.8  --   --  1.8 1.8 2.0   PHOSPHORUS mg/dL  --   --   --  2.7 3.0 4.5   GLUCOSE mg/dL 252* 184*  --  148* 93 168*     Estimated Creatinine Clearance: 105 mL/min (by C-G formula based on SCr of 0.61 mg/dL).    Results from last 7 days   Lab Units 03/01/20  0428   PH, ARTERIAL pH units 7.463*   PCO2, ARTERIAL mm Hg 36.6   PO2 ART mm Hg 89.1         I reviewed the patient's results and images.     Assessment/Plan   Impression        Acute hypoxemic respiratory failure (CMS/HCC)    PVD (peripheral vascular disease) (CMS/HCC)    Current smoker    COPD (chronic obstructive " pulmonary disease) (CMS/HCC)    Coronary artery disease    Hypertension    Hyperlipidemia    Diabetes mellitus (CMS/HCC)    Sepsis (CMS/HCC)    Pleural effusion - bilateral mod/large pleural effusions on CT chest 2/27/2020.     Acute pulmonary edema (CMS/[facility])       Plan        Continue with nebulizer therapy as needed.  I am going to restart some of her psychiatric medications today.  Stop hydralazine and we will go ahead and increase Lopressor to 100 mg p.o. twice daily.  Due to hyperglycemia, I am going to go ahead and increase her Levemir to 15 units subcutaneous twice daily.  We will maintain her thoracostomy tube on the right side today.  If her outflow decreases further likely can be discontinued within the next 24 hours.  Due to her fragile respiratory state, I would like to watch her closely in the intensive care unit today.  Perhaps she will be able to move to the floor tomorrow if she remains stable.    Plan of care and goals reviewed with mulitdisciplinary/antibiotic stewardship team during rounds.   I discussed the patient's findings and my recommendations with patient and nursing staff     High level of risk due to:  illness with threat to life or bodily function.      Lorenzo Anderson MD, [facility]  Pulmonology and Critical Care Medicine

## 2020-03-03 NOTE — PROGRESS NOTES
"Saskia Groves  [id number]  1970     LOS: 9 days   Patient Care Team:  Meagan Barlow APRN as PCP - General (Family Medicine)  Tyson Donaldson DO as Consulting Physician (Cardiology)    Chief Complaint: Infected femoral-femoral bypass      Subjective: Extubated, no complaints  Objective:     Vital Sign Min/Max for last 24 hours  Temp  Min: 98.3 °F (36.8 °C)  Max: 99.9 °F (37.7 °C)   BP  Min: 111/66  Max: 160/62   Pulse  Min: 96  Max: 117   Resp  Min: 18  Max: 22   SpO2  Min: 90 %  Max: 99 %   No data recorded   No data recorded     Flowsheet Rows      First Filed Value   Admission Height  160 cm (62.99\") Documented at 02/23/2020 2240   Admission Weight  60.4 kg (133 lb 1.6 oz) Documented at 02/23/2020 2240          Physical Exam:    Wound:    Pulses:     Mediastinal and Chest Tube Drainage:       Results Review:   Results from last 7 days   Lab Units 03/03/20  0338   WBC 10*3/mm3 11.58*   HEMOGLOBIN g/dL 11.4*   HEMATOCRIT % 36.1   PLATELETS 10*3/mm3 272     Results from last 7 days   Lab Units 03/03/20  0338   SODIUM mmol/L 142   POTASSIUM mmol/L 4.3   CHLORIDE mmol/L 105   CO2 mmol/L 25.0   BUN mg/dL 38*   CREATININE mg/dL 0.61   GLUCOSE mg/dL 252*   CALCIUM mg/dL 8.1*     Results from last 7 days   Lab Units 03/01/20  0428   PH, ARTERIAL pH units 7.463*   PO2 ART mm Hg 89.1   PCO2, ARTERIAL mm Hg 36.6   HCO3 ART mmol/L 26.1*         Assessment      Acute hypoxemic respiratory failure (CMS/HCC)    PVD (peripheral vascular disease) (CMS/HCC)    Current smoker    COPD (chronic obstructive pulmonary disease) (CMS/HCC)    Coronary artery disease    Hypertension    Hyperlipidemia    Diabetes mellitus (CMS/HCC)    Sepsis (CMS/HCC)    Pleural effusion - bilateral mod/large pleural effusions on CT chest 2/27/2020.     Acute pulmonary edema (CMS/HCC)      Extubated, continue supportive care        Deandre Oseguera MD  03/03/20  6:20 AM      Please note that portions of this note were completed with a " voice recognition program. Efforts were made to edit the dictations, but words may be mistranscribed

## 2020-03-03 NOTE — PROGRESS NOTES
Pharmacy Consult-Vancomycin Dosing  Saskia Groves is a  49 y.o. female receiving vancomycin therapy.     Indication: Sepsis, Vascular graft infection  Consulting Provider: Parag Gordon MD  ID Consult: yes  Goal Trough:15-20 mcg/ml    Current Antimicrobial Therapy  Vancomycin PTD  Meropenem 1g IV q8h    Allergies  Allergies as of 02/23/2020 - Reviewed 02/12/2020   Allergen Reaction Noted    Levaquin [levofloxacin] Nausea And Vomiting 01/14/2020    Penicillins Nausea And Vomiting 05/02/2016    Codeine Nausea Only 05/02/2016    Flagyl [metronidazole] Nausea And Vomiting 01/06/2020     Labs    Results from last 7 days   Lab Units 03/03/20  0338 03/02/20  0348 03/01/20  0404   BUN mg/dL 38* 29* 28*   CREATININE mg/dL 0.61 0.61 0.88     Results from last 7 days   Lab Units 03/03/20  0338 03/02/20  0348 03/01/20  0404   WBC 10*3/mm3 11.58* 15.55* 12.98*     Evaluation of Dosing     Is Patient on Dialysis or Renal Replacement: no    Ht - 160 cm (63 in)  Wt - 60.3 kg (133 lb)    Estimated Creatinine Clearance: 105 mL/min (by C-G formula based on SCr of 0.61 mg/dL).    I/O last 3 completed shifts:  In: 3177.4 [I.V.:400.4; Other:421; NG/GT:1722; IV Piggyback:634]  Out: 4915 [Urine:4580; Drains:235; Chest Tube:100]    Microbiology and Radiology  Microbiology Results (last 10 days)       Procedure Component Value - Date/Time    Respiratory Culture - Sputum, ET Suction [[id number]] Collected:  02/29/20 0850    Lab Status:  Final result Specimen:  Sputum from ET Suction Updated:  03/02/20 0902     Respiratory Culture No growth     Gram Stain Few (2+) Epithelial cells per low power field      Few (2+) WBCs seen      No organisms seen    Anaerobic Culture - Hardware / Foreign Body, Groin, right [[id number]] Collected:  02/28/20 1500    Lab Status:  Preliminary result Specimen:  Hardware / Foreign Body from Groin, right Updated:  03/02/20 1028     Anaerobic Culture No anaerobes isolated at 3 days    Hardware / Foreign Body  Culture - Hardware / Foreign Body, Groin, right [[id number]]  (Abnormal)  (Susceptibility) Collected:  02/28/20 1500    Lab Status:  Final result Specimen:  Hardware / Foreign Body from Groin, right Updated:  03/01/20 0645     Hardware / Foreign Body Culture Scant growth (1+) Staphylococcus aureus, MRSA     Comment:   Methicillin resistant Staphylococcus aureus, Patient may be an isolation risk.        Gram Stain Many (4+) WBCs seen      No organisms seen    Susceptibility        Staphylococcus aureus, MRSA     CADENCE     Clindamycin Susceptible     Erythromycin Resistant     Inducible Clindamycin Resistance Negative     Oxacillin Resistant     Penicillin G Resistant     Rifampin Susceptible     Tetracycline Susceptible     Trimethoprim + Sulfamethoxazole Susceptible     Vancomycin Susceptible                  Susceptibility Comments       Staphylococcus aureus, MRSA    This isolate does not demonstrate inducible clindamycin resistance in vitro.                 Anaerobic Culture - Tissue, Groin, right [[id number]] Collected:  02/28/20 1417    Lab Status:  Preliminary result Specimen:  Tissue from Groin, right Updated:  03/02/20 1028     Anaerobic Culture No anaerobes isolated at 3 days    Anaerobic Culture - Body Fluid, Groin, right [[id number]] Collected:  02/28/20 1417    Lab Status:  Preliminary result Specimen:  Body Fluid from Groin, right Updated:  03/02/20 1028     Anaerobic Culture No anaerobes isolated at 3 days    Body Fluid Culture - Body Fluid, Groin, right [[id number]]  (Abnormal)  (Susceptibility) Collected:  02/28/20 1417    Lab Status:  Final result Specimen:  Body Fluid from Groin, right Updated:  03/03/20 0650     Body Fluid Culture Rare Staphylococcus aureus, MRSA     Gram Stain Many (4+) WBCs seen      No organisms seen    Narrative:             Susceptibility        Staphylococcus aureus, MRSA     CADENCE     Gentamicin Susceptible     Oxacillin Resistant     Rifampin Susceptible     Vancomycin Susceptible                   Susceptibility Comments       Staphylococcus aureus, MRSA    This isolate does not demonstrate inducible clindamycin resistance in vitro.                 Tissue / Bone Culture - Tissue, Groin, right [[id number]] Collected:  02/28/20 1417    Lab Status:  Final result Specimen:  Tissue from Groin, right Updated:  03/02/20 0809     Tissue Culture No growth at 3 days     Gram Stain Many (4+) WBCs seen      No organisms seen    AFB Culture - Tissue, Groin, right [[id number]] Collected:  02/28/20 1417    Lab Status:  Preliminary result Specimen:  Tissue from Groin, right Updated:  02/29/20 1321     AFB Stain No acid fast bacilli seen on concentrated smear    AFB Culture - Body Fluid, Groin, right [[id number]] Collected:  02/28/20 1417    Lab Status:  Preliminary result Specimen:  Body Fluid from Groin, right Updated:  02/29/20 1320     AFB Stain No acid fast bacilli seen on concentrated smear    Anaerobic Culture - Body Fluid, Pleural Cavity [[id number]] Collected:  02/28/20 1341    Lab Status:  Preliminary result Specimen:  Body Fluid from Pleural Cavity Updated:  03/02/20 1028     Anaerobic Culture No anaerobes isolated at 3 days    Body Fluid Culture - Body Fluid, Pleural Cavity [[id number]] Collected:  02/28/20 1341    Lab Status:  Final result Specimen:  Body Fluid from Pleural Cavity Updated:  03/02/20 0808     Body Fluid Culture No growth at 3 days     Gram Stain Many (4+) WBCs seen      No organisms seen    AFB Culture - Body Fluid, Pleural Cavity [[id number]] Collected:  02/28/20 1341    Lab Status:  Preliminary result Specimen:  Body Fluid from Pleural Cavity Updated:  02/29/20 1323     AFB Stain No acid fast bacilli seen on concentrated smear    Blood Culture - Blood, Wrist, Right [[id number]] Collected:  02/28/20 0750    Lab Status:  Preliminary result Specimen:  Blood from Wrist, Right Updated:  03/03/20 0815     Blood Culture No growth at 4 days    Wound Culture - Wound, Thigh, Left [[id number]]   (Abnormal)  (Susceptibility) Collected:  02/24/20 1849    Lab Status:  Final result Specimen:  Wound from Thigh, Left Updated:  02/27/20 0723     Wound Culture Light growth (2+) Staphylococcus aureus, MRSA     Comment: Methicillin resistant Staphylococcus aureus, Patient may be an isolation risk.        Gram Stain Few (2+) WBCs seen      Rare (1+) Gram positive cocci in pairs    Susceptibility        Staphylococcus aureus, MRSA     CADENCE     Clindamycin Susceptible     Erythromycin Resistant     Inducible Clindamycin Resistance Negative     Oxacillin Resistant     Penicillin G Resistant     Rifampin Susceptible     Tetracycline Susceptible     Trimethoprim + Sulfamethoxazole Susceptible     Vancomycin Susceptible                  Susceptibility Comments       Staphylococcus aureus, MRSA    This isolate does not demonstrate inducible clindamycin resistance in vitro.                 Blood Culture - Blood, Arm, Left [[id number]] Collected:  02/24/20 0057    Lab Status:  Final result Specimen:  Blood from Arm, Left Updated:  02/29/20 0545     Blood Culture No growth at 5 days    Blood Culture - Blood, Hand, Left [[id number]] Collected:  02/24/20 0057    Lab Status:  Final result Specimen:  Blood from Hand, Left Updated:  02/29/20 0545     Blood Culture No growth at 5 days          Evaluation of Level    Results from last 7 days   Lab Units 03/03/20  0338 03/02/20  1131 02/27/20  1137   VANCOMYCIN TR mcg/mL  --  38.50* 19.10   VANCOMYCIN RM mcg/mL 26.10  --   --      Assessment/Plan:  Continue holding vancomycin with intentions to pulse dose.  Vancomycin random with AM labs 3/4.  Continue current dose of meropenem.  Pharmacy will continue to follow and adjust dose based on renal function, drug levels, and clinical status.    Thanks,  Kip Mckeon, PharmD, BCPS, BCCCP  #[phone]  03/03/20  12:00 PM

## 2020-03-03 NOTE — PLAN OF CARE
Problem: Patient Care Overview  Goal: Plan of Care Review  Outcome: Ongoing (interventions implemented as appropriate)  Flowsheets (Taken 3/3/2020 3463)  Progress: no change  Plan of Care Reviewed With: patient  Outcome Summary: Consulted to assess patient for a possible DTPI to her sacral area.     At this time patients bottom, coccyx, and sacrum is intact and blanching in all areas. Some noted hyperpigmentation at her perirectal region. Area is not pressure or a DTPI. Heels are intact and blanching. No other identifiable pressure issues at this time. Offloading heel boots in place. All skin/pressure interventions in place per staff/RN. Nothing is needed form WOC nurse at this time. Continue with good general skin care, turning, and barrier cream as needed for moisture management. Will sign off at this time.     Thanks

## 2020-03-03 NOTE — THERAPY EVALUATION
Patient Name: Saskia Groves  : 1970    MRN: [id number]                              Today's Date: 3/3/2020       Admit Date: 2020    Visit Dx:     ICD-10-CM ICD-9-CM   1. PVD (peripheral vascular disease) (CMS/HCC) I73.9 443.9   2. Pleural effusion J90 511.9   3. S/P femoral-femoral bypass surgery Z95.828 V45.89   4. Dysphagia, unspecified type R13.10 787.20     Patient Active Problem List   Diagnosis   • PVD (peripheral vascular disease) (CMS/HCC)   • Lumbar radiculopathy   • Paresthesia   • Causalgia of lower limb   • Hiatal hernia   • Current smoker   • Bilateral carotid artery stenosis   • COPD (chronic obstructive pulmonary disease) (CMS/HCC)   • Coronary artery disease   • Hypertension   • Hyperlipidemia   • Diabetes mellitus (CMS/HCC)   • Sepsis (CMS/HCC)   • Pleural effusion - bilateral mod/large pleural effusions on CT chest 2020.    • Acute pulmonary edema (CMS/HCC)   • Acute hypoxemic respiratory failure (CMS/HCC)     Past Medical History:   Diagnosis Date   • Anxiety    • Arthritis    • Asthma    • Carotid artery stenosis    • Chronic bronchitis (CMS/HCC)    • COPD (chronic obstructive pulmonary disease) (CMS/HCC)    • Coronary artery disease     2 vessels with 50% blockage.  Sees Dr. Donaldson   • Depression    • Diabetes mellitus (CMS/HCC)    • Dyslipidemia    • GERD (gastroesophageal reflux disease)    • Hiatal hernia    • Hyperlipidemia    • Hypertension    • Infectious viral hepatitis    • Lower back pain    • Migraine    • Multinodular goiter    • S/P thyroid biopsy     2008, 2013, 2015, and 07/15/2019 at [facility], right lobe nodules - all cytologies were benign   • Sleep apnea     can't tolerate the cpap mask   • Subclinical hyperthyroidism      Past Surgical History:   Procedure Laterality Date   • ANTERIOR CERVICAL DISCECTOMY W/ FUSION     • BACK SURGERY      lumbar   •  SECTION  19940    x 2    • CHOLECYSTECTOMY     • COLONOSCOPY     • ENDOSCOPY     •  FEMORAL ARTERY CUTDOWN Bilateral 2/28/2020    Procedure: FEMORAL ARTERY CUTDOWN WITH REMOVAL OF FEMORAL FEMORAL BYPASS GRAFT BILATERAL;  Surgeon: Deandre Oseguera MD;  Location:  [facility];  Service: Vascular;  Laterality: Bilateral;   • FEMORAL ENDARTERECTOMY Bilateral 1/15/2020    Procedure: FEMORAL ENDARTERECTOMY BILATERAL;  Surgeon: Deandre Oseguera MD;  Location:  [facility];  Service: Vascular   • FEMORAL FEMORAL BYPASS Right 1/15/2020    Procedure: FEMORAL FEMORAL BYPASS;  Surgeon: Deandre Oseguera MD;  Location:  [facility];  Service: Vascular   • HYSTERECTOMY     • KNEE SURGERY Left    • LEG EXCISION LESION/CYST Left 2/28/2020    Procedure: GROIN MUSCLE FLAP BILATERAL;  Surgeon: Modesto Whittaker MD;  Location:  [facility];  Service: Plastics;  Laterality: Left;   • WRIST SURGERY Left      General Information     Row Name 03/03/20 1556          PT Evaluation Time/Intention    Document Type  evaluation  -KR     Mode of Treatment  physical therapy  -KR     Row Name 03/03/20 1556          General Information    Patient Profile Reviewed?  yes  -KR     Prior Level of Function  independent:;all household mobility;gait;transfer;ADL's;dressing;bathing pt is poor historian  -KR     Existing Precautions/Restrictions  fall;other (see comments) wound vac; jonathon MELISSA drain; jonathon groin incisions  -KR     Barriers to Rehab  medically complex;previous functional deficit  -KR     Row Name 03/03/20 1556          Relationship/Environment    Lives With  alone  -KR     Row Name 03/03/20 1556          Resource/Environmental Concerns    Current Living Arrangements  home/apartment/condo  -KR     Row Name 03/03/20 1556          Home Main Entrance    Number of Stairs, Main Entrance  none  -KR     Row Name 03/03/20 1556          Stairs Within Home, Primary    Number of Stairs, Within Home, Primary  none  -KR     Row Name 03/03/20 1556          Cognitive Assessment/Intervention- PT/OT    Orientation Status (Cognition)  oriented x 3  -KR      Row Name 03/03/20 1556          Safety Issues, Functional Mobility    Safety Issues Affecting Function (Mobility)  awareness of need for assistance;insight into deficits/self awareness;safety precaution awareness;safety precautions follow-through/compliance  -KR     Impairments Affecting Function (Mobility)  balance;coordination;endurance/activity tolerance;postural/trunk control;strength  -KR       User Key  (r) = Recorded By, (t) = Taken By, (c) = Cosigned By    Initials Name Provider Type    Melissa Carlisle PT Physical Therapist        Mobility     Row Name 03/03/20 1558          Bed Mobility Assessment/Treatment    Bed Mobility Assessment/Treatment  supine-sit  -KR     Supine-Sit [place] (Bed Mobility)  maximum assist (25% patient effort);2 person assist;verbal cues  -KR     Assistive Device (Bed Mobility)  draw sheet;head of bed elevated  -KR     Comment (Bed Mobility)  VC's for sequencing. Pt required assistance at trunk and BLEs.   -KR     Row Name 03/03/20 1558          Transfer Assessment/Treatment    Comment (Transfers)  STS x2 from EOB with PT/RN on either side to block jonathon knees and provide B UE support. VC's for upright posture once standing.   -KR     Row Name 03/03/20 1558          Sit-Stand Transfer    Sit-Stand [place] (Transfers)  moderate assist (50% patient effort);2 person assist;verbal cues  -KR     Assistive Device (Sit-Stand Transfers)  other (see comments) B UE support  -KR     Row Name 03/03/20 1553          Gait/Stairs Assessment/Training    Gait/Stairs Assessment/Training  gait/ambulation independence  -KR     [place] Level (Gait)  maximum assist (25% patient effort);2 person assist;verbal cues  -KR     Assistive Device (Gait)  other (see comments) B UE support  -KR     Distance in Feet (Gait)  3  -KR     Pattern (Gait)  step-to  -KR     Deviations/Abnormal Patterns (Gait)  base of support, narrow;twin decreased;festinating/shuffling;stride length decreased  -KR      Bilateral Gait Deviations  forward flexed posture;heel strike decreased  -KR     Comment (Gait/Stairs)  Pt ambulated from bed to chair with lateral side steps demonstrating slow twin with small, shuffled steps. Pt required max cueing to advance B LEs and to maintain upright posture. Pt's mobility limited by weakness and fatigue.   -KR       User Key  (r) = Recorded By, (t) = Taken By, (c) = Cosigned By    Initials Name Provider Type    Melissa Carlisle, PT Physical Therapist        Obj/Interventions     Row Name 03/03/20 1601          General ROM    GENERAL ROM COMMENTS  BLE WFL   -KR     Row Name 03/03/20 1601          MMT (Manual Muscle Testing)    General MMT Comments  BLE grossly 3+/5  -KR     Row Name 03/03/20 1601          Static Sitting Balance    Level of [place] (Unsupported Sitting, Static Balance)  minimal assist, 75% patient effort  -KR     Sitting Position (Unsupported Sitting, Static Balance)  sitting on edge of bed  -KR     Row Name 03/03/20 1601          Static Standing Balance    Level of [place] (Supported Standing, Static Balance)  minimal assist, 75% patient effort;2 person assist  -KR     Assistive Device Utilized (Supported Standing, Static Balance)  other (see comments) B UE support  -KR     Row Name 03/03/20 1601          Dynamic Standing Balance    Level of [place], Reaches Outside Midline (Standing, Dynamic Balance)  moderate assist, 50 to 74% patient effort;2 person assist  -KR     Assistive Device Utilized (Supported Standing, Dynamic Balance)  other (see comments) B UE support   -KR     Row Name 03/03/20 1601          Sensory Assessment/Intervention    Sensory General Assessment  no sensation deficits identified  -KR       User Key  (r) = Recorded By, (t) = Taken By, (c) = Cosigned By    Initials Name Provider Type    Melissa Carlisle, PT Physical Therapist        Goals/Plan     Row Name 03/03/20 1602          Bed Mobility Goal 1 (PT)    Activity/Assistive Device  (Bed Mobility Goal 1, PT)  sit to supine;supine to sit  -KR     [place] Level/Cues Needed (Bed Mobility Goal 1, PT)  minimum assist (75% or more patient effort)  -KR     Time Frame (Bed Mobility Goal 1, PT)  2 weeks  -KR     Progress/Outcomes (Bed Mobility Goal 1, PT)  goal ongoing  -KR     Row Name 03/03/20 1602          Transfer Goal 1 (PT)    Activity/Assistive Device (Transfer Goal 1, PT)  sit-to-stand/stand-to-sit;bed-to-chair/chair-to-bed;walker, rolling  -KR     [place] Level/Cues Needed (Transfer Goal 1, PT)  minimum assist (75% or more patient effort)  -KR     Time Frame (Transfer Goal 1, PT)  2 weeks  -KR     Progress/Outcome (Transfer Goal 1, PT)  goal ongoing  -KR     Row Name 03/03/20 1602          Gait Training Goal 1 (PT)    Activity/Assistive Device (Gait Training Goal 1, PT)  gait (walking locomotion);assistive device use;walker, rolling  -KR     [place] Level (Gait Training Goal 1, PT)  moderate assist (50-74% patient effort)  -KR     Distance (Gait Goal 1, PT)  50 feet  -KR     Time Frame (Gait Training Goal 1, PT)  2 weeks  -KR     Progress/Outcome (Gait Training Goal 1, PT)  goal ongoing  -KR       User Key  (r) = Recorded By, (t) = Taken By, (c) = Cosigned By    Initials Name Provider Type    Melissa Carlisle, PT Physical Therapist        Clinical Impression     Row Name 03/03/20 1601          Pain Assessment    Additional Documentation  Pain Scale: Numbers Pre/Post-Treatment (Group)  -KR     Row Name 03/03/20 1601          Pain Scale: Numbers Pre/Post-Treatment    Pain Scale: Numbers, Pretreatment  0/10 - no pain  -KR     Pain Scale: Numbers, Post-Treatment  0/10 - no pain  -KR     Row Name 03/03/20 1601          Physical Therapy Clinical Impression    Patient/Family Goals Statement (PT Clinical Impression)  return to PLOF  -KR     Criteria for Skilled Interventions Met (PT Clinical Impression)  yes;treatment indicated  -KR     Rehab Potential (PT Clinical Summary)  good, to  achieve stated therapy goals  -KR     Row Name 03/03/20 1601          Vital Signs    Pre Systolic BP Rehab  166  -KR     Pre Treatment Diastolic BP  89  -KR     Post Systolic BP Rehab  162  -KR     Post Treatment Diastolic BP  81  -KR     Pretreatment Heart Rate (beats/min)  99  -KR     Posttreatment Heart Rate (beats/min)  100  -KR     Pre SpO2 (%)  96  -KR     O2 Delivery Pre Treatment  room air  -KR     Post SpO2 (%)  92  -KR     O2 Delivery Post Treatment  room air  -KR     Pre Patient Position  Supine  -KR     Intra Patient Position  Standing  -KR     Post Patient Position  Sitting  -KR     Row Name 03/03/20 1601          Positioning and Restraints    Pre-Treatment Position  in bed  -KR     Post Treatment Position  chair  -KR     In Chair  reclined;call light within reach;encouraged to call for assist;exit alarm on;with nsg;RUE elevated;LUE elevated;waffle cushion;on mechanical lift sling;legs elevated  -KR       User Key  (r) = Recorded By, (t) = Taken By, (c) = Cosigned By    Initials Name Provider Type    Melissa Carlisle, PT Physical Therapist        Outcome Measures     Row Name 03/03/20 1604          How much help from another person do you currently need...    Turning from your back to your side while in flat bed without using bedrails?  2  -KR     Moving from lying on back to sitting on the side of a flat bed without bedrails?  2  -KR     Moving to and from a bed to a chair (including a wheelchair)?  2  -KR     Standing up from a chair using your arms (e.g., wheelchair, bedside chair)?  2  -KR     Climbing 3-5 steps with a railing?  1  -KR     To walk in hospital room?  2  -KR     AM-PAC 6 Clicks Score (PT)  11  -KR     Row Name 03/03/20 1604          Functional Assessment    Outcome Measure Options  AM-PAC 6 Clicks Basic Mobility (PT)  -KR       User Key  (r) = Recorded By, (t) = Taken By, (c) = Cosigned By    Initials Name Provider Type    Melissa Carlisle, PT Physical Therapist          PT  Recommendation and Plan  Planned Therapy Interventions (PT Eval): balance training, bed mobility training, gait training, strengthening, transfer training  Outcome Summary/Treatment Plan (PT)  Anticipated Discharge Disposition (PT): skilled nursing facility  Plan of Care Reviewed With: patient  Outcome Summary: PT initial evaluation completed for pt s/p femoral artery cutdown presenting with generalized weakness, impaired balance, and decreased functional mobility. Pt required modA x2 for STS transfers and maxA x2 to ambulate from bed to chair. Pt's decreased independence warrants PT skilled care. Recommend D/C to SNF.     Time Calculation:   PT Charges     Row Name 03/03/20 1034 03/03/20 0815          Time Calculation    Start Time  1034  -KR  0815  -     PT Received On  03/03/20  -KR  --     PT Goal Re-Cert Due Date  03/13/20  -KR  03/12/20  -        Time Calculation- PT    Total Timed Code Minutes- PT  23 minute(s)  -KR  --        Timed Charges    88477 - PT Therapeutic Activity Minutes  23  -KR  --       User Key  (r) = Recorded By, (t) = Taken By, (c) = Cosigned By    Initials Name Provider Type     Arturo Madrid, PT Physical Therapist    Melissa aCrlisle, PT Physical Therapist        Therapy Charges for Today     Code Description Service Date Service Provider Modifiers Qty    69266475725 HC PT THERAPEUTIC ACT EA 15 MIN 3/3/2020 Melissa Aiken, PT GP 2    0819692 HC PT RE-EVAL ESTABLISHED PLAN 2 3/3/2020 Melissa Aiken, PT GP 1          PT G-Codes  Outcome Measure Options: AM-PAC 6 Clicks Basic Mobility (PT)  AM-PAC 6 Clicks Score (PT): 11    Marilyn Aiken PT  3/3/2020

## 2020-03-03 NOTE — THERAPY EVALUATION
Acute Care - Speech Language Pathology   Swallow Initial Evaluation [facility]   Clinical Swallow Evaluation     Patient Name: Saskia Groves  : 1970  MRN: [id number]  Today's Date: 3/3/2020               Admit Date: 2020    Visit Dx:     ICD-10-CM ICD-9-CM   1. PVD (peripheral vascular disease) (CMS/HCC) I73.9 443.9   2. Pleural effusion J90 511.9   3. S/P femoral-femoral bypass surgery Z95.828 V45.89   4. Dysphagia, unspecified type R13.10 787.20     Patient Active Problem List   Diagnosis   • PVD (peripheral vascular disease) (CMS/HCC)   • Lumbar radiculopathy   • Paresthesia   • Causalgia of lower limb   • Hiatal hernia   • Current smoker   • Bilateral carotid artery stenosis   • COPD (chronic obstructive pulmonary disease) (CMS/HCC)   • Coronary artery disease   • Hypertension   • Hyperlipidemia   • Diabetes mellitus (CMS/HCC)   • Sepsis (CMS/HCC)   • Pleural effusion - bilateral mod/large pleural effusions on CT chest 2020.    • Acute pulmonary edema (CMS/HCC)   • Acute hypoxemic respiratory failure (CMS/HCC)     Past Medical History:   Diagnosis Date   • Anxiety    • Arthritis    • Asthma    • Carotid artery stenosis    • Chronic bronchitis (CMS/HCC)    • COPD (chronic obstructive pulmonary disease) (CMS/HCC)    • Coronary artery disease     2 vessels with 50% blockage.  Sees Dr. Donaldson   • Depression    • Diabetes mellitus (CMS/HCC)    • Dyslipidemia    • GERD (gastroesophageal reflux disease)    • Hiatal hernia    • Hyperlipidemia    • Hypertension    • Infectious viral hepatitis    • Lower back pain    • Migraine    • Multinodular goiter    • S/P thyroid biopsy     2008, 2013, 2015, and 07/15/2019 at [facility], right lobe nodules - all cytologies were benign   • Sleep apnea     can't tolerate the cpap mask   • Subclinical hyperthyroidism      Past Surgical History:   Procedure Laterality Date   • ANTERIOR CERVICAL DISCECTOMY W/ FUSION     • BACK SURGERY      lumbar   •   SECTION  19940    x 2    • CHOLECYSTECTOMY     • COLONOSCOPY     • ENDOSCOPY     • FEMORAL ARTERY CUTDOWN Bilateral 2020    Procedure: FEMORAL ARTERY CUTDOWN WITH REMOVAL OF FEMORAL FEMORAL BYPASS GRAFT BILATERAL;  Surgeon: Deandre Oseguera MD;  Location:  [facility];  Service: Vascular;  Laterality: Bilateral;   • FEMORAL ENDARTERECTOMY Bilateral 1/15/2020    Procedure: FEMORAL ENDARTERECTOMY BILATERAL;  Surgeon: Deandre Oseguera MD;  Location:  [facility];  Service: Vascular   • FEMORAL FEMORAL BYPASS Right 1/15/2020    Procedure: FEMORAL FEMORAL BYPASS;  Surgeon: Deandre Oseguera MD;  Location:  [facility];  Service: Vascular   • HYSTERECTOMY     • KNEE SURGERY Left    • LEG EXCISION LESION/CYST Left 2020    Procedure: GROIN MUSCLE FLAP BILATERAL;  Surgeon: Modesto Whittaker MD;  Location:  [facility];  Service: Plastics;  Laterality: Left;   • WRIST SURGERY Left         SWALLOW EVALUATION (last 72 hours)      SLP Adult Swallow Evaluation     Row Name 20 1400                   Rehab Evaluation    Document Type  evaluation  -AC        Subjective Information  no complaints  -AC        Patient Observations  alert;cooperative  -AC        Patient/Family Observations  Dtr present.  -AC        Patient Effort  good  -AC           General Information    Patient Profile Reviewed  yes  -AC        Pertinent History Of Current Problem  48yo re-adm w/ sepsis r/t recent femorofemoral bypass graft 1/15/20. Removal of L bypass graft & placement of muscle flap 20. Respiratory failure, acute hypoxia. Intubated -3/2. Pt failed RN post-extubation swallow screen. Hx COPD, anterior cervical disc surgery yrs ago.  -AC        Current Method of Nutrition  NPO;nasogastric feedings  -AC        Precautions/Limitations, Vision  WFL;for purposes of eval  -AC        Precautions/Limitations, Hearing  WFL;for purposes of eval  -AC        Prior Level of Function-Swallowing  no diet consistency restrictions  -AC         Plans/Goals Discussed with  patient and family;agreed upon  -AC        Barriers to Rehab  medically complex  -AC        Patient's Goals for Discharge  return to PO diet  -AC        Family Goals for Discharge  patient able to return to PO diet;patient able to eat/drink without coughing/choking  -AC           Pain Scale: FACES Pre/Post-Treatment    Pain: FACES Scale, Pretreatment  0-->no hurt  -AC        Pain: FACES Scale, Post-Treatment  0-->no hurt  -AC           Oral Motor and Function    Oral Lesions or Structural Abnormalities and/or variants  Creamy white coating on surface of tongue.  -AC        Dentition Assessment  edentulous, does not have dentures  -AC        Secretion Management  WNL/WFL  -AC        Mucosal Quality  dry  -AC        Volitional Swallow  weak  -AC        Volitional Cough  WFL  -AC           Oral Musculature and Cranial Nerve Assessment    Oral Motor General Assessment  WFL  -AC           General Eating/Swallowing Observations    Respiratory Support Currently in Use  nasal cannula  -AC        Eating/Swallowing Skills  fed by SLP  -AC        Positioning During Eating  upright 90 degree;upright in bed;other (see comments) as ok'd by MD  -AC        Utensils Used  spoon;cup;straw  -AC        Consistencies Trialed  thin liquids;pudding thick  -AC           Clinical Swallow Eval    Oral Prep Phase  WFL  -AC        Oral Transit  WFL  -AC        Oral Residue  WFL  -AC        Pharyngeal Phase  suspected pharyngeal impairment  -AC           Pharyngeal Phase Concerns    Pharyngeal Phase Concerns  multiple swallows;cough  -AC        Multiple Swallows  pudding  -AC        Cough  thin  -AC        Pharyngeal Phase Concerns, Comment  Oral phase seemingly WFL for consistencies trialed. DNT solid 2' aspiration risk. Delayed cough x1 after multiple drinks of thin liquid. Multiple swallows w/ pudding. No overt clinical s/sxs aspiration w/ ice chips.  -AC           Clinical Impression    SLP Swallowing  Diagnosis  other (see comments) r/o pharyngeal dysphagia  -        Functional Impact  risk of aspiration/pneumonia  -        Rehab Potential/Prognosis, Swallowing  good, to achieve stated therapy goals  -        Swallow Criteria for Skilled Therapeutic Interventions Met  demonstrates skilled criteria  -           Recommendations    SLP Diet Recommendation  NPO;temporary alternate methods of nutrition/hydration;other (see comments) 4 ice chip/hr after oral care w/ supervision; d/c if s/s asp  -        Recommended Diagnostics  FEES  -        SLP Rec. for Method of Medication Administration  meds via alternate route  -        Anticipated Dischage Disposition  anticipate therapy at next level of care  -          User Key  (r) = Recorded By, (t) = Taken By, (c) = Cosigned By    Initials Name Effective Dates     Heena Amaral, MS CCC-SLP 07/27/17 -           EDUCATION  The patient has been educated in the following areas:   Dysphagia (Swallowing Impairment) Oral Care/Hydration NPO rationale.    SLP Recommendation and Plan  SLP Swallowing Diagnosis: other (see comments)(r/o pharyngeal dysphagia)  SLP Diet Recommendation: NPO, temporary alternate methods of nutrition/hydration, other (see comments)(4 ice chip/hr after oral care w/ supervision; d/c if s/s asp)     SLP Rec. for Method of Medication Administration: meds via alternate route        Recommended Diagnostics: FEES  Swallow Criteria for Skilled Therapeutic Interventions Met: demonstrates skilled criteria  Anticipated Dischage Disposition: anticipate therapy at next level of care  Rehab Potential/Prognosis, Swallowing: good, to achieve stated therapy goals             Plan of Care Reviewed With: patient, daughter           Time Calculation:   Time Calculation- SLP     Row Name 03/03/20 1446             Time Calculation- SLP    SLP Start Time  1400  -      SLP Received On  03/03/20  -        User Key  (r) = Recorded By, (t) = Taken By, (c) =  Cosigned By    Initials Name Provider Type     Heena Amaral, MS CCC-SLP Speech and Language Pathologist          Therapy Charges for Today     Code Description Service Date Service Provider Modifiers Qty    78086155002  ST EVAL ORAL PHARYNG SWALLOW 4 3/3/2020 Heena Amaral, MS CCC-SLP GN 1               Heena Amaral MS CCC-SLP  3/3/2020

## 2020-03-04 ENCOUNTER — APPOINTMENT (OUTPATIENT)
Dept: GENERAL RADIOLOGY | Facility: HOSPITAL | Age: 50
End: 2020-03-04

## 2020-03-04 ENCOUNTER — ANCILLARY PROCEDURE (OUTPATIENT)
Dept: SPEECH THERAPY | Facility: HOSPITAL | Age: 50
End: 2020-03-04

## 2020-03-04 LAB
ANION GAP SERPL CALCULATED.3IONS-SCNC: 11 MMOL/L (ref 5–15)
BACTERIA SPEC AEROBE CULT: NORMAL
BACTERIA SPEC ANAEROBE CULT: NORMAL
BASOPHILS # BLD AUTO: 0.06 10*3/MM3 (ref 0–0.2)
BASOPHILS NFR BLD AUTO: 0.7 % (ref 0–1.5)
BUN BLD-MCNC: 41 MG/DL (ref 6–20)
BUN/CREAT SERPL: 73.2 (ref 7–25)
CALCIUM SPEC-SCNC: 8.3 MG/DL (ref 8.6–10.5)
CHLORIDE SERPL-SCNC: 106 MMOL/L (ref 98–107)
CO2 SERPL-SCNC: 26 MMOL/L (ref 22–29)
CREAT BLD-MCNC: 0.56 MG/DL (ref 0.57–1)
DEPRECATED RDW RBC AUTO: 53.1 FL (ref 37–54)
EOSINOPHIL # BLD AUTO: 0.27 10*3/MM3 (ref 0–0.4)
EOSINOPHIL NFR BLD AUTO: 3 % (ref 0.3–6.2)
ERYTHROCYTE [DISTWIDTH] IN BLOOD BY AUTOMATED COUNT: 15.2 % (ref 12.3–15.4)
GFR SERPL CREATININE-BSD FRML MDRD: 115 ML/MIN/1.73
GLUCOSE BLD-MCNC: 211 MG/DL (ref 65–99)
GLUCOSE BLDC GLUCOMTR-MCNC: 113 MG/DL (ref 70–130)
GLUCOSE BLDC GLUCOMTR-MCNC: 172 MG/DL (ref 70–130)
GLUCOSE BLDC GLUCOMTR-MCNC: 256 MG/DL (ref 70–130)
HCT VFR BLD AUTO: 34.4 % (ref 34–46.6)
HGB BLD-MCNC: 10.8 G/DL (ref 12–15.9)
IMM GRANULOCYTES # BLD AUTO: 0.19 10*3/MM3 (ref 0–0.05)
IMM GRANULOCYTES NFR BLD AUTO: 2.1 % (ref 0–0.5)
LYMPHOCYTES # BLD AUTO: 1.65 10*3/MM3 (ref 0.7–3.1)
LYMPHOCYTES NFR BLD AUTO: 18.5 % (ref 19.6–45.3)
MCH RBC QN AUTO: 29.5 PG (ref 26.6–33)
MCHC RBC AUTO-ENTMCNC: 31.4 G/DL (ref 31.5–35.7)
MCV RBC AUTO: 94 FL (ref 79–97)
MONOCYTES # BLD AUTO: 0.88 10*3/MM3 (ref 0.1–0.9)
MONOCYTES NFR BLD AUTO: 9.9 % (ref 5–12)
NEUTROPHILS # BLD AUTO: 5.88 10*3/MM3 (ref 1.7–7)
NEUTROPHILS NFR BLD AUTO: 65.8 % (ref 42.7–76)
NRBC BLD AUTO-RTO: 0 /100 WBC (ref 0–0.2)
PLATELET # BLD AUTO: 240 10*3/MM3 (ref 140–450)
PMV BLD AUTO: 9.8 FL (ref 6–12)
POTASSIUM BLD-SCNC: 4.1 MMOL/L (ref 3.5–5.2)
RBC # BLD AUTO: 3.66 10*6/MM3 (ref 3.77–5.28)
SODIUM BLD-SCNC: 143 MMOL/L (ref 136–145)
VANCOMYCIN SERPL-MCNC: 13.8 MCG/ML (ref 5–40)
WBC NRBC COR # BLD: 8.93 10*3/MM3 (ref 3.4–10.8)

## 2020-03-04 PROCEDURE — 80202 ASSAY OF VANCOMYCIN: CPT

## 2020-03-04 PROCEDURE — 25010000002 DAPTOMYCIN PER 1 MG: Performed by: INTERNAL MEDICINE

## 2020-03-04 PROCEDURE — 74018 RADEX ABDOMEN 1 VIEW: CPT

## 2020-03-04 PROCEDURE — 85025 COMPLETE CBC W/AUTO DIFF WBC: CPT | Performed by: INTERNAL MEDICINE

## 2020-03-04 PROCEDURE — 25010000002 HYDRALAZINE PER 20 MG: Performed by: INTERNAL MEDICINE

## 2020-03-04 PROCEDURE — 82962 GLUCOSE BLOOD TEST: CPT

## 2020-03-04 PROCEDURE — 97530 THERAPEUTIC ACTIVITIES: CPT

## 2020-03-04 PROCEDURE — 92612 ENDOSCOPY SWALLOW (FEES) VID: CPT

## 2020-03-04 PROCEDURE — 71045 X-RAY EXAM CHEST 1 VIEW: CPT

## 2020-03-04 PROCEDURE — 63710000001 INSULIN REGULAR HUMAN PER 5 UNITS: Performed by: INTERNAL MEDICINE

## 2020-03-04 PROCEDURE — 80048 BASIC METABOLIC PNL TOTAL CA: CPT | Performed by: INTERNAL MEDICINE

## 2020-03-04 PROCEDURE — 63710000001 INSULIN DETEMIR PER 5 UNITS: Performed by: INTERNAL MEDICINE

## 2020-03-04 PROCEDURE — 94799 UNLISTED PULMONARY SVC/PX: CPT

## 2020-03-04 PROCEDURE — 99232 SBSQ HOSP IP/OBS MODERATE 35: CPT | Performed by: INTERNAL MEDICINE

## 2020-03-04 RX ADMIN — INSULIN DETEMIR 15 UNITS: 100 INJECTION, SOLUTION SUBCUTANEOUS at 08:04

## 2020-03-04 RX ADMIN — IPRATROPIUM BROMIDE AND ALBUTEROL SULFATE 3 ML: 2.5; .5 SOLUTION RESPIRATORY (INHALATION) at 13:10

## 2020-03-04 RX ADMIN — SODIUM CHLORIDE, PRESERVATIVE FREE 10 ML: 5 INJECTION INTRAVENOUS at 21:56

## 2020-03-04 RX ADMIN — SODIUM CHLORIDE, PRESERVATIVE FREE 10 ML: 5 INJECTION INTRAVENOUS at 08:05

## 2020-03-04 RX ADMIN — INSULIN HUMAN 3 UNITS: 100 INJECTION, SOLUTION PARENTERAL at 00:41

## 2020-03-04 RX ADMIN — PREGABALIN 150 MG: 75 CAPSULE ORAL at 05:22

## 2020-03-04 RX ADMIN — PREGABALIN 150 MG: 75 CAPSULE ORAL at 14:27

## 2020-03-04 RX ADMIN — ASPIRIN 325 MG ORAL TABLET 325 MG: 325 PILL ORAL at 08:04

## 2020-03-04 RX ADMIN — IPRATROPIUM BROMIDE AND ALBUTEROL SULFATE 3 ML: 2.5; .5 SOLUTION RESPIRATORY (INHALATION) at 06:21

## 2020-03-04 RX ADMIN — BUDESONIDE 0.5 MG: 0.5 INHALANT RESPIRATORY (INHALATION) at 06:21

## 2020-03-04 RX ADMIN — AMITRIPTYLINE HYDROCHLORIDE 10 MG: 10 TABLET, FILM COATED ORAL at 21:39

## 2020-03-04 RX ADMIN — METOPROLOL TARTRATE 100 MG: 50 TABLET, FILM COATED ORAL at 21:39

## 2020-03-04 RX ADMIN — ISOSORBIDE MONONITRATE 30 MG: 30 TABLET, EXTENDED RELEASE ORAL at 08:04

## 2020-03-04 RX ADMIN — KETOTIFEN FUMARATE 1 DROP: 0.35 SOLUTION/ DROPS OPHTHALMIC at 08:04

## 2020-03-04 RX ADMIN — INSULIN HUMAN 8 UNITS: 100 INJECTION, SOLUTION PARENTERAL at 12:26

## 2020-03-04 RX ADMIN — CLOPIDOGREL BISULFATE 75 MG: 75 TABLET ORAL at 08:04

## 2020-03-04 RX ADMIN — ATORVASTATIN CALCIUM 10 MG: 10 TABLET, FILM COATED ORAL at 08:04

## 2020-03-04 RX ADMIN — INSULIN HUMAN 5 UNITS: 100 INJECTION, SOLUTION PARENTERAL at 07:45

## 2020-03-04 RX ADMIN — HYDROCODONE BITARTRATE AND ACETAMINOPHEN 1 TABLET: 10; 325 TABLET ORAL at 08:10

## 2020-03-04 RX ADMIN — PANTOPRAZOLE SODIUM 40 MG: 40 INJECTION, POWDER, FOR SOLUTION INTRAVENOUS at 05:22

## 2020-03-04 RX ADMIN — LOSARTAN POTASSIUM 50 MG: 50 TABLET ORAL at 08:04

## 2020-03-04 RX ADMIN — FAMOTIDINE 20 MG: 20 TABLET ORAL at 06:30

## 2020-03-04 RX ADMIN — KETOTIFEN FUMARATE 1 DROP: 0.35 SOLUTION/ DROPS OPHTHALMIC at 21:39

## 2020-03-04 RX ADMIN — ACETAMINOPHEN 650 MG: 650 SOLUTION ORAL at 12:26

## 2020-03-04 RX ADMIN — METOPROLOL TARTRATE 100 MG: 50 TABLET, FILM COATED ORAL at 08:04

## 2020-03-04 RX ADMIN — FAMOTIDINE 20 MG: 20 TABLET ORAL at 16:39

## 2020-03-04 RX ADMIN — ROPINIROLE HYDROCHLORIDE 4 MG: 2 TABLET, FILM COATED ORAL at 21:39

## 2020-03-04 RX ADMIN — INSULIN DETEMIR 15 UNITS: 100 INJECTION, SOLUTION SUBCUTANEOUS at 21:39

## 2020-03-04 RX ADMIN — BUDESONIDE 0.5 MG: 0.5 INHALANT RESPIRATORY (INHALATION) at 18:35

## 2020-03-04 RX ADMIN — DAPTOMYCIN 500 MG: 500 INJECTION, POWDER, LYOPHILIZED, FOR SOLUTION INTRAVENOUS at 05:22

## 2020-03-04 RX ADMIN — HYDRALAZINE HYDROCHLORIDE 10 MG: 20 INJECTION INTRAMUSCULAR; INTRAVENOUS at 06:28

## 2020-03-04 RX ADMIN — HYDROCODONE BITARTRATE AND ACETAMINOPHEN 1 TABLET: 10; 325 TABLET ORAL at 16:39

## 2020-03-04 RX ADMIN — Medication 1 CAPSULE: at 08:04

## 2020-03-04 RX ADMIN — PREGABALIN 150 MG: 75 CAPSULE ORAL at 21:39

## 2020-03-04 RX ADMIN — LACTULOSE 10 G: 10 SOLUTION ORAL at 08:04

## 2020-03-04 NOTE — PLAN OF CARE
Problem: Patient Care Overview  Goal: Plan of Care Review  Outcome: Ongoing (interventions implemented as appropriate)  Flowsheets (Taken 3/4/2020 7293)  Progress: improving  Plan of Care Reviewed With: patient  Outcome Summary: Pt increased ambulation distance to 150ft with modA x2 and B UE support; chair follow for safety. Pt progressed to periods of Castillo x2. Pt demonstrated forward flexed posture with anterior lean; very unsteady throughout ambulation. No improvement in gait mechanics despite increased cueing. Pt required one seated rest break. Continue to progress as appropriate.

## 2020-03-04 NOTE — PROGRESS NOTES
"INFECTIOUS DISEASE PROGRESS NOTE     Saskia Groves  1970  [id number]      Admission Date: 2/23/2020    Antibiotic therapy:   Daptomycin    Requesting Provider: Deandre Oseguera MD     Reason for Consultation: Left groin wound infection     History of present illness:  2/24/20:  Patient is a 49 y.o. female seen today for a left groin wound infection.  She underwent femoral endardectomy, and femorofemoral  Bypass with gortex graft on 1/15.  She has been having fevers 100 degrees and above since 1/17. She was seen last week by her PCP and given Bactrim but she couldn't tolerate it and Ceftin was called in. Her left groin continued to remain swollen and warm. She had a venous duplex to rule out DVT and then developed a \"knot\" of her left groin. She presented to the ED at [facility], was given Vancomycin and Merrem and transferred to [facility]. Tmax of 100.5 degrees without leukocytosis, normal lactate and PCT. She is currently on Vancomycin and Merrem and we were consulted for evaluation and treatment.   Labs at [facility] with WBC of 10.4, normal lactate, PCT.  She was tachycardic with tmax of 102.3.  She had fevers at home documented to over 103 degrees.  2/25/20 : She spontaneously drained a copious amount of slightly cloudy straw-colored fluid from her left groin yesterday and has continued to drain.  She complains of cough \"other than that I feel great\".  No n/v//d.  Left groin less tender.   2/26/20:  Graft to be removed on Friday.   She remains afebrile. She doesn't \"feel very good today\".  Family at bedside.   2/27/20:  Surgery scheduled for tomorrow.  She is tearful and afraid she will lose her leg.  She remains afebrile.    2/28/2020: She deteriorated this morning with hypoxemia and bilateral pulmonary opacifications with pleural effusions.  He had a fever to 101 degrees last night.  I discussed her complex situation with Dr. Deandre Oseguera early today and also this evening.  She was taken to surgery " today and her left femoral graft was removed.  A muscle flap was placed.  She is now endotracheally intubated and mechanically ventilated.  She underwent a thoracentesis revealing clear fluid.  Intravenous Merrem was added to her vancomycin this morning due to concerns about possible aspiration pneumonia.  She does have a history of penicillin allergy.  2/29/2020: She remains on ventilatory support.  She has only modest respiratory secretions.  She is still requiring an FiO2 of 60%.  She has been afebrile overnight.  She remains sedated.    Following for Dr. Parag Gordon:  3/1/20: Remains sedated on vent.  Tmax 99.3, FiO2 40% and PEEP 6.  She has a wound VAC on right ground with MELISSA drain with bloody drainage.  She has a right chest tube.  Small amount of clear to red-streaked ETT secretions. On tube feeding.   3/2  Extubated, afebrile, intermittently cooperative  Ongoing issues with hypertension; 90 from chest tube yesterday Day# 8 merrem (intermittent dosing);  vanc trough 38  3/3  Afebrile, reluctant to cooperate with attempts to mobilize, alert, 60 from CT yesterday, no new + cultures, random vanc 26  3/4  Alert, afebrile, more interactive        Past Medical History:   Diagnosis Date   • Anxiety    • Arthritis    • Asthma    • Carotid artery stenosis    • Chronic bronchitis (CMS/HCC)    • COPD (chronic obstructive pulmonary disease) (CMS/HCC)    • Coronary artery disease     2 vessels with 50% blockage.  Sees Dr. Donaldson   • Depression    • Diabetes mellitus (CMS/HCC)    • Dyslipidemia    • GERD (gastroesophageal reflux disease)    • Hiatal hernia    • Hyperlipidemia    • Hypertension    • Infectious viral hepatitis    • Lower back pain    • Migraine    • Multinodular goiter    • S/P thyroid biopsy     11/2008, 01/2013, 04/2015, and 07/15/2019 at [facility], right lobe nodules - all cytologies were benign   • Sleep apnea     can't tolerate the cpap mask   • Subclinical hyperthyroidism        Past Surgical History:    Procedure Laterality Date   • ANTERIOR CERVICAL DISCECTOMY W/ FUSION     • BACK SURGERY      lumbar   •  SECTION  19940    x 2    • CHOLECYSTECTOMY     • COLONOSCOPY     • ENDOSCOPY     • FEMORAL ARTERY CUTDOWN Bilateral 2020    Procedure: FEMORAL ARTERY CUTDOWN WITH REMOVAL OF FEMORAL FEMORAL BYPASS GRAFT BILATERAL;  Surgeon: Deandre Oseguera MD;  Location:  [facility];  Service: Vascular;  Laterality: Bilateral;   • FEMORAL ENDARTERECTOMY Bilateral 1/15/2020    Procedure: FEMORAL ENDARTERECTOMY BILATERAL;  Surgeon: Deandre Oseguera MD;  Location:  [facility];  Service: Vascular   • FEMORAL FEMORAL BYPASS Right 1/15/2020    Procedure: FEMORAL FEMORAL BYPASS;  Surgeon: Deandre Oseguera MD;  Location:  [facility];  Service: Vascular   • HYSTERECTOMY     • KNEE SURGERY Left    • LEG EXCISION LESION/CYST Left 2020    Procedure: GROIN MUSCLE FLAP BILATERAL;  Surgeon: Modesto Whittaker MD;  Location:  [facility];  Service: Plastics;  Laterality: Left;   • WRIST SURGERY Left        Family History   Problem Relation Age of Onset   • Diabetes Mother    • Hypertension Mother    • Arthritis Mother    • Hyperlipidemia Mother    • Migraines Mother    • Thyroid disease Mother    • Hypertension Father    • Lung cancer Father    • Cancer Father    • Stroke Other    • Migraines Maternal Aunt    • Thyroid disease Maternal Aunt    • Heart attack Maternal Aunt    • Osteoporosis Maternal Aunt    • Cancer Maternal Uncle    • Heart attack Maternal Uncle    • Stroke Maternal Uncle    • Hyperlipidemia Maternal Grandmother    • Cancer Maternal Grandmother    • Obesity Maternal Grandmother    • Thyroid disease Daughter    • Obesity Daughter        Social History     Socioeconomic History   • Marital status:      Spouse name: Not on file   • Number of children: 2   • Years of education: Not on file   • Highest education level: Not on file   Occupational History   • 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     Employer: DISABLED      Comment: Back and Leg Pain   Tobacco Use   • Smoking status: Current Every Day Smoker     Packs/day: 1.00     Years: 35.00     Pack years: 35.00     Types: Cigarettes   • Smokeless tobacco: Never Used   Substance and Sexual Activity   • Alcohol use: Not Currently   • Drug use: No   • Sexual activity: Yes     Partners: Male     Comment:    Social History Narrative    Lives in [facility]       Allergies   Allergen Reactions   • Levaquin [Levofloxacin] Nausea And Vomiting   • Penicillins Nausea And Vomiting     Has tolerated cefepime 2/2020   • Codeine Nausea Only   • Flagyl [Metronidazole] Nausea And Vomiting         Medication:    Current Facility-Administered Medications:   •  acetaminophen (TYLENOL) tablet 650 mg, 650 mg, Oral, Q4H PRN, 650 mg at 03/02/20 2128 **OR** acetaminophen (TYLENOL) 160 MG/5ML solution 650 mg, 650 mg, Oral, Q4H PRN, 650 mg at 03/04/20 1226 **OR** acetaminophen (TYLENOL) suppository 650 mg, 650 mg, Rectal, Q4H PRN, Edgar Machado PA, 650 mg at 02/28/20 0429  •  albuterol (PROVENTIL) nebulizer solution 0.083% 2.5 mg/3mL, 2.5 mg, Nebulization, Q6H PRN, Edgar Machado PA  •  amitriptyline (ELAVIL) tablet 10 mg, 10 mg, Oral, Nightly, Lorenzo Anderson MD, 10 mg at 03/03/20 2002  •  aspirin tablet 325 mg, 325 mg, Oral, Daily, Deandre Oseguera MD, 325 mg at 03/04/20 0804  •  atorvastatin (LIPITOR) tablet 10 mg, 10 mg, Oral, Daily, Edgar Machado PA, 10 mg at 03/04/20 0804  •  budesonide (PULMICORT) nebulizer solution 0.5 mg, 0.5 mg, Nebulization, BID - RT, Rian Sifuentes DO, 0.5 mg at 03/04/20 0621  •  clopidogrel (PLAVIX) tablet 75 mg, 75 mg, Oral, Daily, Edgar Machado PA, 75 mg at 03/04/20 0804  •  DAPTOmycin (CUBICIN) 500 mg/50 mL in sodium chloride, 8 mg/kg, Intravenous, Q24H, Collette Melo MD, 500 mg at 03/04/20 0522  •  dextrose (D50W) 25 g/ 50mL Intravenous Solution 25 g, 25 g, Intravenous, Q15 Min PRN, Edgar Machado PA  •  dextrose  (GLUTOSE) oral gel 15 g, 15 g, Oral, Q15 Min PRN, Edgar Machado PA  •  famotidine (PEPCID) tablet 20 mg, 20 mg, Oral, BID AC, Edgar Machado PA, 20 mg at 03/04/20 0630  •  glucagon (human recombinant) (GLUCAGEN DIAGNOSTIC) injection 1 mg, 1 mg, Subcutaneous, Q15 Min PRN, Edgar Machado PA  •  guaiFENesin-dextromethorphan (ROBITUSSIN DM) 100-10 MG/5ML syrup 5 mL, 5 mL, Oral, Q6H PRN, Edgar Machado PA, 5 mL at 02/27/20 0920  •  hydrALAZINE (APRESOLINE) injection 10 mg, 10 mg, Intravenous, Q6H PRN, Yessenia Valero MD, 10 mg at 03/04/20 0628  •  HYDROcodone-acetaminophen (NORCO)  MG per tablet 1 tablet, 1 tablet, Oral, Q8H PRN, Edgar Machado PA, 1 tablet at 03/04/20 0810  •  insulin detemir (LEVEMIR) injection 15 Units, 15 Units, Subcutaneous, Q12H, Lorenzo Anderson MD, 15 Units at 03/04/20 0804  •  insulin regular (humuLIN R,novoLIN R) injection 0-14 Units, 0-14 Units, Subcutaneous, Q6H, Rian Sifuentes, DO, 8 Units at 03/04/20 1226  •  ipratropium-albuterol (DUO-NEB) nebulizer solution 3 mL, 3 mL, Nebulization, 4x Daily - RT, Rian Sifuentes, DO, 3 mL at 03/04/20 1310  •  isosorbide mononitrate (IMDUR) 24 hr tablet 30 mg, 30 mg, Oral, Q24H, Kip Mckeon RPH, 30 mg at 03/04/20 0804  •  ketotifen (ZADITOR) 0.025 % ophthalmic solution 1 drop, 1 drop, Both Eyes, BID, Edgar Machado PA, 1 drop at 03/04/20 0804  •  lactobacillus acidophilus (RISAQUAD) capsule 1 capsule, 1 capsule, Oral, Daily, Collette Melo MD, 1 capsule at 03/04/20 0804  •  lactulose (CHRONULAC) 10 GM/15ML solution 10 g, 10 g, Oral, Daily, Edgar Machado PA, 10 g at 03/04/20 0804  •  losartan (COZAAR) tablet 50 mg, 50 mg, Oral, Daily, Yessenia Valero MD, 50 mg at 03/04/20 0804  •  metoprolol tartrate (LOPRESSOR) tablet 100 mg, 100 mg, Oral, Q12H, Lorenzo Anderson MD, 100 mg at 03/04/20 0804  •  niCARdipine (CARDENE) 40 mg in 200 mL NS (0.2 mg/mL) infusion, 5-15 mg/hr, Intravenous,  Titrated, Edgar Machado PA  •  ondansetron (ZOFRAN) injection 4 mg, 4 mg, Intravenous, Once PRN, Ced Gómez CRNA  •  pantoprazole (PROTONIX) injection 40 mg, 40 mg, Intravenous, Q AM, Edgar Machado PA, 40 mg at 20  •  pregabalin (LYRICA) capsule 150 mg, 150 mg, Oral, Q8H, Lorenzo Anderson MD, 150 mg at 20  •  rOPINIRole (REQUIP) tablet 4 mg, 4 mg, Oral, Nightly, Lorenzo Anderson MD, 4 mg at 20  •  sodium chloride 0.9 % flush 10 mL, 10 mL, Intravenous, Q12H, Edgar Machado PA, 10 mL at 20 08  •  sodium chloride 0.9 % flush 10 mL, 10 mL, Intravenous, PRN, Edgar Machado PA    Antibiotics:  Anti-Infectives (From admission, onward)    Ordered     Dose/Rate Route Frequency Start Stop    20 194  DAPTOmycin (CUBICIN) 500 mg/50 mL in sodium chloride     Kip Mckeon RPH reviewed the order on 20 0903.   Ordering Provider:  Collette Melo MD    8 mg/kg × 60.3 kg  over 30 Minutes Intravenous Every 24 Hours 20 0600 20 0559    20 0746  meropenem (MERREM) 1 g/100 mL 0.9% NS VTB (mbp)     Ordering Provider:  Parag Gordon MD    1 g  over 30 Minutes Intravenous Once 20 0845 20 0921    20 0831  cefepime (MAXIPIME) 2 g/100 mL 0.9% NS (mbp)     Ordering Provider:  Ranjana Easley APRN    2 g  200 mL/hr over 30 Minutes Intravenous Once 20 0930 20 1157    20 0203  vancomycin 500 mg/100 mL 0.9% NS IVPB (mbp)     Ordering Provider:  Ced Kenny RPH    500 mg  100 mL/hr over 60 Minutes Intravenous Once 20 0300 20 0511                Physical Exam:   Vital Signs  Temp (24hrs), Av °F (37.2 °C), Min:97.7 °F (36.5 °C), Max:99.8 °F (37.7 °C)    Temp  Min: 97.7 °F (36.5 °C)  Max: 99.8 °F (37.7 °C)  BP  Min: 92/51  Max: 168/85  Pulse  Min: 90  Max: 112  Resp  Min: 16  Max: 22  SpO2  Min: 87 %  Max: 98 %    GENERAL: extubated,sitting in chair, asking to go home  HEENT:  Normocephalic, atraumatic.  No conjunctival injection. No icterus.   HEART: RRR; No murmur, rubs, gallops.   LUNGS: diminished at lung bases bilaterally   ABDOMEN: Soft, nontender, nondistended. Positive bowel sounds. No rebound or guarding.   MSK:  No joint effusions   SKIN: Warm and dry without cutaneous eruptions on Inspection/palpation.   NEURO: sedated  Left groin with a wound VAC in place MELISSA drains in place with bloody drainage.    Right Chest tube in place    Laboratory Data    Results from last 7 days   Lab Units 03/04/20  0341 03/03/20  0338 03/02/20  0348   WBC 10*3/mm3 8.93 11.58* 15.55*   HEMOGLOBIN g/dL 10.8* 11.4* 12.6   HEMATOCRIT % 34.4 36.1 39.4   PLATELETS 10*3/mm3 240 272 331     Results from last 7 days   Lab Units 03/04/20  0341   SODIUM mmol/L 143   POTASSIUM mmol/L 4.1   CHLORIDE mmol/L 106   CO2 mmol/L 26.0   BUN mg/dL 41*   CREATININE mg/dL 0.56*   GLUCOSE mg/dL 211*   CALCIUM mg/dL 8.3*     Results from last 7 days   Lab Units 02/29/20  1557   ALK PHOS U/L 150*   BILIRUBIN mg/dL 0.7   ALT (SGPT) U/L 35*   AST (SGOT) U/L 63*         Results from last 7 days   Lab Units 02/29/20  1604   CRP mg/dL 8.05*     Results from last 7 days   Lab Units 02/28/20  0243   LACTATE mmol/L 1.5         Results from last 7 days   Lab Units 03/04/20  0341 03/03/20  0338 03/02/20  1131 02/27/20  1137   VANCOMYCIN TR mcg/mL  --   --  38.50* 19.10   VANCOMYCIN RM mcg/mL 13.80 26.10  --   --      Estimated Creatinine Clearance: 114.4 mL/min (A) (by C-G formula based on SCr of 0.56 mg/dL (L)).      Microbiology:  2/24: BCx NG    2/24: wound few WBCs, GPC in pairs -- MRSA   2/28/2020: Operative cultures are growing MRSA  2/29/2020: Sputum culture is NGSF      Radiology:  Imaging Results (Last 72 Hours)     Procedure Component Value Units Date/Time    XR Abdomen KUB [[id number]] Collected:  03/04/20 1420     Updated:  03/04/20 1421    Narrative:       EXAMINATION: XR ABDOMEN KUB-     INDICATION: s/p keofeed placement;  "I73.9-Peripheral vascular disease,  unspecified; Y49-[place] effusion, not elsewhere classified;  Z95.828-Presence of other vascular implants and grafts;  R13.13-Dysphagia, pharyngeal phase     TECHNIQUE:     COMPARISON: NONE     FINDINGS: Feeding tube is seen in the duodenal bulb. Abdomen appears  somewhat \"gassy\", with gas throughout the colon and some small bowel  gas, but loops are only upper limits of normal size. No bowel wall edema  or pneumatosis is seen.          Impression:       Feeding tube tip in the duodenal bulb.           SLP FEES - Fiberoptic Endo Eval Swallow [[id number]] Resulted:  03/04/20 1125     Updated:  03/04/20 1125    Narrative:       This procedure was auto-finalized with no dictation required.    XR Chest 1 View [[id number]] Collected:  03/04/20 0825     Updated:  03/04/20 0930    Narrative:       EXAMINATION: XR CHEST 1 VW-     INDICATION: Respiratory failure; I73.9-Peripheral vascular disease,  unspecified; I19-Udlbspi effusion, not elsewhere classified;  Z95.828-Presence of other vascular implants and grafts;  R13.10-Dysphagia, unspecified.     COMPARISON: 03/03/2020.     FINDINGS: NG tube is seen in the left hemidiaphragm. Right pigtail  catheter remains in place. PICC line is at the cavoatrial junction in  good position. Heart is normal in size. Patient's mild diffuse pulmonary  interstitial disease pattern and focal left basilar atelectasis or  effusion appears stable. No pneumothorax is seen.       Impression:       Stable diffuse pulmonary interstitial disease and left  basilar atelectasis or effusion.      D:  03/04/2020  E:  03/04/2020       XR Chest 1 View [[id number]] Collected:  03/03/20 0854     Updated:  03/03/20 2210    Narrative:       EXAMINATION: XR CHEST 1 VW-03/03/2020:     INDICATION: Respiratory failure; I73.9-Peripheral vascular disease,  unspecified; R81-Vhwwlux effusion, not elsewhere classified;  Z95.828-Presence of other vascular implants and grafts.   "   COMPARISON: 03/01/2020.     FINDINGS: ET tube has been removed. NG tube, PICC line, and right  pleural drain remain in place. The heart is normal in size. Diffuse  pulmonary interstitial disease has improved, presumably resolving edema.  No lung consolidation, effusion, or pneumothorax is seen.       Impression:       Mild remaining pulmonary interstitial disease. No evidence  of pneumothorax or other new chest pathology.     D:  03/03/2020  E:  03/03/2020            This report was finalized on 3/3/2020 10:07 PM by Dr. Jaswant Frank MD.       XR Chest 1 View [[id number]] Collected:  03/01/20 0757     Updated:  03/02/20 0817    Narrative:          EXAMINATION: XR CHEST 1 VW - 03/01/2020     INDICATION: I73.9-Peripheral vascular disease, unspecified; F13-Jgyxbxd  effusion, not elsewhere classified; Z95.828-Presence of other vascular  implants and grafts.      COMPARISON: 02/29/2020     FINDINGS: Support hardware unchanged and in satisfactory positioning.  Cardiac silhouette enlarged with diffuse bilateral pulmonary  opacifications slightly decreased in density from prior comparison  particularly within the right mid and upper lung suggesting improving  airspace disease. No pneumothorax however trace volume left pleural  effusion similar to prior.           Impression:       Cardiac silhouette enlarged with diffuse bilateral pulmonary  opacifications slightly decreased in density from prior comparison  particularly within the right mid and upper lung suggesting improving  airspace disease. No pneumothorax however trace volume left pleural  effusion similar to prior.        DICTATED:   03/01/2020  EDITED/ls :   03/01/2020      This report was finalized on 3/2/2020 8:14 AM by Dr. Valdez Hill.           I read her radiographic studies    Impression:   1.  MRSA left groin abscess/cellulitis/graft infection-status post graft removal 2/28.  She will require a prolonged course of intravenous antibiotic therapy directed toward  MRSA.  Now on daptomycin  2.  Bilateral pulmonary infiltrates/pleural effusions-this may be secondary to edema or aspiration pneumonia; she has been receiving intermittent  Merrem since 2/23  procalcitonin negative x 2  3.  Acute hypoxic respiratory failure-requiring endotracheal intubation and mechanical ventilation  4.  S/p femorofemoral bypass with gortex graft, 1/15  5.  Type 2 Diabetes Mellitus  6.  Penicillin allergy  7.  Type 2 diabetes mellitus-with very poor control  8.  Malnutrition    PLAN/RECOMMENDATIONS:   1.  Continue  daptomycin.    3.  Sputum Gram stain and culture--NGSF  4.  stop Merrem   5   Cpk, cmp 3/4    Discussed with daughter           Collette Melo MD  3/4/2020  2:24 PM

## 2020-03-04 NOTE — DISCHARGE INSTR - DIET
FEES Reason for Referral 3/4/2020  Patient was referred for a FEES to assess the efficiency of his/her swallow function, rule out aspiration and make recommendations regarding safe dietary consistencies, effective compensatory strategies, and safe eating environment.         Recommendations/Treatment  SLP Swallowing Diagnosis: mod-severe, pharyngeal dysfunction  Functional Impact: risk of aspiration/pneumonia  Rehab Potential/Prognosis, Swallowing: good, to achieve stated therapy goals  Swallow Criteria for Skilled Therapeutic Interventions Met: demonstrates skilled criteria  Therapy Frequency (Swallow): 5 days per week  Predicted Duration Therapy Intervention (Days): until discharge  SLP Diet Recommendation: NPO, temporary alternate methods of nutrition/hydration, other (see comments)(may consider replacing NG w/ smaller keofeed given edema)  Recommended Precautions and Strategies: other (see comments)(4 ice chips/hr after oral care & w/ supervision-as tolerated)  SLP Rec. for Method of Medication Administration: meds via alternate route  Anticipated Dischage Disposition: anticipate therapy at next level of care    Instrumental Set-up  Risks/Benefits Reviewed: risks/benefits explained, patient, family, agreed to eval  Nasal Entry: left:  Anatomic Considerations: edema, erythema, vocal folds, arytenoids, other (see comments)(> on pt's R artyenoid)  Utensils Used: Spoon  Consistencies Trialed: thin liquids, pudding/puree    Oral Preparation/ Oral Phase  Oral Phase: WFL, solids not tested  Oral Phase, Comment: WFL for consistencies trialed. DNT solid or liquids via cup/straw 2' aspiration risk.    Pharyngeal Phase  Initiation of Pharyngeal Swallow: bolus in pyriform sinuses  Pharyngeal Phase: impaired pharyngeal phase of swallowing  Penetration During the Swallow: thin liquids, pudding/puree, secondary to delayed swallow initiation or mistiming, secondary to reduced laryngeal elevation, secondary to reduced vestibular  closure  Penetration After the Swallow: thin liquids, pudding/puree, secondary to residue, in valleculae, in pyriform sinuses  Aspiration After the Swallow: pudding/puree, secondary to residue, in valleculae, in pyriform sinuses  Response to Aspiration: cough  Rosenbek's Scale: thin:, 5-->Level 5, pudding/puree:, 8-->Level 8  Residue: all consistencies tested, diffuse within pharynx, secondary to reduced base of tongue retraction, secondary to reduced posterior pharyngeal wall stripping, secondary to reduced laryngeal elevation, secondary to reduced hyolaryngeal excursion, other (see comments)(moderate amount)  Response to Residue: unable to clear residue  Attempted Compensatory Maneuvers: chin tuck, effortful (hard swallow)  Response to Attempted Compensatory Maneuvers: did not prevent penetration, did not reduce residue  FEES Summary: Laryngeal edema affecting swallowing to some degree (> on R where large-bore NG appeared to be resting); however, pt also exhibited diffuse pharyngeal weakness. There was deep penetration of thin liquids & pudding during the swallow. Penetrated material did not spontaneously expel laryngeal vestibule and cued cough was weak. Pt exhibited continued penetration of all consistencies trialed after the swallow as pharyngeal residue spilled into laryngeal vestibule w/ subsequent swallows. Eventual aspiration deemed inevitable. Attempted chin tuck as compensation; however, resulted in large amount of aspiration of pudding after the swallow. Pt coughed in response.

## 2020-03-04 NOTE — PLAN OF CARE
Problem: Patient Care Overview  Goal: Plan of Care Review  Outcome: Ongoing (interventions implemented as appropriate)  Note:   Pt. alert/oriented. Pt. able to stand/pivot from chair to bed.  Pt. on room air sats >93% while awake and 2L NC when sleep.Pt. Has remained in NSR with HR in the 90's. Sbp 129-149mmhg. Awaiting fees this a.m. 3/04/2020.  Will continue to monitor. At 3/4/2020 1725

## 2020-03-04 NOTE — PROGRESS NOTES
"Saskia Groves  [id number]  1970     LOS: 10 days   Patient Care Team:  Meagan Barlow APRN as PCP - General (Family Medicine)  Tyson Donaldson DO as Consulting Physician (Cardiology)    Chief Complaint: Infected femoral-femoral bypass graft      Subjective: No specific complaints, sitting in chair    Objective:     Vital Sign Min/Max for last 24 hours  Temp  Min: 99.6 °F (37.6 °C)  Max: 99.9 °F (37.7 °C)   BP  Min: 129/77  Max: 169/84   Pulse  Min: 93  Max: 121   Resp  Min: 18  Max: 22   SpO2  Min: 87 %  Max: 98 %   No data recorded   No data recorded     Flowsheet Rows      First Filed Value   Admission Height  160 cm (62.99\") Documented at 02/23/2020 2240   Admission Weight  60.4 kg (133 lb 1.6 oz) Documented at 02/23/2020 2240          Physical Exam:    Wound: Satisfactory    Pulses:     Mediastinal and Chest Tube Drainage:       Results Review:   Results from last 7 days   Lab Units 03/04/20  0341   WBC 10*3/mm3 8.93   HEMOGLOBIN g/dL 10.8*   HEMATOCRIT % 34.4   PLATELETS 10*3/mm3 240     Results from last 7 days   Lab Units 03/04/20  0341   SODIUM mmol/L 143   POTASSIUM mmol/L 4.1   CHLORIDE mmol/L 106   CO2 mmol/L 26.0   BUN mg/dL 41*   CREATININE mg/dL 0.56*   GLUCOSE mg/dL 211*   CALCIUM mg/dL 8.3*     Results from last 7 days   Lab Units 03/01/20  0428   PH, ARTERIAL pH units 7.463*   PO2 ART mm Hg 89.1   PCO2, ARTERIAL mm Hg 36.6   HCO3 ART mmol/L 26.1*         Assessment      Acute hypoxemic respiratory failure (CMS/HCC)    PVD (peripheral vascular disease) (CMS/HCC)    Current smoker    COPD (chronic obstructive pulmonary disease) (CMS/HCC)    Coronary artery disease    Hypertension    Hyperlipidemia    Diabetes mellitus (CMS/HCC)    Sepsis (CMS/HCC)    Pleural effusion - bilateral mod/large pleural effusions on CT chest 2/27/2020.     Acute pulmonary edema (CMS/HCC)      Continue to make progress.  DC chest tube.  5 AM portable chest x-ray        Deandre Oseguera MD  03/04/20  6:16 " AM      Please note that portions of this note were completed with a voice recognition program. Efforts were made to edit the dictations, but words may be mistranscribed

## 2020-03-04 NOTE — PLAN OF CARE
Pleural tube d/c'd. Failed fees, changed ng tube to a keofeed to help minimize irritation and swelling in throat.  Ambulated x 2 today, increasing distance to 220feet.

## 2020-03-04 NOTE — PROGRESS NOTES
Continued Stay Note  [facility]     Patient Name: Saskia Groves  MRN: [id number]  Today's Date: 3/4/2020    Admit Date: 2/23/2020    Discharge Plan     Row Name 03/04/20 1059       Plan    Plan  STR     Provided Post Acute Provider List?  Yes    Provided Post Acute Provider Quality & Resource List?  Yes    Delivered To  Patient;Support Person    Patient/Family in Agreement with Plan  yes    Plan Comments  I spoke with patient and dtr at bedside. Patient remains in ICU, sitting in chair and NG in place with tube feeds. Planned Fees today/speech. Patient walked 150 ft this am. Patient interested in rehab at 1) [facility] 2)[facility] and 3) [facility]. I called [facility] and faxed info to [facility] to make sure they can accept patient's insurance. Currently, patient not medically ready. Information faxed to [facility]. CM following. Brielle @ [phone]     Final Discharge Disposition Code  03 - skilled nursing facility (SNF)        Discharge Codes    No documentation.       Expected Discharge Date and Time     Expected Discharge Date Expected Discharge Time    Mar 2, 2020             Tete Morelos RN

## 2020-03-04 NOTE — PROGRESS NOTES
Clinical Nutrition     Nutrition Support Assessment  Reason for Visit:   MDR, Follow-up protocol, EN      Patient Name: Saskia Groves  YOB: 1970  MRN: [id number]  Date of Encounter: 20 8:37 AM  Admission date: 2020        Nutrition Assessment   Assessment       Hospital Problem List    Acute hypoxemic respiratory failure (CMS/HCC)    PVD (peripheral vascular disease) (CMS/HCC)    Current smoker    COPD (chronic obstructive pulmonary disease) (CMS/HCC)    Coronary artery disease    Hypertension    Hyperlipidemia    Diabetes mellitus (CMS/HCC)    Sepsis (CMS/HCC)    Pleural effusion - bilateral mod/large pleural effusions on CT chest 2020.     Acute pulmonary edema (CMS/HCC)    () s/p removal of infected femorofemoral bypass graft, placement of bilateral pleural tubes for drainage of pleural effusions and muscle flap/reconstruction per plastic surgery of the infected groin sites  Incisional wound vac in place x 2    Extubated 3-2-20      PMH: She  has a past medical history of Anxiety, Arthritis, Asthma, Carotid artery stenosis, Chronic bronchitis (CMS/HCC), COPD (chronic obstructive pulmonary disease) (CMS/HCC), Coronary artery disease, Depression, Diabetes mellitus (CMS/HCC), Dyslipidemia, GERD (gastroesophageal reflux disease), Hiatal hernia, Hyperlipidemia, Hypertension, Infectious viral hepatitis, Lower back pain, Migraine, Multinodular goiter, S/P thyroid biopsy, Sleep apnea, and Subclinical hyperthyroidism.   PSxH: She  has a past surgical history that includes Cholecystectomy; Hysterectomy; Knee surgery (Left); Wrist surgery (Left); Anterior cervical discectomy w/ fusion; Colonoscopy; Esophagogastroduodenoscopy; Back surgery; Femoral Endarterectomy (Bilateral, 1/15/2020); Femoral Femoral Bypass (Right, 1/15/2020);  section (); Femoral Artery Cutdown (Bilateral, 2020); and Leg Excision Lesion/Cyst (Left, 2020).          Reported/Observed/Food/Nutrition Related History:     Pt sitting up in chair, much more appropriate today, in good spirits    Per RN: tolerating TF      Labs reviewed     Results from last 7 days   Lab Units 03/04/20  0341 03/03/20  0338 03/02/20  0348  03/01/20  0404 02/29/20  1557 02/29/20  0411 02/28/20  1818   GLUCOSE mg/dL 211* 252* 184*  --  148* 93 168* 247*   BUN mg/dL 41* 38* 29*  --  28* 25* 25* 23*   CREATININE mg/dL 0.56* 0.61 0.61  --  0.88 0.84 0.87 0.88   SODIUM mmol/L 143 142 141  --  140 142 139 140   CHLORIDE mmol/L 106 105 104  --  107 107 104 103   POTASSIUM mmol/L 4.1 4.3 4.2   < > 4.2 4.1 3.9 4.4   PHOSPHORUS mg/dL  --   --   --   --  2.7 3.0 4.5  --    MAGNESIUM mg/dL  --  1.8  --   --  1.8 1.8 2.0  --    ALT (SGPT) U/L  --   --   --   --   --  35* 36* 28    < > = values in this interval not displayed.     Results from last 7 days   Lab Units 02/29/20  1604 02/29/20  1557 02/29/20  0411 02/28/20  1818   ALBUMIN g/dL  --  2.60* 2.70* 3.20*   PREALBUMIN mg/dL  --  12.8*  --   --    CRP mg/dL 8.05*  --   --   --    CHOLESTEROL mg/dL  --  97  --   --    TRIGLYCERIDES mg/dL  --  233*  --   --        Results from last 7 days   Lab Units 03/04/20  0004 03/03/20  1719 03/03/20  1217 03/03/20  0538 03/02/20  2329 03/02/20  1747   GLUCOSE mg/dL 172* 135* 270* 281* 219* 218*     Lab Results   Lab Value Date/Time    HGBA1C 12.10 (H) 01/14/2020 1415    HGBA1C 12.8 08/22/2016 1000    HGBA1C 11.4 (H) 04/21/2016 1015         Medications reviewed   Pertinent:abx, pepcid, insulin, probiotic, lactulose      Intake/Ouptut 24 hrs (7:00AM - 6:59 AM)     Intake & Output (last day)       03/03 0701 - 03/04 0700 03/04 0701 - 03/05 0700    I.V. (mL/kg) 118 (2)     Other 238     NG/GT 1089     IV Piggyback 300     Total Intake(mL/kg) 1745 (28.9)     Urine (mL/kg/hr) 1050 (0.7)     Drains 85     Stool 0     Chest Tube 150     Wound 0     Total Output 1285     Net +460           Stool Unmeasured Occurrence 4 x                 GI: wnl    SKIN:  surgical sites, wound vac      Current Nutrition Prescription     PO: NPO Diet  Orders Placed This Encounter      Diet, Tube Feeding Tube Feeding Formula: Peptamen AF (Peptide Based, Critical Care, ALI)  goal rate @60ml/hr, free water @25ml/hr    Intake: 1089ml ( combination of 2 EN products)    EN at goal volume will provide: 1200ml, 1440kcal, 91g protein, 1474ml free water including flush       Nutrition Diagnosis     3/2/20, 3-4  Problem Altered nutrition related laboratory values   Etiology ?lifestyle, medication adherence  and dietary choices   Signs/Symptoms HgbA1C 12.1%    Sttus: needs Ed when appropriate    2-28-20, 3-4  Problem Inadequate oral intake   Etiology Per Clinical Status/Resp status    Signs/Symptoms Dysphagia     Status: EN started 2-29 2-28-20, 3-4  Problem Increased nutrient needs: protein   Etiology Poor Skin Integrity   Signs/Symptoms L. Groin wound infection, wound vac    Status: ongoing     Nutrition Intervention   1.  Follow treatment progress    Maintain EN    Await FEES today    Goal:   General: Nutrition support treatment    EN/PN: Maintain EN    Additional goals: DM education when pt appropriate      Monitoring/Evaluation:   Per protocol, I&O, Pertinent labs, EN delivery/tolerance, Skin status, GI status, Symptoms, Swallow function      Lainey Villa RD, CNSC  Time Spent: 30min

## 2020-03-04 NOTE — THERAPY TREATMENT NOTE
Patient Name: Saskia Groves  : 1970    MRN: [id number]                              Today's Date: 3/4/2020       Admit Date: 2020    Visit Dx:     ICD-10-CM ICD-9-CM   1. PVD (peripheral vascular disease) (CMS/HCC) I73.9 443.9   2. Pleural effusion J90 511.9   3. S/P femoral-femoral bypass surgery Z95.828 V45.89   4. Dysphagia, unspecified type R13.10 787.20     Patient Active Problem List   Diagnosis   • PVD (peripheral vascular disease) (CMS/HCC)   • Lumbar radiculopathy   • Paresthesia   • Causalgia of lower limb   • Hiatal hernia   • Current smoker   • Bilateral carotid artery stenosis   • COPD (chronic obstructive pulmonary disease) (CMS/HCC)   • Coronary artery disease   • Hypertension   • Hyperlipidemia   • Diabetes mellitus (CMS/HCC)   • Sepsis (CMS/HCC)   • Pleural effusion - bilateral mod/large pleural effusions on CT chest 2020.    • Acute pulmonary edema (CMS/HCC)   • Acute hypoxemic respiratory failure (CMS/HCC)     Past Medical History:   Diagnosis Date   • Anxiety    • Arthritis    • Asthma    • Carotid artery stenosis    • Chronic bronchitis (CMS/HCC)    • COPD (chronic obstructive pulmonary disease) (CMS/HCC)    • Coronary artery disease     2 vessels with 50% blockage.  Sees Dr. Donaldson   • Depression    • Diabetes mellitus (CMS/HCC)    • Dyslipidemia    • GERD (gastroesophageal reflux disease)    • Hiatal hernia    • Hyperlipidemia    • Hypertension    • Infectious viral hepatitis    • Lower back pain    • Migraine    • Multinodular goiter    • S/P thyroid biopsy     2008, 2013, 2015, and 07/15/2019 at [facility], right lobe nodules - all cytologies were benign   • Sleep apnea     can't tolerate the cpap mask   • Subclinical hyperthyroidism      Past Surgical History:   Procedure Laterality Date   • ANTERIOR CERVICAL DISCECTOMY W/ FUSION     • BACK SURGERY      lumbar   •  SECTION  19940    x 2    • CHOLECYSTECTOMY     • COLONOSCOPY     • ENDOSCOPY     •  FEMORAL ARTERY CUTDOWN Bilateral 2/28/2020    Procedure: FEMORAL ARTERY CUTDOWN WITH REMOVAL OF FEMORAL FEMORAL BYPASS GRAFT BILATERAL;  Surgeon: Deandre Oseguera MD;  Location:  [facility];  Service: Vascular;  Laterality: Bilateral;   • FEMORAL ENDARTERECTOMY Bilateral 1/15/2020    Procedure: FEMORAL ENDARTERECTOMY BILATERAL;  Surgeon: Deandre Oseguera MD;  Location:  [facility];  Service: Vascular   • FEMORAL FEMORAL BYPASS Right 1/15/2020    Procedure: FEMORAL FEMORAL BYPASS;  Surgeon: Deandre Oseguera MD;  Location:  [facility];  Service: Vascular   • HYSTERECTOMY     • KNEE SURGERY Left    • LEG EXCISION LESION/CYST Left 2/28/2020    Procedure: GROIN MUSCLE FLAP BILATERAL;  Surgeon: Modesto Whittaker MD;  Location:  [facility];  Service: Plastics;  Laterality: Left;   • WRIST SURGERY Left      General Information     Row Name 03/04/20 1040          PT Evaluation Time/Intention    Document Type  therapy note (daily note)  -KR     Mode of Treatment  physical therapy  -KR     Row Name 03/04/20 1040          General Information    Existing Precautions/Restrictions  fall;other (see comments) wound vac; jonathon MELISSA drains; groin incisions  -KR     Row Name 03/04/20 1040          Cognitive Assessment/Intervention- PT/OT    Orientation Status (Cognition)  oriented x 3  -KR     Row Name 03/04/20 1040          Safety Issues, Functional Mobility    Safety Issues Affecting Function (Mobility)  awareness of need for assistance;insight into deficits/self awareness;safety precaution awareness;safety precautions follow-through/compliance  -KR     Impairments Affecting Function (Mobility)  balance;coordination;endurance/activity tolerance;postural/trunk control;strength  -KR       User Key  (r) = Recorded By, (t) = Taken By, (c) = Cosigned By    Initials Name Provider Type    KR Melissa Aiken, PT Physical Therapist        Mobility     Row Name 03/04/20 1041          Bed Mobility Assessment/Treatment    Comment (Bed Mobility)  [facility]  -KR      Row Name 03/04/20 1041          Transfer Assessment/Treatment    Comment (Transfers)  VC's for sequencing and increased safety awareness. Upon standing pt demonstrated very forward flexed posture with decreased balance; cueing to correct.   -KR     Row Name 03/04/20 1041          Sit-Stand Transfer    Sit-Stand [place] (Transfers)  minimum assist (75% patient effort);2 person assist;verbal cues  -KR     Assistive Device (Sit-Stand Transfers)  other (see comments) UE support  -KR     Row Name 03/04/20 1041          Gait/Stairs Assessment/Training    Gait/Stairs Assessment/Training  gait/ambulation independence  -KR     [place] Level (Gait)  moderate assist (50% patient effort);2 person assist;1 person to manage equipment;verbal cues chair follow; progressed to periods of Castillo x2  -KR     Assistive Device (Gait)  other (see comments) B UE support  -KR     Distance in Feet (Gait)  150  -KR     Pattern (Gait)  step-to  -KR     Deviations/Abnormal Patterns (Gait)  base of support, narrow;twin decreased;festinating/shuffling;stride length decreased  -KR     Bilateral Gait Deviations  forward flexed posture;heel strike decreased;leans left;leans right  -KR     Comment (Gait/Stairs)  Pt demonstrated step to gait pattern with slow twin and decreased step length. Pt with very forward flexed posture and significant anteior lean; unable to correct despite cueing. Pt very unsteady throughout ambulation with multiple near LOB. Pt required one seated rest break.   -KR       User Key  (r) = Recorded By, (t) = Taken By, (c) = Cosigned By    Initials Name Provider Type    Melissa Carlisle PT Physical Therapist        Obj/Interventions     Row Name 03/04/20 1044          Static Sitting Balance    Level of [place] (Unsupported Sitting, Static Balance)  contact guard assist  -KR     Sitting Position (Unsupported Sitting, Static Balance)  sitting in chair  -KR     Row Name 03/04/20 1044          Static  Standing Balance    Level of [place] (Supported Standing, Static Balance)  minimal assist, 75% patient effort;2 person assist  -KR     Assistive Device Utilized (Supported Standing, Static Balance)  other (see comments) B UE support  -KR     Row Name 03/04/20 1044          Dynamic Standing Balance    Level of [place], Reaches Outside Midline (Standing, Dynamic Balance)  minimal assist, 75% patient effort;2 person assist  -KR     Assistive Device Utilized (Supported Standing, Dynamic Balance)  other (see comments) B UE support  -KR       User Key  (r) = Recorded By, (t) = Taken By, (c) = Cosigned By    Initials Name Provider Type    Melissa Carlisle, PT Physical Therapist        Goals/Plan    No documentation.       Clinical Impression     Row Name 03/04/20 1044          Pain Assessment    Additional Documentation  Pain Scale: Numbers Pre/Post-Treatment (Group)  -KR     Row Name 03/04/20 1044          Pain Scale: Numbers Pre/Post-Treatment    Pain Scale: Numbers, Pretreatment  0/10 - no pain  -KR     Pain Scale: Numbers, Post-Treatment  0/10 - no pain  -KR     Row Name 03/04/20 1044          Vital Signs    Pre Systolic BP Rehab  158  -KR     Pre Treatment Diastolic BP  82  -KR     Post Systolic BP Rehab  135  -KR     Post Treatment Diastolic BP  81  -KR     Pretreatment Heart Rate (beats/min)  110  -KR     Posttreatment Heart Rate (beats/min)  103  -KR     Pre SpO2 (%)  96  -KR     O2 Delivery Pre Treatment  room air  -KR     Post SpO2 (%)  96  -KR     O2 Delivery Post Treatment  room air  -KR     Pre Patient Position  Sitting  -KR     Intra Patient Position  Standing  -KR     Post Patient Position  Sitting  -KR     Row Name 03/04/20 1044          Positioning and Restraints    Pre-Treatment Position  sitting in chair/recliner  -KR     Post Treatment Position  chair  -KR     In Chair  notified nsg;reclined;call light within reach;encouraged to call for assist;with family/caregiver;RUE elevated;LUE  elevated;legs elevated  -KR       User Key  (r) = Recorded By, (t) = Taken By, (c) = Cosigned By    Initials Name Provider Type    Melissa Carlisle PT Physical Therapist        Outcome Measures     Row Name 03/04/20 1045          How much help from another person do you currently need...    Turning from your back to your side while in flat bed without using bedrails?  2  -KR     Moving from lying on back to sitting on the side of a flat bed without bedrails?  2  -KR     Moving to and from a bed to a chair (including a wheelchair)?  3  -KR     Standing up from a chair using your arms (e.g., wheelchair, bedside chair)?  3  -KR     Climbing 3-5 steps with a railing?  1  -KR     To walk in hospital room?  2  -KR     AM-PAC 6 Clicks Score (PT)  13  -KR     Row Name 03/04/20 1045          Functional Assessment    Outcome Measure Options  AM-PAC 6 Clicks Basic Mobility (PT)  -KR       User Key  (r) = Recorded By, (t) = Taken By, (c) = Cosigned By    Initials Name Provider Type    Melissa Carlisle PT Physical Therapist          PT Recommendation and Plan  Planned Therapy Interventions (PT Eval): balance training, bed mobility training, gait training, strengthening, transfer training  Outcome Summary/Treatment Plan (PT)  Anticipated Discharge Disposition (PT): skilled nursing facility  Plan of Care Reviewed With: patient  Progress: improving  Outcome Summary: Pt increased ambulation distance to 150ft with modA x2 and B UE support; chair follow for safety. Pt progressed to periods of [place] x2. Pt demonstrated forward flexed posture with anterior lean; very unsteady throughout ambulation. No improvement in gait mechanics despite increased cueing. Pt required one seated rest break. Continue to progress as appropriate.     Time Calculation:   PT Charges     Row Name 03/04/20 0841             Time Calculation    Start Time  0841  -KR      PT Received On  03/04/20  -KR      PT Goal Re-Cert Due Date  03/13/20  -KR         Time  Calculation- PT    Total Timed Code Minutes- PT  23 minute(s)  -KR         Timed Charges    68886 - PT Therapeutic Activity Minutes  23  -KR        User Key  (r) = Recorded By, (t) = Taken By, (c) = Cosigned By    Initials Name Provider Type    Melissa Carlisle, PT Physical Therapist        Therapy Charges for Today     Code Description Service Date Service Provider Modifiers Qty    47413911808  PT THERAPEUTIC ACT EA 15 MIN 3/3/2020 Melissa Aiken, PT GP 2    08261662767 HC PT RE-EVAL ESTABLISHED PLAN 2 3/3/2020 Melissa Aiken, PT GP 1    [id number]  PT THERAPEUTIC ACT EA 15 MIN 3/4/2020 Melissa Aiken, PT GP 2    29390128091 HC PT THER SUPP EA 15 MIN 3/4/2020 Melissa Aiken, PT GP 2          PT G-Codes  Outcome Measure Options: AM-PAC 6 Clicks Basic Mobility (PT)  AM-PAC 6 Clicks Score (PT): 13    Marilyn Aiken PT  3/4/2020

## 2020-03-04 NOTE — PROGRESS NOTES
Intensive Care Follow-up     Hospital:  LOS: 10 days   Ms. Saskia Groves, 49 y.o. female is followed for:   Acute hypoxemic respiratory failure (CMS/HCC)        Subjective   Interval History:  The chart has been reviewed.  The patient is much more alert and interactive today.  She remains on tube feedings secondary to dysphagia.  Her chest tube was removed this morning and she is not having any difficulties with her breathing currently.    The patient's past medical, surgical and social history were reviewed and updated in Epic as appropriate.        Objective     Infusions:    niCARdipine 5-15 mg/hr     Medications:    amitriptyline 10 mg Oral Nightly   aspirin 325 mg Oral Daily   atorvastatin 10 mg Oral Daily   budesonide 0.5 mg Nebulization BID - RT   clopidogrel 75 mg Oral Daily   DAPTOmycin 8 mg/kg Intravenous Q24H   famotidine 20 mg Oral BID AC   insulin detemir 15 Units Subcutaneous Q12H   insulin regular 0-14 Units Subcutaneous Q6H   ipratropium-albuterol 3 mL Nebulization 4x Daily - RT   isosorbide mononitrate 30 mg Oral Q24H   ketotifen 1 drop Both Eyes BID   lactobacillus acidophilus 1 capsule Oral Daily   lactulose 10 g Oral Daily   losartan 50 mg Oral Daily   metoprolol tartrate 100 mg Oral Q12H   pantoprazole 40 mg Intravenous Q AM   pregabalin 150 mg Oral Q8H   rOPINIRole 4 mg Oral Nightly   sodium chloride 10 mL Intravenous Q12H       Vital Sign Min/Max for last 24 hours  Temp  Min: 97.7 °F (36.5 °C)  Max: 99.9 °F (37.7 °C)   BP  Min: 129/77  Max: 169/84   Pulse  Min: 93  Max: 112   Resp  Min: 18  Max: 22   SpO2  Min: 87 %  Max: 98 %   Flow (L/min)  Min: 2  Max: 2       Input/Output for last 24 hour shift  03/03 0701 - 03/04 0700  In: 1745 [I.V.:118]  Out: 1285 [Urine:1050; Drains:85]      Objective:  General Appearance:  Uncomfortable, in no acute distress and well-appearing.    Vital signs: (most recent): Blood pressure 139/76, pulse 102, temperature 97.7 °F (36.5 °C), temperature source  "Axillary, resp. rate 18, height 160 cm (62.99\"), weight 60.3 kg (133 lb), SpO2 96 %, not currently breastfeeding.    Lungs:  Normal effort and normal respiratory rate.  No stridor.  There are decreased breath sounds.  No rales, wheezes or rhonchi.    Heart: Normal rate.  Regular rhythm.  S1 normal and S2 normal.  No murmur.   Chest: Symmetric chest wall expansion.   Abdomen: Abdomen is soft and non-distended.  Bowel sounds are normal.   There is no abdominal tenderness.     Extremities: Normal range of motion.  (There is 1+ pitting edema to the lower extremities.  )  Neurological: Patient is alert and oriented to person, place and time.    Pupils:  Pupils are equal, round, and reactive to light.  Pupils are equal.   Skin:  Warm.              Results from last 7 days   Lab Units 03/04/20  0341 03/03/20  0338 03/02/20  0348   WBC 10*3/mm3 8.93 11.58* 15.55*   HEMOGLOBIN g/dL 10.8* 11.4* 12.6   PLATELETS 10*3/mm3 240 272 331     Results from last 7 days   Lab Units 03/04/20  0341 03/03/20  0338 03/02/20  0348  03/01/20  0404 02/29/20  1557 02/29/20  0411   SODIUM mmol/L 143 142 141  --  140 142 139   POTASSIUM mmol/L 4.1 4.3 4.2   < > 4.2 4.1 3.9   CO2 mmol/L 26.0 25.0 25.0  --  24.0 24.0 22.0   BUN mg/dL 41* 38* 29*  --  28* 25* 25*   CREATININE mg/dL 0.56* 0.61 0.61  --  0.88 0.84 0.87   MAGNESIUM mg/dL  --  1.8  --   --  1.8 1.8 2.0   PHOSPHORUS mg/dL  --   --   --   --  2.7 3.0 4.5   GLUCOSE mg/dL 211* 252* 184*  --  148* 93 168*    < > = values in this interval not displayed.     Estimated Creatinine Clearance: 114.4 mL/min (A) (by C-G formula based on SCr of 0.56 mg/dL (L)).    Results from last 7 days   Lab Units 03/01/20  0428   PH, ARTERIAL pH units 7.463*   PCO2, ARTERIAL mm Hg 36.6   PO2 ART mm Hg 89.1         I reviewed the patient's results and images.     Assessment/Plan   Impression        Acute hypoxemic respiratory failure (CMS/HCC)    PVD (peripheral vascular disease) (CMS/HCC)    Current smoker    " COPD (chronic obstructive pulmonary disease) (CMS/HCC)    Coronary artery disease    Hypertension    Hyperlipidemia    Diabetes mellitus (CMS/HCC)    Sepsis (CMS/HCC)    Pleural effusion - bilateral mod/large pleural effusions on CT chest 2/27/2020.     Acute pulmonary edema (CMS/HCC)       Plan        Continue with current antimicrobials.  We will monitor the patient's respiratory status.  Chest x-ray has been ordered for tomorrow morning.  Mobilize as tolerated with physical therapy.  Speech evaluation today for dysphasia.  Hopefully she will be able to pass and start taking oral intake.  Otherwise we will continue on with the tube feedings for now.  She will remain in the intensive care unit for close monitoring today.    Plan of care and goals reviewed with mulitdisciplinary/antibiotic stewardship team during rounds.   I discussed the patient's findings and my recommendations with patient, family and nursing staff     Lorenzo Anderson MD, [facility]  Pulmonology and Critical Care Medicine

## 2020-03-04 NOTE — DISCHARGE PLACEMENT REQUEST
"Saskia Groves (49 y.o. Female) Call Tete Morelos RN,  @ [phone]                                 Date of Birth Social Security Number Address Home Phone MRN    1970  [address] [place] [address] [phone] [id number]    Druze Marital Status          None        Admission Date Admission Type Admitting Provider Attending Provider Department, Room/Bed    20 Urgent Elian Lopez MD Mueller, Joseph C, MD [facility] 2HSIC, S253/1    Discharge Date Discharge Disposition Discharge Destination                       Attending Provider:  Elian Lopez MD    Allergies:  Levaquin [Levofloxacin], Penicillins, Codeine, Flagyl [Metronidazole]    Isolation:  None   Infection:  MRSA (20)   Code Status:  CPR    Ht:  160 cm (62.99\")   Wt:  60.3 kg (133 lb)    Admission Cmt:  None   Principal Problem:  Acute hypoxemic respiratory failure (CMS/HCC) [J96.01]                 Active Insurance as of 2020     Primary Coverage     Payor Plan Insurance Group Employer/Plan Group    [employer] [place] [employer] [place]ICA[place]      Payor Plan Address Payor Plan Phone Number Payor Plan Fax Number Effective Dates    [address] [address] [phone]  2020 - None Entered    [facility] [id number]       Subscriber Name Subscriber Birth Date Member ID       SASKIA GROVES 1970 [id number]                 Emergency Contacts      (Rel.) Home Phone Work Phone Mobile Phone    Thuan Groves (Spouse) [phone] -- --               History & Physical      DayMichelle MD at 20 2301              [facility]  HISTORY AND PHYSICAL    Patient Name: Saskia Groves  : 1970  MRN: [id number]  Primary Care Physician: Meagan Barlow APRN  Date of admission: 2020      Subjective   Subjective     Chief Complaint:  infection    HPI:  Saskia Groves is a 49 y.o. female who is s/p bilateral femoral " endarterectomy and fem-fem bypass on 1/15/2020 by Dr. Oseguera.  Pt was discharged on 2020 to home w/ f/u scheduled for 2 wks post-op. PT was seen  w/ incisions that by documentation were healing appropriately.  Pt presents now as transfer from OSH w/ ?wound infection.  Pt w/ fever, tachycardia, WBC 10.4.  Pt was given 2L NS, vanc and merrem prior to transfer.  Family states pt has had fever for past week and has been evaluated but it was felt her incision sites were not the issue however pt was placed on bactrim which she did not tolerate then ceftin but for unclear reason.   Pt has some abd pain as well diffusely but no n/v.  Has had some dysuria and was told she had a yeast infection.  Denies any cough or shortness of breath.  Still smoking.      Review of Systems      All other systems reviewed and are negative.     Personal History     Past Medical History:   Diagnosis Date   • Anxiety    • Arthritis    • Asthma    • Carotid artery stenosis    • Chronic bronchitis (CMS/HCC)    • COPD (chronic obstructive pulmonary disease) (CMS/HCC)    • Coronary artery disease     2 vessels with 50% blockage.  Sees Dr. Donaldson   • Depression    • Diabetes mellitus (CMS/HCC)    • Disease of thyroid gland    • Dyslipidemia    • GERD (gastroesophageal reflux disease)    • Goiter    • Goiter    • Hiatal hernia    • Hyperlipidemia    • Hypertension    • Infectious viral hepatitis    • Lower back pain    • Migraine    • Sleep apnea     can't tolerate the cpap mask   • Thyroid nodule        Past Surgical History:   Procedure Laterality Date   • ANTERIOR CERVICAL DISCECTOMY W/ FUSION     • BACK SURGERY      lumbar   •  SECTION  19940    x 2    • CHOLECYSTECTOMY     • COLONOSCOPY     • ENDOSCOPY     • FEMORAL ENDARTERECTOMY Bilateral 1/15/2020    Procedure: FEMORAL ENDARTERECTOMY BILATERAL;  Surgeon: Deandre Oseguera MD;  [facility]: [facility];  Service: Vascular   • FEMORAL FEMORAL BYPASS Right 1/15/2020     Procedure: FEMORAL FEMORAL BYPASS;  Surgeon: Deandre Oseguera MD;  Location: [facility];  Service: Vascular   • HYSTERECTOMY     • KNEE SURGERY Left    • WRIST SURGERY Left        Family History: family history includes Arthritis in her mother; Cancer in her father, maternal grandmother, and maternal uncle; Diabetes in her mother; Heart attack in her maternal aunt and maternal uncle; Hyperlipidemia in her maternal grandmother and mother; Hypertension in her father and mother; Lung cancer in her father; Migraines in her maternal aunt and mother; Obesity in her daughter and maternal grandmother; Osteoporosis in her maternal aunt; Stroke in her maternal uncle and another family member; Thyroid disease in her daughter, maternal aunt, and mother. Otherwise pertinent FHx was reviewed and unremarkable.     Social History:  reports that she has been smoking cigarettes. She has a 35.00 pack-year smoking history. She has never used smokeless tobacco. She reports that she drank alcohol. She reports that she does not use drugs.  Social History     Social History Narrative    Lives in [facility]       Medications:  Available home medication information reviewed.  Medications Prior to Admission   Medication Sig Dispense Refill Last Dose   • albuterol (PROVENTIL) (2.5 MG/3ML) 0.083% nebulizer solution Take  by nebulization Every 6 (Six) Hours As Needed.   Taking   • ALBUTEROL SULFATE HFA IN Inhale 1 puff Every 6 (Six) Hours As Needed (as needed for wheezing and sob).   Taking   • amitriptyline (ELAVIL) 10 MG tablet Take 10 mg by mouth Every Night.   Taking   • aspirin 81 MG tablet Take 1 tablet by mouth daily.   Taking   • benzonatate (TESSALON) 100 MG capsule Take 100 mg by mouth 3 (Three) Times a Day As Needed.   Taking   • clopidogrel (PLAVIX) 75 MG tablet TAKE ONE TABLET BY MOUTH DAILY 30 tablet 5 Taking   • cyanocobalamin 1000 MCG/ML injection 1,000 mcg Every 28 (Twenty-Eight) Days.   Taking   • fluticasone  "(FLONASE) 50 MCG/ACT nasal spray 2 sprays into the nostril(s) as directed by provider Daily. Use as directed   Taking   • fluticasone-salmeterol (ADVAIR) 250-50 MCG/DOSE DISKUS Inhale 1 puff 2 (Two) Times a Day.   Taking   • HYDROcodone-acetaminophen (LORTAB)  MG per tablet Take 1 tablet by mouth Every 8 (Eight) Hours As Needed.   Taking   • hydrOXYzine (ATARAX) 25 MG tablet Take 25 mg by mouth Every Night.   Taking   • insulin aspart protamine & aspart (NOVOLOG) 70/30 100 unit/mL Inject 35 Units under the skin into the appropriate area as directed 2 (Two) Times a Day Before Meals.   Taking   • Insulin Syringe-Needle U-100 30G X 5/16\" 0.5 ML misc Uses X 4/day 200 each 5 Taking   • isosorbide mononitrate (IMDUR) 30 MG 24 hr tablet Take 30 mg by mouth Daily.   Taking   • ketotifen (ZADITOR) 0.025 % ophthalmic solution Administer 1 drop to both eyes 2 (Two) Times a Day.   Taking   • lactulose (CHRONULAC) 10 GM/15ML solution Take 10 g by mouth Daily.   Taking   • Lancets (ONETOUCH DELICA PLUS [id number]) misc    Taking   • levocetirizine (XYZAL) 5 MG tablet Take 5 mg by mouth Every Evening.   Taking   • losartan (COZAAR) 25 MG tablet Take 25 mg by mouth Daily.   Taking   • lovastatin (MEVACOR) 40 MG tablet Take 40 mg by mouth Every Night.   Taking   • metFORMIN (GLUCOPHAGE) 500 MG tablet Take 1 tablet by mouth 2 (Two) Times a Day With Meals.   Taking   • metoprolol tartrate (LOPRESSOR) 25 MG tablet Take 12.5 mg by mouth 2 (Two) Times a Day.   Taking   • montelukast (SINGULAIR) 10 MG tablet Take 10 mg by mouth Every Night.   Taking   • omega-3 acid ethyl esters (LOVAZA) 1 g capsule 2 g 2 (Two) Times a Day.   Taking   • ONE TOUCH ULTRA TEST test strip    Taking   • pantoprazole (PROTONIX) 40 MG EC tablet Take 40 mg by mouth Daily.   Taking   • pregabalin (LYRICA) 150 MG capsule Take 150 mg by mouth 3 (Three) Times a Day.   Taking   • raNITIdine (ZANTAC) 150 MG tablet Take 150 mg by mouth 2 (Two) Times a Day.   Taking "   • rOPINIRole (REQUIP) 4 MG tablet Take 4 mg by mouth Every Night.   Taking   • SPIRIVA RESPIMAT 2.5 MCG/ACT aerosol solution inhaler Inhale 2 puffs Daily.   Taking   • tiZANidine (ZANAFLEX) 4 MG tablet Take 4 mg by mouth Every 8 (Eight) Hours As Needed.   Taking   • vitamin D (ERGOCALCIFEROL) 1.25 MG (61569 UT) capsule capsule 50,000 Units 1 (One) Time Per Week. wednesdays   Taking       Allergies   Allergen Reactions   • Levaquin [Levofloxacin] Nausea And Vomiting   • Penicillins Nausea And Vomiting   • Codeine Nausea Only   • Flagyl [Metronidazole] Nausea And Vomiting       Objective   Objective     Vital Signs:   Temp:  [99.2 °F (37.3 °C)] 99.2 °F (37.3 °C)  Heart Rate:  [95] 95  Resp:  [18] 18  BP: (104)/(65) 104/65        Physical Exam   Gen; alert, oriented, nad, chronically ill appearing  Heent; perrla, eomi, sl pasty mm  Cv; rr w/ tachycardia, no mrg  L; ctab, no wheeze/crackles  Abd; mildly ttp diffusely  Ext; left femoral incision site w/ fluctuant golf-ball size nontender mass w/ little surrounding erythema; right femoral incision site w/o surrounding erythema or ttp or discharge; palpable pedal pulses  Skin; above  Neuro; grossly intact  Psych; mood and affect appropriate    Results Reviewed:  I have personally reviewed current lab and radiology data.              Invalid input(s):  ALKPHOS  CrCl cannot be calculated (Patient's most recent lab result is older than the maximum 30 days allowed.).  Brief Urine Lab Results     None        Imaging Results (Last 24 Hours)     ** No results found for the last 24 hours. **             Assessment/Plan   Assessment & Plan     Active Hospital Problems    Diagnosis POA   • Sepsis (CMS/HCC) [A41.9] Yes   • COPD (chronic obstructive pulmonary disease) (CMS/HCC) [J44.9] Yes   • Coronary artery disease [I25.10] Yes     2 vessels with 50% blockage.  Sees Dr. Donaldson     • Hypertension [I10] Yes   • Hyperlipidemia [E78.5] Yes   • Diabetes mellitus (CMS/HCC) [E11.9] Yes    • Current smoker [F17.200] Yes   • PVD (peripheral vascular disease) (CMS/HCC) [I73.9] Yes     48 y/o female w/ hx of PVD, tobacco abuse, DM, HTN, HL, COPD who is s/p fem-fem bypass on 1/5/2020 by Dr. Oseguera here now w/  1. Sepsis; by criteria of fever, tachycardia, leukocytosis at OSH.  Unclear if post-op site is source though most likely however feels more like seroma and has very little erythema.  Will order CT tonight and continue vanc/merrem for now.  Cultures obtained.  Dr. Oseguera to see in a.m.  Will include abd in CT given vague abd pain.  2. Ongoing tobacco abuse; declines nicotine patch.  Cessation recommended.  3. DM: ssi  4. HTN: continue cozaar, metoprolol.  5. PVD: asa, plavix, statin continued.       DVT prophylaxis:  lovenox    CODE STATUS:  full  There are no questions and answers to display.       Admission Status:  I believe this patient meets INPATIENT status due to sepsis w/ unclear source w/ fairly recent surgery.  I feel patient’s risk for adverse outcomes and need for care warrant INPATIENT evaluation and I predict the patient’s care encounter to likely last beyond 2 midnights.      Electronically signed by Michelle Shipman MD, 02/23/20, 11:01 PM.      Electronically signed by Michelle Shipman MD at 02/23/20 6817         Current Facility-Administered Medications   Medication Dose Route Frequency Provider Last Rate Last Dose   • acetaminophen (TYLENOL) tablet 650 mg  650 mg Oral Q4H PRN Edgar Machado PA   650 mg at 03/02/20 2128    Or   • acetaminophen (TYLENOL) 160 MG/5ML solution 650 mg  650 mg Oral Q4H PRN Edgar Machado PA   649.6 mg at 03/01/20 0205    Or   • acetaminophen (TYLENOL) suppository 650 mg  650 mg Rectal Q4H PRN Edgar Machado PA   650 mg at 02/28/20 0429   • albuterol (PROVENTIL) nebulizer solution 0.083% 2.5 mg/3mL  2.5 mg Nebulization Q6H PRN Edgar Machado PA       • amitriptyline (ELAVIL) tablet 10 mg  10 mg Oral Nightly Anderson, Lorenzo M, MD   10 mg at 03/03/20 2002    • aspirin tablet 325 mg  325 mg Oral Daily Deandre Oseguera MD   325 mg at 03/04/20 0804   • atorvastatin (LIPITOR) tablet 10 mg  10 mg Oral Daily Edgar Machado PA   10 mg at 03/04/20 0804   • budesonide (PULMICORT) nebulizer solution 0.5 mg  0.5 mg Nebulization BID - RT Rian Sifuentes, DO   0.5 mg at 03/04/20 0621   • clopidogrel (PLAVIX) tablet 75 mg  75 mg Oral Daily Edgar Machado PA   75 mg at 03/04/20 0804   • DAPTOmycin (CUBICIN) 500 mg/50 mL in sodium chloride  8 mg/kg Intravenous Q24H Collette Melo MD   500 mg at 03/04/20 0522   • dextrose (D50W) 25 g/ 50mL Intravenous Solution 25 g  25 g Intravenous Q15 Min PRN Edgar Machado PA       • dextrose (GLUTOSE) oral gel 15 g  15 g Oral Q15 Min PRN Edgar Machado PA       • famotidine (PEPCID) tablet 20 mg  20 mg Oral BID AC Edgar Machado PA   20 mg at 03/04/20 0630   • glucagon (human recombinant) (GLUCAGEN DIAGNOSTIC) injection 1 mg  1 mg Subcutaneous Q15 Min PRN Edgar Machado PA       • guaiFENesin-dextromethorphan (ROBITUSSIN DM) 100-10 MG/5ML syrup 5 mL  5 mL Oral Q6H PRN Edgar Machado PA   5 mL at 02/27/20 0920   • hydrALAZINE (APRESOLINE) injection 10 mg  10 mg Intravenous Q6H PRN Yessenia Valero MD   10 mg at 03/04/20 0628   • HYDROcodone-acetaminophen (NORCO)  MG per tablet 1 tablet  1 tablet Oral Q8H PRN Edgar Machado PA   1 tablet at 03/04/20 0810   • insulin detemir (LEVEMIR) injection 15 Units  15 Units Subcutaneous Q12H Lorenzo Anderson MD   15 Units at 03/04/20 0804   • insulin regular (humuLIN R,novoLIN R) injection 0-14 Units  0-14 Units Subcutaneous Q6H Rian Sifuentes, DO   5 Units at 03/04/20 0745   • ipratropium-albuterol (DUO-NEB) nebulizer solution 3 mL  3 mL Nebulization 4x Daily - RT Rian Sifuentes DO   3 mL at 03/04/20 0621   • isosorbide mononitrate (IMDUR) 24 hr tablet 30 mg  30 mg Oral Q24H Kip Mckeon RPH   30 mg at 03/04/20 0804   • ketotifen  (ZADITOR) 0.025 % ophthalmic solution 1 drop  1 drop Both Eyes BID Edgar Machado PA   1 drop at 20 08   • lactobacillus acidophilus (RISAQUAD) capsule 1 capsule  1 capsule Oral Daily Collette Melo MD   1 capsule at 20 08   • lactulose (CHRONULAC) 10 GM/15ML solution 10 g  10 g Oral Daily Edgar Machado PA   10 g at 20 08   • losartan (COZAAR) tablet 50 mg  50 mg Oral Daily Yessenia Valero MD   50 mg at 20 08   • metoprolol tartrate (LOPRESSOR) tablet 100 mg  100 mg Oral Q12H Lorenzo Anderson MD   100 mg at 20   • niCARdipine (CARDENE) 40 mg in 200 mL NS (0.2 mg/mL) infusion  5-15 mg/hr Intravenous Titrated Edgar Machado PA       • ondansetron (ZOFRAN) injection 4 mg  4 mg Intravenous Once PRN Ced Gómez CRNA       • pantoprazole (PROTONIX) injection 40 mg  40 mg Intravenous Q AM Edgar Machado PA   40 mg at 20 05   • pregabalin (LYRICA) capsule 150 mg  150 mg Oral Q8H Lorenzo Anderson MD   150 mg at 20   • rOPINIRole (REQUIP) tablet 4 mg  4 mg Oral Nightly Lorenzo Anderson MD   4 mg at 20   • sodium chloride 0.9 % flush 10 mL  10 mL Intravenous Q12H Edgar Machado PA   10 mL at 20   • sodium chloride 0.9 % flush 10 mL  10 mL Intravenous PRN Edgar Machado PA            Operative/Procedure Notes (most recent note)      Modesto Whittaker MD at 20 1329            Plastic Surgery Operative Report        Patient Name:  Saskia Groves  :  1970  MRN:  [id number]    Date of Surgery:  2020       Pre Operative Diagnosis:  Infected femoral-femoral graft, bilateral pleural effusions, pneumonia    Post Operative Diagnosis:    Infected femoral-femoral graft, bilateral pleural effusions, pneumonia    Procedure:  Procedure(s):  1) debridement of skin and subcutaneous tissue   2) Rt Sartorious muscle flap to cover right groin graft  3) Lt Sartorious muscle flap to cover the left groin  graft      CPT® Codes:99541, 91620    Staff:  Surgeon(s):  Deandre Oseguera MD Moore, MD Flaco Dasilva Gary F, MD Moore, Yonatan Morris MD    Assistant: Edgar Machado PA    Anesthesia: General    Indications for the Procedure:  49yoF smoker hx DM, HTN, PVD, recently s/p 1/15/20 Rt femoral endarterectomy, Lt femoral endarterectomy femoral-femoral bypass with 8 mm Wingina-Slade graft with an Infected femoral-femoral bypass graft now with respiratory failure and developing sepsis needs debridement and coverage.    Operative Findings: bilateral grafts in place.       Description of the Procedure:  Saw patient and family in ICU. Discussed the risks and benefits of surgery including loss of limbs possible loss of life. Patient and family elected to proceed with the procedure. H&P was updated. Consent was obtained. Patient was taken back to the OR placed in a supine position. Anesthesia was induced and the patient was then intubated. A timeout was performed in which the patient the procedure were gone over. Everyone was in agreement in terms of the patient and the procedure. At this point in time Thoracic surgery started the case. Please see Dr. Oseguera operative report for further details. Once 1) diagnostic fiberoptic bronchoscopy 2) right chest tube placement of a Tod catheter 3) removal of femoral-femoral bypass 4) bilateral common femoral artery pericardial patches were placed plastic surgery came into the case  The bilateral groin wounds were exposed with the grafts in plain view. Abx irrigation was then used in bilateral groins to washout the wound. A total of a liter was used in each groin wound. Hemostasis was noted. Starting on the Rt Groin wound which measure 15 cm in length. The Sartorious muscle was then elevated using a bovie cautery. The Sartorious was disconnected from its insertion at the ASIS and mobilized to cover the graft. The muscle was then sutured into position using 2-0 vicryl suture. Skin and  subcutaneous tissue edges were then debrided with scissors. A linette 15 Kyr[place]z drain was then placed into the area the muscle was harvested. Hemostasis was noted. 2-0 Vicryl suture was used to close the deep layer followed by 3-0 monocryls deep dermal then a running 4-0 monocryl.   Starting on the Left groin the Sartorious muscle was then elevated using a bovie cautery. The Sartorious was disconnected from its insertion at the ASIS and mobilized to cover the graft. The muscle was then sutured into position using 2-0 vicryl suture. Skin and subcutaneous tissue edges were then debrided with scissors. A linette 15 Kyrgyz drain was then placed into the area the muscle was harvested. Hemostasis was noted. 2-0 Vicryl suture was used to close the deep layer followed by 3-0 monocryls deep dermal then a running 4-0 monocryl.   The Prevena duo was then placed on the groin incisions and hooked up to the wound vac. Pulses were then checked using the Doppler on bilateral lower extremities. Bilateral PT arteries were intact, noted pink perfusion to the toes and plantar surface bilaterally with intact cap refill. Patient was then taken to the ICU intubated.       Implant Name Type Inv. Item Serial No.  Lot No. LRB No. Used   PTCH VASCUGUARD 1X6CM - [id number] Implant PTCH VASCUGUARD 1X6CM  [employer] [id number]  1   PTCH VASCUGUARD 1X6CM - [id number] Implant PTCH VASCUGUARD 1X6CM  [employer] [id number]  1       Specimen Removed: skin and subcutaneous tissue      Specimens     ID Source Type Tests Collected By Collected At Frozen?      1 Pleural Cavity Body Fluid · ANAEROBIC CULTURE  · FUNGAL CULTURE  · GRAM STAIN (Canceled)  · BODY FLUID CULTURE  · AFB CULTURE   Deandre Oseguera MD 20 1341      2 Groin, right Tissue · ANAEROBIC CULTURE  · FUNGAL CULTURE  · TISSUE / BONE CULTURE  · AFB CULTURE   Deandre Oseguera MD 20 1417      3 Groin, right Body Fluid · ANAEROBIC CULTURE  · FUNGAL CULTURE  · GRAM STAIN  (Canceled)  · BODY FLUID CULTURE  · AFB CULTURE   Deandre Oseguera MD 2/28/20 1417      4 Groin, right Hardware / Foreign Body · ANAEROBIC CULTURE  · HARDWARE / FOREIGN BODY CULTURE   Deandre Oseguera MD 2/28/20 1500      A Groin, right Hardware / Foreign Body · TISSUE PATHOLOGY EXAM   Deandre Oseguera MD 2/28/20 1529            Estimated Blood Loss: 1200 mL    Drains Placed: 2x 15Fr Ahmet drains    Complications: None immediate    Modesto Whittaker MD     Date: 2/28/2020  Time: 5:49 PM      Electronically signed by Modesto Whittaker MD at 02/28/20 1810          Physician Progress Notes (most recent note)      Lorenzo Anderson MD at 03/04/20 1056          Intensive Care Follow-up     Hospital:  LOS: 10 days   Ms. Saskia Groves, 49 y.o. female is followed for:   Acute hypoxemic respiratory failure (CMS/HCC)     Subjective   Subjective   Interval History:  The chart has been reviewed.  The patient is much more alert and interactive today.  She remains on tube feedings secondary to dysphagia.  Her chest tube was removed this morning and she is not having any difficulties with her breathing currently.    The patient's past medical, surgical and social history were reviewed and updated in Epic as appropriate.     Objective   Objective     Infusions:    niCARdipine 5-15 mg/hr     Medications:    amitriptyline 10 mg Oral Nightly   aspirin 325 mg Oral Daily   atorvastatin 10 mg Oral Daily   budesonide 0.5 mg Nebulization BID - RT   clopidogrel 75 mg Oral Daily   DAPTOmycin 8 mg/kg Intravenous Q24H   famotidine 20 mg Oral BID AC   insulin detemir 15 Units Subcutaneous Q12H   insulin regular 0-14 Units Subcutaneous Q6H   ipratropium-albuterol 3 mL Nebulization 4x Daily - RT   isosorbide mononitrate 30 mg Oral Q24H   ketotifen 1 drop Both Eyes BID   lactobacillus acidophilus 1 capsule Oral Daily   lactulose 10 g Oral Daily   losartan 50 mg Oral Daily   metoprolol tartrate 100 mg Oral Q12H   pantoprazole 40 mg  "Intravenous Q AM   pregabalin 150 mg Oral Q8H   rOPINIRole 4 mg Oral Nightly   sodium chloride 10 mL Intravenous Q12H       Vital Sign Min/Max for last 24 hours  Temp  Min: 97.7 °F (36.5 °C)  Max: 99.9 °F (37.7 °C)   BP  Min: 129/77  Max: 169/84   Pulse  Min: 93  Max: 112   Resp  Min: 18  Max: 22   SpO2  Min: 87 %  Max: 98 %   Flow (L/min)  Min: 2  Max: 2       Input/Output for last 24 hour shift  03/03 0701 - 03/04 0700  In: 1745 [I.V.:118]  Out: 1285 [Urine:1050; Drains:85]      Objective:  General Appearance:  Uncomfortable, in no acute distress and well-appearing.    Vital signs: (most recent): Blood pressure 139/76, pulse 102, temperature 97.7 °F (36.5 °C), temperature source Axillary, resp. rate 18, height 160 cm (62.99\"), weight 60.3 kg (133 lb), SpO2 96 %, not currently breastfeeding.    Lungs:  Normal effort and normal respiratory rate.  No stridor.  There are decreased breath sounds.  No rales, wheezes or rhonchi.    Heart: Normal rate.  Regular rhythm.  S1 normal and S2 normal.  No murmur.   Chest: Symmetric chest wall expansion.   Abdomen: Abdomen is soft and non-distended.  Bowel sounds are normal.   There is no abdominal tenderness.     Extremities: Normal range of motion.  (There is 1+ pitting edema to the lower extremities.  )  Neurological: Patient is alert and oriented to person, place and time.    Pupils:  Pupils are equal, round, and reactive to light.  Pupils are equal.   Skin:  Warm.              Results from last 7 days   Lab Units 03/04/20  0341 03/03/20  0338 03/02/20  0348   WBC 10*3/mm3 8.93 11.58* 15.55*   HEMOGLOBIN g/dL 10.8* 11.4* 12.6   PLATELETS 10*3/mm3 240 272 331     Results from last 7 days   Lab Units 03/04/20  0341 03/03/20  0338 03/02/20  0348  03/01/20  0404 02/29/20  1557 02/29/20  0411   SODIUM mmol/L 143 142 141  --  140 142 139   POTASSIUM mmol/L 4.1 4.3 4.2   < > 4.2 4.1 3.9   CO2 mmol/L 26.0 25.0 25.0  --  24.0 24.0 22.0   BUN mg/dL 41* 38* 29*  --  28* 25* 25* "   CREATININE mg/dL 0.56* 0.61 0.61  --  0.88 0.84 0.87   MAGNESIUM mg/dL  --  1.8  --   --  1.8 1.8 2.0   PHOSPHORUS mg/dL  --   --   --   --  2.7 3.0 4.5   GLUCOSE mg/dL 211* 252* 184*  --  148* 93 168*    < > = values in this interval not displayed.     Estimated Creatinine Clearance: 114.4 mL/min (A) (by C-G formula based on SCr of 0.56 mg/dL (L)).    Results from last 7 days   Lab Units 03/01/20  0428   PH, ARTERIAL pH units 7.463*   PCO2, ARTERIAL mm Hg 36.6   PO2 ART mm Hg 89.1         I reviewed the patient's results and images.     Assessment/Plan   Impression        Acute hypoxemic respiratory failure (CMS/HCC)    PVD (peripheral vascular disease) (CMS/HCC)    Current smoker    COPD (chronic obstructive pulmonary disease) (CMS/HCC)    Coronary artery disease    Hypertension    Hyperlipidemia    Diabetes mellitus (CMS/HCC)    Sepsis (CMS/HCC)    Pleural effusion - bilateral mod/large pleural effusions on CT chest 2/27/2020.     Acute pulmonary edema (CMS/HCC)       Plan        Continue with current antimicrobials.  We will monitor the patient's respiratory status.  Chest x-ray has been ordered for tomorrow morning.  Mobilize as tolerated with physical therapy.  Speech evaluation today for dysphasia.  Hopefully she will be able to pass and start taking oral intake.  Otherwise we will continue on with the tube feedings for now.  She will remain in the intensive care unit for close monitoring today.    Plan of care and goals reviewed with mulitdisciplinary/antibiotic stewardship team during rounds.   I discussed the patient's findings and my recommendations with patient, family and nursing staff     Lorenzo Anderson MD, [facility]  Pulmonology and Critical Care Medicine       Electronically signed by Lorenzo Anderson MD at 03/04/20 1106          Consult Notes (most recent note)      Modesto Whittaker MD at 02/26/20 1227      Consult Orders    1. Inpatient Plastic Surgery Consult [[id number]] ordered by Edgar Machado  "PEDRO CALDERON at 20 0805                  Plastic Surgery Consult      Admission Date:  2020  LOS:  3  Patient Care Team:  Meagan Barlow APRN as PCP - General (Family Medicine)  Tyson Donaldson DO as Consulting Physician (Cardiology)    Patient Name:  Saskia Groves  :  1970  MRN:  [id number]    Date:  2020      Chief Complaint:  Lt groin infection    History of Present Illness:  49 y.o. female    Mrs Saskia Groves is a 49yoF smoker hx DM, HTN, PVD, recently s/p 1/15/20 Rt femoral endarterectomy, Lt femoral endarterectomy femoral-femoral bypass with 8 mm [place]-Slade graft with an Infected femoral-femoral bypass graft. Patient presented with a Lt groin wound infection.   On 1/15/20 she underwent femoral endardectomy, and femorofemoral  Bypass with gortex graft.  She has been having fevers 100 degrees since . She was seen last week by her PCP and given Bactrim but she couldn't tolerate it and Ceftin was called in. Lt groin continued to remain swollen and warm. She had a venous duplex to rule out DVT and then developed a \"knot\" of her left groin. She presented to the ED at [facility], was given Vancomycin and Merrem and transferred to [facility]. Tmax of 100.5 degrees without leukocytosis, normal lactate and PCT. She is currently on Vancomycin and Merrem and we were consulted for evaluation and treatment.   Labs at [facility] with WBC of 10.4, normal lactate, PCT.  She was tachycardic with tmax of 102.3.  She had fevers at home documented to over 103 degrees.  20 : She spontaneously drained a copious amount of slightly cloudy straw-colored fluid from her left groin yesterday and has continued to drain.  She complains of cough \"other than that I feel great\".  No n/v//d.  Left groin less tender.   20:  Graft to be removed on Friday.   She remains afebrile. She doesn't \"feel very good today\".  Family at bedside.   CT scan  20 Fluid collection in the left inguinal region at the left " femoral-femoral bypass anastomosis, measuring 5.3 x 4.9 x 6.5 cm. Inflammatory stranding noted throughout the subcutaneous soft tissues of the left inguinal region and proximal left thighsurrounding the fluid collection. Findings are concerning for abscess versus infected hematoma given the provided clinical history.  CT surgery plans for debridement and consulted plastic surgery for wound/graft coverage.           History:   Past Medical History:   Diagnosis Date   • Anxiety    • Arthritis    • Asthma    • Carotid artery stenosis    • Chronic bronchitis (CMS/HCC)    • COPD (chronic obstructive pulmonary disease) (CMS/HCC)    • Coronary artery disease     2 vessels with 50% blockage.  Sees Dr. Donaldson   • Depression    • Diabetes mellitus (CMS/HCC)    • Disease of thyroid gland    • Dyslipidemia    • GERD (gastroesophageal reflux disease)    • Goiter    • Goiter    • Hiatal hernia    • Hyperlipidemia    • Hypertension    • Infectious viral hepatitis    • Lower back pain    • Migraine    • Sleep apnea     can't tolerate the cpap mask   • Thyroid nodule      Past Surgical History:   Procedure Laterality Date   • ANTERIOR CERVICAL DISCECTOMY W/ FUSION     • BACK SURGERY      lumbar   •  SECTION  19940    x 2    • CHOLECYSTECTOMY     • COLONOSCOPY     • ENDOSCOPY     • FEMORAL ENDARTERECTOMY Bilateral 1/15/2020    Procedure: FEMORAL ENDARTERECTOMY BILATERAL;  Surgeon: Deandre Oseguera MD;  Location:  [facility];  Service: Vascular   • FEMORAL FEMORAL BYPASS Right 1/15/2020    Procedure: FEMORAL FEMORAL BYPASS;  Surgeon: Deandre Oseguera MD;  Location:  [facility];  Service: Vascular   • HYSTERECTOMY     • KNEE SURGERY Left    • WRIST SURGERY Left      Family History   Problem Relation Age of Onset   • Diabetes Mother    • Hypertension Mother    • Arthritis Mother    • Hyperlipidemia Mother    • Migraines Mother    • Thyroid disease Mother    • Hypertension Father    • Lung cancer Father    • Cancer Father     • Stroke Other    • Migraines Maternal Aunt    • Thyroid disease Maternal Aunt    • Heart attack Maternal Aunt    • Osteoporosis Maternal Aunt    • Cancer Maternal Uncle    • Heart attack Maternal Uncle    • Stroke Maternal Uncle    • Hyperlipidemia Maternal Grandmother    • Cancer Maternal Grandmother    • Obesity Maternal Grandmother    • Thyroid disease Daughter    • Obesity Daughter      Social History     Socioeconomic History   • Marital status:      Spouse name: Not on file   • Number of children: 2   • Years of education: Not on file   • Highest education level: Not on file   Occupational History   • 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     Employer: DISABLED     Comment: Back and Leg Pain   Tobacco Use   • Smoking status: Current Every Day Smoker     Packs/day: 1.00     Years: 35.00     Pack years: 35.00     Types: Cigarettes   • Smokeless tobacco: Never Used   Substance and Sexual Activity   • Alcohol use: Not Currently   • Drug use: No   • Sexual activity: Yes     Partners: Male     Comment:    Social History Narrative    Lives in [facility]     Allergies   Allergen Reactions   • Levaquin [Levofloxacin] Nausea And Vomiting   • Penicillins Nausea And Vomiting   • Codeine Nausea Only   • Flagyl [Metronidazole] Nausea And Vomiting       Medication:    Current Facility-Administered Medications:   •  [START ON 2/27/2020] ! Nursing: Vancomycin trough due on 2/27 at 1130. Please hold 2/27 12noon dose until notified by Pharmacy., , Does not apply, Once, Therese Hollis, [facility]  •  acetaminophen (TYLENOL) tablet 650 mg, 650 mg, Oral, Q4H PRN, 325 mg at 02/24/20 1313 **OR** acetaminophen (TYLENOL) 160 MG/5ML solution 650 mg, 650 mg, Oral, Q4H PRN **OR** acetaminophen (TYLENOL) suppository 650 mg, 650 mg, Rectal, Q4H PRN, Rose Marie Nuñez, APRN  •  albuterol (PROVENTIL) nebulizer solution 0.083% 2.5 mg/3mL, 2.5 mg, Nebulization, Q6H PRN, Rose Marie Nuñez, APRN  •  amitriptyline (ELAVIL) tablet  10 mg, 10 mg, Oral, Nightly, Rose Marie Nuñez APRN, 10 mg at 02/25/20 2122  •  aspirin EC tablet 81 mg, 81 mg, Oral, Daily, Rose Marie Nuñez APRN, 81 mg at 02/26/20 0902  •  atorvastatin (LIPITOR) tablet 10 mg, 10 mg, Oral, Daily, Rose Marie Nuñez, APRN, 10 mg at 02/26/20 0903  •  benzonatate (TESSALON) capsule 100 mg, 100 mg, Oral, TID PRN, Rose Marie Nuñez APRN, 100 mg at 02/25/20 1454  •  budesonide-formoterol (SYMBICORT) 160-4.5 MCG/ACT inhaler 2 puff, 2 puff, Inhalation, BID - RT, Rose Marie Nuñez APRN, 2 puff at 02/24/20 2006  •  clopidogrel (PLAVIX) tablet 75 mg, 75 mg, Oral, Daily, Rose Marie Nuñez APRN, 75 mg at 02/26/20 0902  •  dextrose (D50W) 25 g/ 50mL Intravenous Solution 25 g, 25 g, Intravenous, Q15 Min PRN, Rose Marie Nuñez APRN  •  dextrose (GLUTOSE) oral gel 15 g, 15 g, Oral, Q15 Min PRN, Rose Marie Nuñez APRN  •  enoxaparin (LOVENOX) syringe 40 mg, 40 mg, Subcutaneous, Q24H, Rose Marie Nuñez APRN, 40 mg at 02/26/20 0535  •  famotidine (PEPCID) tablet 20 mg, 20 mg, Oral, BID AC, Rose Marie Nuñez APRN, 20 mg at 02/26/20 0902  •  fluticasone (FLONASE) 50 MCG/ACT nasal spray 2 spray, 2 spray, Nasal, Daily, Rose Marie Nuñez APRN, 2 spray at 02/26/20 0902  •  glucagon (human recombinant) (GLUCAGEN DIAGNOSTIC) injection 1 mg, 1 mg, Subcutaneous, Q15 Min PRN, Rose Marie Nuñez APRN  •  guaiFENesin-dextromethorphan (ROBITUSSIN DM) 100-10 MG/5ML syrup 5 mL, 5 mL, Oral, Q6H PRN, Cinda Vicente MD, 5 mL at 02/26/20 0902  •  HYDROcodone-acetaminophen (NORCO)  MG per tablet 1 tablet, 1 tablet, Oral, Q8H PRN, Rose Marie Nuñez APRN, 1 tablet at 02/25/20 1419  •  hydrOXYzine (ATARAX) tablet 25 mg, 25 mg, Oral, Nightly, Rose Marie Nuñez APRN, 25 mg at 02/25/20 2122  •  insulin lispro (humaLOG) injection 0-14 Units, 0-14 Units, Subcutaneous, 4x Daily With Meals & Nightly, Rose Marie Nuñez APRN, 3 Units at 02/26/20 0901  •  ipratropium  (ATROVENT) nebulizer solution 0.5 mg, 0.5 mg, Nebulization, 4x Daily - RT, Rose Marie Nuñez, APRN, 0.5 mg at 02/25/20 1924  •  isosorbide mononitrate (IMDUR) 24 hr tablet 30 mg, 30 mg, Oral, Daily, Rose Marie Nuñez, APRN, 30 mg at 02/26/20 0902  •  ketotifen (ZADITOR) 0.025 % ophthalmic solution 1 drop, 1 drop, Both Eyes, BID, Rose Marie Nuñez, APRN, 1 drop at 02/26/20 0901  •  lactulose (CHRONULAC) 10 GM/15ML solution 10 g, 10 g, Oral, Daily, Rose Marie Nuñez APRN, 10 g at 02/26/20 0903  •  losartan (COZAAR) tablet 25 mg, 25 mg, Oral, Daily, Rose Marie Nuñez, APRN, 25 mg at 02/26/20 0906  •  metoprolol tartrate (LOPRESSOR) tablet 25 mg, 25 mg, Oral, Q12H, Cinda Vicente MD, 25 mg at 02/26/20 0902  •  montelukast (SINGULAIR) tablet 10 mg, 10 mg, Oral, Nightly, Rose Marie Nuñez, APRN, 10 mg at 02/25/20 2122  •  ondansetron (ZOFRAN) tablet 4 mg, 4 mg, Oral, Q6H PRN **OR** ondansetron (ZOFRAN) injection 4 mg, 4 mg, Intravenous, Q6H PRN, Rose Marie Nuñez, APRN  •  pantoprazole (PROTONIX) EC tablet 40 mg, 40 mg, Oral, Daily, Rose Marie Nuñez, APRN, 40 mg at 02/26/20 0535  •  Pharmacy to dose vancomycin, , Does not apply, Continuous PRN, Day, Michelle FU MD  •  pregabalin (LYRICA) capsule 150 mg, 150 mg, Oral, TID, Rose Marie Nuñez, APRN, 150 mg at 02/26/20 0902  •  rOPINIRole (REQUIP) tablet 4 mg, 4 mg, Oral, Nightly, Rose Marie Nuñez APRN, 4 mg at 02/25/20 2123  •  sodium chloride 0.9 % flush 10 mL, 10 mL, Intravenous, Q12H, Rose Marie Nuñez APRN, 10 mL at 02/25/20 0929  •  sodium chloride 0.9 % flush 10 mL, 10 mL, Intravenous, PRN, Rose Marie Nuñez, APRN  •  tiZANidine (ZANAFLEX) tablet 4 mg, 4 mg, Oral, Q8H PRN, Rose Marie Nuñez APRN, 4 mg at 02/25/20 2123  •  vancomycin in dextrose 5% 150 mL (VANCOCIN) IVPB 750 mg, 750 mg, Intravenous, Q12H, Therese Hollis, [facility], 750 mg at 02/25/20 2342    Antibiotics:  Anti-Infectives (From admission, onward)    Ordered      Dose/Rate Route Frequency Start Stop    02/25/20 1238  vancomycin in dextrose 5% 150 mL (VANCOCIN) IVPB 750 mg  Review   Ordering Provider:  Therese Hollis RPH    750 mg  over 60 Minutes Intravenous Every 12 Hours Scheduled 02/25/20 1330 03/01/20 1159    02/24/20 0831  cefepime (MAXIPIME) 2 g/100 mL 0.9% NS (mbp)     Ordering Provider:  Ranjana Easley APRN    2 g  200 mL/hr over 30 Minutes Intravenous Once 02/24/20 0930 02/24/20 1157    02/24/20 0203  vancomycin 500 mg/100 mL 0.9% NS IVPB (mbp)     Ordering Provider:  Ced Kenny RPH    500 mg  100 mL/hr over 60 Minutes Intravenous Once 02/24/20 0300 02/24/20 0511    02/24/20 0212  Pharmacy to dose vancomycin  Review   Ordering Provider:  Michelle Shipman MD     Does not apply Continuous PRN 02/24/20 0212 03/01/20 2339          Allergies   Allergen Reactions   • Levaquin [Levofloxacin] Nausea And Vomiting   • Penicillins Nausea And Vomiting   • Codeine Nausea Only   • Flagyl [Metronidazole] Nausea And Vomiting          Review of Systems:  Constitutional-- No Fever, chills or sweats.  Appetite okay, and no malaise. No fatigue.  HEENT-- No new vision, hearing or throat complaints.  No epistaxis or oral sores.  Denies odynophagia or dysphagia. No headache, photophobia or neck stiffness.  CV-- No chest pain, palpitation or syncope  Resp-- No SOB/cough/Hemoptysis  GI- No nausea, vomiting, or diarrhea.  No hematochezia, melena, or hematemesis. Denies jaundice or chronic liver disease.  -- No dysuria, hematuria, or flank pain.  Denies hesitancy, urgency or flank pain.  Lymph- no swollen lymph nodes in neck/axilla or groin.   Heme- No active bruising or bleeding; no Hx of DVT or PE.  MS-- no swelling or pain in the bones or joints of arms/legs.  No new back pain.  He does not verbalize pain per sister at bedside.  Neuro-- No acute focal weakness or numbness in the arms or legs.  No seizures.  Skin--No rashes.       Physical Exam:   Vital Signs:  Temp (24hrs),  Av.6 °F (37 °C), Min:98.4 °F (36.9 °C), Max:98.8 °F (37.1 °C)    Temp  Min: 98.4 °F (36.9 °C)  Max: 98.8 °F (37.1 °C)  BP  Min: 133/86  Max: 177/73  Pulse  Min: 106  Max: 126  Resp  Min: 16  Max: 20  SpO2  Min: 90 %  Max: 98 %    GENERAL: Awake and alert, in no acute distress.   HEENT: Normocephalic, atraumatic.  EOMI. No conjunctival injection. No icterus.   NECK: Supple without nuchal rigidity. No mass.  LYMPH: No cervical, axillary or inguinal lymphadenopathy.  HEART: RRR;   LUNGS: Clear to auscultation bilaterally, Normal respiratory effort. Nonlabored. No dullness.  ABDOMEN: Soft, nontender, nondistended. No rebound or guarding. NO mass or HSM.  EXT:  No cyanosis, clubbing or edema. No cord.  SKIN: Warm and dry without cutaneous eruptions on Inspection/palpation.    NEURO: Oriented to PPT. No focal deficits on motor/sensory exam at arms/legs.  PSYCHIATRIC: Normal insight and judgement. Cooperative with PE    Lt Groin: no erythema, no pus, clear drainage to palpation small area of break down, tender to deep palpation. No other incision, LLE perfused.     Laboratory Data:  Results from last 7 days   Lab Units 20  0409 20  0333 20  0058   WBC 10*3/mm3 10.10 9.30 9.11   HEMOGLOBIN g/dL 8.8* 8.6* 9.1*   HEMATOCRIT % 26.5* 27.2* 27.1*   PLATELETS 10*3/mm3 305 212 176     Results from last 7 days   Lab Units 20  0409   SODIUM mmol/L 138   POTASSIUM mmol/L 3.8   CHLORIDE mmol/L 103   CO2 mmol/L 24.0   BUN mg/dL 12   CREATININE mg/dL 0.56*   GLUCOSE mg/dL 126*   CALCIUM mg/dL 7.9*     Results from last 7 days   Lab Units 20  0058   ALK PHOS U/L 154*   BILIRUBIN mg/dL 0.2   ALT (SGPT) U/L 25   AST (SGOT) U/L 61*             Results from last 7 days   Lab Units 20  0058   LACTATE mmol/L 0.7         Results from last 7 days   Lab Units 20  1113 20  0057   VANCOMYCIN TR mcg/mL 21.10*  --    VANCOMYCIN RM mcg/mL  --  12.30     Estimated Creatinine Clearance: 114.6 mL/min  (A) (by C-G formula based on SCr of 0.56 mg/dL (L)).    Microbiology:  Blood Culture   Date Value Ref Range Status   02/24/2020 No growth at 2 days  Preliminary   02/24/2020 No growth at 2 days  Preliminary     No results found for: BCIDPCR, CXREFLEX, CSFCX, CULTURETIS  No results found for: CULTURES, HSVCX, URCX  No results found for: EYECULTURE, GCCX, LABHSV  No results found for: LEGIONELLA, MRSACX, MUMPSCX, MYCOPLASCX  No results found for: NOCARDIACX, STOOLCX  No results found for: THROATCX, UNSTIMCULT, URINECX, CULTURE, VZVCULTUR  Wound Culture   Date Value Ref Range Status   02/24/2020 Light growth (2+) Staphylococcus aureus, MRSA (A)  Preliminary     Comment:       Methicillin resistant Staphylococcus aureus, Patient may be an isolation risk.             Assessment: 49yoF smoker hx DM, HTN, PVD, recently s/p 1/15/20 Rt femoral endarterectomy, Lt femoral endarterectomy femoral-femoral bypass with 8 mm [place]-Slade graft with an Infected femoral-femoral bypass graft. Will need debridement and graft coverage    Plan:  - reviewed CT scan  - discussed case with CT surgery Dr. Oseguera  - discussed case with Dr. Gordon  - CT surgery plan for operative debridement of Lt groin possible graft management and will need Lt groin graft coverage. Plastic surgery will be available for coverage. Plan for OR this Friday 2/28/20. Obtain further cultures in OR.   - patient needs nutrition consult to maximize nutrition   - recommend some form of diabetic boost or ensure  - glucose control per primary (A1c of 12.1 on 1/14/2020)   - abx per ID  - patient will need Type and screen tomorrow to 2 units crossmatched (ordered)  - hold am lovenox for Friday morning if ok per CT surgery  - recommend dry dressing changes TID (ordered)   - will continue to follow           Modesto Whittaker MD  02/26/20  12:27 PM         Electronically signed by Modesto Whittaker MD at 02/26/20 7103          Nutrition Notes (most recent note)      Lainey Villa  ERNE[address] RD at 20 0837                            Clinical Nutrition     Nutrition Support Assessment  Reason for Visit:   MDR, Follow-up protocol, EN      Patient Name: Saskia Groves  YOB: 1970  MRN: [id number]  Date of Encounter: 20 8:37 AM  Admission date: 2020        Nutrition Assessment   Assessment       Hospital Problem List    Acute hypoxemic respiratory failure (CMS/HCC)    PVD (peripheral vascular disease) (CMS/HCC)    Current smoker    COPD (chronic obstructive pulmonary disease) (CMS/HCC)    Coronary artery disease    Hypertension    Hyperlipidemia    Diabetes mellitus (CMS/HCC)    Sepsis (CMS/HCC)    Pleural effusion - bilateral mod/large pleural effusions on CT chest 2020.     Acute pulmonary edema (CMS/HCC)    () s/p removal of infected femorofemoral bypass graft, placement of bilateral pleural tubes for drainage of pleural effusions and muscle flap/reconstruction per plastic surgery of the infected groin sites  Incisional wound vac in place x 2    Extubated 3-20      PMH: She  has a past medical history of Anxiety, Arthritis, Asthma, Carotid artery stenosis, Chronic bronchitis (CMS/HCC), COPD (chronic obstructive pulmonary disease) (CMS/HCC), Coronary artery disease, Depression, Diabetes mellitus (CMS/HCC), Dyslipidemia, GERD (gastroesophageal reflux disease), Hiatal hernia, Hyperlipidemia, Hypertension, Infectious viral hepatitis, Lower back pain, Migraine, Multinodular goiter, S/P thyroid biopsy, Sleep apnea, and Subclinical hyperthyroidism.   PSxH: She  has a past surgical history that includes Cholecystectomy; Hysterectomy; Knee surgery (Left); Wrist surgery (Left); Anterior cervical discectomy w/ fusion; Colonoscopy; Esophagogastroduodenoscopy; Back surgery; Femoral Endarterectomy (Bilateral, 1/15/2020); Femoral Femoral Bypass (Right, 1/15/2020);  section (); Femoral Artery Cutdown (Bilateral, 2020); and Leg Excision Lesion/Cyst (Left,  2/28/2020).         Reported/Observed/Food/Nutrition Related History:     Pt sitting up in chair, much more appropriate today, in good spirits    Per RN: tolerating TF      Labs reviewed     Results from last 7 days   Lab Units 03/04/20  0341 03/03/20  0338 03/02/20  0348  03/01/20  0404 02/29/20  1557 02/29/20  0411 02/28/20  1818   GLUCOSE mg/dL 211* 252* 184*  --  148* 93 168* 247*   BUN mg/dL 41* 38* 29*  --  28* 25* 25* 23*   CREATININE mg/dL 0.56* 0.61 0.61  --  0.88 0.84 0.87 0.88   SODIUM mmol/L 143 142 141  --  140 142 139 140   CHLORIDE mmol/L 106 105 104  --  107 107 104 103   POTASSIUM mmol/L 4.1 4.3 4.2   < > 4.2 4.1 3.9 4.4   PHOSPHORUS mg/dL  --   --   --   --  2.7 3.0 4.5  --    MAGNESIUM mg/dL  --  1.8  --   --  1.8 1.8 2.0  --    ALT (SGPT) U/L  --   --   --   --   --  35* 36* 28    < > = values in this interval not displayed.     Results from last 7 days   Lab Units 02/29/20  1604 02/29/20  1557 02/29/20  0411 02/28/20  1818   ALBUMIN g/dL  --  2.60* 2.70* 3.20*   PREALBUMIN mg/dL  --  12.8*  --   --    CRP mg/dL 8.05*  --   --   --    CHOLESTEROL mg/dL  --  97  --   --    TRIGLYCERIDES mg/dL  --  233*  --   --        Results from last 7 days   Lab Units 03/04/20  0004 03/03/20  1719 03/03/20  1217 03/03/20  0538 03/02/20  2329 03/02/20  1747   GLUCOSE mg/dL 172* 135* 270* 281* 219* 218*     Lab Results   Lab Value Date/Time    HGBA1C 12.10 (H) 01/14/2020 1415    HGBA1C 12.8 08/22/2016 1000    HGBA1C 11.4 (H) 04/21/2016 1015         Medications reviewed   Pertinent:abx, pepcid, insulin, probiotic, lactulose      Intake/Ouptut 24 hrs (7:00AM - 6:59 AM)     Intake & Output (last day)       03/03 0701 - 03/04 0700 03/04 0701 - 03/05 0700    I.V. (mL/kg) 118 (2)     Other 238     NG/GT 1089     IV Piggyback 300     Total Intake(mL/kg) 1745 (28.9)     Urine (mL/kg/hr) 1050 (0.7)     Drains 85     Stool 0     Chest Tube 150     Wound 0     Total Output 1285     Net +460           Stool Unmeasured  Occurrence 4 x                GI: wnl    SKIN:  surgical sites, wound vac      Current Nutrition Prescription     PO: NPO Diet  Orders Placed This Encounter      Diet, Tube Feeding Tube Feeding Formula: Peptamen AF (Peptide Based, Critical Care, ALI)  goal rate @60ml/hr, free water @25ml/hr    Intake: 1089ml ( combination of 2 EN products)    EN at goal volume will provide: 1200ml, 1440kcal, 91g protein, 1474ml free water including flush      Nutrition Diagnosis     3/2/20, 3-4  Problem Altered nutrition related laboratory values   Etiology ?lifestyle, medication adherence  and dietary choices   Signs/Symptoms HgbA1C 12.1%    Sttus: needs Ed when appropriate    2-28-20, 3-4  Problem Inadequate oral intake   Etiology Per Clinical Status/Resp status    Signs/Symptoms Dysphagia     Status: EN started 2-29 2-28-20, 3-4  Problem Increased nutrient needs: protein   Etiology Poor Skin Integrity   Signs/Symptoms L. Groin wound infection, wound vac    Status: ongoing     Nutrition Intervention   1.  Follow treatment progress    Maintain EN    Await FEES today    Goal:   General: Nutrition support treatment    EN/PN: Maintain EN    Additional goals: DM education when pt appropriate      Monitoring/Evaluation:   Per protocol, I&O, Pertinent labs, EN delivery/tolerance, Skin status, GI status, Symptoms, Swallow function      Lainey Villa RD, [facility]  Time Spent: 30min        Electronically signed by Lainey Villa RD at 03/04/20 9052

## 2020-03-04 NOTE — MBS/VFSS/FEES
Acute Care - Speech Language Pathology   Swallow Initial Evaluation [facility]   Fiberoptic Endoscopic Evaluation of Swallowing (FEES)     Patient Name: Saskia Groves  : 1970  MRN: [id number]  Today's Date: 3/4/2020               Admit Date: 2020    Visit Dx:     ICD-10-CM ICD-9-CM   1. PVD (peripheral vascular disease) (CMS/HCC) I73.9 443.9   2. Pleural effusion J90 511.9   3. S/P femoral-femoral bypass surgery Z95.828 V45.89   4. Pharyngeal dysphagia R13.13 787.23     Patient Active Problem List   Diagnosis   • PVD (peripheral vascular disease) (CMS/HCC)   • Lumbar radiculopathy   • Paresthesia   • Causalgia of lower limb   • Hiatal hernia   • Current smoker   • Bilateral carotid artery stenosis   • COPD (chronic obstructive pulmonary disease) (CMS/HCC)   • Coronary artery disease   • Hypertension   • Hyperlipidemia   • Diabetes mellitus (CMS/HCC)   • Sepsis (CMS/HCC)   • Pleural effusion - bilateral mod/large pleural effusions on CT chest 2020.    • Acute pulmonary edema (CMS/HCC)   • Acute hypoxemic respiratory failure (CMS/HCC)     Past Medical History:   Diagnosis Date   • Anxiety    • Arthritis    • Asthma    • Carotid artery stenosis    • Chronic bronchitis (CMS/HCC)    • COPD (chronic obstructive pulmonary disease) (CMS/HCC)    • Coronary artery disease     2 vessels with 50% blockage.  Sees Dr. Donaldson   • Depression    • Diabetes mellitus (CMS/HCC)    • Dyslipidemia    • GERD (gastroesophageal reflux disease)    • Hiatal hernia    • Hyperlipidemia    • Hypertension    • Infectious viral hepatitis    • Lower back pain    • Migraine    • Multinodular goiter    • S/P thyroid biopsy     2008, 2013, 2015, and 07/15/2019 at [facility], right lobe nodules - all cytologies were benign   • Sleep apnea     can't tolerate the cpap mask   • Subclinical hyperthyroidism      Past Surgical History:   Procedure Laterality Date   • ANTERIOR CERVICAL DISCECTOMY W/ FUSION     • BACK SURGERY       lumbar   •  SECTION  19940    x 2    • CHOLECYSTECTOMY     • COLONOSCOPY     • ENDOSCOPY     • FEMORAL ARTERY CUTDOWN Bilateral 2020    Procedure: FEMORAL ARTERY CUTDOWN WITH REMOVAL OF FEMORAL FEMORAL BYPASS GRAFT BILATERAL;  Surgeon: Deandre Oseguera MD;  Location:  [facility];  Service: Vascular;  Laterality: Bilateral;   • FEMORAL ENDARTERECTOMY Bilateral 1/15/2020    Procedure: FEMORAL ENDARTERECTOMY BILATERAL;  Surgeon: Deandre Oseguera MD;  Location:  [facility];  Service: Vascular   • FEMORAL FEMORAL BYPASS Right 1/15/2020    Procedure: FEMORAL FEMORAL BYPASS;  Surgeon: Deandre Oseguera MD;  Location:  [facility];  Service: Vascular   • HYSTERECTOMY     • KNEE SURGERY Left    • LEG EXCISION LESION/CYST Left 2020    Procedure: GROIN MUSCLE FLAP BILATERAL;  Surgeon: Modesto Whittaker MD;  Location:  [facility];  Service: Plastics;  Laterality: Left;   • WRIST SURGERY Left         SWALLOW EVALUATION (last 72 hours)      SLP Adult Swallow Evaluation     Row Name 20 1030 20 1400                Rehab Evaluation    Document Type  evaluation  -AC  evaluation  -AC       Subjective Information  no complaints  -AC  no complaints  -AC       Patient Observations  alert;cooperative  -AC  alert;cooperative  -AC       Patient/Family Observations  Pt's dtr present.  -AC  Dtr present.  -AC       Patient Effort  good  -AC  good  -AC          General Information    Patient Profile Reviewed  yes  -AC  yes  -AC       Pertinent History Of Current Problem  Please see initial eval  -AC  48yo re-adm w/ sepsis r/t recent femorofemoral bypass graft 1/15/20. Removal of L bypass graft & placement of muscle flap 20. Respiratory failure, acute hypoxia. Intubated -3/2. Pt failed RN post-extubation swallow screen. Hx COPD, anterior cervical disc surgery yrs ago.  -AC       Current Method of Nutrition  NPO;nasogastric feedings  -AC  NPO;nasogastric feedings  -AC       Precautions/Limitations, Vision  --   WFL;for purposes of eval  -AC       Precautions/Limitations, Hearing  --  WFL;for purposes of eval  -AC       Prior Level of Function-Swallowing  --  no diet consistency restrictions  -AC       Plans/Goals Discussed with  patient and family;agreed upon  -AC  patient and family;agreed upon  -AC       Barriers to Rehab  medically complex  -AC  medically complex  -AC       Patient's Goals for Discharge  return to PO diet  -AC  return to PO diet  -AC       Family Goals for Discharge  patient able to return to PO diet;patient able to eat/drink without coughing/choking  -AC  patient able to return to PO diet;patient able to eat/drink without coughing/choking  -AC          Pain Scale: FACES Pre/Post-Treatment    Pain: FACES Scale, Pretreatment  0-->no hurt  -AC  0-->no hurt  -AC       Pain: FACES Scale, Post-Treatment  0-->no hurt  -AC  0-->no hurt  -AC          Oral Motor and Function    Oral Lesions or Structural Abnormalities and/or variants  --  Creamy white coating on surface of tongue.  -AC       Dentition Assessment  edentulous, does not have dentures  -AC  edentulous, does not have dentures  -AC       Secretion Management  --  WNL/WFL  -AC       Mucosal Quality  --  dry  -AC       Volitional Swallow  --  weak  -AC       Volitional Cough  --  WFL  -AC          Oral Musculature and Cranial Nerve Assessment    Oral Motor General Assessment  --  WFL  -AC          General Eating/Swallowing Observations    Respiratory Support Currently in Use  room air  -AC  nasal cannula  -AC       Eating/Swallowing Skills  fed by staff/caregiver  -AC  fed by SLP  -AC       Positioning During Eating  upright 90 degree;upright in chair  -AC  upright 90 degree;upright in bed;other (see comments) as ok'd by MD  -AC       Utensils Used  --  spoon;cup;straw  -AC       Consistencies Trialed  --  thin liquids;pudding thick  -AC          Clinical Swallow Eval    Oral Prep Phase  --  WFL  -AC       Oral Transit  --  WFL  -AC       Oral Residue   --  WFL  -AC       Pharyngeal Phase  --  suspected pharyngeal impairment  -AC          Pharyngeal Phase Concerns    Pharyngeal Phase Concerns  --  multiple swallows;cough  -AC       Multiple Swallows  --  pudding  -AC       Cough  --  thin  -AC       Pharyngeal Phase Concerns, Comment  --  Oral phase seemingly WFL for consistencies trialed. DNT solid 2' aspiration risk. Delayed cough x1 after multiple drinks of thin liquid. Multiple swallows w/ pudding. No overt clinical s/sxs aspiration w/ ice chips.  -AC          Fiberoptic Endoscopic Evaluation of Swallowing (FEES)    Risks/Benefits Reviewed  risks/benefits explained;patient;family;agreed to eval  -AC  --       Nasal Entry  left:  -AC  --       Special Considerations  Scope #338  -AC  --          Anatomy and Physiology    Anatomic Considerations  edema;erythema;vocal folds;arytenoids;other (see comments) > on pt's R artyenoid  -AC  --       Velopharyngeal  CNA;other (see comments) copious mucous  -AC  --       Base of Tongue  symmetrical;range reduced  -AC  --       Epiglottis  edematous  -AC  --       Laryngeal Function Breathing  symmetrical  -AC  --       Laryngeal Function Phonation  symmetrical  -AC  --       Laryngeal Function to Breath Hold  TVF/FVF/Arytenoid  -AC  --       Secretion Rating Scale (Silver et al. 1996)  2- secretions initially outside the vestibule but later entered the vestibule  -AC  --       Secretion Description  thin;clear;white  -AC  --       Ice Chips  elicited swallow;partially cleared secretions  -AC  --       Spontaneous Swallow  frequency reduced  -AC  --       Sensory  reduced sensation  -AC  --       Utensils Used  Spoon  -AC  --       Consistencies Trialed  thin liquids;pudding/puree  -AC  --          FEES Interpretation    Oral Phase  WFL;solids not tested  -AC  --       Oral Phase, Comment  WFL for consistencies trialed. DNT solid or liquids via cup/straw 2' aspiration risk.  -AC  --          Initiation of Pharyngeal Swallow     Initiation of Pharyngeal Swallow  bolus in pyriform sinuses  -AC  --       Pharyngeal Phase  impaired pharyngeal phase of swallowing  -AC  --       Penetration During the Swallow  thin liquids;pudding/puree;secondary to delayed swallow initiation or mistiming;secondary to reduced laryngeal elevation;secondary to reduced vestibular closure  -AC  --       Penetration After the Swallow  thin liquids;pudding/puree;secondary to residue;in valleculae;in pyriform sinuses  -AC  --       Aspiration After the Swallow  pudding/puree;secondary to residue;in valleculae;in pyriform sinuses  -AC  --       Response to Aspiration  cough  -AC  --       Rosenbek's Scale  thin:;5-->Level 5;pudding/puree:;8-->Level 8  -AC  --       Residue  all consistencies tested;diffuse within pharynx;secondary to reduced base of tongue retraction;secondary to reduced posterior pharyngeal wall stripping;secondary to reduced laryngeal elevation;secondary to reduced hyolaryngeal excursion;other (see comments) moderate amount  -AC  --       Response to Residue  unable to clear residue  -AC  --       Attempted Compensatory Maneuvers  chin tuck;effortful (hard swallow)  -AC  --       Response to Attempted Compensatory Maneuvers  did not prevent penetration;did not reduce residue  -AC  --       FEES Summary  Laryngeal edema affecting swallowing to some degree (> on R where large-bore NG appeared to be resting); however, pt also exhibited diffuse pharyngeal weakness. There was deep penetration of thin liquids & pudding during the swallow. Penetrated material did not spontaneously expel laryngeal vestibule and cued cough was weak. Pt exhibited continued penetration of all consistencies trialed after the swallow as pharyngeal residue spilled into laryngeal vestibule w/ subsequent swallows. Eventual aspiration deemed inevitable. Attempted chin tuck as compensation; however, resulted in large amount of aspiration of pudding after the swallow. Pt coughed in  response.   -AC  --          Clinical Impression    SLP Swallowing Diagnosis  mod-severe;pharyngeal dysfunction  -AC  other (see comments) r/o pharyngeal dysphagia  -AC       Functional Impact  risk of aspiration/pneumonia  -AC  risk of aspiration/pneumonia  -AC       Rehab Potential/Prognosis, Swallowing  good, to achieve stated therapy goals  -AC  good, to achieve stated therapy goals  -AC       Swallow Criteria for Skilled Therapeutic Interventions Met  demonstrates skilled criteria  -AC  demonstrates skilled criteria  -AC          Recommendations    Therapy Frequency (Swallow)  5 days per week  -AC  --       Predicted Duration Therapy Intervention (Days)  until discharge  -AC  --       SLP Diet Recommendation  NPO;temporary alternate methods of nutrition/hydration;other (see comments) may consider replacing NG w/ smaller keofeed given edema  -AC  NPO;temporary alternate methods of nutrition/hydration;other (see comments) 4 ice chip/hr after oral care w/ supervision; d/c if s/s asp  -AC       Recommended Diagnostics  --  FEES  -AC       Recommended Precautions and Strategies  other (see comments) 4 ice chips/hr after oral care & w/ supervision-as tolerated  -AC  --       SLP Rec. for Method of Medication Administration  meds via alternate route  -AC  meds via alternate route  -AC       Anticipated Dischage Disposition  anticipate therapy at next level of care  -AC  anticipate therapy at next level of care  -AC         User Key  (r) = Recorded By, (t) = Taken By, (c) = Cosigned By    Initials Name Effective Dates    Heena Mcclain MS CCC-SLP 07/27/17 -           EDUCATION  The patient has been educated in the following areas:   Dysphagia (Swallowing Impairment) Oral Care/Hydration NPO rationale.    SLP Recommendation and Plan  SLP Swallowing Diagnosis: mod-severe, pharyngeal dysfunction  SLP Diet Recommendation: NPO, temporary alternate methods of nutrition/hydration, other (see comments)(may consider  replacing NG w/ smaller keofeed given edema)  Recommended Precautions and Strategies: other (see comments)(4 ice chips/hr after oral care & w/ supervision-as tolerated)  SLP Rec. for Method of Medication Administration: meds via alternate route         Swallow Criteria for Skilled Therapeutic Interventions Met: demonstrates skilled criteria  Anticipated Dischage Disposition: anticipate therapy at next level of care  Rehab Potential/Prognosis, Swallowing: good, to achieve stated therapy goals  Therapy Frequency (Swallow): 5 days per week  Predicted Duration Therapy Intervention (Days): until discharge       Plan of Care Reviewed With: patient, daughter    SLP GOALS     Row Name 03/04/20 1030             Oral Nutrition/Hydration Goal 1 (SLP)    Oral Nutrition/Hydration Goal 1, SLP  LTG: Pt will improve swallowing skills per clinical assessment, warranting repeat instrumental swallow study.  -AC      Time Frame (Oral Nutrition/Hydration Goal 1, SLP)  by discharge  -AC         Oral Nutrition/Hydration Goal 2 (SLP)    Oral Nutrition/Hydration Goal 2, SLP  Pt will tolerate therapeutic trials of H2O/puree w/o s/sxs aspiration w/ 70% acc w/o cues to determine readiness for repeat instrumental study.  -AC      Time Frame (Oral Nutrition/Hydration Goal 2, SLP)  short term goal (STG)  -AC         Pharyngeal Strengthening Exercise Goal 1 (SLP)    Activity (Pharyngeal Strengthening Goal 1, SLP)  increase timing;increase superior movement of the hyolaryngeal complex;increase anterior movement of the hyolaryngeal complex;increase closure at entrance to airway/closure of airway at glottis;increase squeeze/positive pressure generation  -AC      Increase Timing  prepping - 3 second prep or suck swallow or 3-step swallow  -AC      Increase Superior Movement of the Hyolaryngeal Complex  effortful pitch glide (falsetto + pharyngeal squeeze)  -AC      Increase Anterior Movement of the Hyolaryngeal Complex  chin tuck against resistance  (CTAR)  -AC      Increase Closure at Entrance to Airway/Closure of Airway at Glottis  super-supraglottic swallow  -AC      Increase Squeeze/Positive Pressure Generation  hard effortful swallow  -AC      [place]/Accuracy (Pharyngeal Strengthening Goal 1, SLP)  with minimal cues (75-90% accuracy)  -AC      Time Frame (Pharyngeal Strengthening Goal 1, SLP)  short term goal (STG)  -AC        User Key  (r) = Recorded By, (t) = Taken By, (c) = Cosigned By    Initials Name Provider Type    Heena Mcclain MS CCC-SLP Speech and Language Pathologist             Time Calculation:   Time Calculation- SLP     Row Name 03/04/20 1156             Time Calculation- SLP    SLP Start Time  1030  -      SLP Received On  03/04/20  -        User Key  (r) = Recorded By, (t) = Taken By, (c) = Cosigned By    Initials Name Provider Type    Heena Mcclain MS CCC-SLP Speech and Language Pathologist          Therapy Charges for Today     Code Description Service Date Service Provider Modifiers Qty    28844984620 HC ST EVAL ORAL PHARYNG SWALLOW 4 3/3/2020 Heena Amaral MS CCC-SLP GN 1    [id number] HC ST FIBEROPTIC ENDO EVAL SWALL 7 3/4/2020 Henea Amaral MS CCC-SLP GN 1               Heena Amaral MS CCC-SHANNAN  3/4/2020

## 2020-03-04 NOTE — PLAN OF CARE
Problem: Patient Care Overview  Goal: Plan of Care Review  3/4/2020 1156 by Heena Amaral MS CCC-SLP  Outcome: Ongoing (interventions implemented as appropriate)  Flowsheets (Taken 3/4/2020 1156)  Plan of Care Reviewed With: patient; daughter  Note:   SLP evaluation completed. Will address dysphagia in tx. Please see note for further details and recommendations.

## 2020-03-05 ENCOUNTER — APPOINTMENT (OUTPATIENT)
Dept: GENERAL RADIOLOGY | Facility: HOSPITAL | Age: 50
End: 2020-03-05

## 2020-03-05 LAB
ALBUMIN SERPL-MCNC: 2.5 G/DL (ref 3.5–5.2)
ALBUMIN/GLOB SERPL: 0.7 G/DL
ALP SERPL-CCNC: 101 U/L (ref 39–117)
ALT SERPL W P-5'-P-CCNC: 15 U/L (ref 1–33)
ANION GAP SERPL CALCULATED.3IONS-SCNC: 10 MMOL/L (ref 5–15)
AST SERPL-CCNC: 24 U/L (ref 1–32)
BASOPHILS # BLD AUTO: 0.07 10*3/MM3 (ref 0–0.2)
BASOPHILS NFR BLD AUTO: 0.7 % (ref 0–1.5)
BILIRUB SERPL-MCNC: 0.3 MG/DL (ref 0.2–1.2)
BUN BLD-MCNC: 43 MG/DL (ref 6–20)
BUN/CREAT SERPL: 79.6 (ref 7–25)
CALCIUM SPEC-SCNC: 8.6 MG/DL (ref 8.6–10.5)
CHLORIDE SERPL-SCNC: 102 MMOL/L (ref 98–107)
CK SERPL-CCNC: 29 U/L (ref 20–180)
CO2 SERPL-SCNC: 26 MMOL/L (ref 22–29)
CREAT BLD-MCNC: 0.54 MG/DL (ref 0.57–1)
DEPRECATED RDW RBC AUTO: 51.9 FL (ref 37–54)
EOSINOPHIL # BLD AUTO: 0.24 10*3/MM3 (ref 0–0.4)
EOSINOPHIL NFR BLD AUTO: 2.6 % (ref 0.3–6.2)
ERYTHROCYTE [DISTWIDTH] IN BLOOD BY AUTOMATED COUNT: 14.6 % (ref 12.3–15.4)
GFR SERPL CREATININE-BSD FRML MDRD: 120 ML/MIN/1.73
GLOBULIN UR ELPH-MCNC: 3.6 GM/DL
GLUCOSE BLD-MCNC: 147 MG/DL (ref 65–99)
GLUCOSE BLDC GLUCOMTR-MCNC: 101 MG/DL (ref 70–130)
GLUCOSE BLDC GLUCOMTR-MCNC: 123 MG/DL (ref 70–130)
GLUCOSE BLDC GLUCOMTR-MCNC: 135 MG/DL (ref 70–130)
GLUCOSE BLDC GLUCOMTR-MCNC: 194 MG/DL (ref 70–130)
GLUCOSE BLDC GLUCOMTR-MCNC: 80 MG/DL (ref 70–130)
HCT VFR BLD AUTO: 33.9 % (ref 34–46.6)
HGB BLD-MCNC: 10.4 G/DL (ref 12–15.9)
IMM GRANULOCYTES # BLD AUTO: 0.16 10*3/MM3 (ref 0–0.05)
IMM GRANULOCYTES NFR BLD AUTO: 1.7 % (ref 0–0.5)
LYMPHOCYTES # BLD AUTO: 1.59 10*3/MM3 (ref 0.7–3.1)
LYMPHOCYTES NFR BLD AUTO: 16.9 % (ref 19.6–45.3)
MCH RBC QN AUTO: 29.2 PG (ref 26.6–33)
MCHC RBC AUTO-ENTMCNC: 30.7 G/DL (ref 31.5–35.7)
MCV RBC AUTO: 95.2 FL (ref 79–97)
MONOCYTES # BLD AUTO: 0.95 10*3/MM3 (ref 0.1–0.9)
MONOCYTES NFR BLD AUTO: 10.1 % (ref 5–12)
NEUTROPHILS # BLD AUTO: 6.38 10*3/MM3 (ref 1.7–7)
NEUTROPHILS NFR BLD AUTO: 68 % (ref 42.7–76)
NRBC BLD AUTO-RTO: 0 /100 WBC (ref 0–0.2)
PLATELET # BLD AUTO: 216 10*3/MM3 (ref 140–450)
PMV BLD AUTO: 10 FL (ref 6–12)
POTASSIUM BLD-SCNC: 4.2 MMOL/L (ref 3.5–5.2)
PROT SERPL-MCNC: 6.1 G/DL (ref 6–8.5)
RBC # BLD AUTO: 3.56 10*6/MM3 (ref 3.77–5.28)
SODIUM BLD-SCNC: 138 MMOL/L (ref 136–145)
WBC NRBC COR # BLD: 9.39 10*3/MM3 (ref 3.4–10.8)

## 2020-03-05 PROCEDURE — 82550 ASSAY OF CK (CPK): CPT | Performed by: INTERNAL MEDICINE

## 2020-03-05 PROCEDURE — 99232 SBSQ HOSP IP/OBS MODERATE 35: CPT | Performed by: INTERNAL MEDICINE

## 2020-03-05 PROCEDURE — 94799 UNLISTED PULMONARY SVC/PX: CPT

## 2020-03-05 PROCEDURE — 97530 THERAPEUTIC ACTIVITIES: CPT

## 2020-03-05 PROCEDURE — 82962 GLUCOSE BLOOD TEST: CPT

## 2020-03-05 PROCEDURE — 80053 COMPREHEN METABOLIC PANEL: CPT | Performed by: INTERNAL MEDICINE

## 2020-03-05 PROCEDURE — 71045 X-RAY EXAM CHEST 1 VIEW: CPT

## 2020-03-05 PROCEDURE — 25010000002 DAPTOMYCIN PER 1 MG: Performed by: INTERNAL MEDICINE

## 2020-03-05 PROCEDURE — 63710000001 INSULIN DETEMIR PER 5 UNITS: Performed by: INTERNAL MEDICINE

## 2020-03-05 PROCEDURE — 85025 COMPLETE CBC W/AUTO DIFF WBC: CPT | Performed by: INTERNAL MEDICINE

## 2020-03-05 RX ADMIN — PREGABALIN 150 MG: 75 CAPSULE ORAL at 06:44

## 2020-03-05 RX ADMIN — FAMOTIDINE 20 MG: 20 TABLET ORAL at 06:44

## 2020-03-05 RX ADMIN — BUDESONIDE 0.5 MG: 0.5 INHALANT RESPIRATORY (INHALATION) at 08:50

## 2020-03-05 RX ADMIN — INSULIN DETEMIR 15 UNITS: 100 INJECTION, SOLUTION SUBCUTANEOUS at 20:00

## 2020-03-05 RX ADMIN — HYDROCODONE BITARTRATE AND ACETAMINOPHEN 1 TABLET: 10; 325 TABLET ORAL at 06:51

## 2020-03-05 RX ADMIN — INSULIN DETEMIR 15 UNITS: 100 INJECTION, SOLUTION SUBCUTANEOUS at 08:46

## 2020-03-05 RX ADMIN — AMITRIPTYLINE HYDROCHLORIDE 10 MG: 10 TABLET, FILM COATED ORAL at 20:00

## 2020-03-05 RX ADMIN — FAMOTIDINE 20 MG: 20 TABLET ORAL at 17:52

## 2020-03-05 RX ADMIN — ATORVASTATIN CALCIUM 10 MG: 10 TABLET, FILM COATED ORAL at 08:40

## 2020-03-05 RX ADMIN — SODIUM CHLORIDE, PRESERVATIVE FREE 10 ML: 5 INJECTION INTRAVENOUS at 21:13

## 2020-03-05 RX ADMIN — BUDESONIDE 0.5 MG: 0.5 INHALANT RESPIRATORY (INHALATION) at 20:32

## 2020-03-05 RX ADMIN — INSULIN HUMAN 3 UNITS: 100 INJECTION, SOLUTION PARENTERAL at 00:39

## 2020-03-05 RX ADMIN — PANTOPRAZOLE SODIUM 40 MG: 40 INJECTION, POWDER, FOR SOLUTION INTRAVENOUS at 06:44

## 2020-03-05 RX ADMIN — KETOTIFEN FUMARATE 1 DROP: 0.35 SOLUTION/ DROPS OPHTHALMIC at 08:46

## 2020-03-05 RX ADMIN — ROPINIROLE HYDROCHLORIDE 4 MG: 2 TABLET, FILM COATED ORAL at 20:00

## 2020-03-05 RX ADMIN — CLOPIDOGREL BISULFATE 75 MG: 75 TABLET ORAL at 08:38

## 2020-03-05 RX ADMIN — KETOTIFEN FUMARATE 1 DROP: 0.35 SOLUTION/ DROPS OPHTHALMIC at 20:00

## 2020-03-05 RX ADMIN — Medication 1 CAPSULE: at 08:38

## 2020-03-05 RX ADMIN — METOPROLOL TARTRATE 100 MG: 50 TABLET, FILM COATED ORAL at 08:38

## 2020-03-05 RX ADMIN — DAPTOMYCIN 500 MG: 500 INJECTION, POWDER, LYOPHILIZED, FOR SOLUTION INTRAVENOUS at 06:44

## 2020-03-05 RX ADMIN — ASPIRIN 325 MG ORAL TABLET 325 MG: 325 PILL ORAL at 08:39

## 2020-03-05 RX ADMIN — LOSARTAN POTASSIUM 50 MG: 50 TABLET ORAL at 08:38

## 2020-03-05 RX ADMIN — HYDROCODONE BITARTRATE AND ACETAMINOPHEN 1 TABLET: 10; 325 TABLET ORAL at 20:00

## 2020-03-05 RX ADMIN — METOPROLOL TARTRATE 100 MG: 50 TABLET, FILM COATED ORAL at 20:00

## 2020-03-05 RX ADMIN — PREGABALIN 150 MG: 75 CAPSULE ORAL at 14:51

## 2020-03-05 RX ADMIN — PREGABALIN 150 MG: 75 CAPSULE ORAL at 21:13

## 2020-03-05 NOTE — PLAN OF CARE
Problem: Patient Care Overview  Goal: Plan of Care Review  Outcome: Ongoing (interventions implemented as appropriate)  Flowsheets (Taken 3/5/2020 1018)  Progress: improving  Plan of Care Reviewed With: patient;daughter  Outcome Summary: patient able to increase ambulation to 300 ft with walker and CGA. she had 1 LOB needing assist to correct. Continue to progress toward mobility goals

## 2020-03-05 NOTE — PLAN OF CARE
Problem: Patient Care Overview  Goal: Plan of Care Review  Flowsheets (Taken 3/5/2020 6004)  Progress: improving  Plan of Care Reviewed With: patient; family  Outcome Summary: Patient AOX4, VSS on oxygen via nasal cannula for sleep. Patient walked 250ft and tolerated well x1 assistance. Patient has bilateral femoral drains in place; slow serosanginous drainage present. Patient appears to be doing well.

## 2020-03-05 NOTE — PROGRESS NOTES
Plastic Surgery Progress Note      Admission Date:  2020  LOS:  11  Patient Care Team:  Meagan Barlow APRN as PCP - General (Family Medicine)  Tyson Donaldson DO as Consulting Physician (Cardiology)    Patient Name:  Saskia Groves  :  1970  MRN:  [id number]    Date:  3/5/2020    Subjective:  No acute events, WBC trending down      History:   Past Medical History:   Diagnosis Date   • Anxiety    • Arthritis    • Asthma    • Carotid artery stenosis    • Chronic bronchitis (CMS/HCC)    • COPD (chronic obstructive pulmonary disease) (CMS/HCC)    • Coronary artery disease     2 vessels with 50% blockage.  Sees Dr. Donaldson   • Depression    • Diabetes mellitus (CMS/HCC)    • Dyslipidemia    • GERD (gastroesophageal reflux disease)    • Hiatal hernia    • Hyperlipidemia    • Hypertension    • Infectious viral hepatitis    • Lower back pain    • Migraine    • Multinodular goiter    • S/P thyroid biopsy     2008, 2013, 2015, and 07/15/2019 at [facility], right lobe nodules - all cytologies were benign   • Sleep apnea     can't tolerate the cpap mask   • Subclinical hyperthyroidism      Past Surgical History:   Procedure Laterality Date   • ANTERIOR CERVICAL DISCECTOMY W/ FUSION     • BACK SURGERY      lumbar   •  SECTION  19940    x 2    • CHOLECYSTECTOMY     • COLONOSCOPY     • ENDOSCOPY     • FEMORAL ARTERY CUTDOWN Bilateral 2020    Procedure: FEMORAL ARTERY CUTDOWN WITH REMOVAL OF FEMORAL FEMORAL BYPASS GRAFT BILATERAL;  Surgeon: Deandre Oseguera MD;  Location:  [facility];  Service: Vascular;  Laterality: Bilateral;   • FEMORAL ENDARTERECTOMY Bilateral 1/15/2020    Procedure: FEMORAL ENDARTERECTOMY BILATERAL;  Surgeon: Deandre Oseguera MD;  Location:  [facility];  Service: Vascular   • FEMORAL FEMORAL BYPASS Right 1/15/2020    Procedure: FEMORAL FEMORAL BYPASS;  Surgeon: Deandre Oseguera MD;  Location:  [facility];  Service: Vascular   • HYSTERECTOMY     • KNEE SURGERY Left     • LEG EXCISION LESION/CYST Left 2/28/2020    Procedure: GROIN MUSCLE FLAP BILATERAL;  Surgeon: Modesto Whittaker MD;  Location: [facility];  Service: Plastics;  Laterality: Left;   • WRIST SURGERY Left      Family History   Problem Relation Age of Onset   • Diabetes Mother    • Hypertension Mother    • Arthritis Mother    • Hyperlipidemia Mother    • Migraines Mother    • Thyroid disease Mother    • Hypertension Father    • Lung cancer Father    • Cancer Father    • Stroke Other    • Migraines Maternal Aunt    • Thyroid disease Maternal Aunt    • Heart attack Maternal Aunt    • Osteoporosis Maternal Aunt    • Cancer Maternal Uncle    • Heart attack Maternal Uncle    • Stroke Maternal Uncle    • Hyperlipidemia Maternal Grandmother    • Cancer Maternal Grandmother    • Obesity Maternal Grandmother    • Thyroid disease Daughter    • Obesity Daughter      Social History     Socioeconomic History   • Marital status:      Spouse name: Not on file   • Number of children: 2   • Years of education: Not on file   • Highest education level: Not on file   Occupational History   • Occupation: Benefitter Work     Employer: DISABLED     Comment: Back and Leg Pain   Tobacco Use   • Smoking status: Current Every Day Smoker     Packs/day: 1.00     Years: 35.00     Pack years: 35.00     Types: Cigarettes   • Smokeless tobacco: Never Used   Substance and Sexual Activity   • Alcohol use: Not Currently   • Drug use: No   • Sexual activity: Yes     Partners: Male     Comment:    Social History Narrative    Lives in [place], [place] alone     Allergies   Allergen Reactions   • Levaquin [Levofloxacin] Nausea And Vomiting   • Penicillins Nausea And Vomiting     Has tolerated cefepime 2/2020   • Codeine Nausea Only   • Flagyl [Metronidazole] Nausea And Vomiting       Medication:    Current Facility-Administered Medications:   •  acetaminophen (TYLENOL) tablet 650 mg, 650 mg, Oral, Q4H PRN, 650 mg at 03/02/20 2128 **OR**  acetaminophen (TYLENOL) 160 MG/5ML solution 650 mg, 650 mg, Oral, Q4H PRN, 650 mg at 03/04/20 1226 **OR** acetaminophen (TYLENOL) suppository 650 mg, 650 mg, Rectal, Q4H PRN, Edgar Machado PA, 650 mg at 02/28/20 0429  •  albuterol (PROVENTIL) nebulizer solution 0.083% 2.5 mg/3mL, 2.5 mg, Nebulization, Q6H PRN, Edgar Machado PA  •  amitriptyline (ELAVIL) tablet 10 mg, 10 mg, Oral, Nightly, Lorenzo Anderson MD, 10 mg at 03/04/20 2139  •  aspirin tablet 325 mg, 325 mg, Oral, Daily, Deandre Oseguera MD, 325 mg at 03/05/20 0839  •  atorvastatin (LIPITOR) tablet 10 mg, 10 mg, Oral, Daily, Edgar Machado PA, 10 mg at 03/05/20 0840  •  budesonide (PULMICORT) nebulizer solution 0.5 mg, 0.5 mg, Nebulization, BID - RT, Rian Sifuentes, , 0.5 mg at 03/05/20 0850  •  clopidogrel (PLAVIX) tablet 75 mg, 75 mg, Oral, Daily, Edgar Machado PA, 75 mg at 03/05/20 0838  •  DAPTOmycin (CUBICIN) 500 mg/50 mL in sodium chloride, 8 mg/kg, Intravenous, Q24H, Collette Melo MD, 500 mg at 03/05/20 0644  •  dextrose (D50W) 25 g/ 50mL Intravenous Solution 25 g, 25 g, Intravenous, Q15 Min PRN, Edgar Machado PA  •  dextrose (GLUTOSE) oral gel 15 g, 15 g, Oral, Q15 Min PRN, Edgar Machado PA  •  famotidine (PEPCID) tablet 20 mg, 20 mg, Oral, BID AC, Edgar Machado PA, 20 mg at 03/05/20 0644  •  glucagon (human recombinant) (GLUCAGEN DIAGNOSTIC) injection 1 mg, 1 mg, Subcutaneous, Q15 Min PRN, Edgar Machado PA  •  guaiFENesin-dextromethorphan (ROBITUSSIN DM) 100-10 MG/5ML syrup 5 mL, 5 mL, Oral, Q6H PRN, Edgar Machado PA, 5 mL at 02/27/20 0920  •  hydrALAZINE (APRESOLINE) injection 10 mg, 10 mg, Intravenous, Q6H PRN, Yessenia Valero MD, 10 mg at 03/04/20 0628  •  HYDROcodone-acetaminophen (NORCO)  MG per tablet 1 tablet, 1 tablet, Oral, Q8H PRN, Edgar Machado PA, 1 tablet at 03/05/20 0651  •  insulin detemir (LEVEMIR) injection 15 Units, 15 Units, Subcutaneous, Q12H, Lorenzo Anderson MD, 15  Units at 03/05/20 0846  •  insulin regular (humuLIN R,novoLIN R) injection 0-14 Units, 0-14 Units, Subcutaneous, Q6H, Rian Sifuentes DO, 3 Units at 03/05/20 0039  •  ketotifen (ZADITOR) 0.025 % ophthalmic solution 1 drop, 1 drop, Both Eyes, BID, Edgar Machado PA, 1 drop at 03/05/20 0846  •  lactobacillus acidophilus (RISAQUAD) capsule 1 capsule, 1 capsule, Oral, Daily, Collette Melo MD, 1 capsule at 03/05/20 0838  •  losartan (COZAAR) tablet 50 mg, 50 mg, Oral, Daily, Yessenia Valero MD, 50 mg at 03/05/20 0838  •  metoprolol tartrate (LOPRESSOR) tablet 100 mg, 100 mg, Oral, Q12H, Lorenzo Anderson MD, 100 mg at 03/05/20 0838  •  niCARdipine (CARDENE) 40 mg in 200 mL NS (0.2 mg/mL) infusion, 5-15 mg/hr, Intravenous, Titrated, Edgar Machado PA  •  ondansetron (ZOFRAN) injection 4 mg, 4 mg, Intravenous, Once PRN, Ced Gómez CRNA  •  pantoprazole (PROTONIX) injection 40 mg, 40 mg, Intravenous, Q AM, Edgar Machado PA, 40 mg at 03/05/20 0644  •  pregabalin (LYRICA) capsule 150 mg, 150 mg, Oral, Q8H, Lorenzo Anderson MD, 150 mg at 03/05/20 0644  •  rOPINIRole (REQUIP) tablet 4 mg, 4 mg, Oral, Nightly, Lorenzo Anderson MD, 4 mg at 03/04/20 2139  •  sodium chloride 0.9 % flush 10 mL, 10 mL, Intravenous, Q12H, Edgar Machado PA, 10 mL at 03/04/20 2156  •  sodium chloride 0.9 % flush 10 mL, 10 mL, Intravenous, PRN, Edgar Machado PA    Antibiotics:  Anti-Infectives (From admission, onward)    Ordered     Dose/Rate Route Frequency Start Stop    03/03/20 1942  DAPTOmycin (CUBICIN) 500 mg/50 mL in sodium chloride     [facility] Mcke[facility] reviewed the order on 03/04/20 0903.   Ordering Provider:  Collette Melo MD    8 mg/kg × 60.3 kg  over 30 Minutes Intravenous Every 24 Hours 03/04/20 0600 03/16/20 0559    02/28/20 0746  meropenem (MERREM) 1 g/100 mL 0.9% NS VTB (mbp)     Ordering Provider:  Parag Gordon MD    1 g  over 30 Minutes Intravenous Once 02/28/20 0877  20 0921    20 0831  cefepime (MAXIPIME) 2 g/100 mL 0.9% NS (mbp)     Ordering Provider:  Ranjana Easley APRN    2 g  200 mL/hr over 30 Minutes Intravenous Once 20 0930 20 1157    20 0203  vancomycin 500 mg/100 mL 0.9% NS IVPB (mbp)     Ordering Provider:  Ced KennyRAINAH    500 mg  100 mL/hr over 60 Minutes Intravenous Once 20 0300 20 0511            Objective:    Physical Exam:   Vital Signs:  Temp (24hrs), Av.3 °F (36.8 °C), Min:98.2 °F (36.8 °C), Max:98.4 °F (36.9 °C)    Temp  Min: 98.2 °F (36.8 °C)  Max: 98.4 °F (36.9 °C)  BP  Min: 92/51  Max: 157/85  Pulse  Min: 84  Max: 105  Resp  Min: 16  Max: 23  SpO2  Min: 87 %  Max: 98 %    GENERAL: Awake and alert, in no acute distress. extubated  HEART: RRR;   LUNGS: Nonlabored. No dullness. intubated  ABDOMEN: Soft, nontender, nondistended. No rebound or guarding. NO mass or HSM.  EXT:  No cyanosis, clubbing or edema. No cord.  : Benavidez catheter in place.  SKIN: Warm and dry without cutaneous eruptions on Inspection/palpation.    NEURO: Oriented to PPT. No focal deficits on motor/sensory exam at arms/legs.  PSYCHIATRIC: Normal insight and judgement. Cooperative with PE  Bilateral Groins: incisional wound vac in place to suction, drains appropriate output, no seroma or hematoma on exam, soft, appropriate tenderness to palpation, no ecchymosis       Laboratory Data:  Results from last 7 days   Lab Units 20  0344 20  0341 20  0338   WBC 10*3/mm3 9.39 8.93 11.58*   HEMOGLOBIN g/dL 10.4* 10.8* 11.4*   HEMATOCRIT % 33.9* 34.4 36.1   PLATELETS 10*3/mm3 216 240 272     Results from last 7 days   Lab Units 20  0344   SODIUM mmol/L 138   POTASSIUM mmol/L 4.2   CHLORIDE mmol/L 102   CO2 mmol/L 26.0   BUN mg/dL 43*   CREATININE mg/dL 0.54*   GLUCOSE mg/dL 147*   CALCIUM mg/dL 8.6     Results from last 7 days   Lab Units 20  0344   ALK PHOS U/L 101   BILIRUBIN mg/dL 0.3   ALT (SGPT) U/L 15   AST  (SGOT) U/L 24         Results from last 7 days   Lab Units 02/29/20  1604   CRP mg/dL 8.05*     Results from last 7 days   Lab Units 02/28/20  0243   LACTATE mmol/L 1.5     Results from last 7 days   Lab Units 03/05/20  0344   CK TOTAL U/L 29     Results from last 7 days   Lab Units 03/04/20  0341 03/03/20  0338 03/02/20  1131 02/27/20  1137   VANCOMYCIN TR mcg/mL  --   --  38.50* 19.10   VANCOMYCIN RM mcg/mL 13.80 26.10  --   --      Estimated Creatinine Clearance: 118.6 mL/min (A) (by C-G formula based on SCr of 0.54 mg/dL (L)).    Microbiology:  AFB Culture   Date Value Ref Range Status   02/28/2020 No AFB isolated at less than 1 week  Preliminary   02/28/2020 No AFB isolated at less than 1 week  Preliminary     Blood Culture   Date Value Ref Range Status   02/28/2020 No growth at 5 days  Final     No results found for: BCIDPCR, CXREFLEX, CSFCX, CULTURETIS  No results found for: CULTURES, HSVCX, URCX  No results found for: EYECULTURE, GCCX, LABHSV  No results found for: LEGIONELLA, MRSACX, MUMPSCX, MYCOPLASCX  No results found for: NOCARDIACX, STOOLCX  No results found for: THROATCX, UNSTIMCULT, URINECX, CULTURE, VZVCULTUR  No results found for: VIRALCULTU, WOUNDCX      Assessment: 49yoF s/p 12/28/20 diagnostic fiberoptic bronchoscopy, Rtchest tube placement, removal of femoral-femoral bypass, bilateral CFA pericardial patches, bilateral Sartorious muscle flaps for groin graft coverage.         Plan:  - no acute surgery at this point in time, incisional wound vac in place to suction, no seroma or hematoma on PE, Lower extremity perfused,   - continue abx per ID  - continue incisional wound vac  - record and strip the MELISSA drains  - may sit up in chair, may stand, per plastic surgery  - will continue to follow          Modesto Whittaker MD  03/05/20  9:12 AM

## 2020-03-05 NOTE — THERAPY TREATMENT NOTE
Patient Name: Saskia Groves  : 1970    MRN: [id number]                              Today's Date: 3/5/2020       Admit Date: 2020    Visit Dx:     ICD-10-CM ICD-9-CM   1. PVD (peripheral vascular disease) (CMS/HCC) I73.9 443.9   2. Pleural effusion J90 511.9   3. S/P femoral-femoral bypass surgery Z95.828 V45.89   4. Pharyngeal dysphagia R13.13 787.23     Patient Active Problem List   Diagnosis   • PVD (peripheral vascular disease) (CMS/HCC)   • Lumbar radiculopathy   • Paresthesia   • Causalgia of lower limb   • Hiatal hernia   • Current smoker   • Bilateral carotid artery stenosis   • COPD (chronic obstructive pulmonary disease) (CMS/HCC)   • Coronary artery disease   • Hypertension   • Hyperlipidemia   • Diabetes mellitus (CMS/HCC)   • Sepsis (CMS/HCC)   • Pleural effusion - bilateral mod/large pleural effusions on CT chest 2020.    • Acute pulmonary edema (CMS/HCC)   • Acute hypoxemic respiratory failure (CMS/HCC)     Past Medical History:   Diagnosis Date   • Anxiety    • Arthritis    • Asthma    • Carotid artery stenosis    • Chronic bronchitis (CMS/HCC)    • COPD (chronic obstructive pulmonary disease) (CMS/HCC)    • Coronary artery disease     2 vessels with 50% blockage.  Sees Dr. Donaldson   • Depression    • Diabetes mellitus (CMS/HCC)    • Dyslipidemia    • GERD (gastroesophageal reflux disease)    • Hiatal hernia    • Hyperlipidemia    • Hypertension    • Infectious viral hepatitis    • Lower back pain    • Migraine    • Multinodular goiter    • S/P thyroid biopsy     2008, 2013, 2015, and 07/15/2019 at [facility], right lobe nodules - all cytologies were benign   • Sleep apnea     can't tolerate the cpap mask   • Subclinical hyperthyroidism      Past Surgical History:   Procedure Laterality Date   • ANTERIOR CERVICAL DISCECTOMY W/ FUSION     • BACK SURGERY      lumbar   •  SECTION  19940    x 2    • CHOLECYSTECTOMY     • COLONOSCOPY     • ENDOSCOPY     • FEMORAL  ARTERY CUTDOWN Bilateral 2/28/2020    Procedure: FEMORAL ARTERY CUTDOWN WITH REMOVAL OF FEMORAL FEMORAL BYPASS GRAFT BILATERAL;  Surgeon: Deandre Oseguera MD;  Location:  [facility];  Service: Vascular;  Laterality: Bilateral;   • FEMORAL ENDARTERECTOMY Bilateral 1/15/2020    Procedure: FEMORAL ENDARTERECTOMY BILATERAL;  Surgeon: Deandre Oseguera MD;  Location:  [facility];  Service: Vascular   • FEMORAL FEMORAL BYPASS Right 1/15/2020    Procedure: FEMORAL FEMORAL BYPASS;  Surgeon: Deandre Oseguera MD;  Location:  [facility];  Service: Vascular   • HYSTERECTOMY     • KNEE SURGERY Left    • LEG EXCISION LESION/CYST Left 2/28/2020    Procedure: GROIN MUSCLE FLAP BILATERAL;  Surgeon: Modesto Whittaker MD;  Location:  [facility];  Service: Plastics;  Laterality: Left;   • WRIST SURGERY Left      General Information     Row Name 03/05/20 1014          PT Evaluation Time/Intention    Document Type  therapy note (daily note)  -DARRIN     Mode of Treatment  physical therapy  -DARRIN     Row Name 03/05/20 1014          General Information    Patient Profile Reviewed?  yes  -DARRIN     Prior Level of Function  independent:;gait;transfer;bed mobility;ADL's  -DARRIN     Existing Precautions/Restrictions  cardiac  -DARRIN     Barriers to Rehab  medically complex  -DARRIN     Row Name 03/05/20 1014          Relationship/Environment    Lives With  alone  -DARRIN     Row Name 03/05/20 1014          Resource/Environmental Concerns    Current Living Arrangements  home/apartment/condo  -DARRIN     Row Name 03/05/20 1014          Cognitive Assessment/Intervention- PT/OT    Orientation Status (Cognition)  oriented x 4  -DARRIN     Row Name 03/05/20 1014          Safety Issues, Functional Mobility    Safety Issues Affecting Function (Mobility)  safety precautions follow-through/compliance;safety precaution awareness  -DARRIN     Impairments Affecting Function (Mobility)  balance;endurance/activity tolerance;shortness of breath;strength  -DARRIN       User Key  (r) = Recorded By, (t) =  Taken By, (c) = Cosigned By    Initials Name Provider Type    Gwendolyn Varela PT Physical Therapist        Mobility     Row Name 03/05/20 1015          Bed Mobility Assessment/Treatment    Comment (Bed Mobility)  patient is OOB and returns to the chair  -DARRIN     Row Name 03/05/20 1015          Bed-Chair Transfer    Bed-Chair Chaves (Transfers)  contact guard  -DARRIN     Assistive Device (Bed-Chair Transfers)  walker, front-wheeled  -DARRIN     Row Name 03/05/20 1015          Sit-Stand Transfer    Sit-Stand Chaves (Transfers)  contact guard  -DARRIN     Assistive Device (Sit-Stand Transfers)  walker, front-wheeled  -DARRIN     Row Name 03/05/20 1015          Gait/Stairs Assessment/Training    Gait/Stairs Assessment/Training  gait/ambulation independence  -DARRIN     Chaves Level (Gait)  contact guard  -DARRIN     Assistive Device (Gait)  walker, front-wheeled  -DARRIN     Distance in Feet (Gait)  300  -DARRIN     Pattern (Gait)  step-through  -DARRIN     Deviations/Abnormal Patterns (Gait)  stride length decreased;base of support, narrow  -DARRIN     Bilateral Gait Deviations  forward flexed posture  -DARRIN     Comment (Gait/Stairs)  patient with only one LOB today  requiring assist to correct.   -DARRIN       User Key  (r) = Recorded By, (t) = Taken By, (c) = Cosigned By    Initials Name Provider Type    Gwendolyn Varela PT Physical Therapist        Obj/Interventions     Row Name 03/05/20 1017          Therapeutic Exercise    Lower Extremity Range of Motion (Therapeutic Exercise)  hip flexion/extension, bilateral;knee flexion/extension, bilateral;ankle dorsiflexion/plantar flexion, bilateral  -DARRIN     Exercise Type (Therapeutic Exercise)  AROM (active range of motion)  -DARRIN     Position (Therapeutic Exercise)  seated  -DARRIN     Expected Outcome (Therapeutic Exercise)  facilitate normal movement patterns;improve functional tolerance, self-care activity;improve functional stability  -DARRIN     Row Name 03/05/20 1017          Static Sitting  Balance    Level of [place] (Unsupported Sitting, Static Balance)  independent  -DARRIN     Sitting Position (Unsupported Sitting, Static Balance)  sitting in chair  -DARRIN     Row Name 03/05/20 1017          Dynamic Sitting Balance    Level of [place], Reaches Outside Midline (Sitting, Dynamic Balance)  independent  -DARRIN     Sitting Position, Reaches Outside Midline (Sitting, Dynamic Balance)  sitting in chair  -DARRIN     Row Name 03/05/20 1017          Static Standing Balance    Level of [place] (Supported Standing, Static Balance)  contact guard assist  -DARRIN     Assistive Device Utilized (Supported Standing, Static Balance)  walker, rolling  -DARRIN     Row Name 03/05/20 1017          Dynamic Standing Balance    Level of [place], Reaches Outside Midline (Standing, Dynamic Balance)  contact guard assist  -DARRIN     Assistive Device Utilized (Supported Standing, Dynamic Balance)  walker, rolling  -DARRIN       User Key  (r) = Recorded By, (t) = Taken By, (c) = Cosigned By    Initials Name Provider Type    DARRIN Gwendolyn Hackett, PT Physical Therapist        Goals/Plan    No documentation.       Clinical Impression     Row Name 03/05/20 1018          Pain Scale: Numbers Pre/Post-Treatment    Pain Scale: Numbers, Pretreatment  2/10  -DARRIN     Pain Scale: Numbers, Post-Treatment  2/10  -DARRIN     Pain Location - Orientation  generalized  -DARRIN     Pain Intervention(s)  Repositioned;Ambulation/increased activity  -     Row Name 03/05/20 1018          Plan of Care Review    Plan of Care Reviewed With  patient;daughter  -DARRIN     Progress  improving  -DARRIN     Outcome Summary  patient able to increase ambulation to 300 ft with walker and CGA. she had 1 LOB needing assist to correct. Continue to progress toward mobility goals  -     Row Name 03/05/20 1018          Physical Therapy Clinical Impression    Patient/Family Goals Statement (PT Clinical Impression)  return to PLOF  -DARRIN     Criteria for Skilled Interventions Met (PT  Clinical Impression)  yes;treatment indicated  -DARRIN     Rehab Potential (PT Clinical Summary)  good, to achieve stated therapy goals  -DARRIN     Row Name 03/05/20 1018          Vital Signs    Pre Systolic BP Rehab  130  -DARRIN     Pre Treatment Diastolic BP  78  -DARRIN     Post Systolic BP Rehab  135  -DARRIN     Post Treatment Diastolic BP  80  -DARRIN     Pretreatment Heart Rate (beats/min)  94  -DARRIN     Posttreatment Heart Rate (beats/min)  96  -DARRIN     Pre SpO2 (%)  94  -DARRIN     O2 Delivery Pre Treatment  room air  -DARRIN     Post SpO2 (%)  98  -DARRIN     O2 Delivery Post Treatment  room air  -DARRIN     Pre Patient Position  Sitting  -DARRIN     Intra Patient Position  Standing  -DARRIN     Post Patient Position  Sitting  -DARRIN     Row Name 03/05/20 1018          Positioning and Restraints    Pre-Treatment Position  sitting in chair/recliner  -DARRIN     Post Treatment Position  chair  -DARRIN     In Chair  notified nsg;reclined;sitting;call light within reach;encouraged to call for assist;with family/caregiver  -DARRIN       User Key  (r) = Recorded By, (t) = Taken By, (c) = Cosigned By    Initials Name Provider Type    Gwendolyn Varela PT Physical Therapist        Outcome Measures     Row Name 03/05/20 1021          How much help from another person do you currently need...    Turning from your back to your side while in flat bed without using bedrails?  3  -DARRIN     Moving from lying on back to sitting on the side of a flat bed without bedrails?  3  -DARRIN     Moving to and from a bed to a chair (including a wheelchair)?  3  -DARRIN     Standing up from a chair using your arms (e.g., wheelchair, bedside chair)?  3  -DARRIN     Climbing 3-5 steps with a railing?  2  -DARRIN     To walk in hospital room?  3  -DARRIN     AM-PAC 6 Clicks Score (PT)  17  -DARRIN       User Key  (r) = Recorded By, (t) = Taken By, (c) = Cosigned By    Initials Name Provider Type    Gwendolyn Varela PT Physical Therapist          PT Recommendation and Plan  Planned Therapy Interventions (PT Eval):  balance training, bed mobility training, gait training, home exercise program, transfer training, strengthening  Outcome Summary/Treatment Plan (PT)  Anticipated Equipment Needs at Discharge (PT): front wheeled walker  Anticipated Discharge Disposition (PT): skilled nursing facility  Plan of Care Reviewed With: patient, daughter  Progress: improving  Outcome Summary: patient able to increase ambulation to 300 ft with walker and CGA. she had 1 LOB needing assist to correct. Continue to progress toward mobility goals     Time Calculation:   PT Charges     Row Name 03/05/20 0830             Time Calculation    Start Time  0830  -DARRIN      PT Received On  03/05/20  -DARRIN      PT Goal Re-Cert Due Date  03/13/20  -DARRIN         Time Calculation- PT    Total Timed Code Minutes- PT  23 minute(s)  -DARRIN         Timed Charges    76639 - PT Therapeutic Activity Minutes  23  -DARRIN        User Key  (r) = Recorded By, (t) = Taken By, (c) = Cosigned By    Initials Name Provider Type    Gwendolyn Varela, PT Physical Therapist        Therapy Charges for Today     Code Description Service Date Service Provider Modifiers Qty    [id number] HC PT THERAPEUTIC ACT EA 15 MIN 3/5/2020 Gwendolyn Hackett PT GP 2          PT G-Codes  Outcome Measure Options: AM-PAC 6 Clicks Basic Mobility (PT)  AM-PAC 6 Clicks Score (PT): 17    Gwendolyn Hackett PT  3/5/2020

## 2020-03-05 NOTE — PROGRESS NOTES
"INFECTIOUS DISEASE PROGRESS NOTE     Saskia Groves  1970  [id number]      Admission Date: 2/23/2020    Antibiotic therapy:   Daptomycin    Requesting Provider: Deandre Oseguera MD     Reason for Consultation: Left groin wound infection     History of present illness:  2/24/20:  Patient is a 49 y.o. female seen today for a left groin wound infection.  She underwent femoral endardectomy, and femorofemoral  Bypass with gortex graft on 1/15.  She has been having fevers 100 degrees and above since 1/17. She was seen last week by her PCP and given Bactrim but she couldn't tolerate it and Ceftin was called in. Her left groin continued to remain swollen and warm. She had a venous duplex to rule out DVT and then developed a \"knot\" of her left groin. She presented to the ED at [facility], was given Vancomycin and Merrem and transferred to [facility]. Tmax of 100.5 degrees without leukocytosis, normal lactate and PCT. She is currently on Vancomycin and Merrem and we were consulted for evaluation and treatment.   Labs at [facility] with WBC of 10.4, normal lactate, PCT.  She was tachycardic with tmax of 102.3.  She had fevers at home documented to over 103 degrees.  2/25/20 : She spontaneously drained a copious amount of slightly cloudy straw-colored fluid from her left groin yesterday and has continued to drain.  She complains of cough \"other than that I feel great\".  No n/v//d.  Left groin less tender.   2/26/20:  Graft to be removed on Friday.   She remains afebrile. She doesn't \"feel very good today\".  Family at bedside.   2/27/20:  Surgery scheduled for tomorrow.  She is tearful and afraid she will lose her leg.  She remains afebrile.    2/28/2020: She deteriorated this morning with hypoxemia and bilateral pulmonary opacifications with pleural effusions.  He had a fever to 101 degrees last night.  I discussed her complex situation with Dr. Deandre Oseguera early today and also this evening.  She was taken to surgery " today and her left femoral graft was removed.  A muscle flap was placed.  She is now endotracheally intubated and mechanically ventilated.  She underwent a thoracentesis revealing clear fluid.  Intravenous Merrem was added to her vancomycin this morning due to concerns about possible aspiration pneumonia.  She does have a history of penicillin allergy.  2/29/2020: She remains on ventilatory support.  She has only modest respiratory secretions.  She is still requiring an FiO2 of 60%.  She has been afebrile overnight.  She remains sedated.    Following for Dr. Parag Gordon:  3/1/20: Remains sedated on vent.  Tmax 99.3, FiO2 40% and PEEP 6.  She has a wound VAC on right ground with MELISSA drain with bloody drainage.  She has a right chest tube.  Small amount of clear to red-streaked ETT secretions. On tube feeding.   3/2  Extubated, afebrile, intermittently cooperative  Ongoing issues with hypertension; 90 from chest tube yesterday Day# 8 merrem (intermittent dosing);  vanc trough 38  3/3  Afebrile, reluctant to cooperate with attempts to mobilize, alert, 60 from CT yesterday, no new + cultures, random vanc 26  3/4  Alert, afebrile, more interactive  3/5 alert, afebrile, more interactive and joking with RN, stronger, failed SLP exam yesterday and remains on tube feeding. Having frequent stools and lactulose (which she took at home) has been stopped        Past Medical History:   Diagnosis Date   • Anxiety    • Arthritis    • Asthma    • Carotid artery stenosis    • Chronic bronchitis (CMS/HCC)    • COPD (chronic obstructive pulmonary disease) (CMS/HCC)    • Coronary artery disease     2 vessels with 50% blockage.  Sees Dr. Donaldson   • Depression    • Diabetes mellitus (CMS/HCC)    • Dyslipidemia    • GERD (gastroesophageal reflux disease)    • Hiatal hernia    • Hyperlipidemia    • Hypertension    • Infectious viral hepatitis    • Lower back pain    • Migraine    • Multinodular goiter    • S/P thyroid biopsy     11/2008,  2013, 2015, and 07/15/2019 at [facility], right lobe nodules - all cytologies were benign   • Sleep apnea     can't tolerate the cpap mask   • Subclinical hyperthyroidism        Past Surgical History:   Procedure Laterality Date   • ANTERIOR CERVICAL DISCECTOMY W/ FUSION     • BACK SURGERY      lumbar   •  SECTION  19940    x 2    • CHOLECYSTECTOMY     • COLONOSCOPY     • ENDOSCOPY     • FEMORAL ARTERY CUTDOWN Bilateral 2020    Procedure: FEMORAL ARTERY CUTDOWN WITH REMOVAL OF FEMORAL FEMORAL BYPASS GRAFT BILATERAL;  Surgeon: Deandre Oseguera MD;  Location:  [facility];  Service: Vascular;  Laterality: Bilateral;   • FEMORAL ENDARTERECTOMY Bilateral 1/15/2020    Procedure: FEMORAL ENDARTERECTOMY BILATERAL;  Surgeon: Deandre Oseguera MD;  Location:  [facility];  Service: Vascular   • FEMORAL FEMORAL BYPASS Right 1/15/2020    Procedure: FEMORAL FEMORAL BYPASS;  Surgeon: Deandre Oseguera MD;  Location:  [facility];  Service: Vascular   • HYSTERECTOMY     • KNEE SURGERY Left    • LEG EXCISION LESION/CYST Left 2020    Procedure: GROIN MUSCLE FLAP BILATERAL;  Surgeon: Modesto Whittaker MD;  Location:  [facility];  Service: Plastics;  Laterality: Left;   • WRIST SURGERY Left        Family History   Problem Relation Age of Onset   • Diabetes Mother    • Hypertension Mother    • Arthritis Mother    • Hyperlipidemia Mother    • Migraines Mother    • Thyroid disease Mother    • Hypertension Father    • Lung cancer Father    • Cancer Father    • Stroke Other    • Migraines Maternal Aunt    • Thyroid disease Maternal Aunt    • Heart attack Maternal Aunt    • Osteoporosis Maternal Aunt    • Cancer Maternal Uncle    • Heart attack Maternal Uncle    • Stroke Maternal Uncle    • Hyperlipidemia Maternal Grandmother    • Cancer Maternal Grandmother    • Obesity Maternal Grandmother    • Thyroid disease Daughter    • Obesity Daughter        Social History     Socioeconomic History   • Marital status:      Spouse  name: Not on file   • Number of children: 2   • Years of education: Not on file   • Highest education level: Not on file   Occupational History   • 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     Employer: DISABLED     Comment: Back and Leg Pain   Tobacco Use   • Smoking status: Current Every Day Smoker     Packs/day: 1.00     Years: 35.00     Pack years: 35.00     Types: Cigarettes   • Smokeless tobacco: Never Used   Substance and Sexual Activity   • Alcohol use: Not Currently   • Drug use: No   • Sexual activity: Yes     Partners: Male     Comment:    Social History Narrative    L[facility] in [facility]       Allergies   Allergen Reactions   • Levaquin [Levofloxacin] Nausea And Vomiting   • Penicillins Nausea And Vomiting     Has tolerated cefepime 2/2020   • Codeine Nausea Only   • Flagyl [Metronidazole] Nausea And Vomiting         Medication:    Current Facility-Administered Medications:   •  acetaminophen (TYLENOL) tablet 650 mg, 650 mg, Oral, Q4H PRN, 650 mg at 03/02/20 2128 **OR** acetaminophen (TYLENOL) 160 MG/5ML solution 650 mg, 650 mg, Oral, Q4H PRN, 650 mg at 03/04/20 1226 **OR** acetaminophen (TYLENOL) suppository 650 mg, 650 mg, Rectal, Q4H PRN, Edgar Machado PA, 650 mg at 02/28/20 0429  •  albuterol (PROVENTIL) nebulizer solution 0.083% 2.5 mg/3mL, 2.5 mg, Nebulization, Q6H PRN, Edgar Machado PA  •  amitriptyline (ELAVIL) tablet 10 mg, 10 mg, Oral, Nightly, Lorenzo Anderson MD, 10 mg at 03/04/20 2139  •  aspirin tablet 325 mg, 325 mg, Oral, Daily, Deandre Oseguera MD, 325 mg at 03/05/20 0839  •  atorvastatin (LIPITOR) tablet 10 mg, 10 mg, Oral, Daily, Edgar Machado PA, 10 mg at 03/05/20 0840  •  budesonide (PULMICORT) nebulizer solution 0.5 mg, 0.5 mg, Nebulization, BID - RT, Rian Sifuentes, , 0.5 mg at 03/05/20 0850  •  clopidogrel (PLAVIX) tablet 75 mg, 75 mg, Oral, Daily, Edgar Machado PA, 75 mg at 03/05/20 0855  •  DAPTOmycin (CUBICIN) 500 mg/50 mL in sodium chloride, 8  mg/kg, Intravenous, Q24H, Collette Melo MD, 500 mg at 03/05/20 0644  •  dextrose (D50W) 25 g/ 50mL Intravenous Solution 25 g, 25 g, Intravenous, Q15 Min PRN, Edgar Machado PA  •  dextrose (GLUTOSE) oral gel 15 g, 15 g, Oral, Q15 Min PRN, Edgar Machado PA  •  famotidine (PEPCID) tablet 20 mg, 20 mg, Oral, BID AC, Edgar Machado PA, 20 mg at 03/05/20 0644  •  glucagon (human recombinant) (GLUCAGEN DIAGNOSTIC) injection 1 mg, 1 mg, Subcutaneous, Q15 Min PRN, Edgar Machado PA  •  guaiFENesin-dextromethorphan (ROBITUSSIN DM) 100-10 MG/5ML syrup 5 mL, 5 mL, Oral, Q6H PRN, Edgar Machado PA, 5 mL at 02/27/20 0920  •  hydrALAZINE (APRESOLINE) injection 10 mg, 10 mg, Intravenous, Q6H PRN, Yessenia Valero MD, 10 mg at 03/04/20 0628  •  HYDROcodone-acetaminophen (NORCO)  MG per tablet 1 tablet, 1 tablet, Oral, Q8H PRN, Edgar Machado PA, 1 tablet at 03/05/20 0651  •  insulin detemir (LEVEMIR) injection 15 Units, 15 Units, Subcutaneous, Q12H, Lorenzo Anderson MD, 15 Units at 03/05/20 0846  •  insulin regular (humuLIN R,novoLIN R) injection 0-14 Units, 0-14 Units, Subcutaneous, Q6H, Rian Sifuentes DO, 3 Units at 03/05/20 0039  •  ketotifen (ZADITOR) 0.025 % ophthalmic solution 1 drop, 1 drop, Both Eyes, BID, Edgar Machado PA, 1 drop at 03/05/20 0846  •  lactobacillus acidophilus (RISAQUAD) capsule 1 capsule, 1 capsule, Oral, Daily, Collette Melo MD, 1 capsule at 03/05/20 0838  •  losartan (COZAAR) tablet 50 mg, 50 mg, Oral, Daily, Yessenia Valero MD, 50 mg at 03/05/20 0838  •  metoprolol tartrate (LOPRESSOR) tablet 100 mg, 100 mg, Oral, Q12H, Lorenzo Anderson MD, 100 mg at 03/05/20 0838  •  niCARdipine (CARDENE) 40 mg in 200 mL NS (0.2 mg/mL) infusion, 5-15 mg/hr, Intravenous, Titrated, Edgar Machado PA  •  ondansetron (ZOFRAN) injection 4 mg, 4 mg, Intravenous, Once PRN, Ced Gómez CRNA  •  pantoprazole (PROTONIX) injection 40 mg, 40 mg, Intravenous, Q AM,  Edgar Machado PA, 40 mg at 20 0644  •  pregabalin (LYRICA) capsule 150 mg, 150 mg, Oral, Q8H, Lorenzo Anderson MD, 150 mg at 20 1451  •  rOPINIRole (REQUIP) tablet 4 mg, 4 mg, Oral, Nightly, Lorenzo Anderson MD, 4 mg at 20 2139  •  sodium chloride 0.9 % flush 10 mL, 10 mL, Intravenous, Q12H, Edgar Machado PA, 10 mL at 20 2156  •  sodium chloride 0.9 % flush 10 mL, 10 mL, Intravenous, PRN, Edgar Machado PA    Antibiotics:  Anti-Infectives (From admission, onward)    Ordered     Dose/Rate Route Frequency Start Stop    20 194  DAPTOmycin (CUBICIN) 500 mg/50 mL in sodium chloride     Kip Mckeon RPH reviewed the order on 20 0903.   Ordering Provider:  Collette Melo MD    8 mg/kg × 60.3 kg  over 30 Minutes Intravenous Every 24 Hours 20 0600 20 0559    20 0746  meropenem (MERREM) 1 g/100 mL 0.9% NS VTB (mbp)     Ordering Provider:  Parag Gordon MD    1 g  over 30 Minutes Intravenous Once 20 0845 20 0921    20 0831  cefepime (MAXIPIME) 2 g/100 mL 0.9% NS (mbp)     Ordering Provider:  Ranjana Easley APRN    2 g  200 mL/hr over 30 Minutes Intravenous Once 20 0930 20 1157    20 0203  vancomycin 500 mg/100 mL 0.9% NS IVPB (mbp)     Ordering Provider:  Ced Kenny RPH    500 mg  100 mL/hr over 60 Minutes Intravenous Once 20 0300 20 0511                Physical Exam:   Vital Signs  Temp (24hrs), Av.6 °F (37 °C), Min:98.2 °F (36.8 °C), Max:98.9 °F (37.2 °C)    Temp  Min: 98.2 °F (36.8 °C)  Max: 98.9 °F (37.2 °C)  BP  Min: 109/67  Max: 157/85  Pulse  Min: 80  Max: 105  Resp  Min: 16  Max: 23  SpO2  Min: 87 %  Max: 100 %    GENERAL: extubated,sitting in chair, transferring with minimal assistance  HEENT: Normocephalic, atraumatic.  No conjunctival injection. No icterus.   HEART: RRR; No murmur, rubs, gallops.   LUNGS: diminished at lung bases bilaterally   ABDOMEN: Soft, nontender,  nondistended. Positive bowel sounds. No rebound or guarding.   MSK:  No joint effusions   SKIN: Warm and dry without cutaneous eruptions on Inspection/palpation.   NEURO: sedated  Left groin with a wound VAC in place MELISSA drains in place with bloody drainage.    Right Chest tube in place    Laboratory Data    Results from last 7 days   Lab Units 03/05/20  0344 03/04/20  0341 03/03/20  0338   WBC 10*3/mm3 9.39 8.93 11.58*   HEMOGLOBIN g/dL 10.4* 10.8* 11.4*   HEMATOCRIT % 33.9* 34.4 36.1   PLATELETS 10*3/mm3 216 240 272     Results from last 7 days   Lab Units 03/05/20  0344   SODIUM mmol/L 138   POTASSIUM mmol/L 4.2   CHLORIDE mmol/L 102   CO2 mmol/L 26.0   BUN mg/dL 43*   CREATININE mg/dL 0.54*   GLUCOSE mg/dL 147*   CALCIUM mg/dL 8.6     Results from last 7 days   Lab Units 03/05/20  0344   ALK PHOS U/L 101   BILIRUBIN mg/dL 0.3   ALT (SGPT) U/L 15   AST (SGOT) U/L 24         Results from last 7 days   Lab Units 02/29/20  1604   CRP mg/dL 8.05*     Results from last 7 days   Lab Units 02/28/20  0243   LACTATE mmol/L 1.5     Results from last 7 days   Lab Units 03/05/20  0344   CK TOTAL U/L 29     Results from last 7 days   Lab Units 03/04/20  0341 03/03/20  0338 03/02/20  1131   VANCOMYCIN TR mcg/mL  --   --  38.50*   VANCOMYCIN RM mcg/mL 13.80 26.10  --      Estimated Creatinine Clearance: 118.6 mL/min (A) (by C-G formula based on SCr of 0.54 mg/dL (L)).      Microbiology:  2/24: BCx NG    2/24: wound few WBCs, GPC in pairs -- MRSA   2/28/2020: Operative cultures are growing MRSA  2/29/2020: Sputum culture is NGSF      Radiology:  Imaging Results (Last 72 Hours)     Procedure Component Value Units Date/Time    XR Chest 1 View [[id number]] Collected:  03/05/20 0836     Updated:  03/05/20 0844    Narrative:       EXAMINATION: XR CHEST 1 VW-     INDICATION: Postop; I73.9-Peripheral vascular disease, unspecified;  I63-Nzrjzcp effusion, not elsewhere classified; Z95.828-Presence of  other vascular implants and grafts;  "R13.13-Dysphagia, pharyngeal phase.     COMPARISON: 03/04/2020.     FINDINGS: Right upper extremity PICC line is seen with its tip at the  cavoatrial junction. Feeding tube is seen below the left hemidiaphragm.  Heart is normal in size. Right pigtail catheter has been removed. There  are small areas of discoid atelectasis in the right mid and lower lung,  and appears to be very small pneumothorax in the lateral right base,  approximately 7 mm in width. This does not extend to the apex, or across  the base of the lung, although there is probably trace air in the minor  fissure. Left lung shows near resolution of focal atelectasis in the  medial left base. There is stable diffuse interstitial disease  elsewhere.       Impression:       1. Right pleural drain removal with very small right basilar  pneumothorax.  2. Mild new discoid atelectasis in the right mid and lower lung, but  interval clearing of the left lung base, since yesterday's study.      D:  03/05/2020  E:  03/05/2020       XR Abdomen KUB [[id number]] Collected:  03/04/20 1420     Updated:  03/04/20 6037    Narrative:       EXAMINATION: XR ABDOMEN/KUB-03/04/2020:     INDICATION: S/P Keofeed placement; I73.9-Peripheral vascular disease,  unspecified; F06-Slblgcc effusion, not elsewhere classified;  Z95.828-Presence of other vascular implants and grafts;  R13.13-Dysphagia, pharyngeal phase.     COMPARISON: NONE.     FINDINGS: Feeding tube is seen in the duodenal bulb. The abdomen appears  somewhat \"gassy\", with gas throughout the colon and some small bowel  gas, but loops are only upper limits of normal size. No bowel wall edema  or pneumatosis is seen.       Impression:       Feeding tube tip in the duodenal bulb.     D:  03/04/2020  E:  03/04/2020            This report was finalized on 3/4/2020 10:54 PM by Dr. Jaswant Frank MD.       XR Chest 1 View [[id number]] Collected:  03/04/20 0825     Updated:  03/04/20 2254    Narrative:       EXAMINATION: XR CHEST 1 " VW-     INDICATION: Respiratory failure; I73.9-Peripheral vascular disease,  unspecified; H29-Alezyxc effusion, not elsewhere classified;  Z95.828-Presence of other vascular implants and grafts;  R13.10-Dysphagia, unspecified.     COMPARISON: 03/03/2020.     FINDINGS: NG tube is seen in the left hemidiaphragm. Right pigtail  catheter remains in place. PICC line is at the cavoatrial junction in  good position. Heart is normal in size. Patient's mild diffuse pulmonary  interstitial disease pattern and focal left basilar atelectasis or  effusion appears stable. No pneumothorax is seen.       Impression:       Stable diffuse pulmonary interstitial disease and left  basilar atelectasis or effusion.      D:  03/04/2020  E:  03/04/2020     This report was finalized on 3/4/2020 10:51 PM by Dr. Jaswant Frank MD.       SLP FEES - Fiberoptic Endo Eval Swallow [[id number]] Resulted:  03/04/20 1125     Updated:  03/04/20 1125    Narrative:       This procedure was auto-finalized with no dictation required.    XR Chest 1 View [[id number]] Collected:  03/03/20 0854     Updated:  03/03/20 2210    Narrative:       EXAMINATION: XR CHEST 1 VW-03/03/2020:     INDICATION: Respiratory failure; I73.9-Peripheral vascular disease,  unspecified; R32-Ppikrrt effusion, not elsewhere classified;  Z95.828-Presence of other vascular implants and grafts.     COMPARISON: 03/01/2020.     FINDINGS: ET tube has been removed. NG tube, PICC line, and right  pleural drain remain in place. The heart is normal in size. Diffuse  pulmonary interstitial disease has improved, presumably resolving edema.  No lung consolidation, effusion, or pneumothorax is seen.       Impression:       Mild remaining pulmonary interstitial disease. No evidence  of pneumothorax or other new chest pathology.     D:  03/03/2020  E:  03/03/2020            This report was finalized on 3/3/2020 10:07 PM by Dr. Jaswant Frank MD.           I read her radiographic studies    Impression:   1.   MRSA left groin abscess/cellulitis/graft infection-status post graft removal 2/28.  She will require a prolonged course of intravenous antibiotic therapy directed toward MRSA.  Now on daptomycin- lft's and cpk normal  2.  Bilateral pulmonary infiltrates/pleural effusions-this may be secondary to edema or aspiration pneumonia; s/p a course of merrem Merrem   procalcitonin negative x 2  3.  Acute hypoxic respiratory failure-requiring endotracheal intubation and mechanical ventilation  Now extubated  4.  S/p femorofemoral bypass with gortex graft, 1/15  5.  Type 2 Diabetes Mellitus  6.  Penicillin allergy  7.  Type 2 diabetes mellitus-with very poor control  8.  Malnutrition    PLAN/RECOMMENDATIONS:   1.  Continue  daptomycin.    3.  Sputum Gram stain and culture--NGSF  4.  probiotic  5   Cpk, cmp 3/9    Discussed with daughter   Discussed with TIANNA Melo MD  3/5/2020  3:02 PM

## 2020-03-05 NOTE — PROGRESS NOTES
Clinical Nutrition     Multidisciplinary Rounds      Patient Name: Saskia Groves  Date of Encounter: 03/05/20 8:55 AM  MRN: [id number]  Admission date: 2/23/2020    Reason for visit: SUDHAKAR. RD to continue to follow per protocol.     Pt walking in hallway with OT, in good spirits, states she will work on her swallowing exercises    Current diet: NPO Diet  Orders Placed This Encounter      Diet, Tube Feeding Tube Feeding Formula: Peptamen AF (Peptide Based, Critical Care, ALI)   ndt: goal rate @60ml/hr, frre water @25ml/hr    Intake: 490ml, 41% goal volume    Intervention:  Follow treatment plan  Care plan reviewed    Follow up:   Per protocol      Lainey Villa RD  8:55 AM  Time: 20min

## 2020-03-05 NOTE — PLAN OF CARE
1. Pt  intact neurologically.  She ambulated hallway twice.  2.  Room air kept sats >95%.  3. Cardiac- HR 80-95 bpm with -130 mmhg.  Pulses dopplerable.  4. GI- Peptamen 60 ml/hr and FW @ 25 ml/hr. 5. -  Urine output >500 ml.  6.  Pt ambulated hallway twice. 7. MELISSA drain (Right)=  30 ml/hr  MELISSA drain (Left)= 40 ml/hr.

## 2020-03-05 NOTE — PROGRESS NOTES
"Ssakia Groves  [id number]  1970     LOS: 11 days   Patient Care Team:  Meagan Barlow APRN as PCP - General (Family Medicine)  Tyson Donaldson DO as Consulting Physician (Cardiology)    Chief Complaint: Infected femoral-femoral bypass graft      Subjective: No complaints  Objective:     Vital Sign Min/Max for last 24 hours  Temp  Min: 97.7 °F (36.5 °C)  Max: 98.4 °F (36.9 °C)   BP  Min: 92/51  Max: 158/82   Pulse  Min: 84  Max: 109   Resp  Min: 16  Max: 23   SpO2  Min: 87 %  Max: 98 %   No data recorded   No data recorded     Flowsheet Rows      First Filed Value   Admission Height  160 cm (62.99\") Documented at 02/23/2020 2240   Admission Weight  60.4 kg (133 lb 1.6 oz) Documented at 02/23/2020 2240          Physical Exam:    Wound: Satisfactory    Pulses:     Mediastinal and Chest Tube Drainage:       Results Review:   Results from last 7 days   Lab Units 03/05/20  0344   WBC 10*3/mm3 9.39   HEMOGLOBIN g/dL 10.4*   HEMATOCRIT % 33.9*   PLATELETS 10*3/mm3 216     Results from last 7 days   Lab Units 03/05/20  0344   SODIUM mmol/L 138   POTASSIUM mmol/L 4.2   CHLORIDE mmol/L 102   CO2 mmol/L 26.0   BUN mg/dL 43*   CREATININE mg/dL 0.54*   GLUCOSE mg/dL 147*   CALCIUM mg/dL 8.6     Results from last 7 days   Lab Units 03/01/20  0428   PH, ARTERIAL pH units 7.463*   PO2 ART mm Hg 89.1   PCO2, ARTERIAL mm Hg 36.6   HCO3 ART mmol/L 26.1*         Assessment      Acute hypoxemic respiratory failure (CMS/HCC)    PVD (peripheral vascular disease) (CMS/HCC)    Current smoker    COPD (chronic obstructive pulmonary disease) (CMS/HCC)    Coronary artery disease    Hypertension    Hyperlipidemia    Diabetes mellitus (CMS/HCC)    Sepsis (CMS/HCC)    Pleural effusion - bilateral mod/large pleural effusions on CT chest 2/27/2020.     Acute pulmonary edema (CMS/HCC)      Transfer to telemetry.  Ambulate twice daily        Deandre Oseguera MD  03/05/20  6:31 AM      Please note that portions of this note were " completed with a voice recognition program. Efforts were made to edit the dictations, but words may be mistranscribed

## 2020-03-05 NOTE — PROGRESS NOTES
"Intensive Care Follow-up     Hospital:  LOS: 11 days   Ms. Saskia Groves, 49 y.o. female is followed for:   Acute hypoxemic respiratory failure (CMS/HCC)        Subjective   Interval History:  The chart has been reviewed.  The patient is much more awake and is working very well with physical therapy.  She has remained afebrile.    The patient's past medical, surgical and social history were reviewed and updated in Epic as appropriate.        Objective     Infusions:    niCARdipine 5-15 mg/hr     Medications:    amitriptyline 10 mg Oral Nightly   aspirin 325 mg Oral Daily   atorvastatin 10 mg Oral Daily   budesonide 0.5 mg Nebulization BID - RT   clopidogrel 75 mg Oral Daily   DAPTOmycin 8 mg/kg Intravenous Q24H   famotidine 20 mg Oral BID AC   insulin detemir 15 Units Subcutaneous Q12H   insulin regular 0-14 Units Subcutaneous Q6H   ketotifen 1 drop Both Eyes BID   lactobacillus acidophilus 1 capsule Oral Daily   losartan 50 mg Oral Daily   metoprolol tartrate 100 mg Oral Q12H   pantoprazole 40 mg Intravenous Q AM   pregabalin 150 mg Oral Q8H   rOPINIRole 4 mg Oral Nightly   sodium chloride 10 mL Intravenous Q12H       Vital Sign Min/Max for last 24 hours  Temp  Min: 98.2 °F (36.8 °C)  Max: 98.8 °F (37.1 °C)   BP  Min: 92/51  Max: 157/85   Pulse  Min: 80  Max: 105   Resp  Min: 16  Max: 23   SpO2  Min: 87 %  Max: 98 %   Flow (L/min)  Min: 2  Max: 6       Input/Output for last 24 hour shift  03/04 0701 - 03/05 0700  In: 2045   Out: 580 [Urine:450; Drains:130]      Objective:  General Appearance:  In no acute distress, well-appearing and comfortable.    Vital signs: (most recent): Blood pressure 112/71, pulse 80, temperature 98.8 °F (37.1 °C), temperature source Oral, resp. rate 18, height 160 cm (62.99\"), weight 60.3 kg (133 lb), SpO2 94 %, not currently breastfeeding.    HEENT: (Keofeed in place.)    Lungs:  Normal effort and normal respiratory rate.  Breath sounds clear to auscultation.  No stridor.  No rales, " decreased breath sounds, wheezes or rhonchi.    Heart: Normal rate.  Regular rhythm.  S1 normal and S2 normal.  No murmur.   Chest: Symmetric chest wall expansion.   Abdomen: Abdomen is soft and non-distended.  Bowel sounds are normal.   There is no abdominal tenderness.     Extremities: Normal range of motion.  (There is 1+ pitting edema to the lower extremities.  )  Neurological: Patient is alert and oriented to person, place and time.    Pupils:  Pupils are equal, round, and reactive to light.  Pupils are equal.   Skin:  Warm.              Results from last 7 days   Lab Units 03/05/20  0344 03/04/20  0341 03/03/20  0338   WBC 10*3/mm3 9.39 8.93 11.58*   HEMOGLOBIN g/dL 10.4* 10.8* 11.4*   PLATELETS 10*3/mm3 216 240 272     Results from last 7 days   Lab Units 03/05/20  0344 03/04/20  0341 03/03/20  0338  03/01/20  0404 02/29/20  1557 02/29/20  0411   SODIUM mmol/L 138 143 142   < > 140 142 139   POTASSIUM mmol/L 4.2 4.1 4.3   < > 4.2 4.1 3.9   CO2 mmol/L 26.0 26.0 25.0   < > 24.0 24.0 22.0   BUN mg/dL 43* 41* 38*   < > 28* 25* 25*   CREATININE mg/dL 0.54* 0.56* 0.61   < > 0.88 0.84 0.87   MAGNESIUM mg/dL  --   --  1.8  --  1.8 1.8 2.0   PHOSPHORUS mg/dL  --   --   --   --  2.7 3.0 4.5   GLUCOSE mg/dL 147* 211* 252*   < > 148* 93 168*    < > = values in this interval not displayed.     Estimated Creatinine Clearance: 118.6 mL/min (A) (by C-G formula based on SCr of 0.54 mg/dL (L)).    Results from last 7 days   Lab Units 03/01/20  0428   PH, ARTERIAL pH units 7.463*   PCO2, ARTERIAL mm Hg 36.6   PO2 ART mm Hg 89.1       I reviewed the patient's results and images.     Assessment/Plan   Impression        Acute hypoxemic respiratory failure (CMS/HCC)    PVD (peripheral vascular disease) (CMS/HCC)    Current smoker    COPD (chronic obstructive pulmonary disease) (CMS/HCC)    Coronary artery disease    Hypertension    Hyperlipidemia    Diabetes mellitus (CMS/HCC)    Sepsis (CMS/HCC)    Pleural effusion - bilateral  mod/large pleural effusions on CT chest 2/27/2020.     Acute pulmonary edema (CMS/HCC)       Plan        Continue to mobilize with physical therapy.  Pulmonary hygiene has been encouraged.  Antibiotics per the infectious disease service.  Stop lactulose.  Continue with current glucose control.  Continue current tube feeding.  She will continue to work with speech therapy and hopefully resolve her dysphasia.  Plan will be for transition to telemetry today.    Plan of care and goals reviewed with mulitdisciplinary/antibiotic stewardship team during rounds.   I discussed the patient's findings and my recommendations with patient, family and nursing staff       Lorenzo Anderson MD, [facility]  Pulmonology and Critical Care Medicine

## 2020-03-06 LAB
ALBUMIN SERPL-MCNC: 2.6 G/DL (ref 3.5–5.2)
ALBUMIN/GLOB SERPL: 0.7 G/DL
ALP SERPL-CCNC: 108 U/L (ref 39–117)
ALT SERPL W P-5'-P-CCNC: 19 U/L (ref 1–33)
ANION GAP SERPL CALCULATED.3IONS-SCNC: 9 MMOL/L (ref 5–15)
AST SERPL-CCNC: 32 U/L (ref 1–32)
BASOPHILS # BLD AUTO: 0.06 10*3/MM3 (ref 0–0.2)
BASOPHILS NFR BLD AUTO: 0.7 % (ref 0–1.5)
BILIRUB SERPL-MCNC: 0.3 MG/DL (ref 0.2–1.2)
BUN BLD-MCNC: 45 MG/DL (ref 6–20)
BUN/CREAT SERPL: 70.3 (ref 7–25)
CALCIUM SPEC-SCNC: 8.8 MG/DL (ref 8.6–10.5)
CHLORIDE SERPL-SCNC: 107 MMOL/L (ref 98–107)
CK SERPL-CCNC: 30 U/L (ref 20–180)
CO2 SERPL-SCNC: 28 MMOL/L (ref 22–29)
CREAT BLD-MCNC: 0.64 MG/DL (ref 0.57–1)
DEPRECATED RDW RBC AUTO: 48.4 FL (ref 37–54)
EOSINOPHIL # BLD AUTO: 0.22 10*3/MM3 (ref 0–0.4)
EOSINOPHIL NFR BLD AUTO: 2.6 % (ref 0.3–6.2)
ERYTHROCYTE [DISTWIDTH] IN BLOOD BY AUTOMATED COUNT: 14.2 % (ref 12.3–15.4)
GFR SERPL CREATININE-BSD FRML MDRD: 98 ML/MIN/1.73
GLOBULIN UR ELPH-MCNC: 3.8 GM/DL
GLUCOSE BLD-MCNC: 79 MG/DL (ref 65–99)
GLUCOSE BLDC GLUCOMTR-MCNC: 102 MG/DL (ref 70–130)
GLUCOSE BLDC GLUCOMTR-MCNC: 132 MG/DL (ref 70–130)
GLUCOSE BLDC GLUCOMTR-MCNC: 148 MG/DL (ref 70–130)
GLUCOSE BLDC GLUCOMTR-MCNC: 68 MG/DL (ref 70–130)
HCT VFR BLD AUTO: 34 % (ref 34–46.6)
HGB BLD-MCNC: 10.4 G/DL (ref 12–15.9)
IMM GRANULOCYTES # BLD AUTO: 0.13 10*3/MM3 (ref 0–0.05)
IMM GRANULOCYTES NFR BLD AUTO: 1.5 % (ref 0–0.5)
LYMPHOCYTES # BLD AUTO: 1.83 10*3/MM3 (ref 0.7–3.1)
LYMPHOCYTES NFR BLD AUTO: 21.8 % (ref 19.6–45.3)
MCH RBC QN AUTO: 28.4 PG (ref 26.6–33)
MCHC RBC AUTO-ENTMCNC: 30.6 G/DL (ref 31.5–35.7)
MCV RBC AUTO: 92.9 FL (ref 79–97)
MONOCYTES # BLD AUTO: 0.94 10*3/MM3 (ref 0.1–0.9)
MONOCYTES NFR BLD AUTO: 11.2 % (ref 5–12)
NEUTROPHILS # BLD AUTO: 5.22 10*3/MM3 (ref 1.7–7)
NEUTROPHILS NFR BLD AUTO: 62.2 % (ref 42.7–76)
NRBC BLD AUTO-RTO: 0 /100 WBC (ref 0–0.2)
PLATELET # BLD AUTO: 189 10*3/MM3 (ref 140–450)
PMV BLD AUTO: 10.4 FL (ref 6–12)
POTASSIUM BLD-SCNC: 4.4 MMOL/L (ref 3.5–5.2)
PROT SERPL-MCNC: 6.4 G/DL (ref 6–8.5)
RBC # BLD AUTO: 3.66 10*6/MM3 (ref 3.77–5.28)
SODIUM BLD-SCNC: 144 MMOL/L (ref 136–145)
WBC NRBC COR # BLD: 8.4 10*3/MM3 (ref 3.4–10.8)

## 2020-03-06 PROCEDURE — 85025 COMPLETE CBC W/AUTO DIFF WBC: CPT | Performed by: INTERNAL MEDICINE

## 2020-03-06 PROCEDURE — 99233 SBSQ HOSP IP/OBS HIGH 50: CPT | Performed by: INTERNAL MEDICINE

## 2020-03-06 PROCEDURE — 80053 COMPREHEN METABOLIC PANEL: CPT | Performed by: PHYSICIAN ASSISTANT

## 2020-03-06 PROCEDURE — 25010000002 DAPTOMYCIN PER 1 MG: Performed by: PHYSICIAN ASSISTANT

## 2020-03-06 PROCEDURE — 92526 ORAL FUNCTION THERAPY: CPT

## 2020-03-06 PROCEDURE — 82962 GLUCOSE BLOOD TEST: CPT

## 2020-03-06 PROCEDURE — 63710000001 INSULIN DETEMIR PER 5 UNITS: Performed by: PHYSICIAN ASSISTANT

## 2020-03-06 PROCEDURE — 82550 ASSAY OF CK (CPK): CPT | Performed by: PHYSICIAN ASSISTANT

## 2020-03-06 PROCEDURE — 94799 UNLISTED PULMONARY SVC/PX: CPT

## 2020-03-06 RX ORDER — FAMOTIDINE 20 MG/1
20 TABLET, FILM COATED ORAL ONCE
Status: COMPLETED | OUTPATIENT
Start: 2020-03-06 | End: 2020-03-06

## 2020-03-06 RX ORDER — HYDROCODONE BITARTRATE AND ACETAMINOPHEN 10; 325 MG/1; MG/1
1 TABLET ORAL ONCE
Status: COMPLETED | OUTPATIENT
Start: 2020-03-06 | End: 2020-03-06

## 2020-03-06 RX ADMIN — HYDROCODONE BITARTRATE AND ACETAMINOPHEN 1 TABLET: 10; 325 TABLET ORAL at 17:47

## 2020-03-06 RX ADMIN — HYDROCODONE BITARTRATE AND ACETAMINOPHEN 1 TABLET: 10; 325 TABLET ORAL at 06:46

## 2020-03-06 RX ADMIN — INSULIN DETEMIR 15 UNITS: 100 INJECTION, SOLUTION SUBCUTANEOUS at 08:52

## 2020-03-06 RX ADMIN — LOSARTAN POTASSIUM 50 MG: 50 TABLET ORAL at 08:48

## 2020-03-06 RX ADMIN — ATORVASTATIN CALCIUM 10 MG: 10 TABLET, FILM COATED ORAL at 08:49

## 2020-03-06 RX ADMIN — DAPTOMYCIN 500 MG: 500 INJECTION, POWDER, LYOPHILIZED, FOR SOLUTION INTRAVENOUS at 08:52

## 2020-03-06 RX ADMIN — Medication 1 CAPSULE: at 08:48

## 2020-03-06 RX ADMIN — FAMOTIDINE 20 MG: 20 TABLET ORAL at 17:25

## 2020-03-06 RX ADMIN — PREGABALIN 150 MG: 75 CAPSULE ORAL at 08:48

## 2020-03-06 RX ADMIN — FAMOTIDINE 20 MG: 20 TABLET ORAL at 08:48

## 2020-03-06 RX ADMIN — CLOPIDOGREL BISULFATE 75 MG: 75 TABLET ORAL at 08:48

## 2020-03-06 RX ADMIN — METOPROLOL TARTRATE 100 MG: 50 TABLET, FILM COATED ORAL at 08:48

## 2020-03-06 RX ADMIN — BUDESONIDE 0.5 MG: 0.5 INHALANT RESPIRATORY (INHALATION) at 10:03

## 2020-03-06 RX ADMIN — PREGABALIN 150 MG: 75 CAPSULE ORAL at 17:31

## 2020-03-06 RX ADMIN — HYDROCODONE BITARTRATE AND ACETAMINOPHEN 1 TABLET: 10; 325 TABLET ORAL at 17:25

## 2020-03-06 RX ADMIN — BUDESONIDE 0.5 MG: 0.5 INHALANT RESPIRATORY (INHALATION) at 20:40

## 2020-03-06 RX ADMIN — KETOTIFEN FUMARATE 1 DROP: 0.35 SOLUTION/ DROPS OPHTHALMIC at 22:04

## 2020-03-06 RX ADMIN — FAMOTIDINE 20 MG: 20 TABLET ORAL at 17:47

## 2020-03-06 RX ADMIN — PANTOPRAZOLE SODIUM 40 MG: 40 INJECTION, POWDER, FOR SOLUTION INTRAVENOUS at 06:45

## 2020-03-06 RX ADMIN — ASPIRIN 325 MG ORAL TABLET 325 MG: 325 PILL ORAL at 08:48

## 2020-03-06 RX ADMIN — KETOTIFEN FUMARATE 1 DROP: 0.35 SOLUTION/ DROPS OPHTHALMIC at 11:09

## 2020-03-06 NOTE — PLAN OF CARE
Problem: Patient Care Overview  Goal: Plan of Care Review  Outcome: Ongoing (interventions implemented as appropriate)  Flowsheets (Taken 3/6/2020 8564)  Plan of Care Reviewed With: patient  Note:   SLP treatment completed. Will continue to address dysphagia in tx. Please see note for further details and recommendations.

## 2020-03-06 NOTE — PROGRESS NOTES
"INFECTIOUS DISEASE PROGRESS NOTE     Saskia Groves  1970  [id number]      Admission Date: 2/23/2020    Antibiotic therapy:   Daptomycin    Requesting Provider: Deandre Oseguera MD     Reason for Consultation: Left groin wound infection     History of present illness:  2/24/20:  Patient is a 49 y.o. female seen today for a left groin wound infection.  She underwent femoral endardectomy, and femorofemoral  Bypass with gortex graft on 1/15.  She has been having fevers 100 degrees and above since 1/17. She was seen last week by her PCP and given Bactrim but she couldn't tolerate it and Ceftin was called in. Her left groin continued to remain swollen and warm. She had a venous duplex to rule out DVT and then developed a \"knot\" of her left groin. She presented to the ED at [facility], was given Vancomycin and Merrem and transferred to [facility]. Tmax of 100.5 degrees without leukocytosis, normal lactate and PCT. She is currently on Vancomycin and Merrem and we were consulted for evaluation and treatment.   Labs at [facility] with WBC of 10.4, normal lactate, PCT.  She was tachycardic with tmax of 102.3.  She had fevers at home documented to over 103 degrees.  2/25/20 : She spontaneously drained a copious amount of slightly cloudy straw-colored fluid from her left groin yesterday and has continued to drain.  She complains of cough \"other than that I feel great\".  No n/v//d.  Left groin less tender.   2/26/20:  Graft to be removed on Friday.   She remains afebrile. She doesn't \"feel very good today\".  Family at bedside.   2/27/20:  Surgery scheduled for tomorrow.  She is tearful and afraid she will lose her leg.  She remains afebrile.    2/28/2020: She deteriorated this morning with hypoxemia and bilateral pulmonary opacifications with pleural effusions.  He had a fever to 101 degrees last night.  I discussed her complex situation with Dr. Deandre Oseguera early today and also this evening.  She was taken to surgery " today and her left femoral graft was removed.  A muscle flap was placed.  She is now endotracheally intubated and mechanically ventilated.  She underwent a thoracentesis revealing clear fluid.  Intravenous Merrem was added to her vancomycin this morning due to concerns about possible aspiration pneumonia.  She does have a history of penicillin allergy.  2/29/2020: She remains on ventilatory support.  She has only modest respiratory secretions.  She is still requiring an FiO2 of 60%.  She has been afebrile overnight.  She remains sedated.    Following for Dr. Parag Gordon:  3/1/20: Remains sedated on vent.  Tmax 99.3, FiO2 40% and PEEP 6.  She has a wound VAC on right ground with MELISSA drain with bloody drainage.  She has a right chest tube.  Small amount of clear to red-streaked ETT secretions. On tube feeding.   3/2  Extubated, afebrile, intermittently cooperative  Ongoing issues with hypertension; 90 from chest tube yesterday Day# 8 merrem (intermittent dosing);  vanc trough 38  3/3  Afebrile, reluctant to cooperate with attempts to mobilize, alert, 60 from CT yesterday, no new + cultures, random vanc 26  3/4  Alert, afebrile, more interactive  3/5 alert, afebrile, more interactive and joking with RN, stronger, failed SLP exam yesterday and remains on tube feeding. Having frequent stools and lactulose (which she took at home) has been stopped  3/6 alert, afebrile, chest tube out, feeding tube out after she passed SLP eval        Past Medical History:   Diagnosis Date   • Anxiety    • Arthritis    • Asthma    • Carotid artery stenosis    • Chronic bronchitis (CMS/HCC)    • COPD (chronic obstructive pulmonary disease) (CMS/HCC)    • Coronary artery disease     2 vessels with 50% blockage.  Sees Dr. Donaldson   • Depression    • Diabetes mellitus (CMS/HCC)    • Dyslipidemia    • GERD (gastroesophageal reflux disease)    • Hiatal hernia    • Hyperlipidemia    • Hypertension    • Infectious viral hepatitis    • Lower back  pain    • Migraine    • Multinodular goiter    • S/P thyroid biopsy     2008, 2013, 2015, and 07/15/2019 at [facility], right lobe nodules - all cytologies were benign   • Sleep apnea     can't tolerate the cpap mask   • Subclinical hyperthyroidism        Past Surgical History:   Procedure Laterality Date   • ANTERIOR CERVICAL DISCECTOMY W/ FUSION     • BACK SURGERY      lumbar   •  SECTION  19940    x 2    • CHOLECYSTECTOMY     • COLONOSCOPY     • ENDOSCOPY     • FEMORAL ARTERY CUTDOWN Bilateral 2020    Procedure: FEMORAL ARTERY CUTDOWN WITH REMOVAL OF FEMORAL FEMORAL BYPASS GRAFT BILATERAL;  Surgeon: Deandre Oseguera MD;  Location:  [facility];  Service: Vascular;  Laterality: Bilateral;   • FEMORAL ENDARTERECTOMY Bilateral 1/15/2020    Procedure: FEMORAL ENDARTERECTOMY BILATERAL;  Surgeon: Deandre Oseguera MD;  Location:  [facility];  Service: Vascular   • FEMORAL FEMORAL BYPASS Right 1/15/2020    Procedure: FEMORAL FEMORAL BYPASS;  Surgeon: Deandre Oseguera MD;  Location:  [facility];  Service: Vascular   • HYSTERECTOMY     • KNEE SURGERY Left    • LEG EXCISION LESION/CYST Left 2020    Procedure: GROIN MUSCLE FLAP BILATERAL;  Surgeon: Modesto Whittaker MD;  Location:  [facility];  Service: Plastics;  Laterality: Left;   • WRIST SURGERY Left        Family History   Problem Relation Age of Onset   • Diabetes Mother    • Hypertension Mother    • Arthritis Mother    • Hyperlipidemia Mother    • Migraines Mother    • Thyroid disease Mother    • Hypertension Father    • Lung cancer Father    • Cancer Father    • Stroke Other    • Migraines Maternal Aunt    • Thyroid disease Maternal Aunt    • Heart attack Maternal Aunt    • Osteoporosis Maternal Aunt    • Cancer Maternal Uncle    • Heart attack Maternal Uncle    • Stroke Maternal Uncle    • Hyperlipidemia Maternal Grandmother    • Cancer Maternal Grandmother    • Obesity Maternal Grandmother    • Thyroid disease Daughter    • Obesity Daughter         Social History     Socioeconomic History   • Marital status:      Spouse name: Not on file   • Number of children: 2   • Years of education: Not on file   • Highest education level: Not on file   Occupational History   • 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     Employer: DISABLED     Comment: Back and Leg Pain   Tobacco Use   • Smoking status: Current Every Day Smoker     Packs/day: 1.00     Years: 35.00     Pack years: 35.00     Types: Cigarettes   • Smokeless tobacco: Never Used   Substance and Sexual Activity   • Alcohol use: Not Currently   • Drug use: No   • Sexual activity: Yes     Partners: Male     Comment:    Social History Narrative    Lives in [facility]       Allergies   Allergen Reactions   • Levaquin [Levofloxacin] Nausea And Vomiting   • Penicillins Nausea And Vomiting     Has tolerated cefepime 2/2020   • Codeine Nausea Only   • Flagyl [Metronidazole] Nausea And Vomiting         Medication:    Current Facility-Administered Medications:   •  acetaminophen (TYLENOL) tablet 650 mg, 650 mg, Oral, Q4H PRN, 650 mg at 03/02/20 2128 **OR** acetaminophen (TYLENOL) 160 MG/5ML solution 650 mg, 650 mg, Oral, Q4H PRN, 650 mg at 03/04/20 1226 **OR** acetaminophen (TYLENOL) suppository 650 mg, 650 mg, Rectal, Q4H PRN, Isael Bailey PA, 650 mg at 02/28/20 0429  •  albuterol (PROVENTIL) nebulizer solution 0.083% 2.5 mg/3mL, 2.5 mg, Nebulization, Q6H PRN, Isael Bailey PA  •  amitriptyline (ELAVIL) tablet 10 mg, 10 mg, Oral, Nightly, Isael Bailey PA, 10 mg at 03/05/20 2000  •  aspirin tablet 325 mg, 325 mg, Oral, Daily, Isael Bailey PA, 325 mg at 03/06/20 0848  •  atorvastatin (LIPITOR) tablet 10 mg, 10 mg, Oral, Daily, Isael Bailey PA, 10 mg at 03/06/20 0849  •  budesonide (PULMICORT) nebulizer solution 0.5 mg, 0.5 mg, Nebulization, BID - RT, Isael Bailey PA, 0.5 mg at 03/06/20 1003  •  clopidogrel (PLAVIX) tablet 75 mg, 75 mg, Oral, Daily, Isael Bailey  PEDRO GABRIEL, 75 mg at 03/06/20 0848  •  DAPTOmycin (CUBICIN) 500 mg/50 mL in sodium chloride, 8 mg/kg, Intravenous, Q24H, Isael Bailey PA, 500 mg at 03/06/20 0852  •  dextrose (D50W) 25 g/ 50mL Intravenous Solution 25 g, 25 g, Intravenous, Q15 Min PRN, Isael Bailey PA  •  dextrose (GLUTOSE) oral gel 15 g, 15 g, Oral, Q15 Min PRN, Isael Bailey PA  •  famotidine (PEPCID) tablet 20 mg, 20 mg, Oral, BID AC, Isael Bailey PA, 20 mg at 03/06/20 0848  •  glucagon (human recombinant) (GLUCAGEN DIAGNOSTIC) injection 1 mg, 1 mg, Subcutaneous, Q15 Min PRN, Isael Bailey PA  •  guaiFENesin-dextromethorphan (ROBITUSSIN DM) 100-10 MG/5ML syrup 5 mL, 5 mL, Oral, Q6H PRN, Isael Bailey PA, 5 mL at 02/27/20 0920  •  hydrALAZINE (APRESOLINE) injection 10 mg, 10 mg, Intravenous, Q6H PRN, Isael Bailey PA, 10 mg at 03/04/20 0628  •  HYDROcodone-acetaminophen (NORCO)  MG per tablet 1 tablet, 1 tablet, Oral, Q8H PRN, Isael Bailey PA, 1 tablet at 03/06/20 0646  •  insulin detemir (LEVEMIR) injection 15 Units, 15 Units, Subcutaneous, Q12H, Isael Bailey PA, 15 Units at 03/06/20 0852  •  insulin regular (humuLIN R,novoLIN R) injection 0-14 Units, 0-14 Units, Subcutaneous, Q6H, Isael Bailey PA, 3 Units at 03/05/20 0039  •  ketotifen (ZADITOR) 0.025 % ophthalmic solution 1 drop, 1 drop, Both Eyes, BID, Isael Bailey PA, 1 drop at 03/06/20 1109  •  lactobacillus acidophilus (RISAQUAD) capsule 1 capsule, 1 capsule, Oral, Daily, Isael Bailey PA, 1 capsule at 03/06/20 0848  •  losartan (COZAAR) tablet 50 mg, 50 mg, Oral, Daily, Isael Bailey PA, 50 mg at 03/06/20 0848  •  metoprolol tartrate (LOPRESSOR) tablet 100 mg, 100 mg, Oral, Q12H, Isael Bailey PA, 100 mg at 03/06/20 0848  •  pantoprazole (PROTONIX) injection 40 mg, 40 mg, Intravenous, Q AM, Isael Bailey PA, 40 mg at 03/06/20 0645  •  pregabalin (LYRICA) capsule 150 mg, 150 mg, Oral, Q8H,  Isael Bailey PA, 150 mg at 20 0848  •  rOPINIRole (REQUIP) tablet 4 mg, 4 mg, Oral, Nightly, Isael Bailey PA, 4 mg at 20    Antibiotics:  Anti-Infectives (From admission, onward)    Ordered     Dose/Rate Route Frequency Start Stop    20 194  DAPTOmycin (CUBICIN) 500 mg/50 mL in sodium chloride     Kip Mckeon RPH reviewed the order on 20 0903.   Ordering Provider:  Isael Bailey PA    8 mg/kg × 60.3 kg  over 30 Minutes Intravenous Every 24 Hours 20 0600 20 0559    20 0746  meropenem (MERREM) 1 g/100 mL 0.9% NS VTB (mbp)     Ordering Provider:  Parag Gordon MD    1 g  over 30 Minutes Intravenous Once 20 0845 20 0921    20 0831  cefepime (MAXIPIME) 2 g/100 mL 0.9% NS (mbp)     Ordering Provider:  Ranjana Easley APRN    2 g  200 mL/hr over 30 Minutes Intravenous Once 20 0930 20 1157    20 0203  vancomycin 500 mg/100 mL 0.9% NS IVPB (mbp)     Ordering Provider:  Ced Kenny RPH    500 mg  100 mL/hr over 60 Minutes Intravenous Once 20 0300 20 0511                Physical Exam:   Vital Signs  Temp (24hrs), Av.2 °F (36.8 °C), Min:97.3 °F (36.3 °C), Max:98.9 °F (37.2 °C)    Temp  Min: 97.3 °F (36.3 °C)  Max: 98.9 °F (37.2 °C)  BP  Min: 118/70  Max: 161/83  Pulse  Min: 77  Max: 96  Resp  Min: 16  Max: 20  SpO2  Min: 86 %  Max: 100 %    GENERAL: extubated,sitting in chair, transferring with minimal assistance  HEENT: Normocephalic, atraumatic.  No conjunctival injection. No icterus.   HEART: RRR; No murmur, rubs, gallops.   LUNGS: diminished at lung bases bilaterally   ABDOMEN: Soft, nontender, nondistended. Positive bowel sounds. No rebound or guarding.   MSK:  No joint effusions   SKIN: Warm and dry without cutaneous eruptions on Inspection/palpation.   NEURO: sedated  Left groin with a wound VAC in place MELISSA drains in place with bloody drainage.    Right Chest tube in  place    Laboratory Data    Results from last 7 days   Lab Units 03/06/20 0418 03/05/20  0344 03/04/20  0341   WBC 10*3/mm3 8.40 9.39 8.93   HEMOGLOBIN g/dL 10.4* 10.4* 10.8*   HEMATOCRIT % 34.0 33.9* 34.4   PLATELETS 10*3/mm3 189 216 240     Results from last 7 days   Lab Units 03/06/20  0418   SODIUM mmol/L 144   POTASSIUM mmol/L 4.4   CHLORIDE mmol/L 107   CO2 mmol/L 28.0   BUN mg/dL 45*   CREATININE mg/dL 0.64   GLUCOSE mg/dL 79   CALCIUM mg/dL 8.8     Results from last 7 days   Lab Units 03/06/20  0418   ALK PHOS U/L 108   BILIRUBIN mg/dL 0.3   ALT (SGPT) U/L 19   AST (SGOT) U/L 32         Results from last 7 days   Lab Units 02/29/20  1604   CRP mg/dL 8.05*         Results from last 7 days   Lab Units 03/06/20 0418 03/05/20  0344   CK TOTAL U/L 30 29     Results from last 7 days   Lab Units 03/04/20  0341 03/03/20  0338 03/02/20  1131   VANCOMYCIN TR mcg/mL  --   --  38.50*   VANCOMYCIN RM mcg/mL 13.80 26.10  --      Estimated Creatinine Clearance: 94.5 mL/min (by C-G formula based on SCr of 0.64 mg/dL).      Microbiology:  2/24: BCx NG    2/24: wound few WBCs, GPC in pairs -- MRSA   2/28/2020: Operative cultures are growing MRSA  2/29/2020: Sputum culture is NGSF      Radiology:  Imaging Results (Last 72 Hours)     Procedure Component Value Units Date/Time    XR Chest 1 View [[id number]] Collected:  03/05/20 0836     Updated:  03/05/20 0844    Narrative:       EXAMINATION: XR CHEST 1 VW-     INDICATION: Postop; I73.9-Peripheral vascular disease, unspecified;  T14-Tcsgpwq effusion, not elsewhere classified; Z95.828-Presence of  other vascular implants and grafts; R13.13-Dysphagia, pharyngeal phase.     COMPARISON: 03/04/2020.     FINDINGS: Right upper extremity PICC line is seen with its tip at the  cavoatrial junction. Feeding tube is seen below the left hemidiaphragm.  Heart is normal in size. Right pigtail catheter has been removed. There  are small areas of discoid atelectasis in the right mid and lower  "lung,  and appears to be very small pneumothorax in the lateral right base,  approximately 7 mm in width. This does not extend to the apex, or across  the base of the lung, although there is probably trace air in the minor  fissure. Left lung shows near resolution of focal atelectasis in the  medial left base. There is stable diffuse interstitial disease  elsewhere.       Impression:       1. Right pleural drain removal with very small right basilar  pneumothorax.  2. Mild new discoid atelectasis in the right mid and lower lung, but  interval clearing of the left lung base, since yesterday's study.      D:  03/05/2020  E:  03/05/2020       XR Abdomen KUB [[id number]] Collected:  03/04/20 1420     Updated:  03/04/20 2257    Narrative:       EXAMINATION: XR ABDOMEN/KUB-03/04/2020:     INDICATION: S/P Keofeed placement; I73.9-Peripheral vascular disease,  unspecified; J62-[place] effusion, not elsewhere classified;  Z95.828-Presence of other vascular implants and grafts;  R13.13-Dysphagia, pharyngeal phase.     COMPARISON: NONE.     FINDINGS: Feeding tube is seen in the duodenal bulb. The abdomen appears  somewhat \"gassy\", with gas throughout the colon and some small bowel  gas, but loops are only upper limits of normal size. No bowel wall edema  or pneumatosis is seen.       Impression:       Feeding tube tip in the duodenal bulb.     D:  03/04/2020  E:  03/04/2020            This report was finalized on 3/4/2020 10:54 PM by Dr. Jaswant Frank MD.       XR Chest 1 View [[id number]] Collected:  03/04/20 0825     Updated:  03/04/20 2254    Narrative:       EXAMINATION: XR CHEST 1 VW-     INDICATION: Respiratory failure; I73.9-Peripheral vascular disease,  unspecified; I49-[place]zi effusion, not elsewhere classified;  Z95.828-Presence of other vascular implants and grafts;  R13.10-Dysphagia, unspecified.     COMPARISON: 03/03/2020.     FINDINGS: NG tube is seen in the left hemidiaphragm. Right pigtail  catheter remains in " place. PICC line is at the cavoatrial junction in  good position. Heart is normal in size. Patient's mild diffuse pulmonary  interstitial disease pattern and focal left basilar atelectasis or  effusion appears stable. No pneumothorax is seen.       Impression:       Stable diffuse pulmonary interstitial disease and left  basilar atelectasis or effusion.      D:  03/04/2020  E:  03/04/2020     This report was finalized on 3/4/2020 10:51 PM by Dr. Jaswant Frank MD.       SLP FEES - Fiberoptic Endo Eval Swallow [[id number]] Resulted:  03/04/20 1125     Updated:  03/04/20 1125    Narrative:       This procedure was auto-finalized with no dictation required.    XR Chest 1 View [[id number]] Collected:  03/03/20 0854     Updated:  03/03/20 2210    Narrative:       EXAMINATION: XR CHEST 1 VW-03/03/2020:     INDICATION: Respiratory failure; I73.9-Peripheral vascular disease,  unspecified; K54-G[place]qepe effusion, not elsewhere classified;  Z95.828-Presence of other vascular implants and grafts.     COMPARISON: 03/01/2020.     FINDINGS: ET tube has been removed. NG tube, PICC line, and right  pleural drain remain in place. The heart is normal in size. Diffuse  pulmonary interstitial disease has improved, presumably resolving edema.  No lung consolidation, effusion, or pneumothorax is seen.       Impression:       Mild remaining pulmonary interstitial disease. No evidence  of pneumothorax or other new chest pathology.     D:  03/03/2020  E:  03/03/2020            This report was finalized on 3/3/2020 10:07 PM by Dr. Jaswant Frank MD.           I read her radiographic studies    Impression:   1.  MRSA left groin abscess/cellulitis/graft infection-status post graft removal 2/28.  She will require a prolonged course of intravenous antibiotic therapy directed toward MRSA.  Now on daptomycin- lft's and cpk normal  2.  Bilateral pulmonary infiltrates/pleural effusions-this may be secondary to edema or aspiration pneumonia; s/p a course of  merrem Merrem   procalcitonin negative x 2  3.  Acute hypoxic respiratory failure-requiring endotracheal intubation and mechanical ventilation  Now extubated  4.  S/p femorofemoral bypass with gortex graft, 1/15  5.  Type 2 Diabetes Mellitus  6.  Penicillin allergy  7.  Type 2 diabetes mellitus-with very poor control  8.  Malnutrition    PLAN/RECOMMENDATIONS:   1.  Continue  daptomycin to 4/20 to complete 8 weeks therapy  3.  Sputum Gram stain and culture--NGSF  4.  probiotic  5   Cpk, cmp 3/9      Will discuss with JOY Melo MD  3/6/2020  12:27 PM

## 2020-03-06 NOTE — PROGRESS NOTES
"    [facility] Medicine Services  PROGRESS NOTE    Patient Name: Saskia Groves  : 1970  MRN: [id number]    Date of Admission: 2020  Primary Care Physician: Meagan Barlow APRN    Subjective   Subjective     CC:  l groin abscess post fem-fem    HPI:  Feels fine, no dyspnea, no complaints.  \"All I need is food!\"    Review of Systems  No fevers  Has been up walking  No abd pain or nausea  minmal pain at groins    Objective   Objective     Vital Signs:   Temp:  [97.3 °F (36.3 °C)-98.9 °F (37.2 °C)] 97.3 °F (36.3 °C)  Heart Rate:  [80-96] 90  Resp:  [16-22] 16  BP: (109-161)/() 141/83        Physical Exam:  Gen:  WD/WN awake in bed surrounded by dtrs and grandkids.    Neuro: alert and oriented, clear speech, follows commands, grossly nonfocal  HEENT:  NC/AT PERRL, OP benign  Neck:  Supple, no LAD  Heart RRR no murmur, rub, or gallop;  Lungs CTA nonlabored except a few crackles R  base.  No wheeze  Abd:  Soft, nontender, no rebound or guarding, pos BS  Extrem:  No c/c/e  MELISSA drain noted - serosang.        Results Reviewed:  Results from last 7 days   Lab Units 20   WBC 10*3/mm3 8.40 9.39 8.93   HEMOGLOBIN g/dL 10.4* 10.4* 10.8*   HEMATOCRIT % 34.0 33.9* 34.4   PLATELETS 10*3/mm3 189 216 240     Results from last 7 days   Lab Units 20  1557   SODIUM mmol/L 144 138 143   < > 142   POTASSIUM mmol/L 4.4 4.2 4.1   < > 4.1   CHLORIDE mmol/L 107 102 106   < > 107   CO2 mmol/L 28.0 26.0 26.0   < > 24.0   BUN mg/dL 45* 43* 41*   < > 25*   CREATININE mg/dL 0.64 0.54* 0.56*   < > 0.84   GLUCOSE mg/dL 79 147* 211*   < > 93   CALCIUM mg/dL 8.8 8.6 8.3*   < > 7.8*   ALT (SGPT) U/L 19 15  --   --  35*   AST (SGOT) U/L 32 24  --   --  63*    < > = values in this interval not displayed.     Estimated Creatinine Clearance: 94.5 mL/min (by C-G formula based on SCr of 0.64 mg/dL).    Microbiology " Results Abnormal     Procedure Component Value - Date/Time    Fungus Culture - Body Fluid, Pleural Cavity [[id number]] Collected:  02/28/20 1341    Lab Status:  Preliminary result Specimen:  Body Fluid from Pleural Cavity Updated:  03/04/20 1530     Fungus Culture No fungus isolated at less than 1 week    AFB Culture - Body Fluid, Pleural Cavity [[id number]] Collected:  02/28/20 1341    Lab Status:  Preliminary result Specimen:  Body Fluid from Pleural Cavity Updated:  03/04/20 1530     AFB Culture No AFB isolated at less than 1 week     AFB Stain No acid fast bacilli seen on concentrated smear    Fungus Culture - Body Fluid, Groin, right [[id number]] Collected:  02/28/20 1417    Lab Status:  Preliminary result Specimen:  Body Fluid from Groin, right Updated:  03/04/20 1530     Fungus Culture No fungus isolated at less than 1 week    AFB Culture - Body Fluid, Groin, right [[id number]] Collected:  02/28/20 1417    Lab Status:  Preliminary result Specimen:  Body Fluid from Groin, right Updated:  03/04/20 1530     AFB Culture No AFB isolated at less than 1 week     AFB Stain No acid fast bacilli seen on concentrated smear    Fungus Culture - Tissue, Groin, right [[id number]] Collected:  02/28/20 1417    Lab Status:  Preliminary result Specimen:  Tissue from Groin, right Updated:  03/04/20 1530     Fungus Culture No fungus isolated at less than 1 week    AFB Culture - Tissue, Groin, right [[id number]] Collected:  02/28/20 1417    Lab Status:  Preliminary result Specimen:  Tissue from Groin, right Updated:  03/04/20 1530     AFB Culture No AFB isolated at less than 1 week     AFB Stain No acid fast bacilli seen on concentrated smear    Anaerobic Culture - Hardware / Foreign Body, Groin, right [[id number]] Collected:  02/28/20 1500    Lab Status:  Final result Specimen:  Hardware / Foreign Body from Groin, right Updated:  03/04/20 1205     Anaerobic Culture No anaerobes isolated at 5 days    Anaerobic Culture - Body Fluid,  Groin, right [[id number]] Collected:  02/28/20 1417    Lab Status:  Final result Specimen:  Body Fluid from Groin, right Updated:  03/04/20 1204     Anaerobic Culture No anaerobes isolated at 5 days    Anaerobic Culture - Body Fluid, Pleural Cavity [[id number]] Collected:  02/28/20 1341    Lab Status:  Final result Specimen:  Body Fluid from Pleural Cavity Updated:  03/04/20 1203     Anaerobic Culture No anaerobes isolated at 5 days    Anaerobic Culture - Tissue, Groin, right [[id number]] Collected:  02/28/20 1417    Lab Status:  Final result Specimen:  Tissue from Groin, right Updated:  03/04/20 1202     Anaerobic Culture No anaerobes isolated at 5 days    Blood Culture - Blood, Wrist, Right [[id number]] Collected:  02/28/20 0750    Lab Status:  Final result Specimen:  Blood from Wrist, Right Updated:  03/04/20 0815     Blood Culture No growth at 5 days    Body Fluid Culture - Body Fluid, Groin, right [[id number]]  (Abnormal)  (Susceptibility) Collected:  02/28/20 1417    Lab Status:  Final result Specimen:  Body Fluid from Groin, right Updated:  03/03/20 0650     Body Fluid Culture Rare Staphylococcus aureus, MRSA     Gram Stain Many (4+) WBCs seen      No organisms seen    Narrative:             Susceptibility      Staphylococcus aureus, MRSA     CADENCE     Gentamicin Susceptible     Oxacillin Resistant     Rifampin Susceptible     Vancomycin Susceptible                Susceptibility Comments     Staphylococcus aureus, MRSA    This isolate does not demonstrate inducible clindamycin resistance in vitro.               Respiratory Culture - Sputum, ET Suction [[id number]] Collected:  02/29/20 0850    Lab Status:  Final result Specimen:  Sputum from ET Suction Updated:  03/02/20 0902     Respiratory Culture No growth     Gram Stain Few (2+) Epithelial cells per low power field      Few (2+) WBCs seen      No organisms seen    Tissue / Bone Culture - Tissue, Groin, right [[id number]] Collected:  02/28/20 1417    Lab Status:   Final result Specimen:  Tissue from Groin, right Updated:  03/02/20 0809     Tissue Culture No growth at 3 days     Gram Stain Many (4+) WBCs seen      No organisms seen    Body Fluid Culture - Body Fluid, Pleural Cavity [[id number]] Collected:  02/28/20 1341    Lab Status:  Final result Specimen:  Body Fluid from Pleural Cavity Updated:  03/02/20 0808     Body Fluid Culture No growth at 3 days     Gram Stain Many (4+) WBCs seen      No organisms seen    Hardware / Foreign Body Culture - Hardware / Foreign Body, Groin, right [[id number]]  (Abnormal)  (Susceptibility) Collected:  02/28/20 1500    Lab Status:  Final result Specimen:  Hardware / Foreign Body from Groin, right Updated:  03/01/20 0645     Hardware / Foreign Body Culture Scant growth (1+) Staphylococcus aureus, MRSA     Comment:   Methicillin resistant Staphylococcus aureus, Patient may be an isolation risk.        Gram Stain Many (4+) WBCs seen      No organisms seen    Susceptibility      Staphylococcus aureus, MRSA     CADENCE     Clindamycin Susceptible     Erythromycin Resistant     Inducible Clindamycin Resistance Negative     Oxacillin Resistant     Penicillin G Resistant     Rifampin Susceptible     Tetracycline Susceptible     Trimethoprim + Sulfamethoxazole Susceptible     Vancomycin Susceptible                Susceptibility Comments     Staphylococcus aureus, MRSA    This isolate does not demonstrate inducible clindamycin resistance in vitro.               Blood Culture - Blood, Arm, Left [[id number]] Collected:  02/24/20 0057    Lab Status:  Final result Specimen:  Blood from Arm, Left Updated:  02/29/20 0545     Blood Culture No growth at 5 days    Blood Culture - Blood, Hand, Left [[id number]] Collected:  02/24/20 0057    Lab Status:  Final result Specimen:  Blood from Hand, Left Updated:  02/29/20 0545     Blood Culture No growth at 5 days    Wound Culture - Wound, Thigh, Left [[id number]]  (Abnormal)  (Susceptibility) Collected:  02/24/20 1849     Lab Status:  Final result Specimen:  Wound from Thigh, Left Updated:  02/27/20 0723     Wound Culture Light growth (2+) Staphylococcus aureus, MRSA     Comment: Methicillin resistant Staphylococcus aureus, Patient may be an isolation risk.        Gram Stain Few (2+) WBCs seen      Rare (1+) Gram positive cocci in pairs    Susceptibility      Staphylococcus aureus, MRSA     CADENCE     Clindamycin Susceptible     Erythromycin Resistant     Inducible Clindamycin Resistance Negative     Oxacillin Resistant     Penicillin G Resistant     Rifampin Susceptible     Tetracycline Susceptible     Trimethoprim + Sulfamethoxazole Susceptible     Vancomycin Susceptible                Susceptibility Comments     Staphylococcus aureus, MRSA    This isolate does not demonstrate inducible clindamycin resistance in vitro.                     Imaging Results (Last 24 Hours)     ** No results found for the last 24 hours. **          Results for orders placed during the hospital encounter of 02/23/20   Adult Transthoracic Echo Complete W/ Cont if Necessary Per Protocol    Narrative · Left ventricular systolic function is mildly decreased. Calculated EF =   45.0%. Estimated EF appears to be in the range of 46 - 50%  · Mild global hypokinesis no focal wall motion abnormalities  · Mild aortic valve sclerosis without stenosis.  · There is a trivial pericardial effusion. There is a large size left   pleural effusion          I have reviewed the medications:  Scheduled Meds:  amitriptyline 10 mg Oral Nightly   aspirin 325 mg Oral Daily   atorvastatin 10 mg Oral Daily   budesonide 0.5 mg Nebulization BID - RT   clopidogrel 75 mg Oral Daily   DAPTOmycin 8 mg/kg Intravenous Q24H   famotidine 20 mg Oral BID AC   insulin detemir 15 Units Subcutaneous Q12H   insulin regular 0-14 Units Subcutaneous Q6H   ketotifen 1 drop Both Eyes BID   lactobacillus acidophilus 1 capsule Oral Daily   losartan 50 mg Oral Daily   metoprolol tartrate 100 mg Oral Q12H    pantoprazole 40 mg Intravenous Q AM   pregabalin 150 mg Oral Q8H   rOPINIRole 4 mg Oral Nightly     Continuous Infusions:   PRN Meds:.•  acetaminophen **OR** acetaminophen **OR** acetaminophen  •  albuterol  •  dextrose  •  dextrose  •  glucagon (human recombinant)  •  guaiFENesin-dextromethorphan  •  hydrALAZINE  •  HYDROcodone-acetaminophen    Assessment/Plan   Assessment & Plan     Active Hospital Problems    Diagnosis  POA   • **Acute hypoxemic respiratory failure (CMS/[facility]) [J96.01]  No   • Pleural effusion - bilateral mod/large pleural effusions on CT chest 2/27/2020.  [J90]  No   • Acute pulmonary edema (CMS/[facility]) [J81.0]  No   • Sepsis (CMS/[facility]) [A41.9]  Yes   • COPD (chronic obstructive pulmonary disease) (CMS/[facility]) [J44.9]  Yes   • Coronary artery disease [I25.10]  Yes   • Hypertension [I10]  Yes   • Hyperlipidemia [E78.5]  Yes   • Diabetes mellitus (CMS/[facility]) [E11.9]  Yes   • Current smoker [F17.200]  Yes   • PVD (peripheral vascular disease) (CMS/[facility]) [I73.9]  Yes      Resolved Hospital Problems   No resolved problems to display.        Brief Hospital Course to date:  Saskia Groves is a 49 y.o. female admitted 2/23.  She has history of DM, tobacco, HTN, PAD.  She had fem-fem bypass on 1/15 but was readmitted for fever and wound infection left groin.  Transferred to ICU on 2/27 for effusions and hypoxia.  Had graft explant 2/28 as well as bronch and right chest tube.  Muscle flaps were done bilat by [employer].    Go wound vacs.  Was extubated after several days.  Is followed by ID.  Dysphagia with ST following.    All problems new to me.  Recoreds reviwed and summarized above.     Wounds, groin abscess L  - vacs  - abx per ID    Debility  - mobilize    Dysphagia  - tube feeds, ST    Dm  - insulin; controlled. Bit on the low side.  Watch.     Hypoxia  - improved    DVT Prophylaxis:      Disposition: I expect the patient to be discharged tbd.    CODE STATUS:   Code Status and Medical Interventions:    Ordered at: 02/23/20 2345     Level Of Support Discussed With:    Patient     Code Status:    CPR     Medical Interventions (Level of Support Prior to Arrest):    Full         Electronically signed by Izabel Velasquez MD, 03/06/20, 9:50 AM.

## 2020-03-06 NOTE — PLAN OF CARE
Problem: Patient Care Overview  Goal: Plan of Care Review  Flowsheets  Taken 3/5/2020 2350  Progress: no change  Outcome Summary: Patient AOX4, VSS on no supplemental oxygen. Patient bed ready on 4G 456. Patient report called at 2319; patient transferred approx 2340.  Taken 3/5/2020 2000  Plan of Care Reviewed With: patient

## 2020-03-06 NOTE — PROGRESS NOTES
"Saskia Groves  [id number]  1970     LOS: 12 days   Patient Care Team:  Meagan Barlow APRN as PCP - General (Family Medicine)  Tyson Donaldson DO as Consulting Physician (Cardiology)    Chief Complaint: Infected femoral-femoral bypass graft      Subjective:     Objective:     Vital Sign Min/Max for last 24 hours  Temp  Min: 98.4 °F (36.9 °C)  Max: 98.9 °F (37.2 °C)   BP  Min: 109/67  Max: 161/83   Pulse  Min: 80  Max: 97   Resp  Min: 18  Max: 22   SpO2  Min: 86 %  Max: 100 %   No data recorded   Weight  Min: 63.6 kg (140 lb 4.8 oz)  Max: 63.6 kg (140 lb 4.8 oz)     Flowsheet Rows      First Filed Value   Admission Height  160 cm (62.99\") Documented at 02/23/2020 2240   Admission Weight  60.4 kg (133 lb 1.6 oz) Documented at 02/23/2020 2240          Physical Exam:    Wound: Satisfactory    Pulses:     Mediastinal and Chest Tube Drainage:       Results Review:   Results from last 7 days   Lab Units 03/06/20  0418   WBC 10*3/mm3 8.40   HEMOGLOBIN g/dL 10.4*   HEMATOCRIT % 34.0   PLATELETS 10*3/mm3 189     Results from last 7 days   Lab Units 03/06/20  0418   SODIUM mmol/L 144   POTASSIUM mmol/L 4.4   CHLORIDE mmol/L 107   CO2 mmol/L 28.0   BUN mg/dL 45*   CREATININE mg/dL 0.64   GLUCOSE mg/dL 79   CALCIUM mg/dL 8.8     Results from last 7 days   Lab Units 03/01/20  0428   PH, ARTERIAL pH units 7.463*   PO2 ART mm Hg 89.1   PCO2, ARTERIAL mm Hg 36.6   HCO3 ART mmol/L 26.1*         Assessment      Acute hypoxemic respiratory failure (CMS/HCC)    PVD (peripheral vascular disease) (CMS/HCC)    Current smoker    COPD (chronic obstructive pulmonary disease) (CMS/HCC)    Coronary artery disease    Hypertension    Hyperlipidemia    Diabetes mellitus (CMS/HCC)    Sepsis (CMS/HCC)    Pleural effusion - bilateral mod/large pleural effusions on CT chest 2/27/2020.     Acute pulmonary edema (CMS/HCC)      Doing satisfactory as per Dr. Modesto Whittaker.        Deandre Oseguera MD  03/06/20  7:17 AM      Please note that " portions of this note were completed with a voice recognition program. Efforts were made to edit the dictations, but words may be mistranscribed

## 2020-03-06 NOTE — PAYOR COMM NOTE
"Sade Cantor RN  Utilization Review  P: [phone]  F: [phone]    Ref # [id number]  Updated clinicals for continued stay      Saskia Arteaga (49 y.o. Female)     Date of Birth Social Security Number Address Home Phone MRN    1970  [address] [place] [address] [phone] [id number]    Druze Marital Status          None        Admission Date Admission Type Admitting Provider Attending Provider Department, Room/Bed    2/23/20 Urgent Izabel Velasquez MD Mini, Jocelyn, MD [address], S[address]/1    Discharge Date Discharge Disposition Discharge Destination                       Attending Provider:  Izabel Velasquez MD    Allergies:  Levaquin [Levofloxacin], Penicillins, Codeine, Flagyl [Metronidazole]    Isolation:  None   Infection:  MRSA (02/26/20)   Code Status:  CPR    Ht:  160 cm (62.99\")   Wt:  63.6 kg (140 lb 4.8 oz)    Admission Cmt:  None   Principal Problem:  Acute hypoxemic respiratory failure (CMS/HCC) [J96.01]                 Active Insurance as of 2/23/2020     Primary Coverage     Payor Plan Insurance Group Employer/Plan Group    [employer] [place] [employer]ICA[employer]      Payor Plan Address Payor Plan Phone Number Payor Plan Fax Number Effective Dates    [address] [address] [phone]  1/6/2020 - None Entered    [facility] [id number]       Subscriber Name Subscriber Birth Date Member ID       SASKIA ARTEAGA 1970 [id number]                 Emergency Contacts      (Rel.) Home Phone Work Phone Mobile Phone    Thuan Arteaga (Spouse) [phone] -- --               Physician Progress Notes (last 24 hours) (Notes from 03/05/20 1341 through 03/06/20 1341)      Collette Melo MD at 03/06/20 1227          INFECTIOUS DISEASE PROGRESS NOTE     Saskia Arteaga  1970  [id number]      Admission Date: 2/23/2020    Antibiotic therapy:   Daptomycin    Requesting Provider: Deandre Oseguera MD     Reason for Consultation: Left groin wound infection " "    History of present illness:  2/24/20:  Patient is a 49 y.o. female seen today for a left groin wound infection.  She underwent femoral endardectomy, and femorofemoral  Bypass with gortex graft on 1/15.  She has been having fevers 100 degrees and above since 1/17. She was seen last week by her PCP and given Bactrim but she couldn't tolerate it and Ceftin was called in. Her left groin continued to remain swollen and warm. She had a venous duplex to rule out DVT and then developed a \"knot\" of her left groin. She presented to the ED at [facility], was given Vancomycin and Merrem and transferred to [facility]. Tmax of 100.5 degrees without leukocytosis, normal lactate and PCT. She is currently on Vancomycin and Merrem and we were consulted for evaluation and treatment.   Labs at [facility] with WBC of 10.4, normal lactate, PCT.  She was tachycardic with tmax of 102.3.  She had fevers at home documented to over 103 degrees.  2/25/20 : She spontaneously drained a copious amount of slightly cloudy straw-colored fluid from her left groin yesterday and has continued to drain.  She complains of cough \"other than that I feel great\".  No n/v//d.  Left groin less tender.   2/26/20:  Graft to be removed on Friday.   She remains afebrile. She doesn't \"feel very good today\".  Family at bedside.   2/27/20:  Surgery scheduled for tomorrow.  She is tearful and afraid she will lose her leg.  She remains afebrile.    2/28/2020: She deteriorated this morning with hypoxemia and bilateral pulmonary opacifications with pleural effusions.  He had a fever to 101 degrees last night.  I discussed her complex situation with Dr. Deandre Oseguera early today and also this evening.  She was taken to surgery today and her left femoral graft was removed.  A muscle flap was placed.  She is now endotracheally intubated and mechanically ventilated.  She underwent a thoracentesis revealing clear fluid.  Intravenous Merrem was added to her vancomycin this " morning due to concerns about possible aspiration pneumonia.  She does have a history of penicillin allergy.  2/29/2020: She remains on ventilatory support.  She has only modest respiratory secretions.  She is still requiring an FiO2 of 60%.  She has been afebrile overnight.  She remains sedated.    Following for Dr. Parag Gordon:  3/1/20: Remains sedated on vent.  Tmax 99.3, FiO2 40% and PEEP 6.  She has a wound VAC on right ground with MELISSA drain with bloody drainage.  She has a right chest tube.  Small amount of clear to red-streaked ETT secretions. On tube feeding.   3/2  Extubated, afebrile, intermittently cooperative  Ongoing issues with hypertension; 90 from chest tube yesterday Day# 8 merrem (intermittent dosing);  vanc trough 38  3/3  Afebrile, reluctant to cooperate with attempts to mobilize, alert, 60 from CT yesterday, no new + cultures, random vanc 26  3/4  Alert, afebrile, more interactive  3/5 alert, afebrile, more interactive and joking with RN, stronger, failed SLP exam yesterday and remains on tube feeding. Having frequent stools and lactulose (which she took at home) has been stopped  3/6 alert, afebrile, chest tube out, feeding tube out after she passed SLP eval        Past Medical History:   Diagnosis Date   • Anxiety    • Arthritis    • Asthma    • Carotid artery stenosis    • Chronic bronchitis (CMS/HCC)    • COPD (chronic obstructive pulmonary disease) (CMS/HCC)    • Coronary artery disease     2 vessels with 50% blockage.  Sees Dr. Donaldson   • Depression    • Diabetes mellitus (CMS/HCC)    • Dyslipidemia    • GERD (gastroesophageal reflux disease)    • Hiatal hernia    • Hyperlipidemia    • Hypertension    • Infectious viral hepatitis    • Lower back pain    • Migraine    • Multinodular goiter    • S/P thyroid biopsy     11/2008, 01/2013, 04/2015, and 07/15/2019 at [facility], right lobe nodules - all cytologies were benign   • Sleep apnea     can't tolerate the cpap mask   • Subclinical  hyperthyroidism        Past Surgical History:   Procedure Laterality Date   • ANTERIOR CERVICAL DISCECTOMY W/ FUSION     • BACK SURGERY      lumbar   •  SECTION  19940    x 2    • CHOLECYSTECTOMY     • COLONOSCOPY     • ENDOSCOPY     • FEMORAL ARTERY CUTDOWN Bilateral 2020    Procedure: FEMORAL ARTERY CUTDOWN WITH REMOVAL OF FEMORAL FEMORAL BYPASS GRAFT BILATERAL;  Surgeon: Deandre Oseguera MD;  Location:  [facility];  Service: Vascular;  Laterality: Bilateral;   • FEMORAL ENDARTERECTOMY Bilateral 1/15/2020    Procedure: FEMORAL ENDARTERECTOMY BILATERAL;  Surgeon: Deandre Oseguera MD;  Location:  [facility];  Service: Vascular   • FEMORAL FEMORAL BYPASS Right 1/15/2020    Procedure: FEMORAL FEMORAL BYPASS;  Surgeon: Deandre Oseguera MD;  Location:  [facility];  Service: Vascular   • HYSTERECTOMY     • KNEE SURGERY Left    • LEG EXCISION LESION/CYST Left 2020    Procedure: GROIN MUSCLE FLAP BILATERAL;  Surgeon: Modesto Whittaker MD;  Location:  [facility];  Service: Plastics;  Laterality: Left;   • WRIST SURGERY Left        Family History   Problem Relation Age of Onset   • Diabetes Mother    • Hypertension Mother    • Arthritis Mother    • Hyperlipidemia Mother    • Migraines Mother    • Thyroid disease Mother    • Hypertension Father    • Lung cancer Father    • Cancer Father    • Stroke Other    • Migraines Maternal Aunt    • Thyroid disease Maternal Aunt    • Heart attack Maternal Aunt    • Osteoporosis Maternal Aunt    • Cancer Maternal Uncle    • Heart attack Maternal Uncle    • Stroke Maternal Uncle    • Hyperlipidemia Maternal Grandmother    • Cancer Maternal Grandmother    • Obesity Maternal Grandmother    • Thyroid disease Daughter    • Obesity Daughter        Social History     Socioeconomic History   • Marital status:      Spouse name: Not on file   • Number of children: 2   • Years of education: Not on file   • Highest education level: Not on file   Occupational History   •  Occupation: [employer]     Employer: DISABLED     Comment: Back and Leg Pain   Tobacco Use   • Smoking status: Current Every Day Smoker     Packs/day: 1.00     Years: 35.00     Pack years: 35.00     Types: Cigarettes   • Smokeless tobacco: Never Used   Substance and Sexual Activity   • Alcohol use: Not Currently   • Drug use: No   • Sexual activity: Yes     Partners: Male     Comment:    Social History Narrative    Lives in [facility]       Allergies   Allergen Reactions   • Levaquin [Levofloxacin] Nausea And Vomiting   • Penicillins Nausea And Vomiting     Has tolerated cefepime 2/2020   • Codeine Nausea Only   • Flagyl [Metronidazole] Nausea And Vomiting         Medication:    Current Facility-Administered Medications:   •  acetaminophen (TYLENOL) tablet 650 mg, 650 mg, Oral, Q4H PRN, 650 mg at 03/02/20 2128 **OR** acetaminophen (TYLENOL) 160 MG/5ML solution 650 mg, 650 mg, Oral, Q4H PRN, 650 mg at 03/04/20 1226 **OR** acetaminophen (TYLENOL) suppository 650 mg, 650 mg, Rectal, Q4H PRN, Isael Bailey PA, 650 mg at 02/28/20 0429  •  albuterol (PROVENTIL) nebulizer solution 0.083% 2.5 mg/3mL, 2.5 mg, Nebulization, Q6H PRN, Isael Bailey PA  •  amitriptyline (ELAVIL) tablet 10 mg, 10 mg, Oral, Nightly, Isael Bailey PA, 10 mg at 03/05/20 2000  •  aspirin tablet 325 mg, 325 mg, Oral, Daily, Isael Bailey PA, 325 mg at 03/06/20 0848  •  atorvastatin (LIPITOR) tablet 10 mg, 10 mg, Oral, Daily, Isael Bailey PA, 10 mg at 03/06/20 0849  •  budesonide (PULMICORT) nebulizer solution 0.5 mg, 0.5 mg, Nebulization, BID - RT, Isael Bailey PA, 0.5 mg at 03/06/20 1003  •  clopidogrel (PLAVIX) tablet 75 mg, 75 mg, Oral, Daily, Isael Bailey PA, 75 mg at 03/06/20 0848  •  DAPTOmycin (CUBICIN) 500 mg/50 mL in sodium chloride, 8 mg/kg, Intravenous, Q24H, Isael Bailey, PA, 500 mg at 03/06/20 0852  •  dextrose (D50W) 25 g/ 50mL Intravenous Solution 25 g, 25 g,  Intravenous, Q15 Min PRN, Isael Bailey PA  •  dextrose (GLUTOSE) oral gel 15 g, 15 g, Oral, Q15 Min PRN, Isael Bailey PA  •  famotidine (PEPCID) tablet 20 mg, 20 mg, Oral, BID AC, Isael Bailey PA, 20 mg at 03/06/20 0848  •  glucagon (human recombinant) (GLUCAGEN DIAGNOSTIC) injection 1 mg, 1 mg, Subcutaneous, Q15 Min PRN, Isael Bailey PA  •  guaiFENesin-dextromethorphan (ROBITUSSIN DM) 100-10 MG/5ML syrup 5 mL, 5 mL, Oral, Q6H PRN, Isael Bailey PA, 5 mL at 02/27/20 0920  •  hydrALAZINE (APRESOLINE) injection 10 mg, 10 mg, Intravenous, Q6H PRN, Isael Bailey PA, 10 mg at 03/04/20 0628  •  HYDROcodone-acetaminophen (NORCO)  MG per tablet 1 tablet, 1 tablet, Oral, Q8H PRN, Isael Bailey PA, 1 tablet at 03/06/20 0646  •  insulin detemir (LEVEMIR) injection 15 Units, 15 Units, Subcutaneous, Q12H, Isael Bailey PA, 15 Units at 03/06/20 0852  •  insulin regular (humuLIN R,novoLIN R) injection 0-14 Units, 0-14 Units, Subcutaneous, Q6H, Isael Bailey PA, 3 Units at 03/05/20 0039  •  ketotifen (ZADITOR) 0.025 % ophthalmic solution 1 drop, 1 drop, Both Eyes, BID, Isael Bailey PA, 1 drop at 03/06/20 1109  •  lactobacillus acidophilus (RISAQUAD) capsule 1 capsule, 1 capsule, Oral, Daily, Isael Bailey PA, 1 capsule at 03/06/20 0848  •  losartan (COZAAR) tablet 50 mg, 50 mg, Oral, Daily, Isael Bailey PA, 50 mg at 03/06/20 0848  •  metoprolol tartrate (LOPRESSOR) tablet 100 mg, 100 mg, Oral, Q12H, Isael Bailey PA, 100 mg at 03/06/20 0848  •  pantoprazole (PROTONIX) injection 40 mg, 40 mg, Intravenous, Q AM, Isael Bailey PA, 40 mg at 03/06/20 0645  •  pregabalin (LYRICA) capsule 150 mg, 150 mg, Oral, Q8H, Isael Bailey PA, 150 mg at 03/06/20 0848  •  rOPINIRole (REQUIP) tablet 4 mg, 4 mg, Oral, Nightly, Isael Bailey PA, 4 mg at 03/05/20 2000    Antibiotics:  Anti-Infectives (From admission, onward)    Ordered      Dose/Rate Route Frequency Start Stop    20 194  DAPTOmycin (CUBICIN) 500 mg/50 mL in sodium chloride     Kip Mckeon RPH reviewed the order on 20 09.   Ordering Provider:  Isael Bailey PA    8 mg/kg × 60.3 kg  over 30 Minutes Intravenous Every 24 Hours 20 0600 20 0559    20 0746  meropenem (MERREM) 1 g/100 mL 0.9% NS VTB (mbp)     Ordering Provider:  Parag Gordon MD    1 g  over 30 Minutes Intravenous Once 20 0845 20 0921    20 0831  cefepime (MAXIPIME) 2 g/100 mL 0.9% NS (mbp)     Ordering Provider:  Ranjana Easley APRN    2 g  200 mL/hr over 30 Minutes Intravenous Once 20 0930 20 1157    20 0203  vancomycin 500 mg/100 mL 0.9% NS IVPB (mbp)     Ordering Provider:  Ced Kenny RPH    500 mg  100 mL/hr over 60 Minutes Intravenous Once 20 0300 20 0511                Physical Exam:   Vital Signs  Temp (24hrs), Av.2 °F (36.8 °C), Min:97.3 °F (36.3 °C), Max:98.9 °F (37.2 °C)    Temp  Min: 97.3 °F (36.3 °C)  Max: 98.9 °F (37.2 °C)  BP  Min: 118/70  Max: 161/83  Pulse  Min: 77  Max: 96  Resp  Min: 16  Max: 20  SpO2  Min: 86 %  Max: 100 %    GENERAL: extubated,sitting in chair, transferring with minimal assistance  HEENT: Normocephalic, atraumatic.  No conjunctival injection. No icterus.   HEART: RRR; No murmur, rubs, gallops.   LUNGS: diminished at lung bases bilaterally   ABDOMEN: Soft, nontender, nondistended. Positive bowel sounds. No rebound or guarding.   MSK:  No joint effusions   SKIN: Warm and dry without cutaneous eruptions on Inspection/palpation.   NEURO: sedated  Left groin with a wound VAC in place MELISSA drains in place with bloody drainage.    Right Chest tube in place    Laboratory Data    Results from last 7 days   Lab Units 20  0418 20  0344 20  0341   WBC 10*3/mm3 8.40 9.39 8.93   HEMOGLOBIN g/dL 10.4* 10.4* 10.8*   HEMATOCRIT % 34.0 33.9* 34.4   PLATELETS 10*3/mm3 189 216 240      Results from last 7 days   Lab Units 03/06/20  0418   SODIUM mmol/L 144   POTASSIUM mmol/L 4.4   CHLORIDE mmol/L 107   CO2 mmol/L 28.0   BUN mg/dL 45*   CREATININE mg/dL 0.64   GLUCOSE mg/dL 79   CALCIUM mg/dL 8.8     Results from last 7 days   Lab Units 03/06/20  0418   ALK PHOS U/L 108   BILIRUBIN mg/dL 0.3   ALT (SGPT) U/L 19   AST (SGOT) U/L 32         Results from last 7 days   Lab Units 02/29/20  1604   CRP mg/dL 8.05*         Results from last 7 days   Lab Units 03/06/20  0418 03/05/20  0344   CK TOTAL U/L 30 29     Results from last 7 days   Lab Units 03/04/20  0341 03/03/20  0338 03/02/20  1131   VANCOMYCIN TR mcg/mL  --   --  38.50*   VANCOMYCIN RM mcg/mL 13.80 26.10  --      Estimated Creatinine Clearance: 94.5 mL/min (by C-G formula based on SCr of 0.64 mg/dL).      Microbiology:  2/24: BCx NG    2/24: wound few WBCs, GPC in pairs -- MRSA   2/28/2020: Operative cultures are growing MRSA  2/29/2020: Sputum culture is NGSF      Radiology:  Imaging Results (Last 72 Hours)     Procedure Component Value Units Date/Time    XR Chest 1 View [[id number]] Collected:  03/05/20 0836     Updated:  03/05/20 0844    Narrative:       EXAMINATION: XR CHEST 1 VW-     INDICATION: Postop; I73.9-Peripheral vascular disease, unspecified;  V39-Xvgcgbw effusion, not elsewhere classified; Z95.828-Presence of  other vascular implants and grafts; R13.13-Dysphagia, pharyngeal phase.     COMPARISON: 03/04/2020.     FINDINGS: Right upper extremity PICC line is seen with its tip at the  cavoatrial junction. Feeding tube is seen below the left hemidiaphragm.  Heart is normal in size. Right pigtail catheter has been removed. There  are small areas of discoid atelectasis in the right mid and lower lung,  and appears to be very small pneumothorax in the lateral right base,  approximately 7 mm in width. This does not extend to the apex, or across  the base of the lung, although there is probably trace air in the minor  fissure. Left  "lung shows near resolution of focal atelectasis in the  medial left base. There is stable diffuse interstitial disease  elsewhere.       Impression:       1. Right pleural drain removal with very small right basilar  pneumothorax.  2. Mild new discoid atelectasis in the right mid and lower lung, but  interval clearing of the left lung base, since yesterday's study.      D:  03/05/2020  E:  03/05/2020       XR Abdomen KUB [[id number]] Collected:  03/04/20 1420     Updated:  03/04/20 2257    Narrative:       EXAMINATION: XR ABDOMEN/KUB-03/04/2020:     INDICATION: S/P Keofeed placement; I73.9-Peripheral vascular disease,  unspecified; Y65-Capottx effusion, not elsewhere classified;  Z95.828-Presence of other vascular implants and grafts;  R13.13-Dysphagia, pharyngeal phase.     COMPARISON: NONE.     FINDINGS: Feeding tube is seen in the duodenal bulb. The abdomen appears  somewhat \"gassy\", with gas throughout the colon and some small bowel  gas, but loops are only upper limits of normal size. No bowel wall edema  or pneumatosis is seen.       Impression:       Feeding tube tip in the duodenal bulb.     D:  03/04/2020  E:  03/04/2020            This report was finalized on 3/4/2020 10:54 PM by Dr. Jaswant Frank MD.       XR Chest 1 View [[id number]] Collected:  03/04/20 0825     Updated:  03/04/20 2254    Narrative:       EXAMINATION: XR CHEST 1 VW-     INDICATION: Respiratory failure; I73.9-Peripheral vascular disease,  unspecified; T13-Echyasn effusion, not elsewhere classified;  Z95.828-Presence of other vascular implants and grafts;  R13.10-Dysphagia, unspecified.     COMPARISON: 03/03/2020.     FINDINGS: NG tube is seen in the left hemidiaphragm. Right pigtail  catheter remains in place. PICC line is at the cavoatrial junction in  good position. Heart is normal in size. Patient's mild diffuse pulmonary  interstitial disease pattern and focal left basilar atelectasis or  effusion appears stable. No pneumothorax is " seen.       Impression:       Stable diffuse pulmonary interstitial disease and left  basilar atelectasis or effusion.      D:  03/04/2020  E:  03/04/2020     This report was finalized on 3/4/2020 10:51 PM by Dr. Jaswant Frank MD.       SLP FEES - Fiberoptic Endo Eval Swallow [[id number]] Resulted:  03/04/20 1125     Updated:  03/04/20 1125    Narrative:       This procedure was auto-finalized with no dictation required.    XR Chest 1 View [[id number]] Collected:  03/03/20 0854     Updated:  03/03/20 2210    Narrative:       EXAMINATION: XR CHEST 1 VW-03/03/2020:     INDICATION: Respiratory failure; I73.9-Peripheral vascular disease,  unspecified; X19-Hugdnxe effusion, not elsewhere classified;  Z95.828-Presence of other vascular implants and grafts.     COMPARISON: 03/01/2020.     FINDINGS: ET tube has been removed. NG tube, PICC line, and right  pleural drain remain in place. The heart is normal in size. Diffuse  pulmonary interstitial disease has improved, presumably resolving edema.  No lung consolidation, effusion, or pneumothorax is seen.       Impression:       Mild remaining pulmonary interstitial disease. No evidence  of pneumothorax or other new chest pathology.     D:  03/03/2020  E:  03/03/2020            This report was finalized on 3/3/2020 10:07 PM by Dr. Jaswant Frank MD.           I read her radiographic studies    Impression:   1.  MRSA left groin abscess/cellulitis/graft infection-status post graft removal 2/28.  She will require a prolonged course of intravenous antibiotic therapy directed toward MRSA.  Now on daptomycin- lft's and cpk normal  2.  Bilateral pulmonary infiltrates/pleural effusions-this may be secondary to edema or aspiration pneumonia; s/p a course of merrem Merrem   procalcitonin negative x 2  3.  Acute hypoxic respiratory failure-requiring endotracheal intubation and mechanical ventilation  Now extubated  4.  S/p femorofemoral bypass with gortex graft, 1/15  5.  Type 2 Diabetes  "Mellitus  6.  Penicillin allergy  7.  Type 2 diabetes mellitus-with very poor control  8.  Malnutrition    PLAN/RECOMMENDATIONS:   1.  Continue  daptomycin to 4/20 to complete 8 weeks therapy  3.  Sputum Gram stain and culture--NGSF  4.  probiotic  5   Cpk, cmp 3/9      Will discuss with JOY Melo MD  3/6/2020  12:27 PM                  Electronically signed by Collette Melo MD at 03/06/20 1229     Deandre Oseguera MD at 03/06/20 0717          Saskia Groves  [id number]  1970     LOS: 12 days   Patient Care Team:  Meagan Barlow APRN as PCP - General (Family Medicine)  Tyson Donaldson DO as Consulting Physician (Cardiology)    Chief Complaint: Infected femoral-femoral bypass graft      Subjective:     Objective:     Vital Sign Min/Max for last 24 hours  Temp  Min: 98.4 °F (36.9 °C)  Max: 98.9 °F (37.2 °C)   BP  Min: 109/67  Max: 161/83   Pulse  Min: 80  Max: 97   Resp  Min: 18  Max: 22   SpO2  Min: 86 %  Max: 100 %   No data recorded   Weight  Min: 63.6 kg (140 lb 4.8 oz)  Max: 63.6 kg (140 lb 4.8 oz)     Flowsheet Rows      First Filed Value   Admission Height  160 cm (62.99\") Documented at 02/23/2020 2240   Admission Weight  60.4 kg (133 lb 1.6 oz) Documented at 02/23/2020 2240          Physical Exam:    Wound: Satisfactory    Pulses:     Mediastinal and Chest Tube Drainage:       Results Review:   Results from last 7 days   Lab Units 03/06/20  0418   WBC 10*3/mm3 8.40   HEMOGLOBIN g/dL 10.4*   HEMATOCRIT % 34.0   PLATELETS 10*3/mm3 189     Results from last 7 days   Lab Units 03/06/20  0418   SODIUM mmol/L 144   POTASSIUM mmol/L 4.4   CHLORIDE mmol/L 107   CO2 mmol/L 28.0   BUN mg/dL 45*   CREATININE mg/dL 0.64   GLUCOSE mg/dL 79   CALCIUM mg/dL 8.8     Results from last 7 days   Lab Units 03/01/20  0428   PH, ARTERIAL pH units 7.463*   PO2 ART mm Hg 89.1   PCO2, ARTERIAL mm Hg 36.6   HCO3 ART mmol/L 26.1*         Assessment      Acute hypoxemic respiratory failure " "(CMS/[facility])    PVD (peripheral vascular disease) (CMS/[facility])    Current smoker    COPD (chronic obstructive pulmonary disease) (CMS/[facility])    Coronary artery disease    Hypertension    Hyperlipidemia    Diabetes mellitus (CMS/HCC)    Sepsis (CMS/[facility])    Pleural effusion - bilateral mod/large pleural effusions on CT chest 2/27/2020.     Acute pulmonary edema (CMS/[facility])      Doing satisfactory as per Dr. Modesto Whittaker.        Deandre Oseguera MD  03/06/20  7:17 AM      Please note that portions of this note were completed with a voice recognition program. Efforts were made to edit the dictations, but words may be mistranscribed    Electronically signed by Deandre Oseguera MD at 03/06/20 0718     Collette Melo MD at 03/05/20 1502          INFECTIOUS DISEASE PROGRESS NOTE     Saskia Groves  1970  [id number]      Admission Date: 2/23/2020    Antibiotic therapy:   Daptomycin    Requesting Provider: Deandre Oseguera MD     Reason for Consultation: Left groin wound infection     History of present illness:  2/24/20:  Patient is a 49 y.o. female seen today for a left groin wound infection.  She underwent femoral endardectomy, and femorofemoral  Bypass with gortex graft on 1/15.  She has been having fevers 100 degrees and above since 1/17. She was seen last week by her PCP and given Bactrim but she couldn't tolerate it and Ceftin was called in. Her left groin continued to remain swollen and warm. She had a venous duplex to rule out DVT and then developed a \"knot\" of her left groin. She presented to the ED at [facility], was given Vancomycin and Merrem and transferred to [facility]. Tmax of 100.5 degrees without leukocytosis, normal lactate and PCT. She is currently on Vancomycin and Merrem and we were consulted for evaluation and treatment.   Labs at [facility] with WBC of 10.4, normal lactate, PCT.  She was tachycardic with tmax of 102.3.  She had fevers at home documented to over 103 degrees.  2/25/20 : She " "spontaneously drained a copious amount of slightly cloudy straw-colored fluid from her left groin yesterday and has continued to drain.  She complains of cough \"other than that I feel great\".  No n/v//d.  Left groin less tender.   2/26/20:  Graft to be removed on Friday.   She remains afebrile. She doesn't \"feel very good today\".  Family at bedside.   2/27/20:  Surgery scheduled for tomorrow.  She is tearful and afraid she will lose her leg.  She remains afebrile.    2/28/2020: She deteriorated this morning with hypoxemia and bilateral pulmonary opacifications with pleural effusions.  He had a fever to 101 degrees last night.  I discussed her complex situation with Dr. Deandre Oseguera early today and also this evening.  She was taken to surgery today and her left femoral graft was removed.  A muscle flap was placed.  She is now endotracheally intubated and mechanically ventilated.  She underwent a thoracentesis revealing clear fluid.  Intravenous Merrem was added to her vancomycin this morning due to concerns about possible aspiration pneumonia.  She does have a history of penicillin allergy.  2/29/2020: She remains on ventilatory support.  She has only modest respiratory secretions.  She is still requiring an FiO2 of 60%.  She has been afebrile overnight.  She remains sedated.    Following for Dr. Parag Gordon:  3/1/20: Remains sedated on vent.  Tmax 99.3, FiO2 40% and PEEP 6.  She has a wound VAC on right ground with MELISSA drain with bloody drainage.  She has a right chest tube.  Small amount of clear to red-streaked ETT secretions. On tube feeding.   3/2  Extubated, afebrile, intermittently cooperative  Ongoing issues with hypertension; 90 from chest tube yesterday Day# 8 merrem (intermittent dosing);  vanc trough 38  3/3  Afebrile, reluctant to cooperate with attempts to mobilize, alert, 60 from CT yesterday, no new + cultures, random vanc 26  3/4  Alert, afebrile, more interactive  3/5 alert, afebrile, more " interactive and joking with RN, stronger, failed SLP exam yesterday and remains on tube feeding. Having frequent stools and lactulose (which she took at home) has been stopped        Past Medical History:   Diagnosis Date   • Anxiety    • Arthritis    • Asthma    • Carotid artery stenosis    • Chronic bronchitis (CMS/HCC)    • COPD (chronic obstructive pulmonary disease) (CMS/HCC)    • Coronary artery disease     2 vessels with 50% blockage.  Sees Dr. Donaldson   • Depression    • Diabetes mellitus (CMS/HCC)    • Dyslipidemia    • GERD (gastroesophageal reflux disease)    • Hiatal hernia    • Hyperlipidemia    • Hypertension    • Infectious viral hepatitis    • Lower back pain    • Migraine    • Multinodular goiter    • S/P thyroid biopsy     2008, 2013, 2015, and 07/15/2019 at [facility], right lobe nodules - all cytologies were benign   • Sleep apnea     can't tolerate the cpap mask   • Subclinical hyperthyroidism        Past Surgical History:   Procedure Laterality Date   • ANTERIOR CERVICAL DISCECTOMY W/ FUSION     • BACK SURGERY      lumbar   •  SECTION  19940    x 2    • CHOLECYSTECTOMY     • COLONOSCOPY     • ENDOSCOPY     • FEMORAL ARTERY CUTDOWN Bilateral 2020    Procedure: FEMORAL ARTERY CUTDOWN WITH REMOVAL OF FEMORAL FEMORAL BYPASS GRAFT BILATERAL;  Surgeon: Deandre Oseguera MD;  Location:  [facility];  Service: Vascular;  Laterality: Bilateral;   • FEMORAL ENDARTERECTOMY Bilateral 1/15/2020    Procedure: FEMORAL ENDARTERECTOMY BILATERAL;  Surgeon: Deandre Oseguera MD;  Location:  [facility];  Service: Vascular   • FEMORAL FEMORAL BYPASS Right 1/15/2020    Procedure: FEMORAL FEMORAL BYPASS;  Surgeon: Deandre Oseguera MD;  Location:  [facility];  Service: Vascular   • HYSTERECTOMY     • KNEE SURGERY Left    • LEG EXCISION LESION/CYST Left 2020    Procedure: GROIN MUSCLE FLAP BILATERAL;  Surgeon: Modesto Whittaker MD;  Location:  [facility];  Service: Plastics;  Laterality: Left;   •  WRIST SURGERY Left        Family History   Problem Relation Age of Onset   • Diabetes Mother    • Hypertension Mother    • Arthritis Mother    • Hyperlipidemia Mother    • Migraines Mother    • Thyroid disease Mother    • Hypertension Father    • Lung cancer Father    • Cancer Father    • Stroke Other    • Migraines Maternal Aunt    • Thyroid disease Maternal Aunt    • Heart attack Maternal Aunt    • Osteoporosis Maternal Aunt    • Cancer Maternal Uncle    • Heart attack Maternal Uncle    • Stroke Maternal Uncle    • Hyperlipidemia Maternal Grandmother    • Cancer Maternal Grandmother    • Obesity Maternal Grandmother    • Thyroid disease Daughter    • Obesity Daughter        Social History     Socioeconomic History   • Marital status:      Spouse name: Not on file   • Number of children: 2   • Years of education: Not on file   • Highest education level: Not on file   Occupational History   • Occupation: [employer]     Employer: DISABLED     Comment: Back and Leg Pain   Tobacco Use   • Smoking status: Current Every Day Smoker     Packs/day: 1.00     Years: 35.00     Pack years: 35.00     Types: Cigarettes   • Smokeless tobacco: Never Used   Substance and Sexual Activity   • Alcohol use: Not Currently   • Drug use: No   • Sexual activity: Yes     Partners: Male     Comment:    Social History Narrative    Lives in [place], [place] alone       Allergies   Allergen Reactions   • Levaquin [Levofloxacin] Nausea And Vomiting   • Penicillins Nausea And Vomiting     Has tolerated cefepime 2/2020   • Codeine Nausea Only   • Flagyl [Metronidazole] Nausea And Vomiting         Medication:    Current Facility-Administered Medications:   •  acetaminophen (TYLENOL) tablet 650 mg, 650 mg, Oral, Q4H PRN, 650 mg at 03/02/20 2128 **OR** acetaminophen (TYLENOL) 160 MG/5ML solution 650 mg, 650 mg, Oral, Q4H PRN, 650 mg at 03/04/20 1226 **OR** acetaminophen (TYLENOL) suppository 650 mg, 650 mg, Rectal, Q4H PRN, Black  PEDRO Castellanos, 650 mg at 02/28/20 0429  •  albuterol (PROVENTIL) nebulizer solution 0.083% 2.5 mg/3mL, 2.5 mg, Nebulization, Q6H PRN, Edgar Machado PA  •  amitriptyline (ELAVIL) tablet 10 mg, 10 mg, Oral, Nightly, Lorenzo Anderson MD, 10 mg at 03/04/20 2139  •  aspirin tablet 325 mg, 325 mg, Oral, Daily, Deandre Oseguera MD, 325 mg at 03/05/20 0839  •  atorvastatin (LIPITOR) tablet 10 mg, 10 mg, Oral, Daily, Edgar Machado PA, 10 mg at 03/05/20 0840  •  budesonide (PULMICORT) nebulizer solution 0.5 mg, 0.5 mg, Nebulization, BID - RT, Rian Sifuentes, , 0.5 mg at 03/05/20 0850  •  clopidogrel (PLAVIX) tablet 75 mg, 75 mg, Oral, Daily, Edgar Machado PA, 75 mg at 03/05/20 0838  •  DAPTOmycin (CUBICIN) 500 mg/50 mL in sodium chloride, 8 mg/kg, Intravenous, Q24H, Collette Melo MD, 500 mg at 03/05/20 0644  •  dextrose (D50W) 25 g/ 50mL Intravenous Solution 25 g, 25 g, Intravenous, Q15 Min PRN, Edgar Machado PA  •  dextrose (GLUTOSE) oral gel 15 g, 15 g, Oral, Q15 Min PRN, Edgar Machado PA  •  famotidine (PEPCID) tablet 20 mg, 20 mg, Oral, BID AC, Edgar Machado PA, 20 mg at 03/05/20 0644  •  glucagon (human recombinant) (GLUCAGEN DIAGNOSTIC) injection 1 mg, 1 mg, Subcutaneous, Q15 Min PRN, Edgar Machado PA  •  guaiFENesin-dextromethorphan (ROBITUSSIN DM) 100-10 MG/5ML syrup 5 mL, 5 mL, Oral, Q6H PRN, Edgar Machado PA, 5 mL at 02/27/20 0920  •  hydrALAZINE (APRESOLINE) injection 10 mg, 10 mg, Intravenous, Q6H PRN, Yessenia Valero MD, 10 mg at 03/04/20 0628  •  HYDROcodone-acetaminophen (NORCO)  MG per tablet 1 tablet, 1 tablet, Oral, Q8H PRN, Edgar Machado PA, 1 tablet at 03/05/20 0651  •  insulin detemir (LEVEMIR) injection 15 Units, 15 Units, Subcutaneous, Q12H, Lorenzo Anderson MD, 15 Units at 03/05/20 0846  •  insulin regular (humuLIN R,novoLIN R) injection 0-14 Units, 0-14 Units, Subcutaneous, Q6H, Rian Sifuentes DO, 3 Units at 03/05/20 0039  •   ketotifen (ZADITOR) 0.025 % ophthalmic solution 1 drop, 1 drop, Both Eyes, BID, Edgar Machado PA, 1 drop at 03/05/20 0846  •  lactobacillus acidophilus (RISAQUAD) capsule 1 capsule, 1 capsule, Oral, Daily, Collette Melo MD, 1 capsule at 03/05/20 0838  •  losartan (COZAAR) tablet 50 mg, 50 mg, Oral, Daily, Yessenia Valero MD, 50 mg at 03/05/20 0838  •  metoprolol tartrate (LOPRESSOR) tablet 100 mg, 100 mg, Oral, Q12H, Lorenzo Anderson MD, 100 mg at 03/05/20 0838  •  niCARdipine (CARDENE) 40 mg in 200 mL NS (0.2 mg/mL) infusion, 5-15 mg/hr, Intravenous, Titrated, Edgar Machado PA  •  ondansetron (ZOFRAN) injection 4 mg, 4 mg, Intravenous, Once PRN, Ced Gómez CRNA  •  pantoprazole (PROTONIX) injection 40 mg, 40 mg, Intravenous, Q AM, Edgar Machado PA, 40 mg at 03/05/20 0644  •  pregabalin (LYRICA) capsule 150 mg, 150 mg, Oral, Q8H, Lorenzo Anderson MD, 150 mg at 03/05/20 1451  •  rOPINIRole (REQUIP) tablet 4 mg, 4 mg, Oral, Nightly, Lorenzo Anderson MD, 4 mg at 03/04/20 2139  •  sodium chloride 0.9 % flush 10 mL, 10 mL, Intravenous, Q12H, Edgar Machado PA, 10 mL at 03/04/20 2156  •  sodium chloride 0.9 % flush 10 mL, 10 mL, Intravenous, PRN, Edgar Machado PA    Antibiotics:  Anti-Infectives (From admission, onward)    Ordered     Dose/Rate Route Frequency Start Stop    03/03/20 1942  DAPTOmycin (CUBICIN) 500 mg/50 mL in sodium chloride     Kip Mckeon [facility] reviewed the order on 03/04/20 0903.   Ordering Provider:  Collette Melo MD    8 mg/kg × 60.3 kg  over 30 Minutes Intravenous Every 24 Hours 03/04/20 0600 03/16/20 0559    02/28/20 0746  meropenem (MERREM) 1 g/100 mL 0.9% NS VTB (mbp)     Ordering Provider:  Parag Gordon MD    1 g  over 30 Minutes Intravenous Once 02/28/20 0845 02/28/20 0921    02/24/20 0831  cefepime (MAXIPIME) 2 g/100 mL 0.9% NS (mbp)     Ordering Provider:  Ranjana Easley APRN    2 g  200 mL/hr over 30 Minutes Intravenous Once  20 0930 20 1157    20 0203  vancomycin 500 mg/100 mL 0.9% NS IVPB (mbp)     Ordering Provider:  Ced Kenny RPH    500 mg  100 mL/hr over 60 Minutes Intravenous Once 20 0300 20 0511                Physical Exam:   Vital Signs  Temp (24hrs), Av.6 °F (37 °C), Min:98.2 °F (36.8 °C), Max:98.9 °F (37.2 °C)    Temp  Min: 98.2 °F (36.8 °C)  Max: 98.9 °F (37.2 °C)  BP  Min: 109/67  Max: 157/85  Pulse  Min: 80  Max: 105  Resp  Min: 16  Max: 23  SpO2  Min: 87 %  Max: 100 %    GENERAL: extubated,sitting in chair, transferring with minimal assistance  HEENT: Normocephalic, atraumatic.  No conjunctival injection. No icterus.   HEART: RRR; No murmur, rubs, gallops.   LUNGS: diminished at lung bases bilaterally   ABDOMEN: Soft, nontender, nondistended. Positive bowel sounds. No rebound or guarding.   MSK:  No joint effusions   SKIN: Warm and dry without cutaneous eruptions on Inspection/palpation.   NEURO: sedated  Left groin with a wound VAC in place MELISSA drains in place with bloody drainage.    Right Chest tube in place    Laboratory Data    Results from last 7 days   Lab Units 20  0344 20  0341 20  0338   WBC 10*3/mm3 9.39 8.93 11.58*   HEMOGLOBIN g/dL 10.4* 10.8* 11.4*   HEMATOCRIT % 33.9* 34.4 36.1   PLATELETS 10*3/mm3 216 240 272     Results from last 7 days   Lab Units 20  0344   SODIUM mmol/L 138   POTASSIUM mmol/L 4.2   CHLORIDE mmol/L 102   CO2 mmol/L 26.0   BUN mg/dL 43*   CREATININE mg/dL 0.54*   GLUCOSE mg/dL 147*   CALCIUM mg/dL 8.6     Results from last 7 days   Lab Units 20  0344   ALK PHOS U/L 101   BILIRUBIN mg/dL 0.3   ALT (SGPT) U/L 15   AST (SGOT) U/L 24         Results from last 7 days   Lab Units 20  1604   CRP mg/dL 8.05*     Results from last 7 days   Lab Units 20  0243   LACTATE mmol/L 1.5     Results from last 7 days   Lab Units 20  0344   CK TOTAL U/L 29     Results from last 7 days   Lab Units 20  0341  03/03/20  0338 03/02/20  1131   VANCOMYCIN TR mcg/mL  --   --  38.50*   VANCOMYCIN RM mcg/mL 13.80 26.10  --      Estimated Creatinine Clearance: 118.6 mL/min (A) (by C-G formula based on SCr of 0.54 mg/dL (L)).      Microbiology:  2/24: BCx NG    2/24: wound few WBCs, GPC in pairs -- MRSA   2/28/2020: Operative cultures are growing MRSA  2/29/2020: Sputum culture is NGSF      Radiology:  Imaging Results (Last 72 Hours)     Procedure Component Value Units Date/Time    XR Chest 1 View [[id number]] Collected:  03/05/20 0836     Updated:  03/05/20 0844    Narrative:       EXAMINATION: XR CHEST 1 VW-     INDICATION: Postop; I73.9-Peripheral vascular disease, unspecified;  W36-Gjgurbl effusion, not elsewhere classified; Z95.828-Presence of  other vascular implants and grafts; R13.13-Dysphagia, pharyngeal phase.     COMPARISON: 03/04/2020.     FINDINGS: Right upper extremity PICC line is seen with its tip at the  cavoatrial junction. Feeding tube is seen below the left hemidiaphragm.  Heart is normal in size. Right pigtail catheter has been removed. There  are small areas of discoid atelectasis in the right mid and lower lung,  and appears to be very small pneumothorax in the lateral right base,  approximately 7 mm in width. This does not extend to the apex, or across  the base of the lung, although there is probably trace air in the minor  fissure. Left lung shows near resolution of focal atelectasis in the  medial left base. There is stable diffuse interstitial disease  elsewhere.       Impression:       1. Right pleural drain removal with very small right basilar  pneumothorax.  2. Mild new discoid atelectasis in the right mid and lower lung, but  interval clearing of the left lung base, since yesterday's study.      D:  03/05/2020  E:  03/05/2020       XR Abdomen KUB [[id number]] Collected:  03/04/20 1420     Updated:  03/04/20 2962    Narrative:       EXAMINATION: XR ABDOMEN/KUB-03/04/2020:     INDICATION: S/P Keofeed  "placement; I73.9-Peripheral vascular disease,  unspecified; V94-Jsnlwyr effusion, not elsewhere classified;  Z95.828-Presence of other vascular implants and grafts;  R13.13-Dysphagia, pharyngeal phase.     COMPARISON: NONE.     FINDINGS: Feeding tube is seen in the duodenal bulb. The abdomen appears  somewhat \"gassy\", with gas throughout the colon and some small bowel  gas, but loops are only upper limits of normal size. No bowel wall edema  or pneumatosis is seen.       Impression:       Feeding tube tip in the duodenal bulb.     D:  03/04/2020  E:  03/04/2020            This report was finalized on 3/4/2020 10:54 PM by Dr. Jaswant Frank MD.       XR Chest 1 View [[id number]] Collected:  03/04/20 0825     Updated:  03/04/20 2254    Narrative:       EXAMINATION: XR CHEST 1 VW-     INDICATION: Respiratory failure; I73.9-Peripheral vascular disease,  unspecified; R62-Ytxfsbp effusion, not elsewhere classified;  Z95.828-Presence of other vascular implants and grafts;  R13.10-Dysphagia, unspecified.     COMPARISON: 03/03/2020.     FINDINGS: NG tube is seen in the left hemidiaphragm. Right pigtail  catheter remains in place. PICC line is at the cavoatrial junction in  good position. Heart is normal in size. Patient's mild diffuse pulmonary  interstitial disease pattern and focal left basilar atelectasis or  effusion appears stable. No pneumothorax is seen.       Impression:       Stable diffuse pulmonary interstitial disease and left  basilar atelectasis or effusion.      D:  03/04/2020  E:  03/04/2020     This report was finalized on 3/4/2020 10:51 PM by Dr. Jaswant Frank MD.       SLP FEES - Fiberoptic Endo Eval Swallow [[id number]] Resulted:  03/04/20 1125     Updated:  03/04/20 1125    Narrative:       This procedure was auto-finalized with no dictation required.    XR Chest 1 View [[id number]] Collected:  03/03/20 0854     Updated:  03/03/20 2210    Narrative:       EXAMINATION: XR CHEST 1 VW-03/03/2020:     INDICATION: " Respiratory failure; I73.9-Peripheral vascular disease,  unspecified; F37-Dheqzyr effusion, not elsewhere classified;  Z95.828-Presence of other vascular implants and grafts.     COMPARISON: 03/01/2020.     FINDINGS: ET tube has been removed. NG tube, PICC line, and right  pleural drain remain in place. The heart is normal in size. Diffuse  pulmonary interstitial disease has improved, presumably resolving edema.  No lung consolidation, effusion, or pneumothorax is seen.       Impression:       Mild remaining pulmonary interstitial disease. No evidence  of pneumothorax or other new chest pathology.     D:  03/03/2020  E:  03/03/2020            This report was finalized on 3/3/2020 10:07 PM by Dr. Jaswant Frank MD.           I read her radiographic studies    Impression:   1.  MRSA left groin abscess/cellulitis/graft infection-status post graft removal 2/28.  She will require a prolonged course of intravenous antibiotic therapy directed toward MRSA.  Now on daptomycin- lft's and cpk normal  2.  Bilateral pulmonary infiltrates/pleural effusions-this may be secondary to edema or aspiration pneumonia; s/p a course of merrem Merrem   procalcitonin negative x 2  3.  Acute hypoxic respiratory failure-requiring endotracheal intubation and mechanical ventilation  Now extubated  4.  S/p femorofemoral bypass with gortex graft, 1/15  5.  Type 2 Diabetes Mellitus  6.  Penicillin allergy  7.  Type 2 diabetes mellitus-with very poor control  8.  Malnutrition    PLAN/RECOMMENDATIONS:   1.  Continue  daptomycin.    3.  Sputum Gram stain and culture--NGSF  4.  probiotic  5   Cpk, cmp 3/9    Discussed with daughter   Discussed with RN          Collette Melo MD  3/5/2020  3:02 PM                  Electronically signed by Collette Melo MD at 03/05/20 1508       Consult Notes (last 24 hours) (Notes from 03/05/20 1341 through 03/06/20 1341)    No notes of this type exist for this encounter.

## 2020-03-06 NOTE — PLAN OF CARE
Problem: Patient Care Overview  Goal: Plan of Care Review  Outcome: Ongoing (interventions implemented as appropriate)  Flowsheets (Taken 3/6/2020 8413)  Progress: improving  Plan of Care Reviewed With: patient  Outcome Summary: VSS. Supplemental oxygen is due to patient preference, not necessity. Tube feeding running per keofeed at the goal rate. Wound Vac and MELISSA drains are patent and functioning well. No acute distress noted, will continue to monitor the patient at this time.

## 2020-03-06 NOTE — THERAPY TREATMENT NOTE
Acute Care - Speech Language Pathology   Swallow Treatment Note [facility]     Patient Name: Saskia Groves  : 1970  MRN: [id number]  Today's Date: 3/6/2020               Admit Date: 2020    Visit Dx:      ICD-10-CM ICD-9-CM   1. PVD (peripheral vascular disease) (CMS/HCC) I73.9 443.9   2. Pleural effusion J90 511.9   3. S/P femoral-femoral bypass surgery Z95.828 V45.89   4. Pharyngeal dysphagia R13.13 787.23     Patient Active Problem List   Diagnosis   • PVD (peripheral vascular disease) (CMS/HCC)   • Lumbar radiculopathy   • Paresthesia   • Causalgia of lower limb   • Hiatal hernia   • Current smoker   • Bilateral carotid artery stenosis   • COPD (chronic obstructive pulmonary disease) (CMS/HCC)   • Coronary artery disease   • Hypertension   • Hyperlipidemia   • Diabetes mellitus (CMS/HCC)   • Sepsis (CMS/HCC)   • Pleural effusion - bilateral mod/large pleural effusions on CT chest 2020.    • Acute pulmonary edema (CMS/HCC)   • Acute hypoxemic respiratory failure (CMS/HCC)       Therapy Treatment  Rehabilitation Treatment Summary     Row Name 20 0945             Treatment Time/Intention    Discipline  speech language pathologist  -DV      Document Type  therapy note (daily note)  -DV      Subjective Information  no complaints  -DV      Mode of Treatment  speech-language pathology;individual therapy  -DV      Patient/Family Observations  No family present.  -DV      Care Plan Review  evaluation/treatment results reviewed;care plan/treatment goals reviewed;risks/benefits reviewed;current/potential barriers reviewed;patient/other agree to care plan  -DV      Therapy Frequency (Swallow)  5 days per week  -DV      Patient Effort  good  -DV      Recorded by [DV] Yokasta Castro MS CCC-SLP 20 1051      Row Name 20 0945             Pain Assessment    Additional Documentation  Pain Scale: Word Pre/Post-Treatment (Group)  -DV      Recorded by [DV] Yokasta Castro MS CCC-SLP 20  1051      Row Name 03/06/20 0945             Pain Scale: Word Pre/Post-Treatment    Pain: Word Scale, Pretreatment  2 - mild pain  -DV      Pain: Word Scale, Post-Treatment  2 - mild pain  -DV      Pre/Post Treatment Pain Comment  Pt stated always has underlying pain, tolerated.  -DV      Recorded by [DV] Yokasta Castro MS CCC-SLP 03/06/20 1051      Row Name                Wound 02/23/20 2230 Right groin Incision    Wound - Properties Group Date first assessed: 02/23/20 [EB] Time first assessed: 2230 [EB] Present on Hospital Admission: Y [EB] Side: Right [EB] Location: groin [EB] Primary Wound Type: Incision [EB] Recorded by:  [EB] Collette Hardy RN 02/23/20 2302    Row Name                Wound 02/23/20 2302 Left groin Incision    Wound - Properties Group Date first assessed: 02/23/20 [EB] Time first assessed: 2302 [EB] Present on Hospital Admission: Y [EB] Side: Left [EB] Location: groin [EB] Primary Wound Type: Incision [EB] Recorded by:  [EB] Collette Hardy RN 02/23/20 2303    Row Name                Wound 03/04/20 0715 Right lateral chest Puncture    Wound - Properties Group Date first assessed: 03/04/20 [JJ] Time first assessed: 0715 [JJ] Present on Hospital Admission: N [JJ] Side: Right [JJ] Orientation: lateral [JJ] Location: chest [JJ] Primary Wound Type: Puncture [JJ], s/p chest tube site  Recorded by:  [JJ] Edgar Kenny RN 03/04/20 0937    Row Name                NPWT (Negative Pressure Wound Therapy) 02/28/20 1200 groin    NPWT (Negative Pressure Wound Therapy) - Properties Group Placement Date: 02/28/20 [MF] Placement Time: 1200 [MF] Location: groin [MF] Recorded by:  [MF] Arturo Madrid PT 03/02/20 1129    Row Name 03/06/20 0945             Outcome Summary/Treatment Plan (SLP)    Daily Summary of Progress (SLP)  progress toward functional goals as expected  -DV      Plan for Continued Treatment (SLP)  Continue PO trials and dysphagia exercises. Pt throat-clearing w/ trials of puree this  session, no s/s w/ thin. Exercises reviewed and completed. Pt likely ready for repeat instrumental swallow study in 2-3 days.  -DV      Anticipated Dischage Disposition  unknown;anticipate therapy at next level of care  -DV      Recorded by [DV] Yokasta Castro MS CCC-SLP 03/06/20 1051        User Key  (r) = Recorded By, (t) = Taken By, (c) = Cosigned By    Initials Name Effective Dates Discipline    Arturo Lopez, PT 06/19/15 -  PT    Edgar Bryan, RN 06/16/16 -  Nurse    Collette Berman RN 11/14/19 -  Nurse    Yokasta Lemos, MS CCC-SLP 02/28/20 -  SLP          Outcome Summary  Outcome Summary/Treatment Plan (SLP)  Daily Summary of Progress (SLP): progress toward functional goals as expected (03/06/20 0945 : Yokasta Castro, MS CCC-SLP)  Plan for Continued Treatment (SLP): Continue PO trials and dysphagia exercises. Pt throat-clearing w/ trials of puree this session, no s/s w/ thin. Exercises reviewed and completed. Pt likely ready for repeat instrumental swallow study in 2-3 days. (03/06/20 0945 : Yokasta Castro, MS CCC-SLP)  Anticipated Dischage Disposition: unknown, anticipate therapy at next level of care (03/06/20 0945 : Yokasta Castro, MS CCC-SLP)      SLP GOALS     Row Name 03/06/20 0945 03/04/20 1030          Oral Nutrition/Hydration Goal 1 (SLP)    Oral Nutrition/Hydration Goal 1, SLP  LTG: Pt will improve swallowing skills per clinical assessment, warranting repeat instrumental swallow study.  -DV  LTG: Pt will improve swallowing skills per clinical assessment, warranting repeat instrumental swallow study.  -AC     Time Frame (Oral Nutrition/Hydration Goal 1, SLP)  by discharge  -DV  by discharge  -AC     Progress/Outcomes (Oral Nutrition/Hydration Goal 1, SLP)  continuing progress toward goal  -DV  --        Oral Nutrition/Hydration Goal 2 (SLP)    Oral Nutrition/Hydration Goal 2, SLP  Pt will tolerate therapeutic trials of H2O/puree w/o s/sxs aspiration w/ 70% acc w/o cues to determine readiness  for repeat instrumental study.  -DV  Pt will tolerate therapeutic trials of H2O/puree w/o s/sxs aspiration w/ 70% acc w/o cues to determine readiness for repeat instrumental study.  -AC     Time Frame (Oral Nutrition/Hydration Goal 2, SLP)  short term goal (STG)  -DV  short term goal (STG)  -AC     Barriers (Oral Nutrition/Hydration Goal 2, SLP)  Throat clearing with puree, no s/s thin.  -DV  --     Progress/Outcomes (Oral Nutrition/Hydration Goal 2, SLP)  continuing progress toward goal  -DV  --        Pharyngeal Strengthening Exercise Goal 1 (SLP)    Activity (Pharyngeal Strengthening Goal 1, SLP)  increase timing;increase superior movement of the hyolaryngeal complex;increase anterior movement of the hyolaryngeal complex;increase closure at entrance to airway/closure of airway at glottis;increase squeeze/positive pressure generation  -DV  increase timing;increase superior movement of the hyolaryngeal complex;increase anterior movement of the hyolaryngeal complex;increase closure at entrance to airway/closure of airway at glottis;increase squeeze/positive pressure generation  -AC     Increase Timing  prepping - 3 second prep or suck swallow or 3-step swallow  -DV  prepping - 3 second prep or suck swallow or 3-step swallow  -AC     Increase Superior Movement of the Hyolaryngeal Complex  effortful pitch glide (falsetto + pharyngeal squeeze)  -DV  effortful pitch glide (falsetto + pharyngeal squeeze)  -AC     Increase Anterior Movement of the Hyolaryngeal Complex  chin tuck against resistance (CTAR)  -DV  chin tuck against resistance (CTAR)  -AC     Increase Closure at Entrance to Airway/Closure of Airway at Glottis  super-supraglottic swallow  -DV  super-supraglottic swallow  -AC     Increase Squeeze/Positive Pressure Generation  hard effortful swallow  -DV  hard effortful swallow  -AC     [place]/Accuracy (Pharyngeal Strengthening Goal 1, SLP)  with minimal cues (75-90% accuracy)  -DV  with minimal cues  (75-90% accuracy)  -AC     Time Frame (Pharyngeal Strengthening Goal 1, SLP)  short term goal (STG)  -DV  short term goal (STG)  -AC     Barriers (Pharyngeal Strengthening Goal 1, SLP)  Exercises reviewed and reps completed.  -DV  --     Progress/Outcomes (Pharyngeal Strengthening Goal 1, SLP)  good progress toward goal  -DV  --       User Key  (r) = Recorded By, (t) = Taken By, (c) = Cosigned By    Initials Name Provider Type    Heena Mcclain MS CCC-SLP Speech and Language Pathologist    Yokasta Lemos MS CCC-SLP Speech and Language Pathologist          EDUCATION  The patient has been educated in the following areas:   Dysphagia (Swallowing Impairment) Oral Care/Hydration NPO rationale.    SLP Recommendation and Plan  Daily Summary of Progress (SLP): progress toward functional goals as expected     Plan for Continued Treatment (SLP): Continue PO trials and dysphagia exercises. Pt throat-clearing w/ trials of puree this session, no s/s w/ thin. Exercises reviewed and completed. Pt likely ready for repeat instrumental swallow study in 2-3 days.  Anticipated Dischage Disposition: unknown, anticipate therapy at next level of care                    Time Calculation:   Time Calculation- SLP     Row Name 03/06/20 1054             Time Calculation- SLP    SLP Start Time  0945  -DV      SLP Received On  03/06/20  -DV        User Key  (r) = Recorded By, (t) = Taken By, (c) = Cosigned By    Initials Name Provider Type    Yokasta Lemos MS CCC-SLP Speech and Language Pathologist          Therapy Charges for Today     Code Description Service Date Service Provider Modifiers Qty    [id number]  ST TREATMENT SWALLOW 3 3/6/2020 Yokasta Castro MS CCC-SLP GN 1                 Yokasta Castro MS CCC-SHANNAN  3/6/2020

## 2020-03-06 NOTE — PROGRESS NOTES
Continued Stay Note  [facility]     Patient Name: Saskia Groves  MRN: [id number]  Today's Date: 3/6/2020    Admit Date: 2/23/2020    Discharge Plan     Row Name 03/06/20 1414       Plan    Plan  Update    Plan Comments  Discussed patient in rounds. Pt has 2 wound vac's and IV antbx, ref made to [facility]. Pt is aware. Precert is going to be needed. CM will follow up Monday.    Final Discharge Disposition Code  63 - [facility]        Discharge Codes    No documentation.       Expected Discharge Date and Time     Expected Discharge Date Expected Discharge Time    Mar 2, 2020             Yessenia Pratt RN

## 2020-03-07 LAB
ALBUMIN SERPL-MCNC: 2.6 G/DL (ref 3.5–5.2)
ALP SERPL-CCNC: 123 U/L (ref 39–117)
ALT SERPL W P-5'-P-CCNC: 23 U/L (ref 1–33)
ANION GAP SERPL CALCULATED.3IONS-SCNC: 10 MMOL/L (ref 5–15)
AST SERPL-CCNC: 33 U/L (ref 1–32)
BILIRUB SERPL-MCNC: 0.3 MG/DL (ref 0.2–1.2)
BUN BLD-MCNC: 47 MG/DL (ref 6–20)
CALCIUM SPEC-SCNC: 8.7 MG/DL (ref 8.6–10.5)
CHLORIDE SERPL-SCNC: 103 MMOL/L (ref 98–107)
CHOLEST SERPL-MCNC: 112 MG/DL (ref 0–200)
CO2 SERPL-SCNC: 27 MMOL/L (ref 22–29)
CREAT BLD-MCNC: 0.66 MG/DL (ref 0.57–1)
CRP SERPL-MCNC: 1.11 MG/DL (ref 0–0.5)
GLUCOSE BLD-MCNC: 83 MG/DL (ref 65–99)
GLUCOSE BLDC GLUCOMTR-MCNC: 200 MG/DL (ref 70–130)
GLUCOSE BLDC GLUCOMTR-MCNC: 45 MG/DL (ref 70–130)
GLUCOSE BLDC GLUCOMTR-MCNC: 49 MG/DL (ref 70–130)
GLUCOSE BLDC GLUCOMTR-MCNC: 59 MG/DL (ref 70–130)
GLUCOSE BLDC GLUCOMTR-MCNC: 62 MG/DL (ref 70–130)
GLUCOSE BLDC GLUCOMTR-MCNC: 90 MG/DL (ref 70–130)
MAGNESIUM SERPL-MCNC: 2.6 MG/DL (ref 1.6–2.6)
PHOSPHATE SERPL-MCNC: 4.5 MG/DL (ref 2.5–4.5)
POTASSIUM BLD-SCNC: 5.1 MMOL/L (ref 3.5–5.2)
PREALB SERPL-MCNC: 28.9 MG/DL (ref 20–40)
PROT SERPL-MCNC: 6.5 G/DL (ref 6–8.5)
SODIUM BLD-SCNC: 140 MMOL/L (ref 136–145)
TRIGL SERPL-MCNC: 139 MG/DL (ref 0–150)

## 2020-03-07 PROCEDURE — 84100 ASSAY OF PHOSPHORUS: CPT | Performed by: PHYSICIAN ASSISTANT

## 2020-03-07 PROCEDURE — 84134 ASSAY OF PREALBUMIN: CPT | Performed by: PHYSICIAN ASSISTANT

## 2020-03-07 PROCEDURE — 83735 ASSAY OF MAGNESIUM: CPT | Performed by: PHYSICIAN ASSISTANT

## 2020-03-07 PROCEDURE — 97605 NEG PRS WND THER DME<=50SQCM: CPT

## 2020-03-07 PROCEDURE — 82962 GLUCOSE BLOOD TEST: CPT

## 2020-03-07 PROCEDURE — 80053 COMPREHEN METABOLIC PANEL: CPT | Performed by: PHYSICIAN ASSISTANT

## 2020-03-07 PROCEDURE — 86140 C-REACTIVE PROTEIN: CPT | Performed by: PHYSICIAN ASSISTANT

## 2020-03-07 PROCEDURE — 94799 UNLISTED PULMONARY SVC/PX: CPT

## 2020-03-07 PROCEDURE — 99024 POSTOP FOLLOW-UP VISIT: CPT | Performed by: THORACIC SURGERY (CARDIOTHORACIC VASCULAR SURGERY)

## 2020-03-07 PROCEDURE — 25010000002 DAPTOMYCIN PER 1 MG: Performed by: PHYSICIAN ASSISTANT

## 2020-03-07 PROCEDURE — 63710000001 INSULIN DETEMIR PER 5 UNITS: Performed by: PHYSICIAN ASSISTANT

## 2020-03-07 PROCEDURE — 82465 ASSAY BLD/SERUM CHOLESTEROL: CPT | Performed by: PHYSICIAN ASSISTANT

## 2020-03-07 PROCEDURE — 99232 SBSQ HOSP IP/OBS MODERATE 35: CPT | Performed by: NURSE PRACTITIONER

## 2020-03-07 PROCEDURE — 97110 THERAPEUTIC EXERCISES: CPT

## 2020-03-07 PROCEDURE — 97116 GAIT TRAINING THERAPY: CPT

## 2020-03-07 PROCEDURE — 84478 ASSAY OF TRIGLYCERIDES: CPT | Performed by: PHYSICIAN ASSISTANT

## 2020-03-07 RX ADMIN — CLOPIDOGREL BISULFATE 75 MG: 75 TABLET ORAL at 09:54

## 2020-03-07 RX ADMIN — Medication 1 CAPSULE: at 09:55

## 2020-03-07 RX ADMIN — INSULIN HUMAN 5 UNITS: 100 INJECTION, SOLUTION PARENTERAL at 06:52

## 2020-03-07 RX ADMIN — INSULIN DETEMIR 15 UNITS: 100 INJECTION, SOLUTION SUBCUTANEOUS at 10:08

## 2020-03-07 RX ADMIN — KETOTIFEN FUMARATE 1 DROP: 0.35 SOLUTION/ DROPS OPHTHALMIC at 22:30

## 2020-03-07 RX ADMIN — ACETAMINOPHEN 650 MG: 325 TABLET, FILM COATED ORAL at 22:17

## 2020-03-07 RX ADMIN — KETOTIFEN FUMARATE 1 DROP: 0.35 SOLUTION/ DROPS OPHTHALMIC at 09:56

## 2020-03-07 RX ADMIN — METOPROLOL TARTRATE 100 MG: 50 TABLET, FILM COATED ORAL at 22:16

## 2020-03-07 RX ADMIN — FAMOTIDINE 20 MG: 20 TABLET ORAL at 09:55

## 2020-03-07 RX ADMIN — PREGABALIN 150 MG: 75 CAPSULE ORAL at 00:36

## 2020-03-07 RX ADMIN — PANTOPRAZOLE SODIUM 40 MG: 40 INJECTION, POWDER, FOR SOLUTION INTRAVENOUS at 06:52

## 2020-03-07 RX ADMIN — INSULIN HUMAN 5 UNITS: 100 INJECTION, SOLUTION PARENTERAL at 13:42

## 2020-03-07 RX ADMIN — BUDESONIDE 0.5 MG: 0.5 INHALANT RESPIRATORY (INHALATION) at 20:36

## 2020-03-07 RX ADMIN — ASPIRIN 325 MG ORAL TABLET 325 MG: 325 PILL ORAL at 09:55

## 2020-03-07 RX ADMIN — PREGABALIN 150 MG: 75 CAPSULE ORAL at 13:42

## 2020-03-07 RX ADMIN — ATORVASTATIN CALCIUM 10 MG: 10 TABLET, FILM COATED ORAL at 09:55

## 2020-03-07 RX ADMIN — PREGABALIN 150 MG: 75 CAPSULE ORAL at 22:17

## 2020-03-07 RX ADMIN — METOPROLOL TARTRATE 100 MG: 50 TABLET, FILM COATED ORAL at 00:36

## 2020-03-07 RX ADMIN — DEXTROSE MONOHYDRATE 25 G: 25 INJECTION, SOLUTION INTRAVENOUS at 18:28

## 2020-03-07 RX ADMIN — INSULIN DETEMIR 15 UNITS: 100 INJECTION, SOLUTION SUBCUTANEOUS at 22:17

## 2020-03-07 RX ADMIN — HYDROCODONE BITARTRATE AND ACETAMINOPHEN 1 TABLET: 10; 325 TABLET ORAL at 00:36

## 2020-03-07 RX ADMIN — ROPINIROLE HYDROCHLORIDE 4 MG: 2 TABLET, FILM COATED ORAL at 22:15

## 2020-03-07 RX ADMIN — AMITRIPTYLINE HYDROCHLORIDE 10 MG: 10 TABLET, FILM COATED ORAL at 00:36

## 2020-03-07 RX ADMIN — LOSARTAN POTASSIUM 50 MG: 50 TABLET ORAL at 09:55

## 2020-03-07 RX ADMIN — FAMOTIDINE 20 MG: 20 TABLET ORAL at 18:29

## 2020-03-07 RX ADMIN — PREGABALIN 150 MG: 75 CAPSULE ORAL at 09:54

## 2020-03-07 RX ADMIN — AMITRIPTYLINE HYDROCHLORIDE 10 MG: 10 TABLET, FILM COATED ORAL at 22:17

## 2020-03-07 RX ADMIN — DAPTOMYCIN 500 MG: 500 INJECTION, POWDER, LYOPHILIZED, FOR SOLUTION INTRAVENOUS at 06:53

## 2020-03-07 RX ADMIN — INSULIN DETEMIR 15 UNITS: 100 INJECTION, SOLUTION SUBCUTANEOUS at 00:36

## 2020-03-07 RX ADMIN — METOPROLOL TARTRATE 100 MG: 50 TABLET, FILM COATED ORAL at 09:55

## 2020-03-07 RX ADMIN — ROPINIROLE HYDROCHLORIDE 4 MG: 2 TABLET, FILM COATED ORAL at 00:36

## 2020-03-07 RX ADMIN — HYDROCODONE BITARTRATE AND ACETAMINOPHEN 1 TABLET: 10; 325 TABLET ORAL at 09:55

## 2020-03-07 RX ADMIN — HYDROCODONE BITARTRATE AND ACETAMINOPHEN 1 TABLET: 10; 325 TABLET ORAL at 18:01

## 2020-03-07 NOTE — PROGRESS NOTES
[facility] Medicine Services  PROGRESS NOTE    Patient Name: Saskia Groves  : 1970  MRN: [id number]    Date of Admission: 2020  Length of Stay: 13  Primary Care Physician: Meagan Barlow APRN    Subjective   Subjective     CC:  Follow up post fem-fem bypass abscess  Acute hypoxemic respiratory failure (CMS/HCC)    HPI:  Patient seen resting in bed in no apparent distress. No acute events overnight per nursing. Patient reports that she is feeling fine today. She rested well overnight. No new complaints at this time.     ROS:  Gen- No fevers, chills  CV- No chest pain, palpitations  Resp- No cough, dyspnea  GI- No N/V/D, abd pain  MSK- appropriate bilateral groin pain    Objective   Objective     Vital Signs:   Temp:  [98.1 °F (36.7 °C)-98.3 °F (36.8 °C)] 98.1 °F (36.7 °C)  Heart Rate:  [] 96  Resp:  [18] 18  BP: (115-161)/(72-90) 151/86        Physical Exam:  Constitutional: No acute distress, awake, alert  HENT: NCAT, mucous membranes moist  Respiratory: scattered crackles in bases, bilateral upper lobes clear, respiratory effort normal   Cardiovascular: RRR, no murmurs, rubs, or gallops, palpable pedal pulses bilaterally  Gastrointestinal: Positive bowel sounds, soft, nontender, nondistended  Musculoskeletal: trace bilateral ankle edema  Psychiatric: Appropriate affect, cooperative  Neurologic: Oriented x 3, strength symmetric in all extremities, speech clear  Skin: warm, dry, bilateral groin wound vac with bilateral MELISSA drains in place    Results Reviewed:  I have personally reviewed current lab, radiology, and data and agree.    Results from last 7 days   Lab Units 20  0418 20  0344 20  0341   WBC 10*3/mm3 8.40 9.39 8.93   HEMOGLOBIN g/dL 10.4* 10.4* 10.8*   HEMATOCRIT % 34.0 33.9* 34.4   PLATELETS 10*3/mm3 189 216 240     Results from last 7 days   Lab Units 20  1349 20  0418 20  0344   SODIUM mmol/L 140 144 138    POTASSIUM mmol/L 5.1 4.4 4.2   CHLORIDE mmol/L 103 107 102   CO2 mmol/L 27.0 28.0 26.0   BUN mg/dL 47* 45* 43*   CREATININE mg/dL 0.66 0.64 0.54*   GLUCOSE mg/dL 83 79 147*   CALCIUM mg/dL 8.7 8.8 8.6   ALT (SGPT) U/L 23 19 15   AST (SGOT) U/L 33* 32 24     Estimated Creatinine Clearance: 91.6 mL/min (by C-G formula based on SCr of 0.66 mg/dL).  No results found for: BNP    Microbiology Results Abnormal     Procedure Component Value - Date/Time    Fungus Culture - Body Fluid, Pleural Cavity [[id number]] Collected:  02/28/20 1341    Lab Status:  Preliminary result Specimen:  Body Fluid from Pleural Cavity Updated:  03/06/20 1530     Fungus Culture No fungus isolated at 1 week    AFB Culture - Body Fluid, Pleural Cavity [[id number]] Collected:  02/28/20 1341    Lab Status:  Preliminary result Specimen:  Body Fluid from Pleural Cavity Updated:  03/06/20 1530     AFB Culture No AFB isolated at 1 week     AFB Stain No acid fast bacilli seen on concentrated smear    Fungus Culture - Body Fluid, Groin, right [[id number]] Collected:  02/28/20 1417    Lab Status:  Preliminary result Specimen:  Body Fluid from Groin, right Updated:  03/06/20 1530     Fungus Culture No fungus isolated at 1 week    AFB Culture - Body Fluid, Groin, right [[id number]] Collected:  02/28/20 1417    Lab Status:  Preliminary result Specimen:  Body Fluid from Groin, right Updated:  03/06/20 1530     AFB Culture No AFB isolated at 1 week     AFB Stain No acid fast bacilli seen on concentrated smear    Fungus Culture - Tissue, Groin, right [[id number]] Collected:  02/28/20 1417    Lab Status:  Preliminary result Specimen:  Tissue from Groin, right Updated:  03/06/20 1530     Fungus Culture No fungus isolated at 1 week    AFB Culture - Tissue, Groin, right [[id number]] Collected:  02/28/20 1417    Lab Status:  Preliminary result Specimen:  Tissue from Groin, right Updated:  03/06/20 1530     AFB Culture No AFB isolated at 1 week     AFB Stain No acid fast  bacilli seen on concentrated smear    Anaerobic Culture - Hardware / Foreign Body, Groin, right [[id number]] Collected:  02/28/20 1500    Lab Status:  Final result Specimen:  Hardware / Foreign Body from Groin, right Updated:  03/04/20 1205     Anaerobic Culture No anaerobes isolated at 5 days    Anaerobic Culture - Body Fluid, Groin, right [[id number]] Collected:  02/28/20 1417    Lab Status:  Final result Specimen:  Body Fluid from Groin, right Updated:  03/04/20 1204     Anaerobic Culture No anaerobes isolated at 5 days    Anaerobic Culture - Body Fluid, Pleural Cavity [[id number]] Collected:  02/28/20 1341    Lab Status:  Final result Specimen:  Body Fluid from Pleural Cavity Updated:  03/04/20 1203     Anaerobic Culture No anaerobes isolated at 5 days    Anaerobic Culture - Tissue, Groin, right [[id number]] Collected:  02/28/20 1417    Lab Status:  Final result Specimen:  Tissue from Groin, right Updated:  03/04/20 1202     Anaerobic Culture No anaerobes isolated at 5 days    Blood Culture - Blood, Wrist, Right [[id number]] Collected:  02/28/20 0750    Lab Status:  Final result Specimen:  Blood from Wrist, Right Updated:  03/04/20 0815     Blood Culture No growth at 5 days    Body Fluid Culture - Body Fluid, Groin, right [[id number]]  (Abnormal)  (Susceptibility) Collected:  02/28/20 1417    Lab Status:  Final result Specimen:  Body Fluid from Groin, right Updated:  03/03/20 0650     Body Fluid Culture Rare Staphylococcus aureus, MRSA     Gram Stain Many (4+) WBCs seen      No organisms seen    Narrative:             Susceptibility      Staphylococcus aureus, MRSA     CADENCE     Gentamicin Susceptible     Oxacillin Resistant     Rifampin Susceptible     Vancomycin Susceptible                Susceptibility Comments     Staphylococcus aureus, MRSA    This isolate does not demonstrate inducible clindamycin resistance in vitro.               Respiratory Culture - Sputum, ET Suction [[id number]] Collected:  02/29/20 0850     Lab Status:  Final result Specimen:  Sputum from ET Suction Updated:  03/02/20 0902     Respiratory Culture No growth     Gram Stain Few (2+) Epithelial cells per low power field      Few (2+) WBCs seen      No organisms seen    Tissue / Bone Culture - Tissue, Groin, right [[id number]] Collected:  02/28/20 1417    Lab Status:  Final result Specimen:  Tissue from Groin, right Updated:  03/02/20 0809     Tissue Culture No growth at 3 days     Gram Stain Many (4+) WBCs seen      No organisms seen    Body Fluid Culture - Body Fluid, Pleural Cavity [[id number]] Collected:  02/28/20 1341    Lab Status:  Final result Specimen:  Body Fluid from Pleural Cavity Updated:  03/02/20 0808     Body Fluid Culture No growth at 3 days     Gram Stain Many (4+) WBCs seen      No organisms seen    Hardware / Foreign Body Culture - Hardware / Foreign Body, Groin, right [[id number]]  (Abnormal)  (Susceptibility) Collected:  02/28/20 1500    Lab Status:  Final result Specimen:  Hardware / Foreign Body from Groin, right Updated:  03/01/20 0645     Hardware / Foreign Body Culture Scant growth (1+) Staphylococcus aureus, MRSA     Comment:   Methicillin resistant Staphylococcus aureus, Patient may be an isolation risk.        Gram Stain Many (4+) WBCs seen      No organisms seen    Susceptibility      Staphylococcus aureus, MRSA     CADENCE     Clindamycin Susceptible     Erythromycin Resistant     Inducible Clindamycin Resistance Negative     Oxacillin Resistant     Penicillin G Resistant     Rifampin Susceptible     Tetracycline Susceptible     Trimethoprim + Sulfamethoxazole Susceptible     Vancomycin Susceptible                Susceptibility Comments     Staphylococcus aureus, MRSA    This isolate does not demonstrate inducible clindamycin resistance in vitro.               Blood Culture - Blood, Arm, Left [[id number]] Collected:  02/24/20 0057    Lab Status:  Final result Specimen:  Blood from Arm, Left Updated:  02/29/20 0545     Blood Culture  No growth at 5 days    Blood Culture - Blood, Hand, Left [[id number]] Collected:  02/24/20 0057    Lab Status:  Final result Specimen:  Blood from Hand, Left Updated:  02/29/20 0545     Blood Culture No growth at 5 days    Wound Culture - Wound, Thigh, Left [[id number]]  (Abnormal)  (Susceptibility) Collected:  02/24/20 1849    Lab Status:  Final result Specimen:  Wound from Thigh, Left Updated:  02/27/20 0723     Wound Culture Light growth (2+) Staphylococcus aureus, MRSA     Comment: Methicillin resistant Staphylococcus aureus, Patient may be an isolation risk.        Gram Stain Few (2+) WBCs seen      Rare (1+) Gram positive cocci in pairs    Susceptibility      Staphylococcus aureus, MRSA     CADENCE     Clindamycin Susceptible     Erythromycin Resistant     Inducible Clindamycin Resistance Negative     Oxacillin Resistant     Penicillin G Resistant     Rifampin Susceptible     Tetracycline Susceptible     Trimethoprim + Sulfamethoxazole Susceptible     Vancomycin Susceptible                Susceptibility Comments     Staphylococcus aureus, MRSA    This isolate does not demonstrate inducible clindamycin resistance in vitro.                     Imaging Results (Last 24 Hours)     Procedure Component Value Units Date/Time    XR Chest 1 View [[id number]] Collected:  03/05/20 0836     Updated:  03/06/20 2300    Narrative:       EXAMINATION: XR CHEST 1 VW-     INDICATION: Postop; I73.9-Peripheral vascular disease, unspecified;  G49-Fniqvgg effusion, not elsewhere classified; Z95.828-Presence of  other vascular implants and grafts; R13.13-Dysphagia, pharyngeal phase.     COMPARISON: 03/04/2020.     FINDINGS: Right upper extremity PICC line is seen with its tip at the  cavoatrial junction. Feeding tube is seen below the left hemidiaphragm.  Heart is normal in size. Right pigtail catheter has been removed. There  are small areas of discoid atelectasis in the right mid and lower lung,  and there appears to be very small  pneumothorax in the lateral right  base, approximately 7 mm in width. This does not extend to the apex, or  across the base of the lung, although there is probably trace air in the  minor fissure. Left lung shows near resolution of focal atelectasis in  the medial left base. There is stable diffuse interstitial disease  elsewhere.       Impression:       1. Right pleural drain removal with very small right basilar  pneumothorax.  2. Mild new discoid atelectasis in the right mid and lower lung, but  interval clearing of the left lung base, since yesterday's study.      D:  03/05/2020  E:  03/05/2020     This report was finalized on 3/6/2020 10:57 PM by Dr. Jaswant Frank MD.           Results for orders placed during the hospital encounter of 02/23/20   Adult Transthoracic Echo Complete W/ Cont if Necessary Per Protocol    Narrative · Left ventricular systolic function is mildly decreased. Calculated EF =   45.0%. Estimated EF appears to be in the range of 46 - 50%  · Mild global hypokinesis no focal wall motion abnormalities  · Mild aortic valve sclerosis without stenosis.  · There is a trivial pericardial effusion. There is a large size left   pleural effusion          I have reviewed the medications.  Scheduled Meds:  amitriptyline 10 mg Oral Nightly   aspirin 325 mg Oral Daily   atorvastatin 10 mg Oral Daily   budesonide 0.5 mg Nebulization BID - RT   clopidogrel 75 mg Oral Daily   DAPTOmycin 8 mg/kg Intravenous Q24H   famotidine 20 mg Oral BID AC   insulin detemir 15 Units Subcutaneous Q12H   insulin regular 0-14 Units Subcutaneous Q6H   ketotifen 1 drop Both Eyes BID   lactobacillus acidophilus 1 capsule Oral Daily   losartan 50 mg Oral Daily   metoprolol tartrate 100 mg Oral Q12H   pantoprazole 40 mg Intravenous Q AM   pregabalin 150 mg Oral Q8H   rOPINIRole 4 mg Oral Nightly     Continuous Infusions:   PRN Meds:.•  acetaminophen **OR** acetaminophen **OR** acetaminophen  •  albuterol  •  dextrose  •  dextrose  •   glucagon (human recombinant)  •  guaiFENesin-dextromethorphan  •  hydrALAZINE  •  HYDROcodone-acetaminophen    Assessment/Plan   Assessment / Plan     Active Hospital Problems    Diagnosis  POA   • **Acute hypoxemic respiratory failure (CMS/[facility]) [J96.01]  No   • Pleural effusion - bilateral mod/large pleural effusions on CT chest 2/27/2020.  [J90]  No   • Acute pulmonary edema (CMS/[facility]) [J81.0]  No   • Sepsis (CMS/[facility]) [A41.9]  Yes   • COPD (chronic obstructive pulmonary disease) (CMS/[facility]) [J44.9]  Yes   • Coronary artery disease [I25.10]  Yes   • Hypertension [I10]  Yes   • Hyperlipidemia [E78.5]  Yes   • Diabetes mellitus (CMS/[facility]) [E11.9]  Yes   • Current smoker [F17.200]  Yes   • PVD (peripheral vascular disease) (CMS/[facility]) [I73.9]  Yes      Resolved Hospital Problems   No resolved problems to display.     Brief Hospital Course to date:  Saskia Groves is a 49 y.o. female admitted 2/23.  She has history of DM, tobacco, HTN, PAD.  She had fem-fem bypass on 1/15 but was readmitted for fever and wound infection left groin.  Transferred to ICU on 2/27 for effusions and hypoxia.  Had graft explant 2/28 as well as bronch and right chest tube.  Muscle flaps were done bilat by Plastics.  Bilateral wound vacs in place.  Was extubated after several days.  Is followed by ID.  Dysphagia with ST following.     Wounds, bilateral groin abscess   -s/p explantation femorofemoral bypass on 2/28   -CT surgery following  -Bilateral wound vacs in place  -Bilateral MELISSA drains in place with serosanguineous drainage   -PT wound following  -ID following   -Currently on Dapto, plan to continue until 4/20 to complete 8 weeks of therapy      Debility  - mobilize     Dysphagia  - Keofeed in place with TF  -SLP following  -Plan to reevaluate swallow 1-2 days     DM2  -A1C 12.1 on 1/14  -Last 24 hours BG range   -continue FSBG ACHS  -continue Levemir 15 units BID  -continue moderate dose SSI    Hypoxia  - improved     DVT Prophylaxis:   [facility]    CODE STATUS:   Code Status and Medical Interventions:   Ordered at: 02/23/20 2341     Level Of Support Discussed With:    Patient     Code Status:    CPR     Medical Interventions (Level of Support Prior to Arrest):    Full           Electronically signed by LUIS Jamil, 03/07/20, 3:17 PM.

## 2020-03-07 NOTE — THERAPY WOUND CARE TREATMENT
Acute Care - Wound/Debridement Treatment Note  [facility]     Patient Name: Saskia Groves  : 1970  MRN: [id number]  Today's Date: 3/7/2020                  Admit Date: 2020    Visit Dx:    ICD-10-CM ICD-9-CM   1. PVD (peripheral vascular disease) (CMS/[facility]) I73.9 443.9   2. Pleural effusion J90 511.9   3. S/P femoral-femoral bypass surgery Z95.828 V45.89   4. Pharyngeal dysphagia R13.13 787.23       Patient Active Problem List   Diagnosis   • PVD (peripheral vascular disease) (CMS/[facility])   • Lumbar radiculopathy   • Paresthesia   • Causalgia of lower limb   • Hiatal hernia   • Current smoker   • Bilateral carotid artery stenosis   • COPD (chronic obstructive pulmonary disease) (CMS/[facility])   • Coronary artery disease   • Hypertension   • Hyperlipidemia   • Diabetes mellitus (CMS/[facility])   • Sepsis (CMS/[facility])   • Pleural effusion - bilateral mod/large pleural effusions on CT chest 2020.    • Acute pulmonary edema (CMS/[facility])   • Acute hypoxemic respiratory failure (CMS/[facility])           Wound 20 2230 Right groin Incision (Active)   Dressing Appearance dry;intact 3/7/2020  9:40 AM   Closure Adhesive bandage;GILLIAN 3/7/2020  7:42 AM   Base dressing in place, unable to visualize 3/7/2020  9:40 AM   Care, Wound negative pressure wound therapy 3/6/2020  8:00 PM   Dressing Care, Wound other (see comments) 3/6/2020  8:00 PM   Wound Output (mL) 0 3/7/2020  9:40 AM       Wound 20 2302 Left groin Incision (Active)   Dressing Appearance dry;intact 3/7/2020  9:40 AM   Closure Adhesive bandage;GILLIAN 3/7/2020  7:42 AM   Base dressing in place, unable to visualize 3/7/2020  9:40 AM   Care, Wound negative pressure wound therapy 3/6/2020  8:00 PM   Dressing Care, Wound other (see comments) 3/6/2020  8:00 PM   Wound Output (mL) 0 3/7/2020  9:40 AM       Wound 20 0715 Right lateral chest Puncture (Active)   Dressing Appearance dry;intact 3/7/2020  7:42 AM   Closure None 3/7/2020  7:42 AM   Base dressing in  place, unable to visualize 3/7/2020  7:42 AM       NPWT (Negative Pressure Wound Therapy) 02/28/20 1200 groin (Active)   Therapy Setting continuous therapy 3/7/2020  9:40 AM   Dressing foam, black 3/7/2020  6:00 AM   Pressure Setting 125 mmHg 3/7/2020  9:40 AM         WOUND DEBRIDEMENT                  Therapy Treatment    Rehabilitation Treatment Summary     Row Name 03/07/20 0940             Treatment Time/Intention    Discipline  physical therapist  -MC      Document Type  wound care;therapy note (daily note)  -      Subjective Information  no complaints  -      Mode of Treatment  physical therapy;individual therapy  -      Care Plan Review  care plan/treatment goals reviewed;risks/benefits reviewed;current/potential barriers reviewed;patient/other agree to care plan  -MC      Recorded by [] Yen Cannon, PT 03/07/20 1021      Row Name 03/07/20 0940             Cognitive Assessment/Intervention- PT/OT    Orientation Status (Cognition)  oriented x 4  -MC      Recorded by [] Yen Cannon, PT 03/07/20 1021      Row Name 03/07/20 0940             Positioning and Restraints    Pre-Treatment Position  in bed  -      Post Treatment Position  bed  -MC      In Bed  supine;call light within reach;encouraged to call for assist  -MC      Recorded by [MC] Yen Cannon, PT 03/07/20 1021      Row Name 03/07/20 0940             Pain Scale: Numbers Pre/Post-Treatment    Pain Scale: Numbers, Pretreatment  0/10 - no pain  -      Pain Scale: Numbers, Post-Treatment  0/10 - no pain  -MC      Recorded by [MC] Yen Cannon, PT 03/07/20 1021      Row Name 03/07/20 0940             Wound 02/23/20 2230 Right groin Incision    Wound - Properties Group Date first assessed: 02/23/20 [EB] Time first assessed: 2230 [EB] Present on Hospital Admission: Y [EB] Side: Right [EB] Location: groin [EB] Primary Wound Type: Incision [EB] Recorded by:  [EB] Collette Hardy RN 02/23/20 2302    Dressing Appearance   dry;intact  -MC      Base  dressing in place, unable to visualize  -      Wound Output (mL)  0  -MC      Recorded by [] Yen Cannon, PT 03/07/20 1021      Row Name 03/07/20 0940             Wound 02/23/20 2302 Left groin Incision    Wound - Properties Group Date first assessed: 02/23/20 [EB] Time first assessed: 2302 [EB] Present on Hospital Admission: Y [EB] Side: Left [EB] Location: groin [EB] Primary Wound Type: Incision [EB] Recorded by:  [EB] Collette Hardy RN 02/23/20 2303    Dressing Appearance  dry;intact  -MC      Base  dressing in place, unable to visualize  -MC      Wound Output (mL)  0  -MC      Recorded by [] Yen Cannon, PT 03/07/20 1021      Row Name                Wound 03/04/20 0715 Right lateral chest Puncture    Wound - Properties Group Date first assessed: 03/04/20 [JJ] Time first assessed: 0715 [JJ] Present on Hospital Admission: N [JJ] Side: Right [JJ] Orientation: lateral [JJ] Location: chest [JJ] Primary Wound Type: Puncture [JJ], s/p chest tube site  Recorded by:  [JJ] Edgar Kenny, RN 03/04/20 0937    Row Name 03/07/20 0940             NPWT (Negative Pressure Wound Therapy) 02/28/20 1200 groin    NPWT (Negative Pressure Wound Therapy) - Properties Group Placement Date: 02/28/20 [MF] Placement Time: 1200 [MF] Location: groin [MF] Recorded by:  [] Arturo Madrid, PT 03/02/20 1129    Therapy Setting  continuous therapy  -      Pressure Setting  125 mmHg  -MC      Recorded by [] Yen Cannon, PT 03/07/20 1021      Row Name 03/07/20 0940             Coping    Observed Emotional State  accepting;cooperative;calm  -      Verbalized Emotional State  acceptance  -MC      Recorded by [LINUS] Yen Cannon, PT 03/07/20 1021      Row Name 03/07/20 0940             Plan of Care Review    Plan of Care Reviewed With  patient  -      Progress  no change  -      Outcome Summary  Bilateral groin prevena vac dressings in place with no apparent issues or  complaints from the pt. No drainage noted in canister or tubing. PT Wound care will continue to monitor.  -      Recorded by [] Yen Cannon, PT 03/07/20 1021        User Key  (r) = Recorded By, (t) = Taken By, (c) = Cosigned By    Initials Name Effective Dates Discipline    MF Arturo Madrid, PT 06/19/15 -  PT    Yen Nova, PT 04/03/18 -  PT    Edgar Bryan, RN 06/16/16 -  Nurse    Collette Berman RN 11/14/19 -  Nurse                PT Recommendation and Plan  Anticipated Equipment Needs at Discharge (PT): front wheeled walker  Anticipated Discharge Disposition (PT): skilled nursing facility  Planned Therapy Interventions (PT Eval): balance training, bed mobility training, gait training, home exercise program, transfer training, strengthening  Therapy Frequency (PT Clinical Impression): daily        Progress: no change      Progress: no change  Outcome Summary: Bilateral groin prevena vac dressings in place with no apparent issues or complaints from the pt. No drainage noted in canister or tubing. PT Wound care will continue to monitor.  Plan of Care Reviewed With: patient            Time Calculation  PT Charges     Row Name 03/07/20 0940 03/07/20 0928          Time Calculation    Start Time  0940  -  0904  -AS     PT Received On  --  03/07/20  -AS     PT Goal Re-Cert Due Date  03/13/20  -  03/13/20  -AS        Timed Charges    48380 - PT Therapeutic Exercise Minutes  --  8  -AS     82014 - Gait Training Minutes   --  15  -AS       User Key  (r) = Recorded By, (t) = Taken By, (c) = Cosigned By    Initials Name Provider Type    AS Ilsa Farrell, PTA Physical Therapy Assistant    Yen Nova, PT Physical Therapist           Therapy Charges for Today     Code Description Service Date Service Provider Modifiers Qty    [id number] HC PT NEG PRESS WOUND TO 50SQCM DME1 3/7/2020 Yen Cannon, PT  1            PT G-Codes  Outcome Measure Options: AM-PAC 6 Clicks  Basic Mobility (PT)  AM-PAC 6 Clicks Score (PT): 19        Yen Cannon, PT  3/7/2020

## 2020-03-07 NOTE — PROGRESS NOTES
Plastic Surgery Progress Note      Admission Date:  2020  LOS:  12  Patient Care Team:  Meagan Barlow APRN as PCP - General (Family Medicine)  Tyson Donaldson DO as Consulting Physician (Cardiology)    Patient Name:  Saskia Groves  :  1970  MRN:  [id number]    Date:  3/6/2020      Subjective:  No acute events overnight,       History:   Past Medical History:   Diagnosis Date   • Anxiety    • Arthritis    • Asthma    • Carotid artery stenosis    • Chronic bronchitis (CMS/HCC)    • COPD (chronic obstructive pulmonary disease) (CMS/HCC)    • Coronary artery disease     2 vessels with 50% blockage.  Sees Dr. Donaldson   • Depression    • Diabetes mellitus (CMS/HCC)    • Dyslipidemia    • GERD (gastroesophageal reflux disease)    • Hiatal hernia    • Hyperlipidemia    • Hypertension    • Infectious viral hepatitis    • Lower back pain    • Migraine    • Multinodular goiter    • S/P thyroid biopsy     2008, 2013, 2015, and 07/15/2019 at [facility], right lobe nodules - all cytologies were benign   • Sleep apnea     can't tolerate the cpap mask   • Subclinical hyperthyroidism      Past Surgical History:   Procedure Laterality Date   • ANTERIOR CERVICAL DISCECTOMY W/ FUSION     • BACK SURGERY      lumbar   •  SECTION  19940    x 2    • CHOLECYSTECTOMY     • COLONOSCOPY     • ENDOSCOPY     • FEMORAL ARTERY CUTDOWN Bilateral 2020    Procedure: FEMORAL ARTERY CUTDOWN WITH REMOVAL OF FEMORAL FEMORAL BYPASS GRAFT BILATERAL;  Surgeon: Deandre Oseguera MD;  Location:  [facility];  Service: Vascular;  Laterality: Bilateral;   • FEMORAL ENDARTERECTOMY Bilateral 1/15/2020    Procedure: FEMORAL ENDARTERECTOMY BILATERAL;  Surgeon: Deandre Oseguera MD;  Location:  [facility];  Service: Vascular   • FEMORAL FEMORAL BYPASS Right 1/15/2020    Procedure: FEMORAL FEMORAL BYPASS;  Surgeon: Deandre Oseguera MD;  Location:  [facility];  Service: Vascular   • HYSTERECTOMY     • KNEE SURGERY Left    •  LEG EXCISION LESION/CYST Left 2/28/2020    Procedure: GROIN MUSCLE FLAP BILATERAL;  Surgeon: Modesto Whittaker MD;  Location: [facility];  Service: Plastics;  Laterality: Left;   • WRIST SURGERY Left      Family History   Problem Relation Age of Onset   • Diabetes Mother    • Hypertension Mother    • Arthritis Mother    • Hyperlipidemia Mother    • Migraines Mother    • Thyroid disease Mother    • Hypertension Father    • Lung cancer Father    • Cancer Father    • Stroke Other    • Migraines Maternal Aunt    • Thyroid disease Maternal Aunt    • Heart attack Maternal Aunt    • Osteoporosis Maternal Aunt    • Cancer Maternal Uncle    • Heart attack Maternal Uncle    • Stroke Maternal Uncle    • Hyperlipidemia Maternal Grandmother    • Cancer Maternal Grandmother    • Obesity Maternal Grandmother    • Thyroid disease Daughter    • Obesity Daughter      Social History     Socioeconomic History   • Marital status:      Spouse name: Not on file   • Number of children: 2   • Years of education: Not on file   • Highest education level: Not on file   Occupational History   • Occupation: [employer]     Employer: DISABLED     Comment: Back and Leg Pain   Tobacco Use   • Smoking status: Current Every Day Smoker     Packs/day: 1.00     Years: 35.00     Pack years: 35.00     Types: Cigarettes   • Smokeless tobacco: Never Used   Substance and Sexual Activity   • Alcohol use: Not Currently   • Drug use: No   • Sexual activity: Yes     Partners: Male     Comment:    Social History Narrative    Lives in [place], [place] alone     Allergies   Allergen Reactions   • Levaquin [Levofloxacin] Nausea And Vomiting   • Penicillins Nausea And Vomiting     Has tolerated cefepime 2/2020   • Codeine Nausea Only   • Flagyl [Metronidazole] Nausea And Vomiting       Medication:    Current Facility-Administered Medications:   •  acetaminophen (TYLENOL) tablet 650 mg, 650 mg, Oral, Q4H PRN, 650 mg at 03/02/20 2128 **OR** acetaminophen  (TYLENOL) 160 MG/5ML solution 650 mg, 650 mg, Oral, Q4H PRN, 650 mg at 03/04/20 1226 **OR** acetaminophen (TYLENOL) suppository 650 mg, 650 mg, Rectal, Q4H PRN, Isael Bailey PA, 650 mg at 02/28/20 0429  •  albuterol (PROVENTIL) nebulizer solution 0.083% 2.5 mg/3mL, 2.5 mg, Nebulization, Q6H PRN, Isael Bailey PA  •  amitriptyline (ELAVIL) tablet 10 mg, 10 mg, Oral, Nightly, Isael Bailey PA, 10 mg at 03/05/20 2000  •  aspirin tablet 325 mg, 325 mg, Oral, Daily, Isael Bailey PA, 325 mg at 03/06/20 0848  •  atorvastatin (LIPITOR) tablet 10 mg, 10 mg, Oral, Daily, Isael Bailey PA, 10 mg at 03/06/20 0849  •  budesonide (PULMICORT) nebulizer solution 0.5 mg, 0.5 mg, Nebulization, BID - RT, Isael Bailey PA, 0.5 mg at 03/06/20 2040  •  clopidogrel (PLAVIX) tablet 75 mg, 75 mg, Oral, Daily, Isael Bailey PA, 75 mg at 03/06/20 0848  •  DAPTOmycin (CUBICIN) 500 mg/50 mL in sodium chloride, 8 mg/kg, Intravenous, Q24H, Isael Bailey PA, 500 mg at 03/06/20 0852  •  dextrose (D50W) 25 g/ 50mL Intravenous Solution 25 g, 25 g, Intravenous, Q15 Min PRN, Isael Bailey PA  •  dextrose (GLUTOSE) oral gel 15 g, 15 g, Oral, Q15 Min PRN, Isael Bailey PA  •  famotidine (PEPCID) tablet 20 mg, 20 mg, Oral, BID AC, Isael Bailey PA, 20 mg at 03/06/20 1725  •  glucagon (human recombinant) (GLUCAGEN DIAGNOSTIC) injection 1 mg, 1 mg, Subcutaneous, Q15 Min PRN, Isael Bailey PA  •  guaiFENesin-dextromethorphan (ROBITUSSIN DM) 100-10 MG/5ML syrup 5 mL, 5 mL, Oral, Q6H PRN, Isael Bailey PA, 5 mL at 02/27/20 0920  •  hydrALAZINE (APRESOLINE) injection 10 mg, 10 mg, Intravenous, Q6H PRN, Isael Bailey PA, 10 mg at 03/04/20 0628  •  HYDROcodone-acetaminophen (NORCO)  MG per tablet 1 tablet, 1 tablet, Oral, Q8H PRN, Isael Bailey PA, 1 tablet at 03/06/20 1725  •  insulin detemir (LEVEMIR) injection 15 Units, 15 Units, Subcutaneous, Q12H, Leo,  PEDRO Salazar, 15 Units at 03/06/20 0852  •  insulin regular (humuLIN R,novoLIN R) injection 0-14 Units, 0-14 Units, Subcutaneous, Q6H, Isael Bailey PA, 3 Units at 03/05/20 0039  •  ketotifen (ZADITOR) 0.025 % ophthalmic solution 1 drop, 1 drop, Both Eyes, BID, Isael Bailey PA, 1 drop at 03/06/20 1109  •  lactobacillus acidophilus (RISAQUAD) capsule 1 capsule, 1 capsule, Oral, Daily, Isael Bailey PA, 1 capsule at 03/06/20 0848  •  losartan (COZAAR) tablet 50 mg, 50 mg, Oral, Daily, Isael Bailey PA, 50 mg at 03/06/20 0848  •  metoprolol tartrate (LOPRESSOR) tablet 100 mg, 100 mg, Oral, Q12H, Isael Bailey PA, 100 mg at 03/06/20 0848  •  pantoprazole (PROTONIX) injection 40 mg, 40 mg, Intravenous, Q AM, Isael Bailey PA, 40 mg at 03/06/20 0645  •  pregabalin (LYRICA) capsule 150 mg, 150 mg, Oral, Q8H, Isael Bailey PA, 150 mg at 03/06/20 1731  •  rOPINIRole (REQUIP) tablet 4 mg, 4 mg, Oral, Nightly, Isael Bailey PA, 4 mg at 03/05/20 2000    Antibiotics:  Anti-Infectives (From admission, onward)    Ordered     Dose/Rate Route Frequency Start Stop    03/03/20 1942  DAPTOmycin (CUBICIN) 500 mg/50 mL in sodium chloride     Kip Mckeon [facility] reviewed the order on 03/04/20 0903.   Ordering Provider:  Isael Bailey PA    8 mg/kg × 60.3 kg  over 30 Minutes Intravenous Every 24 Hours 03/04/20 0600 03/16/20 0559    02/28/20 0746  meropenem (MERREM) 1 g/100 mL 0.9% NS VTB (mbp)     Ordering Provider:  Parag Gordon MD    1 g  over 30 Minutes Intravenous Once 02/28/20 0845 02/28/20 0921    02/24/20 0831  cefepime (MAXIPIME) 2 g/100 mL 0.9% NS (mbp)     Ordering Provider:  Ranjana Easley APRN    2 g  200 mL/hr over 30 Minutes Intravenous Once 02/24/20 0930 02/24/20 1157    02/24/20 0203  vancomycin 500 mg/100 mL 0.9% NS IVPB (mbp)     Ordering Provider:  Ced Kenny RPH    500 mg  100 mL/hr over 60 Minutes Intravenous Once 02/24/20 0300 02/24/20 0511             Objective:    Physical Exam:   Vital Signs:  Temp (24hrs), Av.1 °F (36.7 °C), Min:97.3 °F (36.3 °C), Max:98.6 °F (37 °C)    Temp  Min: 97.3 °F (36.3 °C)  Max: 98.6 °F (37 °C)  BP  Min: 129/78  Max: 161/83  Pulse  Min: 77  Max: 95  Resp  Min: 16  Max: 18  SpO2  Min: 86 %  Max: 100 %    GENERAL: Awake and alert, in no acute distress. extubated  HEART: RRR;   LUNGS: Nonlabored. No dullness. intubated  ABDOMEN: Soft, nontender, nondistended. No rebound or guarding. NO mass or HSM.  EXT:  No cyanosis, clubbing or edema. No cord.  : Benavidez catheter in place.  SKIN: Warm and dry without cutaneous eruptions on Inspection/palpation.    NEURO: Oriented to PPT. No focal deficits on motor/sensory exam at arms/legs.  PSYCHIATRIC: Normal insight and judgement. Cooperative with PE  Bilateral Groins: incisional wound vac in place to suction, drains appropriate output, no seroma or hematoma on exam, soft, appropriate tenderness to palpation, no ecchymosis           Laboratory Data:  Results from last 7 days   Lab Units 20  034   WBC 10*3/mm3 8.40 9.39 8.93   HEMOGLOBIN g/dL 10.4* 10.4* 10.8*   HEMATOCRIT % 34.0 33.9* 34.4   PLATELETS 10*3/mm3 189 216 240     Results from last 7 days   Lab Units 20  041   SODIUM mmol/L 144   POTASSIUM mmol/L 4.4   CHLORIDE mmol/L 107   CO2 mmol/L 28.0   BUN mg/dL 45*   CREATININE mg/dL 0.64   GLUCOSE mg/dL 79   CALCIUM mg/dL 8.8     Results from last 7 days   Lab Units 20  041   ALK PHOS U/L 108   BILIRUBIN mg/dL 0.3   ALT (SGPT) U/L 19   AST (SGOT) U/L 32         Results from last 7 days   Lab Units 20  1604   CRP mg/dL 8.05*         Results from last 7 days   Lab Units 208 20  0344   CK TOTAL U/L 30 29     Results from last 7 days   Lab Units 20  0341 20  0338 20  1131   VANCOMYCIN TR mcg/mL  --   --  38.50*   VANCOMYCIN RM mcg/mL 13.80 26.10  --      Estimated Creatinine Clearance: 94.5 mL/min  (by C-G formula based on SCr of 0.64 mg/dL).    Microbiology:  No results found for: ACANTHNAEG, AFBCX, BPERTUSSISCX, BLOODCX  No results found for: BCIDPCR, CXREFLEX, CSFCX, CULTURETIS  No results found for: CULTURES, HSVCX, URCX  No results found for: EYECULTURE, GCCX, LABHSV  No results found for: LEGIONELLA, MRSACX, MUMPSCX, MYCOPLASCX  No results found for: NOCARDIACX, STOOLCX  No results found for: THROATCX, UNSTIMCULT, URINECX, CULTURE, VZVCULTUR  No results found for: VIRALCULTU, WOUNDCX      Assessment: 49yoF s/p 12/28/20 diagnostic fiberoptic bronchoscopy, Rtchest tube placement, removal of femoral-femoral bypass, bilateral CFA pericardial patches, bilateral Sartorious muscle flaps for groin graft coverage.         Plan:  - no acute surgery at this point in time, incisional wound vac in place to suction, no seroma or hematoma on PE, Lower extremity perfused,   - continue abx per ID  - continue incisional wound vac  - record and strip the MELISSA drains  - may sit up in chair, may stand, per plastic surgery  - will continue to follow        Modesto Whittaker MD  03/06/20  8:56 PM

## 2020-03-07 NOTE — PLAN OF CARE
Problem: Patient Care Overview  Goal: Plan of Care Review  Outcome: Ongoing (interventions implemented as appropriate)  Flowsheets (Taken 3/7/2020 1428)  Progress: improving  Plan of Care Reviewed With: patient  Outcome Summary: patient ambulated 350 feet with CGA and rollator for support, good safe pace noticed with no LOB. Assist for multiple lines. Distance limited by weakness and fatigue.

## 2020-03-07 NOTE — THERAPY TREATMENT NOTE
Patient Name: Saskia Groves  : 1970    MRN: [id number]                              Today's Date: 3/7/2020       Admit Date: 2020    Visit Dx:     ICD-10-CM ICD-9-CM   1. PVD (peripheral vascular disease) (CMS/HCC) I73.9 443.9   2. Pleural effusion J90 511.9   3. S/P femoral-femoral bypass surgery Z95.828 V45.89   4. Pharyngeal dysphagia R13.13 787.23     Patient Active Problem List   Diagnosis   • PVD (peripheral vascular disease) (CMS/HCC)   • Lumbar radiculopathy   • Paresthesia   • Causalgia of lower limb   • Hiatal hernia   • Current smoker   • Bilateral carotid artery stenosis   • COPD (chronic obstructive pulmonary disease) (CMS/HCC)   • Coronary artery disease   • Hypertension   • Hyperlipidemia   • Diabetes mellitus (CMS/HCC)   • Sepsis (CMS/HCC)   • Pleural effusion - bilateral mod/large pleural effusions on CT chest 2020.    • Acute pulmonary edema (CMS/HCC)   • Acute hypoxemic respiratory failure (CMS/HCC)     Past Medical History:   Diagnosis Date   • Anxiety    • Arthritis    • Asthma    • Carotid artery stenosis    • Chronic bronchitis (CMS/HCC)    • COPD (chronic obstructive pulmonary disease) (CMS/HCC)    • Coronary artery disease     2 vessels with 50% blockage.  Sees Dr. Donaldson   • Depression    • Diabetes mellitus (CMS/HCC)    • Dyslipidemia    • GERD (gastroesophageal reflux disease)    • Hiatal hernia    • Hyperlipidemia    • Hypertension    • Infectious viral hepatitis    • Lower back pain    • Migraine    • Multinodular goiter    • S/P thyroid biopsy     2008, 2013, 2015, and 07/15/2019 at [facility], right lobe nodules - all cytologies were benign   • Sleep apnea     can't tolerate the cpap mask   • Subclinical hyperthyroidism      Past Surgical History:   Procedure Laterality Date   • ANTERIOR CERVICAL DISCECTOMY W/ FUSION     • BACK SURGERY      lumbar   •  SECTION  19940    x 2    • CHOLECYSTECTOMY     • COLONOSCOPY     • ENDOSCOPY     • FEMORAL  ARTERY CUTDOWN Bilateral 2/28/2020    Procedure: FEMORAL ARTERY CUTDOWN WITH REMOVAL OF FEMORAL FEMORAL BYPASS GRAFT BILATERAL;  Surgeon: Deandre Oseguera MD;  Location:  [facility];  Service: Vascular;  Laterality: Bilateral;   • FEMORAL ENDARTERECTOMY Bilateral 1/15/2020    Procedure: FEMORAL ENDARTERECTOMY BILATERAL;  Surgeon: Deandre Oseguera MD;  Location:  [facility];  Service: Vascular   • FEMORAL FEMORAL BYPASS Right 1/15/2020    Procedure: FEMORAL FEMORAL BYPASS;  Surgeon: Deandre Oseguera MD;  Location:  [facility];  Service: Vascular   • HYSTERECTOMY     • KNEE SURGERY Left    • LEG EXCISION LESION/CYST Left 2/28/2020    Procedure: GROIN MUSCLE FLAP BILATERAL;  Surgeon: Modesto Whittaker MD;  Location:  [facility];  Service: Plastics;  Laterality: Left;   • WRIST SURGERY Left      General Information     Row Name 03/07/20 0923          PT Evaluation Time/Intention    Document Type  therapy note (daily note)  -AS     Mode of Treatment  physical therapy  -AS     Row Name 03/07/20 0923          General Information    Patient Profile Reviewed?  yes  -AS     Existing Precautions/Restrictions  cardiac NG tube, wound vac, IV  -AS     Barriers to Rehab  medically complex  -AS     Row Name 03/07/20 0923          Cognitive Assessment/Intervention- PT/OT    Orientation Status (Cognition)  oriented x 4  -AS     Cognitive Assessment/Intervention Comment  alert, following commnds  -AS     Row Name 03/07/20 0923          Safety Issues, Functional Mobility    Safety Issues Affecting Function (Mobility)  safety precaution awareness;safety precautions follow-through/compliance  -AS     Impairments Affecting Function (Mobility)  balance;endurance/activity tolerance;shortness of breath;strength  -AS       User Key  (r) = Recorded By, (t) = Taken By, (c) = Cosigned By    Initials Name Provider Type    AS Ilsa Farrell PTA Physical Therapy Assistant        Mobility     Row Name 03/07/20 0924          Bed Mobility  Assessment/Treatment    Supine-Sit [place] (Bed Mobility)  supervision  -AS     Assistive Device (Bed Mobility)  head of bed elevated;bed rails  -AS     Comment (Bed Mobility)  patient demonstrated safe technique, assist with multiple lines only  -AS     Row Name 03/07/20 0924          Transfer Assessment/Treatment    Comment (Transfers)  verbal cues for sequencing  -AS     Row Name 03/07/20 0924          Bed-Chair Transfer    Bed-Chair [place] (Transfers)  unable to assess patient requested back to bed  -AS     Row Name 03/07/20 0924          Sit-Stand Transfer    Sit-Stand [place] (Transfers)  verbal cues;contact guard  -AS     Assistive Device (Sit-Stand Transfers)  walker, 4-wheeled  -AS     Row Name 03/07/20 0924          Gait/Stairs Assessment/Training    Gait/Stairs Assessment/Training  gait/ambulation assistive device  -AS     [place] Level (Gait)  verbal cues;contact guard  -AS     Assistive Device (Gait)  walker, 4-wheeled  -AS     Distance in Feet (Gait)  300  -AS     Pattern (Gait)  step-through  -AS     Deviations/Abnormal Patterns (Gait)  bilateral deviations;twin decreased;gait speed decreased  -AS     Comment (Gait/Stairs)  patient ambulated 350 feet with CGA and rollator for support, good safe pace noticed with no LOB. Assist for multiple lines. Distance limited by weakness and fatigue.  -AS       User Key  (r) = Recorded By, (t) = Taken By, (c) = Cosigned By    Initials Name Provider Type    AS Ilsa Farrell PTA Physical Therapy Assistant        Obj/Interventions     Row Name 03/07/20 0927          Therapeutic Exercise    Lower Extremity (Therapeutic Exercise)  LAQ (long arc quad), bilateral;marching while seated  -AS     Lower Extremity Range of Motion (Therapeutic Exercise)  ankle dorsiflexion/plantar flexion, bilateral  -AS     Exercise Type (Therapeutic Exercise)  AROM (active range of motion)  -AS     Position (Therapeutic Exercise)  seated  -AS     Sets/Reps  (Therapeutic Exercise)  1/10  -AS       User Key  (r) = Recorded By, (t) = Taken By, (c) = Cosigned By    Initials Name Provider Type    AS lIsa Farrell PTA Physical Therapy Assistant        Goals/Plan    No documentation.       Clinical Impression     Row Name 03/07/20 0927          Pain Assessment    Additional Documentation  Pain Scale: Numbers Pre/Post-Treatment (Group)  -AS     Row Name 03/07/20 0927          Pain Scale: Numbers Pre/Post-Treatment    Pain Scale: Numbers, Pretreatment  2/10  -AS     Pain Scale: Numbers, Post-Treatment  2/10  -AS     Pain Location - Orientation  generalized  -AS     Pain Intervention(s)  Ambulation/increased activity;Repositioned  -AS     Row Name 03/07/20 0927          Positioning and Restraints    Pre-Treatment Position  in bed  -AS     Post Treatment Position  bed  -AS     In Bed  supine;call light within reach;exit alarm on;encouraged to call for assist  -AS       User Key  (r) = Recorded By, (t) = Taken By, (c) = Cosigned By    Initials Name Provider Type    AS Ilsa Farrell PTA Physical Therapy Assistant        Outcome Measures     Row Name 03/07/20 0927          How much help from another person do you currently need...    Turning from your back to your side while in flat bed without using bedrails?  4  -AS     Moving from lying on back to sitting on the side of a flat bed without bedrails?  4  -AS     Moving to and from a bed to a chair (including a wheelchair)?  3  -AS     Standing up from a chair using your arms (e.g., wheelchair, bedside chair)?  3  -AS     Climbing 3-5 steps with a railing?  2  -AS     To walk in hospital room?  3  -AS     AM-PAC 6 Clicks Score (PT)  19  -AS     Row Name 03/07/20 0927          Functional Assessment    Outcome Measure Options  AM-PAC 6 Clicks Basic Mobility (PT)  -AS       User Key  (r) = Recorded By, (t) = Taken By, (c) = Cosigned By    Initials Name Provider Type    AS Ilsa Farrell PTA Physical Therapy  Assistant          PT Recommendation and Plan     Plan of Care Reviewed With: patient  Progress: improving  Outcome Summary: patient ambulated 350 feet with CGA and rollator for support, good safe pace noticed with no LOB. Assist for multiple lines. Distance limited by weakness and fatigue.     Time Calculation:   PT Charges     Row Name 03/07/20 0928             Time Calculation    Start Time  0904  -AS      PT Received On  03/07/20  -AS      PT Goal Re-Cert Due Date  03/13/20  -AS         Timed Charges    42871 - PT Therapeutic Exercise Minutes  8  -AS      90111 - Gait Training Minutes   15  -AS        User Key  (r) = Recorded By, (t) = Taken By, (c) = Cosigned By    Initials Name Provider Type    AS Ilsa Farrell PTA Physical Therapy Assistant        Therapy Charges for Today     Code Description Service Date Service Provider Modifiers Qty    90823637411 HC PT THER PROC EA 15 MIN 3/7/2020 Ilsa Farrell PTA GP 1    86800723348 HC GAIT TRAINING EA 15 MIN 3/7/2020 Ilsa Farrell PTA GP 1          PT G-Codes  Outcome Measure Options: AM-PAC 6 Clicks Basic Mobility (PT)  AM-PAC 6 Clicks Score (PT): 19    Ilsa Farrell PTA  3/7/2020

## 2020-03-07 NOTE — PLAN OF CARE
Problem: Patient Care Overview  Goal: Plan of Care Review  Outcome: Ongoing (interventions implemented as appropriate)  Flowsheets  Taken 3/6/2020 0426  Progress: improving  Outcome Summary: VSS. Supplemental oxygen is due to patient preference, not necessity. Tube feeding running per keofeed at the goal rate. Wound Vac and MELISSA drains are patent and functioning well. No acute distress noted, will continue to monitor the patient at this time.  Taken 3/7/2020 0612  Plan of Care Reviewed With: patient

## 2020-03-07 NOTE — PLAN OF CARE
Problem: Patient Care Overview  Goal: Plan of Care Review  Outcome: Ongoing (interventions implemented as appropriate)  Flowsheets (Taken 3/7/2020 6033)  Progress: no change  Plan of Care Reviewed With: patient  Outcome Summary: Bilateral groin prevena vac dressings in place with no apparent issues or complaints from the pt. No drainage noted in canister or tubing. PT Wound care will continue to monitor.

## 2020-03-07 NOTE — PROGRESS NOTES
CTS Progress Note      POD 7 s/p explantation femorofemoral bypass       LOS: 13 days   Patient Care Team:  Meagan Barlow APRN as PCP - General (Family Medicine)  Tyson Donaldson DO as Consulting Physician (Cardiology)    Subjective  Awake, alert, cooperative  Rested well    CC:     Objective    Vital Signs  Temp:  [97.3 °F (36.3 °C)-98.3 °F (36.8 °C)] 98.3 °F (36.8 °C)  Heart Rate:  [] 82  Resp:  [16-18] 18  BP: (115-161)/(72-90) 115/72    Physical Exam:   General Appearance: alert, appears stated age and cooperative   Lungs: clear to auscultation, respirations regular, respirations even and respirations unlabored   Heart: regular rhythm & normal rate, normal S1, S2, no murmur, no gallop, no rub and no click   Skin: Warm, dry, incision c/d/i   Abdomen: Soft, nontender, bowel sounds present throughout.  Results   Results from last 7 days   Lab Units 03/06/20  0418   WBC 10*3/mm3 8.40   HEMOGLOBIN g/dL 10.4*   HEMATOCRIT % 34.0   PLATELETS 10*3/mm3 189     Results from last 7 days   Lab Units 03/06/20  0418   SODIUM mmol/L 144   POTASSIUM mmol/L 4.4   CHLORIDE mmol/L 107   CO2 mmol/L 28.0   BUN mg/dL 45*   CREATININE mg/dL 0.64   GLUCOSE mg/dL 79   CALCIUM mg/dL 8.8           Imaging Results (Last 24 Hours)     Procedure Component Value Units Date/Time    XR Chest 1 View [[id number]] Collected:  03/05/20 0836     Updated:  03/06/20 2300    Narrative:       EXAMINATION: XR CHEST 1 VW-     INDICATION: Postop; I73.9-Peripheral vascular disease, unspecified;  Q83-Nekenjq effusion, not elsewhere classified; Z95.828-Presence of  other vascular implants and grafts; R13.13-Dysphagia, pharyngeal phase.     COMPARISON: 03/04/2020.     FINDINGS: Right upper extremity PICC line is seen with its tip at the  cavoatrial junction. Feeding tube is seen below the left hemidiaphragm.  Heart is normal in size. Right pigtail catheter has been removed. There  are small areas of discoid atelectasis in the right mid and lower  lung,  and there appears to be very small pneumothorax in the lateral right  base, approximately 7 mm in width. This does not extend to the apex, or  across the base of the lung, although there is probably trace air in the  minor fissure. Left lung shows near resolution of focal atelectasis in  the medial left base. There is stable diffuse interstitial disease  elsewhere.       Impression:       1. Right pleural drain removal with very small right basilar  pneumothorax.  2. Mild new discoid atelectasis in the right mid and lower lung, but  interval clearing of the left lung base, since yesterday's study.      D:  03/05/2020  E:  03/05/2020     This report was finalized on 3/6/2020 10:57 PM by Dr. Jaswant Frank MD.             Assessment      Acute hypoxemic respiratory failure (CMS/HCC)    PVD (peripheral vascular disease) (CMS/HCC)    Current smoker    COPD (chronic obstructive pulmonary disease) (CMS/HCC)    Coronary artery disease    Hypertension    Hyperlipidemia    Diabetes mellitus (CMS/HCC)    Sepsis (CMS/HCC)    Pleural effusion - bilateral mod/large pleural effusions on CT chest 2/27/2020.     Acute pulmonary edema (CMS/HCC)  Afebrile  Hemodynamically stable  Bilateral wound vacs in place      Plan   Continue antibiotics and wound vacs  Continue support.  Home versus placement soon.  Agree with the above.  Continuing with wound vacs and antibiotics    PEDRO Trevizo  03/07/20  6:27 AM

## 2020-03-08 LAB
GLUCOSE BLDC GLUCOMTR-MCNC: 128 MG/DL (ref 70–130)
GLUCOSE BLDC GLUCOMTR-MCNC: 142 MG/DL (ref 70–130)
GLUCOSE BLDC GLUCOMTR-MCNC: 163 MG/DL (ref 70–130)
GLUCOSE BLDC GLUCOMTR-MCNC: 170 MG/DL (ref 70–130)
GLUCOSE BLDC GLUCOMTR-MCNC: 178 MG/DL (ref 70–130)
GLUCOSE BLDC GLUCOMTR-MCNC: 273 MG/DL (ref 70–130)

## 2020-03-08 PROCEDURE — 97605 NEG PRS WND THER DME<=50SQCM: CPT

## 2020-03-08 PROCEDURE — 82962 GLUCOSE BLOOD TEST: CPT

## 2020-03-08 PROCEDURE — 63710000001 INSULIN DETEMIR PER 5 UNITS: Performed by: PHYSICIAN ASSISTANT

## 2020-03-08 PROCEDURE — 99232 SBSQ HOSP IP/OBS MODERATE 35: CPT | Performed by: NURSE PRACTITIONER

## 2020-03-08 PROCEDURE — 99024 POSTOP FOLLOW-UP VISIT: CPT | Performed by: THORACIC SURGERY (CARDIOTHORACIC VASCULAR SURGERY)

## 2020-03-08 PROCEDURE — 25010000002 DAPTOMYCIN PER 1 MG: Performed by: PHYSICIAN ASSISTANT

## 2020-03-08 PROCEDURE — 94799 UNLISTED PULMONARY SVC/PX: CPT

## 2020-03-08 PROCEDURE — 92610 EVALUATE SWALLOWING FUNCTION: CPT

## 2020-03-08 RX ADMIN — FAMOTIDINE 20 MG: 20 TABLET ORAL at 09:21

## 2020-03-08 RX ADMIN — FAMOTIDINE 20 MG: 20 TABLET ORAL at 18:37

## 2020-03-08 RX ADMIN — HYDROCODONE BITARTRATE AND ACETAMINOPHEN 1 TABLET: 10; 325 TABLET ORAL at 20:27

## 2020-03-08 RX ADMIN — INSULIN DETEMIR 15 UNITS: 100 INJECTION, SOLUTION SUBCUTANEOUS at 20:30

## 2020-03-08 RX ADMIN — ATORVASTATIN CALCIUM 10 MG: 10 TABLET, FILM COATED ORAL at 09:21

## 2020-03-08 RX ADMIN — DAPTOMYCIN 500 MG: 500 INJECTION, POWDER, LYOPHILIZED, FOR SOLUTION INTRAVENOUS at 05:18

## 2020-03-08 RX ADMIN — ASPIRIN 325 MG ORAL TABLET 325 MG: 325 PILL ORAL at 09:21

## 2020-03-08 RX ADMIN — CLOPIDOGREL BISULFATE 75 MG: 75 TABLET ORAL at 09:21

## 2020-03-08 RX ADMIN — PANTOPRAZOLE SODIUM 40 MG: 40 INJECTION, POWDER, FOR SOLUTION INTRAVENOUS at 05:18

## 2020-03-08 RX ADMIN — AMITRIPTYLINE HYDROCHLORIDE 10 MG: 10 TABLET, FILM COATED ORAL at 20:26

## 2020-03-08 RX ADMIN — METOPROLOL TARTRATE 100 MG: 50 TABLET, FILM COATED ORAL at 09:21

## 2020-03-08 RX ADMIN — PREGABALIN 150 MG: 75 CAPSULE ORAL at 16:51

## 2020-03-08 RX ADMIN — HYDROCODONE BITARTRATE AND ACETAMINOPHEN 1 TABLET: 10; 325 TABLET ORAL at 05:18

## 2020-03-08 RX ADMIN — BUDESONIDE 0.5 MG: 0.5 INHALANT RESPIRATORY (INHALATION) at 08:30

## 2020-03-08 RX ADMIN — BUDESONIDE 0.5 MG: 0.5 INHALANT RESPIRATORY (INHALATION) at 19:37

## 2020-03-08 RX ADMIN — PREGABALIN 150 MG: 75 CAPSULE ORAL at 05:18

## 2020-03-08 RX ADMIN — KETOTIFEN FUMARATE 1 DROP: 0.35 SOLUTION/ DROPS OPHTHALMIC at 09:22

## 2020-03-08 RX ADMIN — Medication 1 CAPSULE: at 09:21

## 2020-03-08 RX ADMIN — INSULIN HUMAN 8 UNITS: 100 INJECTION, SOLUTION PARENTERAL at 11:59

## 2020-03-08 RX ADMIN — PREGABALIN 150 MG: 75 CAPSULE ORAL at 20:26

## 2020-03-08 RX ADMIN — INSULIN DETEMIR 15 UNITS: 100 INJECTION, SOLUTION SUBCUTANEOUS at 11:59

## 2020-03-08 RX ADMIN — METOPROLOL TARTRATE 100 MG: 50 TABLET, FILM COATED ORAL at 20:26

## 2020-03-08 RX ADMIN — LOSARTAN POTASSIUM 50 MG: 50 TABLET ORAL at 09:21

## 2020-03-08 RX ADMIN — ROPINIROLE HYDROCHLORIDE 4 MG: 2 TABLET, FILM COATED ORAL at 20:29

## 2020-03-08 NOTE — PROGRESS NOTES
[facility] Medicine Services  PROGRESS NOTE    Patient Name: Saskia Groves  : 1970  MRN: [id number]    Date of Admission: 2020  Length of Stay: 14  Primary Care Physician: Meagan Barlow APRN    Subjective   Subjective     CC:  Follow up post fem-fem bypass abscess    HPI:  Patient resting in bed in no apparent distress. Patent became hypoglycemic yesterday evening per nursing. Patient reports that she feels better today. She would like to have feeding tube removed and have a diet ordered. No new complaints at this time.     ROS:  Gen- No fevers, chills  CV- No chest pain, palpitations  Resp- No cough, dyspnea  GI- No N/V/D, abd pain  MSK- appropriate bilat groin pain    Objective   Objective     Vital Signs:   Temp:  [97.6 °F (36.4 °C)-98.8 °F (37.1 °C)] 98.1 °F (36.7 °C)  Heart Rate:  [77-95] 87  Resp:  [16-18] 18  BP: (124-170)/() 131/77        Physical Exam:  Constitutional: No acute distress, awake, alert  HENT: NCAT, mucous membranes moist  Respiratory: scattered crackles in bases, respiratory effort normal   Cardiovascular: RRR, no murmurs, palpable pedal pulses bilaterally  Gastrointestinal: Positive bowel sounds, soft, nontender, nondistended  Musculoskeletal: trace bilateral ankle edema  Psychiatric: Appropriate affect, cooperative  Neurologic: Oriented x 3, strength symmetric in all extremities, speech clear  Skin: warm, dry, bilat groin wound vac, bilat groin ebony drains in place    Results Reviewed:    Results from last 7 days   Lab Units 20  0418 20  0344 20  0341   WBC 10*3/mm3 8.40 9.39 8.93   HEMOGLOBIN g/dL 10.4* 10.4* 10.8*   HEMATOCRIT % 34.0 33.9* 34.4   PLATELETS 10*3/mm3 189 216 240     Results from last 7 days   Lab Units 20  1349 20  0418 20  0344   SODIUM mmol/L 140 144 138   POTASSIUM mmol/L 5.1 4.4 4.2   CHLORIDE mmol/L 103 107 102   CO2 mmol/L 27.0 28.0 26.0   BUN mg/dL 47* 45* 43*   CREATININE  mg/dL 0.66 0.64 0.54*   GLUCOSE mg/dL 83 79 147*   CALCIUM mg/dL 8.7 8.8 8.6   ALT (SGPT) U/L 23 19 15   AST (SGOT) U/L 33* 32 24     Estimated Creatinine Clearance: 91.6 mL/min (by C-G formula based on SCr of 0.66 mg/dL).  No results found for: BNP    Microbiology Results Abnormal     Procedure Component Value - Date/Time    Fungus Culture - Body Fluid, Pleural Cavity [[id number]] Collected:  02/28/20 1341    Lab Status:  Preliminary result Specimen:  Body Fluid from Pleural Cavity Updated:  03/06/20 1530     Fungus Culture No fungus isolated at 1 week    AFB Culture - Body Fluid, Pleural Cavity [[id number]] Collected:  02/28/20 1341    Lab Status:  Preliminary result Specimen:  Body Fluid from Pleural Cavity Updated:  03/06/20 1530     AFB Culture No AFB isolated at 1 week     AFB Stain No acid fast bacilli seen on concentrated smear    Fungus Culture - Body Fluid, Groin, right [[id number]] Collected:  02/28/20 1417    Lab Status:  Preliminary result Specimen:  Body Fluid from Groin, right Updated:  03/06/20 1530     Fungus Culture No fungus isolated at 1 week    AFB Culture - Body Fluid, Groin, right [[id number]] Collected:  02/28/20 1417    Lab Status:  Preliminary result Specimen:  Body Fluid from Groin, right Updated:  03/06/20 1530     AFB Culture No AFB isolated at 1 week     AFB Stain No acid fast bacilli seen on concentrated smear    Fungus Culture - Tissue, Groin, right [[id number]] Collected:  02/28/20 1417    Lab Status:  Preliminary result Specimen:  Tissue from Groin, right Updated:  03/06/20 1530     Fungus Culture No fungus isolated at 1 week    AFB Culture - Tissue, Groin, right [[id number]] Collected:  02/28/20 1417    Lab Status:  Preliminary result Specimen:  Tissue from Groin, right Updated:  03/06/20 1530     AFB Culture No AFB isolated at 1 week     AFB Stain No acid fast bacilli seen on concentrated smear    Anaerobic Culture - Hardware / Foreign Body, Groin, right [[id number]] Collected:  02/28/20  1500    Lab Status:  Final result Specimen:  Hardware / Foreign Body from Groin, right Updated:  03/04/20 1205     Anaerobic Culture No anaerobes isolated at 5 days    Anaerobic Culture - Body Fluid, Groin, right [[id number]] Collected:  02/28/20 1417    Lab Status:  Final result Specimen:  Body Fluid from Groin, right Updated:  03/04/20 1204     Anaerobic Culture No anaerobes isolated at 5 days    Anaerobic Culture - Body Fluid, Pleural Cavity [[id number]] Collected:  02/28/20 1341    Lab Status:  Final result Specimen:  Body Fluid from Pleural Cavity Updated:  03/04/20 1203     Anaerobic Culture No anaerobes isolated at 5 days    Anaerobic Culture - Tissue, Groin, right [[id number]] Collected:  02/28/20 1417    Lab Status:  Final result Specimen:  Tissue from Groin, right Updated:  03/04/20 1202     Anaerobic Culture No anaerobes isolated at 5 days    Blood Culture - Blood, Wrist, Right [[id number]] Collected:  02/28/20 0750    Lab Status:  Final result Specimen:  Blood from Wrist, Right Updated:  03/04/20 0815     Blood Culture No growth at 5 days    Body Fluid Culture - Body Fluid, Groin, right [[id number]]  (Abnormal)  (Susceptibility) Collected:  02/28/20 1417    Lab Status:  Final result Specimen:  Body Fluid from Groin, right Updated:  03/03/20 0650     Body Fluid Culture Rare Staphylococcus aureus, MRSA     Gram Stain Many (4+) WBCs seen      No organisms seen    Narrative:             Susceptibility      Staphylococcus aureus, MRSA     CADENCE     Gentamicin Susceptible     Oxacillin Resistant     Rifampin Susceptible     Vancomycin Susceptible                Susceptibility Comments     Staphylococcus aureus, MRSA    This isolate does not demonstrate inducible clindamycin resistance in vitro.               Respiratory Culture - Sputum, ET Suction [[id number]] Collected:  02/29/20 0850    Lab Status:  Final result Specimen:  Sputum from ET Suction Updated:  03/02/20 0902     Respiratory Culture No growth     Gram  Stain Few (2+) Epithelial cells per low power field      Few (2+) WBCs seen      No organisms seen    Tissue / Bone Culture - Tissue, Groin, right [[id number]] Collected:  02/28/20 1417    Lab Status:  Final result Specimen:  Tissue from Groin, right Updated:  03/02/20 0809     Tissue Culture No growth at 3 days     Gram Stain Many (4+) WBCs seen      No organisms seen    Body Fluid Culture - Body Fluid, Pleural Cavity [[id number]] Collected:  02/28/20 1341    Lab Status:  Final result Specimen:  Body Fluid from Pleural Cavity Updated:  03/02/20 0808     Body Fluid Culture No growth at 3 days     Gram Stain Many (4+) WBCs seen      No organisms seen    Hardware / Foreign Body Culture - Hardware / Foreign Body, Groin, right [[id number]]  (Abnormal)  (Susceptibility) Collected:  02/28/20 1500    Lab Status:  Final result Specimen:  Hardware / Foreign Body from Groin, right Updated:  03/01/20 0645     Hardware / Foreign Body Culture Scant growth (1+) Staphylococcus aureus, MRSA     Comment:   Methicillin resistant Staphylococcus aureus, Patient may be an isolation risk.        Gram Stain Many (4+) WBCs seen      No organisms seen    Susceptibility      Staphylococcus aureus, MRSA     CADENCE     Clindamycin Susceptible     Erythromycin Resistant     Inducible Clindamycin Resistance Negative     Oxacillin Resistant     Penicillin G Resistant     Rifampin Susceptible     Tetracycline Susceptible     Trimethoprim + Sulfamethoxazole Susceptible     Vancomycin Susceptible                Susceptibility Comments     Staphylococcus aureus, MRSA    This isolate does not demonstrate inducible clindamycin resistance in vitro.               Blood Culture - Blood, Arm, Left [[id number]] Collected:  02/24/20 0057    Lab Status:  Final result Specimen:  Blood from Arm, Left Updated:  02/29/20 0545     Blood Culture No growth at 5 days    Blood Culture - Blood, Hand, Left [[id number]] Collected:  02/24/20 0057    Lab Status:  Final result  Specimen:  Blood from Hand, Left Updated:  02/29/20 0545     Blood Culture No growth at 5 days    Wound Culture - Wound, Thigh, Left [[id number]]  (Abnormal)  (Susceptibility) Collected:  02/24/20 1849    Lab Status:  Final result Specimen:  Wound from Thigh, Left Updated:  02/27/20 0723     Wound Culture Light growth (2+) Staphylococcus aureus, MRSA     Comment: Methicillin resistant Staphylococcus aureus, Patient may be an isolation risk.        Gram Stain Few (2+) WBCs seen      Rare (1+) Gram positive cocci in pairs    Susceptibility      Staphylococcus aureus, MRSA     CADENCE     Clindamycin Susceptible     Erythromycin Resistant     Inducible Clindamycin Resistance Negative     Oxacillin Resistant     Penicillin G Resistant     Rifampin Susceptible     Tetracycline Susceptible     Trimethoprim + Sulfamethoxazole Susceptible     Vancomycin Susceptible                Susceptibility Comments     Staphylococcus aureus, MRSA    This isolate does not demonstrate inducible clindamycin resistance in vitro.                     Imaging Results (Last 24 Hours)     ** No results found for the last 24 hours. **        Results for orders placed during the hospital encounter of 02/23/20   Adult Transthoracic Echo Complete W/ Cont if Necessary Per Protocol    Narrative · Left ventricular systolic function is mildly decreased. Calculated EF =   45.0%. Estimated EF appears to be in the range of 46 - 50%  · Mild global hypokinesis no focal wall motion abnormalities  · Mild aortic valve sclerosis without stenosis.  · There is a trivial pericardial effusion. There is a large size left   pleural effusion          I have reviewed the medications.  Scheduled Meds:  amitriptyline 10 mg Oral Nightly   aspirin 325 mg Oral Daily   atorvastatin 10 mg Oral Daily   budesonide 0.5 mg Nebulization BID - RT   clopidogrel 75 mg Oral Daily   DAPTOmycin 8 mg/kg Intravenous Q24H   famotidine 20 mg Oral BID AC   insulin detemir 15 Units Subcutaneous  Q12H   insulin regular 0-14 Units Subcutaneous Q6H   ketotifen 1 drop Both Eyes BID   lactobacillus acidophilus 1 capsule Oral Daily   losartan 50 mg Oral Daily   metoprolol tartrate 100 mg Oral Q12H   pantoprazole 40 mg Intravenous Q AM   pregabalin 150 mg Oral Q8H   rOPINIRole 4 mg Oral Nightly     Continuous Infusions:   PRN Meds:.•  acetaminophen **OR** acetaminophen **OR** acetaminophen  •  albuterol  •  dextrose  •  dextrose  •  glucagon (human recombinant)  •  guaiFENesin-dextromethorphan  •  hydrALAZINE  •  HYDROcodone-acetaminophen    Assessment/Plan   Assessment / Plan     Active Hospital Problems    Diagnosis  POA   • **Acute hypoxemic respiratory failure (CMS/HCC) [J96.01]  No   • Pleural effusion - bilateral mod/large pleural effusions on CT chest 2/27/2020.  [J90]  No   • Acute pulmonary edema (CMS/HCC) [J81.0]  No   • Sepsis (CMS/HCC) [A41.9]  Yes   • COPD (chronic obstructive pulmonary disease) (CMS/HCC) [J44.9]  Yes   • Coronary artery disease [I25.10]  Yes   • Hypertension [I10]  Yes   • Hyperlipidemia [E78.5]  Yes   • Diabetes mellitus (CMS/HCC) [E11.9]  Yes   • Current smoker [F17.200]  Yes   • PVD (peripheral vascular disease) (CMS/HCC) [I73.9]  Yes      Resolved Hospital Problems   No resolved problems to display.          Brief Hospital Course to date:  Saskia Groves is a 49 y.o. female  admitted 2/23.  She has history of DM, tobacco, HTN, PAD.  She had fem-fem bypass on 1/15 but was readmitted for fever and wound infection left groin.  Transferred to ICU on 2/27 for effusions and hypoxia.  Had graft explant 2/28 as well as bronch and right chest tube.  Muscle flaps were done bilat by Plastics.  Bilateral wound vacs in place.  Was extubated after several days.  Is followed by ID.  Dysphagia with ST following.     Wounds, bilateral groin abscess   -s/p explantation femorofemoral bypass on 2/28   -CT surgery following  -Bilateral wound vacs in place  -Bilateral MELISSA drains in place with  serosanguineous drainage   -PT wound following  -ID following   -Currently on Dapto, plan to continue until 4/20 to complete 8 weeks of therapy       Debility  - mobilize     Dysphagia  -Passed swallow study  -Discontinue Keofeed and tubefeed  -SLP following  -Ok for regular diet with thin liquids per SLP      DM2  -A1C 12.1 on 1/14  -Hypoglycemic on evening of 3/7/20  -continue FSBG ACHS  -continue Levemir 15 units BID  -continue moderate dose SSI     Hypoxia  - improved    DVT Prophylaxis:  [facility]    CODE STATUS:   Code Status and Medical Interventions:   Ordered at: 02/23/20 2341     Level Of Support Discussed With:    Patient     Code Status:    CPR     Medical Interventions (Level of Support Prior to Arrest):    Full           Electronically signed by LUIS Jamil, 03/08/20, 2:40 PM.

## 2020-03-08 NOTE — PLAN OF CARE
Problem: Patient Care Overview  Goal: Plan of Care Review  Outcome: Ongoing (interventions implemented as appropriate)  Flowsheets (Taken 3/8/2020 0352)  Progress: no change  Plan of Care Reviewed With: patient  Outcome Summary: Bilateral groin prevena vac dressings intact with no apparent issues or complaints. PT wound care will continue to monitor.

## 2020-03-08 NOTE — THERAPY RE-EVALUATION
Acute Care - Speech Language Pathology   Swallow Re-Evaluation [facility] Clinical Swallow Evaluation     Patient Name: Saskia Groves  : 1970  MRN: [id number]  Today's Date: 3/8/2020               Admit Date: 2020    Visit Dx:     ICD-10-CM ICD-9-CM   1. PVD (peripheral vascular disease) (CMS/HCC) I73.9 443.9   2. Pleural effusion J90 511.9   3. S/P femoral-femoral bypass surgery Z95.828 V45.89     Patient Active Problem List   Diagnosis   • PVD (peripheral vascular disease) (CMS/HCC)   • Lumbar radiculopathy   • Paresthesia   • Causalgia of lower limb   • Hiatal hernia   • Current smoker   • Bilateral carotid artery stenosis   • COPD (chronic obstructive pulmonary disease) (CMS/HCC)   • Coronary artery disease   • Hypertension   • Hyperlipidemia   • Diabetes mellitus (CMS/HCC)   • Sepsis (CMS/HCC)   • Pleural effusion - bilateral mod/large pleural effusions on CT chest 2020.    • Acute pulmonary edema (CMS/HCC)   • Acute hypoxemic respiratory failure (CMS/HCC)     Past Medical History:   Diagnosis Date   • Anxiety    • Arthritis    • Asthma    • Carotid artery stenosis    • Chronic bronchitis (CMS/HCC)    • COPD (chronic obstructive pulmonary disease) (CMS/HCC)    • Coronary artery disease     2 vessels with 50% blockage.  Sees Dr. Donaldson   • Depression    • Diabetes mellitus (CMS/HCC)    • Dyslipidemia    • GERD (gastroesophageal reflux disease)    • Hiatal hernia    • Hyperlipidemia    • Hypertension    • Infectious viral hepatitis    • Lower back pain    • Migraine    • Multinodular goiter    • S/P thyroid biopsy     2008, 2013, 2015, and 07/15/2019 at [facility], right lobe nodules - all cytologies were benign   • Sleep apnea     can't tolerate the cpap mask   • Subclinical hyperthyroidism      Past Surgical History:   Procedure Laterality Date   • ANTERIOR CERVICAL DISCECTOMY W/ FUSION     • BACK SURGERY      lumbar   •  SECTION  19940    x 2    • CHOLECYSTECTOMY     •  COLONOSCOPY     • ENDOSCOPY     • FEMORAL ARTERY CUTDOWN Bilateral 2/28/2020    Procedure: FEMORAL ARTERY CUTDOWN WITH REMOVAL OF FEMORAL FEMORAL BYPASS GRAFT BILATERAL;  Surgeon: Deandre Oseguera MD;  Location:  [facility];  Service: Vascular;  Laterality: Bilateral;   • FEMORAL ENDARTERECTOMY Bilateral 1/15/2020    Procedure: FEMORAL ENDARTERECTOMY BILATERAL;  Surgeon: Deandre Oseguera MD;  Location:  [facility];  Service: Vascular   • FEMORAL FEMORAL BYPASS Right 1/15/2020    Procedure: FEMORAL FEMORAL BYPASS;  Surgeon: Deandre Oseguera MD;  Location:  [facility];  Service: Vascular   • HYSTERECTOMY     • KNEE SURGERY Left    • LEG EXCISION LESION/CYST Left 2/28/2020    Procedure: GROIN MUSCLE FLAP BILATERAL;  Surgeon: Modesto Whittaker MD;  Location:  [facility];  Service: Plastics;  Laterality: Left;   • WRIST SURGERY Left         SWALLOW EVALUATION (last 72 hours)      [facility] Adult Swallow Evaluation     Row Name 03/08/20 1400                   Rehab Evaluation    Document Type  evaluation  -ML        Subjective Information  complains of hungry and wants corpak discontinued  -ML        Patient Observations  alert;cooperative;agree to therapy  -ML        Patient/Family Observations  Family at bedside  -ML        Patient Effort  excellent  -ML           General Information    Patient Profile Reviewed  yes  -ML        Pertinent History Of Current Problem  50yo re-adm w/ sepsis r/t recent femorofemoral bypass graft 1/15/20. Removal of L bypass graft & placement of muscle flap 2/28/20. Respiratory failure, acute hypoxia. Intubated 2/28-3/2. Pt failed RN post-extubation swallow screen. Hx COPD, anterior cervical disc surgery yrs ago  -ML        Current Method of Nutrition  NPO;nasogastric feedings  -ML        Precautions/Limitations, Vision  WFL;for purposes of eval  -ML        Precautions/Limitations, Hearing  WFL;for purposes of eval  -ML        Prior Level of Function-Communication  WFL  -ML        Prior Level of  Function-Swallowing  no diet consistency restrictions  -ML        Plans/Goals Discussed with  patient and family;agreed upon  -ML        Barriers to Rehab  medically complex  -ML        Patient's Goals for Discharge  return to PO diet  -ML           Pain Assessment    Additional Documentation  Pain Scale: Numbers Pre/Post-Treatment (Group)  -ML           Pain Scale: Numbers Pre/Post-Treatment    Pain Scale: Numbers, Pretreatment  0/10 - no pain  -ML           Oral Motor and Function    Dentition Assessment  edentulous, dentures not available Pt does not eat with dentures at home due to poor fit  -ML        Secretion Management  WNL/WFL  -ML        Mucosal Quality  dry  -ML        Volitional Swallow  WFL  -ML        Volitional Cough  WFL  -ML           Oral Musculature and Cranial Nerve Assessment    Oral Motor General Assessment  WFL  -ML           General Eating/Swallowing Observations    Respiratory Support Currently in Use  room air  -ML        Eating/Swallowing Skills  self-fed  -ML        Positioning During Eating  upright 90 degree;upright in bed Sitting unsupported in bed  -ML        Utensils Used  spoon;cup;straw  -ML        Consistencies Trialed  regular textures;pureed;thin liquids  -ML           Respiratory    Respiratory Status  WFL  -ML        Date of Intubation  2/28-3/2  -ML           Clinical Swallow Eval    Oral Prep Phase  WFL  -ML        Oral Transit  WFL  -ML        Oral Residue  WFL  -ML        Pharyngeal Phase  WFL  -ML        Esophageal Phase  unremarkable  -ML        Clinical Swallow Evaluation Summary  Clinical swallowing assessment completed. Pt anxious for po consideration. It has now been 5 days since intubation.  Vocal quality is baseline-slightly hoarse.  Strong cough.  Now able to self feed. Sitting unsupported in bed.  Corpak in place.  Pt presents with functional oral skills. Pt does not have dentures in place, however reports she is unable to eat with her dentures due to poor  fitting.  No evidence of phrayngeal impairment. No cough, no throat clear, no vocal quality change, and single swallows per bite/drink. Dysphagia appears to have resolved.  Reviewed ST notes. Pt previously with pharyngeal signs at bedside. Given status today, no reason to repeat instrumental. OK for regular/thin with no further ST indicated.  TF at MD/RD/RN discretion.  -ML           Clinical Impression    SLP Swallowing Diagnosis  functional oral phase;functional pharyngeal phase  -ML        Functional Impact  no impact on function  -ML        Swallow Criteria for Skilled Therapeutic Interventions Met  baseline status  -ML           Recommendations    Therapy Frequency (Swallow)  evaluation only  -ML        SLP Diet Recommendation  regular textures;thin liquids  -ML        Recommended Precautions and Strategies  upright posture during/after eating  -ML        SLP Rec. for Method of Medication Administration  as tolerated  -ML        Monitor for Signs of Aspiration  notify SLP if any concerns  -ML           Oral Nutrition/Hydration Goal 1 (SLP)    Oral Nutrition/Hydration Goal 1, SLP  LTG: Pt will improve swallowing skills per clinical assessment, warranting repeat instrumental swallow study.  -ML        Progress/Outcomes (Oral Nutrition/Hydration Goal 1, SLP)  goal met  -ML           Oral Nutrition/Hydration Goal 2 (SLP)    Oral Nutrition/Hydration Goal 2, SLP  Pt will tolerate therapeutic trials of H2O/puree w/o s/sxs aspiration w/ 70% acc w/o cues to determine readiness for repeat instrumental study.  -ML        Time Frame (Oral Nutrition/Hydration Goal 2, SLP)  short term goal (STG)  -ML        Progress/Outcomes (Oral Nutrition/Hydration Goal 2, SLP)  goal met  -ML           Pharyngeal Strengthening Exercise Goal 1 (SLP)    Activity (Pharyngeal Strengthening Goal 1, SLP)  increase timing;increase superior movement of the hyolaryngeal complex;increase anterior movement of the hyolaryngeal complex;increase closure  at entrance to airway/closure of airway at glottis;increase squeeze/positive pressure generation  -ML        Increase Timing  prepping - 3 second prep or suck swallow or 3-step swallow  -ML        Increase Superior Movement of the Hyolaryngeal Complex  effortful pitch glide (falsetto + pharyngeal squeeze)  -ML        Increase Anterior Movement of the Hyolaryngeal Complex  chin tuck against resistance (CTAR)  -ML        Increase Closure at Entrance to Airway/Closure of Airway at Glottis  super-supraglottic swallow  -ML        Increase Squeeze/Positive Pressure Generation  hard effortful swallow  -ML        [place]/Accuracy (Pharyngeal Strengthening Goal 1, SLP)  with minimal cues (75-90% accuracy)  -ML        Time Frame (Pharyngeal Strengthening Goal 1, SLP)  short term goal (STG)  -ML        Progress/Outcomes (Pharyngeal Strengthening Goal 1, SLP)  goal met  -ML          User Key  (r) = Recorded By, (t) = Taken By, (c) = Cosigned By    Initials Name Effective Dates    ML Lainey Ellis CCC-SLP 04/03/18 -           EDUCATION  The patient has been educated in the following areas:   Dysphagia (Swallowing Impairment).    SLP Recommendation and Plan  SLP Swallowing Diagnosis: functional oral phase, functional pharyngeal phase  SLP Diet Recommendation: regular textures, thin liquids  Recommended Precautions and Strategies: upright posture during/after eating  SLP Rec. for Method of Medication Administration: as tolerated     Monitor for Signs of Aspiration: notify SLP if any concerns     Swallow Criteria for Skilled Therapeutic Interventions Met: baseline status        Therapy Frequency (Swallow): evaluation only          Outcome Summary: Reevaluated.  Dysphagia resolved.  Diet as tolerated- ok for regular/thin with MD diet restrictions. TF at MD/RD/RN discretion.    SLP GOALS     Row Name 03/08/20 1400 03/06/20 0945          Oral Nutrition/Hydration Goal 1 (SLP)    Oral Nutrition/Hydration Goal 1, SLP  LTG:  Pt will improve swallowing skills per clinical assessment, warranting repeat instrumental swallow study.  -ML  LTG: Pt will improve swallowing skills per clinical assessment, warranting repeat instrumental swallow study.  -DV     Time Frame (Oral Nutrition/Hydration Goal 1, SLP)  --  by discharge  -DV     Progress/Outcomes (Oral Nutrition/Hydration Goal 1, SLP)  goal met  -ML  continuing progress toward goal  -DV        Oral Nutrition/Hydration Goal 2 (SLP)    Oral Nutrition/Hydration Goal 2, SLP  Pt will tolerate therapeutic trials of H2O/puree w/o s/sxs aspiration w/ 70% acc w/o cues to determine readiness for repeat instrumental study.  -ML  Pt will tolerate therapeutic trials of H2O/puree w/o s/sxs aspiration w/ 70% acc w/o cues to determine readiness for repeat instrumental study.  -DV     Time Frame (Oral Nutrition/Hydration Goal 2, SLP)  short term goal (STG)  -ML  short term goal (STG)  -DV     Barriers (Oral Nutrition/Hydration Goal 2, SLP)  --  Throat clearing with puree, no s/s thin.  -DV     Progress/Outcomes (Oral Nutrition/Hydration Goal 2, SLP)  goal met  -ML  continuing progress toward goal  -DV        Pharyngeal Strengthening Exercise Goal 1 (SLP)    Activity (Pharyngeal Strengthening Goal 1, SLP)  increase timing;increase superior movement of the hyolaryngeal complex;increase anterior movement of the hyolaryngeal complex;increase closure at entrance to airway/closure of airway at glottis;increase squeeze/positive pressure generation  -ML  increase timing;increase superior movement of the hyolaryngeal complex;increase anterior movement of the hyolaryngeal complex;increase closure at entrance to airway/closure of airway at glottis;increase squeeze/positive pressure generation  -DV     Increase Timing  prepping - 3 second prep or suck swallow or 3-step swallow  -ML  prepping - 3 second prep or suck swallow or 3-step swallow  -DV     Increase Superior Movement of the Hyolaryngeal Complex  effortful  pitch glide (falsetto + pharyngeal squeeze)  -ML  effortful pitch glide (falsetto + pharyngeal squeeze)  -DV     Increase Anterior Movement of the Hyolaryngeal Complex  chin tuck against resistance (CTAR)  -ML  chin tuck against resistance (CTAR)  -DV     Increase Closure at Entrance to Airway/Closure of Airway at Glottis  super-supraglottic swallow  -ML  super-supraglottic swallow  -DV     Increase Squeeze/Positive Pressure Generation  hard effortful swallow  -ML  hard effortful swallow  -DV     [place]/Accuracy (Pharyngeal Strengthening Goal 1, SLP)  with minimal cues (75-90% accuracy)  -ML  with minimal cues (75-90% accuracy)  -DV     Time Frame (Pharyngeal Strengthening Goal 1, SLP)  short term goal (STG)  -ML  short term goal (STG)  -DV     Barriers (Pharyngeal Strengthening Goal 1, SLP)  --  Exercises reviewed and reps completed.  -DV     Progress/Outcomes (Pharyngeal Strengthening Goal 1, SLP)  goal met  -ML  good progress toward goal  -DV       User Key  (r) = Recorded By, (t) = Taken By, (c) = Cosigned By    Initials Name Provider Type    Lainey Worrell CCC-SLP Speech and Language Pathologist    Yokasta Lemos, MS CCC-SLP Speech and Language Pathologist             Time Calculation:   Time Calculation- SLP     Row Name 03/08/20 1409             Time Calculation- SLP    SLP Start Time  1400  -ML      SLP Received On  03/08/20  -ML        User Key  (r) = Recorded By, (t) = Taken By, (c) = Cosigned By    Initials Name Provider Type    Lainey Worrell CCC-SLP Speech and Language Pathologist          Therapy Charges for Today     Code Description Service Date Service Provider Modifiers Qty    [id number]  ST EVAL ORAL PHARYNG SWALLOW 3 3/8/2020 Lainey Ellis CCC-SLP GN 1               CAROLYN Rogers  3/8/2020

## 2020-03-08 NOTE — PLAN OF CARE
Problem: Patient Care Overview  Goal: Plan of Care Review  Outcome: Ongoing (interventions implemented as appropriate)  Flowsheets (Taken 3/8/2020 3123)  Outcome Summary: Reevaluated.  Dysphagia resolved.  Diet as tolerated- ok for regular/thin with MD diet restrictions. TF at MD/RD/RN discretion.

## 2020-03-08 NOTE — PROGRESS NOTES
CTS Progress Note      POD 8 s/p explantation femorofemoral bypass       LOS: 14 days   Patient Care Team:  Meagan Barlow APRN as PCP - General (Family Medicine)  Tyson Donaldson DO as Consulting Physician (Cardiology)    Subjective  Up and about some yesterday afternoon, no complaints  Anxious to pass swallowing testing get rid of NG tube  CC:     Objective    Vital Signs  Temp:  [97.6 °F (36.4 °C)-98.8 °F (37.1 °C)] 97.6 °F (36.4 °C)  Heart Rate:  [77-96] 85  Resp:  [16-18] 18  BP: (124-170)/() 127/74    Physical Exam:   General Appearance: alert, appears stated age and cooperative   Lungs: clear to auscultation, respirations regular, respirations even and respirations unlabored   Heart: regular rhythm & normal rate, normal S1, S2, no murmur, no gallop, no rub and no click   Skin: Warm, dry, wound vacs in place incision c/d/i   Abdomen: Soft, nontender, bowel sounds present throughout.  Results   Results from last 7 days   Lab Units 03/06/20  0418   WBC 10*3/mm3 8.40   HEMOGLOBIN g/dL 10.4*   HEMATOCRIT % 34.0   PLATELETS 10*3/mm3 189     Results from last 7 days   Lab Units 03/07/20  1349   SODIUM mmol/L 140   POTASSIUM mmol/L 5.1   CHLORIDE mmol/L 103   CO2 mmol/L 27.0   BUN mg/dL 47*   CREATININE mg/dL 0.66   GLUCOSE mg/dL 83   CALCIUM mg/dL 8.7           Imaging Results (Last 24 Hours)     ** No results found for the last 24 hours. **          Assessment      Acute hypoxemic respiratory failure (CMS/HCC)    PVD (peripheral vascular disease) (CMS/HCC)    Current smoker    COPD (chronic obstructive pulmonary disease) (CMS/HCC)    Coronary artery disease    Hypertension    Hyperlipidemia    Diabetes mellitus (CMS/HCC)    Sepsis (CMS/HCC)    Pleural effusion - bilateral mod/large pleural effusions on CT chest 2/27/2020.     Acute pulmonary edema (CMS/HCC)  Hemodynamically stable      Plan   Await Fees study then remove NG tube  Continue wound VAC and antibiotics.  Hopefully home soon.        Isael FRANCOIS  PEDRO Bailey  03/08/20  6:48 AM

## 2020-03-09 ENCOUNTER — APPOINTMENT (OUTPATIENT)
Dept: GENERAL RADIOLOGY | Facility: HOSPITAL | Age: 50
End: 2020-03-09

## 2020-03-09 LAB
ALBUMIN SERPL-MCNC: 2.7 G/DL (ref 3.5–5.2)
ALBUMIN/GLOB SERPL: 0.7 G/DL
ALP SERPL-CCNC: 138 U/L (ref 39–117)
ALT SERPL W P-5'-P-CCNC: 23 U/L (ref 1–33)
ANION GAP SERPL CALCULATED.3IONS-SCNC: 7 MMOL/L (ref 5–15)
AST SERPL-CCNC: 27 U/L (ref 1–32)
BASOPHILS # BLD AUTO: 0.1 10*3/MM3 (ref 0–0.2)
BASOPHILS NFR BLD AUTO: 0.6 % (ref 0–1.5)
BILIRUB SERPL-MCNC: 0.2 MG/DL (ref 0.2–1.2)
BUN BLD-MCNC: 42 MG/DL (ref 6–20)
BUN/CREAT SERPL: 48.8 (ref 7–25)
CALCIUM SPEC-SCNC: 8.4 MG/DL (ref 8.6–10.5)
CHLORIDE SERPL-SCNC: 104 MMOL/L (ref 98–107)
CK SERPL-CCNC: 29 U/L (ref 20–180)
CO2 SERPL-SCNC: 26 MMOL/L (ref 22–29)
CREAT BLD-MCNC: 0.86 MG/DL (ref 0.57–1)
DEPRECATED RDW RBC AUTO: 48.3 FL (ref 37–54)
EOSINOPHIL # BLD AUTO: 0.15 10*3/MM3 (ref 0–0.4)
EOSINOPHIL NFR BLD AUTO: 0.9 % (ref 0.3–6.2)
ERYTHROCYTE [DISTWIDTH] IN BLOOD BY AUTOMATED COUNT: 14 % (ref 12.3–15.4)
GFR SERPL CREATININE-BSD FRML MDRD: 70 ML/MIN/1.73
GLOBULIN UR ELPH-MCNC: 3.7 GM/DL
GLUCOSE BLD-MCNC: 117 MG/DL (ref 65–99)
GLUCOSE BLDC GLUCOMTR-MCNC: 109 MG/DL (ref 70–130)
GLUCOSE BLDC GLUCOMTR-MCNC: 144 MG/DL (ref 70–130)
GLUCOSE BLDC GLUCOMTR-MCNC: 192 MG/DL (ref 70–130)
GLUCOSE BLDC GLUCOMTR-MCNC: 78 MG/DL (ref 70–130)
GLUCOSE BLDC GLUCOMTR-MCNC: 87 MG/DL (ref 70–130)
HCT VFR BLD AUTO: 33.7 % (ref 34–46.6)
HGB BLD-MCNC: 10.5 G/DL (ref 12–15.9)
IMM GRANULOCYTES # BLD AUTO: 0.1 10*3/MM3 (ref 0–0.05)
IMM GRANULOCYTES NFR BLD AUTO: 0.6 % (ref 0–0.5)
LYMPHOCYTES # BLD AUTO: 1.71 10*3/MM3 (ref 0.7–3.1)
LYMPHOCYTES NFR BLD AUTO: 10.3 % (ref 19.6–45.3)
MCH RBC QN AUTO: 29.2 PG (ref 26.6–33)
MCHC RBC AUTO-ENTMCNC: 31.2 G/DL (ref 31.5–35.7)
MCV RBC AUTO: 93.9 FL (ref 79–97)
MONOCYTES # BLD AUTO: 1.32 10*3/MM3 (ref 0.1–0.9)
MONOCYTES NFR BLD AUTO: 8 % (ref 5–12)
NEUTROPHILS # BLD AUTO: 13.19 10*3/MM3 (ref 1.7–7)
NEUTROPHILS NFR BLD AUTO: 79.6 % (ref 42.7–76)
NRBC BLD AUTO-RTO: 0 /100 WBC (ref 0–0.2)
PLATELET # BLD AUTO: 181 10*3/MM3 (ref 140–450)
PMV BLD AUTO: 10.5 FL (ref 6–12)
POTASSIUM BLD-SCNC: 5.6 MMOL/L (ref 3.5–5.2)
PROT SERPL-MCNC: 6.4 G/DL (ref 6–8.5)
RBC # BLD AUTO: 3.59 10*6/MM3 (ref 3.77–5.28)
SODIUM BLD-SCNC: 137 MMOL/L (ref 136–145)
WBC NRBC COR # BLD: 16.57 10*3/MM3 (ref 3.4–10.8)

## 2020-03-09 PROCEDURE — C1894 INTRO/SHEATH, NON-LASER: HCPCS

## 2020-03-09 PROCEDURE — 94799 UNLISTED PULMONARY SVC/PX: CPT

## 2020-03-09 PROCEDURE — 97530 THERAPEUTIC ACTIVITIES: CPT

## 2020-03-09 PROCEDURE — 63710000001 INSULIN LISPRO (HUMAN) PER 5 UNITS: Performed by: INTERNAL MEDICINE

## 2020-03-09 PROCEDURE — 82550 ASSAY OF CK (CPK): CPT | Performed by: INTERNAL MEDICINE

## 2020-03-09 PROCEDURE — 63710000001 INSULIN DETEMIR PER 5 UNITS: Performed by: PHYSICIAN ASSISTANT

## 2020-03-09 PROCEDURE — 80053 COMPREHEN METABOLIC PANEL: CPT | Performed by: INTERNAL MEDICINE

## 2020-03-09 PROCEDURE — 99233 SBSQ HOSP IP/OBS HIGH 50: CPT | Performed by: INTERNAL MEDICINE

## 2020-03-09 PROCEDURE — 71046 X-RAY EXAM CHEST 2 VIEWS: CPT

## 2020-03-09 PROCEDURE — C1751 CATH, INF, PER/CENT/MIDLINE: HCPCS

## 2020-03-09 PROCEDURE — 85025 COMPLETE CBC W/AUTO DIFF WBC: CPT | Performed by: INTERNAL MEDICINE

## 2020-03-09 PROCEDURE — 25010000002 DAPTOMYCIN PER 1 MG: Performed by: PHYSICIAN ASSISTANT

## 2020-03-09 PROCEDURE — 97605 NEG PRS WND THER DME<=50SQCM: CPT

## 2020-03-09 PROCEDURE — 82962 GLUCOSE BLOOD TEST: CPT

## 2020-03-09 RX ORDER — SODIUM CHLORIDE 0.9 % (FLUSH) 0.9 %
20 SYRINGE (ML) INJECTION AS NEEDED
Status: DISCONTINUED | OUTPATIENT
Start: 2020-03-09 | End: 2020-03-11 | Stop reason: HOSPADM

## 2020-03-09 RX ORDER — SODIUM CHLORIDE 0.9 % (FLUSH) 0.9 %
10 SYRINGE (ML) INJECTION AS NEEDED
Status: DISCONTINUED | OUTPATIENT
Start: 2020-03-09 | End: 2020-03-11 | Stop reason: HOSPADM

## 2020-03-09 RX ORDER — SODIUM CHLORIDE 0.9 % (FLUSH) 0.9 %
10 SYRINGE (ML) INJECTION EVERY 12 HOURS SCHEDULED
Status: DISCONTINUED | OUTPATIENT
Start: 2020-03-09 | End: 2020-03-11 | Stop reason: HOSPADM

## 2020-03-09 RX ORDER — DEXTROSE MONOHYDRATE 25 G/50ML
25 INJECTION, SOLUTION INTRAVENOUS
Status: DISCONTINUED | OUTPATIENT
Start: 2020-03-09 | End: 2020-03-09

## 2020-03-09 RX ORDER — NICOTINE POLACRILEX 4 MG
15 LOZENGE BUCCAL
Status: DISCONTINUED | OUTPATIENT
Start: 2020-03-09 | End: 2020-03-09 | Stop reason: SDUPTHER

## 2020-03-09 RX ORDER — PANTOPRAZOLE SODIUM 40 MG/1
40 TABLET, DELAYED RELEASE ORAL
Status: DISCONTINUED | OUTPATIENT
Start: 2020-03-10 | End: 2020-03-11 | Stop reason: HOSPADM

## 2020-03-09 RX ADMIN — INSULIN DETEMIR 15 UNITS: 100 INJECTION, SOLUTION SUBCUTANEOUS at 09:22

## 2020-03-09 RX ADMIN — PREGABALIN 150 MG: 75 CAPSULE ORAL at 22:15

## 2020-03-09 RX ADMIN — INSULIN DETEMIR 15 UNITS: 100 INJECTION, SOLUTION SUBCUTANEOUS at 22:18

## 2020-03-09 RX ADMIN — KETOTIFEN FUMARATE 1 DROP: 0.35 SOLUTION/ DROPS OPHTHALMIC at 22:18

## 2020-03-09 RX ADMIN — SODIUM ZIRCONIUM CYCLOSILICATE 10 G: 10 POWDER, FOR SUSPENSION ORAL at 14:16

## 2020-03-09 RX ADMIN — BUDESONIDE 0.5 MG: 0.5 INHALANT RESPIRATORY (INHALATION) at 09:07

## 2020-03-09 RX ADMIN — Medication 1 CAPSULE: at 09:23

## 2020-03-09 RX ADMIN — METOPROLOL TARTRATE 100 MG: 50 TABLET, FILM COATED ORAL at 22:16

## 2020-03-09 RX ADMIN — PREGABALIN 150 MG: 75 CAPSULE ORAL at 14:15

## 2020-03-09 RX ADMIN — LOSARTAN POTASSIUM 50 MG: 50 TABLET ORAL at 09:23

## 2020-03-09 RX ADMIN — INSULIN LISPRO 2 UNITS: 100 INJECTION, SOLUTION INTRAVENOUS; SUBCUTANEOUS at 12:44

## 2020-03-09 RX ADMIN — FAMOTIDINE 20 MG: 20 TABLET ORAL at 18:16

## 2020-03-09 RX ADMIN — HYDROCODONE BITARTRATE AND ACETAMINOPHEN 1 TABLET: 10; 325 TABLET ORAL at 22:15

## 2020-03-09 RX ADMIN — METOPROLOL TARTRATE 100 MG: 50 TABLET, FILM COATED ORAL at 09:23

## 2020-03-09 RX ADMIN — CLOPIDOGREL BISULFATE 75 MG: 75 TABLET ORAL at 09:23

## 2020-03-09 RX ADMIN — INSULIN LISPRO 3 UNITS: 100 INJECTION, SOLUTION INTRAVENOUS; SUBCUTANEOUS at 12:44

## 2020-03-09 RX ADMIN — HYDROCODONE BITARTRATE AND ACETAMINOPHEN 1 TABLET: 10; 325 TABLET ORAL at 12:56

## 2020-03-09 RX ADMIN — PREGABALIN 150 MG: 75 CAPSULE ORAL at 06:31

## 2020-03-09 RX ADMIN — BUDESONIDE 0.5 MG: 0.5 INHALANT RESPIRATORY (INHALATION) at 19:08

## 2020-03-09 RX ADMIN — FAMOTIDINE 20 MG: 20 TABLET ORAL at 06:31

## 2020-03-09 RX ADMIN — PANTOPRAZOLE SODIUM 40 MG: 40 INJECTION, POWDER, FOR SOLUTION INTRAVENOUS at 06:31

## 2020-03-09 RX ADMIN — AMITRIPTYLINE HYDROCHLORIDE 10 MG: 10 TABLET, FILM COATED ORAL at 22:15

## 2020-03-09 RX ADMIN — ASPIRIN 325 MG ORAL TABLET 325 MG: 325 PILL ORAL at 09:23

## 2020-03-09 RX ADMIN — SODIUM CHLORIDE, PRESERVATIVE FREE 10 ML: 5 INJECTION INTRAVENOUS at 22:18

## 2020-03-09 RX ADMIN — ROPINIROLE HYDROCHLORIDE 4 MG: 2 TABLET, FILM COATED ORAL at 22:17

## 2020-03-09 RX ADMIN — DAPTOMYCIN 500 MG: 500 INJECTION, POWDER, LYOPHILIZED, FOR SOLUTION INTRAVENOUS at 06:30

## 2020-03-09 RX ADMIN — KETOTIFEN FUMARATE 1 DROP: 0.35 SOLUTION/ DROPS OPHTHALMIC at 12:46

## 2020-03-09 RX ADMIN — ATORVASTATIN CALCIUM 10 MG: 10 TABLET, FILM COATED ORAL at 09:23

## 2020-03-09 NOTE — PLAN OF CARE
VSS, single lumen PICC placed in Left arm in anticipation of D/C tomorrow. Walked with PT, BM x 2.

## 2020-03-09 NOTE — PROGRESS NOTES
"INFECTIOUS DISEASE PROGRESS NOTE     Saskia Groves  1970  [id number]      Admission Date: 2/23/2020    Antibiotic therapy:   Daptomycin    Requesting Provider: Deandre Oseguera MD     Reason for Consultation: Left groin wound infection     History of present illness:  2/24/20:  Patient is a 49 y.o. female seen today for a left groin wound infection.  She underwent femoral endardectomy, and femorofemoral  Bypass with gortex graft on 1/15.  She has been having fevers 100 degrees and above since 1/17. She was seen last week by her PCP and given Bactrim but she couldn't tolerate it and Ceftin was called in. Her left groin continued to remain swollen and warm. She had a venous duplex to rule out DVT and then developed a \"knot\" of her left groin. She presented to the ED at [facility], was given Vancomycin and Merrem and transferred to [facility]. Tmax of 100.5 degrees without leukocytosis, normal lactate and PCT. She is currently on Vancomycin and Merrem and we were consulted for evaluation and treatment.   Labs at [facility] with WBC of 10.4, normal lactate, PCT.  She was tachycardic with tmax of 102.3.  She had fevers at home documented to over 103 degrees.  2/25/20 : She spontaneously drained a copious amount of slightly cloudy straw-colored fluid from her left groin yesterday and has continued to drain.  She complains of cough \"other than that I feel great\".  No n/v//d.  Left groin less tender.   2/26/20:  Graft to be removed on Friday.   She remains afebrile. She doesn't \"feel very good today\".  Family at bedside.   2/27/20:  Surgery scheduled for tomorrow.  She is tearful and afraid she will lose her leg.  She remains afebrile.    2/28/2020: She deteriorated this morning with hypoxemia and bilateral pulmonary opacifications with pleural effusions.  He had a fever to 101 degrees last night.  I discussed her complex situation with Dr. Deandre Oseguera early today and also this evening.  She was taken to surgery " today and her left femoral graft was removed.  A muscle flap was placed.  She is now endotracheally intubated and mechanically ventilated.  She underwent a thoracentesis revealing clear fluid.  Intravenous Merrem was added to her vancomycin this morning due to concerns about possible aspiration pneumonia.  She does have a history of penicillin allergy.  2/29/2020: She remains on ventilatory support.  She has only modest respiratory secretions.  She is still requiring an FiO2 of 60%.  She has been afebrile overnight.  She remains sedated.    Following for Dr. Parag Gordon:  3/1/20: Remains sedated on vent.  Tmax 99.3, FiO2 40% and PEEP 6.  She has a wound VAC on right ground with MELISSA drain with bloody drainage.  She has a right chest tube.  Small amount of clear to red-streaked ETT secretions. On tube feeding.   3/2  Extubated, afebrile, intermittently cooperative  Ongoing issues with hypertension; 90 from chest tube yesterday Day# 8 merrem (intermittent dosing);  vanc trough 38  3/3  Afebrile, reluctant to cooperate with attempts to mobilize, alert, 60 from CT yesterday, no new + cultures, random vanc 26  3/4  Alert, afebrile, more interactive  3/5 alert, afebrile, more interactive and joking with RN, stronger, failed SLP exam yesterday and remains on tube feeding. Having frequent stools and lactulose (which she took at home) has been stopped  3/6 alert, afebrile, chest tube out, feeding tube out after she passed SLP eval  3/9 alert, afebrile, walking in [place] with walker, placement not feasible with her insurance according to daughter; per Dr Whittaker, the wound vac can be removed at discharge; WBC up to 16K now        Past Medical History:   Diagnosis Date   • Anxiety    • Arthritis    • Asthma    • Carotid artery stenosis    • Chronic bronchitis (CMS/HCC)    • COPD (chronic obstructive pulmonary disease) (CMS/HCC)    • Coronary artery disease     2 vessels with 50% blockage.  Sees Dr. Donaldson   • Depression    •  Diabetes mellitus (CMS/HCC)    • Dyslipidemia    • GERD (gastroesophageal reflux disease)    • Hiatal hernia    • Hyperlipidemia    • Hypertension    • Infectious viral hepatitis    • Lower back pain    • Migraine    • Multinodular goiter    • S/P thyroid biopsy     2008, 2013, 2015, and [facility]/15/2019 at [facility], right lobe nodules - all cytologies were benign   • Sleep apnea     can't tolerate the cpap mask   • Subclinical hyperthyroidism        Past Surgical History:   Procedure Laterality Date   • ANTERIOR CERVICAL DISCECTOMY W/ FUSION     • BACK SURGERY      lumbar   •  SECTION  19940    x 2    • CHOLECYSTECTOMY     • COLONOSCOPY     • ENDOSCOPY     • FEMORAL ARTERY CUTDOWN Bilateral 2020    Procedure: FEMORAL ARTERY CUTDOWN WITH REMOVAL OF FEMORAL FEMORAL BYPASS GRAFT BILATERAL;  Surgeon: Deandre Oseguera MD;  Location:  [facility];  Service: Vascular;  Laterality: Bilateral;   • FEMORAL ENDARTERECTOMY Bilateral 1/15/2020    Procedure: FEMORAL ENDARTERECTOMY BILATERAL;  Surgeon: Deandre Oseguera MD;  Location:  [facility];  Service: Vascular   • FEMORAL FEMORAL BYPASS Right 1/15/2020    Procedure: FEMORAL FEMORAL BYPASS;  Surgeon: Deandre Oseguera MD;  Location:  [facility];  Service: Vascular   • HYSTERECTOMY     • KNEE SURGERY Left    • LEG EXCISION LESION/CYST Left 2020    Procedure: GROIN MUSCLE FLAP BILATERAL;  Surgeon: Modesto Whittaker MD;  Location:  [facility];  Service: Plastics;  Laterality: Left;   • WRIST SURGERY Left        Family History   Problem Relation Age of Onset   • Diabetes Mother    • Hypertension Mother    • Arthritis Mother    • Hyperlipidemia Mother    • Migraines Mother    • Thyroid disease Mother    • Hypertension Father    • Lung cancer Father    • Cancer Father    • Stroke Other    • Migraines Maternal Aunt    • Thyroid disease Maternal Aunt    • Heart attack Maternal Aunt    • Osteoporosis Maternal Aunt    • Cancer Maternal Uncle    • Heart attack Maternal  Uncle    • Stroke Maternal Uncle    • Hyperlipidemia Maternal Grandmother    • Cancer Maternal Grandmother    • Obesity Maternal Grandmother    • Thyroid disease Daughter    • Obesity Daughter        Social History     Socioeconomic History   • Marital status:      Spouse name: Not on file   • Number of children: 2   • Years of education: Not on file   • Highest education level: Not on file   Occupational History   • Occupation: [employer]     Employer: DISABLED     Comment: Back and Leg Pain   Tobacco Use   • Smoking status: Current Every Day Smoker     Packs/day: 1.00     Years: 35.00     Pack years: 35.00     Types: Cigarettes   • Smokeless tobacco: Never Used   Substance and Sexual Activity   • Alcohol use: Not Currently   • Drug use: No   • Sexual activity: Yes     Partners: Male     Comment:    Social History Narrative    L[facility] in [facility]       Allergies   Allergen Reactions   • Levaquin [Levofloxacin] Nausea And Vomiting   • Penicillins Nausea And Vomiting     Has tolerated cefepime 2/2020   • Codeine Nausea Only   • Flagyl [Metronidazole] Nausea And Vomiting         Medication:    Current Facility-Administered Medications:   •  acetaminophen (TYLENOL) tablet 650 mg, 650 mg, Oral, Q4H PRN, 650 mg at 03/07/20 2217 **OR** acetaminophen (TYLENOL) 160 MG/5ML solution 650 mg, 650 mg, Oral, Q4H PRN, 650 mg at 03/04/20 1226 **OR** acetaminophen (TYLENOL) suppository 650 mg, 650 mg, Rectal, Q4H PRN, Isael Bailey PA, 650 mg at 02/28/20 0429  •  albuterol (PROVENTIL) nebulizer solution 0.083% 2.5 mg/3mL, 2.5 mg, Nebulization, Q6H PRN, Isael Bailey PA  •  amitriptyline (ELAVIL) tablet 10 mg, 10 mg, Oral, Nightly, Isael Bailey PA, 10 mg at 03/08/20 2026  •  aspirin tablet 325 mg, 325 mg, Oral, Daily, Isael Bailey PA, 325 mg at 03/09/20 0923  •  atorvastatin (LIPITOR) tablet 10 mg, 10 mg, Oral, Daily, Isael Bailey PA, 10 mg at 03/09/20 0923  •  budesonide  (PULMICORT) nebulizer solution 0.5 mg, 0.5 mg, Nebulization, BID - RT, Isael Bailey PA, 0.5 mg at 03/09/20 0907  •  clopidogrel (PLAVIX) tablet 75 mg, 75 mg, Oral, Daily, Isael Bailey PA, 75 mg at 03/09/20 0923  •  DAPTOmycin (CUBICIN) 500 mg/50 mL in sodium chloride, 8 mg/kg, Intravenous, Q24H, Collette Melo MD, 500 mg at 03/09/20 0630  •  dextrose (D50W) 25 g/ 50mL Intravenous Solution 25 g, 25 g, Intravenous, Q15 Min PRN, Isael Bailey PA, 25 g at 03/07/20 1828  •  dextrose (GLUTOSE) oral gel 15 g, 15 g, Oral, Q15 Min PRN, Isael Bailey PA  •  famotidine (PEPCID) tablet 20 mg, 20 mg, Oral, BID AC, Isael Bailey PA, 20 mg at 03/09/20 0631  •  glucagon (human recombinant) (GLUCAGEN DIAGNOSTIC) injection 1 mg, 1 mg, Subcutaneous, Q15 Min PRN, Isael Bailey PA  •  glucagon (human recombinant) (GLUCAGEN DIAGNOSTIC) injection 1 mg, 1 mg, Subcutaneous, Q15 Min PRN, Kip Auguste MD  •  guaiFENesin-dextromethorphan (ROBITUSSIN DM) 100-10 MG/5ML syrup 5 mL, 5 mL, Oral, Q6H PRN, Isael Bailey PA, 5 mL at 02/27/20 0920  •  hydrALAZINE (APRESOLINE) injection 10 mg, 10 mg, Intravenous, Q6H PRN, Isael Bailey PA, 10 mg at 03/04/20 0628  •  HYDROcodone-acetaminophen (NORCO)  MG per tablet 1 tablet, 1 tablet, Oral, Q8H PRN, Isael Bailey PA, 1 tablet at 03/09/20 1256  •  insulin detemir (LEVEMIR) injection 15 Units, 15 Units, Subcutaneous, Q12H, Isael Bailey PA, 15 Units at 03/09/20 0922  •  insulin lispro (humaLOG) injection 0-9 Units, 0-9 Units, Subcutaneous, 4x Daily With Meals & Nightly, Kip Auguste MD, 2 Units at 03/09/20 1244  •  insulin lispro (humaLOG) injection 3 Units, 3 Units, Subcutaneous, TID With Meals, Kip Auguste MD, 3 Units at 03/09/20 1244  •  ketotifen (ZADITOR) 0.025 % ophthalmic solution 1 drop, 1 drop, Both Eyes, BID, Isael Bailey PA, 1 drop at 03/09/20 1246  •  lactobacillus acidophilus  (RISAQUAD) capsule 1 capsule, 1 capsule, Oral, Daily, Isael Bailey PA, 1 capsule at 20 09  •  metoprolol tartrate (LOPRESSOR) tablet 100 mg, 100 mg, Oral, Q12H, Isael Bailey PA, 100 mg at 20 0923  •  [START ON 3/10/2020] pantoprazole (PROTONIX) EC tablet 40 mg, 40 mg, Oral, Q AM, Therese Hollis, [facility]  •  pregabalin (LYRICA) capsule 150 mg, 150 mg, Oral, Q8H, Isael Bailey PA, 150 mg at 20 0631  •  rOPINIRole (REQUIP) tablet 4 mg, 4 mg, Oral, Nightly, Isael Bailey PA, 4 mg at 20  •  Sodium Zirconium Cyclosilicate pack 10 g, 10 g, Oral, Once, Kip Auguste MD    Antibiotics:  Anti-Infectives (From admission, onward)    Ordered     Dose/Rate Route Frequency Start Stop    20 194  DAPTOmycin (CUBICIN) 500 mg/50 mL in sodium chloride     Therese Hollis [facility] reviewed the order on 20 0852.   Ordering Provider:  Collette Melo MD    8 mg/kg × 60.3 kg  over 30 Minutes Intravenous Every 24 Hours 20 0600 20 2359    20 0746  meropenem (MERREM) 1 g/100 mL 0.9% NS VTB (mbp)     Ordering Provider:  Parag Gordon MD    1 g  over 30 Minutes Intravenous Once 20 0845 20 0921    20 0831  cefepime (MAXIPIME) 2 g/100 mL 0.9% NS (mbp)     Ordering Provider:  Ranjana Easley APRN    2 g  200 mL/hr over 30 Minutes Intravenous Once 20 0930 20 1157    20 0203  vancomycin 500 mg/100 mL 0.9% NS IVPB (mbp)     Ordering Provider:  Ced Kenny RPH    500 mg  100 mL/hr over 60 Minutes Intravenous Once 20 0300 20 0511                Physical Exam:   Vital Signs  Temp (24hrs), Av.7 °F (37.1 °C), Min:98.3 °F (36.8 °C), Max:99.3 °F (37.4 °C)    Temp  Min: 98.3 °F (36.8 °C)  Max: 99.3 °F (37.4 °C)  BP  Min: 100/68  Max: 159/91  Pulse  Min: 86  Max: 110  Resp  Min: 16  Max: 18  SpO2  Min: 92 %  Max: 95 %    GENERAL: extubated,sitting in chair, transferring with minimal  assistance  HEENT: Normocephalic, atraumatic.  No conjunctival injection. No icterus.   HEART: RRR; No murmur, rubs, gallops.   LUNGS: diminished at lung bases bilaterally   ABDOMEN: Soft, nontender, nondistended. Positive bowel sounds. No rebound or guarding.   MSK:  No joint effusions   SKIN: Warm and dry without cutaneous eruptions on Inspection/palpation.   NEURO: sedated  Left groin with a wound VAC in place MELISSA drains in place with bloody drainage.        Laboratory Data    Results from last 7 days   Lab Units 03/09/20  0321 03/06/20  0418 03/05/20  0344   WBC 10*3/mm3 16.57* 8.40 9.39   HEMOGLOBIN g/dL 10.5* 10.4* 10.4*   HEMATOCRIT % 33.7* 34.0 33.9*   PLATELETS 10*3/mm3 181 189 216     Results from last 7 days   Lab Units 03/09/20  0321   SODIUM mmol/L 137   POTASSIUM mmol/L 5.6*   CHLORIDE mmol/L 104   CO2 mmol/L 26.0   BUN mg/dL 42*   CREATININE mg/dL 0.86   GLUCOSE mg/dL 117*   CALCIUM mg/dL 8.4*     Results from last 7 days   Lab Units 03/09/20  0321   ALK PHOS U/L 138*   BILIRUBIN mg/dL 0.2   ALT (SGPT) U/L 23   AST (SGOT) U/L 27         Results from last 7 days   Lab Units 03/07/20  1349   CRP mg/dL 1.11*         Results from last 7 days   Lab Units 03/09/20  0321 03/06/20  0418 03/05/20  0344   CK TOTAL U/L 29 30 29     Results from last 7 days   Lab Units 03/04/20  0341 03/03/20  0338   VANCOMYCIN RM mcg/mL 13.80 26.10     Estimated Creatinine Clearance: 70.3 mL/min (by C-G formula based on SCr of 0.86 mg/dL).      Microbiology:  2/24: BCx NG    2/24: wound few WBCs, GPC in pairs -- MRSA   2/28/2020: Operative cultures are growing MRSA  2/29/2020: Sputum culture is NGSF      Radiology:  Imaging Results (Last 72 Hours)     Procedure Component Value Units Date/Time    XR Chest 1 View [[id number]] Collected:  03/05/20 0836     Updated:  03/06/20 0919    Narrative:       EXAMINATION: XR CHEST 1 VW-     INDICATION: Postop; I73.9-Peripheral vascular disease, unspecified;  U60-Laitjfm effusion, not elsewhere  classified; Z95.828-Presence of  other vascular implants and grafts; R13.13-Dysphagia, pharyngeal phase.     COMPARISON: 03/04/2020.     FINDINGS: Right upper extremity PICC line is seen with its tip at the  cavoatrial junction. Feeding tube is seen below the left hemidiaphragm.  Heart is normal in size. Right pigtail catheter has been removed. There  are small areas of discoid atelectasis in the right mid and lower lung,  and there appears to be very small pneumothorax in the lateral right  base, approximately 7 mm in width. This does not extend to the apex, or  across the base of the lung, although there is probably trace air in the  minor fissure. Left lung shows near resolution of focal atelectasis in  the medial left base. There is stable diffuse interstitial disease  elsewhere.       Impression:       1. Right pleural drain removal with very small right basilar  pneumothorax.  2. Mild new discoid atelectasis in the right mid and lower lung, but  interval clearing of the left lung base, since yesterday's study.      D:  03/05/2020  E:  03/05/2020     This report was finalized on 3/6/2020 10:57 PM by Dr. Jaswant Frank MD.           I read her radiographic studies    Impression:   1.  MRSA left groin abscess/cellulitis/graft infection-status post graft removal 2/28.  She will require a prolonged course of intravenous antibiotic therapy directed toward MRSA.  Now on daptomycin- lft's and cpk normal  2.  Bilateral pulmonary infiltrates/pleural effusions- clinically improved  3.  Leukocytosis- etiology unclear  4.  S/p femorofemoral bypass with gortex graft, 1/15  5.  Type 2 Diabetes Mellitus  6.  Penicillin allergy  7.  Type 2 diabetes mellitus-with very poor control  8.  Malnutrition    PLAN/RECOMMENDATIONS:   1.  Continue  daptomycin to 4/20 to complete 8 weeks therapy  3.  Pa and lat cxr  4.  probiotic  5   Replace picc with single lumen      Will discuss with JOY Melo MD  3/9/2020  2:00  PM

## 2020-03-09 NOTE — PROGRESS NOTES
Plastic Surgery Progress Note      Admission Date:  2020  LOS:  15  Patient Care Team:  Meagan Barlow APRN as PCP - General (Family Medicine)  Tyson Donaldson DO as Consulting Physician (Cardiology)    Patient Name:  Saskia Groves  :  1970  MRN:  [id number]    Date:  3/9/2020      Subjective:  No acute events overnight.       History:   Past Medical History:   Diagnosis Date   • Anxiety    • Arthritis    • Asthma    • Carotid artery stenosis    • Chronic bronchitis (CMS/HCC)    • COPD (chronic obstructive pulmonary disease) (CMS/HCC)    • Coronary artery disease     2 vessels with 50% blockage.  Sees Dr. Donaldson   • Depression    • Diabetes mellitus (CMS/HCC)    • Dyslipidemia    • GERD (gastroesophageal reflux disease)    • Hiatal hernia    • Hyperlipidemia    • Hypertension    • Infectious viral hepatitis    • Lower back pain    • Migraine    • Multinodular goiter    • S/P thyroid biopsy     2008, 2013, 2015, and [facility]/15/2019 at [facility], right lobe nodules - all cytologies were benign   • Sleep apnea     can't tolerate the cpap mask   • Subclinical hyperthyroidism      Past Surgical History:   Procedure Laterality Date   • ANTERIOR CERVICAL DISCECTOMY W/ FUSION     • BACK SURGERY      lumbar   •  SECTION  19940    x 2    • CHOLECYSTECTOMY     • COLONOSCOPY     • ENDOSCOPY     • FEMORAL ARTERY CUTDOWN Bilateral 2020    Procedure: FEMORAL ARTERY CUTDOWN WITH REMOVAL OF FEMORAL FEMORAL BYPASS GRAFT BILATERAL;  Surgeon: Deandre Oseguera MD;  Location:  [facility];  Service: Vascular;  Laterality: Bilateral;   • FEMORAL ENDARTERECTOMY Bilateral 1/15/2020    Procedure: FEMORAL ENDARTERECTOMY BILATERAL;  Surgeon: Deandre Oseguera MD;  Location:  [facility];  Service: Vascular   • FEMORAL FEMORAL BYPASS Right 1/15/2020    Procedure: FEMORAL FEMORAL BYPASS;  Surgeon: Deandre Oseguera MD;  Location:  [facility];  Service: Vascular   • HYSTERECTOMY     • KNEE SURGERY Left    •  LEG EXCISION LESION/CYST Left 2/28/2020    Procedure: GROIN MUSCLE FLAP BILATERAL;  Surgeon: Modesto Whittaker MD;  Location: [facility];  Service: Plastics;  Laterality: Left;   • WRIST SURGERY Left      Family History   Problem Relation Age of Onset   • Diabetes Mother    • Hypertension Mother    • Arthritis Mother    • Hyperlipidemia Mother    • Migraines Mother    • Thyroid disease Mother    • Hypertension Father    • Lung cancer Father    • Cancer Father    • Stroke Other    • Migraines Maternal Aunt    • Thyroid disease Maternal Aunt    • Heart attack Maternal Aunt    • Osteoporosis Maternal Aunt    • Cancer Maternal Uncle    • Heart attack Maternal Uncle    • Stroke Maternal Uncle    • Hyperlipidemia Maternal Grandmother    • Cancer Maternal Grandmother    • Obesity Maternal Grandmother    • Thyroid disease Daughter    • Obesity Daughter      Social History     Socioeconomic History   • Marital status:      Spouse name: Not on file   • Number of children: 2   • Years of education: Not on file   • Highest education level: Not on file   Occupational History   • Occupation: [employer]     Employer: DISABLED     Comment: Back and Leg Pain   Tobacco Use   • Smoking status: Current Every Day Smoker     Packs/day: 1.00     Years: 35.00     Pack years: 35.00     Types: Cigarettes   • Smokeless tobacco: Never Used   Substance and Sexual Activity   • Alcohol use: Not Currently   • Drug use: No   • Sexual activity: Yes     Partners: Male     Comment:    Social History Narrative    Lives in [place], [place] alone     Allergies   Allergen Reactions   • Levaquin [Levofloxacin] Nausea And Vomiting   • Penicillins Nausea And Vomiting     Has tolerated cefepime 2/2020   • Codeine Nausea Only   • Flagyl [Metronidazole] Nausea And Vomiting       Medication:    Current Facility-Administered Medications:   •  acetaminophen (TYLENOL) tablet 650 mg, 650 mg, Oral, Q4H PRN, 650 mg at 03/07/20 9707 **OR** acetaminophen  (TYLENOL) 160 MG/5ML solution 650 mg, 650 mg, Oral, Q4H PRN, 650 mg at 03/04/20 1226 **OR** acetaminophen (TYLENOL) suppository 650 mg, 650 mg, Rectal, Q4H PRN, Isael Bailey PA, 650 mg at 02/28/20 0429  •  albuterol (PROVENTIL) nebulizer solution 0.083% 2.5 mg/3mL, 2.5 mg, Nebulization, Q6H PRN, Isael Bailey PA  •  amitriptyline (ELAVIL) tablet 10 mg, 10 mg, Oral, Nightly, Isael Bailey PA, 10 mg at 03/08/20 2026  •  aspirin tablet 325 mg, 325 mg, Oral, Daily, Isael Bailey PA, 325 mg at 03/09/20 0923  •  atorvastatin (LIPITOR) tablet 10 mg, 10 mg, Oral, Daily, Isael Bailey PA, 10 mg at 03/09/20 0923  •  budesonide (PULMICORT) nebulizer solution 0.5 mg, 0.5 mg, Nebulization, BID - RT, Isael Bailey PA, 0.5 mg at 03/09/20 0907  •  clopidogrel (PLAVIX) tablet 75 mg, 75 mg, Oral, Daily, Isael Bailey PA, 75 mg at 03/09/20 0923  •  DAPTOmycin (CUBICIN) 500 mg/50 mL in sodium chloride, 8 mg/kg, Intravenous, Q24H, Collette Melo MD, 500 mg at 03/09/20 0630  •  dextrose (D50W) 25 g/ 50mL Intravenous Solution 25 g, 25 g, Intravenous, Q15 Min PRN, Isael Bailey PA, 25 g at 03/07/20 1828  •  dextrose (GLUTOSE) oral gel 15 g, 15 g, Oral, Q15 Min PRN, Isael Bailey PA  •  famotidine (PEPCID) tablet 20 mg, 20 mg, Oral, BID AC, Isael Bailey PA, 20 mg at 03/09/20 0631  •  glucagon (human recombinant) (GLUCAGEN DIAGNOSTIC) injection 1 mg, 1 mg, Subcutaneous, Q15 Min PRN, Isael Bailey PA  •  guaiFENesin-dextromethorphan (ROBITUSSIN DM) 100-10 MG/5ML syrup 5 mL, 5 mL, Oral, Q6H PRN, Isael Bailey PA, 5 mL at 02/27/20 0920  •  hydrALAZINE (APRESOLINE) injection 10 mg, 10 mg, Intravenous, Q6H PRN, Isael Bailey PA, 10 mg at 03/04/20 0628  •  HYDROcodone-acetaminophen (NORCO)  MG per tablet 1 tablet, 1 tablet, Oral, Q8H PRN, Isael Bailey PA, 1 tablet at 03/08/20 2027  •  insulin detemir (LEVEMIR) injection 15 Units, 15 Units,  Subcutaneous, Q12H, Isael Bailey PA, 15 Units at 03/09/20 0922  •  insulin regular (humuLIN R,novoLIN R) injection 0-14 Units, 0-14 Units, Subcutaneous, 4x Daily AC & at Bedtime, Justin Yanes, APRN, 3 Units at 03/08/20 2027  •  ketotifen (ZADITOR) 0.025 % ophthalmic solution 1 drop, 1 drop, Both Eyes, BID, Isael Bailey PA, 1 drop at 03/08/20 0922  •  lactobacillus acidophilus (RISAQUAD) capsule 1 capsule, 1 capsule, Oral, Daily, Isael Bailey PA, 1 capsule at 03/09/20 0923  •  losartan (COZAAR) tablet 50 mg, 50 mg, Oral, Daily, Isael Bailey PA, 50 mg at 03/09/20 0923  •  metoprolol tartrate (LOPRESSOR) tablet 100 mg, 100 mg, Oral, Q12H, Isael Bailey PA, 100 mg at 03/09/20 0923  •  pantoprazole (PROTONIX) injection 40 mg, 40 mg, Intravenous, Q AM, Isael Bailey PA, 40 mg at 03/09/20 0631  •  pregabalin (LYRICA) capsule 150 mg, 150 mg, Oral, Q8H, Isael Bailey PA, 150 mg at 03/09/20 0631  •  rOPINIRole (REQUIP) tablet 4 mg, 4 mg, Oral, Nightly, Isael Bailey PA, 4 mg at 03/08/20 2029    Antibiotics:  Anti-Infectives (From admission, onward)    Ordered     Dose/Rate Route Frequency Start Stop    03/03/20 1942  DAPTOmycin (CUBICIN) 500 mg/50 mL in sodium chloride     Therese Hollis, [facility] reviewed the order on 03/09/20 0852.   Ordering Provider:  Collette Melo MD    8 mg/kg × 60.3 kg  over 30 Minutes Intravenous Every 24 Hours 03/04/20 0600 04/20/20 2359    02/28/20 0746  meropenem (MERREM) 1 g/100 mL 0.9% NS VTB (mbp)     Ordering Provider:  Parag Gordon MD    1 g  over 30 Minutes Intravenous Once 02/28/20 0845 02/28/20 0921    02/24/20 0831  cefepime (MAXIPIME) 2 g/100 mL 0.9% NS (mbp)     Ordering Provider:  Ranjana Easley APRN    2 g  200 mL/hr over 30 Minutes Intravenous Once 02/24/20 0930 02/24/20 1157    02/24/20 0203  vancomycin 500 mg/100 mL 0.9% NS IVPB (mbp)     Ordering Provider:  Ced Kenny RPH    500 mg  100 mL/hr over 60  Minutes Intravenous Once 20 0300 20 0511            Objective:    Physical Exam:   Vital Signs:  Temp (24hrs), Av.7 °F (37.1 °C), Min:98.3 °F (36.8 °C), Max:99.3 °F (37.4 °C)    Temp  Min: 98.3 °F (36.8 °C)  Max: 99.3 °F (37.4 °C)  BP  Min: 100/68  Max: 159/91  Pulse  Min: 86  Max: 110  Resp  Min: 16  Max: 18  SpO2  Min: 92 %  Max: 95 %    GENERAL: Awake and alert, in no acute distress. extubated  HEART: RRR;   LUNGS: Nonlabored. No dullness. intubated  ABDOMEN: Soft, nontender, nondistended. No rebound or guarding. NO mass or HSM.  EXT:  No cyanosis, clubbing or edema. No cord.  : Benavidez catheter in place.  SKIN: Warm and dry without cutaneous eruptions on Inspection/palpation.    NEURO: Oriented to PPT. No focal deficits on motor/sensory exam at arms/legs.  PSYCHIATRIC: Normal insight and judgement. Cooperative with PE  Bilateral Groins: incisional wound vac in place to suction, drains appropriate output, no seroma or hematoma on exam, soft, appropriate tenderness to palpation, no ecchymosis     Laboratory Data:  Results from last 7 days   Lab Units 20  0321 20  0418 20  0344   WBC 10*3/mm3 16.57* 8.40 9.39   HEMOGLOBIN g/dL 10.5* 10.4* 10.4*   HEMATOCRIT % 33.7* 34.0 33.9*   PLATELETS 10*3/mm3 181 189 216     Results from last 7 days   Lab Units 20  0321   SODIUM mmol/L 137   POTASSIUM mmol/L 5.6*   CHLORIDE mmol/L 104   CO2 mmol/L 26.0   BUN mg/dL 42*   CREATININE mg/dL 0.86   GLUCOSE mg/dL 117*   CALCIUM mg/dL 8.4*     Results from last 7 days   Lab Units 20  0321   ALK PHOS U/L 138*   BILIRUBIN mg/dL 0.2   ALT (SGPT) U/L 23   AST (SGOT) U/L 27         Results from last 7 days   Lab Units 20  1349   CRP mg/dL 1.11*         Results from last 7 days   Lab Units 20  0321 20  0418 20  0344   CK TOTAL U/L 29 30 29     Results from last 7 days   Lab Units 20  0341 20  0338 20  1131   VANCOMYCIN TR mcg/mL  --   --  38.50*    VANCOMYCIN RM mcg/mL 13.80 26.10  --      Estimated Creatinine Clearance: 70.3 mL/min (by C-G formula based on SCr of 0.86 mg/dL).    Microbiology:  No results found for: ACANTHNAEG, AFBCX, BPERTUSSISCX, BLOODCX  No results found for: BCIDPCR, CXREFLEX, CSFCX, CULTURETIS  No results found for: CULTURES, HSVCX, URCX  No results found for: EYECULTURE, GCCX, LABHSV  No results found for: LEGIONELLA, MRSACX, MUMPSCX, MYCOPLASCX  No results found for: NOCARDIACX, STOOLCX  No results found for: THROATCX, UNSTIMCULT, URINECX, CULTURE, VZVCULTUR  No results found for: VIRALCULTU, WOUNDCX      Assessment: 49yoF s/p 12/28/20 diagnostic fiberoptic bronchoscopy, Rtchest tube placement, removal of femoral-femoral bypass, bilateral CFA pericardial patches, bilateral Sartorious muscle flaps for groin graft coverage.         Plan:  - no acute surgery at this point in time, incisional wound vac in place to suction, no seroma or hematoma on PE, Lower extremity perfused, concerning about the elevated WBC source,   - continue abx per ID  - continue incisional wound vac, will discontinue day of discharge,   - record and strip the MELISSA drains, most likely will discontinue Rt drain prior to discharge  - may sit up in chair, may stand, patient may walk, per plastic surgery  - disposition, Patient may leave from a plastic surgery standpoint, will discontinue the incisional wound vac and possible remove a drain prior to discharge, please notify Dr. Modesto Whittaker prior to discharge.   - follow up with Dr. Whittaker in 1 week following discharge              Modesto Whittaker MD  03/09/20  10:01 AM

## 2020-03-09 NOTE — PROGRESS NOTES
"Saskia Groves  [id number]  1970     LOS: 15 days   Patient Care Team:  Meagan Barlow APRN as PCP - General (Family Medicine)  Tyson Donaldson DO as Consulting Physician (Cardiology)    Chief Complaint: Infected femoral-femoral bypass      Subjective: No complaints, wants to go to nursing home    Objective:     Vital Sign Min/Max for last 24 hours  Temp  Min: 98.1 °F (36.7 °C)  Max: 99.3 °F (37.4 °C)   BP  Min: 100/68  Max: 151/85   Pulse  Min: 87  Max: 110   Resp  Min: 16  Max: 18   SpO2  Min: 92 %  Max: 93 %   No data recorded   No data recorded     Flowsheet Rows      First Filed Value   Admission Height  160 cm (62.99\") Documented at 02/23/2020 2240   Admission Weight  60.4 kg (133 lb 1.6 oz) Documented at 02/23/2020 2240          Physical Exam:    Wound: Satisfactory    Pulses:     Mediastinal and Chest Tube Drainage:       Results Review:   Results from last 7 days   Lab Units 03/09/20  0321   WBC 10*3/mm3 16.57*   HEMOGLOBIN g/dL 10.5*   HEMATOCRIT % 33.7*   PLATELETS 10*3/mm3 181     Results from last 7 days   Lab Units 03/09/20  0321   SODIUM mmol/L 137   POTASSIUM mmol/L 5.6*   CHLORIDE mmol/L 104   CO2 mmol/L 26.0   BUN mg/dL 42*   CREATININE mg/dL 0.86   GLUCOSE mg/dL 117*   CALCIUM mg/dL 8.4*             Assessment      Acute hypoxemic respiratory failure (CMS/HCC)    PVD (peripheral vascular disease) (CMS/HCC)    Current smoker    COPD (chronic obstructive pulmonary disease) (CMS/HCC)    Coronary artery disease    Hypertension    Hyperlipidemia    Diabetes mellitus (CMS/HCC)    Sepsis (CMS/HCC)    Pleural effusion - bilateral mod/large pleural effusions on CT chest 2/27/2020.     Acute pulmonary edema (CMS/HCC)      Discharge to rehab when all agree.        Deandre Oseguera MD  03/09/20  6:55 AM      Please note that portions of this note were completed with a voice recognition program. Efforts were made to edit the dictations, but words may be mistranscribed  "

## 2020-03-09 NOTE — PLAN OF CARE
Problem: Patient Care Overview  Goal: Plan of Care Review  3/9/2020 1616 by Collette Gomes, PT  Flowsheets  Taken 3/9/2020 1616  Progress: improving  Plan of Care Reviewed With: patient  Taken 3/9/2020 1612  Outcome Summary: Pt ambulates in [facility]ell x 700 ft, up in room independently. PT to d/c at this time.

## 2020-03-09 NOTE — PROGRESS NOTES
"Adult Nutrition  Assessment/PES    Patient Name:  Saskia Groves  YOB: 1970  MRN: [id number]  Admit Date:  2/23/2020    Assessment Date:  3/9/2020    Comments:      Reason for Assessment     Row Name 03/09/20 1123          Reason for Assessment    Reason For Assessment  follow-up protocol 30\"     Diagnosis  -- SEE PREVIOUS NUTRITION NOTES. S/P WOUND VAC         Nutrition/Diet History     Row Name 03/09/20 1124          Nutrition/Diet History    Typical Food/Fluid Intake  PT REPORTS IS EATING WELL SINCE FEEDING TUBE DISCONTINUED. SHE REPORTED FOOD PREFS & WILLINGNESS TO TRY PREMIER PROTEIN DAILY @ [facility].           Labs/Tests/Procedures/Meds     Row Name 03/09/20 1125          Labs/Procedures/Meds    Lab Results Reviewed  reviewed        Medications    Pertinent Medications Reviewed  reviewed         Physical Findings     Row Name 03/09/20 1125          Physical Findings    Gastrointestinal  other (see comments) WDL PER NURSING DOCUMENTATION     Skin  other (see comments) PUNCTURE WOUND & MULTIPLE INCISIONAL WOUNDS PER NURSING DOCUMENTATION           Nutrition Prescription Ordered     Row Name 03/09/20 1126          Nutrition Prescription PO    Current PO Diet  Regular     Fluid Consistency  Thin     Common Modifiers  Cardiac;Consistent Carbohydrate         Evaluation of Received Nutrient/Fluid Intake     Row Name 03/09/20 1126          PO Evaluation    Number of Meals  3     % PO Intake  58 WITH MULTIPLE SNACKS & 88% OF LAST 2 MEALS CONSUMED               Problem/Interventions:  Problem 1     Row Name 03/09/20 1129          Nutrition Diagnoses Problem 1    Problem 1  Inadequate Nutrient Intake     Signs/Symptoms (evidenced by)  PO Intake     Percent (%) intake recorded  58 %     Over number of meals  3 IN ADDITION TO MULTIPLE SNACKS. PT ATE 88% OF LAST 2 MEALS RECORDED         Problem 2     Row Name 03/09/20 1130          Nutrition Diagnoses Problem 2    Problem 2  Increased Nutrient Needs     " Macronutrient  Protein     Etiology (related to)  Other (comment) WOUND HEALING     Signs/Symptoms (evidenced by)  Other (comment) WOUND VAC D/Cd. MULTIPLE INCISIONAL WOUNDS & PUNCTURE WOUND PER NURSING DOCUMENTATION             Intervention Goal     Row Name 03/09/20 1131          Intervention Goal    General  Nutrition support treatment     PO  Continue positive trend;Increase intake         Nutrition Intervention     Row Name 03/09/20 1131          Nutrition Intervention    RD/Tech Action  Advise alternate selection;Advise available snack;Interview for preference;Menu adjusted;Encourage intake;Recommend/ordered;Follow Tx progress;Care plan reviewd     Recommended/Ordered  Supplement         Nutrition Prescription     Row Name 03/09/20 1132          Nutrition Prescription PO    PO Prescription  Begin/change supplement     Supplement  Other (comment) PREMIER PROTEIN     Supplement Frequency  Daily     New PO Prescription Ordered?  Yes         Education/Evaluation     Row Name 03/09/20 1132          Monitor/Evaluation    Monitor  Per protocol;I&O;PO intake;Supplement intake;Weight;Skin status;Symptoms           Electronically signed by:  Bette Nuñez RD  03/09/20 11:32 AM

## 2020-03-09 NOTE — PLAN OF CARE
Problem: Patient Care Overview  Goal: Plan of Care Review  Outcome: Ongoing (interventions implemented as appropriate)  Flowsheets (Taken 3/9/2020 8341)  Outcome Summary: Wound vac check complete. R groin site intact. Applied small amount of tape over L groin site secondary to corner rolling up slightly. Will cont to check daily.

## 2020-03-09 NOTE — THERAPY DISCHARGE NOTE
Patient Name: Saskia Groves  : 1970    MRN: [id number]                              Today's Date: 3/9/2020       Admit Date: 2020    Visit Dx:     ICD-10-CM ICD-9-CM   1. PVD (peripheral vascular disease) (CMS/HCC) I73.9 443.9   2. Pleural effusion J90 511.9   3. S/P femoral-femoral bypass surgery Z95.828 V45.89     Patient Active Problem List   Diagnosis   • PVD (peripheral vascular disease) (CMS/HCC)   • Lumbar radiculopathy   • Paresthesia   • Causalgia of lower limb   • Hiatal hernia   • Current smoker   • Bilateral carotid artery stenosis   • COPD (chronic obstructive pulmonary disease) (CMS/HCC)   • Coronary artery disease   • Hypertension   • Hyperlipidemia   • Diabetes mellitus (CMS/HCC)   • Sepsis (CMS/HCC)   • Pleural effusion - bilateral mod/large pleural effusions on CT chest 2020.    • Acute pulmonary edema (CMS/HCC)   • Acute hypoxemic respiratory failure (CMS/HCC)     Past Medical History:   Diagnosis Date   • Anxiety    • Arthritis    • Asthma    • Carotid artery stenosis    • Chronic bronchitis (CMS/HCC)    • COPD (chronic obstructive pulmonary disease) (CMS/HCC)    • Coronary artery disease     2 vessels with 50% blockage.  Sees Dr. Donaldson   • Depression    • Diabetes mellitus (CMS/HCC)    • Dyslipidemia    • GERD (gastroesophageal reflux disease)    • Hiatal hernia    • Hyperlipidemia    • Hypertension    • Infectious viral hepatitis    • Lower back pain    • Migraine    • Multinodular goiter    • S/P thyroid biopsy     2008, 2013, 2015, and 07/15/2019 at [facility], right lobe nodules - all cytologies were benign   • Sleep apnea     can't tolerate the cpap mask   • Subclinical hyperthyroidism      Past Surgical History:   Procedure Laterality Date   • ANTERIOR CERVICAL DISCECTOMY W/ FUSION     • BACK SURGERY      lumbar   •  SECTION  19940    x 2    • CHOLECYSTECTOMY     • COLONOSCOPY     • ENDOSCOPY     • FEMORAL ARTERY CUTDOWN Bilateral 2020     Procedure: FEMORAL ARTERY CUTDOWN WITH REMOVAL OF FEMORAL FEMORAL BYPASS GRAFT BILATERAL;  Surgeon: Deandre Oseguera MD;  Location:  [facility];  Service: Vascular;  Laterality: Bilateral;   • FEMORAL ENDARTERECTOMY Bilateral 1/15/2020    Procedure: FEMORAL ENDARTERECTOMY BILATERAL;  Surgeon: Deandre Oseguera MD;  Location:  [facility];  Service: Vascular   • FEMORAL FEMORAL BYPASS Right 1/15/2020    Procedure: FEMORAL FEMORAL BYPASS;  Surgeon: Deandre Oseguera MD;  Location:  [facility];  Service: Vascular   • HYSTERECTOMY     • KNEE SURGERY Left    • LEG EXCISION LESION/CYST Left 2/28/2020    Procedure: GROIN MUSCLE FLAP BILATERAL;  Surgeon: Modesto Whittaker MD;  Location:  [facility];  Service: Plastics;  Laterality: Left;   • WRIST SURGERY Left      General Information     Row Name 03/09/20 1610          PT Evaluation Time/Intention    Document Type  therapy note (daily note)  -EJ     Mode of Treatment  physical therapy  -EJ     Row Name 03/09/20 1610          General Information    Patient Profile Reviewed?  yes  -EJ     Existing Precautions/Restrictions  cardiac wound vac, PICC  -EJ     Barriers to Rehab  medically complex  -EJ     Row Name 03/09/20 1610          Cognitive Assessment/Intervention- PT/OT    Orientation Status (Cognition)  oriented x 4  -EJ       User Key  (r) = Recorded By, (t) = Taken By, (c) = Cosigned By    Initials Name Provider Type    EJ Collette Gomes, PT Physical Therapist        Mobility     Row Name 03/09/20 1610          Transfer Assessment/Treatment    Comment (Transfers)  Pt independently stands prior to PT entering room.  -EJ     Row Name 03/09/20 1610          Gait/Stairs Assessment/Training    Gait/Stairs Assessment/Training  gait/ambulation independence  -EJ     [place] Level (Gait)  conditional independence  -EJ     Assistive Device (Gait)  walker, 4-wheeled  -EJ     Distance in Feet (Gait)  700  -EJ     Pattern (Gait)  step-through  -EJ     Deviations/Abnormal Patterns  (Gait)  twin decreased  -EJ     Bilateral Gait Deviations  forward flexed posture  -EJ     Comment (Gait/Stairs)  Pt ambulates with conditional independence, manipulating MELISSA drains to maintain them. Dtr and granddtr present.  -EJ       User Key  (r) = Recorded By, (t) = Taken By, (c) = Cosigned By    Initials Name Provider Type    EJ Collette Gomes PT Physical Therapist        Obj/Interventions    No documentation.       Goals/Plan     Row Name 03/09/20 1614          Bed Mobility Goal 1 (PT)    Activity/Assistive Device (Bed Mobility Goal 1, PT)  sit to supine;supine to sit  -EJ     [place] Level/Cues Needed (Bed Mobility Goal 1, PT)  minimum assist (75% or more patient effort)  -EJ     Time Frame (Bed Mobility Goal 1, PT)  2 weeks  -EJ     Progress/Outcomes (Bed Mobility Goal 1, PT)  other (see comments) pt demonstrates adequate mobility to meet goal  -EJ     Row Name 03/09/20 1614          Transfer Goal 1 (PT)    Activity/Assistive Device (Transfer Goal 1, PT)  sit-to-stand/stand-to-sit;bed-to-chair/chair-to-bed;walker, rolling  -EJ     [place] Level/Cues Needed (Transfer Goal 1, PT)  minimum assist (75% or more patient effort)  -EJ     Time Frame (Transfer Goal 1, PT)  2 weeks  -EJ     Progress/Outcome (Transfer Goal 1, PT)  goal met  -EJ     Row Name 03/09/20 1614          Gait Training Goal 1 (PT)    Activity/Assistive Device (Gait Training Goal 1, PT)  gait (walking locomotion);assistive device use;walker, rolling  -EJ     [place] Level (Gait Training Goal 1, PT)  moderate assist (50-74% patient effort)  -EJ     Distance (Gait Goal 1, PT)  50 feet  -EJ     Time Frame (Gait Training Goal 1, PT)  2 weeks  -EJ     Progress/Outcome (Gait Training Goal 1, PT)  goal met  -EJ       User Key  (r) = Recorded By, (t) = Taken By, (c) = Cosigned By    Initials Name Provider Type    EJ Collette Gomes PT Physical Therapist        Clinical Impression     Row Name 03/09/20 1612          Pain Scale:  Numbers Pre/Post-Treatment    Pain Scale: Numbers, Pretreatment  0/10 - no pain  -EJ     Pain Scale: Numbers, Post-Treatment  0/10 - no pain  -EJ     Pain Intervention(s)  Ambulation/increased activity  -     Row Name 03/09/20 1612          Pain Scale: FACES Pre/Post-Treatment    Pain: FACES Scale, Pretreatment  0-->no hurt  -EJ     Pain: FACES Scale, Post-Treatment  0-->no hurt  -EJ     Row Name 03/09/20 1612          Plan of Care Review    Outcome Summary  Pt ambulates in jewell x 700 ft, up in room independently. PT to d/c at this time.   -     Row Name 03/09/20 1612          Positioning and Restraints    Pre-Treatment Position  standing in room  -EJ     Post Treatment Position  bed  -EJ     In Bed  sitting EOB;with family/caregiver;notified nsg Wound vac reconnected.  -       User Key  (r) = Recorded By, (t) = Taken By, (c) = Cosigned By    Initials Name Provider Type    Collette Ceballos PT Physical Therapist        Outcome Measures     Row Name 03/09/20 1615          How much help from another person do you currently need...    Turning from your back to your side while in flat bed without using bedrails?  4  -EJ     Moving from lying on back to sitting on the side of a flat bed without bedrails?  4  -EJ     Moving to and from a bed to a chair (including a wheelchair)?  4  -EJ     Standing up from a chair using your arms (e.g., wheelchair, bedside chair)?  4  -EJ     Climbing 3-5 steps with a railing?  4  -EJ     To walk in hospital room?  4  -EJ     AM-PAC 6 Clicks Score (PT)  24  -EJ     Row Name 03/09/20 1615          Functional Assessment    Outcome Measure Options  AM-PAC 6 Clicks Basic Mobility (PT)  -       User Key  (r) = Recorded By, (t) = Taken By, (c) = Cosigned By    Initials Name Provider Type    Collette Ceballos PT Physical Therapist          PT Recommendation and Plan     Outcome Summary/Treatment Plan (PT)  Anticipated Discharge Disposition (PT): (SNF if medically  appropriate.)  Plan of Care Reviewed With: patient  Progress: improving  Outcome Summary: Pt ambulates in jewell x 700 ft, up in room independently. PT to d/c at this time.      Time Calculation:   PT Charges     Row Name 03/09/20 1617 03/09/20 0950          Time Calculation    Start Time  1555  -EJ  0950  -MP     PT Received On  03/09/20  -EJ  --     PT Goal Re-Cert Due Date  03/13/20  -EJ  03/13/20  -MP        Time Calculation- PT    Total Timed Code Minutes- PT  8 minute(s)  -EJ  --        Timed Charges    34999 - PT Therapeutic Activity Minutes  8  -EJ  --       User Key  (r) = Recorded By, (t) = Taken By, (c) = Cosigned By    Initials Name Provider Type    Collette Ceballos, PT Physical Therapist    MP Maycol Patton PT Physical Therapist        Therapy Charges for Today     Code Description Service Date Service Provider Modifiers Qty    53715372117 HC PT THERAPEUTIC ACT EA 15 MIN 3/9/2020 Collette Gomes PT GP 1          PT G-Codes  Outcome Measure Options: AM-PAC 6 Clicks Basic Mobility (PT)  AM-PAC 6 Clicks Score (PT): 24    PT Discharge Summary  Anticipated Discharge Disposition (PT): (SNF if medically appropriate.)  Reason for Discharge: All goals achieved  Outcomes Achieved: Able to achieve all goals within established timeline    Collette Mendoza, PT  3/9/2020

## 2020-03-09 NOTE — THERAPY WOUND CARE TREATMENT
Acute Care - Wound/Debridement Treatment Note  [facility]     Patient Name: Saskia Groves  : 1970  MRN: [id number]  Today's Date: 3/9/2020                  Admit Date: 2020    Visit Dx:    ICD-10-CM ICD-9-CM   1. PVD (peripheral vascular disease) (CMS/[facility]) I73.9 443.9   2. Pleural effusion J90 511.9   3. S/P femoral-femoral bypass surgery Z95.828 V45.89       Patient Active Problem List   Diagnosis   • PVD (peripheral vascular disease) (CMS/HCC)   • Lumbar radiculopathy   • Paresthesia   • Causalgia of lower limb   • Hiatal hernia   • Current smoker   • Bilateral carotid artery stenosis   • COPD (chronic obstructive pulmonary disease) (CMS/[facility])   • Coronary artery disease   • Hypertension   • Hyperlipidemia   • Diabetes mellitus (CMS/[facility])   • Sepsis (CMS/[facility])   • Pleural effusion - bilateral mod/large pleural effusions on CT chest 2020.    • Acute pulmonary edema (CMS/[facility])   • Acute hypoxemic respiratory failure (CMS/[facility])           Wound 20 Right groin Incision (Active)   Dressing Appearance dry;intact 3/9/2020  6:00 AM   Closure Adhesive bandage;GILLIAN 3/9/2020  6:00 AM   Base dressing in place, unable to visualize 3/9/2020  6:00 AM   Periwound intact;dry;redness 3/8/2020  8:00 PM   Periwound Temperature warm 3/8/2020  8:00 PM   Periwound Skin Turgor firm 3/8/2020  8:00 PM   Drainage Characteristics/Odor serous 3/8/2020  8:00 PM   Drainage Amount small 3/8/2020  8:00 PM   Care, Wound negative pressure wound therapy 3/8/2020  8:00 PM   Dressing Care, Wound other (see comments) 3/8/2020  8:00 PM   Periwound Care, Wound dry periwound area maintained 3/8/2020  8:00 PM   Wound Output (mL) 0 3/9/2020  9:50 AM       Wound 20 2302 Left groin Incision (Active)   Dressing Appearance dry;intact 3/9/2020  6:00 AM   Closure Adhesive bandage;GILLIAN 3/9/2020  6:00 AM   Base dressing in place, unable to visualize 3/9/2020  6:00 AM   Periwound intact;dry;redness 3/8/2020  8:00 PM   Periwound  Temperature warm 3/8/2020  8:00 PM   Periwound Skin Turgor firm 3/8/2020  8:00 PM   Drainage Characteristics/Odor serous 3/8/2020  8:00 PM   Drainage Amount small 3/8/2020  8:00 PM   Care, Wound negative pressure wound therapy 3/8/2020  8:00 PM   Dressing Care, Wound other (see comments) 3/8/2020  8:00 PM   Periwound Care, Wound dry periwound area maintained 3/8/2020  8:00 PM   Wound Output (mL) 0 3/9/2020  9:50 AM       Wound 03/04/20 0715 Right lateral chest Puncture (Active)   Dressing Appearance dry;intact 3/9/2020  6:00 AM   Closure None 3/9/2020  6:00 AM   Base dressing in place, unable to visualize 3/9/2020  6:00 AM       NPWT (Negative Pressure Wound Therapy) 02/28/20 1200 groin (Active)   Therapy Setting continuous therapy 3/9/2020  9:50 AM   Dressing other (see comments) 3/9/2020  9:50 AM   Pressure Setting 125 mmHg 3/9/2020  9:50 AM         WOUND DEBRIDEMENT                  Therapy Treatment    Rehabilitation Treatment Summary     Row Name 03/09/20 0950             Treatment Time/Intention    Discipline  physical therapist  -MP      Document Type  therapy note (daily note);wound care  -MP      Subjective Information  no complaints  -MP      Mode of Treatment  physical therapy;individual therapy  -MP      Recorded by [MP] Maycol Patton, PT 03/09/20 1010      Row Name 03/09/20 0950             Cognitive Assessment/Intervention- PT/OT    Orientation Status (Cognition)  oriented x 4  -MP      Recorded by [MP] Maycol Patton, PT 03/09/20 1010      Row Name 03/09/20 0950             Positioning and Restraints    Pre-Treatment Position  in bed  -MP      Post Treatment Position  bed  -MP      In Bed  supine;call light within reach;encouraged to call for assist  -MP      Recorded by [MP] Maycol Patton, PT 03/09/20 1010      Row Name 03/09/20 0950             Pain Scale: Numbers Pre/Post-Treatment    Pain Scale: Numbers, Pretreatment  0/10 - no pain  -MP      Pain Scale: Numbers, Post-Treatment  0/10 - no pain   -MP      Recorded by [MP] Maycol Patton, PT 03/09/20 1010      Row Name 03/09/20 0950             Wound 02/23/20 2230 Right groin Incision    Wound - Properties Group Date first assessed: 02/23/20 [EB] Time first assessed: 2230 [EB] Present on Hospital Admission: Y [EB] Side: Right [EB] Location: groin [EB] Primary Wound Type: Incision [EB] Recorded by:  [EB] Collette Hardy RN 02/23/20 2302    Wound Output (mL)  0  -MP      Recorded by [MP] Maycol Patton, PT 03/09/20 1010      Row Name 03/09/20 0950             Wound 02/23/20 2302 Left groin Incision    Wound - Properties Group Date first assessed: 02/23/20 [EB] Time first assessed: 2302 [EB] Present on Hospital Admission: Y [EB] Side: Left [EB] Location: groin [EB] Primary Wound Type: Incision [EB] Recorded by:  [EB] Collette Hardy RN 02/23/20 2303    Wound Output (mL)  0  -MP      Recorded by [MP] Maycol Patton, PT 03/09/20 1010      Row Name                Wound 03/04/20 0715 Right lateral chest Puncture    Wound - Properties Group Date first assessed: 03/04/20 [JJ] Time first assessed: 0715 [JJ] Present on Hospital Admission: N [JJ] Side: Right [JJ] Orientation: lateral [JJ] Location: chest [JJ] Primary Wound Type: Puncture [JJ], s/p chest tube site  Recorded by:  [JJ] Edgar Kenny, RN 03/04/20 0937    Row Name 03/09/20 0950             NPWT (Negative Pressure Wound Therapy) 02/28/20 1200 groin    NPWT (Negative Pressure Wound Therapy) - Properties Group Placement Date: 02/28/20 [MF] Placement Time: 1200 [MF] Location: groin [MF] Recorded by:  [MF] Arturo Madrid, PT 03/02/20 1129    Therapy Setting  continuous therapy  -MP      Dressing  other (see comments) Applied small amount of tape over L groin site  -MP      Pressure Setting  125 mmHg  -MP      Recorded by [MP] Maycol Patton, PT 03/09/20 1010      Row Name 03/09/20 0950             Plan of Care Review    Plan of Care Reviewed With  patient  -MP      Progress  no change  -MP       Outcome Summary  Wound vac check complete. R groin site intact. Applied small amount of tape over L groin site secondary to corner rolling up slightly. Will cont to check daily.   -MP      Recorded by [MP] Maycol Patton, PT 03/09/20 1010        User Key  (r) = Recorded By, (t) = Taken By, (c) = Cosigned By    Initials Name Effective Dates Discipline     Arturo Madrid, PT 06/19/15 -  PT    Edgar Bryan, RN 06/16/16 -  Nurse    Collette Berman RN 11/14/19 -  Nurse    Maycol Travis, PT 11/06/19 -  PT                PT Recommendation and Plan  Anticipated Equipment Needs at Discharge (PT): front wheeled walker  Anticipated Discharge Disposition (PT): skilled nursing facility  Planned Therapy Interventions (PT Eval): balance training, bed mobility training, gait training, home exercise program, transfer training, strengthening  Therapy Frequency (PT Clinical Impression): daily        Progress: no change      Progress: no change  Outcome Summary: Wound vac check complete. R groin site intact. Applied small amount of tape over L groin site secondary to corner rolling up slightly. Will cont to check daily.   Plan of Care Reviewed With: patient            Time Calculation  PT Charges     Row Name 03/09/20 0950             Time Calculation    Start Time  0950  -MP      PT Goal Re-Cert Due Date  03/13/20  -MP        User Key  (r) = Recorded By, (t) = Taken By, (c) = Cosigned By    Initials Name Provider Type    Maycol Travis, PT Physical Therapist           Therapy Charges for Today     Code Description Service Date Service Provider Modifiers Qty    80360415692 HC PT NEG PRESS WOUND TO 50SQCM DME1 3/9/2020 Maycol Patton, PT  1            PT G-Codes  Outcome Measure Options: AM-PAC 6 Clicks Basic Mobility (PT)  AM-PAC 6 Clicks Score (PT): 19        Maycol Patton PT  3/9/2020

## 2020-03-09 NOTE — PROGRESS NOTES
[facility] Medicine Services  PROGRESS NOTE    Patient Name: Saskia Groves  : 1970  MRN: [id number]    Date of Admission: 2020  Primary Care Physician: Meagan Barlow APRN    Subjective   Subjective     CC:  Follow-up left groin wound infection    HPI:  Feels well.  Doing better.  Walking with very little assistance.  Wanting to get out of the hospital soon as possible.  Tolerating antibiotics.  Daughter and granddaughter at the bedside.  No other new complaints.  Tolerating diet.    Review of Systems  No current fevers or chills  No current shortness of breath or cough  No current nausea, vomiting, or diarrhea  No current chest pain or palpitations      Objective   Objective     Vital Signs:   Temp:  [98.3 °F (36.8 °C)-99.3 °F (37.4 °C)] 98.3 °F (36.8 °C)  Heart Rate:  [] 100  Resp:  [16-18] 18  BP: (100-159)/(68-91) 159/91        Physical Exam:  Constitutional:Awake, alert  HENT: NCAT, mucous membranes moist, neck supple  Respiratory: Some dullness at the base but otherwise clear to auscultation bilaterally, respiratory effort normal, nonlabored breathing   Cardiovascular: RRR, S1, S2, normal radial pulses  Gastrointestinal: Positive bowel sounds, soft, nontender, nondistended  Musculoskeletal: Normal musculature for age, no lower extremity edema, BMI 24  Psychiatric: Appropriate affect, cooperative, conversational  Neurologic: No slurred speech or facial droop, follows commands, oriented  Skin: Groin wound VAC in place, wound is CDI, no rashes or jaundice    Results Reviewed:  Results from last 7 days   Lab Units 20  0321 20  0418 20  0344   WBC 10*3/mm3 16.57* 8.40 9.39   HEMOGLOBIN g/dL 10.5* 10.4* 10.4*   HEMATOCRIT % 33.7* 34.0 33.9*   PLATELETS 10*3/mm3 181 189 216     Results from last 7 days   Lab Units 20  0321 20  1349 20  0418   SODIUM mmol/L 137 140 144   POTASSIUM mmol/L 5.6* 5.1 4.4   CHLORIDE mmol/L 104 103  107   CO2 mmol/L 26.0 27.0 28.0   BUN mg/dL 42* 47* 45*   CREATININE mg/dL 0.86 0.66 0.64   GLUCOSE mg/dL 117* 83 79   CALCIUM mg/dL 8.4* 8.7 8.8   ALT (SGPT) U/L 23 23 19   AST (SGOT) U/L 27 33* 32     Estimated Creatinine Clearance: 70.3 mL/min (by C-G formula based on SCr of 0.86 mg/dL).    Microbiology Results Abnormal     Procedure Component Value - Date/Time    Fungus Culture - Body Fluid, Pleural Cavity [[id number]] Collected:  02/28/20 1341    Lab Status:  Preliminary result Specimen:  Body Fluid from Pleural Cavity Updated:  03/06/20 1530     Fungus Culture No fungus isolated at 1 week    AFB Culture - Body Fluid, Pleural Cavity [[id number]] Collected:  02/28/20 1341    Lab Status:  Preliminary result Specimen:  Body Fluid from Pleural Cavity Updated:  03/06/20 1530     AFB Culture No AFB isolated at 1 week     AFB Stain No acid fast bacilli seen on concentrated smear    Fungus Culture - Body Fluid, Groin, right [[id number]] Collected:  02/28/20 1417    Lab Status:  Preliminary result Specimen:  Body Fluid from Groin, right Updated:  03/06/20 1530     Fungus Culture No fungus isolated at 1 week    AFB Culture - Body Fluid, Groin, right [[id number]] Collected:  02/28/20 1417    Lab Status:  Preliminary result Specimen:  Body Fluid from Groin, right Updated:  03/06/20 1530     AFB Culture No AFB isolated at 1 week     AFB Stain No acid fast bacilli seen on concentrated smear    Fungus Culture - Tissue, Groin, right [[id number]] Collected:  02/28/20 1417    Lab Status:  Preliminary result Specimen:  Tissue from Groin, right Updated:  03/06/20 1530     Fungus Culture No fungus isolated at 1 week    AFB Culture - Tissue, Groin, right [[id number]] Collected:  02/28/20 1417    Lab Status:  Preliminary result Specimen:  Tissue from Groin, right Updated:  03/06/20 1530     AFB Culture No AFB isolated at 1 week     AFB Stain No acid fast bacilli seen on concentrated smear    Anaerobic Culture - Hardware / Foreign Body,  Groin, right [[id number]] Collected:  02/28/20 1500    Lab Status:  Final result Specimen:  Hardware / Foreign Body from Groin, right Updated:  03/04/20 1205     Anaerobic Culture No anaerobes isolated at 5 days    Anaerobic Culture - Body Fluid, Groin, right [[id number]] Collected:  02/28/20 1417    Lab Status:  Final result Specimen:  Body Fluid from Groin, right Updated:  03/04/20 1204     Anaerobic Culture No anaerobes isolated at 5 days    Anaerobic Culture - Body Fluid, Pleural Cavity [[id number]] Collected:  02/28/20 1341    Lab Status:  Final result Specimen:  Body Fluid from Pleural Cavity Updated:  03/04/20 1203     Anaerobic Culture No anaerobes isolated at 5 days    Anaerobic Culture - Tissue, Groin, right [[id number]] Collected:  02/28/20 1417    Lab Status:  Final result Specimen:  Tissue from Groin, right Updated:  03/04/20 1202     Anaerobic Culture No anaerobes isolated at 5 days    Blood Culture - Blood, Wrist, Right [[id number]] Collected:  02/28/20 0750    Lab Status:  Final result Specimen:  Blood from Wrist, Right Updated:  03/04/20 0815     Blood Culture No growth at 5 days    Body Fluid Culture - Body Fluid, Groin, right [[id number]]  (Abnormal)  (Susceptibility) Collected:  02/28/20 1417    Lab Status:  Final result Specimen:  Body Fluid from Groin, right Updated:  03/03/20 0650     Body Fluid Culture Rare Staphylococcus aureus, MRSA     Gram Stain Many (4+) WBCs seen      No organisms seen    Narrative:             Susceptibility      Staphylococcus aureus, MRSA     CADENCE     Gentamicin Susceptible     Oxacillin Resistant     Rifampin Susceptible     Vancomycin Susceptible                Susceptibility Comments     Staphylococcus aureus, MRSA    This isolate does not demonstrate inducible clindamycin resistance in vitro.               Respiratory Culture - Sputum, ET Suction [[id number]] Collected:  02/29/20 0850    Lab Status:  Final result Specimen:  Sputum from ET Suction Updated:  03/02/20 0902      Respiratory Culture No growth     Gram Stain Few (2+) Epithelial cells per low power field      Few (2+) WBCs seen      No organisms seen    Tissue / Bone Culture - Tissue, Groin, right [[id number]] Collected:  02/28/20 1417    Lab Status:  Final result Specimen:  Tissue from Groin, right Updated:  03/02/20 0809     Tissue Culture No growth at 3 days     Gram Stain Many (4+) WBCs seen      No organisms seen    Body Fluid Culture - Body Fluid, Pleural Cavity [[id number]] Collected:  02/28/20 1341    Lab Status:  Final result Specimen:  Body Fluid from Pleural Cavity Updated:  03/02/20 0808     Body Fluid Culture No growth at 3 days     Gram Stain Many (4+) WBCs seen      No organisms seen    Hardware / Foreign Body Culture - Hardware / Foreign Body, Groin, right [[id number]]  (Abnormal)  (Susceptibility) Collected:  02/28/20 1500    Lab Status:  Final result Specimen:  Hardware / Foreign Body from Groin, right Updated:  03/01/20 0645     Hardware / Foreign Body Culture Scant growth (1+) Staphylococcus aureus, MRSA     Comment:   Methicillin resistant Staphylococcus aureus, Patient may be an isolation risk.        Gram Stain Many (4+) WBCs seen      No organisms seen    Susceptibility      Staphylococcus aureus, MRSA     CADENCE     Clindamycin Susceptible     Erythromycin Resistant     Inducible Clindamycin Resistance Negative     Oxacillin Resistant     Penicillin G Resistant     Rifampin Susceptible     Tetracycline Susceptible     Trimethoprim + Sulfamethoxazole Susceptible     Vancomycin Susceptible                Susceptibility Comments     Staphylococcus aureus, MRSA    This isolate does not demonstrate inducible clindamycin resistance in vitro.               Blood Culture - Blood, Arm, Left [[id number]] Collected:  02/24/20 0057    Lab Status:  Final result Specimen:  Blood from Arm, Left Updated:  02/29/20 0545     Blood Culture No growth at 5 days    Blood Culture - Blood, Hand, Left [[id number]] Collected:   02/24/20 0057    Lab Status:  Final result Specimen:  Blood from Hand, Left Updated:  02/29/20 0545     Blood Culture No growth at 5 days    Wound Culture - Wound, Thigh, Left [[id number]]  (Abnormal)  (Susceptibility) Collected:  02/24/20 1849    Lab Status:  Final result Specimen:  Wound from Thigh, Left Updated:  02/27/20 0723     Wound Culture Light growth (2+) Staphylococcus aureus, MRSA     Comment: Methicillin resistant Staphylococcus aureus, Patient may be an isolation risk.        Gram Stain Few (2+) WBCs seen      Rare (1+) Gram positive cocci in pairs    Susceptibility      Staphylococcus aureus, MRSA     CADENCE     Clindamycin Susceptible     Erythromycin Resistant     Inducible Clindamycin Resistance Negative     Oxacillin Resistant     Penicillin G Resistant     Rifampin Susceptible     Tetracycline Susceptible     Trimethoprim + Sulfamethoxazole Susceptible     Vancomycin Susceptible                Susceptibility Comments     Staphylococcus aureus, MRSA    This isolate does not demonstrate inducible clindamycin resistance in vitro.                     Imaging Results (Last 24 Hours)     ** No results found for the last 24 hours. **          Results for orders placed during the hospital encounter of 02/23/20   Adult Transthoracic Echo Complete W/ Cont if Necessary Per Protocol    Narrative · Left ventricular systolic function is mildly decreased. Calculated EF =   45.0%. Estimated EF appears to be in the range of 46 - 50%  · Mild global hypokinesis no focal wall motion abnormalities  · Mild aortic valve sclerosis without stenosis.  · There is a trivial pericardial effusion. There is a large size left   pleural effusion          I have reviewed the medications:  Scheduled Meds:  amitriptyline 10 mg Oral Nightly   aspirin 325 mg Oral Daily   atorvastatin 10 mg Oral Daily   budesonide 0.5 mg Nebulization BID - RT   clopidogrel 75 mg Oral Daily   DAPTOmycin 8 mg/kg Intravenous Q24H   famotidine 20 mg Oral BID  AC   insulin detemir 15 Units Subcutaneous Q12H   insulin regular 0-14 Units Subcutaneous 4x Daily AC & at Bedtime   ketotifen 1 drop Both Eyes BID   lactobacillus acidophilus 1 capsule Oral Daily   losartan 50 mg Oral Daily   metoprolol tartrate 100 mg Oral Q12H   pantoprazole 40 mg Intravenous Q AM   pregabalin 150 mg Oral Q8H   rOPINIRole 4 mg Oral Nightly     Continuous Infusions:   PRN Meds:.•  acetaminophen **OR** acetaminophen **OR** acetaminophen  •  albuterol  •  dextrose  •  dextrose  •  glucagon (human recombinant)  •  guaiFENesin-dextromethorphan  •  hydrALAZINE  •  HYDROcodone-acetaminophen    Assessment/Plan   Assessment & Plan     Active Hospital Problems    Diagnosis  POA   • **Acute hypoxemic respiratory failure (CMS/HCC) [J96.01]  No   • Pleural effusion - bilateral mod/large pleural effusions on CT chest 2/27/2020.  [J90]  No   • Acute pulmonary edema (CMS/HCC) [J81.0]  No   • Sepsis (CMS/HCC) [A41.9]  Yes   • COPD (chronic obstructive pulmonary disease) (CMS/HCC) [J44.9]  Yes   • Coronary artery disease [I25.10]  Yes   • Hypertension [I10]  Yes   • Hyperlipidemia [E78.5]  Yes   • Diabetes mellitus (CMS/HCC) [E11.9]  Yes   • Current smoker [F17.200]  Yes   • PVD (peripheral vascular disease) (CMS/HCC) [I73.9]  Yes      Resolved Hospital Problems   No resolved problems to display.        Brief Hospital Course to date:  Saskia Groves is a 49 y.o. female admitted on 2/23 with history of diabetes, tobacco use, essential hypertension, peripheral artery disease with history of femorofemoral bypass on 1/15 but was readmitted with fever and wound infection of the left groin.  She was transferred to the ICU on 2/27 for pleural effusions and hypoxia.  She required chest tube on the right.  She had graft explant on 2/28.  Muscle flaps were performed by plastic surgery.  She had bilateral wound vacs in place.  She was extubated after several days postoperatively.  Infectious disease has followed and  assisted with management.  Wound infection grew MRSA and patient has been treated with daptomycin.    Plan/comments:  Plan discussed case with infectious disease regarding antibiotic plans and possible transition to outpatient.  Reportedly going to require 8 weeks of antibiotics until 4/20 per ID note.  Plastic surgery plans to remove wound VAC on the day of discharge and right MELISSA drain.  Left MELISSA drain likely to remain in place.  I am discussing with case management regarding the possibility of home antibiotics which is patient's newest request.  Dysphagia has improved and patient currently tolerating oral diet.  Elevated potassium level noted.  Plan to recheck today.  Glucose reviewed.  A1c very elevated.  Prandial insulin adjusted.  Recheck laboratory studies tomorrow.  Doing well off of tube feeding.  Continue respiratory medications.  Monitor for further hypoxia.    DVT Prophylaxis: Mechanical    Disposition: I expect the patient to be discharged home versus skilled when cleared by all services.    CODE STATUS:   Code Status and Medical Interventions:   Ordered at: 02/23/20 1534     Level Of Support Discussed With:    Patient     Code Status:    CPR     Medical Interventions (Level of Support Prior to Arrest):    Full         Electronically signed by Kip Auguste MD, 03/09/20, 12:00 PM.

## 2020-03-09 NOTE — PROGRESS NOTES
Discharge Planning Assessment  [facility]     Patient Name: Saskia Groves  MRN: [id number]  Today's Date: 3/9/2020    Admit Date: 2/23/2020    Discharge Needs Assessment    No documentation.       Discharge Plan     Row Name 03/09/20 1429       Plan    Plan  HOME with HH    Patient/Family in Agreement with Plan  yes    Plan Comments  Met with pt and daughter at bedside to f/u DCP.  Anticipate removal of bilat wound vacs and left MELISSA drain tomorrow with plan to return home with [facility] and [facility] for IV Dapto needs.  CM spoke with Mary Ann at [facility] who is checking the cost of the meds.  Also spoke with Neena at [facility] and notified her of the new referral with anticipated DC on Tues.  Will need final HH orders placed in [facility] once all needs are known.  CM will cont to follow.    Final Discharge Disposition Code  06 - home with home health care        Destination      Service Provider Request Status Selected Services Address Phone Number Fax Number    [facility] [address] [facility] [facility] [address] [phone]      Durable Medical Equipment      Coordination has not been started for this encounter.      Dialysis/Infusion      Coordination has not been started for this encounter.      Home Medical Care - Selection Complete      Service Provider Request Status Selected Services Address Phone Number Fax Number    [facility] Selected Home Health Services [address] [facility] [address] [phone]      Therapy      Coordination has not been started for this encounter.      Community Resources      Coordination has not been started for this encounter.        Expected Discharge Date and Time     Expected Discharge Date Expected Discharge Time    Mar 2, 2020         Demographic Summary    No documentation.       Functional Status    No documentation.       Psychosocial    No documentation.       Abuse/Neglect    No  documentation.       Legal    No documentation.       Substance Abuse    No documentation.       Patient Forms    No documentation.           Bria Pierce RN

## 2020-03-09 NOTE — PLAN OF CARE
Problem: Patient Care Overview  Goal: Plan of Care Review  Flowsheets  Taken 3/9/2020 0421  Progress: improving  Outcome Summary: VSS, patient rested well, pain better, No complaints, wound vac in place, MELISSA drainage decreased  Taken 3/8/2020 2000  Plan of Care Reviewed With: patient

## 2020-03-10 ENCOUNTER — APPOINTMENT (OUTPATIENT)
Dept: GENERAL RADIOLOGY | Facility: HOSPITAL | Age: 50
End: 2020-03-10

## 2020-03-10 LAB
ANION GAP SERPL CALCULATED.3IONS-SCNC: 8 MMOL/L (ref 5–15)
BUN BLD-MCNC: 42 MG/DL (ref 6–20)
BUN/CREAT SERPL: 45.7 (ref 7–25)
CALCIUM SPEC-SCNC: 8.5 MG/DL (ref 8.6–10.5)
CHLORIDE SERPL-SCNC: 105 MMOL/L (ref 98–107)
CO2 SERPL-SCNC: 26 MMOL/L (ref 22–29)
CREAT BLD-MCNC: 0.92 MG/DL (ref 0.57–1)
DEPRECATED RDW RBC AUTO: 49.1 FL (ref 37–54)
ERYTHROCYTE [DISTWIDTH] IN BLOOD BY AUTOMATED COUNT: 14.1 % (ref 12.3–15.4)
GFR SERPL CREATININE-BSD FRML MDRD: 65 ML/MIN/1.73
GLUCOSE BLD-MCNC: 190 MG/DL (ref 65–99)
GLUCOSE BLDC GLUCOMTR-MCNC: 140 MG/DL (ref 70–130)
GLUCOSE BLDC GLUCOMTR-MCNC: 184 MG/DL (ref 70–130)
GLUCOSE BLDC GLUCOMTR-MCNC: 65 MG/DL (ref 70–130)
GLUCOSE BLDC GLUCOMTR-MCNC: 70 MG/DL (ref 70–130)
GLUCOSE BLDC GLUCOMTR-MCNC: 79 MG/DL (ref 70–130)
HCT VFR BLD AUTO: 32.2 % (ref 34–46.6)
HGB BLD-MCNC: 10 G/DL (ref 12–15.9)
MCH RBC QN AUTO: 29.4 PG (ref 26.6–33)
MCHC RBC AUTO-ENTMCNC: 31.1 G/DL (ref 31.5–35.7)
MCV RBC AUTO: 94.7 FL (ref 79–97)
PLATELET # BLD AUTO: 188 10*3/MM3 (ref 140–450)
PMV BLD AUTO: 10.7 FL (ref 6–12)
POTASSIUM BLD-SCNC: 5.4 MMOL/L (ref 3.5–5.2)
RBC # BLD AUTO: 3.4 10*6/MM3 (ref 3.77–5.28)
SODIUM BLD-SCNC: 139 MMOL/L (ref 136–145)
WBC NRBC COR # BLD: 13.08 10*3/MM3 (ref 3.4–10.8)

## 2020-03-10 PROCEDURE — 71045 X-RAY EXAM CHEST 1 VIEW: CPT

## 2020-03-10 PROCEDURE — 94799 UNLISTED PULMONARY SVC/PX: CPT

## 2020-03-10 PROCEDURE — 63710000001 INSULIN DETEMIR PER 5 UNITS: Performed by: PHYSICIAN ASSISTANT

## 2020-03-10 PROCEDURE — 25010000002 DAPTOMYCIN PER 1 MG: Performed by: INTERNAL MEDICINE

## 2020-03-10 PROCEDURE — 82962 GLUCOSE BLOOD TEST: CPT

## 2020-03-10 PROCEDURE — 80048 BASIC METABOLIC PNL TOTAL CA: CPT | Performed by: INTERNAL MEDICINE

## 2020-03-10 PROCEDURE — 97605 NEG PRS WND THER DME<=50SQCM: CPT

## 2020-03-10 PROCEDURE — 99233 SBSQ HOSP IP/OBS HIGH 50: CPT | Performed by: INTERNAL MEDICINE

## 2020-03-10 PROCEDURE — 85027 COMPLETE CBC AUTOMATED: CPT | Performed by: INTERNAL MEDICINE

## 2020-03-10 RX ADMIN — HYDROCODONE BITARTRATE AND ACETAMINOPHEN 1 TABLET: 10; 325 TABLET ORAL at 10:45

## 2020-03-10 RX ADMIN — AMITRIPTYLINE HYDROCHLORIDE 10 MG: 10 TABLET, FILM COATED ORAL at 20:36

## 2020-03-10 RX ADMIN — PREGABALIN 150 MG: 75 CAPSULE ORAL at 05:54

## 2020-03-10 RX ADMIN — BUDESONIDE 0.5 MG: 0.5 INHALANT RESPIRATORY (INHALATION) at 21:33

## 2020-03-10 RX ADMIN — PREGABALIN 150 MG: 75 CAPSULE ORAL at 14:21

## 2020-03-10 RX ADMIN — INSULIN LISPRO 2 UNITS: 100 INJECTION, SOLUTION INTRAVENOUS; SUBCUTANEOUS at 08:32

## 2020-03-10 RX ADMIN — INSULIN LISPRO 3 UNITS: 100 INJECTION, SOLUTION INTRAVENOUS; SUBCUTANEOUS at 08:32

## 2020-03-10 RX ADMIN — INSULIN DETEMIR 15 UNITS: 100 INJECTION, SOLUTION SUBCUTANEOUS at 08:31

## 2020-03-10 RX ADMIN — FAMOTIDINE 20 MG: 20 TABLET ORAL at 05:54

## 2020-03-10 RX ADMIN — PREGABALIN 150 MG: 75 CAPSULE ORAL at 20:36

## 2020-03-10 RX ADMIN — PANTOPRAZOLE SODIUM 40 MG: 40 TABLET, DELAYED RELEASE ORAL at 05:54

## 2020-03-10 RX ADMIN — FAMOTIDINE 20 MG: 20 TABLET ORAL at 17:25

## 2020-03-10 RX ADMIN — CLOPIDOGREL BISULFATE 75 MG: 75 TABLET ORAL at 08:35

## 2020-03-10 RX ADMIN — ROPINIROLE HYDROCHLORIDE 4 MG: 2 TABLET, FILM COATED ORAL at 20:36

## 2020-03-10 RX ADMIN — ATORVASTATIN CALCIUM 10 MG: 10 TABLET, FILM COATED ORAL at 08:35

## 2020-03-10 RX ADMIN — Medication 1 CAPSULE: at 08:34

## 2020-03-10 RX ADMIN — BUDESONIDE 0.5 MG: 0.5 INHALANT RESPIRATORY (INHALATION) at 08:58

## 2020-03-10 RX ADMIN — INSULIN DETEMIR 15 UNITS: 100 INJECTION, SOLUTION SUBCUTANEOUS at 20:37

## 2020-03-10 RX ADMIN — ASPIRIN 325 MG ORAL TABLET 325 MG: 325 PILL ORAL at 08:35

## 2020-03-10 RX ADMIN — KETOTIFEN FUMARATE 1 DROP: 0.35 SOLUTION/ DROPS OPHTHALMIC at 08:37

## 2020-03-10 RX ADMIN — METOPROLOL TARTRATE 100 MG: 50 TABLET, FILM COATED ORAL at 08:35

## 2020-03-10 RX ADMIN — METOPROLOL TARTRATE 100 MG: 50 TABLET, FILM COATED ORAL at 20:36

## 2020-03-10 RX ADMIN — PHENOL 1 SPRAY: 1.5 LIQUID ORAL at 17:25

## 2020-03-10 RX ADMIN — KETOTIFEN FUMARATE 1 DROP: 0.35 SOLUTION/ DROPS OPHTHALMIC at 20:37

## 2020-03-10 RX ADMIN — DAPTOMYCIN 500 MG: 500 INJECTION, POWDER, LYOPHILIZED, FOR SOLUTION INTRAVENOUS at 05:53

## 2020-03-10 RX ADMIN — SODIUM CHLORIDE, PRESERVATIVE FREE 10 ML: 5 INJECTION INTRAVENOUS at 08:35

## 2020-03-10 RX ADMIN — HYDROCODONE BITARTRATE AND ACETAMINOPHEN 1 TABLET: 10; 325 TABLET ORAL at 20:36

## 2020-03-10 RX ADMIN — SODIUM CHLORIDE, PRESERVATIVE FREE 10 ML: 5 INJECTION INTRAVENOUS at 20:37

## 2020-03-10 NOTE — PLAN OF CARE
Problem: Patient Care Overview  Goal: Plan of Care Review  Outcome: Ongoing (interventions implemented as appropriate)  Flowsheets (Taken 3/10/2020 1631)  Progress: improving  Plan of Care Reviewed With: patient  Outcome Summary: VSPHOEBE FU. Bilateral thigh MELISSA drains d/c'd this am per Dr. Whittaker. Bilateral groin wound vacs are still in place. Pt receiving prn norco for pain. L PICC catheter slid out approx 6 cm during dressing change this afternoon, Dr. Melo notified, please see nursing note. Will continue to monitor.

## 2020-03-10 NOTE — PROGRESS NOTES
"INFECTIOUS DISEASE PROGRESS NOTE     Saskia Groves  1970  [id number]      Admission Date: 2/23/2020    Antibiotic therapy:   Daptomycin    Requesting Provider: Deandre Oseguera MD     Reason for Consultation: Left groin wound infection     History of present illness:  2/24/20:  Patient is a 49 y.o. female seen today for a left groin wound infection.  She underwent femoral endardectomy, and femorofemoral  Bypass with gortex graft on 1/15.  She has been having fevers 100 degrees and above since 1/17. She was seen last week by her PCP and given Bactrim but she couldn't tolerate it and Ceftin was called in. Her left groin continued to remain swollen and warm. She had a venous duplex to rule out DVT and then developed a \"knot\" of her left groin. She presented to the ED at [facility], was given Vancomycin and Merrem and transferred to [facility]. Tmax of 100.5 degrees without leukocytosis, normal lactate and PCT. She is currently on Vancomycin and Merrem and we were consulted for evaluation and treatment.   Labs at [facility] with WBC of 10.4, normal lactate, PCT.  She was tachycardic with tmax of 102.3.  She had fevers at home documented to over 103 degrees.  2/25/20 : She spontaneously drained a copious amount of slightly cloudy straw-colored fluid from her left groin yesterday and has continued to drain.  She complains of cough \"other than that I feel great\".  No n/v//d.  Left groin less tender.   2/26/20:  Graft to be removed on Friday.   She remains afebrile. She doesn't \"feel very good today\".  Family at bedside.   2/27/20:  Surgery scheduled for tomorrow.  She is tearful and afraid she will lose her leg.  She remains afebrile.    2/28/2020: She deteriorated this morning with hypoxemia and bilateral pulmonary opacifications with pleural effusions.  He had a fever to 101 degrees last night.  I discussed her complex situation with Dr. Deandre Oseguera early today and also this evening.  She was taken to surgery " today and her left femoral graft was removed.  A muscle flap was placed.  She is now endotracheally intubated and mechanically ventilated.  She underwent a thoracentesis revealing clear fluid.  Intravenous Merrem was added to her vancomycin this morning due to concerns about possible aspiration pneumonia.  She does have a history of penicillin allergy.  2/29/2020: She remains on ventilatory support.  She has only modest respiratory secretions.  She is still requiring an FiO2 of 60%.  She has been afebrile overnight.  She remains sedated.    Following for Dr. Parag Gordon:  3/1/20: Remains sedated on vent.  Tmax 99.3, FiO2 40% and PEEP 6.  She has a wound VAC on right ground with MELISSA drain with bloody drainage.  She has a right chest tube.  Small amount of clear to red-streaked ETT secretions. On tube feeding.   3/2  Extubated, afebrile, intermittently cooperative  Ongoing issues with hypertension; 90 from chest tube yesterday Day# 8 merrem (intermittent dosing);  vanc trough 38  3/3  Afebrile, reluctant to cooperate with attempts to mobilize, alert, 60 from CT yesterday, no new + cultures, random vanc 26  3/4  Alert, afebrile, more interactive  3/5 alert, afebrile, more interactive and joking with RN, stronger, failed SLP exam yesterday and remains on tube feeding. Having frequent stools and lactulose (which she took at home) has been stopped  3/6 alert, afebrile, chest tube out, feeding tube out after she passed SLP eval  3/9 alert, afebrile, walking in [employer]ell with walker, placement not feasible with her insurance according to daughter; per Dr Whittaker, the wound vac can be removed at discharge; WBC up to 16K now        Past Medical History:   Diagnosis Date   • Anxiety    • Arthritis    • Asthma    • Carotid artery stenosis    • Chronic bronchitis (CMS/HCC)    • COPD (chronic obstructive pulmonary disease) (CMS/HCC)    • Coronary artery disease     2 vessels with 50% blockage.  Sees Dr. Donaldson   • Depression    •  Diabetes mellitus (CMS/HCC)    • Dyslipidemia    • GERD (gastroesophageal reflux disease)    • Hiatal hernia    • Hyperlipidemia    • Hypertension    • Infectious viral hepatitis    • Lower back pain    • Migraine    • Multinodular goiter    • S/P thyroid biopsy     2008, 2013, 2015, and 07/15/2019 at [facility], right lobe nodules - all cytologies were benign   • Sleep apnea     can't tolerate the cpap mask   • Subclinical hyperthyroidism        Past Surgical History:   Procedure Laterality Date   • ANTERIOR CERVICAL DISCECTOMY W/ FUSION     • BACK SURGERY      lumbar   •  SECTION  19940    x 2    • CHOLECYSTECTOMY     • COLONOSCOPY     • ENDOSCOPY     • FEMORAL ARTERY CUTDOWN Bilateral 2020    Procedure: FEMORAL ARTERY CUTDOWN WITH REMOVAL OF FEMORAL FEMORAL BYPASS GRAFT BILATERAL;  Surgeon: Deandre Oseguera MD;  Location:  [facility];  Service: Vascular;  Laterality: Bilateral;   • FEMORAL ENDARTERECTOMY Bilateral 1/15/2020    Procedure: FEMORAL ENDARTERECTOMY BILATERAL;  Surgeon: Deandre Oseguera MD;  Location:  [facility];  Service: Vascular   • FEMORAL FEMORAL BYPASS Right 1/15/2020    Procedure: FEMORAL FEMORAL BYPASS;  Surgeon: Deandre Oseguera MD;  Location:  [facility];  Service: Vascular   • HYSTERECTOMY     • KNEE SURGERY Left    • LEG EXCISION LESION/CYST Left 2020    Procedure: GROIN MUSCLE FLAP BILATERAL;  Surgeon: Modesto Whittaker MD;  Location:  [facility];  Service: Plastics;  Laterality: Left;   • WRIST SURGERY Left        Family History   Problem Relation Age of Onset   • Diabetes Mother    • Hypertension Mother    • Arthritis Mother    • Hyperlipidemia Mother    • Migraines Mother    • Thyroid disease Mother    • Hypertension Father    • Lung cancer Father    • Cancer Father    • Stroke Other    • Migraines Maternal Aunt    • Thyroid disease Maternal Aunt    • Heart attack Maternal Aunt    • Osteoporosis Maternal Aunt    • Cancer Maternal Uncle    • Heart attack Maternal  Uncle    • Stroke Maternal Uncle    • Hyperlipidemia Maternal Grandmother    • Cancer Maternal Grandmother    • Obesity Maternal Grandmother    • Thyroid disease Daughter    • Obesity Daughter        Social History     Socioeconomic History   • Marital status:      Spouse name: Not on file   • Number of children: 2   • Years of education: Not on file   • Highest education level: Not on file   Occupational History   • 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       Allergies   Allergen Reactions   • Levaquin [Levofloxacin] Nausea And Vomiting   • Penicillins Nausea And Vomiting     Has tolerated cefepime 2/2020   • Codeine Nausea Only   • Flagyl [Metronidazole] Nausea And Vomiting         Medication:    Current Facility-Administered Medications:   •  acetaminophen (TYLENOL) tablet 650 mg, 650 mg, Oral, Q4H PRN, 650 mg at 03/07/20 2217 **OR** acetaminophen (TYLENOL) 160 MG/5ML solution 650 mg, 650 mg, Oral, Q4H PRN, 650 mg at 03/04/20 1226 **OR** acetaminophen (TYLENOL) suppository 650 mg, 650 mg, Rectal, Q4H PRN, Isael Bailey PA, 650 mg at 02/28/20 0429  •  albuterol (PROVENTIL) nebulizer solution 0.083% 2.5 mg/3mL, 2.5 mg, Nebulization, Q6H PRN, Isael Bailey PA  •  amitriptyline (ELAVIL) tablet 10 mg, 10 mg, Oral, Nightly, Isael Bailey PA, 10 mg at 03/09/20 2215  •  aspirin tablet 325 mg, 325 mg, Oral, Daily, Isael Bailey PA, 325 mg at 03/10/20 0835  •  atorvastatin (LIPITOR) tablet 10 mg, 10 mg, Oral, Daily, Isael Bailey PA, 10 mg at 03/10/20 0835  •  budesonide  (PULMICORT) nebulizer solution 0.5 mg, 0.5 mg, Nebulization, BID - RT, Isael Bailey PA, 0.5 mg at 03/10/20 0858  •  clopidogrel (PLAVIX) tablet 75 mg, 75 mg, Oral, Daily, Isael Bailey PA, 75 mg at 03/10/20 0835  •  DAPTOmycin (CUBICIN) 500 mg/50 mL in sodium chloride, 8 mg/kg, Intravenous, Q24H, Collette Melo MD, 500 mg at 03/10/20 0553  •  dextrose (D50W) 25 g/ 50mL Intravenous Solution 25 g, 25 g, Intravenous, Q15 Min PRN, Isael Bailey PA, 25 g at 03/07/20 1828  •  dextrose (GLUTOSE) oral gel 15 g, 15 g, Oral, Q15 Min PRN, Isael Bailey PA  •  famotidine (PEPCID) tablet 20 mg, 20 mg, Oral, BID AC, Isael Bailey PA, 20 mg at 03/10/20 0554  •  glucagon (human recombinant) (GLUCAGEN DIAGNOSTIC) injection 1 mg, 1 mg, Subcutaneous, Q15 Min PRN, Isael Bailey PA  •  glucagon (human recombinant) (GLUCAGEN DIAGNOSTIC) injection 1 mg, 1 mg, Subcutaneous, Q15 Min PRN, Kip Auguste MD  •  guaiFENesin-dextromethorphan (ROBITUSSIN DM) 100-10 MG/5ML syrup 5 mL, 5 mL, Oral, Q6H PRN, Isael Bailey PA, 5 mL at 02/27/20 0920  •  hydrALAZINE (APRESOLINE) injection 10 mg, 10 mg, Intravenous, Q6H PRN, Isael Bailey PA, 10 mg at 03/04/20 0628  •  HYDROcodone-acetaminophen (NORCO)  MG per tablet 1 tablet, 1 tablet, Oral, Q8H PRN, Isael Bailey PA, 1 tablet at 03/10/20 1045  •  insulin detemir (LEVEMIR) injection 15 Units, 15 Units, Subcutaneous, Q12H, Isael Bailey PA, 15 Units at 03/10/20 0831  •  insulin lispro (humaLOG) injection 0-9 Units, 0-9 Units, Subcutaneous, 4x Daily With Meals & Nightly, Kip Auguste MD, 2 Units at 03/10/20 0832  •  insulin lispro (humaLOG) injection 3 Units, 3 Units, Subcutaneous, TID With Meals, Kip Auguste MD, 3 Units at 03/10/20 0832  •  ketotifen (ZADITOR) 0.025 % ophthalmic solution 1 drop, 1 drop, Both Eyes, BID, Isael Bailey PA, 1 drop at 03/10/20 0837  •  lactobacillus acidophilus  (RISAQUAD) capsule 1 capsule, 1 capsule, Oral, Daily, Isael Bailey PA, 1 capsule at 03/10/20 0834  •  metoprolol tartrate (LOPRESSOR) tablet 100 mg, 100 mg, Oral, Q12H, Isael Bailey PA, 100 mg at 03/10/20 0835  •  pantoprazole (PROTONIX) EC tablet 40 mg, 40 mg, Oral, Q AM, Therese Hollis, [facility], 40 mg at 03/10/20 0554  •  phenol (CHLORASEPTIC) 1.4 % liquid 1 spray, 1 spray, Mouth/Throat, Q2H PRN, Kip Auguste MD  •  pregabalin (LYRICA) capsule 150 mg, 150 mg, Oral, Q8H, Isael Bailey PA, 150 mg at 03/10/20 1421  •  rOPINIRole (REQUIP) tablet 4 mg, 4 mg, Oral, Nightly, Isael Bailey PA, 4 mg at 03/09/20 2217  •  sodium chloride 0.9 % flush 10 mL, 10 mL, Intravenous, Q12H, Collette Melo MD, 10 mL at 03/10/20 0835  •  sodium chloride 0.9 % flush 10 mL, 10 mL, Intravenous, PRN, Collette Melo MD  •  sodium chloride 0.9 % flush 20 mL, 20 mL, Intravenous, PRN, Collette Melo MD    Antibiotics:  Anti-Infectives (From admission, onward)    Ordered     Dose/Rate Route Frequency Start Stop    03/03/20 1942  DAPTOmycin (CUBICIN) 500 mg/50 mL in sodium chloride     Therese Hollis, [facility] reviewed the order on 03/09/20 0852.   Ordering Provider:  Collette Melo MD    8 mg/kg × 60.3 kg  over 30 Minutes Intravenous Every 24 Hours 03/04/20 0600 04/20/20 2359    02/28/20 0746  meropenem (MERREM) 1 g/100 mL 0.9% NS VTB (mbp)     Ordering Provider:  Parag Gordon MD    1 g  over 30 Minutes Intravenous Once 02/28/20 0845 02/28/20 0921    02/24/20 0831  cefepime (MAXIPIME) 2 g/100 mL 0.9% NS (mbp)     Ordering Provider:  Ranjana Easley APRN    2 g  200 mL/hr over 30 Minutes Intravenous Once 02/24/20 0930 02/24/20 1157    02/24/20 0203  vancomycin 500 mg/100 mL 0.9% NS IVPB (mbp)     Ordering Provider:  Ced Kenny RPH    500 mg  100 mL/hr over 60 Minutes Intravenous Once 02/24/20 0300 02/24/20 0511                Physical Exam:   Vital Signs  Temp (24hrs),  Av.3 °F (36.8 °C), Min:98 °F (36.7 °C), Max:98.5 °F (36.9 °C)    Temp  Min: 98 °F (36.7 °C)  Max: 98.5 °F (36.9 °C)  BP  Min: 129/99  Max: 139/81  Pulse  Min: 92  Max: 108  Resp  Min: 16  Max: 18  SpO2  Min: 94 %  Max: 96 %    GENERAL: extubated,sitting in chair, transferring with minimal assistance  HEENT: Normocephalic, atraumatic.  No conjunctival injection. No icterus.   HEART: RRR; No murmur, rubs, gallops.   LUNGS: diminished at lung bases bilaterally   ABDOMEN: Soft, nontender, nondistended. Positive bowel sounds. No rebound or guarding.   MSK:  No joint effusions   SKIN: Warm and dry without cutaneous eruptions on Inspection/palpation.   NEURO: sedated  Left groin with a wound VAC in place MELISSA drains in place with bloody drainage.        Laboratory Data    Results from last 7 days   Lab Units 03/10/20  0506 20  0321 20  0418   WBC 10*3/mm3 13.08* 16.57* 8.40   HEMOGLOBIN g/dL 10.0* 10.5* 10.4*   HEMATOCRIT % 32.2* 33.7* 34.0   PLATELETS 10*3/mm3 188 181 189     Results from last 7 days   Lab Units 03/10/20  0506   SODIUM mmol/L 139   POTASSIUM mmol/L 5.4*   CHLORIDE mmol/L 105   CO2 mmol/L 26.0   BUN mg/dL 42*   CREATININE mg/dL 0.92   GLUCOSE mg/dL 190*   CALCIUM mg/dL 8.5*     Results from last 7 days   Lab Units 20  0321   ALK PHOS U/L 138*   BILIRUBIN mg/dL 0.2   ALT (SGPT) U/L 23   AST (SGOT) U/L 27         Results from last 7 days   Lab Units 20  1349   CRP mg/dL 1.11*         Results from last 7 days   Lab Units 20  0321 20  0418 20  0344   CK TOTAL U/L 29 30 29     Results from last 7 days   Lab Units 20  0341   VANCOMYCIN RM mcg/mL 13.80     Estimated Creatinine Clearance: 65.7 mL/min (by C-G formula based on SCr of 0.92 mg/dL).      Microbiology:  : BCx NG    : wound few WBCs, GPC in pairs -- MRSA   2020: Operative cultures are growing MRSA  2020: Sputum culture is NGSF      Radiology:  Imaging Results (Last 72 Hours)      Procedure Component Value Units Date/Time    XR Chest 1 View [[id number]] Resulted:  03/10/20 1416     Updated:  03/10/20 1417    XR Chest PA & Lateral [[id number]] Collected:  03/10/20 0922     Updated:  03/10/20 1242    Narrative:       EXAMINATION: XR CHEST PA AND LATERAL- 03/09/2020     INDICATION: pneumonia/effusions; I73.9-Peripheral vascular disease,  unspecified; V79-Nvxjcod effusion, not elsewhere classified;  Z95.828-Presence of other vascular implants and grafts      COMPARISON: 03/05/2020     FINDINGS: PA and lateral views of the chest reveal cardiac and  mediastinal silhouettes within normal limits. PICC line catheter  identified bilaterally with both tips in the SVC. There is improvement  seen in aeration of the right midlung. The pulmonary vascularity remains  increased. Mild increased markings seen in the lung bases bilaterally.            Impression:       Improvement seen in aeration of the right midlung with  increased markings seen at the lung bases bilaterally. PICC line  catheter on the left tip in the SVC. Prominence seen in the pulmonary  vascularity bilaterally.        D:  03/10/2020  E:  03/10/2020           I read her radiographic studies    Impression:   1.  MRSA left groin abscess/cellulitis/graft infection-status post graft removal 2/28.  She will require a prolonged course of intravenous antibiotic therapy directed toward MRSA.  Now on daptomycin- lft's and cpk normal  2.  Bilateral pulmonary infiltrates/pleural effusions- clinically improved  3.  Leukocytosis- etiology unclear  4.  S/p femorofemoral bypass with gortex graft, 1/15  5.  Type 2 Diabetes Mellitus  6.  Penicillin allergy  7.  Type 2 diabetes mellitus-with very poor control  8.  Malnutrition    PLAN/RECOMMENDATIONS:   1.  Continue  daptomycin to 4/20 to complete 8 weeks therapy  3.  Pa and lat cxr  4.  probiotic  5   Replace picc with single lumen      Will discuss with JOY Melo,  "MD  3/10/2020  15:42              INFECTIOUS DISEASE PROGRESS NOTE     Saskia Groves  1970  [id number]      Admission Date: 2/23/2020    Antibiotic therapy:   Daptomycin    Requesting Provider: Deandre Oseguera MD     Reason for Consultation: Left groin wound infection     History of present illness:  2/24/20:  Patient is a 49 y.o. female seen today for a left groin wound infection.  She underwent femoral endardectomy, and femorofemoral  Bypass with gortex graft on 1/15.  She has been having fevers 100 degrees and above since 1/17. She was seen last week by her PCP and given Bactrim but she couldn't tolerate it and Ceftin was called in. Her left groin continued to remain swollen and warm. She had a venous duplex to rule out DVT and then developed a \"knot\" of her left groin. She presented to the ED at [facility], was given Vancomycin and Merrem and transferred to [facility]. Tmax of 100.5 degrees without leukocytosis, normal lactate and PCT. She is currently on Vancomycin and Merrem and we were consulted for evaluation and treatment.   Labs at [facility] with WBC of 10.4, normal lactate, PCT.  She was tachycardic with tmax of 102.3.  She had fevers at home documented to over 103 degrees.  2/25/20 : She spontaneously drained a copious amount of slightly cloudy straw-colored fluid from her left groin yesterday and has continued to drain.  She complains of cough \"other than that I feel great\".  No n/v//d.  Left groin less tender.   2/26/20:  Graft to be removed on Friday.   She remains afebrile. She doesn't \"feel very good today\".  Family at bedside.   2/27/20:  Surgery scheduled for tomorrow.  She is tearful and afraid she will lose her leg.  She remains afebrile.    2/28/2020: She deteriorated this morning with hypoxemia and bilateral pulmonary opacifications with pleural effusions.  He had a fever to 101 degrees last night.  I discussed her complex situation with Dr. Deandre Oseguera early today and also this " evening.  She was taken to surgery today and her left femoral graft was removed.  A muscle flap was placed.  She is now endotracheally intubated and mechanically ventilated.  She underwent a thoracentesis revealing clear fluid.  Intravenous Merrem was added to her vancomycin this morning due to concerns about possible aspiration pneumonia.  She does have a history of penicillin allergy.  2/29/2020: She remains on ventilatory support.  She has only modest respiratory secretions.  She is still requiring an FiO2 of 60%.  She has been afebrile overnight.  She remains sedated.    Following for Dr. Parag Gordon:  3/1/20: Remains sedated on vent.  Tmax 99.3, FiO2 40% and PEEP 6.  She has a wound VAC on right ground with MELISSA drain with bloody drainage.  She has a right chest tube.  Small amount of clear to red-streaked ETT secretions. On tube feeding.   3/2  Extubated, afebrile, intermittently cooperative  Ongoing issues with hypertension; 90 from chest tube yesterday Day# 8 merrem (intermittent dosing);  vanc trough 38  3/3  Afebrile, reluctant to cooperate with attempts to mobilize, alert, 60 from CT yesterday, no new + cultures, random vanc 26  3/4  Alert, afebrile, more interactive  3/5 alert, afebrile, more interactive and joking with RN, stronger, failed SLP exam yesterday and remains on tube feeding. Having frequent stools and lactulose (which she took at home) has been stopped  3/6 alert, afebrile, chest tube out, feeding tube out after she passed SLP eval  3/9 alert, afebrile, walking in [employer] with walker, placement not feasible with her insurance according to daughter; per Dr Whittaker, the wound vac can be removed at discharge; WBC up to 16K now  3/10 afebrile, wbc down to 13K   Dr Whittaker's concerns noted and more attempts at placement are underway; picc replaced; family not at bedside        Past Medical History:   Diagnosis Date   • Anxiety    • Arthritis    • Asthma    • Carotid artery stenosis    • Chronic  bronchitis (CMS/HCC)    • COPD (chronic obstructive pulmonary disease) (CMS/HCC)    • Coronary artery disease     2 vessels with 50% blockage.  Sees Dr. Donaldson   • Depression    • Diabetes mellitus (CMS/HCC)    • Dyslipidemia    • GERD (gastroesophageal reflux disease)    • Hiatal hernia    • Hyperlipidemia    • Hypertension    • Infectious viral hepatitis    • Lower back pain    • Migraine    • Multinodular goiter    • S/P thyroid biopsy     2008, 2013, 2015, and 07/15/2019 at [facility], right lobe nodules - all cytologies were benign   • Sleep apnea     can't tolerate the cpap mask   • Subclinical hyperthyroidism        Past Surgical History:   Procedure Laterality Date   • ANTERIOR CERVICAL DISCECTOMY W/ FUSION     • BACK SURGERY      lumbar   •  SECTION  19940    x 2    • CHOLECYSTECTOMY     • COLONOSCOPY     • ENDOSCOPY     • FEMORAL ARTERY CUTDOWN Bilateral 2020    Procedure: FEMORAL ARTERY CUTDOWN WITH REMOVAL OF FEMORAL FEMORAL BYPASS GRAFT BILATERAL;  Surgeon: Deandre Oseguera MD;  Location:  [facility];  Service: Vascular;  Laterality: Bilateral;   • FEMORAL ENDARTERECTOMY Bilateral 1/15/2020    Procedure: FEMORAL ENDARTERECTOMY BILATERAL;  Surgeon: Deandre Oseguera MD;  Location:  [facility];  Service: Vascular   • FEMORAL FEMORAL BYPASS Right 1/15/2020    Procedure: FEMORAL FEMORAL BYPASS;  Surgeon: Deandre Oseguera MD;  Location:  [facility];  Service: Vascular   • HYSTERECTOMY     • KNEE SURGERY Left    • LEG EXCISION LESION/CYST Left 2020    Procedure: GROIN MUSCLE FLAP BILATERAL;  Surgeon: Modesto Whittaker MD;  Location:  [facility];  Service: Plastics;  Laterality: Left;   • WRIST SURGERY Left        Family History   Problem Relation Age of Onset   • Diabetes Mother    • Hypertension Mother    • Arthritis Mother    • Hyperlipidemia Mother    • Migraines Mother    • Thyroid disease Mother    • Hypertension Father    • Lung cancer Father    • Cancer Father    • Stroke Other    •  Migraines Maternal Aunt    • Thyroid disease Maternal Aunt    • Heart attack Maternal Aunt    • Osteoporosis Maternal Aunt    • Cancer Maternal Uncle    • Heart attack Maternal Uncle    • Stroke Maternal Uncle    • Hyperlipidemia Maternal Grandmother    • Cancer Maternal Grandmother    • Obesity Maternal Grandmother    • Thyroid disease Daughter    • Obesity Daughter        Social History     Socioeconomic History   • Marital status:      Spouse name: Not on file   • Number of children: 2   • Years of education: Not on file   • Highest education level: Not on file   Occupational History   • 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     Employer: DISABLED     Comment: Back and Leg Pain   Tobacco Use   • Smoking status: Current Every Day Smoker     Packs/day: 1.00     Years: 35.00     Pack years: 35.00     Types: Cigarettes   • Smokeless tobacco: Never Used   Substance and Sexual Activity   • Alcohol use: Not Currently   • Drug use: No   • Sexual activity: Yes     Partners: Male     Comment:    Social History Narrative    Lives in [facility]       Allergies   Allergen Reactions   • Levaquin [Levofloxacin] Nausea And Vomiting   • Penicillins Nausea And Vomiting     Has tolerated cefepime 2/2020   • Codeine Nausea Only   • Flagyl [Metronidazole] Nausea And Vomiting         Medication:    Current Facility-Administered Medications:   •  acetaminophen (TYLENOL) tablet 650 mg, 650 mg, Oral, Q4H PRN, 650 mg at 03/07/20 2217 **OR** acetaminophen (TYLENOL) 160 MG/5ML solution 650 mg, 650 mg, Oral, Q4H PRN, 650 mg at 03/04/20 1226 **OR** acetaminophen (TYLENOL) suppository 650 mg, 650 mg, Rectal, Q4H PRN, Isael Bailey PA, 650 mg at 02/28/20 0429  •  albuterol (PROVENTIL) nebulizer solution 0.083% 2.5 mg/3mL, 2.5 mg, Nebulization, Q6H PRN, Isael Bailey PA  •  amitriptyline (ELAVIL) tablet 10 mg, 10 mg, Oral, Nightly, Isael Bailey PA, 10 mg at 03/09/20 2215  •  aspirin tablet 325 mg, 325 mg, Oral,  Daily, Isael Bailey PA, 325 mg at 03/10/20 0835  •  atorvastatin (LIPITOR) tablet 10 mg, 10 mg, Oral, Daily, Isael Bailey PA, 10 mg at 03/10/20 0835  •  budesonide (PULMICORT) nebulizer solution 0.5 mg, 0.5 mg, Nebulization, BID - RT, Isael Bailey PA, 0.5 mg at 03/10/20 0858  •  clopidogrel (PLAVIX) tablet 75 mg, 75 mg, Oral, Daily, Isael Bailey PA, 75 mg at 03/10/20 0835  •  DAPTOmycin (CUBICIN) 500 mg/50 mL in sodium chloride, 8 mg/kg, Intravenous, Q24H, Collette Melo MD, 500 mg at 03/10/20 0553  •  dextrose (D50W) 25 g/ 50mL Intravenous Solution 25 g, 25 g, Intravenous, Q15 Min PRN, Isael Bailey PA, 25 g at 03/07/20 1828  •  dextrose (GLUTOSE) oral gel 15 g, 15 g, Oral, Q15 Min PRN, Isael Bailey PA  •  famotidine (PEPCID) tablet 20 mg, 20 mg, Oral, BID AC, Isael Bailey PA, 20 mg at 03/10/20 0554  •  glucagon (human recombinant) (GLUCAGEN DIAGNOSTIC) injection 1 mg, 1 mg, Subcutaneous, Q15 Min PRN, Isael Bailey PA  •  glucagon (human recombinant) (GLUCAGEN DIAGNOSTIC) injection 1 mg, 1 mg, Subcutaneous, Q15 Min PRN, Kip Auguste MD  •  guaiFENesin-dextromethorphan (ROBITUSSIN DM) 100-10 MG/5ML syrup 5 mL, 5 mL, Oral, Q6H PRN, Isael Bailey PA, 5 mL at 02/27/20 0920  •  hydrALAZINE (APRESOLINE) injection 10 mg, 10 mg, Intravenous, Q6H PRN, Isael Bailey PA, 10 mg at 03/04/20 0628  •  HYDROcodone-acetaminophen (NORCO)  MG per tablet 1 tablet, 1 tablet, Oral, Q8H PRN, Isael Bailey PA, 1 tablet at 03/10/20 1045  •  insulin detemir (LEVEMIR) injection 15 Units, 15 Units, Subcutaneous, Q12H, Isael Bailey PA, 15 Units at 03/10/20 0831  •  insulin lispro (humaLOG) injection 0-9 Units, 0-9 Units, Subcutaneous, 4x Daily With Meals & Nightly, Kip Auguste MD, 2 Units at 03/10/20 0832  •  insulin lispro (humaLOG) injection 3 Units, 3 Units, Subcutaneous, TID With Meals, Kip Auguste MD, 3 Units at  03/10/20 0832  •  ketotifen (ZADITOR) 0.025 % ophthalmic solution 1 drop, 1 drop, Both Eyes, BID, Isael Bailey PA, 1 drop at 03/10/20 0837  •  lactobacillus acidophilus (RISAQUAD) capsule 1 capsule, 1 capsule, Oral, Daily, Isael Bailey PA, 1 capsule at 03/10/20 0834  •  metoprolol tartrate (LOPRESSOR) tablet 100 mg, 100 mg, Oral, Q12H, Isael Bailey PA, 100 mg at 03/10/20 0835  •  pantoprazole (PROTONIX) EC tablet 40 mg, 40 mg, Oral, Q AM, Therese Hollis, [facility], 40 mg at 03/10/20 0554  •  phenol (CHLORASEPTIC) 1.4 % liquid 1 spray, 1 spray, Mouth/Throat, Q2H PRN, Kip Auguste MD  •  pregabalin (LYRICA) capsule 150 mg, 150 mg, Oral, Q8H, Isael Bailey PA, 150 mg at 03/10/20 1421  •  rOPINIRole (REQUIP) tablet 4 mg, 4 mg, Oral, Nightly, Isael Bailey PA, 4 mg at 03/09/20 2217  •  sodium chloride 0.9 % flush 10 mL, 10 mL, Intravenous, Q12H, Collette Melo MD, 10 mL at 03/10/20 0835  •  sodium chloride 0.9 % flush 10 mL, 10 mL, Intravenous, PRN, Collette Melo MD  •  sodium chloride 0.9 % flush 20 mL, 20 mL, Intravenous, PRN, Collette Melo MD    Antibiotics:  Anti-Infectives (From admission, onward)    Ordered     Dose/Rate Route Frequency Start Stop    03/03/20 1942  DAPTOmycin (CUBICIN) 500 mg/50 mL in sodium chloride     Therese Hollis, [facility] reviewed the order on 03/09/20 0852.   Ordering Provider:  Collette Melo MD    8 mg/kg × 60.3 kg  over 30 Minutes Intravenous Every 24 Hours 03/04/20 0600 04/20/20 7461    02/28/20 0746  meropenem (MERREM) 1 g/100 mL 0.9% NS VTB (mbp)     Ordering Provider:  Parag Gordon MD    1 g  over 30 Minutes Intravenous Once 02/28/20 0845 02/28/20 0921    02/24/20 0831  cefepime (MAXIPIME) 2 g/100 mL 0.9% NS (mbp)     Ordering Provider:  Ranjana Easley APRN    2 g  200 mL/hr over 30 Minutes Intravenous Once 02/24/20 0930 02/24/20 1157    02/24/20 0203  vancomycin 500 mg/100 mL 0.9% NS IVPB (mbp)     Ordering  Provider:  Ced Kenny, RPH    500 mg  100 mL/hr over 60 Minutes Intravenous Once 20 0300 20 0511                Physical Exam:   Vital Signs  Temp (24hrs), Av.3 °F (36.8 °C), Min:98 °F (36.7 °C), Max:98.5 °F (36.9 °C)    Temp  Min: 98 °F (36.7 °C)  Max: 98.5 °F (36.9 °C)  BP  Min: 129/99  Max: 139/81  Pulse  Min: 92  Max: 108  Resp  Min: 16  Max: 18  SpO2  Min: 94 %  Max: 96 %    GENERAL: extubated,sitting in chair, transferring with minimal assistance  HEENT: Normocephalic, atraumatic.  No conjunctival injection. No icterus.   HEART: RRR; No murmur, rubs, gallops.   LUNGS: diminished at lung bases bilaterally   ABDOMEN: Soft, nontender, nondistended. Positive bowel sounds. No rebound or guarding.   MSK:  No joint effusions   SKIN: Warm and dry without cutaneous eruptions on Inspection/palpation.   NEURO: sedated  Left groin with a wound VAC in place MELISSA drains in place with bloody drainage.        Laboratory Data    Results from last 7 days   Lab Units 03/10/20  0506 20  03220  0418   WBC 10*3/mm3 13.08* 16.57* 8.40   HEMOGLOBIN g/dL 10.0* 10.5* 10.4*   HEMATOCRIT % 32.2* 33.7* 34.0   PLATELETS 10*3/mm3 188 181 189     Results from last 7 days   Lab Units 03/10/20  0506   SODIUM mmol/L 139   POTASSIUM mmol/L 5.4*   CHLORIDE mmol/L 105   CO2 mmol/L 26.0   BUN mg/dL 42*   CREATININE mg/dL 0.92   GLUCOSE mg/dL 190*   CALCIUM mg/dL 8.5*     Results from last 7 days   Lab Units 20  0321   ALK PHOS U/L 138*   BILIRUBIN mg/dL 0.2   ALT (SGPT) U/L 23   AST (SGOT) U/L 27         Results from last 7 days   Lab Units 20  1349   CRP mg/dL 1.11*         Results from last 7 days   Lab Units 20  0321 20  0418 20  0344   CK TOTAL U/L 29 30 29     Results from last 7 days   Lab Units 20  0341   VANCOMYCIN RM mcg/mL 13.80     Estimated Creatinine Clearance: 65.7 mL/min (by C-G formula based on SCr of 0.92 mg/dL).      Microbiology:  : BCx NG    : wound  few WBCs, GPC in pairs -- MRSA   2/28/2020: Operative cultures are growing MRSA  2/29/2020: Sputum culture is NGSF      Radiology:  Imaging Results (Last 72 Hours)     Procedure Component Value Units Date/Time    XR Chest 1 View [[id number]] Resulted:  03/10/20 1416     Updated:  03/10/20 1417    XR Chest PA & Lateral [[id number]] Collected:  03/10/20 0922     Updated:  03/10/20 1242    Narrative:       EXAMINATION: XR CHEST PA AND LATERAL- 03/09/2020     INDICATION: pneumonia/effusions; I73.9-Peripheral vascular disease,  unspecified; D74-Qytnxdv effusion, not elsewhere classified;  Z95.828-Presence of other vascular implants and grafts      COMPARISON: 03/05/2020     FINDINGS: PA and lateral views of the chest reveal cardiac and  mediastinal silhouettes within normal limits. PICC line catheter  identified bilaterally with both tips in the SVC. There is improvement  seen in aeration of the right midlung. The pulmonary vascularity remains  increased. Mild increased markings seen in the lung bases bilaterally.            Impression:       Improvement seen in aeration of the right midlung with  increased markings seen at the lung bases bilaterally. PICC line  catheter on the left tip in the SVC. Prominence seen in the pulmonary  vascularity bilaterally.        D:  03/10/2020  E:  03/10/2020           I read her radiographic studies    Impression:   1.  MRSA left groin abscess/cellulitis/graft infection-status post graft removal 2/28.  She will require a prolonged course of intravenous antibiotic therapy directed toward MRSA.  Now on daptomycin- lft's and cpk normal  2.  Bilateral pulmonary infiltrates/pleural effusions- clinically improved  3.  Leukocytosis- etiology unclear  4.  S/p femorofemoral bypass with gortex graft, 1/15  5.  Type 2 Diabetes Mellitus  6.  Penicillin allergy  7.  Type 2 diabetes mellitus-with very poor control  8.  Malnutrition    PLAN/RECOMMENDATIONS:   1.  Continue  daptomycin to 4/20 to  complete 8 weeks therapy  3.  Pa and lat cxr  4.  probiotic  5   Replace picc with single lumen      Will discuss with CM        Collette Melo MD  3/10/2020  15:42

## 2020-03-10 NOTE — PROGRESS NOTES
Continued Stay Note  [facility]     Patient Name: Saskia Groves  MRN: [id number]  Today's Date: 3/10/2020    Admit Date: 2/23/2020    Discharge Plan     Row Name 03/10/20 0922       Plan    Plan Comments  CM spoke with pt who now reports she feels she may need inpt rehab for IV antibioitcs and wound care at discharge. She has agreed to updated clinicals being sent to [facility]. CM to follow.        Discharge Codes    No documentation.       Expected Discharge Date and Time     Expected Discharge Date Expected Discharge Time    Mar 2, 2020             Toyin Dixon RN

## 2020-03-10 NOTE — PROGRESS NOTES
"Saskia Groves  [id number]  1970     LOS: 16 days   Patient Care Team:  Meagan Barlow APRN as PCP - General (Family Medicine)  Tyson Donaldson DO as Consulting Physician (Cardiology)    Chief Complaint: Infected femoral-femoral bypass      Subjective: Resting comfortably    Objective:     Vital Sign Min/Max for last 24 hours  Temp  Min: 98.3 °F (36.8 °C)  Max: 98.5 °F (36.9 °C)   BP  Min: 129/99  Max: 159/91   Pulse  Min: 92  Max: 108   Resp  Min: 16  Max: 18   SpO2  Min: 94 %  Max: 96 %   No data recorded   No data recorded     Flowsheet Rows      First Filed Value   Admission Height  160 cm (62.99\") Documented at 02/23/2020 2240   Admission Weight  60.4 kg (133 lb 1.6 oz) Documented at 02/23/2020 2240          Physical Exam:    Wound: Satisfactory    Pulses:     Mediastinal and Chest Tube Drainage:       Results Review:   Results from last 7 days   Lab Units 03/10/20  0506   WBC 10*3/mm3 13.08*   HEMOGLOBIN g/dL 10.0*   HEMATOCRIT % 32.2*   PLATELETS 10*3/mm3 188     Results from last 7 days   Lab Units 03/10/20  0506   SODIUM mmol/L 139   POTASSIUM mmol/L 5.4*   CHLORIDE mmol/L 105   CO2 mmol/L 26.0   BUN mg/dL 42*   CREATININE mg/dL 0.92   GLUCOSE mg/dL 190*   CALCIUM mg/dL 8.5*             Assessment      Acute hypoxemic respiratory failure (CMS/HCC)    PVD (peripheral vascular disease) (CMS/HCC)    Current smoker    COPD (chronic obstructive pulmonary disease) (CMS/HCC)    Coronary artery disease    Hypertension    Hyperlipidemia    Diabetes mellitus (CMS/HCC)    Sepsis (CMS/HCC)    Pleural effusion - bilateral mod/large pleural effusions on CT chest 2/27/2020.     Acute pulmonary edema (CMS/HCC)      Discharge when all agree        Deandre Oseguera MD  03/10/20  6:50 AM      Please note that portions of this note were completed with a voice recognition program. Efforts were made to edit the dictations, but words may be mistranscribed  "

## 2020-03-10 NOTE — PROGRESS NOTES
Plastic Surgery Progress Note      Admission Date:  2020  LOS:  16  Patient Care Team:  Meagan Barlow APRN as PCP - General (Family Medicine)  Tyson Donaldson DO as Consulting Physician (Cardiology)    Patient Name:  Saskia Groves  :  1970  MRN:  [id number]    Date:  3/10/2020    Subjective:  No acute events overnight, WBC trending down, MELISSA drains low output      History:   Past Medical History:   Diagnosis Date   • Anxiety    • Arthritis    • Asthma    • Carotid artery stenosis    • Chronic bronchitis (CMS/HCC)    • COPD (chronic obstructive pulmonary disease) (CMS/HCC)    • Coronary artery disease     2 vessels with 50% blockage.  Sees Dr. Donaldson   • Depression    • Diabetes mellitus (CMS/HCC)    • Dyslipidemia    • GERD (gastroesophageal reflux disease)    • Hiatal hernia    • Hyperlipidemia    • Hypertension    • Infectious viral hepatitis    • Lower back pain    • Migraine    • Multinodular goiter    • S/P thyroid biopsy     2008, 2013, 2015, and 07/15/2019 at [facility], right lobe nodules - all cytologies were benign   • Sleep apnea     can't tolerate the cpap mask   • Subclinical hyperthyroidism      Past Surgical History:   Procedure Laterality Date   • ANTERIOR CERVICAL DISCECTOMY W/ FUSION     • BACK SURGERY      lumbar   •  SECTION  19940    x 2    • CHOLECYSTECTOMY     • COLONOSCOPY     • ENDOSCOPY     • FEMORAL ARTERY CUTDOWN Bilateral 2020    Procedure: FEMORAL ARTERY CUTDOWN WITH REMOVAL OF FEMORAL FEMORAL BYPASS GRAFT BILATERAL;  Surgeon: Deandre Oseguera MD;  Location:  [facility];  Service: Vascular;  Laterality: Bilateral;   • FEMORAL ENDARTERECTOMY Bilateral 1/15/2020    Procedure: FEMORAL ENDARTERECTOMY BILATERAL;  Surgeon: Deandre Oseguera MD;  Location:  [facility];  Service: Vascular   • FEMORAL FEMORAL BYPASS Right 1/15/2020    Procedure: FEMORAL FEMORAL BYPASS;  Surgeon: Deandre Oseguera MD;  Location:  [facility];  Service: Vascular   •  HYSTERECTOMY     • KNEE SURGERY Left    • LEG EXCISION LESION/CYST Left 2/28/2020    Procedure: GROIN MUSCLE FLAP BILATERAL;  Surgeon: Modesto Whittaker MD;  [facility];  Service: Plastics;  Laterality: Left;   • WRIST SURGERY Left      Family History   Problem Relation Age of Onset   • Diabetes Mother    • Hypertension Mother    • Arthritis Mother    • Hyperlipidemia Mother    • Migraines Mother    • Thyroid disease Mother    • Hypertension Father    • Lung cancer Father    • Cancer Father    • Stroke Other    • Migraines Maternal Aunt    • Thyroid disease Maternal Aunt    • Heart attack Maternal Aunt    • Osteoporosis Maternal Aunt    • Cancer Maternal Uncle    • Heart attack Maternal Uncle    • Stroke Maternal Uncle    • Hyperlipidemia Maternal Grandmother    • Cancer Maternal Grandmother    • Obesity Maternal Grandmother    • Thyroid disease Daughter    • Obesity Daughter      Social History     Socioeconomic History   • Marital status:      Spouse name: Not on file   • Number of children: 2   • Years of education: Not on file   • Highest education level: Not on file   Occupational History   • Occupation: [employer] Work     Employer: DISABLED     Comment: Back and Leg Pain   Tobacco Use   • Smoking status: Current Every Day Smoker     Packs/day: 1.00     Years: 35.00     Pack years: 35.00     Types: Cigarettes   • Smokeless tobacco: Never Used   Substance and Sexual Activity   • Alcohol use: Not Currently   • Drug use: No   • Sexual activity: Yes     Partners: Male     Comment:    Social History Narrative    Lives in [place], [place] alone     Allergies   Allergen Reactions   • Levaquin [Levofloxacin] Nausea And Vomiting   • Penicillins Nausea And Vomiting     Has tolerated cefepime 2/2020   • Codeine Nausea Only   • Flagyl [Metronidazole] Nausea And Vomiting       Medication:    Current Facility-Administered Medications:   •  acetaminophen (TYLENOL) tablet 650 mg, 650 mg, Oral, Q4H PRN, 650  mg at 03/07/20 2217 **OR** acetaminophen (TYLENOL) 160 MG/5ML solution 650 mg, 650 mg, Oral, Q4H PRN, 650 mg at 03/04/20 1226 **OR** acetaminophen (TYLENOL) suppository 650 mg, 650 mg, Rectal, Q4H PRN, Isael Bailey PA, 650 mg at 02/28/20 0429  •  albuterol (PROVENTIL) nebulizer solution 0.083% 2.5 mg/3mL, 2.5 mg, Nebulization, Q6H PRN, Isael Bailey PA  •  amitriptyline (ELAVIL) tablet 10 mg, 10 mg, Oral, Nightly, Isael Bailey PA, 10 mg at 03/09/20 2215  •  aspirin tablet 325 mg, 325 mg, Oral, Daily, Isael Bailey PA, 325 mg at 03/10/20 0835  •  atorvastatin (LIPITOR) tablet 10 mg, 10 mg, Oral, Daily, Isael Bailey PA, 10 mg at 03/10/20 0835  •  budesonide (PULMICORT) nebulizer solution 0.5 mg, 0.5 mg, Nebulization, BID - RT, Isael Bailey PA, 0.5 mg at 03/09/20 1908  •  clopidogrel (PLAVIX) tablet 75 mg, 75 mg, Oral, Daily, Isael Baiely PA, 75 mg at 03/10/20 0835  •  DAPTOmycin (CUBICIN) 500 mg/50 mL in sodium chloride, 8 mg/kg, Intravenous, Q24H, Collette Melo MD, 500 mg at 03/10/20 0553  •  dextrose (D50W) 25 g/ 50mL Intravenous Solution 25 g, 25 g, Intravenous, Q15 Min PRN, Isael Bailey PA, 25 g at 03/07/20 1828  •  dextrose (GLUTOSE) oral gel 15 g, 15 g, Oral, Q15 Min PRN, Isael Bailey PA  •  famotidine (PEPCID) tablet 20 mg, 20 mg, Oral, BID AC, Isael Bailey PA, 20 mg at 03/10/20 0554  •  glucagon (human recombinant) (GLUCAGEN DIAGNOSTIC) injection 1 mg, 1 mg, Subcutaneous, Q15 Min PRN, Isael Bailey PA  •  glucagon (human recombinant) (GLUCAGEN DIAGNOSTIC) injection 1 mg, 1 mg, Subcutaneous, Q15 Min PRN, Kip Auguste MD  •  guaiFENesin-dextromethorphan (ROBITUSSIN DM) 100-10 MG/5ML syrup 5 mL, 5 mL, Oral, Q6H PRN, Isael Bailey PA, 5 mL at 02/27/20 0920  •  hydrALAZINE (APRESOLINE) injection 10 mg, 10 mg, Intravenous, Q6H PRN, Isael Bailey PA, 10 mg at 03/04/20 0628  •  HYDROcodone-acetaminophen (NORCO)   MG per tablet 1 tablet, 1 tablet, Oral, Q8H PRN, Isael Bailey PA, 1 tablet at 03/09/20 2215  •  insulin detemir (LEVEMIR) injection 15 Units, 15 Units, Subcutaneous, Q12H, Isael Bailey PA, 15 Units at 03/10/20 0831  •  insulin lispro (humaLOG) injection 0-9 Units, 0-9 Units, Subcutaneous, 4x Daily With Meals & Nightly, Kip Auguste MD, 2 Units at 03/10/20 0832  •  insulin lispro (humaLOG) injection 3 Units, 3 Units, Subcutaneous, TID With Meals, Kip Auguste MD, 3 Units at 03/10/20 0832  •  ketotifen (ZADITOR) 0.025 % ophthalmic solution 1 drop, 1 drop, Both Eyes, BID, Isael Bailey PA, 1 drop at 03/10/20 0837  •  lactobacillus acidophilus (RISAQUAD) capsule 1 capsule, 1 capsule, Oral, Daily, Isael Bailey PA, 1 capsule at 03/10/20 0834  •  metoprolol tartrate (LOPRESSOR) tablet 100 mg, 100 mg, Oral, Q12H, Isael Bailey PA, 100 mg at 03/10/20 0835  •  pantoprazole (PROTONIX) EC tablet 40 mg, 40 mg, Oral, Q AM, Therese Hollis, RPH, 40 mg at 03/10/20 0554  •  pregabalin (LYRICA) capsule 150 mg, 150 mg, Oral, Q8H, Isael Bailey PA, 150 mg at 03/10/20 0554  •  rOPINIRole (REQUIP) tablet 4 mg, 4 mg, Oral, Nightly, Isael Bailey PA, 4 mg at 03/09/20 2217  •  sodium chloride 0.9 % flush 10 mL, 10 mL, Intravenous, Q12H, Collette Melo MD, 10 mL at 03/10/20 0835  •  sodium chloride 0.9 % flush 10 mL, 10 mL, Intravenous, PRN, Collette Melo MD  •  sodium chloride 0.9 % flush 20 mL, 20 mL, Intravenous, PRN, Collette Melo MD    Antibiotics:  Anti-Infectives (From admission, onward)    Ordered     Dose/Rate Route Frequency Start Stop    03/03/20 1942  DAPTOmycin (CUBICIN) 500 mg/50 mL in sodium chloride     Therese Hollis, [facility] reviewed the order on 03/09/20 0852.   Ordering Provider:  Collette Melo MD    8 mg/kg × 60.3 kg  over 30 Minutes Intravenous Every 24 Hours 03/04/20 0600 04/20/20 5952    02/28/20 7160  meropenem  (MERREM) 1 g/100 mL 0.9% NS VTB (mbp)     Ordering Provider:  Parag Gordon MD    1 g  over 30 Minutes Intravenous Once 20 0845 20 0921    20 0831  cefepime (MAXIPIME) 2 g/100 mL 0.9% NS (mbp)     Ordering Provider:  Ranjana Easley APRN    2 g  200 mL/hr over 30 Minutes Intravenous Once 20 0930 20 1157    20 0203  vancomycin 500 mg/100 mL 0.9% NS IVPB (mbp)     Ordering Provider:  Ced Kenny RPH    500 mg  100 mL/hr over 60 Minutes Intravenous Once 20 0300 20 0511            Objective:    Physical Exam:   Vital Signs:  Temp (24hrs), Av.3 °F (36.8 °C), Min:98 °F (36.7 °C), Max:98.5 °F (36.9 °C)    Temp  Min: 98 °F (36.7 °C)  Max: 98.5 °F (36.9 °C)  BP  Min: 129/99  Max: 139/81  Pulse  Min: 92  Max: 108  Resp  Min: 16  Max: 18  SpO2  Min: 94 %  Max: 96 %       GENERAL: Awake and alert, in no acute distress. extubated  HEART: RRR;   LUNGS: Nonlabored. No dullness. intubated  ABDOMEN: Soft, nontender, nondistended. No rebound or guarding. NO mass or HSM.  EXT:  No cyanosis, clubbing or edema. No cord.  : Benavidez catheter in place.  SKIN: Warm and dry without cutaneous eruptions on Inspection/palpation.    NEURO: Oriented to PPT. No focal deficits on motor/sensory exam at arms/legs.  PSYCHIATRIC: Normal insight and judgement. Cooperative with PE  Bilateral Groins: incisional wound vac in place to suction, drains appropriate output, no seroma or hematoma on exam, soft, appropriate tenderness to palpation, no ecchymosis       Laboratory Data:  Results from last 7 days   Lab Units 03/10/20  0506 20  0321 20  0418   WBC 10*3/mm3 13.08* 16.57* 8.40   HEMOGLOBIN g/dL 10.0* 10.5* 10.4*   HEMATOCRIT % 32.2* 33.7* 34.0   PLATELETS 10*3/mm3 188 181 189     Results from last 7 days   Lab Units 03/10/20  0506   SODIUM mmol/L 139   POTASSIUM mmol/L 5.4*   CHLORIDE mmol/L 105   CO2 mmol/L 26.0   BUN mg/dL 42*   CREATININE mg/dL 0.92   GLUCOSE mg/dL 190*    CALCIUM mg/dL 8.5*     Results from last 7 days   Lab Units 03/09/20  0321   ALK PHOS U/L 138*   BILIRUBIN mg/dL 0.2   ALT (SGPT) U/L 23   AST (SGOT) U/L 27         Results from last 7 days   Lab Units 03/07/20  1349   CRP mg/dL 1.11*         Results from last 7 days   Lab Units 03/09/20  0321 03/06/20  0418 03/05/20  0344   CK TOTAL U/L 29 30 29     Results from last 7 days   Lab Units 03/04/20  0341   VANCOMYCIN RM mcg/mL 13.80     Estimated Creatinine Clearance: 65.7 mL/min (by C-G formula based on SCr of 0.92 mg/dL).    Microbiology:  No results found for: ACANTHNAEG, AFBCX, BPERTUSSISCX, BLOODCX  No results found for: BCIDPCR, CXREFLEX, CSFCX, CULTURETIS  No results found for: CULTURES, HSVCX, URCX  No results found for: EYECULTURE, GCCX, LABHSV  No results found for: LEGIONELLA, MRSACX, MUMPSCX, MYCOPLASCX  No results found for: NOCARDIACX, STOOLCX  No results found for: THROATCX, UNSTIMCULT, URINECX, CULTURE, VZVCULTUR  No results found for: VIRALCULTU, WOUNDCX         Assessment: 49yoF s/p 12/28/20 diagnostic fiberoptic bronchoscopy, Rtchest tube placement, removal of femoral-femoral bypass, bilateral CFA pericardial patches, bilateral Sartorious muscle flaps for groin graft coverage.         Plan:  - no acute surgery at this point in time, incisional wound vac in place to suction, no seroma or hematoma on PE, Lower extremity perfused, concerning about the elevated WBC source which is now trending down.  - Discussed with patient the importance of going to a Rehab facility for strengthening, IV abx management, glucose control, and nicotine cessation would increase her chances of not having a complication and keeping her legs. Will try to talk with family later today about going to a facility for theses things.     - continue abx per ID  - continue incisional wound vac, will discontinue tomorrow  - dc MELISSA drains today, change dressings to sites BID and as needed to keep dry  - may sit up in chair, may stand,  patient may walk, per plastic surgery  - disposition, Patient may leave from a plastic surgery standpoint tomorrow if the WBC continues to trend down, please notify Dr. Modesto Whittaker prior to discharge.   - follow up with Dr. Whittaker in 1 week following discharge                 Modesto Whittaker MD  03/10/20  08:54

## 2020-03-10 NOTE — PLAN OF CARE
Problem: Patient Care Overview  Goal: Plan of Care Review  Outcome: Ongoing (interventions implemented as appropriate)  Flowsheets (Taken 3/10/2020 7529)  Plan of Care Reviewed With: patient  Outcome Summary: B groin wound vacs intact with no issues noted. Pt with possible d/c tomorrow and MD requesting removal of wound vac prior to d/c

## 2020-03-10 NOTE — PAYOR COMM NOTE
"Sade Cantor RN  Utilization Review  P: [phone]  F: [phone]    Ref # [id number]  Updated clinicals for continued stay      Saskia Arteaga (49 y.o. Female)     Date of Birth Social Security Number Address Home Phone MRN    1970  [address] [place] [address] [phone] [id number]    Anabaptist Marital Status          None        Admission Date Admission Type Admitting Provider Attending Provider Department, Room/Bed    2/23/20 Urgent Kip Auguste MD Furlow, Stephen Matthew, MD [address], S[address]/1    Discharge Date Discharge Disposition Discharge Destination                       Attending Provider:  Kip Auguste MD    Allergies:  Levaquin [Levofloxacin], Penicillins, Codeine, Flagyl [Metronidazole]    Isolation:  None   Infection:  MRSA (02/26/20)   Code Status:  CPR    Ht:  160 cm (62.99\")   Wt:  63.6 kg (140 lb 4.8 oz)    Admission Cmt:  None   Principal Problem:  Acute hypoxemic respiratory failure (CMS/HCC) [J96.01]                 Active Insurance as of 2/23/2020     Primary Coverage     Payor Plan Insurance Group Employer/Plan Group    [employer] [place] [employer] [place]ICA[place]      Payor Plan Address Payor Plan Phone Number Payor Plan Fax Number Effective Dates    [address] [address] [phone]  1/6/2020 - None Entered    [facility] [id number]       Subscriber Name Subscriber Birth Date Member ID       SASKIA ARTEAGA 1970 [id number]                 Emergency Contacts      (Rel.) Home Phone Work Phone Mobile Phone    Thuan Arteaga (Spouse) [phone] -- --               Physician Progress Notes (last 24 hours) (Notes from 03/09/20 1048 through 03/10/20 1048)      Modesto Whittaker MD at 03/10/20 0853            Plastic Surgery Progress Note      Admission Date:  2/23/2020  LOS:  16  Patient Care Team:  Meagan Barlow APRN as PCP - General (Family Medicine)  Tyson Donaldson DO as Consulting Physician (Cardiology)    Patient " Name:  Saskia Groves  :  1970  MRN:  [id number]    Date:  3/10/2020    Subjective:  No acute events overnight, WBC trending down, MELISSA drains low output      History:   Past Medical History:   Diagnosis Date   • Anxiety    • Arthritis    • Asthma    • Carotid artery stenosis    • Chronic bronchitis (CMS/HCC)    • COPD (chronic obstructive pulmonary disease) (CMS/HCC)    • Coronary artery disease     2 vessels with 50% blockage.  Sees Dr. Donaldson   • Depression    • Diabetes mellitus (CMS/HCC)    • Dyslipidemia    • GERD (gastroesophageal reflux disease)    • Hiatal hernia    • Hyperlipidemia    • Hypertension    • Infectious viral hepatitis    • Lower back pain    • Migraine    • Multinodular goiter    • S/P thyroid biopsy     2008, 2013, 2015, and 07/15/2019 at [facility], right lobe nodules - all cytologies were benign   • Sleep apnea     can't tolerate the cpap mask   • Subclinical hyperthyroidism      Past Surgical History:   Procedure Laterality Date   • ANTERIOR CERVICAL DISCECTOMY W/ FUSION     • BACK SURGERY      lumbar   •  SECTION  19940    x 2    • CHOLECYSTECTOMY     • COLONOSCOPY     • ENDOSCOPY     • FEMORAL ARTERY CUTDOWN Bilateral 2020    Procedure: FEMORAL ARTERY CUTDOWN WITH REMOVAL OF FEMORAL FEMORAL BYPASS GRAFT BILATERAL;  Surgeon: Deandre Oseguera MD;  Location:  [facility];  Service: Vascular;  Laterality: Bilateral;   • FEMORAL ENDARTERECTOMY Bilateral 1/15/2020    Procedure: FEMORAL ENDARTERECTOMY BILATERAL;  Surgeon: Deandre Oseguera MD;  Location:  [facility];  Service: Vascular   • FEMORAL FEMORAL BYPASS Right 1/15/2020    Procedure: FEMORAL FEMORAL BYPASS;  Surgeon: Deandre Oseguera MD;  Location:  [facility];  Service: Vascular   • HYSTERECTOMY     • KNEE SURGERY Left    • LEG EXCISION LESION/CYST Left 2020    Procedure: GROIN MUSCLE FLAP BILATERAL;  Surgeon: Modesto Whittaker MD;  Location:  [facility];  Service: Plastics;  Laterality: Left;   • WRIST  SURGERY Left      Family History   Problem Relation Age of Onset   • Diabetes Mother    • Hypertension Mother    • Arthritis Mother    • Hyperlipidemia Mother    • Migraines Mother    • Thyroid disease Mother    • Hypertension Father    • Lung cancer Father    • Cancer Father    • Stroke Other    • Migraines Maternal Aunt    • Thyroid disease Maternal Aunt    • Heart attack Maternal Aunt    • Osteoporosis Maternal Aunt    • Cancer Maternal Uncle    • Heart attack Maternal Uncle    • Stroke Maternal Uncle    • Hyperlipidemia Maternal Grandmother    • Cancer Maternal Grandmother    • Obesity Maternal Grandmother    • Thyroid disease Daughter    • Obesity Daughter      Social History     Socioeconomic History   • Marital status:      Spouse name: Not on file   • Number of children: 2   • Years of education: Not on file   • Highest education level: Not on file   Occupational History   • Occupation: [employer] Work     Employer: DISABLED     Comment: Back and Leg Pain   Tobacco Use   • Smoking status: Current Every Day Smoker     Packs/day: 1.00     Years: 35.00     Pack years: 35.00     Types: Cigarettes   • Smokeless tobacco: Never Used   Substance and Sexual Activity   • Alcohol use: Not Currently   • Drug use: No   • Sexual activity: Yes     Partners: Male     Comment:    Social History Narrative    Lives in [place], [place] alone     Allergies   Allergen Reactions   • Levaquin [Levofloxacin] Nausea And Vomiting   • Penicillins Nausea And Vomiting     Has tolerated cefepime 2/2020   • Codeine Nausea Only   • Flagyl [Metronidazole] Nausea And Vomiting       Medication:    Current Facility-Administered Medications:   •  acetaminophen (TYLENOL) tablet 650 mg, 650 mg, Oral, Q4H PRN, 650 mg at 03/07/20 2217 **OR** acetaminophen (TYLENOL) 160 MG/5ML solution 650 mg, 650 mg, Oral, Q4H PRN, 650 mg at 03/04/20 1226 **OR** acetaminophen (TYLENOL) suppository 650 mg, 650 mg, Rectal, Q4H PRN, Isael Bailey PA,  650 mg at 02/28/20 0429  •  albuterol (PROVENTIL) nebulizer solution 0.083% 2.5 mg/3mL, 2.5 mg, Nebulization, Q6H PRN, Isael Bailey PA  •  amitriptyline (ELAVIL) tablet 10 mg, 10 mg, Oral, Nightly, Isael Bailey PA, 10 mg at 03/09/20 2215  •  aspirin tablet 325 mg, 325 mg, Oral, Daily, Isael Bailey PA, 325 mg at 03/10/20 0835  •  atorvastatin (LIPITOR) tablet 10 mg, 10 mg, Oral, Daily, Isael Bailey PA, 10 mg at 03/10/20 0835  •  budesonide (PULMICORT) nebulizer solution 0.5 mg, 0.5 mg, Nebulization, BID - RT, Isael Bailey PA, 0.5 mg at 03/09/20 1908  •  clopidogrel (PLAVIX) tablet 75 mg, 75 mg, Oral, Daily, Isael Bailey PA, 75 mg at 03/10/20 0835  •  DAPTOmycin (CUBICIN) 500 mg/50 mL in sodium chloride, 8 mg/kg, Intravenous, Q24H, Collette Melo MD, 500 mg at 03/10/20 0553  •  dextrose (D50W) 25 g/ 50mL Intravenous Solution 25 g, 25 g, Intravenous, Q15 Min PRN, Isael Bailey PA, 25 g at 03/07/20 1828  •  dextrose (GLUTOSE) oral gel 15 g, 15 g, Oral, Q15 Min PRN, Isael Bailey PA  •  famotidine (PEPCID) tablet 20 mg, 20 mg, Oral, BID AC, Isael Bailey PA, 20 mg at 03/10/20 0554  •  glucagon (human recombinant) (GLUCAGEN DIAGNOSTIC) injection 1 mg, 1 mg, Subcutaneous, Q15 Min PRN, Isael Bailey PA  •  glucagon (human recombinant) (GLUCAGEN DIAGNOSTIC) injection 1 mg, 1 mg, Subcutaneous, Q15 Min PRN, Kip Auguste MD  •  guaiFENesin-dextromethorphan (ROBITUSSIN DM) 100-10 MG/5ML syrup 5 mL, 5 mL, Oral, Q6H PRN, Isael Bailey PA, 5 mL at 02/27/20 0920  •  hydrALAZINE (APRESOLINE) injection 10 mg, 10 mg, Intravenous, Q6H PRN, Isael Bailey PA, 10 mg at 03/04/20 0628  •  HYDROcodone-acetaminophen (NORCO)  MG per tablet 1 tablet, 1 tablet, Oral, Q8H PRN, Isael Bailey PA, 1 tablet at 03/09/20 2215  •  insulin detemir (LEVEMIR) injection 15 Units, 15 Units, Subcutaneous, Q12H, Isael Bailey PA, 15 Units at  03/10/20 0831  •  insulin lispro (humaLOG) injection 0-9 Units, 0-9 Units, Subcutaneous, 4x Daily With Meals & Nightly, Kip Auguste MD, 2 Units at 03/10/20 0832  •  insulin lispro (humaLOG) injection 3 Units, 3 Units, Subcutaneous, TID With Meals, Kip Auguste MD, 3 Units at 03/10/20 0832  •  ketotifen (ZADITOR) 0.025 % ophthalmic solution 1 drop, 1 drop, Both Eyes, BID, Isael Bailey PA, 1 drop at 03/10/20 0837  •  lactobacillus acidophilus (RISAQUAD) capsule 1 capsule, 1 capsule, Oral, Daily, Isael Bailey PA, 1 capsule at 03/10/20 0834  •  metoprolol tartrate (LOPRESSOR) tablet 100 mg, 100 mg, Oral, Q12H, Isael Bailey PA, 100 mg at 03/10/20 0835  •  pantoprazole (PROTONIX) EC tablet 40 mg, 40 mg, Oral, Q AM, Therese Hollis, [facility], 40 mg at 03/10/20 0554  •  pregabalin (LYRICA) capsule 150 mg, 150 mg, Oral, Q8H, Isael Bailey PA, 150 mg at 03/10/20 0554  •  rOPINIRole (REQUIP) tablet 4 mg, 4 mg, Oral, Nightly, Isael Bailey PA, 4 mg at 03/09/20 2217  •  sodium chloride 0.9 % flush 10 mL, 10 mL, Intravenous, Q12H, Collette Melo MD, 10 mL at 03/10/20 0835  •  sodium chloride 0.9 % flush 10 mL, 10 mL, Intravenous, PRN, Collette Melo MD  •  sodium chloride 0.9 % flush 20 mL, 20 mL, Intravenous, PRN, Collette Melo MD    Antibiotics:  Anti-Infectives (From admission, onward)    Ordered     Dose/Rate Route Frequency Start Stop    03/03/20 1942  DAPTOmycin (CUBICIN) 500 mg/50 mL in sodium chloride     Tehrese Hollis, [facility] reviewed the order on 03/09/20 0852.   Ordering Provider:  Collette Melo MD    8 mg/kg × 60.3 kg  over 30 Minutes Intravenous Every 24 Hours 03/04/20 0600 04/20/20 2359    02/28/20 0746  meropenem (MERREM) 1 g/100 mL 0.9% NS VTB (mbp)     Ordering Provider:  Parag Gordon MD    1 g  over 30 Minutes Intravenous Once 02/28/20 0845 02/28/20 0921    02/24/20 0831  cefepime (MAXIPIME) 2 g/100 mL 0.9% NS (mbp)      Ordering Provider:  Ranjana Easley APRN    2 g  200 mL/hr over 30 Minutes Intravenous Once 20 0930 20 1157    20 0203  vancomycin 500 mg/100 mL 0.9% NS IVPB (mbp)     Ordering Provider:  Ced KennyRAINAH    500 mg  100 mL/hr over 60 Minutes Intravenous Once 20 0300 20 0511            Objective:    Physical Exam:   Vital Signs:  Temp (24hrs), Av.3 °F (36.8 °C), Min:98 °F (36.7 °C), Max:98.5 °F (36.9 °C)    Temp  Min: 98 °F (36.7 °C)  Max: 98.5 °F (36.9 °C)  BP  Min: 129/99  Max: 139/81  Pulse  Min: 92  Max: 108  Resp  Min: 16  Max: 18  SpO2  Min: 94 %  Max: 96 %       GENERAL: Awake and alert, in no acute distress. extubated  HEART: RRR;   LUNGS: Nonlabored. No dullness. intubated  ABDOMEN: Soft, nontender, nondistended. No rebound or guarding. NO mass or HSM.  EXT:  No cyanosis, clubbing or edema. No cord.  : Benavidez catheter in place.  SKIN: Warm and dry without cutaneous eruptions on Inspection/palpation.    NEURO: Oriented to PPT. No focal deficits on motor/sensory exam at arms/legs.  PSYCHIATRIC: Normal insight and judgement. Cooperative with PE  Bilateral Groins: incisional wound vac in place to suction, drains appropriate output, no seroma or hematoma on exam, soft, appropriate tenderness to palpation, no ecchymosis       Laboratory Data:  Results from last 7 days   Lab Units 03/10/20  0506 20  0321 20  0418   WBC 10*3/mm3 13.08* 16.57* 8.40   HEMOGLOBIN g/dL 10.0* 10.5* 10.4*   HEMATOCRIT % 32.2* 33.7* 34.0   PLATELETS 10*3/mm3 188 181 189     Results from last 7 days   Lab Units 03/10/20  0506   SODIUM mmol/L 139   POTASSIUM mmol/L 5.4*   CHLORIDE mmol/L 105   CO2 mmol/L 26.0   BUN mg/dL 42*   CREATININE mg/dL 0.92   GLUCOSE mg/dL 190*   CALCIUM mg/dL 8.5*     Results from last 7 days   Lab Units 20  0321   ALK PHOS U/L 138*   BILIRUBIN mg/dL 0.2   ALT (SGPT) U/L 23   AST (SGOT) U/L 27         Results from last 7 days   Lab Units 20  1341    CRP mg/dL 1.11*         Results from last 7 days   Lab Units 03/09/20  0321 03/06/20  0418 03/05/20  0344   CK TOTAL U/L 29 30 29     Results from last 7 days   Lab Units 03/04/20  0341   VANCOMYCIN RM mcg/mL 13.80     Estimated Creatinine Clearance: 65.7 mL/min (by C-G formula based on SCr of 0.92 mg/dL).    Microbiology:  No results found for: ACANTHNAEG, AFBCX, BPERTUSSISCX, BLOODCX  No results found for: BCIDPCR, CXREFLEX, CSFCX, CULTURETIS  No results found for: CULTURES, HSVCX, URCX  No results found for: EYECULTURE, GCCX, LABHSV  No results found for: LEGIONELLA, MRSACX, MUMPSCX, MYCOPLASCX  No results found for: NOCARDIACX, STOOLCX  No results found for: THROATCX, UNSTIMCULT, URINECX, CULTURE, VZVCULTUR  No results found for: VIRALCULTU, WOUNDCX         Assessment: 49yoF s/p 12/28/20 diagnostic fiberoptic bronchoscopy, Rtchest tube placement, removal of femoral-femoral bypass, bilateral CFA pericardial patches, bilateral Sartorious muscle flaps for groin graft coverage.         Plan:  - no acute surgery at this point in time, incisional wound vac in place to suction, no seroma or hematoma on PE, Lower extremity perfused, concerning about the elevated WBC source which is now trending down.  - Discussed with patient the importance of going to a Rehab facility for strengthening, IV abx management, glucose control, and nicotine cessation would increase her chances of not having a complication and keeping her legs. Will try to talk with family later today about going to a facility for theses things.     - continue abx per ID  - continue incisional wound vac, will discontinue tomorrow  - dc MELISSA drains today, change dressings to sites BID and as needed to keep dry  - may sit up in chair, may stand, patient may walk, per plastic surgery  - disposition, Patient may leave from a plastic surgery standpoint tomorrow if the WBC continues to trend down, please notify Dr. Modesto Whittaker prior to discharge.   - follow up with  "Dr. Whittaker in 1 week following discharge                 Modesto Whittaker MD  03/10/20  08:54        Electronically signed by Modesto Whittaker MD at 03/10/20 0905     Deandre Oseguera MD at 03/10/20 0650          Saskia Groves  [id number]  1970     LOS: 16 days   Patient Care Team:  Meagan Barlow APRN as PCP - General (Family Medicine)  Tyson Donaldson DO as Consulting Physician (Cardiology)    Chief Complaint: Infected femoral-femoral bypass      Subjective: Resting comfortably    Objective:     Vital Sign Min/Max for last 24 hours  Temp  Min: 98.3 °F (36.8 °C)  Max: 98.5 °F (36.9 °C)   BP  Min: 129/99  Max: 159/91   Pulse  Min: 92  Max: 108   Resp  Min: 16  Max: 18   SpO2  Min: 94 %  Max: 96 %   No data recorded   No data recorded     Flowsheet Rows      First Filed Value   Admission Height  160 cm (62.99\") Documented at 02/23/2020 2240   Admission Weight  60.4 kg (133 lb 1.6 oz) Documented at 02/23/2020 2240          Physical Exam:    Wound: Satisfactory    Pulses:     Mediastinal and Chest Tube Drainage:       Results Review:   Results from last 7 days   Lab Units 03/10/20  0506   WBC 10*3/mm3 13.08*   HEMOGLOBIN g/dL 10.0*   HEMATOCRIT % 32.2*   PLATELETS 10*3/mm3 188     Results from last 7 days   Lab Units 03/10/20  0506   SODIUM mmol/L 139   POTASSIUM mmol/L 5.4*   CHLORIDE mmol/L 105   CO2 mmol/L 26.0   BUN mg/dL 42*   CREATININE mg/dL 0.92   GLUCOSE mg/dL 190*   CALCIUM mg/dL 8.5*             Assessment      Acute hypoxemic respiratory failure (CMS/HCC)    PVD (peripheral vascular disease) (CMS/HCC)    Current smoker    COPD (chronic obstructive pulmonary disease) (CMS/HCC)    Coronary artery disease    Hypertension    Hyperlipidemia    Diabetes mellitus (CMS/HCC)    Sepsis (CMS/HCC)    Pleural effusion - bilateral mod/large pleural effusions on CT chest 2/27/2020.     Acute pulmonary edema (CMS/HCC)      Discharge when all agree        Deandre Oseguera MD  03/10/20  6:50 AM      Please " "note that portions of this note were completed with a voice recognition program. Efforts were made to edit the dictations, but words may be mistranscribed    Electronically signed by Deandre Oseguera MD at 03/10/20 0617     Collette Melo MD at 03/09/20 8988          INFECTIOUS DISEASE PROGRESS NOTE     Saskia Groves  1970  [id number]      Admission Date: 2/23/2020    Antibiotic therapy:   Daptomycin    Requesting Provider: Deandre Oseguera MD     Reason for Consultation: Left groin wound infection     History of present illness:  2/24/20:  Patient is a 49 y.o. female seen today for a left groin wound infection.  She underwent femoral endardectomy, and femorofemoral  Bypass with gortex graft on 1/15.  She has been having fevers 100 degrees and above since 1/17. She was seen last week by her PCP and given Bactrim but she couldn't tolerate it and Ceftin was called in. Her left groin continued to remain swollen and warm. She had a venous duplex to rule out DVT and then developed a \"knot\" of her left groin. She presented to the ED at [facility], was given Vancomycin and Merrem and transferred to [facility]. Tmax of 100.5 degrees without leukocytosis, normal lactate and PCT. She is currently on Vancomycin and Merrem and we were consulted for evaluation and treatment.   Labs at [facility] with WBC of 10.4, normal lactate, PCT.  She was tachycardic with tmax of 102.3.  She had fevers at home documented to over 103 degrees.  2/25/20 : She spontaneously drained a copious amount of slightly cloudy straw-colored fluid from her left groin yesterday and has continued to drain.  She complains of cough \"other than that I feel great\".  No n/v//d.  Left groin less tender.   2/26/20:  Graft to be removed on Friday.   She remains afebrile. She doesn't \"feel very good today\".  Family at bedside.   2/27/20:  Surgery scheduled for tomorrow.  She is tearful and afraid she will lose her leg.  She remains afebrile.  "   2/28/2020: She deteriorated this morning with hypoxemia and bilateral pulmonary opacifications with pleural effusions.  He had a fever to 101 degrees last night.  I discussed her complex situation with Dr. Deandre Oseguera early today and also this evening.  She was taken to surgery today and her left femoral graft was removed.  A muscle flap was placed.  She is now endotracheally intubated and mechanically ventilated.  She underwent a thoracentesis revealing clear fluid.  Intravenous Merrem was added to her vancomycin this morning due to concerns about possible aspiration pneumonia.  She does have a history of penicillin allergy.  2/29/2020: She remains on ventilatory support.  She has only modest respiratory secretions.  She is still requiring an FiO2 of 60%.  She has been afebrile overnight.  She remains sedated.    Following for Dr. Parag Gordon:  3/1/20: Remains sedated on vent.  Tmax 99.3, FiO2 40% and PEEP 6.  She has a wound VAC on right ground with MELISSA drain with bloody drainage.  She has a right chest tube.  Small amount of clear to red-streaked ETT secretions. On tube feeding.   3/2  Extubated, afebrile, intermittently cooperative  Ongoing issues with hypertension; 90 from chest tube yesterday Day# 8 merrem (intermittent dosing);  vanc trough 38  3/3  Afebrile, reluctant to cooperate with attempts to mobilize, alert, 60 from CT yesterday, no new + cultures, random vanc 26  3/4  Alert, afebrile, more interactive  3/5 alert, afebrile, more interactive and joking with RN, stronger, failed SLP exam yesterday and remains on tube feeding. Having frequent stools and lactulose (which she took at home) has been stopped  3/6 alert, afebrile, chest tube out, feeding tube out after she passed SLP eval  3/9 alert, afebrile, walking in jewell with walker, placement not feasible with her insurance according to daughter; per Dr Whittaker, the wound vac can be removed at discharge; WBC up to 16K now        Past Medical  History:   Diagnosis Date   • Anxiety    • Arthritis    • Asthma    • Carotid artery stenosis    • Chronic bronchitis (CMS/HCC)    • COPD (chronic obstructive pulmonary disease) (CMS/HCC)    • Coronary artery disease     2 vessels with 50% blockage.  Sees Dr. Donaldson   • Depression    • Diabetes mellitus (CMS/HCC)    • Dyslipidemia    • GERD (gastroesophageal reflux disease)    • Hiatal hernia    • Hyperlipidemia    • Hypertension    • Infectious viral hepatitis    • Lower back pain    • Migraine    • Multinodular goiter    • S/P thyroid biopsy     2008, 2013, 2015, and 07/15/2019 at [facility], right lobe nodules - all cytologies were benign   • Sleep apnea     can't tolerate the cpap mask   • Subclinical hyperthyroidism        Past Surgical History:   Procedure Laterality Date   • ANTERIOR CERVICAL DISCECTOMY W/ FUSION     • BACK SURGERY      lumbar   •  SECTION  19940    x 2    • CHOLECYSTECTOMY     • COLONOSCOPY     • ENDOSCOPY     • FEMORAL ARTERY CUTDOWN Bilateral 2020    Procedure: FEMORAL ARTERY CUTDOWN WITH REMOVAL OF FEMORAL FEMORAL BYPASS GRAFT BILATERAL;  Surgeon: Deandre Oseguera MD;  Location:  [facility];  Service: Vascular;  Laterality: Bilateral;   • FEMORAL ENDARTERECTOMY Bilateral 1/15/2020    Procedure: FEMORAL ENDARTERECTOMY BILATERAL;  Surgeon: Deandre Oseguera MD;  Location:  [facility];  Service: Vascular   • FEMORAL FEMORAL BYPASS Right 1/15/2020    Procedure: FEMORAL FEMORAL BYPASS;  Surgeon: Deandre Oseguera MD;  Location:  [facility];  Service: Vascular   • HYSTERECTOMY     • KNEE SURGERY Left    • LEG EXCISION LESION/CYST Left 2020    Procedure: GROIN MUSCLE FLAP BILATERAL;  Surgeon: Modesto Whittaker MD;  Location:  [facility];  Service: Plastics;  Laterality: Left;   • WRIST SURGERY Left        Family History   Problem Relation Age of Onset   • Diabetes Mother    • Hypertension Mother    • Arthritis Mother    • Hyperlipidemia Mother    • Migraines Mother    •  Thyroid disease Mother    • Hypertension Father    • Lung cancer Father    • Cancer Father    • Stroke Other    • Migraines Maternal Aunt    • Thyroid disease Maternal Aunt    • Heart attack Maternal Aunt    • Osteoporosis Maternal Aunt    • Cancer Maternal Uncle    • Heart attack Maternal Uncle    • Stroke Maternal Uncle    • Hyperlipidemia Maternal Grandmother    • Cancer Maternal Grandmother    • Obesity Maternal Grandmother    • Thyroid disease Daughter    • Obesity Daughter        Social History     Socioeconomic History   • Marital status:      Spouse name: Not on file   • Number of children: 2   • Years of education: Not on file   • Highest education level: Not on file   Occupational History   • Occupation: [employer] Work     Employer: DISABLED     Comment: Back and Leg Pain   Tobacco Use   • Smoking status: Current Every Day Smoker     Packs/day: 1.00     Years: 35.00     Pack years: 35.00     Types: Cigarettes   • Smokeless tobacco: Never Used   Substance and Sexual Activity   • Alcohol use: Not Currently   • Drug use: No   • Sexual activity: Yes     Partners: Male     Comment:    Social History Narrative    Lives in [facility]       Allergies   Allergen Reactions   • Levaquin [Levofloxacin] Nausea And Vomiting   • Penicillins Nausea And Vomiting     Has tolerated cefepime 2/2020   • Codeine Nausea Only   • Flagyl [Metronidazole] Nausea And Vomiting         Medication:    Current Facility-Administered Medications:   •  acetaminophen (TYLENOL) tablet 650 mg, 650 mg, Oral, Q4H PRN, 650 mg at 03/07/20 2217 **OR** acetaminophen (TYLENOL) 160 MG/5ML solution 650 mg, 650 mg, Oral, Q4H PRN, 650 mg at 03/04/20 1226 **OR** acetaminophen (TYLENOL) suppository 650 mg, 650 mg, Rectal, Q4H PRN, Isael Bailey PA, 650 mg at 02/28/20 0429  •  albuterol (PROVENTIL) nebulizer solution 0.083% 2.5 mg/3mL, 2.5 mg, Nebulization, Q6H PRN, Isael Bailey PA  •  amitriptyline (ELAVIL) tablet 10  mg, 10 mg, Oral, Nightly, Isael Bailey PA, 10 mg at 03/08/20 2026  •  aspirin tablet 325 mg, 325 mg, Oral, Daily, Isael Bailey PA, 325 mg at 03/09/20 0923  •  atorvastatin (LIPITOR) tablet 10 mg, 10 mg, Oral, Daily, Isael Bailey PA, 10 mg at 03/09/20 0923  •  budesonide (PULMICORT) nebulizer solution 0.5 mg, 0.5 mg, Nebulization, BID - RT, Isael Bailey PA, 0.5 mg at 03/09/20 0907  •  clopidogrel (PLAVIX) tablet 75 mg, 75 mg, Oral, Daily, Isael Bailey PA, 75 mg at 03/09/20 0923  •  DAPTOmycin (CUBICIN) 500 mg/50 mL in sodium chloride, 8 mg/kg, Intravenous, Q24H, Collette Melo MD, 500 mg at 03/09/20 0630  •  dextrose (D50W) 25 g/ 50mL Intravenous Solution 25 g, 25 g, Intravenous, Q15 Min PRN, Isael Bailey PA, 25 g at 03/07/20 1828  •  dextrose (GLUTOSE) oral gel 15 g, 15 g, Oral, Q15 Min PRN, Isael Bailey PA  •  famotidine (PEPCID) tablet 20 mg, 20 mg, Oral, BID AC, Isael Bailey PA, 20 mg at 03/09/20 0631  •  glucagon (human recombinant) (GLUCAGEN DIAGNOSTIC) injection 1 mg, 1 mg, Subcutaneous, Q15 Min PRN, Isael Bailey PA  •  glucagon (human recombinant) (GLUCAGEN DIAGNOSTIC) injection 1 mg, 1 mg, Subcutaneous, Q15 Min PRN, Kip Auguste MD  •  guaiFENesin-dextromethorphan (ROBITUSSIN DM) 100-10 MG/5ML syrup 5 mL, 5 mL, Oral, Q6H PRN, Isael Bailey PA, 5 mL at 02/27/20 0920  •  hydrALAZINE (APRESOLINE) injection 10 mg, 10 mg, Intravenous, Q6H PRN, Isael Bailey PA, 10 mg at 03/04/20 0628  •  HYDROcodone-acetaminophen (NORCO)  MG per tablet 1 tablet, 1 tablet, Oral, Q8H PRN, Isael Bailey PA, 1 tablet at 03/09/20 1256  •  insulin detemir (LEVEMIR) injection 15 Units, 15 Units, Subcutaneous, Q12H, Isael Bailey PA, 15 Units at 03/09/20 0922  •  insulin lispro (humaLOG) injection 0-9 Units, 0-9 Units, Subcutaneous, 4x Daily With Meals & Nightly, Kip Auguste MD, 2 Units at 03/09/20 1244  •  insulin  lispro (humaLOG) injection 3 Units, 3 Units, Subcutaneous, TID With Meals, Kip Auguste MD, 3 Units at 20 1244  •  ketotifen (ZADITOR) 0.025 % ophthalmic solution 1 drop, 1 drop, Both Eyes, BID, Isael Bailey PA, 1 drop at 20 1246  •  lactobacillus acidophilus (RISAQUAD) capsule 1 capsule, 1 capsule, Oral, Daily, Isael Bailey PA, 1 capsule at 20 09  •  metoprolol tartrate (LOPRESSOR) tablet 100 mg, 100 mg, Oral, Q12H, Isael Bailey PA, 100 mg at 20 09  •  [START ON 3/10/2020] pantoprazole (PROTONIX) EC tablet 40 mg, 40 mg, Oral, Q AM, Therese Hollis, [facility]  •  pregabalin (LYRICA) capsule 150 mg, 150 mg, Oral, Q8H, Isael Bailey PA, 150 mg at 20 06  •  rOPINIRole (REQUIP) tablet 4 mg, 4 mg, Oral, Nightly, Isael Bailey PA, 4 mg at 20  •  Sodium Zirconium Cyclosilicate pack 10 g, 10 g, Oral, Once, Kip Auguste MD    Antibiotics:  Anti-Infectives (From admission, onward)    Ordered     Dose/Rate Route Frequency Start Stop    20 194  DAPTOmycin (CUBICIN) 500 mg/50 mL in sodium chloride     Therese Hollis, [facility] reviewed the order on 20 0852.   Ordering Provider:  Collette Melo MD    8 mg/kg × 60.3 kg  over 30 Minutes Intravenous Every 24 Hours 20 0600 20 2359    20 0746  meropenem (MERREM) 1 g/100 mL 0.9% NS VTB (mbp)     Ordering Provider:  Parag Gordon MD    1 g  over 30 Minutes Intravenous Once 20 0845 20 0921    20 0831  cefepime (MAXIPIME) 2 g/100 mL 0.9% NS (mbp)     Ordering Provider:  Ranjana Easley APRN    2 g  200 mL/hr over 30 Minutes Intravenous Once 20 0930 20 1157    20 0203  vancomycin 500 mg/100 mL 0.9% NS IVPB (mbp)     Ordering Provider:  Ced Kenny RPH    500 mg  100 mL/hr over 60 Minutes Intravenous Once 20 0300 20 0511                Physical Exam:   Vital Signs  Temp (24hrs), Av.7 °F (37.1  °C), Min:98.3 °F (36.8 °C), Max:99.3 °F (37.4 °C)    Temp  Min: 98.3 °F (36.8 °C)  Max: 99.3 °F (37.4 °C)  BP  Min: 100/68  Max: 159/91  Pulse  Min: 86  Max: 110  Resp  Min: 16  Max: 18  SpO2  Min: 92 %  Max: 95 %    GENERAL: extubated,sitting in chair, transferring with minimal assistance  HEENT: Normocephalic, atraumatic.  No conjunctival injection. No icterus.   HEART: RRR; No murmur, rubs, gallops.   LUNGS: diminished at lung bases bilaterally   ABDOMEN: Soft, nontender, nondistended. Positive bowel sounds. No rebound or guarding.   MSK:  No joint effusions   SKIN: Warm and dry without cutaneous eruptions on Inspection/palpation.   NEURO: sedated  Left groin with a wound VAC in place MELISSA drains in place with bloody drainage.        Laboratory Data    Results from last 7 days   Lab Units 03/09/20  0321 03/06/20 0418 03/05/20  0344   WBC 10*3/mm3 16.57* 8.40 9.39   HEMOGLOBIN g/dL 10.5* 10.4* 10.4*   HEMATOCRIT % 33.7* 34.0 33.9*   PLATELETS 10*3/mm3 181 189 216     Results from last 7 days   Lab Units 03/09/20  0321   SODIUM mmol/L 137   POTASSIUM mmol/L 5.6*   CHLORIDE mmol/L 104   CO2 mmol/L 26.0   BUN mg/dL 42*   CREATININE mg/dL 0.86   GLUCOSE mg/dL 117*   CALCIUM mg/dL 8.4*     Results from last 7 days   Lab Units 03/09/20  0321   ALK PHOS U/L 138*   BILIRUBIN mg/dL 0.2   ALT (SGPT) U/L 23   AST (SGOT) U/L 27         Results from last 7 days   Lab Units 03/07/20  1349   CRP mg/dL 1.11*         Results from last 7 days   Lab Units 03/09/20  0321 03/06/20  0418 03/05/20  0344   CK TOTAL U/L 29 30 29     Results from last 7 days   Lab Units 03/04/20  0341 03/03/20  0338   VANCOMYCIN RM mcg/mL 13.80 26.10     Estimated Creatinine Clearance: 70.3 mL/min (by C-G formula based on SCr of 0.86 mg/dL).      Microbiology:  2/24: BCx NG    2/24: wound few WBCs, GPC in pairs -- MRSA   2/28/2020: Operative cultures are growing MRSA  2/29/2020: Sputum culture is NGSF      Radiology:  Imaging Results (Last 72 Hours)      Procedure Component Value Units Date/Time    XR Chest 1 View [[id number]] Collected:  03/05/20 0836     Updated:  03/06/20 2300    Narrative:       EXAMINATION: XR CHEST 1 VW-     INDICATION: Postop; I73.9-Peripheral vascular disease, unspecified;  B07-Bvhynfx effusion, not elsewhere classified; Z95.828-Presence of  other vascular implants and grafts; R13.13-Dysphagia, pharyngeal phase.     COMPARISON: 03/04/2020.     FINDINGS: Right upper extremity PICC line is seen with its tip at the  cavoatrial junction. Feeding tube is seen below the left hemidiaphragm.  Heart is normal in size. Right pigtail catheter has been removed. There  are small areas of discoid atelectasis in the right mid and lower lung,  and there appears to be very small pneumothorax in the lateral right  base, approximately 7 mm in width. This does not extend to the apex, or  across the base of the lung, although there is probably trace air in the  minor fissure. Left lung shows near resolution of focal atelectasis in  the medial left base. There is stable diffuse interstitial disease  elsewhere.       Impression:       1. Right pleural drain removal with very small right basilar  pneumothorax.  2. Mild new discoid atelectasis in the right mid and lower lung, but  interval clearing of the left lung base, since yesterday's study.      D:  03/05/2020  E:  03/05/2020     This report was finalized on 3/6/2020 10:57 PM by Dr. Jaswant Frank MD.           I read her radiographic studies    Impression:   1.  MRSA left groin abscess/cellulitis/graft infection-status post graft removal 2/28.  She will require a prolonged course of intravenous antibiotic therapy directed toward MRSA.  Now on daptomycin- lft's and cpk normal  2.  Bilateral pulmonary infiltrates/pleural effusions- clinically improved  3.  Leukocytosis- etiology unclear  4.  S/p femorofemoral bypass with gortex graft, 1/15  5.  Type 2 Diabetes Mellitus  6.  Penicillin allergy  7.  Type 2 diabetes  mellitus-with very poor control  8.  Malnutrition    PLAN/RECOMMENDATIONS:   1.  Continue  daptomycin to  to complete 8 weeks therapy  3.  Pa and lat cxr  4.  probiotic  5   Replace picc with single lumen      Will discuss with CM        Collette Melo MD  3/9/2020  2:00 PM                  Electronically signed by Collette Melo MD at 20 1406     [facility] at 20 1200              [facility]  PROGRESS NOTE    Patient Name: Saskia Groves  : 1970  MRN: [id number]    Date of Admission: 2020  Primary Care Physician: Meagan Barlow APRN    Subjective   Subjective     CC:  Follow-up left groin wound infection    HPI:  Feels well.  Doing better.  Walking with very little assistance.  Wanting to get out of the hospital soon as possible.  Tolerating antibiotics.  Daughter and granddaughter at the bedside.  No other new complaints.  Tolerating diet.    Review of Systems  No current fevers or chills  No current shortness of breath or cough  No current nausea, vomiting, or diarrhea  No current chest pain or palpitations      Objective   Objective     Vital Signs:   Temp:  [98.3 °F (36.8 °C)-99.3 °F (37.4 °C)] 98.3 °F (36.8 °C)  Heart Rate:  [] 100  Resp:  [16-18] 18  BP: (100-159)/(68-91) 159/91        Physical Exam:  Constitutional:Awake, alert  HENT: NCAT, mucous membranes moist, neck supple  Respiratory: Some dullness at the base but otherwise clear to auscultation bilaterally, respiratory effort normal, nonlabored breathing   Cardiovascular: RRR, S1, S2, normal radial pulses  Gastrointestinal: Positive bowel sounds, soft, nontender, nondistended  Musculoskeletal: Normal musculature for age, no lower extremity edema, BMI 24  Psychiatric: Appropriate affect, cooperative, conversational  Neurologic: No slurred speech or facial droop, follows commands, oriented  Skin: Groin wound VAC in place, wound is CDI, no  rashes or jaundice    Results Reviewed:  Results from last 7 days   Lab Units 03/09/20  0321 03/06/20  0418 03/05/20  0344   WBC 10*3/mm3 16.57* 8.40 9.39   HEMOGLOBIN g/dL 10.5* 10.4* 10.4*   HEMATOCRIT % 33.7* 34.0 33.9*   PLATELETS 10*3/mm3 181 189 216     Results from last 7 days   Lab Units 03/09/20  0321 03/07/20  1349 03/06/20  0418   SODIUM mmol/L 137 140 144   POTASSIUM mmol/L 5.6* 5.1 4.4   CHLORIDE mmol/L 104 103 107   CO2 mmol/L 26.0 27.0 28.0   BUN mg/dL 42* 47* 45*   CREATININE mg/dL 0.86 0.66 0.64   GLUCOSE mg/dL 117* 83 79   CALCIUM mg/dL 8.4* 8.7 8.8   ALT (SGPT) U/L 23 23 19   AST (SGOT) U/L 27 33* 32     Estimated Creatinine Clearance: 70.3 mL/min (by C-G formula based on SCr of 0.86 mg/dL).    Microbiology Results Abnormal     Procedure Component Value - Date/Time    Fungus Culture - Body Fluid, Pleural Cavity [[id number]] Collected:  02/28/20 1341    Lab Status:  Preliminary result Specimen:  Body Fluid from Pleural Cavity Updated:  03/06/20 1530     Fungus Culture No fungus isolated at 1 week    AFB Culture - Body Fluid, Pleural Cavity [[id number]] Collected:  02/28/20 1341    Lab Status:  Preliminary result Specimen:  Body Fluid from Pleural Cavity Updated:  03/06/20 1530     AFB Culture No AFB isolated at 1 week     AFB Stain No acid fast bacilli seen on concentrated smear    Fungus Culture - Body Fluid, Groin, right [[id number]] Collected:  02/28/20 1417    Lab Status:  Preliminary result Specimen:  Body Fluid from Groin, right Updated:  03/06/20 1530     Fungus Culture No fungus isolated at 1 week    AFB Culture - Body Fluid, Groin, right [[id number]] Collected:  02/28/20 1417    Lab Status:  Preliminary result Specimen:  Body Fluid from Groin, right Updated:  03/06/20 1530     AFB Culture No AFB isolated at 1 week     AFB Stain No acid fast bacilli seen on concentrated smear    Fungus Culture - Tissue, Groin, right [[id number]] Collected:  02/28/20 1417    Lab Status:  Preliminary result  Specimen:  Tissue from Groin, right Updated:  03/06/20 1530     Fungus Culture No fungus isolated at 1 week    AFB Culture - Tissue, Groin, right [[id number]] Collected:  02/28/20 1417    Lab Status:  Preliminary result Specimen:  Tissue from Groin, right Updated:  03/06/20 1530     AFB Culture No AFB isolated at 1 week     AFB Stain No acid fast bacilli seen on concentrated smear    Anaerobic Culture - Hardware / Foreign Body, Groin, right [[id number]] Collected:  02/28/20 1500    Lab Status:  Final result Specimen:  Hardware / Foreign Body from Groin, right Updated:  03/04/20 1205     Anaerobic Culture No anaerobes isolated at 5 days    Anaerobic Culture - Body Fluid, Groin, right [[id number]] Collected:  02/28/20 1417    Lab Status:  Final result Specimen:  Body Fluid from Groin, right Updated:  03/04/20 1204     Anaerobic Culture No anaerobes isolated at 5 days    Anaerobic Culture - Body Fluid, Pleural Cavity [[id number]] Collected:  02/28/20 1341    Lab Status:  Final result Specimen:  Body Fluid from Pleural Cavity Updated:  03/04/20 1203     Anaerobic Culture No anaerobes isolated at 5 days    Anaerobic Culture - Tissue, Groin, right [[id number]] Collected:  02/28/20 1417    Lab Status:  Final result Specimen:  Tissue from Groin, right Updated:  03/04/20 1202     Anaerobic Culture No anaerobes isolated at 5 days    Blood Culture - Blood, Wrist, Right [[id number]] Collected:  02/28/20 0750    Lab Status:  Final result Specimen:  Blood from Wrist, Right Updated:  03/04/20 0815     Blood Culture No growth at 5 days    Body Fluid Culture - Body Fluid, Groin, right [[id number]]  (Abnormal)  (Susceptibility) Collected:  02/28/20 1417    Lab Status:  Final result Specimen:  Body Fluid from Groin, right Updated:  03/03/20 0650     Body Fluid Culture Rare Staphylococcus aureus, MRSA     Gram Stain Many (4+) WBCs seen      No organisms seen    Narrative:             Susceptibility      Staphylococcus aureus, MRSA     CADENCE      Gentamicin Susceptible     Oxacillin Resistant     Rifampin Susceptible     Vancomycin Susceptible                Susceptibility Comments     Staphylococcus aureus, MRSA    This isolate does not demonstrate inducible clindamycin resistance in vitro.               Respiratory Culture - Sputum, ET Suction [[id number]] Collected:  02/29/20 0850    Lab Status:  Final result Specimen:  Sputum from ET Suction Updated:  03/02/20 0902     Respiratory Culture No growth     Gram Stain Few (2+) Epithelial cells per low power field      Few (2+) WBCs seen      No organisms seen    Tissue / Bone Culture - Tissue, Groin, right [[id number]] Collected:  02/28/20 1417    Lab Status:  Final result Specimen:  Tissue from Groin, right Updated:  03/02/20 0809     Tissue Culture No growth at 3 days     Gram Stain Many (4+) WBCs seen      No organisms seen    Body Fluid Culture - Body Fluid, Pleural Cavity [[id number]] Collected:  02/28/20 1341    Lab Status:  Final result Specimen:  Body Fluid from Pleural Cavity Updated:  03/02/20 0808     Body Fluid Culture No growth at 3 days     Gram Stain Many (4+) WBCs seen      No organisms seen    Hardware / Foreign Body Culture - Hardware / Foreign Body, Groin, right [[id number]]  (Abnormal)  (Susceptibility) Collected:  02/28/20 1500    Lab Status:  Final result Specimen:  Hardware / Foreign Body from Groin, right Updated:  03/01/20 0645     Hardware / Foreign Body Culture Scant growth (1+) Staphylococcus aureus, MRSA     Comment:   Methicillin resistant Staphylococcus aureus, Patient may be an isolation risk.        Gram Stain Many (4+) WBCs seen      No organisms seen    Susceptibility      Staphylococcus aureus, MRSA     CADENCE     Clindamycin Susceptible     Erythromycin Resistant     Inducible Clindamycin Resistance Negative     Oxacillin Resistant     Penicillin G Resistant     Rifampin Susceptible     Tetracycline Susceptible     Trimethoprim + Sulfamethoxazole Susceptible     Vancomycin  Susceptible                Susceptibility Comments     Staphylococcus aureus, MRSA    This isolate does not demonstrate inducible clindamycin resistance in vitro.               Blood Culture - Blood, Arm, Left [[id number]] Collected:  02/24/20 0057    Lab Status:  Final result Specimen:  Blood from Arm, Left Updated:  02/29/20 0545     Blood Culture No growth at 5 days    Blood Culture - Blood, Hand, Left [[id number]] Collected:  02/24/20 0057    Lab Status:  Final result Specimen:  Blood from Hand, Left Updated:  02/29/20 0545     Blood Culture No growth at 5 days    Wound Culture - Wound, Thigh, Left [[id number]]  (Abnormal)  (Susceptibility) Collected:  02/24/20 1849    Lab Status:  Final result Specimen:  Wound from Thigh, Left Updated:  02/27/20 0723     Wound Culture Light growth (2+) Staphylococcus aureus, MRSA     Comment: Methicillin resistant Staphylococcus aureus, Patient may be an isolation risk.        Gram Stain Few (2+) WBCs seen      Rare (1+) Gram positive cocci in pairs    Susceptibility      Staphylococcus aureus, MRSA     CADENCE     Clindamycin Susceptible     Erythromycin Resistant     Inducible Clindamycin Resistance Negative     Oxacillin Resistant     Penicillin G Resistant     Rifampin Susceptible     Tetracycline Susceptible     Trimethoprim + Sulfamethoxazole Susceptible     Vancomycin Susceptible                Susceptibility Comments     Staphylococcus aureus, MRSA    This isolate does not demonstrate inducible clindamycin resistance in vitro.                     Imaging Results (Last 24 Hours)     ** No results found for the last 24 hours. **          Results for orders placed during the hospital encounter of 02/23/20   Adult Transthoracic Echo Complete W/ Cont if Necessary Per Protocol    Narrative · Left ventricular systolic function is mildly decreased. Calculated EF =   45.0%. Estimated EF appears to be in the range of 46 - 50%  · Mild global hypokinesis no focal wall motion  abnormalities  · Mild aortic valve sclerosis without stenosis.  · There is a trivial pericardial effusion. There is a large size left   pleural effusion          I have reviewed the medications:  Scheduled Meds:  amitriptyline 10 mg Oral Nightly   aspirin 325 mg Oral Daily   atorvastatin 10 mg Oral Daily   budesonide 0.5 mg Nebulization BID - RT   clopidogrel 75 mg Oral Daily   DAPTOmycin 8 mg/kg Intravenous Q24H   famotidine 20 mg Oral BID AC   insulin detemir 15 Units Subcutaneous Q12H   insulin regular 0-14 Units Subcutaneous 4x Daily AC & at Bedtime   ketotifen 1 drop Both Eyes BID   lactobacillus acidophilus 1 capsule Oral Daily   losartan 50 mg Oral Daily   metoprolol tartrate 100 mg Oral Q12H   pantoprazole 40 mg Intravenous Q AM   pregabalin 150 mg Oral Q8H   rOPINIRole 4 mg Oral Nightly     Continuous Infusions:   PRN Meds:.•  acetaminophen **OR** acetaminophen **OR** acetaminophen  •  albuterol  •  dextrose  •  dextrose  •  glucagon (human recombinant)  •  guaiFENesin-dextromethorphan  •  hydrALAZINE  •  HYDROcodone-acetaminophen    Assessment/Plan   Assessment & Plan     Active Hospital Problems    Diagnosis  POA   • **Acute hypoxemic respiratory failure (CMS/HCC) [J96.01]  No   • Pleural effusion - bilateral mod/large pleural effusions on CT chest 2/27/2020.  [J90]  No   • Acute pulmonary edema (CMS/HCC) [J81.0]  No   • Sepsis (CMS/HCC) [A41.9]  Yes   • COPD (chronic obstructive pulmonary disease) (CMS/HCC) [J44.9]  Yes   • Coronary artery disease [I25.10]  Yes   • Hypertension [I10]  Yes   • Hyperlipidemia [E78.5]  Yes   • Diabetes mellitus (CMS/HCC) [E11.9]  Yes   • Current smoker [F17.200]  Yes   • PVD (peripheral vascular disease) (CMS/HCC) [I73.9]  Yes      Resolved Hospital Problems   No resolved problems to display.        Brief Hospital Course to date:  Saskia Groves is a 49 y.o. female admitted on 2/23 with history of diabetes, tobacco use, essential hypertension, peripheral artery disease  with history of femorofemoral bypass on 1/15 but was readmitted with fever and wound infection of the left groin.  She was transferred to the ICU on 2/27 for pleural effusions and hypoxia.  She required chest tube on the right.  She had graft explant on 2/28.  Muscle flaps were performed by plastic surgery.  She had bilateral wound vacs in place.  She was extubated after several days postoperatively.  Infectious disease has followed and assisted with management.  Wound infection grew MRSA and patient has been treated with daptomycin.    Plan/comments:  Plan discussed case with infectious disease regarding antibiotic plans and possible transition to outpatient.  Reportedly going to require 8 weeks of antibiotics until 4/20 per ID note.  Plastic surgery plans to remove wound VAC on the day of discharge and right MELISSA drain.  Left MELISSA drain likely to remain in place.  I am discussing with case management regarding the possibility of home antibiotics which is patient's newest request.  Dysphasia has improved and patient currently tolerating oral diet.  Elevated potassium level noted.  Plan to recheck today.  Glucose reviewed.  A1c very elevated.  Prandial insulin adjusted.  Recheck laboratory studies tomorrow.  Doing well off of tube feeding.  Continue respiratory medications.  Monitor for further hypoxia.    DVT Prophylaxis: Mechanical    Disposition: I expect the patient to be discharged home versus skilled when cleared by all services.    CODE STATUS:   Code Status and Medical Interventions:   Ordered at: 02/23/20 0638     Level Of Support Discussed With:    Patient     Code Status:    CPR     Medical Interventions (Level of Support Prior to Arrest):    Full         Electronically signed by Kip Auguste MD, 03/09/20, 12:00 PM.        Electronically signed by Kip Auguste MD at 03/09/20 1221       Consult Notes (last 24 hours) (Notes from 03/09/20 1048 through 03/10/20 1048)    No notes of this  type exist for this encounter.         Toyin Dixon RN      Case Management   Progress Notes   Signed   Date of Service:  03/10/20 0923   Creation Time:  03/10/20 0923            Signed             Show:Clear all  []Manual[x]Template[]Copied    Added by:  [x]Toyin Dixon RN    []Rolyver for details  Continued Stay Note  [facility]     Patient Name: Saskia Groves               MRN: [id number]  Today's Date: 3/10/2020                     Admit Date: 2/23/2020          Discharge Plan      Row Name 03/10/20 0922           Plan     Plan Comments  CM spoke with pt who now reports she feels she may need inpt rehab for IV antibioitcs and wound care at discharge. She has agreed to updated clinicals being sent to [facility] and [facility]. CM to follow.          Discharge Codes    No documentation.              Expected Discharge Date and Time      Expected Discharge Date Expected Discharge Time     Mar 2, 2020                  Toyin Dixon RN

## 2020-03-10 NOTE — PROGRESS NOTES
[facility] Medicine Services  PROGRESS NOTE    Patient Name: Saskia Groves  : 1970  MRN: [id number]    Date of Admission: 2020  Primary Care Physician: Meagan Barlow APRN    Subjective   Subjective     CC:  Follow-up left groin wound infection    HPI:  Tolerated getting the MELISSA drains out today.  Case was discussed earlier this morning with plastic surgery at the bedside.  Patient was reevaluated later today and felt well after MELISSA drain removal.  She is tolerating antibiotics and denies any new complaints.  No family currently at the bedside.       Review of Systems  No current fevers or chills  No current shortness of breath or cough  No current nausea, vomiting, or diarrhea  No current chest pain or palpitations      Objective   Objective     Vital Signs:   Temp:  [98 °F (36.7 °C)-98.5 °F (36.9 °C)] 98 °F (36.7 °C)  Heart Rate:  [] 95  Resp:  [16-18] 16  BP: (129-139)/(78-99) 139/81        Physical Exam:  Constitutional:Awake, alert  HENT: NCAT, mucous membranes moist, neck supple  Respiratory: Some dullness at the base but otherwise clear to auscultation bilaterally, respiratory effort normal, nonlabored breathing   Cardiovascular: RRR, S1, S2, normal radial pulses  Gastrointestinal: Positive bowel sounds, soft, nontender, nondistended  Musculoskeletal: Normal musculature for age, no lower extremity edema, BMI 24  Psychiatric: Appropriate affect, cooperative, conversational  Neurologic: No slurred speech or facial droop, follows commands, oriented  Skin: Groin wound VAC in place, MELISSA drains have been removed, wound is CDI, no rashes or jaundice    Results Reviewed:  Results from last 7 days   Lab Units 03/10/20  0506 20  0321 20  0418   WBC 10*3/mm3 13.08* 16.57* 8.40   HEMOGLOBIN g/dL 10.0* 10.5* 10.4*   HEMATOCRIT % 32.2* 33.7* 34.0   PLATELETS 10*3/mm3 188 181 189     Results from last 7 days   Lab Units 03/10/20  0506 20  0321  03/07/20  1349 03/06/20  0418   SODIUM mmol/L 139 137 140 144   POTASSIUM mmol/L 5.4* 5.6* 5.1 4.4   CHLORIDE mmol/L 105 104 103 107   CO2 mmol/L 26.0 26.0 27.0 28.0   BUN mg/dL 42* 42* 47* 45*   CREATININE mg/dL 0.92 0.86 0.66 0.64   GLUCOSE mg/dL 190* 117* 83 79   CALCIUM mg/dL 8.5* 8.4* 8.7 8.8   ALT (SGPT) U/L  --  23 23 19   AST (SGOT) U/L  --  27 33* 32     Estimated Creatinine Clearance: 65.7 mL/min (by C-G formula based on SCr of 0.92 mg/dL).    Microbiology Results Abnormal     Procedure Component Value - Date/Time    Fungus Culture - Body Fluid, Pleural Cavity [[id number]] Collected:  02/28/20 1341    Lab Status:  Preliminary result Specimen:  Body Fluid from Pleural Cavity Updated:  03/06/20 1530     Fungus Culture No fungus isolated at 1 week    AFB Culture - Body Fluid, Pleural Cavity [[id number]] Collected:  02/28/20 1341    Lab Status:  Preliminary result Specimen:  Body Fluid from Pleural Cavity Updated:  03/06/20 1530     AFB Culture No AFB isolated at 1 week     AFB Stain No acid fast bacilli seen on concentrated smear    Fungus Culture - Body Fluid, Groin, right [[id number]] Collected:  02/28/20 1417    Lab Status:  Preliminary result Specimen:  Body Fluid from Groin, right Updated:  03/06/20 1530     Fungus Culture No fungus isolated at 1 week    AFB Culture - Body Fluid, Groin, right [[id number]] Collected:  02/28/20 1417    Lab Status:  Preliminary result Specimen:  Body Fluid from Groin, right Updated:  03/06/20 1530     AFB Culture No AFB isolated at 1 week     AFB Stain No acid fast bacilli seen on concentrated smear    Fungus Culture - Tissue, Groin, right [[id number]] Collected:  02/28/20 1417    Lab Status:  Preliminary result Specimen:  Tissue from Groin, right Updated:  03/06/20 1530     Fungus Culture No fungus isolated at 1 week    AFB Culture - Tissue, Groin, right [[id number]] Collected:  02/28/20 1417    Lab Status:  Preliminary result Specimen:  Tissue from Groin, right Updated:   03/06/20 1530     AFB Culture No AFB isolated at 1 week     AFB Stain No acid fast bacilli seen on concentrated smear    Anaerobic Culture - Hardware / Foreign Body, Groin, right [[id number]] Collected:  02/28/20 1500    Lab Status:  Final result Specimen:  Hardware / Foreign Body from Groin, right Updated:  03/04/20 1205     Anaerobic Culture No anaerobes isolated at 5 days    Anaerobic Culture - Body Fluid, Groin, right [[id number]] Collected:  02/28/20 1417    Lab Status:  Final result Specimen:  Body Fluid from Groin, right Updated:  03/04/20 1204     Anaerobic Culture No anaerobes isolated at 5 days    Anaerobic Culture - Body Fluid, Pleural Cavity [[id number]] Collected:  02/28/20 1341    Lab Status:  Final result Specimen:  Body Fluid from Pleural Cavity Updated:  03/04/20 1203     Anaerobic Culture No anaerobes isolated at 5 days    Anaerobic Culture - Tissue, Groin, right [[id number]] Collected:  02/28/20 1417    Lab Status:  Final result Specimen:  Tissue from Groin, right Updated:  03/04/20 1202     Anaerobic Culture No anaerobes isolated at 5 days    Blood Culture - Blood, Wrist, Right [[id number]] Collected:  02/28/20 0750    Lab Status:  Final result Specimen:  Blood from Wrist, Right Updated:  03/04/20 0815     Blood Culture No growth at 5 days    Body Fluid Culture - Body Fluid, Groin, right [[id number]]  (Abnormal)  (Susceptibility) Collected:  02/28/20 1417    Lab Status:  Final result Specimen:  Body Fluid from Groin, right Updated:  03/03/20 0650     Body Fluid Culture Rare Staphylococcus aureus, MRSA     Gram Stain Many (4+) WBCs seen      No organisms seen    Narrative:             Susceptibility      Staphylococcus aureus, MRSA     CADENCE     Gentamicin Susceptible     Oxacillin Resistant     Rifampin Susceptible     Vancomycin Susceptible                Susceptibility Comments     Staphylococcus aureus, MRSA    This isolate does not demonstrate inducible clindamycin resistance in vitro.                Respiratory Culture - Sputum, ET Suction [[id number]] Collected:  02/29/20 0850    Lab Status:  Final result Specimen:  Sputum from ET Suction Updated:  03/02/20 0902     Respiratory Culture No growth     Gram Stain Few (2+) Epithelial cells per low power field      Few (2+) WBCs seen      No organisms seen    Tissue / Bone Culture - Tissue, Groin, right [[id number]] Collected:  02/28/20 1417    Lab Status:  Final result Specimen:  Tissue from Groin, right Updated:  03/02/20 0809     Tissue Culture No growth at 3 days     Gram Stain Many (4+) WBCs seen      No organisms seen    Body Fluid Culture - Body Fluid, Pleural Cavity [[id number]] Collected:  02/28/20 1341    Lab Status:  Final result Specimen:  Body Fluid from Pleural Cavity Updated:  03/02/20 0808     Body Fluid Culture No growth at 3 days     Gram Stain Many (4+) WBCs seen      No organisms seen    Hardware / Foreign Body Culture - Hardware / Foreign Body, Groin, right [[id number]]  (Abnormal)  (Susceptibility) Collected:  02/28/20 1500    Lab Status:  Final result Specimen:  Hardware / Foreign Body from Groin, right Updated:  03/01/20 0645     Hardware / Foreign Body Culture Scant growth (1+) Staphylococcus aureus, MRSA     Comment:   Methicillin resistant Staphylococcus aureus, Patient may be an isolation risk.        Gram Stain Many (4+) WBCs seen      No organisms seen    Susceptibility      Staphylococcus aureus, MRSA     CADENCE     Clindamycin Susceptible     Erythromycin Resistant     Inducible Clindamycin Resistance Negative     Oxacillin Resistant     Penicillin G Resistant     Rifampin Susceptible     Tetracycline Susceptible     Trimethoprim + Sulfamethoxazole Susceptible     Vancomycin Susceptible                Susceptibility Comments     Staphylococcus aureus, MRSA    This isolate does not demonstrate inducible clindamycin resistance in vitro.               Blood Culture - Blood, Arm, Left [[id number]] Collected:  02/24/20 0057    Lab Status:  Final  result Specimen:  Blood from Arm, Left Updated:  02/29/20 0545     Blood Culture No growth at 5 days    Blood Culture - Blood, Hand, Left [[id number]] Collected:  02/24/20 0057    Lab Status:  Final result Specimen:  Blood from Hand, Left Updated:  02/29/20 0545     Blood Culture No growth at 5 days    Wound Culture - Wound, Thigh, Left [[id number]]  (Abnormal)  (Susceptibility) Collected:  02/24/20 1849    Lab Status:  Final result Specimen:  Wound from Thigh, Left Updated:  02/27/20 0723     Wound Culture Light growth (2+) Staphylococcus aureus, MRSA     Comment: Methicillin resistant Staphylococcus aureus, Patient may be an isolation risk.        Gram Stain Few (2+) WBCs seen      Rare (1+) Gram positive cocci in pairs    Susceptibility      Staphylococcus aureus, MRSA     CADENCE     Clindamycin Susceptible     Erythromycin Resistant     Inducible Clindamycin Resistance Negative     Oxacillin Resistant     Penicillin G Resistant     Rifampin Susceptible     Tetracycline Susceptible     Trimethoprim + Sulfamethoxazole Susceptible     Vancomycin Susceptible                Susceptibility Comments     Staphylococcus aureus, MRSA    This isolate does not demonstrate inducible clindamycin resistance in vitro.                     Imaging Results (Last 24 Hours)     Procedure Component Value Units Date/Time    XR Chest PA & Lateral [[id number]] Collected:  03/10/20 0922     Updated:  03/10/20 0939    Narrative:          EXAMINATION: XR CHEST PA AND LATERAL-      INDICATION: pneumonia/effusions; I73.9-Peripheral vascular disease,  unspecified; A58-Thgrdfp effusion, not elsewhere classified;  Z95.828-Presence of other vascular implants and grafts      COMPARISON: 03/05/2020     FINDINGS: Pin that the chest reveal cardiac and mediastinal silhouettes  within normal limits. PICC line catheter identified bilaterally with  both tips in the SVC. There is improvement seen in aeration of the right  midlung. The pulmonary vascularity  remains increased. Mild increased  markings seen the lung bases bilaterally.           Impression:       Improvement seen in aeration of the right midlung with  increased markings seen at the lung bases bilaterally. PICC line  catheter left tip in the SVC. Prominence in the pulmonary vascularity  bilaterally.                Results for orders placed during the hospital encounter of 02/23/20   Adult Transthoracic Echo Complete W/ Cont if Necessary Per Protocol    Narrative · Left ventricular systolic function is mildly decreased. Calculated EF =   45.0%. Estimated EF appears to be in the range of 46 - 50%  · Mild global hypokinesis no focal wall motion abnormalities  · Mild aortic valve sclerosis without stenosis.  · There is a trivial pericardial effusion. There is a large size left   pleural effusion          I have reviewed the medications:  Scheduled Meds:    amitriptyline 10 mg Oral Nightly   aspirin 325 mg Oral Daily   atorvastatin 10 mg Oral Daily   budesonide 0.5 mg Nebulization BID - RT   clopidogrel 75 mg Oral Daily   DAPTOmycin 8 mg/kg Intravenous Q24H   famotidine 20 mg Oral BID AC   insulin detemir 15 Units Subcutaneous Q12H   insulin lispro 0-9 Units Subcutaneous 4x Daily With Meals & Nightly   insulin lispro 3 Units Subcutaneous TID With Meals   ketotifen 1 drop Both Eyes BID   lactobacillus acidophilus 1 capsule Oral Daily   metoprolol tartrate 100 mg Oral Q12H   pantoprazole 40 mg Oral Q AM   pregabalin 150 mg Oral Q8H   rOPINIRole 4 mg Oral Nightly   sodium chloride 10 mL Intravenous Q12H     Continuous Infusions:   PRN Meds:.•  acetaminophen **OR** acetaminophen **OR** acetaminophen  •  albuterol  •  dextrose  •  dextrose  •  glucagon (human recombinant)  •  glucagon (human recombinant)  •  guaiFENesin-dextromethorphan  •  hydrALAZINE  •  HYDROcodone-acetaminophen  •  sodium chloride  •  sodium chloride    Assessment/Plan   Assessment & Plan     Active Hospital Problems    Diagnosis  POA   •  **Acute hypoxemic respiratory failure (CMS/[facility]) [J96.01]  No   • Pleural effusion - bilateral mod/large pleural effusions on CT chest 2/27/2020.  [J90]  No   • Acute pulmonary edema (CMS/[facility]) [J81.0]  No   • Sepsis (CMS/[facility]) [A41.9]  Yes   • COPD (chronic obstructive pulmonary disease) (CMS/[facility]) [J44.9]  Yes   • Coronary artery disease [I25.10]  Yes   • Hypertension [I10]  Yes   • Hyperlipidemia [E78.5]  Yes   • Diabetes mellitus (CMS/[facility]) [E11.9]  Yes   • Current smoker [F17.200]  Yes   • PVD (peripheral vascular disease) (CMS/[facility]) [I73.9]  Yes      Resolved Hospital Problems   No resolved problems to display.        Brief Hospital Course to date:  Saskia Groves is a 49 y.o. female admitted on 2/23 with history of diabetes, tobacco use, essential hypertension, peripheral artery disease with history of femorofemoral bypass on 1/15 but was readmitted with fever and wound infection of the left groin.  She was transferred to the ICU on 2/27 for pleural effusions and hypoxia.  She required chest tube on the right.  She had graft explant on 2/28.  Muscle flaps were performed by plastic surgery.  She had bilateral wound vacs in place.  She was extubated after several days postoperatively.  Infectious disease has followed and assisted with management.  Wound infection grew MRSA and patient has been treated with daptomycin.    Plan/comments:  Case discussed with infectious disease.  PICC line has been changed from a triple-lumen to a single-lumen for better IV antibiotic per ID request.  Case discussed extensively with plastic surgery this morning who feels patient could definitely benefit from rehab for better wound care and better control of diabetes.  We are very concerned if patient does not adequately controlled diabetes and properly care for wounds that she could have further complications.  She understands that this could lead to worsening morbidity or mortality and agrees with the plan and agrees with referrals  for skilled facility.  Plastics plans to remove wound VAC tomorrow.  Otherwise continue current care.  40 minutes spent in coronation care, chart review, and discussion with consultants and counseling.  20 minutes spent at the bedside in direct counseling patient regarding current treatment plan and need for rehab. Dysphagia has improved and patient currently tolerating oral diet.  Elevated potassium level noted and trended down.  Glucose reviewed.  A1c very elevated.  Cannot adjust insulin further today if needed pending sugars..  Recheck laboratory studies tomorrow.  Doing well off of tube feeding.  Continue respiratory medications.  Monitor for further hypoxia.    DVT Prophylaxis: Mechanical    Disposition: I expect the patient to be discharged home versus skilled when cleared by all services.    CODE STATUS:   Code Status and Medical Interventions:   Ordered at: 02/23/20 4331     Level Of Support Discussed With:    Patient     Code Status:    CPR     Medical Interventions (Level of Support Prior to Arrest):    Full         Electronically signed by Kip Auguste MD, 03/10/20, 11:14.

## 2020-03-10 NOTE — THERAPY WOUND CARE TREATMENT
Acute Care - Wound/Debridement Treatment Note  [facility]     Patient Name: Saskia Groves  : 1970  MRN: [id number]  Today's Date: 3/10/2020                  Admit Date: 2020    Visit Dx:    ICD-10-CM ICD-9-CM   1. PVD (peripheral vascular disease) (CMS/[facility]) I73.9 443.9   2. Pleural effusion J90 511.9   3. S/P femoral-femoral bypass surgery Z95.828 V45.89       Patient Active Problem List   Diagnosis   • PVD (peripheral vascular disease) (CMS/[facility])   • Lumbar radiculopathy   • Paresthesia   • Causalgia of lower limb   • Hiatal hernia   • Current smoker   • Bilateral carotid artery stenosis   • COPD (chronic obstructive pulmonary disease) (CMS/[facility])   • Coronary artery disease   • Hypertension   • Hyperlipidemia   • Diabetes mellitus (CMS/[facility])   • Sepsis (CMS/[facility])   • Pleural effusion - bilateral mod/large pleural effusions on CT chest 2020.    • Acute pulmonary edema (CMS/[facility])   • Acute hypoxemic respiratory failure (CMS/[facility])           Wound 20 Right groin Incision (Active)   Dressing Appearance dry;intact 3/10/2020  9:15 AM   Closure Adhesive bandage;GILLIAN 3/10/2020  6:00 AM   Base dressing in place, unable to visualize 3/10/2020  6:00 AM   Periwound intact;dry;redness 3/9/2020  8:00 PM   Periwound Temperature warm 3/9/2020  8:00 PM   Periwound Skin Turgor firm 3/9/2020  8:00 PM   Drainage Characteristics/Odor serous 3/9/2020  8:00 PM   Drainage Amount scant 3/9/2020  8:00 PM   Care, Wound negative pressure wound therapy 3/9/2020  8:00 PM   Dressing Care, Wound other (see comments) 3/9/2020  8:00 PM   Periwound Care, Wound dry periwound area maintained 3/9/2020  8:00 PM   Wound Output (mL) 0 3/10/2020  9:15 AM       Wound 20 2302 Left groin Incision (Active)   Dressing Appearance dry;intact 3/10/2020  9:15 AM   Closure Adhesive bandage;GILLIAN 3/10/2020  6:00 AM   Base dressing in place, unable to visualize 3/10/2020  6:00 AM   Periwound intact;dry;redness 3/9/2020  8:00 PM    Periwound Temperature warm 3/9/2020  8:00 PM   Periwound Skin Turgor firm 3/9/2020  8:00 PM   Drainage Characteristics/Odor serous 3/9/2020  8:00 PM   Drainage Amount scant 3/9/2020  8:00 PM   Care, Wound negative pressure wound therapy 3/9/2020  8:00 PM   Dressing Care, Wound other (see comments) 3/9/2020  8:00 PM   Periwound Care, Wound dry periwound area maintained 3/9/2020  8:00 PM   Wound Output (mL) 0 3/10/2020  9:15 AM       Wound 03/04/20 0715 Right lateral chest Puncture (Active)   Dressing Appearance dry;intact 3/10/2020  6:00 AM   Closure None 3/10/2020  6:00 AM   Base dressing in place, unable to visualize 3/10/2020  6:00 AM       Wound 03/10/20 0005 Right anterior;upper arm Puncture (Active)   Dressing Appearance dry;intact 3/10/2020  6:00 AM   Periwound dry;intact 3/10/2020  6:00 AM   Periwound Temperature warm 3/10/2020  6:00 AM   Periwound Skin Turgor soft 3/10/2020  6:00 AM   Dressing Care, Wound petroleum-based;transparent film 3/10/2020  2:00 AM       NPWT (Negative Pressure Wound Therapy) 02/28/20 1200 groin (Active)   Therapy Setting continuous therapy 3/10/2020  9:15 AM   Pressure Setting 125 mmHg 3/10/2020  9:15 AM         WOUND DEBRIDEMENT                  Therapy Treatment    Rehabilitation Treatment Summary     Row Name 03/10/20 0915             Treatment Time/Intention    Discipline  physical therapist  -      Document Type  therapy note (daily note);wound care  -MF      Subjective Information  no complaints  -MF      Mode of Treatment  physical therapy  -MF      Recorded by [MF] Arturo Madrid, PT 03/10/20 1129      Row Name 03/10/20 0915             Positioning and Restraints    Pre-Treatment Position  in bed  -MF      Post Treatment Position  bed  -MF      In Bed  supine;call light within reach  -MF      Recorded by [MF] Arturo Madrid, PT 03/10/20 1129      Row Name 03/10/20 0915             Pain Assessment    Additional Documentation  Pain Scale: FACES Pre/Post-Treatment  (Group)  -MF      Recorded by [MF] Arturo Madrid, PT 03/10/20 1129      Row Name 03/10/20 0915             Pain Scale: FACES Pre/Post-Treatment    Pain: FACES Scale, Pretreatment  0-->no hurt  -MF      Pain: FACES Scale, Post-Treatment  0-->no hurt  -MF      Recorded by [MF] Arturo Madrid, PT 03/10/20 1129      Row Name 03/10/20 0915             Wound 02/23/20 2230 Right groin Incision    Wound - Properties Group Date first assessed: 02/23/20 [EB] Time first assessed: 2230 [EB] Present on Hospital Admission: Y [EB] Side: Right [EB] Location: groin [EB] Primary Wound Type: Incision [EB] Recorded by:  [EB] Collette Hardy RN 02/23/20 2302    Dressing Appearance  dry;intact  -MF      Wound Output (mL)  0  -MF      Recorded by [MF] Arturo Madrid, PT 03/10/20 1129      Row Name 03/10/20 0915             Wound 02/23/20 2302 Left groin Incision    Wound - Properties Group Date first assessed: 02/23/20 [EB] Time first assessed: 2302 [EB] Present on Hospital Admission: Y [EB] Side: Left [EB] Location: groin [EB] Primary Wound Type: Incision [EB] Recorded by:  [EB] Collette Hardy RN 02/23/20 2303    Dressing Appearance  dry;intact  -MF      Wound Output (mL)  0  -MF      Recorded by [MF] Arturo Madrid, PT 03/10/20 1129      Row Name                Wound 03/04/20 0715 Right lateral chest Puncture    Wound - Properties Group Date first assessed: 03/04/20 [JJ] Time first assessed: 0715 [JJ] Present on Hospital Admission: N [JJ] Side: Right [JJ] Orientation: lateral [JJ] Location: chest [JJ] Primary Wound Type: Puncture [JJ], s/p chest tube site  Recorded by:  [JJ] Edgar Kenny, RN 03/04/20 0937    Row Name                Wound 03/10/20 0005 Right anterior;upper arm Puncture    Wound - Properties Group Date first assessed: 03/10/20 [SS] Time first assessed: 0005 [SS] Present on Hospital Admission: N [SS] Side: Right [SS] Orientation: anterior;upper [SS] Location: arm [SS] Primary Wound Type:  Puncture [SS] Additional Comments: PICC line removal  [SS] Recorded by:  [SS] Bria Oliver, RN 03/10/20 0015    Row Name 03/10/20 0915             NPWT (Negative Pressure Wound Therapy) 02/28/20 1200 groin    NPWT (Negative Pressure Wound Therapy) - Properties Group Placement Date: 02/28/20 [MF] Placement Time: 1200 [MF] Location: groin [MF] Recorded by:  [] Arturo Madrid, PT 03/02/20 1129    Therapy Setting  continuous therapy  -      Pressure Setting  125 mmHg  -MF      Recorded by [] Arturo Madrid, PT 03/10/20 1129      Row Name 03/10/20 0915             Coping    Observed Emotional State  accepting;cooperative;calm  -MF      Verbalized Emotional State  acceptance  -MF      Recorded by [] Arturo Madird, PT 03/10/20 1129      Row Name 03/10/20 0915             Plan of Care Review    Plan of Care Reviewed With  patient  -      Outcome Summary  B groin wound vacs intact with no issues noted. Pt with possible d/c tomorrow and MD requesting removal of wound vac prior to d/c   -MF      Recorded by [] Arturo Madrid, PT 03/10/20 1129        User Key  (r) = Recorded By, (t) = Taken By, (c) = Cosigned By    Initials Name Effective Dates Discipline     Arturo Madrid, PT 06/19/15 -  PT    Edgar Bryan, RN 06/16/16 -  Nurse    Collette Bemran RN 11/14/19 -  Nurse    Bria Rubalcava RN 06/23/17 -  Nurse                PT Recommendation and Plan  Anticipated Equipment Needs at Discharge (PT): front wheeled walker  Anticipated Discharge Disposition (PT): (SNF if medically appropriate.)  Planned Therapy Interventions (PT Eval): balance training, bed mobility training, gait training, home exercise program, transfer training, strengthening  Therapy Frequency (PT Clinical Impression): daily               Outcome Summary: B groin wound vacs intact with no issues noted. Pt with possible d/c tomorrow and MD requesting removal of wound vac prior to d/c   Plan of Care  Reviewed With: patient            Time Calculation  PT Charges     Row Name 03/10/20 0900             Time Calculation    Start Time  0900  -      PT Goal Re-Cert Due Date  03/13/20  -        User Key  (r) = Recorded By, (t) = Taken By, (c) = Cosigned By    Initials Name Provider Type    Arturo Lopez, PT Physical Therapist           Therapy Charges for Today     Code Description Service Date Service Provider Modifiers Qty    99793910769 HC PT NEG PRESS WOUND TO 50SQCM DME1 3/10/2020 Arturo Madrid, PT  1            PT G-Codes  Outcome Measure Options: AM-PAC 6 Clicks Basic Mobility (PT)  AM-PAC 6 Clicks Score (PT): 24        Arturo Madrid, PT  3/10/2020

## 2020-03-10 NOTE — NURSING NOTE
While Elina RN was changing PICC dressing, pt moved arm causing catheter to slide out approximately 6 cm. PICC nurse notified. Chest xray order received and completed. Dr. Melo notified. Per Dr. Melo, PICC does not need emergently replaced tonight and can be used until she is able to check chest xray tomorrow.

## 2020-03-10 NOTE — SIGNIFICANT NOTE
Prior to central line removal, order for the removal of catheter was verified, patient was assessed, necessary materials were gathered and patient was educated regarding procedure .    Patient was positioned supine to ensure that the insertion site was at or below the level of the heart.    Hands were washed, clean gloves were applied and central line dressing was gently removed. Catheter exit site was not cultured.     A new pair of clean gloves were then applied. Insertion site was cleansed with 2% Chlorhexidine swab using a circular motion beginning at the insertion site and moving outward for 30 seconds and allowed to dry.     Clamp on line was not present.     Patient was instructed to perform Valsalva maneuver as catheter was withdrawn.     The central line was grasped at the insertion site and slowly pulled outward parallel to the skin. Resistance was not met.    After central line was completely removed, a sterile 4x4 gauze pad was used to apply light pressure until bleeding stopped. At that time, petroleum-based gauze and a sterile occlusive dressing was applied to exit site.     Patient was instructed to keep dressing in place for at least 24 hours and to remain in a flat or reclining position for 30 minutes post-removal.     Catheter was inspected after removal and was intact. Tip of central line was not sent for culture. Patient tolerated procedure.

## 2020-03-10 NOTE — PLAN OF CARE
Problem: Patient Care Overview  Goal: Plan of Care Review  Flowsheets  Taken 3/10/2020 0325  Progress: improving  Outcome Summary: VSS, eating well, right triple lumen PICC removed and singal luman PICC in left arm placed, no complaints, hopefully home with home health tomorrow  Taken 3/9/2020 2000  Plan of Care Reviewed With: patient

## 2020-03-10 NOTE — DISCHARGE PLACEMENT REQUEST
"Saskia Groves (49 y.o. Female)     Toyin Dixon RN   [phone]        Date of Birth Social Security Number Address Home Phone MRN    1970  [address] [place] [address] [phone] [id number]    Muslim Marital Status          None        Admission Date Admission Type Admitting Provider Attending Provider Department, Room/Bed    2/23/20 Urgent Kip Auguste MD Furlow, Stephen Matthew, MD [address], [address]/1    Discharge Date Discharge Disposition Discharge Destination                       Attending Provider:  Kip Auguste MD    Allergies:  Levaquin [Levofloxacin], Penicillins, Codeine, Flagyl [Metronidazole]    Isolation:  None   Infection:  MRSA (02/26/20)   Code Status:  CPR    Ht:  160 cm (62.99\")   Wt:  63.6 kg (140 lb 4.8 oz)    Admission Cmt:  None   Principal Problem:  Acute hypoxemic respiratory failure (CMS/[facility]) [J96.01]                 Active Insurance as of 2/23/2020     Primary Coverage     Payor Plan Insurance Group Employer/Plan Group    [employer] [place] [employer]LL[place]ICA[place]      Payor Plan Address Payor Plan Phone Number Payor Plan Fax Number Effective Dates    [address] [address] [phone]  1/6/2020 - None Entered    [facility] [id number]       Subscriber Name Subscriber Birth Date Member ID       SASKIA GROVES 1970 [id number]                 Emergency Contacts      (Rel.) Home Phone Work Phone Mobile Phone    Thuan Groves (Spouse) [phone] -- --            Insurance Information                [facility]/WELLCARE MEDICAID Phone: [phone]    Subscriber: Saskia Groves Subscriber#: [id number]    Group#:  Precert#:             Current Facility-Administered Medications   Medication Dose Route Frequency Provider Last Rate Last Dose   • acetaminophen (TYLENOL) tablet 650 mg  650 mg Oral Q4H PRN Isael Bailey PA   650 mg at 03/07/20 2217    Or   • acetaminophen (TYLENOL) 160 MG/5ML solution " 650 mg  650 mg Oral Q4H PRN Isael Bailey PA   650 mg at 03/04/20 1226    Or   • acetaminophen (TYLENOL) suppository 650 mg  650 mg Rectal Q4H PRN Isael Bailey PA   650 mg at 02/28/20 0429   • albuterol (PROVENTIL) nebulizer solution 0.083% 2.5 mg/3mL  2.5 mg Nebulization Q6H PRN Isael Bailey PA       • amitriptyline (ELAVIL) tablet 10 mg  10 mg Oral Nightly Isael Bailey PA   10 mg at 03/09/20 2215   • aspirin tablet 325 mg  325 mg Oral Daily Isael Bailey PA   325 mg at 03/10/20 0835   • atorvastatin (LIPITOR) tablet 10 mg  10 mg Oral Daily Isael Bailey PA   10 mg at 03/10/20 0835   • budesonide (PULMICORT) nebulizer solution 0.5 mg  0.5 mg Nebulization BID - RT Isael Bailey PA   0.5 mg at 03/10/20 0858   • clopidogrel (PLAVIX) tablet 75 mg  75 mg Oral Daily Isael Bailey PA   75 mg at 03/10/20 0835   • DAPTOmycin (CUBICIN) 500 mg/50 mL in sodium chloride  8 mg/kg Intravenous Q24H Collette Melo MD   500 mg at 03/10/20 0553   • dextrose (D50W) 25 g/ 50mL Intravenous Solution 25 g  25 g Intravenous Q15 Min PRN Isael Bailey PA   25 g at 03/07/20 1828   • dextrose (GLUTOSE) oral gel 15 g  15 g Oral Q15 Min PRN Isael Bailey PA       • famotidine (PEPCID) tablet 20 mg  20 mg Oral BID AC Isael Bailey PA   20 mg at 03/10/20 0554   • glucagon (human recombinant) (GLUCAGEN DIAGNOSTIC) injection 1 mg  1 mg Subcutaneous Q15 Min PRN Isael Bailey PA       • glucagon (human recombinant) (GLUCAGEN DIAGNOSTIC) injection 1 mg  1 mg Subcutaneous Q15 Min PRN Kip Auguste MD       • guaiFENesin-dextromethorphan (ROBITUSSIN DM) 100-10 MG/5ML syrup 5 mL  5 mL Oral Q6H PRN Isael Bailey PA   5 mL at 02/27/20 0920   • hydrALAZINE (APRESOLINE) injection 10 mg  10 mg Intravenous Q6H PRN Isael Bailey PA   10 mg at 03/04/20 0628   • HYDROcodone-acetaminophen (NORCO)  MG per tablet 1 tablet  1 tablet Oral Q8H PRN Leo,  PEDRO Salazar   1 tablet at 20 2215   • insulin detemir (LEVEMIR) injection 15 Units  15 Units Subcutaneous Q12H Isael Bailey PA   15 Units at 03/10/20 0831   • insulin lispro (humaLOG) injection 0-9 Units  0-9 Units Subcutaneous 4x Daily With Meals & Nightly Kip Auguste MD   2 Units at 03/10/20 0832   • insulin lispro (humaLOG) injection 3 Units  3 Units Subcutaneous TID With Meals Kip Auguste MD   3 Units at 03/10/20 0832   • ketotifen (ZADITOR) 0.025 % ophthalmic solution 1 drop  1 drop Both Eyes BID Isael Bailey PA   1 drop at 03/10/20 0837   • lactobacillus acidophilus (RISAQUAD) capsule 1 capsule  1 capsule Oral Daily Isael Bailey PA   1 capsule at 03/10/20 0834   • metoprolol tartrate (LOPRESSOR) tablet 100 mg  100 mg Oral Q12H Isael Bailey PA   100 mg at 03/10/20 0835   • pantoprazole (PROTONIX) EC tablet 40 mg  40 mg Oral Q AM Therese Hollis, [facility]   40 mg at 03/10/20 0554   • pregabalin (LYRICA) capsule 150 mg  150 mg Oral Q8H Isael Bailey PA   150 mg at 03/10/20 0554   • rOPINIRole (REQUIP) tablet 4 mg  4 mg Oral Nightly Isael Bailey PA   4 mg at 20 2217   • sodium chloride 0.9 % flush 10 mL  10 mL Intravenous Q12H Collette Melo MD   10 mL at 03/10/20 0835   • sodium chloride 0.9 % flush 10 mL  10 mL Intravenous PRN Collette Melo MD       • sodium chloride 0.9 % flush 20 mL  20 mL Intravenous PRN Collette Melo MD            Physician Progress Notes (last 24 hours) (Notes from 20 through 03/10/20 09)      Modesto Whittaker MD at 03/10/20 0853            Plastic Surgery Progress Note      Admission Date:  2020  LOS:  16  Patient Care Team:  Meagan Barlow APRN as PCP - General (Family Medicine)  Tyson Donaldson DO as Consulting Physician (Cardiology)    Patient Name:  Saskia Groves  :  1970  MRN:  [id number]    Date:  3/10/2020    Subjective:  No acute events overnight, WBC  trending down, MELISSA drains low output      History:   Past Medical History:   Diagnosis Date   • Anxiety    • Arthritis    • Asthma    • Carotid artery stenosis    • Chronic bronchitis (CMS/HCC)    • COPD (chronic obstructive pulmonary disease) (CMS/HCC)    • Coronary artery disease     2 vessels with 50% blockage.  Sees Dr. Donaldson   • Depression    • Diabetes mellitus (CMS/HCC)    • Dyslipidemia    • GERD (gastroesophageal reflux disease)    • Hiatal hernia    • Hyperlipidemia    • Hypertension    • Infectious viral hepatitis    • Lower back pain    • Migraine    • Multinodular goiter    • S/P thyroid biopsy     2008, 2013, 2015, and 07/15/2019 at [facility], right lobe nodules - all cytologies were benign   • Sleep apnea     can't tolerate the cpap mask   • Subclinical hyperthyroidism      Past Surgical History:   Procedure Laterality Date   • ANTERIOR CERVICAL DISCECTOMY W/ FUSION     • BACK SURGERY      lumbar   •  SECTION  19940    x 2    • CHOLECYSTECTOMY     • COLONOSCOPY     • ENDOSCOPY     • FEMORAL ARTERY CUTDOWN Bilateral 2020    Procedure: FEMORAL ARTERY CUTDOWN WITH REMOVAL OF FEMORAL FEMORAL BYPASS GRAFT BILATERAL;  Surgeon: Deandre Oseguera MD;  Location:  [facility];  Service: Vascular;  Laterality: Bilateral;   • FEMORAL ENDARTERECTOMY Bilateral 1/15/2020    Procedure: FEMORAL ENDARTERECTOMY BILATERAL;  Surgeon: Deandre Oseguera MD;  Location:  [facility];  Service: Vascular   • FEMORAL FEMORAL BYPASS Right 1/15/2020    Procedure: FEMORAL FEMORAL BYPASS;  Surgeon: Deandre Oseguera MD;  Location:  [facility];  Service: Vascular   • HYSTERECTOMY     • KNEE SURGERY Left    • LEG EXCISION LESION/CYST Left 2020    Procedure: GROIN MUSCLE FLAP BILATERAL;  Surgeon: Modesto Whittaker MD;  Location:  [facility];  Service: Plastics;  Laterality: Left;   • WRIST SURGERY Left      Family History   Problem Relation Age of Onset   • Diabetes Mother    • Hypertension Mother    • Arthritis Mother     • Hyperlipidemia Mother    • Migraines Mother    • Thyroid disease Mother    • Hypertension Father    • Lung cancer Father    • Cancer Father    • Stroke Other    • Migraines Maternal Aunt    • Thyroid disease Maternal Aunt    • Heart attack Maternal Aunt    • Osteoporosis Maternal Aunt    • Cancer Maternal Uncle    • Heart attack Maternal Uncle    • Stroke Maternal Uncle    • Hyperlipidemia Maternal Grandmother    • Cancer Maternal Grandmother    • Obesity Maternal Grandmother    • Thyroid disease Daughter    • Obesity Daughter      Social History     Socioeconomic History   • Marital status:      Spouse name: Not on file   • Number of children: 2   • Years of education: Not on file   • Highest education level: Not on file   Occupational History   • Occupation: [employer]     Employer: DISABLED     Comment: Back and Leg Pain   Tobacco Use   • Smoking status: Current Every Day Smoker     Packs/day: 1.00     Years: 35.00     Pack years: 35.00     Types: Cigarettes   • Smokeless tobacco: Never Used   Substance and Sexual Activity   • Alcohol use: Not Currently   • Drug use: No   • Sexual activity: Yes     Partners: Male     Comment:    Social History Narrative    Lives in [facility]     Allergies   Allergen Reactions   • Levaquin [Levofloxacin] Nausea And Vomiting   • Penicillins Nausea And Vomiting     Has tolerated cefepime 2/2020   • Codeine Nausea Only   • Flagyl [Metronidazole] Nausea And Vomiting       Medication:    Current Facility-Administered Medications:   •  acetaminophen (TYLENOL) tablet 650 mg, 650 mg, Oral, Q4H PRN, 650 mg at 03/07/20 2217 **OR** acetaminophen (TYLENOL) 160 MG/5ML solution 650 mg, 650 mg, Oral, Q4H PRN, 650 mg at 03/04/20 1226 **OR** acetaminophen (TYLENOL) suppository 650 mg, 650 mg, Rectal, Q4H PRN, Isael Bailey PA, 650 mg at 02/28/20 0429  •  albuterol (PROVENTIL) nebulizer solution 0.083% 2.5 mg/3mL, 2.5 mg, Nebulization, Q6H PRN, Leo  PEDRO Salazar  •  amitriptyline (ELAVIL) tablet 10 mg, 10 mg, Oral, Nightly, Isael Bailey PA, 10 mg at 03/09/20 2215  •  aspirin tablet 325 mg, 325 mg, Oral, Daily, Isael Bailey PA, 325 mg at 03/10/20 0835  •  atorvastatin (LIPITOR) tablet 10 mg, 10 mg, Oral, Daily, Isael Bailey PA, 10 mg at 03/10/20 0835  •  budesonide (PULMICORT) nebulizer solution 0.5 mg, 0.5 mg, Nebulization, BID - RT, Isael Bailey PA, 0.5 mg at 03/09/20 1908  •  clopidogrel (PLAVIX) tablet 75 mg, 75 mg, Oral, Daily, Isael Bailey PA, 75 mg at 03/10/20 0835  •  DAPTOmycin (CUBICIN) 500 mg/50 mL in sodium chloride, 8 mg/kg, Intravenous, Q24H, Collette Melo MD, 500 mg at 03/10/20 0553  •  dextrose (D50W) 25 g/ 50mL Intravenous Solution 25 g, 25 g, Intravenous, Q15 Min PRN, Isael Bailey PA, 25 g at 03/07/20 1828  •  dextrose (GLUTOSE) oral gel 15 g, 15 g, Oral, Q15 Min PRN, Isael Bailey PA  •  famotidine (PEPCID) tablet 20 mg, 20 mg, Oral, BID AC, Isael Bailey PA, 20 mg at 03/10/20 0554  •  glucagon (human recombinant) (GLUCAGEN DIAGNOSTIC) injection 1 mg, 1 mg, Subcutaneous, Q15 Min PRN, Isael Bailey PA  •  glucagon (human recombinant) (GLUCAGEN DIAGNOSTIC) injection 1 mg, 1 mg, Subcutaneous, Q15 Min PRN, Kip Auguste MD  •  guaiFENesin-dextromethorphan (ROBITUSSIN DM) 100-10 MG/5ML syrup 5 mL, 5 mL, Oral, Q6H PRN, Isael Bailey PA, 5 mL at 02/27/20 0920  •  hydrALAZINE (APRESOLINE) injection 10 mg, 10 mg, Intravenous, Q6H PRN, Isael Bailey PA, 10 mg at 03/04/20 0628  •  HYDROcodone-acetaminophen (NORCO)  MG per tablet 1 tablet, 1 tablet, Oral, Q8H PRN, Isael Bailey PA, 1 tablet at 03/09/20 2215  •  insulin detemir (LEVEMIR) injection 15 Units, 15 Units, Subcutaneous, Q12H, Isael Bailey PA, 15 Units at 03/10/20 0831  •  insulin lispro (humaLOG) injection 0-9 Units, 0-9 Units, Subcutaneous, 4x Daily With Meals & Nightly, Kip Auguste  MD Lorenzo, 2 Units at 03/10/20 0832  •  insulin lispro (humaLOG) injection 3 Units, 3 Units, Subcutaneous, TID With Meals, Kip Auguste MD, 3 Units at 03/10/20 0832  •  ketotifen (ZADITOR) 0.025 % ophthalmic solution 1 drop, 1 drop, Both Eyes, BID, Isael Bailey PA, 1 drop at 03/10/20 0837  •  lactobacillus acidophilus (RISAQUAD) capsule 1 capsule, 1 capsule, Oral, Daily, Isael Bailey PA, 1 capsule at 03/10/20 0834  •  metoprolol tartrate (LOPRESSOR) tablet 100 mg, 100 mg, Oral, Q12H, Isael Bailey PA, 100 mg at 03/10/20 0835  •  pantoprazole (PROTONIX) EC tablet 40 mg, 40 mg, Oral, Q AM, Therese Hollis, [facility], 40 mg at 03/10/20 0554  •  pregabalin (LYRICA) capsule 150 mg, 150 mg, Oral, Q8H, Isael Bailey PA, 150 mg at 03/10/20 0554  •  rOPINIRole (REQUIP) tablet 4 mg, 4 mg, Oral, Nightly, Isael Bailey PA, 4 mg at 03/09/20 2217  •  sodium chloride 0.9 % flush 10 mL, 10 mL, Intravenous, Q12H, Collette Melo MD, 10 mL at 03/10/20 0835  •  sodium chloride 0.9 % flush 10 mL, 10 mL, Intravenous, PRN, Collette Melo MD  •  sodium chloride 0.9 % flush 20 mL, 20 mL, Intravenous, PRN, Collette Melo MD    Antibiotics:  Anti-Infectives (From admission, onward)    Ordered     Dose/Rate Route Frequency Start Stop    03/03/20 1942  DAPTOmycin (CUBICIN) 500 mg/50 mL in sodium chloride     Therese Hollis, [facility] reviewed the order on 03/09/20 0852.   Ordering Provider:  Collette Melo MD    8 mg/kg × 60.3 kg  over 30 Minutes Intravenous Every 24 Hours 03/04/20 0600 04/20/20 2359    02/28/20 0746  meropenem (MERREM) 1 g/100 mL 0.9% NS VTB (mbp)     Ordering Provider:  Parag Gordon MD    1 g  over 30 Minutes Intravenous Once 02/28/20 0845 02/28/20 0921    02/24/20 0831  cefepime (MAXIPIME) 2 g/100 mL 0.9% NS (mbp)     Ordering Provider:  Ranjana Easley APRN    2 g  200 mL/hr over 30 Minutes Intravenous Once 02/24/20 0930 02/24/20 1157    02/24/20 0203   vancomycin 500 mg/100 mL 0.9% NS IVPB (mbp)     Ordering Provider:  Ced Kenny RP    500 mg  100 mL/hr over 60 Minutes Intravenous Once 20 0300 20 0511            Objective:    Physical Exam:   Vital Signs:  Temp (24hrs), Av.3 °F (36.8 °C), Min:98 °F (36.7 °C), Max:98.5 °F (36.9 °C)    Temp  Min: 98 °F (36.7 °C)  Max: 98.5 °F (36.9 °C)  BP  Min: 129/99  Max: 139/81  Pulse  Min: 92  Max: 108  Resp  Min: 16  Max: 18  SpO2  Min: 94 %  Max: 96 %       GENERAL: Awake and alert, in no acute distress. extubated  HEART: RRR;   LUNGS: Nonlabored. No dullness. intubated  ABDOMEN: Soft, nontender, nondistended. No rebound or guarding. NO mass or HSM.  EXT:  No cyanosis, clubbing or edema. No cord.  : Benavidez catheter in place.  SKIN: Warm and dry without cutaneous eruptions on Inspection/palpation.    NEURO: Oriented to PPT. No focal deficits on motor/sensory exam at arms/legs.  PSYCHIATRIC: Normal insight and judgement. Cooperative with PE  Bilateral Groins: incisional wound vac in place to suction, drains appropriate output, no seroma or hematoma on exam, soft, appropriate tenderness to palpation, no ecchymosis       Laboratory Data:  Results from last 7 days   Lab Units 03/10/20  0506 20  0321 20  0418   WBC 10*3/mm3 13.08* 16.57* 8.40   HEMOGLOBIN g/dL 10.0* 10.5* 10.4*   HEMATOCRIT % 32.2* 33.7* 34.0   PLATELETS 10*3/mm3 188 181 189     Results from last 7 days   Lab Units 03/10/20  0506   SODIUM mmol/L 139   POTASSIUM mmol/L 5.4*   CHLORIDE mmol/L 105   CO2 mmol/L 26.0   BUN mg/dL 42*   CREATININE mg/dL 0.92   GLUCOSE mg/dL 190*   CALCIUM mg/dL 8.5*     Results from last 7 days   Lab Units 20  0321   ALK PHOS U/L 138*   BILIRUBIN mg/dL 0.2   ALT (SGPT) U/L 23   AST (SGOT) U/L 27         Results from last 7 days   Lab Units 20  1349   CRP mg/dL 1.11*         Results from last 7 days   Lab Units 20  0321 20  0418 20  0344   CK TOTAL U/L 29 30 29     Results  from last 7 days   Lab Units 03/04/20  0341   VANCOMYCIN RM mcg/mL 13.80     Estimated Creatinine Clearance: 65.7 mL/min (by C-G formula based on SCr of 0.92 mg/dL).    Microbiology:  No results found for: ACANTHNAEG, AFBCX, BPERTUSSISCX, BLOODCX  No results found for: BCIDPCR, CXREFLEX, CSFCX, CULTURETIS  No results found for: CULTURES, HSVCX, URCX  No results found for: EYECULTURE, GCCX, LABHSV  No results found for: LEGIONELLA, MRSACX, MUMPSCX, MYCOPLASCX  No results found for: NOCARDIACX, STOOLCX  No results found for: THROATCX, UNSTIMCULT, URINECX, CULTURE, VZVCULTUR  No results found for: VIRALCULTU, WOUNDCX         Assessment: 49yoF s/p 12/28/20 diagnostic fiberoptic bronchoscopy, Rtchest tube placement, removal of femoral-femoral bypass, bilateral CFA pericardial patches, bilateral Sartorious muscle flaps for groin graft coverage.         Plan:  - no acute surgery at this point in time, incisional wound vac in place to suction, no seroma or hematoma on PE, Lower extremity perfused, concerning about the elevated WBC source which is now trending down.  - Discussed with patient the importance of going to a Rehab facility for strengthening, IV abx management, glucose control, and nicotine cessation would increase her chances of not having a complication and keeping her legs. Will try to talk with family later today about going to a facility for theses things.     - continue abx per ID  - continue incisional wound vac, will discontinue tomorrow  - dc MELISSA drains today, change dressings to sites BID and as needed to keep dry  - may sit up in chair, may stand, patient may walk, per plastic surgery  - disposition, Patient may leave from a plastic surgery standpoint tomorrow if the WBC continues to trend down, please notify Dr. Modesto Whittaker prior to discharge.   - follow up with Dr. Whittaker in 1 week following discharge                 Modesto Whittaker MD  03/10/20  08:54        Electronically signed by Modesto Whittaker MD at  "03/10/20 0905     Deandre Oseguera MD at 03/10/20 0650          Saskia Groves  [id number]  1970     LOS: 16 days   Patient Care Team:  Meagan Barlow APRN as PCP - General (Family Medicine)  Tyson Donaldson DO as Consulting Physician (Cardiology)    Chief Complaint: Infected femoral-femoral bypass      Subjective: Resting comfortably    Objective:     Vital Sign Min/Max for last 24 hours  Temp  Min: 98.3 °F (36.8 °C)  Max: 98.5 °F (36.9 °C)   BP  Min: 129/99  Max: 159/91   Pulse  Min: 92  Max: 108   Resp  Min: 16  Max: 18   SpO2  Min: 94 %  Max: 96 %   No data recorded   No data recorded     Flowsheet Rows      First Filed Value   Admission Height  160 cm (62.99\") Documented at 02/23/2020 2240   Admission Weight  60.4 kg (133 lb 1.6 oz) Documented at 02/23/2020 2240          Physical Exam:    Wound: Satisfactory    Pulses:     Mediastinal and Chest Tube Drainage:       Results Review:   Results from last 7 days   Lab Units 03/10/20  0506   WBC 10*3/mm3 13.08*   HEMOGLOBIN g/dL 10.0*   HEMATOCRIT % 32.2*   PLATELETS 10*3/mm3 188     Results from last 7 days   Lab Units 03/10/20  0506   SODIUM mmol/L 139   POTASSIUM mmol/L 5.4*   CHLORIDE mmol/L 105   CO2 mmol/L 26.0   BUN mg/dL 42*   CREATININE mg/dL 0.92   GLUCOSE mg/dL 190*   CALCIUM mg/dL 8.5*             Assessment      Acute hypoxemic respiratory failure (CMS/HCC)    PVD (peripheral vascular disease) (CMS/HCC)    Current smoker    COPD (chronic obstructive pulmonary disease) (CMS/HCC)    Coronary artery disease    Hypertension    Hyperlipidemia    Diabetes mellitus (CMS/HCC)    Sepsis (CMS/HCC)    Pleural effusion - bilateral mod/large pleural effusions on CT chest 2/27/2020.     Acute pulmonary edema (CMS/HCC)      Discharge when all agree        Deandre Oseguera MD  03/10/20  6:50 AM      Please note that portions of this note were completed with a voice recognition program. Efforts were made to edit the dictations, but words may be " "mistranscribed    Electronically signed by Deandre Oseguera MD at 03/10/20 0662     Collette Melo MD at 03/09/20 2152          INFECTIOUS DISEASE PROGRESS NOTE     Saskia Groves  1970  [id number]      Admission Date: 2/23/2020    Antibiotic therapy:   Daptomycin    Requesting Provider: Deandre Oseguera MD     Reason for Consultation: Left groin wound infection     History of present illness:  2/24/20:  Patient is a 49 y.o. female seen today for a left groin wound infection.  She underwent femoral endardectomy, and femorofemoral  Bypass with gortex graft on 1/15.  She has been having fevers 100 degrees and above since 1/17. She was seen last week by her PCP and given Bactrim but she couldn't tolerate it and Ceftin was called in. Her left groin continued to remain swollen and warm. She had a venous duplex to rule out DVT and then developed a \"knot\" of her left groin. She presented to the ED at [facility], was given Vancomycin and Merrem and transferred to [facility]. Tmax of 100.5 degrees without leukocytosis, normal lactate and PCT. She is currently on Vancomycin and Merrem and we were consulted for evaluation and treatment.   Labs at [facility] with WBC of 10.4, normal lactate, PCT.  She was tachycardic with tmax of 102.3.  She had fevers at home documented to over 103 degrees.  2/25/20 : She spontaneously drained a copious amount of slightly cloudy straw-colored fluid from her left groin yesterday and has continued to drain.  She complains of cough \"other than that I feel great\".  No n/v//d.  Left groin less tender.   2/26/20:  Graft to be removed on Friday.   She remains afebrile. She doesn't \"feel very good today\".  Family at bedside.   2/27/20:  Surgery scheduled for tomorrow.  She is tearful and afraid she will lose her leg.  She remains afebrile.    2/28/2020: She deteriorated this morning with hypoxemia and bilateral pulmonary opacifications with pleural effusions.  He had a fever to 101 degrees " last night.  I discussed her complex situation with Dr. Deandre Oseguera early today and also this evening.  She was taken to surgery today and her left femoral graft was removed.  A muscle flap was placed.  She is now endotracheally intubated and mechanically ventilated.  She underwent a thoracentesis revealing clear fluid.  Intravenous Merrem was added to her vancomycin this morning due to concerns about possible aspiration pneumonia.  She does have a history of penicillin allergy.  2/29/2020: She remains on ventilatory support.  She has only modest respiratory secretions.  She is still requiring an FiO2 of 60%.  She has been afebrile overnight.  She remains sedated.    Following for Dr. Parag Gordon:  3/1/20: Remains sedated on vent.  Tmax 99.3, FiO2 40% and PEEP 6.  She has a wound VAC on right ground with MELISSA drain with bloody drainage.  She has a right chest tube.  Small amount of clear to red-streaked ETT secretions. On tube feeding.   3/2  Extubated, afebrile, intermittently cooperative  Ongoing issues with hypertension; 90 from chest tube yesterday Day# 8 merrem (intermittent dosing);  vanc trough 38  3/3  Afebrile, reluctant to cooperate with attempts to mobilize, alert, 60 from CT yesterday, no new + cultures, random vanc 26  3/4  Alert, afebrile, more interactive  3/5 alert, afebrile, more interactive and joking with RN, stronger, failed SLP exam yesterday and remains on tube feeding. Having frequent stools and lactulose (which she took at home) has been stopped  3/6 alert, afebrile, chest tube out, feeding tube out after she passed SLP eval  3/9 alert, afebrile, walking in jewell with walker, placement not feasible with her insurance according to daughter; per Dr Whittakre, the wound vac can be removed at discharge; WBC up to 16K now        Past Medical History:   Diagnosis Date   • Anxiety    • Arthritis    • Asthma    • Carotid artery stenosis    • Chronic bronchitis (CMS/HCC)    • COPD (chronic obstructive  pulmonary disease) (CMS/HCC)    • Coronary artery disease     2 vessels with 50% blockage.  Sees Dr. Donaldson   • Depression    • Diabetes mellitus (CMS/HCC)    • Dyslipidemia    • GERD (gastroesophageal reflux disease)    • Hiatal hernia    • Hyperlipidemia    • Hypertension    • Infectious viral hepatitis    • Lower back pain    • Migraine    • Multinodular goiter    • S/P thyroid biopsy     2008, 2013, 2015, and 07/15/2019 at [facility], right lobe nodules - all cytologies were benign   • Sleep apnea     can't tolerate the cpap mask   • Subclinical hyperthyroidism        Past Surgical History:   Procedure Laterality Date   • ANTERIOR CERVICAL DISCECTOMY W/ FUSION     • BACK SURGERY      lumbar   •  SECTION  19940    x 2    • CHOLECYSTECTOMY     • COLONOSCOPY     • ENDOSCOPY     • FEMORAL ARTERY CUTDOWN Bilateral 2020    Procedure: FEMORAL ARTERY CUTDOWN WITH REMOVAL OF FEMORAL FEMORAL BYPASS GRAFT BILATERAL;  Surgeon: Deandre Oseguera MD;  Location:  [facility];  Service: Vascular;  Laterality: Bilateral;   • FEMORAL ENDARTERECTOMY Bilateral 1/15/2020    Procedure: FEMORAL ENDARTERECTOMY BILATERAL;  Surgeon: Deandre Oseguera MD;  Location:  [facility];  Service: Vascular   • FEMORAL FEMORAL BYPASS Right 1/15/2020    Procedure: FEMORAL FEMORAL BYPASS;  Surgeon: Deandre Oseguera MD;  Location:  [facility];  Service: Vascular   • HYSTERECTOMY     • KNEE SURGERY Left    • LEG EXCISION LESION/CYST Left 2020    Procedure: GROIN MUSCLE FLAP BILATERAL;  Surgeon: Modesto Whittaker MD;  Location:  [facility];  Service: Plastics;  Laterality: Left;   • WRIST SURGERY Left        Family History   Problem Relation Age of Onset   • Diabetes Mother    • Hypertension Mother    • Arthritis Mother    • Hyperlipidemia Mother    • Migraines Mother    • Thyroid disease Mother    • Hypertension Father    • Lung cancer Father    • Cancer Father    • Stroke Other    • Migraines Maternal Aunt    • Thyroid disease  Maternal Aunt    • Heart attack Maternal Aunt    • Osteoporosis Maternal Aunt    • Cancer Maternal Uncle    • Heart attack Maternal Uncle    • Stroke Maternal Uncle    • Hyperlipidemia Maternal Grandmother    • Cancer Maternal Grandmother    • Obesity Maternal Grandmother    • Thyroid disease Daughter    • Obesity Daughter        Social History     Socioeconomic History   • Marital status:      Spouse name: Not on file   • Number of children: 2   • Years of education: Not on file   • Highest education level: Not on file   Occupational History   • 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     Employer: DISABLED     Comment: Back and Leg Pain   Tobacco Use   • Smoking status: Current Every Day Smoker     Packs/day: 1.00     Years: 35.00     Pack years: 35.00     Types: Cigarettes   • Smokeless tobacco: Never Used   Substance and Sexual Activity   • Alcohol use: Not Currently   • Drug use: No   • Sexual activity: Yes     Partners: Male     Comment:    Social History Narrative    Lives in [facility]       Allergies   Allergen Reactions   • Levaquin [Levofloxacin] Nausea And Vomiting   • Penicillins Nausea And Vomiting     Has tolerated cefepime 2/2020   • Codeine Nausea Only   • Flagyl [Metronidazole] Nausea And Vomiting         Medication:    Current Facility-Administered Medications:   •  acetaminophen (TYLENOL) tablet 650 mg, 650 mg, Oral, Q4H PRN, 650 mg at 03/07/20 2217 **OR** acetaminophen (TYLENOL) 160 MG/5ML solution 650 mg, 650 mg, Oral, Q4H PRN, 650 mg at 03/04/20 1226 **OR** acetaminophen (TYLENOL) suppository 650 mg, 650 mg, Rectal, Q4H PRN, Isael Bailey PA, 650 mg at 02/28/20 0429  •  albuterol (PROVENTIL) nebulizer solution 0.083% 2.5 mg/3mL, 2.5 mg, Nebulization, Q6H PRN, Isael Bailey PA  •  amitriptyline (ELAVIL) tablet 10 mg, 10 mg, Oral, Nightly, Isael Bailey PA, 10 mg at 03/08/20 2026  •  aspirin tablet 325 mg, 325 mg, Oral, Daily, Isael Bailey PA, 325 mg at  03/09/20 0923  •  atorvastatin (LIPITOR) tablet 10 mg, 10 mg, Oral, Daily, Isael Bailey PA, 10 mg at 03/09/20 0923  •  budesonide (PULMICORT) nebulizer solution 0.5 mg, 0.5 mg, Nebulization, BID - RT, Isael Bailey PA, 0.5 mg at 03/09/20 0907  •  clopidogrel (PLAVIX) tablet 75 mg, 75 mg, Oral, Daily, Isael Bailey PA, 75 mg at 03/09/20 0923  •  DAPTOmycin (CUBICIN) 500 mg/50 mL in sodium chloride, 8 mg/kg, Intravenous, Q24H, Collette Melo MD, 500 mg at 03/09/20 0630  •  dextrose (D50W) 25 g/ 50mL Intravenous Solution 25 g, 25 g, Intravenous, Q15 Min PRN, Isael Bailey PA, 25 g at 03/07/20 1828  •  dextrose (GLUTOSE) oral gel 15 g, 15 g, Oral, Q15 Min PRN, Isael Bailey PA  •  famotidine (PEPCID) tablet 20 mg, 20 mg, Oral, BID AC, Isael Bailey PA, 20 mg at 03/09/20 0631  •  glucagon (human recombinant) (GLUCAGEN DIAGNOSTIC) injection 1 mg, 1 mg, Subcutaneous, Q15 Min PRN, Isael Bailey PA  •  glucagon (human recombinant) (GLUCAGEN DIAGNOSTIC) injection 1 mg, 1 mg, Subcutaneous, Q15 Min PRN, Kip Auguste MD  •  guaiFENesin-dextromethorphan (ROBITUSSIN DM) 100-10 MG/5ML syrup 5 mL, 5 mL, Oral, Q6H PRN, Isael Bailey PA, 5 mL at 02/27/20 0920  •  hydrALAZINE (APRESOLINE) injection 10 mg, 10 mg, Intravenous, Q6H PRN, Isael Bailey PA, 10 mg at 03/04/20 0628  •  HYDROcodone-acetaminophen (NORCO)  MG per tablet 1 tablet, 1 tablet, Oral, Q8H PRN, Isael Bailey PA, 1 tablet at 03/09/20 1256  •  insulin detemir (LEVEMIR) injection 15 Units, 15 Units, Subcutaneous, Q12H, Isael Bailey PA, 15 Units at 03/09/20 0922  •  insulin lispro (humaLOG) injection 0-9 Units, 0-9 Units, Subcutaneous, 4x Daily With Meals & Nightly, Kip Auguste MD, 2 Units at 03/09/20 1244  •  insulin lispro (humaLOG) injection 3 Units, 3 Units, Subcutaneous, TID With Meals, Kip Auguste MD, 3 Units at 03/09/20 1244  •  ketotifen (ZADITOR)  0.025 % ophthalmic solution 1 drop, 1 drop, Both Eyes, BID, Isael Bailey PA, 1 drop at 20 1246  •  lactobacillus acidophilus (RISAQUAD) capsule 1 capsule, 1 capsule, Oral, Daily, Isael Bailey PA, 1 capsule at 20 09  •  metoprolol tartrate (LOPRESSOR) tablet 100 mg, 100 mg, Oral, Q12H, Isael Bailey PA, 100 mg at 20 09  •  [START ON 3/10/2020] pantoprazole (PROTONIX) EC tablet 40 mg, 40 mg, Oral, Q AM, Therese Hollis, [facility]  •  pregabalin (LYRICA) capsule 150 mg, 150 mg, Oral, Q8H, Isael Bailey PA, 150 mg at 20  •  rOPINIRole (REQUIP) tablet 4 mg, 4 mg, Oral, Nightly, Isael Bailey PA, 4 mg at 20  •  Sodium Zirconium Cyclosilicate pack 10 g, 10 g, Oral, Once, Kip Auguste MD    Antibiotics:  Anti-Infectives (From admission, onward)    Ordered     Dose/Rate Route Frequency Start Stop    20 194  DAPTOmycin (CUBICIN) 500 mg/50 mL in sodium chloride     Therese Hollis, [facility] reviewed the order on 20 0852.   Ordering Provider:  Collette Melo MD    8 mg/kg × 60.3 kg  over 30 Minutes Intravenous Every 24 Hours 20 0600 20 2359    20 0746  meropenem (MERREM) 1 g/100 mL 0.9% NS VTB (mbp)     Ordering Provider:  Parag Gordon MD    1 g  over 30 Minutes Intravenous Once 20 0845 20 0921    20 0831  cefepime (MAXIPIME) 2 g/100 mL 0.9% NS (mbp)     Ordering Provider:  Ranjana Easley APRN    2 g  200 mL/hr over 30 Minutes Intravenous Once 20 0930 20 1157    20 0203  vancomycin 500 mg/100 mL 0.9% NS IVPB (mbp)     Ordering Provider:  Ced Kenny RPH    500 mg  100 mL/hr over 60 Minutes Intravenous Once 20 03020 0511                Physical Exam:   Vital Signs  Temp (24hrs), Av.7 °F (37.1 °C), Min:98.3 °F (36.8 °C), Max:99.3 °F (37.4 °C)    Temp  Min: 98.3 °F (36.8 °C)  Max: 99.3 °F (37.4 °C)  BP  Min: 100/68  Max: 159/91  Pulse  Min: 86   Max: 110  Resp  Min: 16  Max: 18  SpO2  Min: 92 %  Max: 95 %    GENERAL: extubated,sitting in chair, transferring with minimal assistance  HEENT: Normocephalic, atraumatic.  No conjunctival injection. No icterus.   HEART: RRR; No murmur, rubs, gallops.   LUNGS: diminished at lung bases bilaterally   ABDOMEN: Soft, nontender, nondistended. Positive bowel sounds. No rebound or guarding.   MSK:  No joint effusions   SKIN: Warm and dry without cutaneous eruptions on Inspection/palpation.   NEURO: sedated  Left groin with a wound VAC in place MELISSA drains in place with bloody drainage.        Laboratory Data    Results from last 7 days   Lab Units 03/09/20  0321 03/06/20  0418 03/05/20  0344   WBC 10*3/mm3 16.57* 8.40 9.39   HEMOGLOBIN g/dL 10.5* 10.4* 10.4*   HEMATOCRIT % 33.7* 34.0 33.9*   PLATELETS 10*3/mm3 181 189 216     Results from last 7 days   Lab Units 03/09/20  0321   SODIUM mmol/L 137   POTASSIUM mmol/L 5.6*   CHLORIDE mmol/L 104   CO2 mmol/L 26.0   BUN mg/dL 42*   CREATININE mg/dL 0.86   GLUCOSE mg/dL 117*   CALCIUM mg/dL 8.4*     Results from last 7 days   Lab Units 03/09/20  0321   ALK PHOS U/L 138*   BILIRUBIN mg/dL 0.2   ALT (SGPT) U/L 23   AST (SGOT) U/L 27         Results from last 7 days   Lab Units 03/07/20  1349   CRP mg/dL 1.11*         Results from last 7 days   Lab Units 03/09/20  0321 03/06/20  0418 03/05/20  0344   CK TOTAL U/L 29 30 29     Results from last 7 days   Lab Units 03/04/20  0341 03/03/20  0338   VANCOMYCIN RM mcg/mL 13.80 26.10     Estimated Creatinine Clearance: 70.3 mL/min (by C-G formula based on SCr of 0.86 mg/dL).      Microbiology:  2/24: BCx NG    2/24: wound few WBCs, GPC in pairs -- MRSA   2/28/2020: Operative cultures are growing MRSA  2/29/2020: Sputum culture is NGSF      Radiology:  Imaging Results (Last 72 Hours)     Procedure Component Value Units Date/Time    XR Chest 1 View [[id number]] Collected:  03/05/20 0836     Updated:  03/06/20 2300    Narrative:        EXAMINATION: XR CHEST 1 VW-     INDICATION: Postop; I73.9-Peripheral vascular disease, unspecified;  A28-Numytvf effusion, not elsewhere classified; Z95.828-Presence of  other vascular implants and grafts; R13.13-Dysphagia, pharyngeal phase.     COMPARISON: 03/04/2020.     FINDINGS: Right upper extremity PICC line is seen with its tip at the  cavoatrial junction. Feeding tube is seen below the left hemidiaphragm.  Heart is normal in size. Right pigtail catheter has been removed. There  are small areas of discoid atelectasis in the right mid and lower lung,  and there appears to be very small pneumothorax in the lateral right  base, approximately 7 mm in width. This does not extend to the apex, or  across the base of the lung, although there is probably trace air in the  minor fissure. Left lung shows near resolution of focal atelectasis in  the medial left base. There is stable diffuse interstitial disease  elsewhere.       Impression:       1. Right pleural drain removal with very small right basilar  pneumothorax.  2. Mild new discoid atelectasis in the right mid and lower lung, but  interval clearing of the left lung base, since yesterday's study.      D:  03/05/2020  E:  03/05/2020     This report was finalized on 3/6/2020 10:57 PM by Dr. Jaswant Frank MD.           I read her radiographic studies    Impression:   1.  MRSA left groin abscess/cellulitis/graft infection-status post graft removal 2/28.  She will require a prolonged course of intravenous antibiotic therapy directed toward MRSA.  Now on daptomycin- lft's and cpk normal  2.  Bilateral pulmonary infiltrates/pleural effusions- clinically improved  3.  Leukocytosis- etiology unclear  4.  S/p femorofemoral bypass with gortex graft, 1/15  5.  Type 2 Diabetes Mellitus  6.  Penicillin allergy  7.  Type 2 diabetes mellitus-with very poor control  8.  Malnutrition    PLAN/RECOMMENDATIONS:   1.  Continue  daptomycin to 4/20 to complete 8 weeks therapy  3.  Pa and  lat cxr  4.  probiotic  5   Replace picc with single lumen      Will discuss with JOY Melo MD  3/9/2020  2:00 PM                  Electronically signed by Collette Melo MD at 20 1406     Kpi Augutse MD at 20 1200              [facility] Medicine Services  PROGRESS NOTE    Patient Name: Saskia Groves  : 1970  MRN: [id number]    Date of Admission: 2020  Primary Care Physician: Meagan Barlow APRN    Subjective   Subjective     CC:  Follow-up left groin wound infection    HPI:  Feels well.  Doing better.  Walking with very little assistance.  Wanting to get out of the hospital soon as possible.  Tolerating antibiotics.  Daughter and granddaughter at the bedside.  No other new complaints.  Tolerating diet.    Review of Systems  No current fevers or chills  No current shortness of breath or cough  No current nausea, vomiting, or diarrhea  No current chest pain or palpitations      Objective   Objective     Vital Signs:   Temp:  [98.3 °F (36.8 °C)-99.3 °F (37.4 °C)] 98.3 °F (36.8 °C)  Heart Rate:  [] 100  Resp:  [16-18] 18  BP: (100-159)/(68-91) 159/91        Physical Exam:  Constitutional:Awake, alert  HENT: NCAT, mucous membranes moist, neck supple  Respiratory: Some dullness at the base but otherwise clear to auscultation bilaterally, respiratory effort normal, nonlabored breathing   Cardiovascular: RRR, S1, S2, normal radial pulses  Gastrointestinal: Positive bowel sounds, soft, nontender, nondistended  Musculoskeletal: Normal musculature for age, no lower extremity edema, BMI 24  Psychiatric: Appropriate affect, cooperative, conversational  Neurologic: No slurred speech or facial droop, follows commands, oriented  Skin: Groin wound VAC in place, wound is CDI, no rashes or jaundice    Results Reviewed:  Results from last 7 days   Lab Units 20  0321 20  0418 20  0344   WBC 10*3/mm3 16.57* 8.40  9.39   HEMOGLOBIN g/dL 10.5* 10.4* 10.4*   HEMATOCRIT % 33.7* 34.0 33.9*   PLATELETS 10*3/mm3 181 189 216     Results from last 7 days   Lab Units 03/09/20  0321 03/07/20  1349 03/06/20  0418   SODIUM mmol/L 137 140 144   POTASSIUM mmol/L 5.6* 5.1 4.4   CHLORIDE mmol/L 104 103 107   CO2 mmol/L 26.0 27.0 28.0   BUN mg/dL 42* 47* 45*   CREATININE mg/dL 0.86 0.66 0.64   GLUCOSE mg/dL 117* 83 79   CALCIUM mg/dL 8.4* 8.7 8.8   ALT (SGPT) U/L 23 23 19   AST (SGOT) U/L 27 33* 32     Estimated Creatinine Clearance: 70.3 mL/min (by C-G formula based on SCr of 0.86 mg/dL).    Microbiology Results Abnormal     Procedure Component Value - Date/Time    Fungus Culture - Body Fluid, Pleural Cavity [[id number]] Collected:  02/28/20 1341    Lab Status:  Preliminary result Specimen:  Body Fluid from Pleural Cavity Updated:  03/06/20 1530     Fungus Culture No fungus isolated at 1 week    AFB Culture - Body Fluid, Pleural Cavity [[id number]] Collected:  02/28/20 1341    Lab Status:  Preliminary result Specimen:  Body Fluid from Pleural Cavity Updated:  03/06/20 1530     AFB Culture No AFB isolated at 1 week     AFB Stain No acid fast bacilli seen on concentrated smear    Fungus Culture - Body Fluid, Groin, right [[id number]] Collected:  02/28/20 1417    Lab Status:  Preliminary result Specimen:  Body Fluid from Groin, right Updated:  03/06/20 1530     Fungus Culture No fungus isolated at 1 week    AFB Culture - Body Fluid, Groin, right [[id number]] Collected:  02/28/20 1417    Lab Status:  Preliminary result Specimen:  Body Fluid from Groin, right Updated:  03/06/20 1530     AFB Culture No AFB isolated at 1 week     AFB Stain No acid fast bacilli seen on concentrated smear    Fungus Culture - Tissue, Groin, right [[id number]] Collected:  02/28/20 1417    Lab Status:  Preliminary result Specimen:  Tissue from Groin, right Updated:  03/06/20 1530     Fungus Culture No fungus isolated at 1 week    AFB Culture - Tissue, Groin, right  [[id number]] Collected:  02/28/20 1417    Lab Status:  Preliminary result Specimen:  Tissue from Groin, right Updated:  03/06/20 1530     AFB Culture No AFB isolated at 1 week     AFB Stain No acid fast bacilli seen on concentrated smear    Anaerobic Culture - Hardware / Foreign Body, Groin, right [[id number]] Collected:  02/28/20 1500    Lab Status:  Final result Specimen:  Hardware / Foreign Body from Groin, right Updated:  03/04/20 1205     Anaerobic Culture No anaerobes isolated at 5 days    Anaerobic Culture - Body Fluid, Groin, right [[id number]] Collected:  02/28/20 1417    Lab Status:  Final result Specimen:  Body Fluid from Groin, right Updated:  03/04/20 1204     Anaerobic Culture No anaerobes isolated at 5 days    Anaerobic Culture - Body Fluid, Pleural Cavity [[id number]] Collected:  02/28/20 1341    Lab Status:  Final result Specimen:  Body Fluid from Pleural Cavity Updated:  03/04/20 1203     Anaerobic Culture No anaerobes isolated at 5 days    Anaerobic Culture - Tissue, Groin, right [[id number]] Collected:  02/28/20 1417    Lab Status:  Final result Specimen:  Tissue from Groin, right Updated:  03/04/20 1202     Anaerobic Culture No anaerobes isolated at 5 days    Blood Culture - Blood, Wrist, Right [[id number]] Collected:  02/28/20 0750    Lab Status:  Final result Specimen:  Blood from Wrist, Right Updated:  03/04/20 0815     Blood Culture No growth at 5 days    Body Fluid Culture - Body Fluid, Groin, right [[id number]]  (Abnormal)  (Susceptibility) Collected:  02/28/20 1417    Lab Status:  Final result Specimen:  Body Fluid from Groin, right Updated:  03/03/20 0650     Body Fluid Culture Rare Staphylococcus aureus, MRSA     Gram Stain Many (4+) WBCs seen      No organisms seen    Narrative:             Susceptibility      Staphylococcus aureus, MRSA     CADENCE     Gentamicin Susceptible     Oxacillin Resistant     Rifampin Susceptible     Vancomycin Susceptible                Susceptibility Comments      Staphylococcus aureus, MRSA    This isolate does not demonstrate inducible clindamycin resistance in vitro.               Respiratory Culture - Sputum, ET Suction [[id number]] Collected:  02/29/20 0850    Lab Status:  Final result Specimen:  Sputum from ET Suction Updated:  03/02/20 0902     Respiratory Culture No growth     Gram Stain Few (2+) Epithelial cells per low power field      Few (2+) WBCs seen      No organisms seen    Tissue / Bone Culture - Tissue, Groin, right [[id number]] Collected:  02/28/20 1417    Lab Status:  Final result Specimen:  Tissue from Groin, right Updated:  03/02/20 0809     Tissue Culture No growth at 3 days     Gram Stain Many (4+) WBCs seen      No organisms seen    Body Fluid Culture - Body Fluid, Pleural Cavity [[id number]] Collected:  02/28/20 1341    Lab Status:  Final result Specimen:  Body Fluid from Pleural Cavity Updated:  03/02/20 0808     Body Fluid Culture No growth at 3 days     Gram Stain Many (4+) WBCs seen      No organisms seen    Hardware / Foreign Body Culture - Hardware / Foreign Body, Groin, right [[id number]]  (Abnormal)  (Susceptibility) Collected:  02/28/20 1500    Lab Status:  Final result Specimen:  Hardware / Foreign Body from Groin, right Updated:  03/01/20 0645     Hardware / Foreign Body Culture Scant growth (1+) Staphylococcus aureus, MRSA     Comment:   Methicillin resistant Staphylococcus aureus, Patient may be an isolation risk.        Gram Stain Many (4+) WBCs seen      No organisms seen    Susceptibility      Staphylococcus aureus, MRSA     CADENCE     Clindamycin Susceptible     Erythromycin Resistant     Inducible Clindamycin Resistance Negative     Oxacillin Resistant     Penicillin G Resistant     Rifampin Susceptible     Tetracycline Susceptible     Trimethoprim + Sulfamethoxazole Susceptible     Vancomycin Susceptible                Susceptibility Comments     Staphylococcus aureus, MRSA    This isolate does not demonstrate inducible clindamycin  resistance in vitro.               Blood Culture - Blood, Arm, Left [[id number]] Collected:  02/24/20 0057    Lab Status:  Final result Specimen:  Blood from Arm, Left Updated:  02/29/20 0545     Blood Culture No growth at 5 days    Blood Culture - Blood, Hand, Left [[id number]] Collected:  02/24/20 0057    Lab Status:  Final result Specimen:  Blood from Hand, Left Updated:  02/29/20 0545     Blood Culture No growth at 5 days    Wound Culture - Wound, Thigh, Left [[id number]]  (Abnormal)  (Susceptibility) Collected:  02/24/20 1849    Lab Status:  Final result Specimen:  Wound from Thigh, Left Updated:  02/27/20 0723     Wound Culture Light growth (2+) Staphylococcus aureus, MRSA     Comment: Methicillin resistant Staphylococcus aureus, Patient may be an isolation risk.        Gram Stain Few (2+) WBCs seen      Rare (1+) Gram positive cocci in pairs    Susceptibility      Staphylococcus aureus, MRSA     CADENCE     Clindamycin Susceptible     Erythromycin Resistant     Inducible Clindamycin Resistance Negative     Oxacillin Resistant     Penicillin G Resistant     Rifampin Susceptible     Tetracycline Susceptible     Trimethoprim + Sulfamethoxazole Susceptible     Vancomycin Susceptible                Susceptibility Comments     Staphylococcus aureus, MRSA    This isolate does not demonstrate inducible clindamycin resistance in vitro.                     Imaging Results (Last 24 Hours)     ** No results found for the last 24 hours. **          Results for orders placed during the hospital encounter of 02/23/20   Adult Transthoracic Echo Complete W/ Cont if Necessary Per Protocol    Narrative · Left ventricular systolic function is mildly decreased. Calculated EF =   45.0%. Estimated EF appears to be in the range of 46 - 50%  · Mild global hypokinesis no focal wall motion abnormalities  · Mild aortic valve sclerosis without stenosis.  · There is a trivial pericardial effusion. There is a large size left   pleural effusion           I have reviewed the medications:  Scheduled Meds:  amitriptyline 10 mg Oral Nightly   aspirin 325 mg Oral Daily   atorvastatin 10 mg Oral Daily   budesonide 0.5 mg Nebulization BID - RT   clopidogrel 75 mg Oral Daily   DAPTOmycin 8 mg/kg Intravenous Q24H   famotidine 20 mg Oral BID AC   insulin detemir 15 Units Subcutaneous Q12H   insulin regular 0-14 Units Subcutaneous 4x Daily AC & at Bedtime   ketotifen 1 drop Both Eyes BID   lactobacillus acidophilus 1 capsule Oral Daily   losartan 50 mg Oral Daily   metoprolol tartrate 100 mg Oral Q12H   pantoprazole 40 mg Intravenous Q AM   pregabalin 150 mg Oral Q8H   rOPINIRole 4 mg Oral Nightly     Continuous Infusions:   PRN Meds:.•  acetaminophen **OR** acetaminophen **OR** acetaminophen  •  albuterol  •  dextrose  •  dextrose  •  glucagon (human recombinant)  •  guaiFENesin-dextromethorphan  •  hydrALAZINE  •  HYDROcodone-acetaminophen    Assessment/Plan   Assessment & Plan     Active Hospital Problems    Diagnosis  POA   • **Acute hypoxemic respiratory failure (CMS/HCC) [J96.01]  No   • Pleural effusion - bilateral mod/large pleural effusions on CT chest 2/27/2020.  [J90]  No   • Acute pulmonary edema (CMS/HCC) [J81.0]  No   • Sepsis (CMS/HCC) [A41.9]  Yes   • COPD (chronic obstructive pulmonary disease) (CMS/HCC) [J44.9]  Yes   • Coronary artery disease [I25.10]  Yes   • Hypertension [I10]  Yes   • Hyperlipidemia [E78.5]  Yes   • Diabetes mellitus (CMS/HCC) [E11.9]  Yes   • Current smoker [F17.200]  Yes   • PVD (peripheral vascular disease) (CMS/HCC) [I73.9]  Yes      Resolved Hospital Problems   No resolved problems to display.        Brief Hospital Course to date:  Saskia Groves is a 49 y.o. female admitted on 2/23 with history of diabetes, tobacco use, essential hypertension, peripheral artery disease with history of femorofemoral bypass on 1/15 but was readmitted with fever and wound infection of the left groin.  She was transferred to the ICU on 2/27  for pleural effusions and hypoxia.  She required chest tube on the right.  She had graft explant on 2/28.  Muscle flaps were performed by plastic surgery.  She had bilateral wound vacs in place.  She was extubated after several days postoperatively.  Infectious disease has followed and assisted with management.  Wound infection grew MRSA and patient has been treated with daptomycin.    Plan/comments:  Plan discussed case with infectious disease regarding antibiotic plans and possible transition to outpatient.  Reportedly going to require 8 weeks of antibiotics until 4/20 per ID note.  Plastic surgery plans to remove wound VAC on the day of discharge and right MELISSA drain.  Left MELISSA drain likely to remain in place.  I am discussing with case management regarding the possibility of home antibiotics which is patient's newest request.  Dysphasia has improved and patient currently tolerating oral diet.  Elevated potassium level noted.  Plan to recheck today.  Glucose reviewed.  A1c very elevated.  Prandial insulin adjusted.  Recheck laboratory studies tomorrow.  Doing well off of tube feeding.  Continue respiratory medications.  Monitor for further hypoxia.    DVT Prophylaxis: Mechanical    Disposition: I expect the patient to be discharged home versus skilled when cleared by all services.    CODE STATUS:   Code Status and Medical Interventions:   Ordered at: 02/23/20 2341     Level Of Support Discussed With:    Patient     Code Status:    CPR     Medical Interventions (Level of Support Prior to Arrest):    Full         Electronically signed by Kip Auguste MD, 03/09/20, 12:00 PM.        Electronically signed by Kip Auguste MD at 03/09/20 1221     Modesto Whittaker MD at 03/09/20 1001            Plastic Surgery Progress Note      Admission Date:  2/23/2020  LOS:  15  Patient Care Team:  Meagan Barlow APRN as PCP - General (Family Medicine)  Tyson Donaldson DO as Consulting Physician  (Cardiology)    Patient Name:  Saskia Groves  :  1970  MRN:  [id number]    Date:  3/9/2020      Subjective:  No acute events overnight.       History:   Past Medical History:   Diagnosis Date   • Anxiety    • Arthritis    • Asthma    • Carotid artery stenosis    • Chronic bronchitis (CMS/HCC)    • COPD (chronic obstructive pulmonary disease) (CMS/HCC)    • Coronary artery disease     2 vessels with 50% blockage.  Sees Dr. Donaldson   • Depression    • Diabetes mellitus (CMS/HCC)    • Dyslipidemia    • GERD (gastroesophageal reflux disease)    • Hiatal hernia    • Hyperlipidemia    • Hypertension    • Infectious viral hepatitis    • Lower back pain    • Migraine    • Multinodular goiter    • S/P thyroid biopsy     2008, 2013, 2015, and 07/15/2019 at [facility], right lobe nodules - all cytologies were benign   • Sleep apnea     can't tolerate the cpap mask   • Subclinical hyperthyroidism      Past Surgical History:   Procedure Laterality Date   • ANTERIOR CERVICAL DISCECTOMY W/ FUSION     • BACK SURGERY      lumbar   •  SECTION  19940    x 2    • CHOLECYSTECTOMY     • COLONOSCOPY     • ENDOSCOPY     • FEMORAL ARTERY CUTDOWN Bilateral 2020    Procedure: FEMORAL ARTERY CUTDOWN WITH REMOVAL OF FEMORAL FEMORAL BYPASS GRAFT BILATERAL;  Surgeon: Deandre Oseguera MD;  Location:  [facility];  Service: Vascular;  Laterality: Bilateral;   • FEMORAL ENDARTERECTOMY Bilateral 1/15/2020    Procedure: FEMORAL ENDARTERECTOMY BILATERAL;  Surgeon: Deandre Oseguera MD;  Location:  [facility];  Service: Vascular   • FEMORAL FEMORAL BYPASS Right 1/15/2020    Procedure: FEMORAL FEMORAL BYPASS;  Surgeon: Deandre Oseguera MD;  Location:  [facility];  Service: Vascular   • HYSTERECTOMY     • KNEE SURGERY Left    • LEG EXCISION LESION/CYST Left 2020    Procedure: GROIN MUSCLE FLAP BILATERAL;  Surgeon: Modesto Whittaker MD;  Location:  [facility];  Service: Plastics;  Laterality: Left;   • WRIST SURGERY Left       Family History   Problem Relation Age of Onset   • Diabetes Mother    • Hypertension Mother    • Arthritis Mother    • Hyperlipidemia Mother    • Migraines Mother    • Thyroid disease Mother    • Hypertension Father    • Lung cancer Father    • Cancer Father    • Stroke Other    • Migraines Maternal Aunt    • Thyroid disease Maternal Aunt    • Heart attack Maternal Aunt    • Osteoporosis Maternal Aunt    • Cancer Maternal Uncle    • Heart attack Maternal Uncle    • Stroke Maternal Uncle    • Hyperlipidemia Maternal Grandmother    • Cancer Maternal Grandmother    • Obesity Maternal Grandmother    • Thyroid disease Daughter    • Obesity Daughter      Social History     Socioeconomic History   • Marital status:      Spouse name: Not on file   • Number of children: 2   • Years of education: Not on file   • Highest education level: Not on file   Occupational History   • Occupation: [employer]     Employer: DISABLED     Comment: Back and Leg Pain   Tobacco Use   • Smoking status: Current Every Day Smoker     Packs/day: 1.00     Years: 35.00     Pack years: 35.00     Types: Cigarettes   • Smokeless tobacco: Never Used   Substance and Sexual Activity   • Alcohol use: Not Currently   • Drug use: No   • Sexual activity: Yes     Partners: Male     Comment:    Social History Narrative    Lives in [place], [place] alone     Allergies   Allergen Reactions   • Levaquin [Levofloxacin] Nausea And Vomiting   • Penicillins Nausea And Vomiting     Has tolerated cefepime 2/2020   • Codeine Nausea Only   • Flagyl [Metronidazole] Nausea And Vomiting       Medication:    Current Facility-Administered Medications:   •  acetaminophen (TYLENOL) tablet 650 mg, 650 mg, Oral, Q4H PRN, 650 mg at 03/07/20 2217 **OR** acetaminophen (TYLENOL) 160 MG/5ML solution 650 mg, 650 mg, Oral, Q4H PRN, 650 mg at 03/04/20 1226 **OR** acetaminophen (TYLENOL) suppository 650 mg, 650 mg, Rectal, Q4H PRN, Isael Bailey PA, 650 mg at  02/28/20 0429  •  albuterol (PROVENTIL) nebulizer solution 0.083% 2.5 mg/3mL, 2.5 mg, Nebulization, Q6H PRN, Isael Bailey PA  •  amitriptyline (ELAVIL) tablet 10 mg, 10 mg, Oral, Nightly, Isael Bailey PA, 10 mg at 03/08/20 2026  •  aspirin tablet 325 mg, 325 mg, Oral, Daily, Isael Bailey PA, 325 mg at 03/09/20 0923  •  atorvastatin (LIPITOR) tablet 10 mg, 10 mg, Oral, Daily, Isael Bailey PA, 10 mg at 03/09/20 0923  •  budesonide (PULMICORT) nebulizer solution 0.5 mg, 0.5 mg, Nebulization, BID - RT, Isael Bailey PA, 0.5 mg at 03/09/20 0907  •  clopidogrel (PLAVIX) tablet 75 mg, 75 mg, Oral, Daily, Isael Bailey PA, 75 mg at 03/09/20 0923  •  DAPTOmycin (CUBICIN) 500 mg/50 mL in sodium chloride, 8 mg/kg, Intravenous, Q24H, Collette Melo MD, 500 mg at 03/09/20 0630  •  dextrose (D50W) 25 g/ 50mL Intravenous Solution 25 g, 25 g, Intravenous, Q15 Min PRN, Isael Bailey PA, 25 g at 03/07/20 1828  •  dextrose (GLUTOSE) oral gel 15 g, 15 g, Oral, Q15 Min PRN, Isael Bailey PA  •  famotidine (PEPCID) tablet 20 mg, 20 mg, Oral, BID AC, Isael Bailey PA, 20 mg at 03/09/20 0631  •  glucagon (human recombinant) (GLUCAGEN DIAGNOSTIC) injection 1 mg, 1 mg, Subcutaneous, Q15 Min PRN, Isael Bailey PA  •  guaiFENesin-dextromethorphan (ROBITUSSIN DM) 100-10 MG/5ML syrup 5 mL, 5 mL, Oral, Q6H PRN, Isael Bailey PA, 5 mL at 02/27/20 0920  •  hydrALAZINE (APRESOLINE) injection 10 mg, 10 mg, Intravenous, Q6H PRN, Isael Bailey PA, 10 mg at 03/04/20 0628  •  HYDROcodone-acetaminophen (NORCO)  MG per tablet 1 tablet, 1 tablet, Oral, Q8H PRN, Isael Bailey PA, 1 tablet at 03/08/20 2027  •  insulin detemir (LEVEMIR) injection 15 Units, 15 Units, Subcutaneous, Q12H, Isael Bailey PA, 15 Units at 03/09/20 0922  •  insulin regular (humuLIN R,novoLIN R) injection 0-14 Units, 0-14 Units, Subcutaneous, 4x Daily AC & at Bedtime, Justin Yanes  APRN, 3 Units at 20  •  ketotifen (ZADITOR) 0.025 % ophthalmic solution 1 drop, 1 drop, Both Eyes, BID, Isael Bailey PA, 1 drop at 20  •  lactobacillus acidophilus (RISAQUAD) capsule 1 capsule, 1 capsule, Oral, Daily, Isael Bailey PA, 1 capsule at 20  •  losartan (COZAAR) tablet 50 mg, 50 mg, Oral, Daily, Isael Bailey PA, 50 mg at 20  •  metoprolol tartrate (LOPRESSOR) tablet 100 mg, 100 mg, Oral, Q12H, Isael Bailey PA, 100 mg at 20  •  pantoprazole (PROTONIX) injection 40 mg, 40 mg, Intravenous, Q AM, Isael Bailey PA, 40 mg at 20  •  pregabalin (LYRICA) capsule 150 mg, 150 mg, Oral, Q8H, Isael Bailey PA, 150 mg at 20  •  rOPINIRole (REQUIP) tablet 4 mg, 4 mg, Oral, Nightly, Isael Bailey PA, 4 mg at 20    Antibiotics:  Anti-Infectives (From admission, onward)    Ordered     Dose/Rate Route Frequency Start Stop    20 194  DAPTOmycin (CUBICIN) 500 mg/50 mL in sodium chloride     Therese Hollis, [facility] reviewed the order on 20 0852.   Ordering Provider:  Collette Melo MD    8 mg/kg × 60.3 kg  over 30 Minutes Intravenous Every 24 Hours 20 0600 20 2359    20 0746  meropenem (MERREM) 1 g/100 mL 0.9% NS VTB (mbp)     Ordering Provider:  Parag Gordon MD    1 g  over 30 Minutes Intravenous Once 20 0845 20 0921    20 0831  cefepime (MAXIPIME) 2 g/100 mL 0.9% NS (mbp)     Ordering Provider:  Mikel-Isael, Ranjana, APRN    2 g  200 mL/hr over 30 Minutes Intravenous Once 20 0930 20 1157    20 0203  vancomycin 500 mg/100 mL 0.9% NS IVPB (mbp)     Ordering Provider:  Ced Kenny RPH    500 mg  100 mL/hr over 60 Minutes Intravenous Once 20 0300 20 0511            Objective:    Physical Exam:   Vital Signs:  Temp (24hrs), Av.7 °F (37.1 °C), Min:98.3 °F (36.8 °C), Max:99.3 °F (37.4 °C)    Temp  Min:  98.3 °F (36.8 °C)  Max: 99.3 °F (37.4 °C)  BP  Min: 100/68  Max: 159/91  Pulse  Min: 86  Max: 110  Resp  Min: 16  Max: 18  SpO2  Min: 92 %  Max: 95 %    GENERAL: Awake and alert, in no acute distress. extubated  HEART: RRR;   LUNGS: Nonlabored. No dullness. intubated  ABDOMEN: Soft, nontender, nondistended. No rebound or guarding. NO mass or HSM.  EXT:  No cyanosis, clubbing or edema. No cord.  : Benavidez catheter in place.  SKIN: Warm and dry without cutaneous eruptions on Inspection/palpation.    NEURO: Oriented to PPT. No focal deficits on motor/sensory exam at arms/legs.  PSYCHIATRIC: Normal insight and judgement. Cooperative with PE  Bilateral Groins: incisional wound vac in place to suction, drains appropriate output, no seroma or hematoma on exam, soft, appropriate tenderness to palpation, no ecchymosis     Laboratory Data:  Results from last 7 days   Lab Units 03/09/20  0321 03/06/20  0418 03/05/20  0344   WBC 10*3/mm3 16.57* 8.40 9.39   HEMOGLOBIN g/dL 10.5* 10.4* 10.4*   HEMATOCRIT % 33.7* 34.0 33.9*   PLATELETS 10*3/mm3 181 189 216     Results from last 7 days   Lab Units 03/09/20  0321   SODIUM mmol/L 137   POTASSIUM mmol/L 5.6*   CHLORIDE mmol/L 104   CO2 mmol/L 26.0   BUN mg/dL 42*   CREATININE mg/dL 0.86   GLUCOSE mg/dL 117*   CALCIUM mg/dL 8.4*     Results from last 7 days   Lab Units 03/09/20  0321   ALK PHOS U/L 138*   BILIRUBIN mg/dL 0.2   ALT (SGPT) U/L 23   AST (SGOT) U/L 27         Results from last 7 days   Lab Units 03/07/20  1349   CRP mg/dL 1.11*         Results from last 7 days   Lab Units 03/09/20  0321 03/06/20  0418 03/05/20  0344   CK TOTAL U/L 29 30 29     Results from last 7 days   Lab Units 03/04/20  0341 03/03/20  0338 03/02/20  1131   VANCOMYCIN TR mcg/mL  --   --  38.50*   VANCOMYCIN RM mcg/mL 13.80 26.10  --      Estimated Creatinine Clearance: 70.3 mL/min (by C-G formula based on SCr of 0.86 mg/dL).    Microbiology:  No results found for: ACANTHNAEG, AFBCX, BPERTUSSISCX,  BLOODCX  No results found for: BCIDPCR, CXREFLEX, CSFCX, CULTURETIS  No results found for: CULTURES, HSVCX, URCX  No results found for: EYECULTURE, GCCX, LABHSV  No results found for: LEGIONELLA, MRSACX, MUMPSCX, MYCOPLASCX  No results found for: NOCARDIACX, STOOLCX  No results found for: THROATCX, UNSTIMCULT, URINECX, CULTURE, VZVCULTUR  No results found for: VIRALCULTU, WOUNDCX      Assessment: 49yoF s/p 12/28/20 diagnostic fiberoptic bronchoscopy, Rtchest tube placement, removal of femoral-femoral bypass, bilateral CFA pericardial patches, bilateral Sartorious muscle flaps for groin graft coverage.         Plan:  - no acute surgery at this point in time, incisional wound vac in place to suction, no seroma or hematoma on PE, Lower extremity perfused, concerning about the elevated WBC source,   - continue abx per ID  - continue incisional wound vac, will discontinue day of discharge,   - record and strip the MELISSA drains, most likely will discontinue Rt drain prior to discharge  - may sit up in chair, may stand, patient may walk, per plastic surgery  - disposition, Patient may leave from a plastic surgery standpoint, will discontinue the incisional wound vac and possible remove a drain prior to discharge, please notify Dr. Modesto Whittaker prior to discharge.   - follow up with Dr. Whittaker in 1 week following discharge              Modesto Whittaker MD  03/09/20  10:01 AM        Electronically signed by Modesto Whittaker MD at 03/09/20 1010       Consult Notes (last 48 hours) (Notes from 03/08/20 0919 through 03/10/20 0919)    No notes of this type exist for this encounter.          Physical Therapy Notes (last 24 hours) (Notes from 03/09/20 0919 through 03/10/20 0919)      Maycol Patton, PT at 03/09/20 0950  Version 1 of 1         Problem: Patient Care Overview  Goal: Plan of Care Review  Outcome: Ongoing (interventions implemented as appropriate)  Flowsheets (Taken 3/9/2020 0950)  Outcome Summary: Wound vac check complete. R  groin site intact. Applied small amount of tape over L groin site secondary to corner rolling up slightly. Will cont to check daily.        Electronically signed by Maycol Patton, PT at 20 1011     Maycol Patton, PT at 20 0950  Version 1 of 1         Acute Care - Wound/Debridement Treatment Note  [facility]     Patient Name: Saskia Groves  : 1970  MRN: [id number]  Today's Date: 3/9/2020                  Admit Date: 2020    Visit Dx:    ICD-10-CM ICD-9-CM   1. PVD (peripheral vascular disease) (CMS/HCC) I73.9 443.9   2. Pleural effusion J90 511.9   3. S/P femoral-femoral bypass surgery Z95.828 V45.89       Patient Active Problem List   Diagnosis   • PVD (peripheral vascular disease) (CMS/HCC)   • Lumbar radiculopathy   • Paresthesia   • Causalgia of lower limb   • Hiatal hernia   • Current smoker   • Bilateral carotid artery stenosis   • COPD (chronic obstructive pulmonary disease) (CMS/[facility])   • Coronary artery disease   • Hypertension   • Hyperlipidemia   • Diabetes mellitus (CMS/HCC)   • Sepsis (CMS/[facility])   • Pleural effusion - bilateral mod/large pleural effusions on CT chest 2020.    • Acute pulmonary edema (CMS/HCC)   • Acute hypoxemic respiratory failure (CMS/[facility])           Wound 20 Right groin Incision (Active)   Dressing Appearance dry;intact 3/9/2020  6:00 AM   Closure Adhesive bandage;GILLIAN 3/9/2020  6:00 AM   Base dressing in place, unable to visualize 3/9/2020  6:00 AM   Periwound intact;dry;redness 3/8/2020  8:00 PM   Periwound Temperature warm 3/8/2020  8:00 PM   Periwound Skin Turgor firm 3/8/2020  8:00 PM   Drainage Characteristics/Odor serous 3/8/2020  8:00 PM   Drainage Amount small 3/8/2020  8:00 PM   Care, Wound negative pressure wound therapy 3/8/2020  8:00 PM   Dressing Care, Wound other (see comments) 3/8/2020  8:00 PM   Periwound Care, Wound dry periwound area maintained 3/8/2020  8:00 PM   Wound Output (mL) 0 3/9/2020  9:50 AM       Wound  02/23/20 2302 Left groin Incision (Active)   Dressing Appearance dry;intact 3/9/2020  6:00 AM   Closure Adhesive bandage;GILLIAN 3/9/2020  6:00 AM   Base dressing in place, unable to visualize 3/9/2020  6:00 AM   Periwound intact;dry;redness 3/8/2020  8:00 PM   Periwound Temperature warm 3/8/2020  8:00 PM   Periwound Skin Turgor firm 3/8/2020  8:00 PM   Drainage Characteristics/Odor serous 3/8/2020  8:00 PM   Drainage Amount small 3/8/2020  8:00 PM   Care, Wound negative pressure wound therapy 3/8/2020  8:00 PM   Dressing Care, Wound other (see comments) 3/8/2020  8:00 PM   Periwound Care, Wound dry periwound area maintained 3/8/2020  8:00 PM   Wound Output (mL) 0 3/9/2020  9:50 AM       Wound 03/04/20 0715 Right lateral chest Puncture (Active)   Dressing Appearance dry;intact 3/9/2020  6:00 AM   Closure None 3/9/2020  6:00 AM   Base dressing in place, unable to visualize 3/9/2020  6:00 AM       NPWT (Negative Pressure Wound Therapy) 02/28/20 1200 groin (Active)   Therapy Setting continuous therapy 3/9/2020  9:50 AM   Dressing other (see comments) 3/9/2020  9:50 AM   Pressure Setting 125 mmHg 3/9/2020  9:50 AM         WOUND DEBRIDEMENT                  Therapy Treatment    Rehabilitation Treatment Summary     Row Name 03/09/20 0950             Treatment Time/Intention    Discipline  physical therapist  -MP      Document Type  therapy note (daily note);wound care  -MP      Subjective Information  no complaints  -MP      Mode of Treatment  physical therapy;individual therapy  -MP      Recorded by [MP] Maycol Patton, PT 03/09/20 1010      Row Name 03/09/20 0950             Cognitive Assessment/Intervention- PT/OT    Orientation Status (Cognition)  oriented x 4  -MP      Recorded by [MP] Maycol Patton, PT 03/09/20 1010      Row Name 03/09/20 0950             Positioning and Restraints    Pre-Treatment Position  in bed  -MP      Post Treatment Position  bed  -MP      In Bed  supine;call light within reach;encouraged to  call for assist  -MP      Recorded by [MP] Maycol Patton, PT 03/09/20 1010      Row Name 03/09/20 0950             Pain Scale: Numbers Pre/Post-Treatment    Pain Scale: Numbers, Pretreatment  0/10 - no pain  -MP      Pain Scale: Numbers, Post-Treatment  0/10 - no pain  -MP      Recorded by [MP] Maycol Patton, PT 03/09/20 1010      Row Name 03/09/20 0950             Wound 02/23/20 2230 Right groin Incision    Wound - Properties Group Date first assessed: 02/23/20 [EB] Time first assessed: 2230 [EB] Present on Hospital Admission: Y [EB] Side: Right [EB] Location: groin [EB] Primary Wound Type: Incision [EB] Recorded by:  [EB] Collette Hardy RN 02/23/20 2302    Wound Output (mL)  0  -MP      Recorded by [MP] Maycol Patton, PT 03/09/20 1010      Row Name 03/09/20 0950             Wound 02/23/20 2302 Left groin Incision    Wound - Properties Group Date first assessed: 02/23/20 [EB] Time first assessed: 2302 [EB] Present on Hospital Admission: Y [EB] Side: Left [EB] Location: groin [EB] Primary Wound Type: Incision [EB] Recorded by:  [EB] Collette Hardy RN 02/23/20 2303    Wound Output (mL)  0  -MP      Recorded by [MP] Maycol Patton, PT 03/09/20 1010      Row Name                Wound 03/04/20 0715 Right lateral chest Puncture    Wound - Properties Group Date first assessed: 03/04/20 [JJ] Time first assessed: 0715 [JJ] Present on Hospital Admission: N [JJ] Side: Right [JJ] Orientation: lateral [JJ] Location: chest [JJ] Primary Wound Type: Puncture [JJ], s/p chest tube site  Recorded by:  [JJ] Edgar Kenny, RN 03/04/20 0937    Row Name 03/09/20 0950             NPWT (Negative Pressure Wound Therapy) 02/28/20 1200 groin    NPWT (Negative Pressure Wound Therapy) - Properties Group Placement Date: 02/28/20 [MF] Placement Time: 1200 [MF] Location: groin [MF] Recorded by:  [MF] Arturo Madrid, PT 03/02/20 2399    Therapy Setting  continuous therapy  -MP      Dressing  other (see comments) Applied small  amount of tape over L groin site  -MP      Pressure Setting  125 mmHg  -MP      Recorded by [MP] Maycol Patton, PT 03/09/20 1010      Row Name 03/09/20 0950             Plan of Care Review    Plan of Care Reviewed With  patient  -MP      Progress  no change  -MP      Outcome Summary  Wound vac check complete. R groin site intact. Applied small amount of tape over L groin site secondary to corner rolling up slightly. Will cont to check daily.   -MP      Recorded by [MP] Maycol Patton, PT 03/09/20 1010        User Key  (r) = Recorded By, (t) = Taken By, (c) = Cosigned By    Initials Name Effective Dates Discipline    MF Arturo Madrid, PT 06/19/15 -  PT    Edgar Bryan, RN 06/16/16 -  Nurse    Collette Berman RN 11/14/19 -  Nurse    Maycol Travis, PT 11/06/19 -  PT                PT Recommendation and Plan  Anticipated Equipment Needs at Discharge (PT): front wheeled walker  Anticipated Discharge Disposition (PT): skilled nursing facility  Planned Therapy Interventions (PT Eval): balance training, bed mobility training, gait training, home exercise program, transfer training, strengthening  Therapy Frequency (PT Clinical Impression): daily        Progress: no change      Progress: no change  Outcome Summary: Wound vac check complete. R groin site intact. Applied small amount of tape over L groin site secondary to corner rolling up slightly. Will cont to check daily.   Plan of Care Reviewed With: patient            Time Calculation  PT Charges     Row Name 03/09/20 0950             Time Calculation    Start Time  0950  -MP      PT Goal Re-Cert Due Date  03/13/20  -MP        User Key  (r) = Recorded By, (t) = Taken By, (c) = Cosigned By    Initials Name Provider Type    Maycol Travis, PT Physical Therapist           Therapy Charges for Today     Code Description Service Date Service Provider Modifiers Qty    [id number] HC PT NEG PRESS WOUND TO 50SQCM DME1 3/9/2020 Maycol Patton, PT  1             PT G-Codes  Outcome Measure Options: AM-PAC 6 Clicks Basic Mobility (PT)  AM-PAC 6 Clicks Score (PT): 19        Maycol Patton, PT  3/9/2020          Electronically signed by Maycol Patton, PT at 20 1013     Collette Gomes, PT at 20 1616  Version 1 of 1         Problem: Patient Care Overview  Goal: Plan of Care Review  3/9/2020 1616 by Collette Gomes PT  Flowsheets  Taken 3/9/2020 1616  Progress: improving  Plan of Care Reviewed With: patient  Taken 3/9/2020 1612  Outcome Summary: Pt ambulates in jewell x 700 ft, up in room independently. PT to d/c at this time.        Electronically signed by Collette Gomes PT at 20 1616     Collette Gomes PT at 20 1617  Version 1 of 1         Patient Name: Saskia Groves  : 1970    MRN: [id number]                              Today's Date: 3/9/2020       Admit Date: 2020    Visit Dx:     ICD-10-CM ICD-9-CM   1. PVD (peripheral vascular disease) (CMS/[facility]) I73.9 443.9   2. Pleural effusion J90 511.9   3. S/P femoral-femoral bypass surgery Z95.828 V45.89     Patient Active Problem List   Diagnosis   • PVD (peripheral vascular disease) (CMS/HCC)   • Lumbar radiculopathy   • Paresthesia   • Causalgia of lower limb   • Hiatal hernia   • Current smoker   • Bilateral carotid artery stenosis   • COPD (chronic obstructive pulmonary disease) (CMS/HCC)   • Coronary artery disease   • Hypertension   • Hyperlipidemia   • Diabetes mellitus (CMS/HCC)   • Sepsis (CMS/[facility])   • Pleural effusion - bilateral mod/large pleural effusions on CT chest 2020.    • Acute pulmonary edema (CMS/HCC)   • Acute hypoxemic respiratory failure (CMS/HCC)     Past Medical History:   Diagnosis Date   • Anxiety    • Arthritis    • Asthma    • Carotid artery stenosis    • Chronic bronchitis (CMS/HCC)    • COPD (chronic obstructive pulmonary disease) (CMS/HCC)    • Coronary artery disease     2 vessels with 50% blockage.  Sees Dr. Donaldson   •  Depression    • Diabetes mellitus (CMS/HCC)    • Dyslipidemia    • GERD (gastroesophageal reflux disease)    • Hiatal hernia    • Hyperlipidemia    • Hypertension    • Infectious viral hepatitis    • Lower back pain    • Migraine    • Multinodular goiter    • S/P thyroid biopsy     2008, 2013, 2015, and 07/15/2019 at [facility], right lobe nodules - all cytologies were benign   • Sleep apnea     can't tolerate the cpap mask   • Subclinical hyperthyroidism      Past Surgical History:   Procedure Laterality Date   • ANTERIOR CERVICAL DISCECTOMY W/ FUSION     • BACK SURGERY      lumbar   •  SECTION  19940    x 2    • CHOLECYSTECTOMY     • COLONOSCOPY     • ENDOSCOPY     • FEMORAL ARTERY CUTDOWN Bilateral 2020    Procedure: FEMORAL ARTERY CUTDOWN WITH REMOVAL OF FEMORAL FEMORAL BYPASS GRAFT BILATERAL;  Surgeon: Deandre Oseguera MD;  Location:  [facility];  Service: Vascular;  Laterality: Bilateral;   • FEMORAL ENDARTERECTOMY Bilateral 1/15/2020    Procedure: FEMORAL ENDARTERECTOMY BILATERAL;  Surgeon: Deandre Oseguera MD;  Location:  [facility];  Service: Vascular   • FEMORAL FEMORAL BYPASS Right 1/15/2020    Procedure: FEMORAL FEMORAL BYPASS;  Surgeon: Deandre Oseguera MD;  Location:  [facility];  Service: Vascular   • HYSTERECTOMY     • KNEE SURGERY Left    • LEG EXCISION LESION/CYST Left 2020    Procedure: GROIN MUSCLE FLAP BILATERAL;  Surgeon: Modesto Whittaker MD;  Location:  [facility];  Service: Plastics;  Laterality: Left;   • WRIST SURGERY Left      General Information     Row Name 20 1610          PT Evaluation Time/Intention    Document Type  therapy note (daily note)  -EJ     Mode of Treatment  physical therapy  -EJ     Row Name 20 1610          General Information    Patient Profile Reviewed?  yes  -EJ     Existing Precautions/Restrictions  cardiac wound vac, PICC  -EJ     Barriers to Rehab  medically complex  -EJ     Row Name 20 1610          Cognitive  Assessment/Intervention- PT/OT    Orientation Status (Cognition)  oriented x 4  -EJ       User Key  (r) = Recorded By, (t) = Taken By, (c) = Cosigned By    Initials Name Provider Type    Collette Ceballos PT Physical Therapist        Mobility     Row Name 03/09/20 1610          Transfer Assessment/Treatment    Comment (Transfers)  Pt independently stands prior to PT entering room.  -EJ     Row Name 03/09/20 1610          Gait/Stairs Assessment/Training    Gait/Stairs Assessment/Training  gait/ambulation independence  -EJ     [place] Level (Gait)  conditional independence  -EJ     Assistive Device (Gait)  walker, 4-wheeled  -EJ     Distance in Feet (Gait)  700  -EJ     Pattern (Gait)  step-through  -EJ     Deviations/Abnormal Patterns (Gait)  twin decreased  -EJ     Bilateral Gait Deviations  forward flexed posture  -EJ     Comment (Gait/Stairs)  Pt ambulates with conditional independence, manipulating MELISSA drains to maintain them. Dtr and granddtr present.  -EJ       User Key  (r) = Recorded By, (t) = Taken By, (c) = Cosigned By    Initials Name Provider Type    Collette Ceballos PT Physical Therapist        Obj/Interventions    No documentation.       Goals/Plan     Row Name 03/09/20 1614          Bed Mobility Goal 1 (PT)    Activity/Assistive Device (Bed Mobility Goal 1, PT)  sit to supine;supine to sit  -EJ     [place] Level/Cues Needed (Bed Mobility Goal 1, PT)  minimum assist (75% or more patient effort)  -EJ     Time Frame (Bed Mobility Goal 1, PT)  2 weeks  -EJ     Progress/Outcomes (Bed Mobility Goal 1, PT)  other (see comments) pt demonstrates adequate mobility to meet goal  -EJ     Row Name 03/09/20 1614          Transfer Goal 1 (PT)    Activity/Assistive Device (Transfer Goal 1, PT)  sit-to-stand/stand-to-sit;bed-to-chair/chair-to-bed;walker, rolling  -EJ     [place] Level/Cues Needed (Transfer Goal 1, PT)  minimum assist (75% or more patient effort)  -EJ     Time Frame (Transfer  Goal 1, PT)  2 weeks  -EJ     Progress/Outcome (Transfer Goal 1, PT)  goal met  -EJ     Row Name 03/09/20 1614          Gait Training Goal 1 (PT)    Activity/Assistive Device (Gait Training Goal 1, PT)  gait (walking locomotion);assistive device use;walker, rolling  -EJ     [place] Level (Gait Training Goal 1, PT)  moderate assist (50-74% patient effort)  -EJ     Distance (Gait Goal 1, PT)  50 feet  -EJ     Time Frame (Gait Training Goal 1, PT)  2 weeks  -EJ     Progress/Outcome (Gait Training Goal 1, PT)  goal met  -EJ       User Key  (r) = Recorded By, (t) = Taken By, (c) = Cosigned By    Initials Name Provider Type    Collette Ceballos PT Physical Therapist        Clinical Impression     Row Name 03/09/20 1612          Pain Scale: Numbers Pre/Post-Treatment    Pain Scale: Numbers, Pretreatment  0/10 - no pain  -EJ     Pain Scale: Numbers, Post-Treatment  0/10 - no pain  -EJ     Pain Intervention(s)  Ambulation/increased activity  -EJ     Row Name 03/09/20 1612          Pain Scale: FACES Pre/Post-Treatment    Pain: FACES Scale, Pretreatment  0-->no hurt  -EJ     Pain: FACES Scale, Post-Treatment  0-->no hurt  -EJ     Row Name 03/09/20 1612          Plan of Care Review    Outcome Summary  Pt ambulates in jewell x 700 ft, up in room independently. PT to d/c at this time.   -EJ     Row Name 03/09/20 1612          Positioning and Restraints    Pre-Treatment Position  standing in room  -EJ     Post Treatment Position  bed  -EJ     In Bed  sitting EOB;with family/caregiver;notified nsg Wound vac reconnected.  -EJ       User Key  (r) = Recorded By, (t) = Taken By, (c) = Cosigned By    Initials Name Provider Type    Collette Ceballos PT Physical Therapist        Outcome Measures     Row Name 03/09/20 1615          How much help from another person do you currently need...    Turning from your back to your side while in flat bed without using bedrails?  4  -EJ     Moving from lying on back to sitting on the  side of a flat bed without bedrails?  4  -EJ     Moving to and from a bed to a chair (including a wheelchair)?  4  -EJ     Standing up from a chair using your arms (e.g., wheelchair, bedside chair)?  4  -EJ     Climbing 3-5 steps with a railing?  4  -EJ     To walk in hospital room?  4  -EJ     AM-PAC 6 Clicks Score (PT)  24  -EJ     Row Name 03/09/20 1615          Functional Assessment    Outcome Measure Options  AM-PAC 6 Clicks Basic Mobility (PT)  -EJ       User Key  (r) = Recorded By, (t) = Taken By, (c) = Cosigned By    Initials Name Provider Type    Collette Ceballos PT Physical Therapist          PT Recommendation and Plan     Outcome Summary/Treatment Plan (PT)  Anticipated Discharge Disposition (PT): (SNF if medically appropriate.)  Plan of Care Reviewed With: patient  Progress: improving  Outcome Summary: Pt ambulates in jewell x 700 ft, up in room independently. PT to d/c at this time.      Time Calculation:   PT Charges     Row Name 03/09/20 1617 03/09/20 0950          Time Calculation    Start Time  1555  -EJ  0950  -MP     PT Received On  03/09/20  -EJ  --     PT Goal Re-Cert Due Date  03/13/20  -EJ  03/13/20  -MP        Time Calculation- PT    Total Timed Code Minutes- PT  8 minute(s)  -EJ  --        Timed Charges    20615 - PT Therapeutic Activity Minutes  8  -EJ  --       User Key  (r) = Recorded By, (t) = Taken By, (c) = Cosigned By    Initials Name Provider Type    Collette Ceballos PT Physical Therapist    MP Maycol Patton PT Physical Therapist        Therapy Charges for Today     Code Description Service Date Service Provider Modifiers Qty    [id number]  PT THERAPEUTIC ACT EA 15 MIN 3/9/2020 Collette Gomes, PT GP 1          PT G-Codes  Outcome Measure Options: AM-PAC 6 Clicks Basic Mobility (PT)  AM-PAC 6 Clicks Score (PT): 24    PT Discharge Summary  Anticipated Discharge Disposition (PT): (SNF if medically appropriate.)  Reason for Discharge: All goals achieved  Outcomes  Achieved: Able to achieve all goals within established timeline    Collette Mendoza, PT  3/9/2020         Electronically signed by Collette Gomes, PT at 03/09/20 8190

## 2020-03-11 VITALS
BODY MASS INDEX: 24.86 KG/M2 | SYSTOLIC BLOOD PRESSURE: 128 MMHG | DIASTOLIC BLOOD PRESSURE: 73 MMHG | TEMPERATURE: 97.4 F | OXYGEN SATURATION: 94 % | WEIGHT: 140.3 LBS | HEART RATE: 85 BPM | RESPIRATION RATE: 18 BRPM | HEIGHT: 63 IN

## 2020-03-11 PROBLEM — T82.7XXA INFECTED PROSTHETIC VASCULAR GRAFT, INITIAL ENCOUNTER (HCC): Status: ACTIVE | Noted: 2020-03-11

## 2020-03-11 PROBLEM — Z98.890 STATUS POST FLAP GRAFT: Status: ACTIVE | Noted: 2020-03-11

## 2020-03-11 LAB
ANION GAP SERPL CALCULATED.3IONS-SCNC: 9 MMOL/L (ref 5–15)
BUN BLD-MCNC: 37 MG/DL (ref 6–20)
BUN/CREAT SERPL: 42.5 (ref 7–25)
CALCIUM SPEC-SCNC: 8.7 MG/DL (ref 8.6–10.5)
CHLORIDE SERPL-SCNC: 105 MMOL/L (ref 98–107)
CO2 SERPL-SCNC: 26 MMOL/L (ref 22–29)
CREAT BLD-MCNC: 0.87 MG/DL (ref 0.57–1)
DEPRECATED RDW RBC AUTO: 48.5 FL (ref 37–54)
ERYTHROCYTE [DISTWIDTH] IN BLOOD BY AUTOMATED COUNT: 14 % (ref 12.3–15.4)
GFR SERPL CREATININE-BSD FRML MDRD: 69 ML/MIN/1.73
GLUCOSE BLD-MCNC: 51 MG/DL (ref 65–99)
GLUCOSE BLDC GLUCOMTR-MCNC: 111 MG/DL (ref 70–130)
GLUCOSE BLDC GLUCOMTR-MCNC: 112 MG/DL (ref 70–130)
GLUCOSE BLDC GLUCOMTR-MCNC: 138 MG/DL (ref 70–130)
GLUCOSE BLDC GLUCOMTR-MCNC: 58 MG/DL (ref 70–130)
HCT VFR BLD AUTO: 33.6 % (ref 34–46.6)
HGB BLD-MCNC: 10.6 G/DL (ref 12–15.9)
MCH RBC QN AUTO: 29.6 PG (ref 26.6–33)
MCHC RBC AUTO-ENTMCNC: 31.5 G/DL (ref 31.5–35.7)
MCV RBC AUTO: 93.9 FL (ref 79–97)
PLATELET # BLD AUTO: 213 10*3/MM3 (ref 140–450)
PMV BLD AUTO: 10.7 FL (ref 6–12)
POTASSIUM BLD-SCNC: 5.2 MMOL/L (ref 3.5–5.2)
RBC # BLD AUTO: 3.58 10*6/MM3 (ref 3.77–5.28)
SODIUM BLD-SCNC: 140 MMOL/L (ref 136–145)
WBC NRBC COR # BLD: 10 10*3/MM3 (ref 3.4–10.8)

## 2020-03-11 PROCEDURE — C1751 CATH, INF, PER/CENT/MIDLINE: HCPCS

## 2020-03-11 PROCEDURE — 80048 BASIC METABOLIC PNL TOTAL CA: CPT | Performed by: INTERNAL MEDICINE

## 2020-03-11 PROCEDURE — 85027 COMPLETE CBC AUTOMATED: CPT | Performed by: INTERNAL MEDICINE

## 2020-03-11 PROCEDURE — 94799 UNLISTED PULMONARY SVC/PX: CPT

## 2020-03-11 PROCEDURE — 63710000001 INSULIN DETEMIR PER 5 UNITS: Performed by: PHYSICIAN ASSISTANT

## 2020-03-11 PROCEDURE — 25010000002 DAPTOMYCIN PER 1 MG: Performed by: INTERNAL MEDICINE

## 2020-03-11 PROCEDURE — 99239 HOSP IP/OBS DSCHRG MGMT >30: CPT | Performed by: INTERNAL MEDICINE

## 2020-03-11 PROCEDURE — 82962 GLUCOSE BLOOD TEST: CPT

## 2020-03-11 PROCEDURE — C1894 INTRO/SHEATH, NON-LASER: HCPCS

## 2020-03-11 RX ORDER — ACETAMINOPHEN 325 MG/1
650 TABLET ORAL EVERY 4 HOURS PRN
Start: 2020-03-11

## 2020-03-11 RX ORDER — DIAPER,BRIEF,INFANT-TODD,DISP
EACH MISCELLANEOUS EVERY 12 HOURS SCHEDULED
Qty: 1 EACH | Refills: 0 | Status: SHIPPED | OUTPATIENT
Start: 2020-03-11 | End: 2021-10-04

## 2020-03-11 RX ORDER — SODIUM CHLORIDE 0.9 % (FLUSH) 0.9 %
10 SYRINGE (ML) INJECTION EVERY 12 HOURS SCHEDULED
Status: DISCONTINUED | OUTPATIENT
Start: 2020-03-11 | End: 2020-03-11 | Stop reason: HOSPADM

## 2020-03-11 RX ORDER — INSULIN LISPRO 100 U/ML
3 INJECTION, SOLUTION SUBCUTANEOUS
Qty: 15 ML | Refills: 1 | Status: SHIPPED | OUTPATIENT
Start: 2020-03-11 | End: 2021-02-08 | Stop reason: SDUPTHER

## 2020-03-11 RX ORDER — METOPROLOL TARTRATE 50 MG/1
50 TABLET, FILM COATED ORAL 2 TIMES DAILY
Qty: 60 TABLET | Refills: 0 | Status: SHIPPED | OUTPATIENT
Start: 2020-03-11 | End: 2021-04-13 | Stop reason: SDUPTHER

## 2020-03-11 RX ORDER — DIAPER,BRIEF,INFANT-TODD,DISP
EACH MISCELLANEOUS EVERY 12 HOURS SCHEDULED
Status: DISCONTINUED | OUTPATIENT
Start: 2020-03-11 | End: 2020-03-11 | Stop reason: HOSPADM

## 2020-03-11 RX ORDER — SODIUM CHLORIDE 0.9 % (FLUSH) 0.9 %
10 SYRINGE (ML) INJECTION AS NEEDED
Status: DISCONTINUED | OUTPATIENT
Start: 2020-03-11 | End: 2020-03-11 | Stop reason: HOSPADM

## 2020-03-11 RX ADMIN — Medication 1 CAPSULE: at 08:35

## 2020-03-11 RX ADMIN — METOPROLOL TARTRATE 100 MG: 50 TABLET, FILM COATED ORAL at 08:35

## 2020-03-11 RX ADMIN — ASPIRIN 325 MG ORAL TABLET 325 MG: 325 PILL ORAL at 08:35

## 2020-03-11 RX ADMIN — PREGABALIN 150 MG: 75 CAPSULE ORAL at 06:19

## 2020-03-11 RX ADMIN — DAPTOMYCIN 500 MG: 500 INJECTION, POWDER, LYOPHILIZED, FOR SOLUTION INTRAVENOUS at 06:19

## 2020-03-11 RX ADMIN — PREGABALIN 150 MG: 75 CAPSULE ORAL at 14:22

## 2020-03-11 RX ADMIN — CLOPIDOGREL BISULFATE 75 MG: 75 TABLET ORAL at 08:35

## 2020-03-11 RX ADMIN — SODIUM CHLORIDE, PRESERVATIVE FREE 10 ML: 5 INJECTION INTRAVENOUS at 08:36

## 2020-03-11 RX ADMIN — ATORVASTATIN CALCIUM 10 MG: 10 TABLET, FILM COATED ORAL at 08:35

## 2020-03-11 RX ADMIN — BUDESONIDE 0.5 MG: 0.5 INHALANT RESPIRATORY (INHALATION) at 07:00

## 2020-03-11 RX ADMIN — FAMOTIDINE 20 MG: 20 TABLET ORAL at 08:36

## 2020-03-11 RX ADMIN — INSULIN DETEMIR 15 UNITS: 100 INJECTION, SOLUTION SUBCUTANEOUS at 08:36

## 2020-03-11 RX ADMIN — PANTOPRAZOLE SODIUM 40 MG: 40 TABLET, DELAYED RELEASE ORAL at 06:19

## 2020-03-11 NOTE — PROGRESS NOTES
Plastic Surgery Progress Note      Admission Date:  2020  LOS:  17  Patient Care Team:  Meagan Barlow APRN as PCP - General (Family Medicine)  Tyson Donaldson DO as Consulting Physician (Cardiology)    Patient Name:  Saskia Groves  :  1970  MRN:  [id number]    Date:  3/11/2020      Subjective:  No acute events overnight, drains removed yesterday, WBC WNL,       History:   Past Medical History:   Diagnosis Date   • Anxiety    • Arthritis    • Asthma    • Carotid artery stenosis    • Chronic bronchitis (CMS/HCC)    • COPD (chronic obstructive pulmonary disease) (CMS/HCC)    • Coronary artery disease     2 vessels with 50% blockage.  Sees Dr. Donaldson   • Depression    • Diabetes mellitus (CMS/HCC)    • Dyslipidemia    • GERD (gastroesophageal reflux disease)    • Hiatal hernia    • Hyperlipidemia    • Hypertension    • Infectious viral hepatitis    • Lower back pain    • Migraine    • Multinodular goiter    • S/P thyroid biopsy     2008, 2013, 2015, and 07/15/2019 at [facility], right lobe nodules - all cytologies were benign   • Sleep apnea     can't tolerate the cpap mask   • Subclinical hyperthyroidism      Past Surgical History:   Procedure Laterality Date   • ANTERIOR CERVICAL DISCECTOMY W/ FUSION     • BACK SURGERY      lumbar   •  SECTION  19940    x 2    • CHOLECYSTECTOMY     • COLONOSCOPY     • ENDOSCOPY     • FEMORAL ARTERY CUTDOWN Bilateral 2020    Procedure: FEMORAL ARTERY CUTDOWN WITH REMOVAL OF FEMORAL FEMORAL BYPASS GRAFT BILATERAL;  Surgeon: Deandre Oseguera MD;  Location:  [facility];  Service: Vascular;  Laterality: Bilateral;   • FEMORAL ENDARTERECTOMY Bilateral 1/15/2020    Procedure: FEMORAL ENDARTERECTOMY BILATERAL;  Surgeon: Deandre Oseguera MD;  Location:  [facility];  Service: Vascular   • FEMORAL FEMORAL BYPASS Right 1/15/2020    Procedure: FEMORAL FEMORAL BYPASS;  Surgeon: Deandre Oseguera MD;  Location:  [facility];  Service: Vascular   •  HYSTERECTOMY     • KNEE SURGERY Left    • LEG EXCISION LESION/CYST Left 2/28/2020    Procedure: GROIN MUSCLE FLAP BILATERAL;  Surgeon: Modesto Whittaker MD;  Location: [facility];  Service: Plastics;  Laterality: Left;   • WRIST SURGERY Left      Family History   Problem Relation Age of Onset   • Diabetes Mother    • Hypertension Mother    • Arthritis Mother    • Hyperlipidemia Mother    • Migraines Mother    • Thyroid disease Mother    • Hypertension Father    • Lung cancer Father    • Cancer Father    • Stroke Other    • Migraines Maternal Aunt    • Thyroid disease Maternal Aunt    • Heart attack Maternal Aunt    • Osteoporosis Maternal Aunt    • Cancer Maternal Uncle    • Heart attack Maternal Uncle    • Stroke Maternal Uncle    • Hyperlipidemia Maternal Grandmother    • Cancer Maternal Grandmother    • Obesity Maternal Grandmother    • Thyroid disease Daughter    • Obesity Daughter      Social History     Socioeconomic History   • Marital status:      Spouse name: Not on file   • Number of children: 2   • Years of education: Not on file   • Highest education level: Not on file   Occupational History   • Occupation: [employer] Work     Employer: DISABLED     Comment: Back and Leg Pain   Tobacco Use   • Smoking status: Current Every Day Smoker     Packs/day: 1.00     Years: 35.00     Pack years: 35.00     Types: Cigarettes   • Smokeless tobacco: Never Used   Substance and Sexual Activity   • Alcohol use: Not Currently   • Drug use: No   • Sexual activity: Yes     Partners: Male     Comment:    Social History Narrative    Lives in [place], [place] alone     Allergies   Allergen Reactions   • Levaquin [Levofloxacin] Nausea And Vomiting   • Penicillins Nausea And Vomiting     Has tolerated cefepime 2/2020   • Codeine Nausea Only   • Flagyl [Metronidazole] Nausea And Vomiting       Medication:    Current Facility-Administered Medications:   •  acetaminophen (TYLENOL) tablet 650 mg, 650 mg, Oral, Q4H PRN, 650  mg at 03/07/20 2217 **OR** acetaminophen (TYLENOL) 160 MG/5ML solution 650 mg, 650 mg, Oral, Q4H PRN, 650 mg at 03/04/20 1226 **OR** acetaminophen (TYLENOL) suppository 650 mg, 650 mg, Rectal, Q4H PRN, Isael Bailey PA, 650 mg at 02/28/20 0429  •  albuterol (PROVENTIL) nebulizer solution 0.083% 2.5 mg/3mL, 2.5 mg, Nebulization, Q6H PRN, Isael Bailey PA  •  amitriptyline (ELAVIL) tablet 10 mg, 10 mg, Oral, Nightly, Isael Bailey PA, 10 mg at 03/10/20 2036  •  aspirin tablet 325 mg, 325 mg, Oral, Daily, Isael Bailey PA, 325 mg at 03/10/20 0835  •  atorvastatin (LIPITOR) tablet 10 mg, 10 mg, Oral, Daily, Isael Bailey PA, 10 mg at 03/10/20 0835  •  budesonide (PULMICORT) nebulizer solution 0.5 mg, 0.5 mg, Nebulization, BID - RT, Isael Bialey PA, 0.5 mg at 03/11/20 0700  •  clopidogrel (PLAVIX) tablet 75 mg, 75 mg, Oral, Daily, Isael Bailey PA, 75 mg at 03/10/20 0835  •  DAPTOmycin (CUBICIN) 500 mg/50 mL in sodium chloride, 8 mg/kg, Intravenous, Q24H, Collette Melo MD, 500 mg at 03/11/20 0619  •  dextrose (D50W) 25 g/ 50mL Intravenous Solution 25 g, 25 g, Intravenous, Q15 Min PRN, Isael Bailey PA, 25 g at 03/07/20 1828  •  dextrose (GLUTOSE) oral gel 15 g, 15 g, Oral, Q15 Min PRN, Isael Bailey PA  •  famotidine (PEPCID) tablet 20 mg, 20 mg, Oral, BID AC, Isael Bailey PA, 20 mg at 03/10/20 1725  •  glucagon (human recombinant) (GLUCAGEN DIAGNOSTIC) injection 1 mg, 1 mg, Subcutaneous, Q15 Min PRN, Isael Bailey PA  •  glucagon (human recombinant) (GLUCAGEN DIAGNOSTIC) injection 1 mg, 1 mg, Subcutaneous, Q15 Min PRN, Kip Auguste MD  •  guaiFENesin-dextromethorphan (ROBITUSSIN DM) 100-10 MG/5ML syrup 5 mL, 5 mL, Oral, Q6H PRN, Isael Bailey PA, 5 mL at 02/27/20 0920  •  hydrALAZINE (APRESOLINE) injection 10 mg, 10 mg, Intravenous, Q6H PRN, Isael Bailey PA, 10 mg at 03/04/20 0628  •  HYDROcodone-acetaminophen (NORCO)   MG per tablet 1 tablet, 1 tablet, Oral, Q8H PRN, Isael Bailey PA, 1 tablet at 03/10/20 2036  •  insulin detemir (LEVEMIR) injection 15 Units, 15 Units, Subcutaneous, Q12H, Isael Bailey PA, 15 Units at 03/10/20 2037  •  insulin lispro (humaLOG) injection 0-9 Units, 0-9 Units, Subcutaneous, 4x Daily With Meals & Nightly, Kip Auguste MD, 2 Units at 03/10/20 0832  •  insulin lispro (humaLOG) injection 3 Units, 3 Units, Subcutaneous, TID With Meals, Kip Auguste MD, 3 Units at 03/10/20 0832  •  ketotifen (ZADITOR) 0.025 % ophthalmic solution 1 drop, 1 drop, Both Eyes, BID, Isael Bailey PA, 1 drop at 03/10/20 2037  •  lactobacillus acidophilus (RISAQUAD) capsule 1 capsule, 1 capsule, Oral, Daily, Isael Bailey PA, 1 capsule at 03/10/20 0834  •  metoprolol tartrate (LOPRESSOR) tablet 100 mg, 100 mg, Oral, Q12H, Isael Bailey PA, 100 mg at 03/10/20 2036  •  pantoprazole (PROTONIX) EC tablet 40 mg, 40 mg, Oral, Q AM, Therese Hollis, RPH, 40 mg at 03/11/20 0619  •  phenol (CHLORASEPTIC) 1.4 % liquid 1 spray, 1 spray, Mouth/Throat, Q2H PRN, Kip Auguste MD, 1 spray at 03/10/20 1725  •  pregabalin (LYRICA) capsule 150 mg, 150 mg, Oral, Q8H, Isael Bailey PA, 150 mg at 03/11/20 0619  •  rOPINIRole (REQUIP) tablet 4 mg, 4 mg, Oral, Nightly, Isael Bailey PA, 4 mg at 03/10/20 2036  •  sodium chloride 0.9 % flush 10 mL, 10 mL, Intravenous, Q12H, Collette Melo MD, 10 mL at 03/10/20 2037  •  sodium chloride 0.9 % flush 10 mL, 10 mL, Intravenous, PRN, Collette Melo MD  •  sodium chloride 0.9 % flush 20 mL, 20 mL, Intravenous, PRN, Collette Melo MD    Antibiotics:  Anti-Infectives (From admission, onward)    Ordered     Dose/Rate Route Frequency Start Stop    03/03/20 1942  DAPTOmycin (CUBICIN) 500 mg/50 mL in sodium chloride     Therese Hlolis, [facility] reviewed the order on 03/09/20 6217.   Ordering Provider:  Lorie  Collette GARCIA MD    8 mg/kg × 60.3 kg  over 30 Minutes Intravenous Every 24 Hours 20 0600 20 2359    20 0746  meropenem (MERREM) 1 g/100 mL 0.9% NS VTB (mbp)     Ordering Provider:  Parag Gordon MD    1 g  over 30 Minutes Intravenous Once 20 0845 20 0921    20 0831  cefepime (MAXIPIME) 2 g/100 mL 0.9% NS (mbp)     Ordering Provider:  Ranjana Easley APRN    2 g  200 mL/hr over 30 Minutes Intravenous Once 20 0930 20 1157    20 0203  vancomycin 500 mg/100 mL 0.9% NS IVPB (mbp)     Ordering Provider:  Ced Kenny RPH    500 mg  100 mL/hr over 60 Minutes Intravenous Once 20 0300 20 0511            Objective:    Physical Exam:   Vital Signs:  Temp (24hrs), Av °F (36.7 °C), Min:97.8 °F (36.6 °C), Max:98.2 °F (36.8 °C)    Temp  Min: 97.8 °F (36.6 °C)  Max: 98.2 °F (36.8 °C)  BP  Min: 121/74  Max: 139/81  Pulse  Min: 80  Max: 106  Resp  Min: 16  Max: 18  No data recorded    GENERAL: Awake and alert, in no acute distress. extubated  HEART: RRR;   LUNGS: Nonlabored. No dullness. intubated  ABDOMEN: Soft, nontender, nondistended. No rebound or guarding. NO mass or HSM.  EXT:  No cyanosis, clubbing or edema. No cord.  : Benavidez catheter in place.  SKIN: Warm and dry without cutaneous eruptions on Inspection/palpation.    NEURO: Oriented to PPT. No focal deficits on motor/sensory exam at arms/legs.  PSYCHIATRIC: Normal insight and judgement. Cooperative with PE  Bilateral Groins: incisional wound vac in place to suction, drains removed, incisional wound vac down, Rt groin incision well healed, Lt groin incision healed with a small area of ecchymosis at the superior point to watch      Laboratory Data:  Results from last 7 days   Lab Units 20  0450 03/10/20  0506 20  0321   WBC 10*3/mm3 10.00 13.08* 16.57*   HEMOGLOBIN g/dL 10.6* 10.0* 10.5*   HEMATOCRIT % 33.6* 32.2* 33.7*   PLATELETS 10*3/mm3 213 188 181     Results from last 7 days    Lab Units 03/11/20  0450   SODIUM mmol/L 140   POTASSIUM mmol/L 5.2   CHLORIDE mmol/L 105   CO2 mmol/L 26.0   BUN mg/dL 37*   CREATININE mg/dL 0.87   GLUCOSE mg/dL 51*   CALCIUM mg/dL 8.7     Results from last 7 days   Lab Units 03/09/20  0321   ALK PHOS U/L 138*   BILIRUBIN mg/dL 0.2   ALT (SGPT) U/L 23   AST (SGOT) U/L 27         Results from last 7 days   Lab Units 03/07/20  1349   CRP mg/dL 1.11*         Results from last 7 days   Lab Units 03/09/20  0321 03/06/20  0418 03/05/20  0344   CK TOTAL U/L 29 30 29         Estimated Creatinine Clearance: 69.5 mL/min (by C-G formula based on SCr of 0.87 mg/dL).    Microbiology:  No results found for: ACANTHNAEG, AFBCX, BPERTUSSISCX, BLOODCX  No results found for: BCIDPCR, CXREFLEX, CSFCX, CULTURETIS  No results found for: CULTURES, HSVCX, URCX  No results found for: EYECULTURE, GCCX, LABHSV  No results found for: LEGIONELLA, MRSACX, MUMPSCX, MYCOPLASCX  No results found for: NOCARDIACX, STOOLCX  No results found for: THROATCX, UNSTIMCULT, URINECX, CULTURE, VZVCULTUR  No results found for: VIRALCULTU, WOUNDCX      Assessment: 49yoF s/p 12/28/20 diagnostic fiberoptic bronchoscopy, Rtchest tube placement, removal of femoral-femoral bypass, bilateral CFA pericardial patches, bilateral Sartorious muscle flaps for groin graft coverage.         Plan:  - no acute surgery at this point in time, incisional wound vac removed, no seroma or hematoma on PE, Lower extremity perfused, WBC WNL.     - I (Dr. Whittaker) had a long discussion with Patient and Daughter last night about importance of going to a Rehab/Nursing facility for strengthening, IV abx management, glucose control, and nicotine cessation. Told both patient and daughter that this is a strong recommendation coming from the CT surgeon, Hospitalist and the plastic surgeon that going to a Rehab/Nursing facility would increase her chances of not having a complication and keeping her legs. It is concerning that the patient  would be going back to a similar environment that brought her here while she is still healing which was explained to the patient and Daughter. Patient and Daughter state they understand and fell they can bring her back in quickly for help if something were to change. Patient does not want to go to Rehab/Nursing facility because she wants to go home which explained to both mother and daughter that she would still eventually be able to go home. Patient and Daughter state they understand and want to home.     - dc incisional wound vac today  - continue abx per ID, IV abx per home  - will need diabetes education and control at home  - no smoking at home  - patient will need to ambulate at home  - shower BID  - apply dry dressing to MELISSA drain holes  - apply band aid with abx ointment to top of Lt groin superior incision BID  - disposition, Patient may leave from a plastic surgery standpoint but would still recommend a rehab or a nursing home.   - follow up with Dr. Whittaker in 1 week following discharge            Modesto Whittaker MD  03/11/20  07:38

## 2020-03-11 NOTE — NURSING NOTE
Prior to central line removal, order for the removal of catheter was verified, patient was assessed, necessary materials were gathered and patient was educated regarding procedure .    Patient was positioned supine to ensure that the insertion site was at or below the level of the heart.    Hands were washed, clean gloves were applied and central line dressing was gently removed. Catheter exit site was not cultured.     A new pair of clean gloves were then applied. Insertion site was cleansed with 2% Chlorhexidine swab using a circular motion beginning at the insertion site and moving outward for 30 seconds and allowed to dry.     Clamp on line was not present.     Patient was instructed to hold breath as catheter was withdrawn.     The central line was grasped at the insertion site and slowly pulled outward parallel to the skin. Resistance was not met.    After central line was completely removed, a sterile 4x4 gauze pad was used to apply light pressure until bleeding stopped. At that time, petroleum-based gauze and a sterile occlusive dressing was applied to exit site.     Patient was instructed to keep dressing in place for at least 24 hours and to remain in a flat or reclining position for 30 minutes post-removal.     Catheter was inspected after removal and was intact. Tip of central line was not sent for culture. Patient tolerated procedure.

## 2020-03-11 NOTE — PROGRESS NOTES
Case Management Discharge Note      Final Note: Notified Bob at [facility] of pt's DC today.  Also notified Radha at [facility] of pt's DC.  Dapto and supplies/teaching will be delivered to pt's room prior to DC today.  Daughter to assist with home infusions.      Provided Post Acute Provider List?: Yes  Provided Post Acute Provider Quality & Resource List?: Yes  Delivered To: Patient, Support Person    Destination      No service has been selected for the patient.      Durable Medical Equipment      No service has been selected for the patient.      Dialysis/Infusion - Selection Complete      Service Provider Request Status Selected Services Address Phone Number Fax Number    [facility] HOME INFUSION Selected Infusion and IV Therapy [address] [facility] [address] [phone]      Home Medical Care - Selection Complete      Service Provider Request Status Selected Services Address Phone Number Fax Number    [facility] HOME CARE Selected Home Health Services [address] [facility] [address] [phone]      Therapy      No service has been selected for the patient.      Community Resources      No service has been selected for the patient.             Final Discharge Disposition Code: 06 - home with home health care

## 2020-03-11 NOTE — PROGRESS NOTES
Discharge Planning Assessment  [facility]     Patient Name: Saskia Groves  MRN: [id number]  Today's Date: 3/11/2020    Admit Date: 2/23/2020    Discharge Needs Assessment    No documentation.       Discharge Plan     Row Name 03/11/20 1114       Plan    Plan  HOME with     Patient/Family in Agreement with Plan  yes    Plan Comments  Met again with pt and daughter at bedside to f/u DCP.  Plan is to DC home with H and IV abx per I.  CM confirmed meds/supplies are covered 100%.  Will cont to follow and finalize DCP upon DC.      Final Discharge Disposition Code  06 - home with home health care        Destination      Service Provider Request Status Selected Services Address Phone Number Fax Number    [facility]A [address] [facility] [facility] [address] [phone]      Durable Medical Equipment      Coordination has not been started for this encounter.      Dialysis/Infusion - Selection Complete      Service Provider Request Status Selected Services Address Phone Number Fax Number    [facility] HOME INFUSION Selected Infusion and IV Therapy [address] [facility] [address] [phone]      Home Medical Care - Selection Complete      Service Provider Request Status Selected Services Address Phone Number Fax Number    [facility] HOME CARE Selected Home Health Services [address] [facility] [phone]      Therapy      Coordination has not been started for this encounter.      Community Resources      Coordination has not been started for this encounter.        Expected Discharge Date and Time     Expected Discharge Date Expected Discharge Time    Mar 2, 2020         Demographic Summary    No documentation.       Functional Status    No documentation.       Psychosocial    No documentation.       Abuse/Neglect    No documentation.       Legal    No documentation.       Substance Abuse     No documentation.       Patient Forms    No documentation.           Bria Pierce RN

## 2020-03-11 NOTE — DISCHARGE SUMMARY
[facility] Medicine Services  DISCHARGE SUMMARY    Patient Name: Saskia Groves  : 1970  MRN: [id number]    Date of Admission: 2020 10:34 PM  Date of Discharge:  3/11/2020  Primary Care Physician: Meagan Barlow APRN    Consults     Date and Time Order Name Status Description    2020 0444 Inpatient Infectious Diseases Consult Completed     2020 0805 Inpatient Plastic Surgery Consult Completed     2020 0737 Inpatient Infectious Diseases Consult Completed     2020 0030 Inpatient Cardiothoracic Surgery Consult            Hospital Course     Presenting Problem:   Post-operative infection [T81.40XA]  Sepsis (CMS/Formerly [facility]) [A41.9]    Active Hospital Problems    Diagnosis  POA   • **Infected prosthetic vascular graft, initial encounter (CMS/Formerly [facility]) [T82.7XXA]  Yes   • Status post Lt and Rt Sartorious muscle flap  [Z98.890]  Not Applicable   • Pleural effusion - bilateral mod/large pleural effusions on CT chest 2020.  [J90]  No   • Acute pulmonary edema (CMS/Formerly [facility]) [J81.0]  No   • Acute hypoxemic respiratory failure (CMS/Formerly [facility]) [J96.01]  No   • Sepsis (CMS/Formerly [facility]) [A41.9]  Yes   • COPD (chronic obstructive pulmonary disease) (CMS/Formerly [facility]) [J44.9]  Yes   • Coronary artery disease [I25.10]  Yes   • Hypertension [I10]  Yes   • Hyperlipidemia [E78.5]  Yes   • Diabetes mellitus (CMS/Formerly [facility]) [E11.9]  Yes   • Current smoker [F17.200]  Yes   • PVD (peripheral vascular disease) (CMS/Formerly [facility]) [I73.9]  Yes      Resolved Hospital Problems   No resolved problems to display.          Hospital Course:  Saskia Groves is a 49 y.o. female admitted on  with history of diabetes, tobacco use, essential hypertension, peripheral artery disease with history of femorofemoral bypass on 1/15 but was readmitted with fever and wound infection of the left groin, infection of femoral-femoral bypass graft, large abscess associated with the infection please see radiology reports below.  She was  transferred to the ICU on 2/27 for pleural effusions and hypoxia.  She required chest tube on the right.  She had graft explant on 2/28 performed by Dr. Whittaker with plastic surgery team.  Please see operative report for full details..  Muscle flaps were performed by plastic surgery.  She had bilateral wound vacs in place.  She was extubated after several days postoperatively.  Infectious disease has followed and assisted with management.  Wound infection grew MRSA and patient has been treated with daptomycin.    Patient's infection are felt to be related to diabetes that is severely uncontrolled.  A1c is very elevated.  Patient was seen by diabetic educator.  She was converted from her 70/30 insulin to basal and prandial insulin.  She has plans to see outpatient endocrinology.  I think it is unlikely that she can control her sugar with 70/30 insulin.  Her daughter reports very poor diet including sugary sodas and frequent potato eating.  I have counseled her at length about appropriate diabetic diet, increasing exercise, and strict adherence to medical regimen.  I feel patient has unlikely ability to adequately follow these and does not seem particularly motivated.  With extensive counseling we encouraged her to go to skilled facility for subacute rehabilitation and wound care in order to get healed from her current infection.  Extensive counseling was given by myself, physical therapist, plastic surgery, cardiac surgery, and infectious disease.  This type this counseling patient and her daughter refused to allow her to go to rehab as they think they can treat her adequately at home.  They are very overconfident with their ability to treat this very complicated situation and to improve diabetes control.  I explained my concern about their overconfidence and they stated ultimately that it was their decision.  Again I stated that poor care and diabetes control can lead to worsening wound, worsening morbidity, and  death.  They said they understand this and plan to stick to recommended regimen.  She has a PICC line and will receive continued antibiotic coverage through April 20.  She will follow-up closely with infectious disease, endocrinology, plastic surgery, and primary care provider.  Home health is been consulted to assist with medical needs.  Also of note patient's losartan has been held as patient is normotensive off this medication was having issues with elevated potassium.  I have educated her on low potassium diet and her potassium is gradually trending down.  She has shown no sign of arrhythmias or peak T waves on her telemetry.  Recommend consideration for repeat BMP at primary care follow-up.  Case discussed with all consulting services who feel she is stable for discharge however all would prefer her to go to rehab which has been explained at length.    At the time of discharge patient was told to take all medications as prescribed, keep all follow-up appointments, and call their doctor or return to the hospital with any worsening or concerning symptoms.    Please note that dragon voice recognition software was used to create this note and that transcription errors are possible.      Discharge Follow Up Recommendations for outpatient labs/diagnostics:  Follow-ups as per listed below.    Day of Discharge     HPI:   Doing well off of the MELISSA drain and wound VAC.  Adamantly refuses to go to rehabilitation which is recommended by myself, physical therapy, plastic surgery, infectious disease, and cardiothoracic surgery.  Her daughter is at the bedside and is very overconfident in their ability to take care of the diabetes and the wounds.  Daughter states that she plans to improve her diabetes care as well as she is an uncontrolled diabetic as well.  I explained to her that if both of them are unable to currently controlled her diabetes it is unlikely that this is going to be able to improve in the near future and that  continued poor control of diabetes can prevent clearance of infection, wound healing, and ultimately lead to death.  She said she is willing to take this risk as did the daughter.      Review of Systems    No current fevers or chills  No current shortness of breath or cough  No current nausea, vomiting, or diarrhea  No current chest pain or palpitations    Vital Signs:   Temp:  [97.4 °F (36.3 °C)-98.2 °F (36.8 °C)] 97.4 °F (36.3 °C)  Heart Rate:  [80-93] 85  Resp:  [16-18] 18  BP: (121-135)/(73-80) 128/73     Physical Exam:  Constitutional:Awake, alert  HENT: NCAT, mucous membranes moist, neck supple  Respiratory: Some dullness at the base but otherwise clear to auscultation bilaterally, respiratory effort normal, nonlabored breathing   Cardiovascular: RRR, S1, S2, normal radial pulses  Gastrointestinal: Positive bowel sounds, soft, nontender, nondistended  Musculoskeletal: Normal musculature for age, no lower extremity edema, BMI 24  Psychiatric: Appropriate affect, cooperative, conversational  Neurologic: No slurred speech or facial droop, follows commands, oriented  Skin: Groin wound VAC in place, MELISSA drains have been removed, wound is CDI, no rashes or jaundice    Pertinent  and/or Most Recent Results     Results from last 7 days   Lab Units 03/11/20  0450 03/10/20  0506 03/09/20  0321 03/07/20  1349 03/06/20  0418 03/05/20  0344   WBC 10*3/mm3 10.00 13.08* 16.57*  --  8.40 9.39   HEMOGLOBIN g/dL 10.6* 10.0* 10.5*  --  10.4* 10.4*   HEMATOCRIT % 33.6* 32.2* 33.7*  --  34.0 33.9*   PLATELETS 10*3/mm3 213 188 181  --  189 216   SODIUM mmol/L 140 139 137 140 144 138   POTASSIUM mmol/L 5.2 5.4* 5.6* 5.1 4.4 4.2   CHLORIDE mmol/L 105 105 104 103 107 102   CO2 mmol/L 26.0 26.0 26.0 27.0 28.0 26.0   BUN mg/dL 37* 42* 42* 47* 45* 43*   CREATININE mg/dL 0.87 0.92 0.86 0.66 0.64 0.54*   GLUCOSE mg/dL 51* 190* 117* 83 79 147*   CALCIUM mg/dL 8.7 8.5* 8.4* 8.7 8.8 8.6     Results from last 7 days   Lab Units 03/09/20  0320  03/07/20  1349 03/06/20  0418 03/05/20  0344   BILIRUBIN mg/dL 0.2 0.3 0.3 0.3   ALK PHOS U/L 138* 123* 108 101   ALT (SGPT) U/L 23 23 19 15   AST (SGOT) U/L 27 33* 32 24     Results from last 7 days   Lab Units 03/07/20  1349   CHOLESTEROL mg/dL 112   TRIGLYCERIDES mg/dL 139           Brief Urine Lab Results  (Last result in the past 365 days)      Color   Clarity   Blood   Leuk Est   Nitrite   Protein   CREAT   Urine HCG        02/28/20 0630 Yellow Clear Moderate (2+) Negative Negative 100 mg/dL (2+)               Microbiology Results Abnormal     Procedure Component Value - Date/Time    Fungus Culture - Body Fluid, Pleural Cavity [[id number]] Collected:  02/28/20 1341    Lab Status:  Preliminary result Specimen:  Body Fluid from Pleural Cavity Updated:  03/06/20 1530     Fungus Culture No fungus isolated at 1 week    AFB Culture - Body Fluid, Pleural Cavity [[id number]] Collected:  02/28/20 1341    Lab Status:  Preliminary result Specimen:  Body Fluid from Pleural Cavity Updated:  03/06/20 1530     AFB Culture No AFB isolated at 1 week     AFB Stain No acid fast bacilli seen on concentrated smear    Fungus Culture - Body Fluid, Groin, right [[id number]] Collected:  02/28/20 1417    Lab Status:  Preliminary result Specimen:  Body Fluid from Groin, right Updated:  03/06/20 1530     Fungus Culture No fungus isolated at 1 week    AFB Culture - Body Fluid, Groin, right [[id number]] Collected:  02/28/20 1417    Lab Status:  Preliminary result Specimen:  Body Fluid from Groin, right Updated:  03/06/20 1530     AFB Culture No AFB isolated at 1 week     AFB Stain No acid fast bacilli seen on concentrated smear    Fungus Culture - Tissue, Groin, right [[id number]] Collected:  02/28/20 1417    Lab Status:  Preliminary result Specimen:  Tissue from Groin, right Updated:  03/06/20 1530     Fungus Culture No fungus isolated at 1 week    AFB Culture - Tissue, Groin, right [[id number]] Collected:  02/28/20 1417    Lab Status:   Preliminary result Specimen:  Tissue from Groin, right Updated:  03/06/20 1530     AFB Culture No AFB isolated at 1 week     AFB Stain No acid fast bacilli seen on concentrated smear    Anaerobic Culture - Hardware / Foreign Body, Groin, right [[id number]] Collected:  02/28/20 1500    Lab Status:  Final result Specimen:  Hardware / Foreign Body from Groin, right Updated:  03/04/20 1205     Anaerobic Culture No anaerobes isolated at 5 days    Anaerobic Culture - Body Fluid, Groin, right [[id number]] Collected:  02/28/20 1417    Lab Status:  Final result Specimen:  Body Fluid from Groin, right Updated:  03/04/20 1204     Anaerobic Culture No anaerobes isolated at 5 days    Anaerobic Culture - Body Fluid, Pleural Cavity [[id number]] Collected:  02/28/20 1341    Lab Status:  Final result Specimen:  Body Fluid from Pleural Cavity Updated:  03/04/20 1203     Anaerobic Culture No anaerobes isolated at 5 days    Anaerobic Culture - Tissue, Groin, right [[id number]] Collected:  02/28/20 1417    Lab Status:  Final result Specimen:  Tissue from Groin, right Updated:  03/04/20 1202     Anaerobic Culture No anaerobes isolated at 5 days    Blood Culture - Blood, Wrist, Right [[id number]] Collected:  02/28/20 0750    Lab Status:  Final result Specimen:  Blood from Wrist, Right Updated:  03/04/20 0815     Blood Culture No growth at 5 days    Body Fluid Culture - Body Fluid, Groin, right [[id number]]  (Abnormal)  (Susceptibility) Collected:  02/28/20 1417    Lab Status:  Final result Specimen:  Body Fluid from Groin, right Updated:  03/03/20 0650     Body Fluid Culture Rare Staphylococcus aureus, MRSA     Gram Stain Many (4+) WBCs seen      No organisms seen    Narrative:             Susceptibility      Staphylococcus aureus, MRSA     CADENCE     Gentamicin Susceptible     Oxacillin Resistant     Rifampin Susceptible     Vancomycin Susceptible                Susceptibility Comments     Staphylococcus aureus, MRSA    This isolate does not  demonstrate inducible clindamycin resistance in vitro.               Respiratory Culture - Sputum, ET Suction [[id number]] Collected:  02/29/20 0850    Lab Status:  Final result Specimen:  Sputum from ET Suction Updated:  03/02/20 0902     Respiratory Culture No growth     Gram Stain Few (2+) Epithelial cells per low power field      Few (2+) WBCs seen      No organisms seen    Tissue / Bone Culture - Tissue, Groin, right [[id number]] Collected:  02/28/20 1417    Lab Status:  Final result Specimen:  Tissue from Groin, right Updated:  03/02/20 0809     Tissue Culture No growth at 3 days     Gram Stain Many (4+) WBCs seen      No organisms seen    Body Fluid Culture - Body Fluid, Pleural Cavity [[id number]] Collected:  02/28/20 1341    Lab Status:  Final result Specimen:  Body Fluid from Pleural Cavity Updated:  03/02/20 0808     Body Fluid Culture No growth at 3 days     Gram Stain Many (4+) WBCs seen      No organisms seen    Hardware / Foreign Body Culture - Hardware / Foreign Body, Groin, right [[id number]]  (Abnormal)  (Susceptibility) Collected:  02/28/20 1500    Lab Status:  Final result Specimen:  Hardware / Foreign Body from Groin, right Updated:  03/01/20 0645     Hardware / Foreign Body Culture Scant growth (1+) Staphylococcus aureus, MRSA     Comment:   Methicillin resistant Staphylococcus aureus, Patient may be an isolation risk.        Gram Stain Many (4+) WBCs seen      No organisms seen    Susceptibility      Staphylococcus aureus, MRSA     CADENCE     Clindamycin Susceptible     Erythromycin Resistant     Inducible Clindamycin Resistance Negative     Oxacillin Resistant     Penicillin G Resistant     Rifampin Susceptible     Tetracycline Susceptible     Trimethoprim + Sulfamethoxazole Susceptible     Vancomycin Susceptible                Susceptibility Comments     Staphylococcus aureus, MRSA    This isolate does not demonstrate inducible clindamycin resistance in vitro.               Blood Culture - Blood,  Arm, Left [[id number]] Collected:  02/24/20 0057    Lab Status:  Final result Specimen:  Blood from Arm, Left Updated:  02/29/20 0545     Blood Culture No growth at 5 days    Blood Culture - Blood, Hand, Left [[id number]] Collected:  02/24/20 0057    Lab Status:  Final result Specimen:  Blood from Hand, Left Updated:  02/29/20 0545     Blood Culture No growth at 5 days    Wound Culture - Wound, Thigh, Left [[id number]]  (Abnormal)  (Susceptibility) Collected:  02/24/20 1849    Lab Status:  Final result Specimen:  Wound from Thigh, Left Updated:  02/27/20 0723     Wound Culture Light growth (2+) Staphylococcus aureus, MRSA     Comment: Methicillin resistant Staphylococcus aureus, Patient may be an isolation risk.        Gram Stain Few (2+) WBCs seen      Rare (1+) Gram positive cocci in pairs    Susceptibility      Staphylococcus aureus, MRSA     CADENCE     Clindamycin Susceptible     Erythromycin Resistant     Inducible Clindamycin Resistance Negative     Oxacillin Resistant     Penicillin G Resistant     Rifampin Susceptible     Tetracycline Susceptible     Trimethoprim + Sulfamethoxazole Susceptible     Vancomycin Susceptible                Susceptibility Comments     Staphylococcus aureus, MRSA    This isolate does not demonstrate inducible clindamycin resistance in vitro.                     Imaging Results (All)     Procedure Component Value Units Date/Time    XR Chest 1 View [[id number]] Collected:  03/10/20 1618     Updated:  03/10/20 2150    Narrative:       EXAMINATION: XR CHEST 1 VW-03/10/2020:     INDICATION: Check PICC placement; I73.9-Peripheral vascular disease,  unspecified; D36-Ewcyobr effusion, not elsewhere classified;  Z95.828-Presence of other vascular implants and grafts.     COMPARISON: 03/09/2020.     FINDINGS: Right-sided PICC line has been removed. Left-sided PICC line  tip lies in the left brachiocephalic vein. The heart is normal in size.  There is diffuse pulmonary interstitial disease  resembling mild  interstitial edema, appearing a little increased although this may  reflect portable rather than PA film technique. There appear to be very  small pleural effusions. No lung consolidation or pneumothorax is seen.       Impression:       1.  Previous right-sided PICC line removal.  2.  Left-sided PICC line tip now appears to lie in the distal left  brachiocephalic vein.  3.  Mild diffuse pulmonary interstitial disease, similar to prior exam,  allowing for portable film technique today.     D:  03/10/2020  E:  03/10/2020            This report was finalized on 3/10/2020 9:47 PM by Dr. Jaswant Frank MD.       XR Chest PA & Lateral [[id number]] Collected:  03/10/20 0922     Updated:  03/10/20 1711    Narrative:       EXAMINATION: XR CHEST PA AND LATERAL- 03/09/2020     INDICATION: pneumonia/effusions; I73.9-Peripheral vascular disease,  unspecified; S34-Qsdcrlm effusion, not elsewhere classified;  Z95.828-Presence of other vascular implants and grafts      COMPARISON: 03/05/2020     FINDINGS: PA and lateral views of the chest reveal cardiac and  mediastinal silhouettes within normal limits. PICC line catheter  identified bilaterally with both tips in the SVC. There is improvement  seen in aeration of the right midlung. The pulmonary vascularity remains  increased. Mild increased markings seen in the lung bases bilaterally.            Impression:       Improvement seen in aeration of the right midlung with  increased markings seen at the lung bases bilaterally. PICC line  catheter on the left tip in the SVC. Prominence seen in the pulmonary  vascularity bilaterally.        D:  03/10/2020  E:  03/10/2020     This report was finalized on 3/10/2020 5:08 PM by Dr. Andra Steele MD.       XR Chest 1 View [[id number]] Collected:  03/05/20 0836     Updated:  03/06/20 2300    Narrative:       EXAMINATION: XR CHEST 1 VW-     INDICATION: Postop; I73.9-Peripheral vascular disease, unspecified;  W42-Tpsvona  "effusion, not elsewhere classified; Z95.828-Presence of  other vascular implants and grafts; R13.13-Dysphagia, pharyngeal phase.     COMPARISON: 03/04/2020.     FINDINGS: Right upper extremity PICC line is seen with its tip at the  cavoatrial junction. Feeding tube is seen below the left hemidiaphragm.  Heart is normal in size. Right pigtail catheter has been removed. There  are small areas of discoid atelectasis in the right mid and lower lung,  and there appears to be very small pneumothorax in the lateral right  base, approximately 7 mm in width. This does not extend to the apex, or  across the base of the lung, although there is probably trace air in the  minor fissure. Left lung shows near resolution of focal atelectasis in  the medial left base. There is stable diffuse interstitial disease  elsewhere.       Impression:       1. Right pleural drain removal with very small right basilar  pneumothorax.  2. Mild new discoid atelectasis in the right mid and lower lung, but  interval clearing of the left lung base, since yesterday's study.      D:  03/05/2020  E:  03/05/2020     This report was finalized on 3/6/2020 10:57 PM by Dr. Jaswant Frank MD.       XR Abdomen KUB [[id number]] Collected:  03/04/20 1420     Updated:  03/04/20 5210    Narrative:       EXAMINATION: XR ABDOMEN/KUB-03/04/2020:     INDICATION: S/P Keofeed placement; I73.9-Peripheral vascular disease,  unspecified; G08-Tbhbwtt effusion, not elsewhere classified;  Z95.828-Presence of other vascular implants and grafts;  R13.13-Dysphagia, pharyngeal phase.     COMPARISON: NONE.     FINDINGS: Feeding tube is seen in the duodenal bulb. The abdomen appears  somewhat \"gassy\", with gas throughout the colon and some small bowel  gas, but loops are only upper limits of normal size. No bowel wall edema  or pneumatosis is seen.       Impression:       Feeding tube tip in the duodenal bulb.     D:  03/04/2020  E:  03/04/2020            This report was finalized on " 3/4/2020 10:54 PM by Dr. Jaswant Frank MD.       XR Chest 1 View [[id number]] Collected:  03/04/20 0825     Updated:  03/04/20 2254    Narrative:       EXAMINATION: XR CHEST 1 VW-     INDICATION: Respiratory failure; I73.9-Peripheral vascular disease,  unspecified; S35-Pwfogca effusion, not elsewhere classified;  Z95.828-Presence of other vascular implants and grafts;  R13.10-Dysphagia, unspecified.     COMPARISON: 03/03/2020.     FINDINGS: NG tube is seen in the left hemidiaphragm. Right pigtail  catheter remains in place. PICC line is at the cavoatrial junction in  good position. Heart is normal in size. Patient's mild diffuse pulmonary  interstitial disease pattern and focal left basilar atelectasis or  effusion appears stable. No pneumothorax is seen.       Impression:       Stable diffuse pulmonary interstitial disease and left  basilar atelectasis or effusion.      D:  03/04/2020  E:  03/04/2020     This report was finalized on 3/4/2020 10:51 PM by Dr. Jaswant Frank MD.       SLP FEES - Fiberoptic Endo Eval Swallow [[id number]] Resulted:  03/04/20 1125     Updated:  03/04/20 1125    Narrative:       This procedure was auto-finalized with no dictation required.    XR Chest 1 View [[id number]] Collected:  03/03/20 0854     Updated:  03/03/20 2210    Narrative:       EXAMINATION: XR CHEST 1 VW-03/03/2020:     INDICATION: Respiratory failure; I73.9-Peripheral vascular disease,  unspecified; Y48-Gosfzpe effusion, not elsewhere classified;  Z95.828-Presence of other vascular implants and grafts.     COMPARISON: 03/01/2020.     FINDINGS: ET tube has been removed. NG tube, PICC line, and right  pleural drain remain in place. The heart is normal in size. Diffuse  pulmonary interstitial disease has improved, presumably resolving edema.  No lung consolidation, effusion, or pneumothorax is seen.       Impression:       Mild remaining pulmonary interstitial disease. No evidence  of pneumothorax or other new chest pathology.     D:   03/03/2020  E:  03/03/2020            This report was finalized on 3/3/2020 10:07 PM by Dr. Jaswant Frank MD.       XR Chest 1 View [[id number]] Collected:  03/01/20 0757     Updated:  03/02/20 0817    Narrative:          EXAMINATION: XR CHEST 1 VW - 03/01/2020     INDICATION: I73.9-Peripheral vascular disease, unspecified; R44-Bpfdpip  effusion, not elsewhere classified; Z95.828-Presence of other vascular  implants and grafts.      COMPARISON: 02/29/2020     FINDINGS: Support hardware unchanged and in satisfactory positioning.  Cardiac silhouette enlarged with diffuse bilateral pulmonary  opacifications slightly decreased in density from prior comparison  particularly within the right mid and upper lung suggesting improving  airspace disease. No pneumothorax however trace volume left pleural  effusion similar to prior.           Impression:       Cardiac silhouette enlarged with diffuse bilateral pulmonary  opacifications slightly decreased in density from prior comparison  particularly within the right mid and upper lung suggesting improving  airspace disease. No pneumothorax however trace volume left pleural  effusion similar to prior.        DICTATED:   03/01/2020  EDITED/ls :   03/01/2020      This report was finalized on 3/2/2020 8:14 AM by Dr. Valdez Hill.       XR Chest 1 View [[id number]] Collected:  02/29/20 0723     Updated:  02/29/20 1907    Narrative:          EXAMINATION: XR CHEST 1 VW - 02/29/2020     INDICATION:  I73.9-Peripheral vascular disease, unspecified; U62-Hqfhqkp  effusion, not elsewhere classified; Z95.828-Presence of other vascular  implants and grafts.      COMPARISON: 02/28/2020     FINDINGS: Support hardware unchanged and in satisfactory positioning.  Cardiac size borderline enlarged and mostly obscured due to increase in  already dense bilateral pulmonary opacifications from prior of diffuse  airspace disease without pneumothorax or large effusion present.           Impression:        Increased density of diffuse bilateral pulmonary  opacifications with a left perihilar predominance of involvement with  air bronchograms present at this site of diffuse bilateral airspace  disease without significant effusion. Support hardware unchanged. No  pneumothorax.     DICTATED:   02/29/2020  EDITED/ls :   02/29/2020      This report was finalized on 2/29/2020 7:04 PM by Dr. Valdez Hill.       XR Chest 1 View [[id number]] Collected:  02/28/20 0841     Updated:  02/28/20 2154    Narrative:       EXAMINATION: XR CHEST 1 VW-     INDICATION: Dyspnea; I73.9-Peripheral vascular disease, unspecified.      COMPARISON: 01/14/2020.     FINDINGS: Apparatus shadow superimposed over the right upper neck and  chest. Heart is normal in size but there has been interval development  of dense groundglass and finely reticular disease, with some patchy  airspace disease of the lower lungs. There appears to be a skinfold  shadow superimposed over the right base. No pneumothorax is suspected.       Impression:       Interval development of extensive pulmonary interstitial  disease, more typical in appearance for developing ARDS, than for  pulmonary edema. Patchy multifocal bibasilar airspace disease, possibly  pneumonia.      D:  02/28/2020  E:  02/28/2020     This report was finalized on 2/28/2020 9:51 PM by Dr. Jaswant Frank MD.       XR Chest 1 View [[id number]] Collected:  02/28/20 1932     Updated:  02/28/20 1934    Narrative:       CHEST X-RAY, 2/28/2020 (18:47)    HISTORY:    . Patient on ventilator. Follow-up cardiopulmonary status.      TECHNIQUE:  AP portable semiupright chest x-ray.    COMPARISON:  *  Chest x-ray earlier today.    FINDINGS:  Endotracheal tube tip in the mid thoracic trachea 5.0 cm above the you. Right arm PICC tip in the low SVC. NG tube in good position in the distal stomach.    Right pleural drain in place with no visible pneumothorax.    Moderate diffuse interstitial and alveolar pulmonary edema and  probable small pleural effusions without significant change since earlier today. Heart size is normal.      Impression:       1. Support equipment in good position as detailed above.  2. Right-sided chest drain with no visible pneumothorax.  3. Pulmonary findings are unchanged since earlier today.    Signer Name: Tyson Yu MD   Signed: 2/28/2020 7:32 PM   Workstation Name: [id number]    [facility]    CT Angiogram Chest With & Without Contrast [[id number]] Collected:  02/27/20 2150     Updated:  02/27/20 2152    Narrative:       CT ANGIOGRAM CHEST W CONTRAST    INDICATION:   Dyspnea of suspected cardiac origin. Shortness of air. Peripheral vascular disease.    TECHNIQUE:   CT angiogram of the chest with iodinated IV contrast. 3-D reconstructions were obtained and reviewed.   Radiation dose reduction techniques included automated exposure control or exposure modulation based on body size. Count of known CT and cardiac nuc  med studies performed in previous 12 months: 1.     COMPARISON:   None available.    FINDINGS:   Study is slightly motion degraded but felt to be diagnostic. Extensive bilateral perihilar alveolar infiltrates which appear relatively symmetric and most consistent with acute pulmonary edema. Moderate bilateral pleural effusions measuring over 5 cm on  the right and over 6 cm on the left. Compressive atelectasis both lung bases. Trachea and bronchi unremarkable.    No evidence of pulmonary embolus. Aortic atherosclerotic changes without dissection or aneurysm. Heart size within normal limits. Enlarged mediastinal lymph nodes the largest in the region of the AP window measuring 2.2 x 1.7 cm and may be reactive.  Appropriate follow-up recommended.    1.9 cm left adrenal nodule. Postcholecystectomy. Postsurgical changes lower cervical spine. Nonaggressive sclerotic bone lesion T8 vertebral body may represent a bone island. Generalized body wall edema suggesting third  spacing of fluid      Impression:       Extensive bilateral perihilar alveolar infiltrates most likely representing pulmonary edema. Moderate-sized bilateral pleural effusions.    No evidence of pulmonary embolus.    Suspected anasarca with generalized body wall edema.    Signer Name: HARVEY Oliver MD   Signed: 2/27/2020 9:50 PM   Workstation Name: [id number]    [facility]    MRI Cervical Spine With & Without Contrast [[id number]] Collected:  02/27/20 1028     Updated:  02/27/20 1030    Narrative:       MRI Spine Cervical WO W    INDICATION:    Acute onset of ataxia. Infection is suspected. Acute onset of fever. History of cervical fusion.    TECHNIQUE:   MRI of the cervical spine with and without IV contrast. Patient was given 10 cc of MultiHance.    The exam is significantly suboptimal due to patient motion.    COMPARISON:    No pertinent prior study    FINDINGS:  The sagittal alignment appears satisfactory. Status post ACDF at C5, C6 and C7. Susceptibility artifact demonstrated.    Visualized marrow signal appears within normal limits. Visualized intracranial contents show no acute findings. The cervicomedullary junction is normal.     The spinal cord is of normal course and caliber. No syrinx is seen. No clearly reproducible areas of signal abnormality identified. No convincing evidence of spinal canal stenosis or encroachment upon the spinal cord. No obvious areas of neural foraminal  compromise.    After the administration of contrast, there are is no convincing evidence of abnormal enhancement. No abnormal fluid collections are convincingly identified.        Impression:       1.  Suboptimal study due to motion artifact.    2.  No convincing evidence of abnormal enhancement or signal intensity.    3.  No convincing evidence of cord compression or neural foraminal compromise.    4.  Status post ACDF from C5 through C7.    Signer Name: Eliseo Bray MD   Signed: 2/27/2020 10:28  AM   Workstation Name: [id number]    [facility]    CT Abdomen Pelvis Without Contrast [[id number]] Collected:  02/24/20 0044     Updated:  02/24/20 0046    Narrative:       CT Abdomen Pelvis WO    INDICATION:   49-year-old female with left groin pain since femoral-femoral bypass January 2020. Order requesting evaluation for possible infection/abscess.    TECHNIQUE:   CT of the abdomen and pelvis without IV contrast. Coronal and sagittal reconstructions were obtained.  Radiation dose reduction techniques included automated exposure control or exposure modulation based on body size. Count of known CT and cardiac nuc  med studies performed in previous 12 months: 0.     COMPARISON:   None available.    FINDINGS:  Visualized lung bases are unremarkable.    Abdomen:   The liver is within normal limits. The spleen and pancreas are normal. Cholecystectomy. Indeterminate 2 cm left adrenal nodule. Right adrenal gland is unremarkable. The kidneys are normal. Abdominal aorta normal in course and caliber. Small bowel is  unremarkable without obstruction. Normal appendix. The colon is unremarkable. Trace free fluid in the paracolic gutters. No free intraperitoneal air.    Pelvis:   The urinary bladder is unremarkable.  The uterus and adnexa are within normal limits. Trace free pelvic fluid.    No acute osseous abnormality. Postsurgical changes from femoral-femoral bypass. There is a fluid collection in the left inguinal region at the left femoral-femoral bypass anastomosis, measuring 5.3 x 4.9 cm in cross-section and at least 6.5 cm in CC  dimension. There is inflammatory stranding noted throughout the subcutaneous soft tissues of the left inguinal region and proximal left thigh surrounding the fluid collection. The findings are concerning for abscess versus infected hematoma given the  provided clinical history.        Impression:         1. Fluid collection in the left inguinal region at the left  femoral-femoral bypass anastomosis, measuring 5.3 x 4.9 x 6.5 cm. Inflammatory stranding noted throughout the subcutaneous soft tissues of the left inguinal region and proximal left thigh  surrounding the fluid collection. Findings are concerning for abscess versus infected hematoma given the provided clinical history.  2. Trace amount of abdominal and pelvic free fluid, nonspecific.  3. Normal appendix.  4. Cholecystectomy.  5. Indeterminate 2 cm left adrenal nodule. This can be further characterized with a nonemergent, outpatient adrenal mass protocol MRI when the patient is clinically able.          Signer Name: Victorino Bray MD   Signed: 2/24/2020 12:44 AM   Workstation Name: [email or web address]    [facility]          Results for orders placed in visit on 06/19/19   Duplex Carotid Ultrasound CAR       Results for orders placed in visit on 06/19/19   Duplex Carotid Ultrasound CAR       Results for orders placed during the hospital encounter of 02/23/20   Adult Transthoracic Echo Complete W/ Cont if Necessary Per Protocol    Narrative · Left ventricular systolic function is mildly decreased. Calculated EF =   45.0%. Estimated EF appears to be in the range of 46 - 50%  · Mild global hypokinesis no focal wall motion abnormalities  · Mild aortic valve sclerosis without stenosis.  · There is a trivial pericardial effusion. There is a large size left   pleural effusion           Order Current Status    AFB Culture - Body Fluid, Groin, right Preliminary result    AFB Culture - Body Fluid, Pleural Cavity Preliminary result    AFB Culture - Tissue, Groin, right Preliminary result    Fungus Culture - Body Fluid, Groin, right Preliminary result    Fungus Culture - Body Fluid, Pleural Cavity Preliminary result    Fungus Culture - Tissue, Groin, right Preliminary result        Discharge Details        Discharge Medications      New Medications      Instructions Start Date   acetaminophen 325 MG  "tablet  Commonly known as:  TYLENOL   650 mg, Oral, Every 4 Hours PRN      bacitracin 500 UNIT/GM ointment   Topical, Every 12 Hours Scheduled, Apply to the left groin incision as instructed      daptomycin  IVPB  Commonly known as:  CUBICIN   500 mg, Intravenous, Every 24 Hours   Start Date:  March 12, 2020     insulin detemir 100 UNIT/ML injection  Commonly known as:  LEVEMIR   12 Units, Subcutaneous, Every 12 Hours Scheduled, Formulary substitution allowed      insulin lispro 100 UNIT/ML injection  Commonly known as:  humaLOG   3 Units, Subcutaneous, 3 Times Daily With Meals         Changes to Medications      Instructions Start Date   metoprolol tartrate 50 MG tablet  Commonly known as:  LOPRESSOR  What changed:    · medication strength  · how much to take  · when to take this   50 mg, Oral, 2 Times Daily         Continue These Medications      Instructions Start Date   albuterol (2.5 MG/3ML) 0.083% nebulizer solution  Commonly known as:  PROVENTIL   Nebulization, Every 6 Hours PRN      ALBUTEROL SULFATE HFA IN   1 puff, Inhalation, Every 6 Hours PRN      amitriptyline 10 MG tablet  Commonly known as:  ELAVIL   10 mg, Oral, Nightly      aspirin 81 MG tablet   1 tablet, Oral, Daily      benzonatate 100 MG capsule  Commonly known as:  TESSALON   100 mg, Oral, 3 Times Daily PRN      clopidogrel 75 MG tablet  Commonly known as:  PLAVIX   TAKE ONE TABLET BY MOUTH DAILY      cyanocobalamin 1000 MCG/ML injection   1,000 mcg, Every 28 Days      FLONASE 50 MCG/ACT nasal spray  Generic drug:  fluticasone   2 sprays, Nasal, Daily, Use as directed      fluticasone-salmeterol 250-50 MCG/DOSE DISKUS  Commonly known as:  ADVAIR   1 puff, Inhalation, 2 Times Daily - RT      hydrOXYzine 25 MG tablet  Commonly known as:  ATARAX   25 mg, Oral, Nightly      Insulin Syringe-Needle U-100 30G X 5/16\" 0.5 ML misc   Uses X 4/day      isosorbide mononitrate 30 MG 24 hr tablet  Commonly known as:  IMDUR   30 mg, Oral, Daily      ketotifen " 0.025 % ophthalmic solution  Commonly known as:  ZADITOR   1 drop, Both Eyes, 2 Times Daily      lactulose 10 GM/15ML solution  Commonly known as:  CHRONULAC   10 g, Oral, Daily      levocetirizine 5 MG tablet  Commonly known as:  XYZAL   5 mg, Oral, Every Evening      LORTAB  MG per tablet  Generic drug:  HYDROcodone-acetaminophen   1 tablet, Oral, Every 8 Hours PRN      lovastatin 40 MG tablet  Commonly known as:  MEVACOR   40 mg, Oral, Nightly      metFORMIN 500 MG tablet  Commonly known as:  GLUCOPHAGE   1 tablet, Oral, 2 Times Daily With Meals      omega-3 acid ethyl esters 1 g capsule  Commonly known as:  LOVAZA   2 g, 2 Times Daily      ONE TOUCH ULTRA TEST test strip  Generic drug:  glucose blood   No dose, route, or frequency recorded.      ONETOUCH DELICA PLUS [id number] misc   No dose, route, or frequency recorded.      pantoprazole 40 MG EC tablet  Commonly known as:  PROTONIX   40 mg, Oral, Daily      pregabalin 150 MG capsule  Commonly known as:  LYRICA   150 mg, Oral, 3 Times Daily      raNITIdine 150 MG tablet  Commonly known as:  ZANTAC   150 mg, Oral, 2 Times Daily      rOPINIRole 4 MG tablet  Commonly known as:  REQUIP   4 mg, Oral, Nightly      SINGULAIR 10 MG tablet  Generic drug:  montelukast   10 mg, Oral, Nightly      SPIRIVA RESPIMAT 2.5 MCG/ACT aerosol solution inhaler  Generic drug:  tiotropium bromide monohydrate   2 puffs, Inhalation, Daily - RT      vitamin D 1.25 MG (47707 UT) capsule capsule  Commonly known as:  ERGOCALCIFEROL   50,000 Units, Weekly, wednesdays      ZANAFLEX 4 MG tablet  Generic drug:  tiZANidine   4 mg, Oral, Every 8 Hours PRN         Stop These Medications    insulin aspart prot & aspart (70-30) 100 UNIT/ML suspension pen-injector injection  Commonly known as:  NOVOLOG     losartan 25 MG tablet  Commonly known as:  COZAAR            Allergies   Allergen Reactions   • Levaquin [Levofloxacin] Nausea And Vomiting   • Penicillins Nausea And Vomiting     Has  tolerated cefepime 2/2020   • Codeine Nausea Only   • Flagyl [Metronidazole] Nausea And Vomiting         Discharge Disposition:  Home-Health Care Svc    Diet:  Hospital:  Diet Order   Procedures   • Diet Regular; Thin; Cardiac, Consistent Carbohydrate, Low Potassium       Activity:  Activity Instructions     Other Activity Instructions      Activity Instructions: Follow activity instructions as per plastic surgery.  Walk carefully avoid excessive strain to groin area    Up WIth Assist               CODE STATUS:    Code Status and Medical Interventions:   Ordered at: 02/23/20 2341     Level Of Support Discussed With:    Patient     Code Status:    CPR     Medical Interventions (Level of Support Prior to Arrest):    Full       Future Appointments   Date Time Provider Department Center   4/29/2020  1:15 PM Pamella Donohue MD [facility] None       Additional Instructions for the Follow-ups that You Need to Schedule     Ambulatory Referral to Home Health   As directed      Cover bilat groin incisions with Covaderm.  Change q3 days    1) place dry gauze dressing to bilateral groin ebony drain site holes - BID or as needed to keep dry, 2) apply antibiotic ointment to superior left groin incision BID then apply band aid    Datomycin 500mg IV daily to 4/20; routine picc care, weekly cbc, cmp, cpk    Order Comments:  Cover bilat groin incisions with Covaderm.  Change q3 days 1) place dry gauze dressing to bilateral groin ebony drain site holes - BID or as needed to keep dry, 2) apply antibiotic ointment to superior left groin incision BID then apply band aid Datomycin 500mg IV daily to 4/20; routine picc care, weekly cbc, cmp, cpk     Face to Face Visit Date:  3/11/2020    Follow-up provider for Plan of Care?:  I treated the patient in an acute care facility and will not continue treatment after discharge.    Follow-up provider:  ALEXEY YOO [[id number]]    Reason/Clinical Findings:  PVD;  S/P femoral-femoral bypass surgery     Describe mobility limitations that make leaving home difficult:  Impaired functional mobility, gait, balance, endurance    Nursing/Therapeutic Services Requested:  Skilled Nursing    Skilled nursing orders:  PICC line care/instruction Infusion therapy Wound care dressing/changes    Frequency:  1 Week 1         Discharge Follow-up with PCP   As directed       Currently Documented PCP:    Meagan Barlow APRN    PCP Phone Number:    [phone]     Follow Up Details:  1 week         Discharge Follow-up with Specified Provider: Endocrinology follow-up in 1 to 2 weeks.   As directed      To:  Endocrinology follow-up in 1 to 2 weeks.         Discharge Follow-up with Specified Provider: Follow-up with Dr. Modesto Whittaker in 1 week   As directed      To:  Follow-up with Dr. Modesto Whittaker in 1 week         Discharge Follow-up with Specified Provider: Follow-up with infectious disease Dr. Melo in 2 weeks.   As directed      To:  Follow-up with infectious disease Dr. Melo in 2 weeks.               Time Spent on Discharge: 45 minutes    Electronically signed by Kip Auguste MD, 03/11/20, 1:07 PM.

## 2020-03-11 NOTE — PROGRESS NOTES
"Saskia Groves  [id number]  1970     LOS: 17 days   Patient Care Team:  Meagan Barlow APRN as PCP - General (Family Medicine)  Tyson Donaldson DO as Consulting Physician (Cardiology)    Chief Complaint: Infected femoral-femoral bypass      Subjective: No complaints, wants to go home    Objective:     Vital Sign Min/Max for last 24 hours  Temp  Min: 97.8 °F (36.6 °C)  Max: 98.2 °F (36.8 °C)   BP  Min: 121/74  Max: 139/81   Pulse  Min: 81  Max: 106   Resp  Min: 16  Max: 18   No data recorded   No data recorded   No data recorded     Flowsheet Rows      First Filed Value   Admission Height  160 cm (62.99\") Documented at 02/23/2020 2240   Admission Weight  60.4 kg (133 lb 1.6 oz) Documented at 02/23/2020 2240          Physical Exam:    Wound: Satisfactory    Pulses:     Mediastinal and Chest Tube Drainage:       Results Review:   Results from last 7 days   Lab Units 03/11/20  0450   WBC 10*3/mm3 10.00   HEMOGLOBIN g/dL 10.6*   HEMATOCRIT % 33.6*   PLATELETS 10*3/mm3 213     Results from last 7 days   Lab Units 03/10/20  0506   SODIUM mmol/L 139   POTASSIUM mmol/L 5.4*   CHLORIDE mmol/L 105   CO2 mmol/L 26.0   BUN mg/dL 42*   CREATININE mg/dL 0.92   GLUCOSE mg/dL 190*   CALCIUM mg/dL 8.5*             Assessment      Acute hypoxemic respiratory failure (CMS/HCC)    PVD (peripheral vascular disease) (CMS/HCC)    Current smoker    COPD (chronic obstructive pulmonary disease) (CMS/HCC)    Coronary artery disease    Hypertension    Hyperlipidemia    Diabetes mellitus (CMS/HCC)    Sepsis (CMS/HCC)    Pleural effusion - bilateral mod/large pleural effusions on CT chest 2/27/2020.     Acute pulmonary edema (CMS/HCC)      Discharge if all agree        Deandre Oseguera MD  03/11/20  06:25      Please note that portions of this note were completed with a voice recognition program. Efforts were made to edit the dictations, but words may be mistranscribed  "

## 2020-03-11 NOTE — PROGRESS NOTES
Discharge Planning Assessment  [facility]     Patient Name: Saskia Groves  MRN: [id number]  Today's Date: 3/11/2020    Admit Date: 2/23/2020    Discharge Needs Assessment    No documentation.       Discharge Plan     Row Name 03/11/20 1648       Plan    Plan  update    Plan Comments  Call from pt RN, Parag VASQUEZ for a d/c medicine for pt. Medicaid will not cover Levemir, humalog for this pt. CM advised RN to call hospitalist for further plan        Destination      Service Provider Request Status Selected Services Address Phone Number Fax Number    [facility] P[facility] - [facility] N[facility]A [address] [facility] [address] [phone]      Durable Medical Equipment      Coordination has not been started for this encounter.      Dialysis/Infusion - Selection Complete      Service Provider Request Status Selected Services Address Phone Number Fax Number    [facility] HOME INFUSION Selected Infusion and IV Therapy [address] [facility] [id number] [phone]      Home Medical Care - Selection Complete      Service Provider Request Status Selected Services Address Phone Number Fax Number    [facility] HOME CARE Selected Home Health Services [address] [facility] [id number] [phone]      Therapy      Coordination has not been started for this encounter.      Community Resources      Coordination has not been started for this encounter.        Expected Discharge Date and Time     Expected Discharge Date Expected Discharge Time    Mar 11, 2020         Demographic Summary    No documentation.       Functional Status    No documentation.       Psychosocial    No documentation.       Abuse/Neglect    No documentation.       Legal    No documentation.       Substance Abuse    No documentation.       Patient Forms    No documentation.           Debbi Avila RN

## 2020-03-12 ENCOUNTER — READMISSION MANAGEMENT (OUTPATIENT)
Dept: CALL CENTER | Facility: HOSPITAL | Age: 50
End: 2020-03-12

## 2020-03-12 NOTE — PAYOR COMM NOTE
"Sade Cantor RN  Utilization Review  P: [phone]  F: [phone]    Ref # [id number]  DC date = 3/11/2020  DC summary attached    Saskia Arteaga (49 y.o. Female)     Date of Birth Social Security Number Address Home Phone MRN    1970  [address] [place] [address] [phone] [id number]    Jew Marital Status          None        Admission Date Admission Type Admitting Provider Attending Provider Department, Room/Bed    20 Urgent Kip Auguste MD  [address], S456/1    Discharge Date Discharge Disposition Discharge Destination        3[place]11/2020 [place] [facility] [place]              Attending Provider:  (none)   Allergies:  Levaquin [Levofloxacin], Penicillins, Codeine, Flagyl [Metronidazole]    Isolation:  None   Infection:  MRSA (20)   Code Status:  Prior    Ht:  160 cm (62.99\")   Wt:  63.6 kg (140 lb 4.8 oz)    Admission Cmt:  None   Principal Problem:  Infected prosthetic vascular graft, initial encounter (CMS/[facility]) [T82.7XXA]                 Active Insurance as of 2020     Primary Coverage     Payor Plan Insurance Group Employer/Plan Group    [employer] [place] [employer] [place]ICA[place]      Payor Plan Address Payor Plan Phone Number Payor Plan Fax Number Effective Dates    [address] [address] [phone]  2020 - None Entered    [facility] [id number]       Subscriber Name Subscriber Birth Date Member ID       SASKIA ARTEAGA 1970 [id number]                 Emergency Contacts      (Rel.) Home Phone Work Phone Mobile Phone    Thuan Arteaga (Spouse) [phone] -- --               Discharge Summary      Kip Auguste MD at 20 1306              [facility] Medicine Services  DISCHARGE SUMMARY    Patient Name: Saskia Arteaga  : 1970  MRN: [id number]    Date of Admission: 2020 10:34 PM  Date of Discharge:  3/11/2020  Primary Care Physician: Meagan Barlow APRN    Consults     " Date and Time Order Name Status Description    2/28/2020 0444 Inpatient Infectious Diseases Consult Completed     2/26/2020 0805 Inpatient Plastic Surgery Consult Completed     2/24/2020 0737 Inpatient Infectious Diseases Consult Completed     2/24/2020 0030 Inpatient Cardiothoracic Surgery Consult            Hospital Course     Presenting Problem:   Post-operative infection [T81.40XA]  Sepsis (CMS/[facility]) [A41.9]    Active Hospital Problems    Diagnosis  POA   • **Infected prosthetic vascular graft, initial encounter (CMS/[facility]) [T82.7XXA]  Yes   • Status post Lt and Rt Sartorious muscle flap  [Z98.890]  Not Applicable   • Pleural effusion - bilateral mod/large pleural effusions on CT chest 2/27/2020.  [J90]  No   • Acute pulmonary edema (CMS/[facility]) [J81.0]  No   • Acute hypoxemic respiratory failure (CMS/[facility]) [J96.01]  No   • Sepsis (CMS/[facility]) [A41.9]  Yes   • COPD (chronic obstructive pulmonary disease) (CMS/[facility]) [J44.9]  Yes   • Coronary artery disease [I25.10]  Yes   • Hypertension [I10]  Yes   • Hyperlipidemia [E78.5]  Yes   • Diabetes mellitus (CMS/[facility]) [E11.9]  Yes   • Current smoker [F17.200]  Yes   • PVD (peripheral vascular disease) (CMS/[facility]) [I73.9]  Yes      Resolved Hospital Problems   No resolved problems to display.          Hospital Course:  Saskia Groves is a 49 y.o. female admitted on 2/23 with history of diabetes, tobacco use, essential hypertension, peripheral artery disease with history of femorofemoral bypass on 1/15 but was readmitted with fever and wound infection of the left groin, infection of femoral-femoral bypass graft, large abscess associated with the infection please see radiology reports below.  She was transferred to the ICU on 2/27 for pleural effusions and hypoxia.  She required chest tube on the right.  She had graft explant on 2/28 performed by Dr. Whittaker with plastic surgery team.  Please see operative report for full details..  Muscle flaps were performed by plastic surgery.  She  had bilateral wound vacs in place.  She was extubated after several days postoperatively.  Infectious disease has followed and assisted with management.  Wound infection grew MRSA and patient has been treated with daptomycin.    Patient's infection are felt to be related to diabetes that is severely uncontrolled.  A1c is very elevated.  Patient was seen by diabetic educator.  She was converted from her 70/30 insulin to basal and prandial insulin.  She has plans to see outpatient endocrinology.  I think it is unlikely that she can control her sugar with 70/30 insulin.  Her daughter reports very poor diet including sugary sodas and frequent potato eating.  I have counseled her at length about appropriate diabetic diet, increasing exercise, and strict adherence to medical regimen.  I feel patient has unlikely ability to adequately follow these and does not seem particularly motivated.  With extensive counseling we encouraged her to go to skilled facility for subacute rehabilitation and wound care in order to get healed from her current infection.  Extensive counseling was given by myself, physical therapist, plastic surgery, cardiac surgery, and infectious disease.  This type this counseling patient and her daughter refused to allow her to go to rehab as they think they can treat her adequately at home.  They are very overconfident with their ability to treat this very complicated situation and to improve diabetes control.  I explained my concern about their overconfidence and they stated ultimately that it was their decision.  Again I stated that poor care and diabetes control can lead to worsening wound, worsening morbidity, and death.  They said they understand this and plan to stick to recommended regimen.  She has a PICC line and will receive continued antibiotic coverage through April 20.  She will follow-up closely with infectious disease, endocrinology, plastic surgery, and primary care provider.  Home health  is been consulted to assist with medical needs.  Also of note patient's losartan has been held as patient is normotensive off this medication was having issues with elevated potassium.  I have educated her on low potassium diet and her potassium is gradually trending down.  She has shown no sign of arrhythmias or peak T waves on her telemetry.  Recommend consideration for repeat BMP at primary care follow-up.  Case discussed with all consulting services who feel she is stable for discharge however all would prefer her to go to rehab which has been explained at length.    At the time of discharge patient was told to take all medications as prescribed, keep all follow-up appointments, and call their doctor or return to the hospital with any worsening or concerning symptoms.    Please note that dragon voice recognition software was used to create this note and that transcription errors are possible.      Discharge Follow Up Recommendations for outpatient labs/diagnostics:  Follow-ups as per listed below.    Day of Discharge     HPI:   Doing well off of the MELISSA drain and wound VAC.  Adamantly refuses to go to rehabilitation which is recommended by myself, physical therapy, plastic surgery, infectious disease, and cardiothoracic surgery.  Her daughter is at the bedside and is very overconfident in their ability to take care of the diabetes and the wounds.  Daughter states that she plans to improve her diabetes care as well as she is an uncontrolled diabetic as well.  I explained to her that if both of them are unable to currently controlled her diabetes it is unlikely that this is going to be able to improve in the near future and that continued poor control of diabetes can prevent clearance of infection, wound healing, and ultimately lead to death.  She said she is willing to take this risk as did the daughter.      Review of Systems    No current fevers or chills  No current shortness of breath or cough  No current  nausea, vomiting, or diarrhea  No current chest pain or palpitations    Vital Signs:   Temp:  [97.4 °F (36.3 °C)-98.2 °F (36.8 °C)] 97.4 °F (36.3 °C)  Heart Rate:  [80-93] 85  Resp:  [16-18] 18  BP: (121-135)/(73-80) 128/73     Physical Exam:  Constitutional:Awake, alert  HENT: NCAT, mucous membranes moist, neck supple  Respiratory: Some dullness at the base but otherwise clear to auscultation bilaterally, respiratory effort normal, nonlabored breathing   Cardiovascular: RRR, S1, S2, normal radial pulses  Gastrointestinal: Positive bowel sounds, soft, nontender, nondistended  Musculoskeletal: Normal musculature for age, no lower extremity edema, BMI 24  Psychiatric: Appropriate affect, cooperative, conversational  Neurologic: No slurred speech or facial droop, follows commands, oriented  Skin: Groin wound VAC in place, MELISSA drains have been removed, wound is CDI, no rashes or jaundice    Pertinent  and/or Most Recent Results     Results from last 7 days   Lab Units 03/11/20  0450 03/10/20  0506 03/09/20  0321 03/07/20  1349 03/06/20  0418 03/05/20  0344   WBC 10*3/mm3 10.00 13.08* 16.57*  --  8.40 9.39   HEMOGLOBIN g/dL 10.6* 10.0* 10.5*  --  10.4* 10.4*   HEMATOCRIT % 33.6* 32.2* 33.7*  --  34.0 33.9*   PLATELETS 10*3/mm3 213 188 181  --  189 216   SODIUM mmol/L 140 139 137 140 144 138   POTASSIUM mmol/L 5.2 5.4* 5.6* 5.1 4.4 4.2   CHLORIDE mmol/L 105 105 104 103 107 102   CO2 mmol/L 26.0 26.0 26.0 27.0 28.0 26.0   BUN mg/dL 37* 42* 42* 47* 45* 43*   CREATININE mg/dL 0.87 0.92 0.86 0.66 0.64 0.54*   GLUCOSE mg/dL 51* 190* 117* 83 79 147*   CALCIUM mg/dL 8.7 8.5* 8.4* 8.7 8.8 8.6     Results from last 7 days   Lab Units 03/09/20  0321 03/07/20  1349 03/06/20  0418 03/05/20  0344   BILIRUBIN mg/dL 0.2 0.3 0.3 0.3   ALK PHOS U/L 138* 123* 108 101   ALT (SGPT) U/L 23 23 19 15   AST (SGOT) U/L 27 33* 32 24     Results from last 7 days   Lab Units 03/07/20  1349   CHOLESTEROL mg/dL 112   TRIGLYCERIDES mg/dL 139            Brief Urine Lab Results  (Last result in the past 365 days)      Color   Clarity   Blood   Leuk Est   Nitrite   Protein   CREAT   Urine HCG        02/28/20 0630 Yellow Clear Moderate (2+) Negative Negative 100 mg/dL (2+)               Microbiology Results Abnormal     Procedure Component Value - Date/Time    Fungus Culture - Body Fluid, Pleural Cavity [[id number]] Collected:  02/28/20 1341    Lab Status:  Preliminary result Specimen:  Body Fluid from Pleural Cavity Updated:  03/06/20 1530     Fungus Culture No fungus isolated at 1 week    AFB Culture - Body Fluid, Pleural Cavity [[id number]] Collected:  02/28/20 1341    Lab Status:  Preliminary result Specimen:  Body Fluid from Pleural Cavity Updated:  03/06/20 1530     AFB Culture No AFB isolated at 1 week     AFB Stain No acid fast bacilli seen on concentrated smear    Fungus Culture - Body Fluid, Groin, right [[id number]] Collected:  02/28/20 1417    Lab Status:  Preliminary result Specimen:  Body Fluid from Groin, right Updated:  03/06/20 1530     Fungus Culture No fungus isolated at 1 week    AFB Culture - Body Fluid, Groin, right [[id number]] Collected:  02/28/20 1417    Lab Status:  Preliminary result Specimen:  Body Fluid from Groin, right Updated:  03/06/20 1530     AFB Culture No AFB isolated at 1 week     AFB Stain No acid fast bacilli seen on concentrated smear    Fungus Culture - Tissue, Groin, right [[id number]] Collected:  02/28/20 1417    Lab Status:  Preliminary result Specimen:  Tissue from Groin, right Updated:  03/06/20 1530     Fungus Culture No fungus isolated at 1 week    AFB Culture - Tissue, Groin, right [[id number]] Collected:  02/28/20 1417    Lab Status:  Preliminary result Specimen:  Tissue from Groin, right Updated:  03/06/20 1530     AFB Culture No AFB isolated at 1 week     AFB Stain No acid fast bacilli seen on concentrated smear    Anaerobic Culture - Hardware / Foreign Body, Groin, right [[id number]] Collected:  02/28/20 1500    Lab  Status:  Final result Specimen:  Hardware / Foreign Body from Groin, right Updated:  03/04/20 1205     Anaerobic Culture No anaerobes isolated at 5 days    Anaerobic Culture - Body Fluid, Groin, right [[id number]] Collected:  02/28/20 1417    Lab Status:  Final result Specimen:  Body Fluid from Groin, right Updated:  03/04/20 1204     Anaerobic Culture No anaerobes isolated at 5 days    Anaerobic Culture - Body Fluid, Pleural Cavity [[id number]] Collected:  02/28/20 1341    Lab Status:  Final result Specimen:  Body Fluid from Pleural Cavity Updated:  03/04/20 1203     Anaerobic Culture No anaerobes isolated at 5 days    Anaerobic Culture - Tissue, Groin, right [[id number]] Collected:  02/28/20 1417    Lab Status:  Final result Specimen:  Tissue from Groin, right Updated:  03/04/20 1202     Anaerobic Culture No anaerobes isolated at 5 days    Blood Culture - Blood, Wrist, Right [[id number]] Collected:  02/28/20 0750    Lab Status:  Final result Specimen:  Blood from Wrist, Right Updated:  03/04/20 0815     Blood Culture No growth at 5 days    Body Fluid Culture - Body Fluid, Groin, right [[id number]]  (Abnormal)  (Susceptibility) Collected:  02/28/20 1417    Lab Status:  Final result Specimen:  Body Fluid from Groin, right Updated:  03/03/20 0650     Body Fluid Culture Rare Staphylococcus aureus, MRSA     Gram Stain Many (4+) WBCs seen      No organisms seen    Narrative:             Susceptibility      Staphylococcus aureus, MRSA     CADENCE     Gentamicin Susceptible     Oxacillin Resistant     Rifampin Susceptible     Vancomycin Susceptible                Susceptibility Comments     Staphylococcus aureus, MRSA    This isolate does not demonstrate inducible clindamycin resistance in vitro.               Respiratory Culture - Sputum, ET Suction [[id number]] Collected:  02/29/20 0850    Lab Status:  Final result Specimen:  Sputum from ET Suction Updated:  03/02/20 0902     Respiratory Culture No growth     Gram Stain Few (2+)  Epithelial cells per low power field      Few (2+) WBCs seen      No organisms seen    Tissue / Bone Culture - Tissue, Groin, right [[id number]] Collected:  02/28/20 1417    Lab Status:  Final result Specimen:  Tissue from Groin, right Updated:  03/02/20 0809     Tissue Culture No growth at 3 days     Gram Stain Many (4+) WBCs seen      No organisms seen    Body Fluid Culture - Body Fluid, Pleural Cavity [[id number]] Collected:  02/28/20 1341    Lab Status:  Final result Specimen:  Body Fluid from Pleural Cavity Updated:  03/02/20 0808     Body Fluid Culture No growth at 3 days     Gram Stain Many (4+) WBCs seen      No organisms seen    Hardware / Foreign Body Culture - Hardware / Foreign Body, Groin, right [[id number]]  (Abnormal)  (Susceptibility) Collected:  02/28/20 1500    Lab Status:  Final result Specimen:  Hardware / Foreign Body from Groin, right Updated:  03/01/20 0645     Hardware / Foreign Body Culture Scant growth (1+) Staphylococcus aureus, MRSA     Comment:   Methicillin resistant Staphylococcus aureus, Patient may be an isolation risk.        Gram Stain Many (4+) WBCs seen      No organisms seen    Susceptibility      Staphylococcus aureus, MRSA     CADENCE     Clindamycin Susceptible     Erythromycin Resistant     Inducible Clindamycin Resistance Negative     Oxacillin Resistant     Penicillin G Resistant     Rifampin Susceptible     Tetracycline Susceptible     Trimethoprim + Sulfamethoxazole Susceptible     Vancomycin Susceptible                Susceptibility Comments     Staphylococcus aureus, MRSA    This isolate does not demonstrate inducible clindamycin resistance in vitro.               Blood Culture - Blood, Arm, Left [[id number]] Collected:  02/24/20 0057    Lab Status:  Final result Specimen:  Blood from Arm, Left Updated:  02/29/20 0545     Blood Culture No growth at 5 days    Blood Culture - Blood, Hand, Left [[id number]] Collected:  02/24/20 0057    Lab Status:  Final result Specimen:  Blood  from Hand, Left Updated:  02/29/20 0545     Blood Culture No growth at 5 days    Wound Culture - Wound, Thigh, Left [[id number]]  (Abnormal)  (Susceptibility) Collected:  02/24/20 1849    Lab Status:  Final result Specimen:  Wound from Thigh, Left Updated:  02/27/20 0723     Wound Culture Light growth (2+) Staphylococcus aureus, MRSA     Comment: Methicillin resistant Staphylococcus aureus, Patient may be an isolation risk.        Gram Stain Few (2+) WBCs seen      Rare (1+) Gram positive cocci in pairs    Susceptibility      Staphylococcus aureus, MRSA     CADENCE     Clindamycin Susceptible     Erythromycin Resistant     Inducible Clindamycin Resistance Negative     Oxacillin Resistant     Penicillin G Resistant     Rifampin Susceptible     Tetracycline Susceptible     Trimethoprim + Sulfamethoxazole Susceptible     Vancomycin Susceptible                Susceptibility Comments     Staphylococcus aureus, MRSA    This isolate does not demonstrate inducible clindamycin resistance in vitro.                     Imaging Results (All)     Procedure Component Value Units Date/Time    XR Chest 1 View [[id number]] Collected:  03/10/20 1618     Updated:  03/10/20 2150    Narrative:       EXAMINATION: XR CHEST 1 VW-03/10/2020:     INDICATION: Check PICC placement; I73.9-Peripheral vascular disease,  unspecified; L42-Jerohhf effusion, not elsewhere classified;  Z95.828-Presence of other vascular implants and grafts.     COMPARISON: 03/09/2020.     FINDINGS: Right-sided PICC line has been removed. Left-sided PICC line  tip lies in the left brachiocephalic vein. The heart is normal in size.  There is diffuse pulmonary interstitial disease resembling mild  interstitial edema, appearing a little increased although this may  reflect portable rather than PA film technique. There appear to be very  small pleural effusions. No lung consolidation or pneumothorax is seen.       Impression:       1.  Previous right-sided PICC line removal.  2.   Left-sided PICC line tip now appears to lie in the distal left  brachiocephalic vein.  3.  Mild diffuse pulmonary interstitial disease, similar to prior exam,  allowing for portable film technique today.     D:  03/10/2020  E:  03/10/2020            This report was finalized on 3/10/2020 9:47 PM by Dr. Jaswant Frank MD.       XR Chest PA & Lateral [[id number]] Collected:  03/10/20 0922     Updated:  03/10/20 1711    Narrative:       EXAMINATION: XR CHEST PA AND LATERAL- 03/09/2020     INDICATION: pneumonia/effusions; I73.9-Peripheral vascular disease,  unspecified; W01-Xghkmpu effusion, not elsewhere classified;  Z95.828-Presence of other vascular implants and grafts      COMPARISON: 03/05/2020     FINDINGS: PA and lateral views of the chest reveal cardiac and  mediastinal silhouettes within normal limits. PICC line catheter  identified bilaterally with both tips in the SVC. There is improvement  seen in aeration of the right midlung. The pulmonary vascularity remains  increased. Mild increased markings seen in the lung bases bilaterally.            Impression:       Improvement seen in aeration of the right midlung with  increased markings seen at the lung bases bilaterally. PICC line  catheter on the left tip in the SVC. Prominence seen in the pulmonary  vascularity bilaterally.        D:  03/10/2020  E:  03/10/2020     This report was finalized on 3/10/2020 5:08 PM by Dr. Andra Steele MD.       XR Chest 1 View [[id number]] Collected:  03/05/20 0836     Updated:  03/06/20 2300    Narrative:       EXAMINATION: XR CHEST 1 VW-     INDICATION: Postop; I73.9-Peripheral vascular disease, unspecified;  R05-Bndvfac effusion, not elsewhere classified; Z95.828-Presence of  other vascular implants and grafts; R13.13-Dysphagia, pharyngeal phase.     COMPARISON: 03/04/2020.     FINDINGS: Right upper extremity PICC line is seen with its tip at the  cavoatrial junction. Feeding tube is seen below the left  "hemidiaphragm.  Heart is normal in size. Right pigtail catheter has been removed. There  are small areas of discoid atelectasis in the right mid and lower lung,  and there appears to be very small pneumothorax in the lateral right  base, approximately 7 mm in width. This does not extend to the apex, or  across the base of the lung, although there is probably trace air in the  minor fissure. Left lung shows near resolution of focal atelectasis in  the medial left base. There is stable diffuse interstitial disease  elsewhere.       Impression:       1. Right pleural drain removal with very small right basilar  pneumothorax.  2. Mild new discoid atelectasis in the right mid and lower lung, but  interval clearing of the left lung base, since yesterday's study.      D:  03/05/2020  E:  03/05/2020     This report was finalized on 3/6/2020 10:57 PM by Dr. Jaswant Frank MD.       XR Abdomen KUB [[id number]] Collected:  03/04/20 1420     Updated:  03/04/20 2257    Narrative:       EXAMINATION: XR ABDOMEN/KUB-03/04/2020:     INDICATION: S/P Keofeed placement; I73.9-Peripheral vascular disease,  unspecified; M55-Uhycgcn effusion, not elsewhere classified;  Z95.828-Presence of other vascular implants and grafts;  R13.13-Dysphagia, pharyngeal phase.     COMPARISON: NONE.     FINDINGS: Feeding tube is seen in the duodenal bulb. The abdomen appears  somewhat \"gassy\", with gas throughout the colon and some small bowel  gas, but loops are only upper limits of normal size. No bowel wall edema  or pneumatosis is seen.       Impression:       Feeding tube tip in the duodenal bulb.     D:  03/04/2020  E:  03/04/2020            This report was finalized on 3/4/2020 10:54 PM by Dr. Jaswant Frank MD.       XR Chest 1 View [[id number]] Collected:  03/04/20 0825     Updated:  03/04/20 2254    Narrative:       EXAMINATION: XR CHEST 1 VW-     INDICATION: Respiratory failure; I73.9-Peripheral vascular disease,  unspecified; Y80-Pzaoozo effusion, not " elsewhere classified;  Z95.828-Presence of other vascular implants and grafts;  R13.10-Dysphagia, unspecified.     COMPARISON: 03/03/2020.     FINDINGS: NG tube is seen in the left hemidiaphragm. Right pigtail  catheter remains in place. PICC line is at the cavoatrial junction in  good position. Heart is normal in size. Patient's mild diffuse pulmonary  interstitial disease pattern and focal left basilar atelectasis or  effusion appears stable. No pneumothorax is seen.       Impression:       Stable diffuse pulmonary interstitial disease and left  basilar atelectasis or effusion.      D:  03/04/2020  E:  03/04/2020     This report was finalized on 3/4/2020 10:51 PM by Dr. Jaswant Frank MD.       SLP FEES - Fiberoptic Endo Eval Swallow [[id number]] Resulted:  03/04/20 1125     Updated:  03/04/20 1125    Narrative:       This procedure was auto-finalized with no dictation required.    XR Chest 1 View [[id number]] Collected:  03/03/20 0854     Updated:  03/03/20 2210    Narrative:       EXAMINATION: XR CHEST 1 VW-03/03/2020:     INDICATION: Respiratory failure; I73.9-Peripheral vascular disease,  unspecified; E02-Tockuda effusion, not elsewhere classified;  Z95.828-Presence of other vascular implants and grafts.     COMPARISON: 03/01/2020.     FINDINGS: ET tube has been removed. NG tube, PICC line, and right  pleural drain remain in place. The heart is normal in size. Diffuse  pulmonary interstitial disease has improved, presumably resolving edema.  No lung consolidation, effusion, or pneumothorax is seen.       Impression:       Mild remaining pulmonary interstitial disease. No evidence  of pneumothorax or other new chest pathology.     D:  03/03/2020  E:  03/03/2020            This report was finalized on 3/3/2020 10:07 PM by Dr. Jaswant Frank MD.       XR Chest 1 View [[id number]] Collected:  03/01/20 0757     Updated:  03/02/20 0817    Narrative:          EXAMINATION: XR CHEST 1 VW - 03/01/2020     INDICATION:  I73.9-Peripheral vascular disease, unspecified; Z88-Ovarogu  effusion, not elsewhere classified; Z95.828-Presence of other vascular  implants and grafts.      COMPARISON: 02/29/2020     FINDINGS: Support hardware unchanged and in satisfactory positioning.  Cardiac silhouette enlarged with diffuse bilateral pulmonary  opacifications slightly decreased in density from prior comparison  particularly within the right mid and upper lung suggesting improving  airspace disease. No pneumothorax however trace volume left pleural  effusion similar to prior.           Impression:       Cardiac silhouette enlarged with diffuse bilateral pulmonary  opacifications slightly decreased in density from prior comparison  particularly within the right mid and upper lung suggesting improving  airspace disease. No pneumothorax however trace volume left pleural  effusion similar to prior.        DICTATED:   03/01/2020  EDITED/ls :   03/01/2020      This report was finalized on 3/2/2020 8:14 AM by Dr. Valdez Hill.       XR Chest 1 View [[id number]] Collected:  02/29/20 0723     Updated:  02/29/20 1907    Narrative:          EXAMINATION: XR CHEST 1 VW - 02/29/2020     INDICATION:  I73.9-Peripheral vascular disease, unspecified; P08-Ocsoluw  effusion, not elsewhere classified; Z95.828-Presence of other vascular  implants and grafts.      COMPARISON: 02/28/2020     FINDINGS: Support hardware unchanged and in satisfactory positioning.  Cardiac size borderline enlarged and mostly obscured due to increase in  already dense bilateral pulmonary opacifications from prior of diffuse  airspace disease without pneumothorax or large effusion present.           Impression:       Increased density of diffuse bilateral pulmonary  opacifications with a left perihilar predominance of involvement with  air bronchograms present at this site of diffuse bilateral airspace  disease without significant effusion. Support hardware unchanged. No  pneumothorax.      DICTATED:   02/29/2020  EDITED/ls :   02/29/2020      This report was finalized on 2/29/2020 7:04 PM by Dr. Valdez Hill.       XR Chest 1 View [[id number]] Collected:  02/28/20 0841     Updated:  02/28/20 2154    Narrative:       EXAMINATION: XR CHEST 1 VW-     INDICATION: Dyspnea; I73.9-Peripheral vascular disease, unspecified.      COMPARISON: 01/14/2020.     FINDINGS: Apparatus shadow superimposed over the right upper neck and  chest. Heart is normal in size but there has been interval development  of dense groundglass and finely reticular disease, with some patchy  airspace disease of the lower lungs. There appears to be a skinfold  shadow superimposed over the right base. No pneumothorax is suspected.       Impression:       Interval development of extensive pulmonary interstitial  disease, more typical in appearance for developing ARDS, than for  pulmonary edema. Patchy multifocal bibasilar airspace disease, possibly  pneumonia.      D:  02/28/2020  E:  02/28/2020     This report was finalized on 2/28/2020 9:51 PM by Dr. Jaswant Frank MD.       XR Chest 1 View [[id number]] Collected:  02/28/20 1932     Updated:  02/28/20 1934    Narrative:       CHEST X-RAY, 2/28/2020 (18:47)    HISTORY:    . Patient on ventilator. Follow-up cardiopulmonary status.      TECHNIQUE:  AP portable semiupright chest x-ray.    COMPARISON:  *  Chest x-ray earlier today.    FINDINGS:  Endotracheal tube tip in the mid thoracic trachea 5.0 cm above the you. Right arm PICC tip in the low SVC. NG tube in good position in the distal stomach.    Right pleural drain in place with no visible pneumothorax.    Moderate diffuse interstitial and alveolar pulmonary edema and probable small pleural effusions without significant change since earlier today. Heart size is normal.      Impression:       1. Support equipment in good position as detailed above.  2. Right-sided chest drain with no visible pneumothorax.  3. Pulmonary findings are  unchanged since earlier today.    Signer Name: Tyson Yu MD   Signed: 2/28/2020 7:32 PM   Workstation Name: [email or web address]-    [facility]    CT Angiogram Chest With & Without Contrast [[id number]] Collected:  02/27/20 2150     Updated:  02/27/20 2152    Narrative:       CT ANGIOGRAM CHEST W CONTRAST    INDICATION:   Dyspnea of suspected cardiac origin. Shortness of air. Peripheral vascular disease.    TECHNIQUE:   CT angiogram of the chest with iodinated IV contrast. 3-D reconstructions were obtained and reviewed.   Radiation dose reduction techniques included automated exposure control or exposure modulation based on body size. Count of known CT and cardiac nuc  med studies performed in previous 12 months: 1.     COMPARISON:   None available.    FINDINGS:   Study is slightly motion degraded but felt to be diagnostic. Extensive bilateral perihilar alveolar infiltrates which appear relatively symmetric and most consistent with acute pulmonary edema. Moderate bilateral pleural effusions measuring over 5 cm on  the right and over 6 cm on the left. Compressive atelectasis both lung bases. Trachea and bronchi unremarkable.    No evidence of pulmonary embolus. Aortic atherosclerotic changes without dissection or aneurysm. Heart size within normal limits. Enlarged mediastinal lymph nodes the largest in the region of the AP window measuring 2.2 x 1.7 cm and may be reactive.  Appropriate follow-up recommended.    1.9 cm left adrenal nodule. Postcholecystectomy. Postsurgical changes lower cervical spine. Nonaggressive sclerotic bone lesion T8 vertebral body may represent a bone island. Generalized body wall edema suggesting third spacing of fluid      Impression:       Extensive bilateral perihilar alveolar infiltrates most likely representing pulmonary edema. Moderate-sized bilateral pleural effusions.    No evidence of pulmonary embolus.    Suspected anasarca with generalized body wall  edema.    Signer Name: HARVEY Oliver MD   Signed: 2/27/2020 9:50 PM   Workstation Name: [facility]    [facility]    MRI Cervical Spine With & Without Contrast [[id number]] Collected:  02/27/20 1028     Updated:  02/27/20 1030    Narrative:       MRI Spine Cervical WO W    INDICATION:    Acute onset of ataxia. Infection is suspected. Acute onset of fever. History of cervical fusion.    TECHNIQUE:   MRI of the cervical spine with and without IV contrast. Patient was given 10 cc of MultiHance.    The exam is significantly suboptimal due to patient motion.    COMPARISON:    No pertinent prior study    FINDINGS:  The sagittal alignment appears satisfactory. Status post ACDF at C5, C6 and C7. Susceptibility artifact demonstrated.    Visualized marrow signal appears within normal limits. Visualized intracranial contents show no acute findings. The cervicomedullary junction is normal.     The spinal cord is of normal course and caliber. No syrinx is seen. No clearly reproducible areas of signal abnormality identified. No convincing evidence of spinal canal stenosis or encroachment upon the spinal cord. No obvious areas of neural foraminal  compromise.    After the administration of contrast, there are is no convincing evidence of abnormal enhancement. No abnormal fluid collections are convincingly identified.        Impression:       1.  Suboptimal study due to motion artifact.    2.  No convincing evidence of abnormal enhancement or signal intensity.    3.  No convincing evidence of cord compression or neural foraminal compromise.    4.  Status post ACDF from C5 through C7.    Signer Name: Eliseo Bray MD   Signed: 2/27/2020 10:28 AM   Workstation Name: [facility]    [facility]    CT Abdomen Pelvis Without Contrast [[id number]] Collected:  02/24/20 0044     Updated:  02/24/20 0046    Narrative:       CT Abdomen Pelvis WO    INDICATION:   49-year-old female with left  groin pain since femoral-femoral bypass January 2020. Order requesting evaluation for possible infection/abscess.    TECHNIQUE:   CT of the abdomen and pelvis without IV contrast. Coronal and sagittal reconstructions were obtained.  Radiation dose reduction techniques included automated exposure control or exposure modulation based on body size. Count of known CT and cardiac nuc  med studies performed in previous 12 months: 0.     COMPARISON:   None available.    FINDINGS:  Visualized lung bases are unremarkable.    Abdomen:   The liver is within normal limits. The spleen and pancreas are normal. Cholecystectomy. Indeterminate 2 cm left adrenal nodule. Right adrenal gland is unremarkable. The kidneys are normal. Abdominal aorta normal in course and caliber. Small bowel is  unremarkable without obstruction. Normal appendix. The colon is unremarkable. Trace free fluid in the paracolic gutters. No free intraperitoneal air.    Pelvis:   The urinary bladder is unremarkable.  The uterus and adnexa are within normal limits. Trace free pelvic fluid.    No acute osseous abnormality. Postsurgical changes from femoral-femoral bypass. There is a fluid collection in the left inguinal region at the left femoral-femoral bypass anastomosis, measuring 5.3 x 4.9 cm in cross-section and at least 6.5 cm in CC  dimension. There is inflammatory stranding noted throughout the subcutaneous soft tissues of the left inguinal region and proximal left thigh surrounding the fluid collection. The findings are concerning for abscess versus infected hematoma given the  provided clinical history.        Impression:         1. Fluid collection in the left inguinal region at the left femoral-femoral bypass anastomosis, measuring 5.3 x 4.9 x 6.5 cm. Inflammatory stranding noted throughout the subcutaneous soft tissues of the left inguinal region and proximal left thigh  surrounding the fluid collection. Findings are concerning for abscess versus  infected hematoma given the provided clinical history.  2. Trace amount of abdominal and pelvic free fluid, nonspecific.  3. Normal appendix.  4. Cholecystectomy.  5. Indeterminate 2 cm left adrenal nodule. This can be further characterized with a nonemergent, outpatient adrenal mass protocol MRI when the patient is clinically able.          Signer Name: Victorino Bray MD   Signed: 2/24/2020 12:44 AM   Workstation Name: [email or web address]    [facility]          Results for orders placed in visit on 06/19/19   Duplex Carotid Ultrasound CAR       Results for orders placed in visit on 06/19/19   Duplex Carotid Ultrasound CAR       Results for orders placed during the hospital encounter of 02/23/20   Adult Transthoracic Echo Complete W/ Cont if Necessary Per Protocol    Narrative · Left ventricular systolic function is mildly decreased. Calculated EF =   45.0%. Estimated EF appears to be in the range of 46 - 50%  · Mild global hypokinesis no focal wall motion abnormalities  · Mild aortic valve sclerosis without stenosis.  · There is a trivial pericardial effusion. There is a large size left   pleural effusion           Order Current Status    AFB Culture - Body Fluid, Groin, right Preliminary result    AFB Culture - Body Fluid, Pleural Cavity Preliminary result    AFB Culture - Tissue, Groin, right Preliminary result    Fungus Culture - Body Fluid, Groin, right Preliminary result    Fungus Culture - Body Fluid, Pleural Cavity Preliminary result    Fungus Culture - Tissue, Groin, right Preliminary result        Discharge Details        Discharge Medications      New Medications      Instructions Start Date   acetaminophen 325 MG tablet  Commonly known as:  TYLENOL   650 mg, Oral, Every 4 Hours PRN      bacitracin 500 UNIT/GM ointment   Topical, Every 12 Hours Scheduled, Apply to the left groin incision as instructed      daptomycin  IVPB  Commonly known as:  CUBICIN   500 mg, Intravenous, Every 24  "Hours   Start Date:  March 12, 2020     insulin detemir 100 UNIT/ML injection  Commonly known as:  LEVEMIR   12 Units, Subcutaneous, Every 12 Hours Scheduled, Formulary substitution allowed      insulin lispro 100 UNIT/ML injection  Commonly known as:  humaLOG   3 Units, Subcutaneous, 3 Times Daily With Meals         Changes to Medications      Instructions Start Date   metoprolol tartrate 50 MG tablet  Commonly known as:  LOPRESSOR  What changed:    · medication strength  · how much to take  · when to take this   50 mg, Oral, 2 Times Daily         Continue These Medications      Instructions Start Date   albuterol (2.5 MG/3ML) 0.083% nebulizer solution  Commonly known as:  PROVENTIL   Nebulization, Every 6 Hours PRN      ALBUTEROL SULFATE HFA IN   1 puff, Inhalation, Every 6 Hours PRN      amitriptyline 10 MG tablet  Commonly known as:  ELAVIL   10 mg, Oral, Nightly      aspirin 81 MG tablet   1 tablet, Oral, Daily      benzonatate 100 MG capsule  Commonly known as:  TESSALON   100 mg, Oral, 3 Times Daily PRN      clopidogrel 75 MG tablet  Commonly known as:  PLAVIX   TAKE ONE TABLET BY MOUTH DAILY      cyanocobalamin 1000 MCG/ML injection   1,000 mcg, Every 28 Days      FLONASE 50 MCG/ACT nasal spray  Generic drug:  fluticasone   2 sprays, Nasal, Daily, Use as directed      fluticasone-salmeterol 250-50 MCG/DOSE DISKUS  Commonly known as:  ADVAIR   1 puff, Inhalation, 2 Times Daily - RT      hydrOXYzine 25 MG tablet  Commonly known as:  ATARAX   25 mg, Oral, Nightly      Insulin Syringe-Needle U-100 30G X 5/16\" 0.5 ML misc   Uses X 4/day      isosorbide mononitrate 30 MG 24 hr tablet  Commonly known as:  IMDUR   30 mg, Oral, Daily      ketotifen 0.025 % ophthalmic solution  Commonly known as:  ZADITOR   1 drop, Both Eyes, 2 Times Daily      lactulose 10 GM/15ML solution  Commonly known as:  CHRONULAC   10 g, Oral, Daily      levocetirizine 5 MG tablet  Commonly known as:  XYZAL   5 mg, Oral, Every Evening    "   LORTAB  MG per tablet  Generic drug:  HYDROcodone-acetaminophen   1 tablet, Oral, Every 8 Hours PRN      lovastatin 40 MG tablet  Commonly known as:  MEVACOR   40 mg, Oral, Nightly      metFORMIN 500 MG tablet  Commonly known as:  GLUCOPHAGE   1 tablet, Oral, 2 Times Daily With Meals      omega-3 acid ethyl esters 1 g capsule  Commonly known as:  LOVAZA   2 g, 2 Times Daily      ONE TOUCH ULTRA TEST test strip  Generic drug:  glucose blood   No dose, route, or frequency recorded.      ONETOUCH DELICA PLUS [id number] misc   No dose, route, or frequency recorded.      pantoprazole 40 MG EC tablet  Commonly known as:  PROTONIX   40 mg, Oral, Daily      pregabalin 150 MG capsule  Commonly known as:  LYRICA   150 mg, Oral, 3 Times Daily      raNITIdine 150 MG tablet  Commonly known as:  ZANTAC   150 mg, Oral, 2 Times Daily      rOPINIRole 4 MG tablet  Commonly known as:  REQUIP   4 mg, Oral, Nightly      SINGULAIR 10 MG tablet  Generic drug:  montelukast   10 mg, Oral, Nightly      SPIRIVA RESPIMAT 2.5 MCG/ACT aerosol solution inhaler  Generic drug:  tiotropium bromide monohydrate   2 puffs, Inhalation, Daily - RT      vitamin D 1.25 MG (98970 UT) capsule capsule  Commonly known as:  ERGOCALCIFEROL   50,000 Units, Weekly, wednesdays      ZANAFLEX 4 MG tablet  Generic drug:  tiZANidine   4 mg, Oral, Every 8 Hours PRN         Stop These Medications    insulin aspart prot & aspart (70-30) 100 UNIT/ML suspension pen-injector injection  Commonly known as:  NOVOLOG     losartan 25 MG tablet  Commonly known as:  COZAAR            Allergies   Allergen Reactions   • Levaquin [Levofloxacin] Nausea And Vomiting   • Penicillins Nausea And Vomiting     Has tolerated cefepime 2/2020   • Codeine Nausea Only   • Flagyl [Metronidazole] Nausea And Vomiting         Discharge Disposition:  [place] [facility]    Diet:  Hospital:  Diet Order   Procedures   • Diet Regular; Thin; Cardiac, Consistent Carbohydrate, Low Potassium        Activity:  Activity Instructions     Other Activity Instructions      Activity Instructions: Follow activity instructions as per plastic surgery.  Walk carefully avoid excessive strain to groin area    Up WIth Assist               CODE STATUS:    Code Status and Medical Interventions:   Ordered at: 02/23/20 9120     Level Of Support Discussed With:    Patient     Code Status:    CPR     Medical Interventions (Level of Support Prior to Arrest):    Full       Future Appointments   Date Time Provider Department Center   4/29/2020  1:15 PM Pamella Donohue MD [facility] None       Additional Instructions for the Follow-ups that You Need to Schedule     Ambulatory Referral to Home Health   As directed      Cover bilat groin incisions with Covaderm.  Change q3 days    1) place dry gauze dressing to bilateral groin ebony drain site holes - BID or as needed to keep dry, 2) apply antibiotic ointment to superior left groin incision BID then apply band aid    Datomycin 500mg IV daily to 4/20; routine picc care, weekly cbc, cmp, cpk    Order Comments:  Cover bilat groin incisions with Covaderm.  Change q3 days 1) place dry gauze dressing to bilateral groin ebony drain site holes - BID or as needed to keep dry, 2) apply antibiotic ointment to superior left groin incision BID then apply band aid Datomycin 500mg IV daily to 4/20; routine picc care, weekly cbc, cmp, cpk     Face to Face Visit Date:  3/11/2020    Follow-up provider for Plan of Care?:  I treated the patient in an acute care facility and will not continue treatment after discharge.    Follow-up provider:  ALEXEY YOO [[id number]]    Reason/Clinical Findings:  PVD;  S/P femoral-femoral bypass surgery    Describe mobility limitations that make leaving home difficult:  Impaired functional mobility, gait, balance, endurance    Nursing/Therapeutic Services Requested:  Skilled Nursing    Skilled nursing orders:  PICC line care/instruction Infusion therapy Wound care  dressing/changes    Frequency:  1 Week 1         Discharge Follow-up with PCP   As directed       Currently Documented PCP:    Meagan Barlow APRN    PCP Phone Number:    [phone]     Follow Up Details:  1 week         Discharge Follow-up with Specified Provider: Endocrinology follow-up in 1 to 2 weeks.   As directed      To:  Endocrinology follow-up in 1 to 2 weeks.         Discharge Follow-up with Specified Provider: Follow-up with Dr. Modesto Whittaker in 1 week   As directed      To:  Follow-up with Dr. Modesto Whittaker in 1 week         Discharge Follow-up with Specified Provider: Follow-up with infectious disease Dr. Melo in 2 weeks.   As directed      To:  Follow-up with infectious disease Dr. Melo in 2 weeks.               Time Spent on Discharge: 45 minutes    Electronically signed by Kip Auguste MD, 03/11/20, 1:07 PM.        Electronically signed by Kip Auguste MD at 03/11/20 1315         Bria Pierce, RN      Case Management   Progress Notes   Signed   Date of Service:  03/11/20 1356   Creation Time:  03/11/20 1356            Signed             Show:Clear all  []Manual[x]Template[]Copied    Added by:  [x]Bria Pierce, RN    []Jr for details  Case Management Discharge Note        Final Note: Notified Bob at [facility] of pt's DC today.  Also notified Radha at [facility] of pt's DC.  Dapto and supplies/teaching will be delivered to pt's room prior to DC today.  Daughter to assist with home infusions.       Provided Post Acute Provider List?: Yes  Provided Post Acute Provider Quality & Resource List?: Yes  Delivered To: Patient, Support Person               Destination                 No service has been selected for the patient.                           Durable Medical Equipment                 No service has been selected for the patient.                           Dialysis/Infusion - Selection Complete       Service Provider Request Status Selected Services Address  Phone Number Fax Number     [facility] HOME INFUSION Selected Infusion and IV Therapy [address], [facility] [address] [phone]                           Home Medical Care - Selection Complete       Service Provider Request Status Selected Services Address Phone Number Fax Number     [facility] HOME CARE Selected Home Health Services [address], [facility] [address] [phone]           Therapy       No service has been selected for the patient.               Community Resources       No service has been selected for the patient.             Final Discharge Disposition Code: 06 - home with home health care

## 2020-03-13 NOTE — OUTREACH NOTE
Prep Survey      Responses   [facility] facility patient discharged from?  [place]   Is LACE score < 7 ?  No   Eligibility  Readm Mgmt   Discharge diagnosis  Infected prosthetic vascular graft pulmonary edema Sepsis graft explant on 2/28    Does the patient have one of the following disease processes/diagnoses(primary or secondary)?  Sepsis   Does the patient have Home health ordered?  Yes   What is the Home health agency?    [facility]    Medication alerts for this patient  home infusions   Prep survey completed?  Yes          Qian Powers RN

## 2020-03-16 ENCOUNTER — LAB REQUISITION (OUTPATIENT)
Dept: LAB | Facility: HOSPITAL | Age: 50
End: 2020-03-16

## 2020-03-16 DIAGNOSIS — A41.9 SEPSIS, UNSPECIFIED ORGANISM (HCC): ICD-10-CM

## 2020-03-16 LAB
ALBUMIN SERPL-MCNC: 2.9 G/DL (ref 3.5–5.2)
ALBUMIN/GLOB SERPL: 0.7 G/DL
ALP SERPL-CCNC: 115 U/L (ref 39–117)
ALT SERPL W P-5'-P-CCNC: 20 U/L (ref 1–33)
ANION GAP SERPL CALCULATED.3IONS-SCNC: 13 MMOL/L (ref 5–15)
AST SERPL-CCNC: 21 U/L (ref 1–32)
BASOPHILS # BLD AUTO: 0.06 10*3/MM3 (ref 0–0.2)
BASOPHILS NFR BLD AUTO: 0.7 % (ref 0–1.5)
BILIRUB SERPL-MCNC: 0.2 MG/DL (ref 0.2–1.2)
BUN BLD-MCNC: 24 MG/DL (ref 6–20)
BUN/CREAT SERPL: 23.8 (ref 7–25)
CALCIUM SPEC-SCNC: 9.1 MG/DL (ref 8.6–10.5)
CHLORIDE SERPL-SCNC: 105 MMOL/L (ref 98–107)
CK SERPL-CCNC: 36 U/L (ref 20–180)
CO2 SERPL-SCNC: 22 MMOL/L (ref 22–29)
CREAT BLD-MCNC: 1.01 MG/DL (ref 0.57–1)
CRP SERPL-MCNC: 1.31 MG/DL (ref 0–0.5)
DEPRECATED RDW RBC AUTO: 47.4 FL (ref 37–54)
EOSINOPHIL # BLD AUTO: 0.36 10*3/MM3 (ref 0–0.4)
EOSINOPHIL NFR BLD AUTO: 4.4 % (ref 0.3–6.2)
ERYTHROCYTE [DISTWIDTH] IN BLOOD BY AUTOMATED COUNT: 14.5 % (ref 12.3–15.4)
ERYTHROCYTE [SEDIMENTATION RATE] IN BLOOD: 95 MM/HR (ref 0–20)
GFR SERPL CREATININE-BSD FRML MDRD: 58 ML/MIN/1.73
GLOBULIN UR ELPH-MCNC: 4.2 GM/DL
GLUCOSE BLD-MCNC: 124 MG/DL (ref 65–99)
HCT VFR BLD AUTO: 33.1 % (ref 34–46.6)
HGB BLD-MCNC: 10.7 G/DL (ref 12–15.9)
IMM GRANULOCYTES # BLD AUTO: 0.06 10*3/MM3 (ref 0–0.05)
IMM GRANULOCYTES NFR BLD AUTO: 0.7 % (ref 0–0.5)
LYMPHOCYTES # BLD AUTO: 1.41 10*3/MM3 (ref 0.7–3.1)
LYMPHOCYTES NFR BLD AUTO: 17 % (ref 19.6–45.3)
MCH RBC QN AUTO: 29.5 PG (ref 26.6–33)
MCHC RBC AUTO-ENTMCNC: 32.3 G/DL (ref 31.5–35.7)
MCV RBC AUTO: 91.2 FL (ref 79–97)
MONOCYTES # BLD AUTO: 0.82 10*3/MM3 (ref 0.1–0.9)
MONOCYTES NFR BLD AUTO: 9.9 % (ref 5–12)
NEUTROPHILS # BLD AUTO: 5.56 10*3/MM3 (ref 1.7–7)
NEUTROPHILS NFR BLD AUTO: 67.3 % (ref 42.7–76)
NRBC BLD AUTO-RTO: 0 /100 WBC (ref 0–0.2)
PLATELET # BLD AUTO: 274 10*3/MM3 (ref 140–450)
PMV BLD AUTO: 10.5 FL (ref 6–12)
POTASSIUM BLD-SCNC: 4.7 MMOL/L (ref 3.5–5.2)
PROT SERPL-MCNC: 7.1 G/DL (ref 6–8.5)
RBC # BLD AUTO: 3.63 10*6/MM3 (ref 3.77–5.28)
RBC MORPH BLD: NORMAL
SMALL PLATELETS BLD QL SMEAR: ADEQUATE
SODIUM BLD-SCNC: 140 MMOL/L (ref 136–145)
WBC MORPH BLD: NORMAL
WBC NRBC COR # BLD: 8.27 10*3/MM3 (ref 3.4–10.8)

## 2020-03-16 PROCEDURE — 80053 COMPREHEN METABOLIC PANEL: CPT | Performed by: INTERNAL MEDICINE

## 2020-03-16 PROCEDURE — 85007 BL SMEAR W/DIFF WBC COUNT: CPT | Performed by: INTERNAL MEDICINE

## 2020-03-16 PROCEDURE — 85651 RBC SED RATE NONAUTOMATED: CPT | Performed by: INTERNAL MEDICINE

## 2020-03-16 PROCEDURE — 82550 ASSAY OF CK (CPK): CPT | Performed by: INTERNAL MEDICINE

## 2020-03-16 PROCEDURE — 86140 C-REACTIVE PROTEIN: CPT | Performed by: INTERNAL MEDICINE

## 2020-03-16 PROCEDURE — 85025 COMPLETE CBC W/AUTO DIFF WBC: CPT | Performed by: INTERNAL MEDICINE

## 2020-03-17 ENCOUNTER — READMISSION MANAGEMENT (OUTPATIENT)
Dept: CALL CENTER | Facility: HOSPITAL | Age: 50
End: 2020-03-17

## 2020-03-17 NOTE — OUTREACH NOTE
Sepsis Week 1 Survey      Responses   [facility] patient discharged from?  [place]   Does the patient have one of the following disease processes/diagnoses(primary or secondary)?  Sepsis   Is there a successful TCM telephone encounter documented?  No   Week 1 attempt successful?  Yes   Call start time  1141   Call end time  1149   Discharge diagnosis  Infected prosthetic vascular graft pulmonary edema Sepsis graft explant on 2/28    Medication alerts for this patient  Iv daptomycin infusion daily, her dtr is doing infusion. HH is changing PICC dsg.    Meds reviewed with patient/caregiver?  Yes   Is the patient having any side effects they believe may be caused by any medication additions or changes?  No   Does the patient have all medications related to this admission filled (includes all antibiotics, inhalers, nebulizers,steroids,etc.)  Yes   Is the patient taking all medications as directed (includes completed medication regime)?  Yes   Does the patient have a primary care provider?   Yes   Does the patient have an appointment with their PCP within 7 days of discharge?  Yes   Has the patient kept scheduled appointments due by today?  Yes   Comments  PCP changed insulin to 5u Lispro TID and Insulin kwik 15u BID per pt report.    What is the Home health agency?    [facility]    Has home health visited the patient within 72 hours of discharge?  Yes   Psychosocial issues?  No   Comments  Pt reports RLE remains numb from ankle to hip & LLE numb from knee to hip, denies edema, pain, cold to touch or changed in color. Pt report bilat LE are warm to touch and pink in color. Monitoring her gluc at home 120-200. No issue voiding and appetite is WNL. She is concerned her abd feels bloated, advised to notify the PCP. Left groin has dry gauze bandage, denies s/sx of inf at this time in groin.   Did the patient receive a copy of their discharge instructions?  Yes   Nursing interventions  Reviewed instructions with patient    What is the patient's perception of their health status since discharge?  Improving   Is the patient/caregiver able to teach back Sepsis?  S - Shivering,fever or very cold, P - Pale or discolored skin, S - Sleepy, difficult to arouse,confused   Is patient/caregiver able to teach back steps to recovery at home?  Talk about feelings with family/friends, Exercise as tolerated   Is the patient/caregiver able to teach back signs and symptoms of worsening condition:  Rapid heart rate (>90), Fever   Is the patient/caregiver able to teach back the hierarchy of who to call/visit for symptoms/problems? PCP, Specialist, Home health nurse, Urgent Care, ED, 911  Yes   Week 1 call completed?  Yes          Salud Retana RN

## 2020-03-25 ENCOUNTER — READMISSION MANAGEMENT (OUTPATIENT)
Dept: CALL CENTER | Facility: HOSPITAL | Age: 50
End: 2020-03-25

## 2020-03-25 NOTE — OUTREACH NOTE
Sepsis Week 2 Survey      Responses   [facility] patient discharged from?  [place]   Does the patient have one of the following disease processes/diagnoses(primary or secondary)?  Sepsis   Week 2 attempt successful?  Yes   Call start time  1337   Call end time  1351   Discharge diagnosis  Infected prosthetic vascular graft pulmonary edema Sepsis graft explant on 2/28    Is patient permission given to speak with other caregiver?  No   Meds reviewed with patient/caregiver?  Yes   Is the patient having any side effects they believe may be caused by any medication additions or changes?  No   Does the patient have all medications related to this admission filled (includes all antibiotics, inhalers, nebulizers,steroids,etc.)  Yes   Is the patient taking all medications as directed (includes completed medication regime)?  Yes   Does the patient have a primary care provider?   Yes   Comments regarding PCP  Modesto Whittaker MD PCP   Has the patient kept scheduled appointments due by today?  Yes   Comments  Collette Melo MD on 3/30/2020   What is the Home health agency?    [facility]    Has home health visited the patient within 72 hours of discharge?  Yes   Home health comments  Patient reports that her  nurse did not come yesterday to draw her blood. Patient to call  office today and report that nurse has not visited to do her blood draw.     Psychosocial issues?  No   Did the patient receive a copy of their discharge instructions?  Yes   Nursing interventions  Reviewed instructions with patient   What is the patient's perception of their health status since discharge?  Improving   Nursing interventions  Nurse provided patient education   Is the patient/caregiver able to teach back Sepsis?  S - Shivering,fever or very cold, E - Extreme pain or generalized discomfort (worst ever,especially abdomen)   Nursing interventions  Nurse provided reassurance to patient, Nurse provided patient education   Is patient/caregiver  able to teach back steps to recovery at home?  Set small, achievable goals for return to baseline health, Rest and regain strength   Is the patient/caregiver able to teach back signs and symptoms of worsening condition:  Fever, Rapid heart rate (>90), Shortness of breath/rapid respiratory rate, Altered mental status(confusion/coma), Edema, High blood glucose without diabetes, Hyperthermia   Is the patient/caregiver able to teach back the hierarchy of who to call/visit for symptoms/problems? PCP, Specialist, Home health nurse, Urgent Care, ED, 911  Yes   Week 2 call completed?  Yes          Qian Alicea RN

## 2020-03-27 ENCOUNTER — LAB REQUISITION (OUTPATIENT)
Dept: LAB | Facility: HOSPITAL | Age: 50
End: 2020-03-27

## 2020-03-27 DIAGNOSIS — A41.9 SEPSIS, UNSPECIFIED ORGANISM (HCC): ICD-10-CM

## 2020-03-27 LAB
ALBUMIN SERPL-MCNC: 3.5 G/DL (ref 3.5–5.2)
ALBUMIN/GLOB SERPL: 0.8 G/DL
ALP SERPL-CCNC: 101 U/L (ref 39–117)
ALT SERPL W P-5'-P-CCNC: 27 U/L (ref 1–33)
ANION GAP SERPL CALCULATED.3IONS-SCNC: 11.9 MMOL/L (ref 5–15)
AST SERPL-CCNC: 25 U/L (ref 1–32)
BASOPHILS # BLD AUTO: 0.07 10*3/MM3 (ref 0–0.2)
BASOPHILS NFR BLD AUTO: 1 % (ref 0–1.5)
BILIRUB SERPL-MCNC: 0.2 MG/DL (ref 0.2–1.2)
BUN BLD-MCNC: 27 MG/DL (ref 6–20)
BUN/CREAT SERPL: 25 (ref 7–25)
CALCIUM SPEC-SCNC: 9.7 MG/DL (ref 8.6–10.5)
CHLORIDE SERPL-SCNC: 105 MMOL/L (ref 98–107)
CK SERPL-CCNC: 102 U/L (ref 20–180)
CO2 SERPL-SCNC: 25.1 MMOL/L (ref 22–29)
CREAT BLD-MCNC: 1.08 MG/DL (ref 0.57–1)
CRP SERPL-MCNC: 0.26 MG/DL (ref 0–0.5)
DEPRECATED RDW RBC AUTO: 47.6 FL (ref 37–54)
EOSINOPHIL # BLD AUTO: 0.23 10*3/MM3 (ref 0–0.4)
EOSINOPHIL NFR BLD AUTO: 3.1 % (ref 0.3–6.2)
ERYTHROCYTE [DISTWIDTH] IN BLOOD BY AUTOMATED COUNT: 14.6 % (ref 12.3–15.4)
ERYTHROCYTE [SEDIMENTATION RATE] IN BLOOD: 125 MM/HR (ref 0–20)
GFR SERPL CREATININE-BSD FRML MDRD: 54 ML/MIN/1.73
GLOBULIN UR ELPH-MCNC: 4.5 GM/DL
GLUCOSE BLD-MCNC: 102 MG/DL (ref 65–99)
HCT VFR BLD AUTO: 33.7 % (ref 34–46.6)
HGB BLD-MCNC: 10.8 G/DL (ref 12–15.9)
IMM GRANULOCYTES # BLD AUTO: 0.04 10*3/MM3 (ref 0–0.05)
IMM GRANULOCYTES NFR BLD AUTO: 0.5 % (ref 0–0.5)
LYMPHOCYTES # BLD AUTO: 1.4 10*3/MM3 (ref 0.7–3.1)
LYMPHOCYTES NFR BLD AUTO: 19.1 % (ref 19.6–45.3)
MCH RBC QN AUTO: 28.8 PG (ref 26.6–33)
MCHC RBC AUTO-ENTMCNC: 32 G/DL (ref 31.5–35.7)
MCV RBC AUTO: 89.9 FL (ref 79–97)
MONOCYTES # BLD AUTO: 0.6 10*3/MM3 (ref 0.1–0.9)
MONOCYTES NFR BLD AUTO: 8.2 % (ref 5–12)
NEUTROPHILS # BLD AUTO: 5 10*3/MM3 (ref 1.7–7)
NEUTROPHILS NFR BLD AUTO: 68.1 % (ref 42.7–76)
NRBC BLD AUTO-RTO: 0 /100 WBC (ref 0–0.2)
PLATELET # BLD AUTO: 278 10*3/MM3 (ref 140–450)
PMV BLD AUTO: 10.2 FL (ref 6–12)
POTASSIUM BLD-SCNC: 5.8 MMOL/L (ref 3.5–5.2)
PROT SERPL-MCNC: 8 G/DL (ref 6–8.5)
RBC # BLD AUTO: 3.75 10*6/MM3 (ref 3.77–5.28)
SODIUM BLD-SCNC: 142 MMOL/L (ref 136–145)
WBC NRBC COR # BLD: 7.34 10*3/MM3 (ref 3.4–10.8)

## 2020-03-27 PROCEDURE — 86140 C-REACTIVE PROTEIN: CPT | Performed by: INTERNAL MEDICINE

## 2020-03-27 PROCEDURE — 82550 ASSAY OF CK (CPK): CPT | Performed by: INTERNAL MEDICINE

## 2020-03-27 PROCEDURE — 85651 RBC SED RATE NONAUTOMATED: CPT | Performed by: INTERNAL MEDICINE

## 2020-03-27 PROCEDURE — 80053 COMPREHEN METABOLIC PANEL: CPT | Performed by: INTERNAL MEDICINE

## 2020-03-27 PROCEDURE — 85025 COMPLETE CBC W/AUTO DIFF WBC: CPT | Performed by: INTERNAL MEDICINE

## 2020-04-01 ENCOUNTER — READMISSION MANAGEMENT (OUTPATIENT)
Dept: CALL CENTER | Facility: HOSPITAL | Age: 50
End: 2020-04-01

## 2020-04-01 NOTE — OUTREACH NOTE
Sepsis Week 3 Survey      Responses   [facility] patient discharged from?  [place]   Does the patient have one of the following disease processes/diagnoses(primary or secondary)?  Sepsis   Week 3 attempt successful?  Yes   Call start time  1653   Call end time  1654   Discharge diagnosis  Infected prosthetic vascular graft pulmonary edema Sepsis graft explant on 2/28    What is the patient's perception of their health status since discharge?  Improving   Is the patient/caregiver able to teach back the hierarchy of who to call/visit for symptoms/problems? PCP, Specialist, Home health nurse, Urgent Care, ED, 911  Yes   Additional teach back comments  Patient says she is doing well, no questions or concerns at this time.   Week 3 call completed?  Yes   Revoked  No further contact(revokes)-requires comment   Graduated/Revoked comments  Patient says that no further calls are needed at this time.          Lulu Kenny RN

## 2020-04-10 LAB
FUNGUS WND CULT: NORMAL
MYCOBACTERIUM SPEC CULT: NORMAL
NIGHT BLUE STAIN TISS: NORMAL

## 2020-05-11 ENCOUNTER — OFFICE VISIT (OUTPATIENT)
Dept: CARDIAC SURGERY | Facility: CLINIC | Age: 50
End: 2020-05-11

## 2020-05-11 VITALS
BODY MASS INDEX: 21.62 KG/M2 | DIASTOLIC BLOOD PRESSURE: 84 MMHG | SYSTOLIC BLOOD PRESSURE: 161 MMHG | HEIGHT: 63 IN | TEMPERATURE: 97.8 F | WEIGHT: 122 LBS | OXYGEN SATURATION: 99 % | HEART RATE: 104 BPM

## 2020-05-11 DIAGNOSIS — I73.9 PVD (PERIPHERAL VASCULAR DISEASE) (HCC): Primary | ICD-10-CM

## 2020-05-11 PROCEDURE — 99024 POSTOP FOLLOW-UP VISIT: CPT | Performed by: NURSE PRACTITIONER

## 2020-05-11 NOTE — PROGRESS NOTES
[facility] Follow-Up Note    Name:  Saskia Groves  MRN Number:  [id number]  Date of Encounter:  05/11/2020    Referred By:  No ref. provider found  PCP:  Meagan Barlow APRN    Chief Complaint:    Chief Complaint   Patient presents with   • Post-op Follow-up     hosp follow -up s/p Fem Fem bypass graft removal 2/28/2020. Pt states that she is very weak and that she feels numb in the graft sites and that both of her legs feel heavy and she can't walk far or she will fall.        History of Present Illness:    Mrs. Saskia Groves is a pleasant 49 y.o. female with a history of hypertension, hyperlipidemia, GERD, diabetes mellitus, coronary artery disease, COPD, carotid artery stenosis and peripheral vascular disease status post bilateral femoral endarterectomy with femoral-femoral bypass 1/15/2020 per Dr. Oseguera with subsequent return to the OR for bronchoscopy, right chest tube placement, and removal of femoral-femoral bypass bilateral common femoral artery pericardial patch 2/28/20 per Dr. Oseguera secondary to infection with same day right and left sartorius muscle flaps to cover bilateral groins per Dr. Whittaker who returns the office today for follow-up exam.  Since the removal of her femoral-femoral bypass graft, the patient has had an increase in claudication symptoms.  She reports that she cannot walk very far without falling.  She continues to follow with Dr. Whittaker and Dr. Fisher with [facility].    Review of Systems:  Review of Systems   Constitution: Negative for chills, fever and malaise/fatigue.   Eyes: Negative for visual disturbance.   Cardiovascular: Positive for claudication. Negative for chest pain, dyspnea on exertion and leg swelling.   Respiratory: Negative for hemoptysis.    Skin: Negative for poor wound healing.   Gastrointestinal: Negative for abdominal pain.   Neurological: Positive for paresthesias and weakness.    Psychiatric/Behavioral: Negative for altered mental status.       Past Medical History:    Past Medical History:   Diagnosis Date   • Anxiety    • Arthritis    • Asthma    • Carotid artery stenosis    • Chronic bronchitis (CMS/HCC)    • COPD (chronic obstructive pulmonary disease) (CMS/HCC)    • Coronary artery disease     2 vessels with 50% blockage.  Sees Dr. Donaldson   • Depression    • Diabetes mellitus (CMS/HCC)    • Dyslipidemia    • GERD (gastroesophageal reflux disease)    • Hiatal hernia    • Hyperlipidemia    • Hypertension    • Infectious viral hepatitis    • Lower back pain    • Migraine    • Multinodular goiter    • S/P thyroid biopsy     2008, 2013, 2015, and 07/15/2019 at [facility], right lobe nodules - all cytologies were benign   • Sleep apnea     can't tolerate the cpap mask   • Subclinical hyperthyroidism        Past Surgical History:    Past Surgical History:   Procedure Laterality Date   • ANTERIOR CERVICAL DISCECTOMY W/ FUSION     • BACK SURGERY      lumbar   •  SECTION  19940    x 2    • CHOLECYSTECTOMY     • COLONOSCOPY     • ENDOSCOPY     • FEMORAL ARTERY CUTDOWN Bilateral 2020    Procedure: FEMORAL ARTERY CUTDOWN WITH REMOVAL OF FEMORAL FEMORAL BYPASS GRAFT BILATERAL;  Surgeon: Deandre Oseguera MD;  Location:  [facility];  Service: Vascular;  Laterality: Bilateral;   • FEMORAL ENDARTERECTOMY Bilateral 1/15/2020    Procedure: FEMORAL ENDARTERECTOMY BILATERAL;  Surgeon: Deandre Oseguera MD;  Location:  [facility];  Service: Vascular   • FEMORAL FEMORAL BYPASS Right 1/15/2020    Procedure: FEMORAL FEMORAL BYPASS;  Surgeon: Deandre Oseguera MD;  Location:  [facility];  Service: Vascular   • HYSTERECTOMY     • KNEE SURGERY Left    • LEG EXCISION LESION/CYST Left 2020    Procedure: GROIN MUSCLE FLAP BILATERAL;  Surgeon: Modesto Whittaker MD;  Location:  [facility];  Service: Plastics;  Laterality: Left;   • WRIST SURGERY Left        Patient Active Problem List   Diagnosis    • PVD (peripheral vascular disease) (CMS/[facility])   • Lumbar radiculopathy   • Paresthesia   • Causalgia of lower limb   • Hiatal hernia   • Current smoker   • Bilateral carotid artery stenosis   • COPD (chronic obstructive pulmonary disease) (CMS/[facility])   • Coronary artery disease   • Hypertension   • Hyperlipidemia   • Diabetes mellitus (CMS/[facility])   • Sepsis (CMS/[facility])   • Pleural effusion - bilateral mod/large pleural effusions on CT chest 2/27/2020.    • Acute pulmonary edema (CMS/[facility])   • Acute hypoxemic respiratory failure (CMS/[facility])   • Infected prosthetic vascular graft, initial encounter (CMS/[facility])   • Status post Lt and Rt Sartorious muscle flap      Social History     Tobacco Use   • Smoking status: Current Every Day Smoker     Packs/day: 1.00     Years: 35.00     Pack years: 35.00     Types: Cigarettes   • Smokeless tobacco: Never Used   Substance Use Topics   • Alcohol use: Not Currently   • Drug use: No     Family History   Problem Relation Age of Onset   • Diabetes Mother    • Hypertension Mother    • Arthritis Mother    • Hyperlipidemia Mother    • Migraines Mother    • Thyroid disease Mother    • Hypertension Father    • Lung cancer Father    • Cancer Father    • Stroke Other    • Migraines Maternal Aunt    • Thyroid disease Maternal Aunt    • Heart attack Maternal Aunt    • Osteoporosis Maternal Aunt    • Cancer Maternal Uncle    • Heart attack Maternal Uncle    • Stroke Maternal Uncle    • Hyperlipidemia Maternal Grandmother    • Cancer Maternal Grandmother    • Obesity Maternal Grandmother    • Thyroid disease Daughter    • Obesity Daughter        Medications:      Current Outpatient Medications:   •  albuterol (PROVENTIL) (2.5 MG/3ML) 0.083% nebulizer solution, Take  by nebulization Every 6 (Six) Hours As Needed., Disp: , Rfl:   •  ALBUTEROL SULFATE HFA IN, Inhale 1 puff Every 6 (Six) Hours As Needed (as needed for wheezing and sob)., Disp: , Rfl:   •  amitriptyline (ELAVIL) 10 MG tablet, Take 10 mg by  "mouth Every Night., Disp: , Rfl:   •  aspirin 81 MG tablet, Take 1 tablet by mouth daily., Disp: , Rfl:   •  bacitracin 500 UNIT/GM ointment, Apply  topically to the appropriate area as directed Every 12 (Twelve) Hours. Apply to the left groin incision as instructed, Disp: 1 each, Rfl: 0  •  benzonatate (TESSALON) 100 MG capsule, Take 100 mg by mouth 3 (Three) Times a Day As Needed., Disp: , Rfl:   •  clopidogrel (PLAVIX) 75 MG tablet, TAKE ONE TABLET BY MOUTH DAILY, Disp: 30 tablet, Rfl: 5  •  cyanocobalamin 1000 MCG/ML injection, 1,000 mcg Every 28 (Twenty-Eight) Days., Disp: , Rfl:   •  fluticasone (FLONASE) 50 MCG/ACT nasal spray, 2 sprays into the nostril(s) as directed by provider Daily. Use as directed, Disp: , Rfl:   •  fluticasone-salmeterol (ADVAIR) 250-50 MCG/DOSE DISKUS, Inhale 1 puff 2 (Two) Times a Day., Disp: , Rfl:   •  HYDROcodone-acetaminophen (LORTAB)  MG per tablet, Take 1 tablet by mouth Every 8 (Eight) Hours As Needed., Disp: , Rfl:   •  hydrOXYzine (ATARAX) 25 MG tablet, Take 25 mg by mouth Every Night., Disp: , Rfl:   •  insulin detemir (LEVEMIR) 100 UNIT/ML injection, Inject 12 Units under the skin into the appropriate area as directed Every 12 (Twelve) Hours. Formulary substitution allowed, Disp: 10 mL, Rfl: 12  •  Insulin Glargine (BASAGLAR KWIKPEN) 100 UNIT/ML injection pen, Inject 12 Units under the skin into the appropriate area as directed 2 (Two) Times a Day., Disp: 15 mL, Rfl: 2  •  Insulin Lispro (ADMELOG SOLOSTAR) 100 UNIT/ML injection pen, Inject 3 Units under the skin into the appropriate area as directed 3 (Three) Times a Day With Meals., Disp: 15 mL, Rfl: 1  •  Insulin Syringe-Needle U-100 30G X 5/16\" 0.5 ML misc, Uses X 4/day, Disp: 200 each, Rfl: 5  •  ketotifen (ZADITOR) 0.025 % ophthalmic solution, Administer 1 drop to both eyes 2 (Two) Times a Day., Disp: , Rfl:   •  lactulose (CHRONULAC) 10 GM/15ML solution, Take 10 g by mouth Daily., Disp: , Rfl:   •  Lancets " (ONETOUCH DELICA PLUS [id number]) misc, , Disp: , Rfl:   •  levocetirizine (XYZAL) 5 MG tablet, Take 5 mg by mouth Every Evening., Disp: , Rfl:   •  lovastatin (MEVACOR) 40 MG tablet, Take 40 mg by mouth Every Night., Disp: , Rfl:   •  metFORMIN (GLUCOPHAGE) 500 MG tablet, Take 1 tablet by mouth 2 (Two) Times a Day With Meals., Disp: , Rfl:   •  metoprolol tartrate (LOPRESSOR) 50 MG tablet, Take 1 tablet by mouth 2 (Two) Times a Day. (Patient taking differently: Take 50 mg by mouth Daily.), Disp: 60 tablet, Rfl: 0  •  montelukast (SINGULAIR) 10 MG tablet, Take 10 mg by mouth Every Night., Disp: , Rfl:   •  omega-3 acid ethyl esters (LOVAZA) 1 g capsule, 2 g 2 (Two) Times a Day., Disp: , Rfl:   •  ONE TOUCH ULTRA TEST test strip, , Disp: , Rfl:   •  pantoprazole (PROTONIX) 40 MG EC tablet, Take 40 mg by mouth Daily., Disp: , Rfl:   •  pregabalin (LYRICA) 150 MG capsule, Take 150 mg by mouth 3 (Three) Times a Day., Disp: , Rfl:   •  raNITIdine (ZANTAC) 150 MG tablet, Take 150 mg by mouth 2 (Two) Times a Day., Disp: , Rfl:   •  rOPINIRole (REQUIP) 4 MG tablet, Take 4 mg by mouth Every Night., Disp: , Rfl:   •  SPIRIVA RESPIMAT 2.5 MCG/ACT aerosol solution inhaler, Inhale 2 puffs Daily., Disp: , Rfl:   •  tiZANidine (ZANAFLEX) 4 MG tablet, Take 4 mg by mouth Every 8 (Eight) Hours As Needed., Disp: , Rfl:   •  vitamin D (ERGOCALCIFEROL) 1.25 MG (09783 UT) capsule capsule, 50,000 Units 1 (One) Time Per Week. wednesdays, Disp: , Rfl:   •  acetaminophen (TYLENOL) 325 MG tablet, Take 2 tablets by mouth Every 4 (Four) Hours As Needed for Mild Pain ., Disp: , Rfl:   •  isosorbide mononitrate (IMDUR) 30 MG 24 hr tablet, Take 30 mg by mouth Daily., Disp: , Rfl:     Allergies:  Allergies   Allergen Reactions   • Levaquin [Levofloxacin] Nausea And Vomiting   • Penicillins Nausea And Vomiting     Has tolerated cefepime 2/2020   • Codeine Nausea Only   • Flagyl [Metronidazole] Nausea And Vomiting       Physical Exam:  Vital Signs:   "  Vitals:    05/11/20 1329   BP: 161/84   Pulse: 104   Temp: 97.8 °F (36.6 °C)   TempSrc: Temporal   SpO2: 99%   Weight: 55.3 kg (122 lb)   Height: 160 cm (63\")       Physical Exam   Gen- NAD, pleasant, cooperative  CV- Regular rate and rhythm, no murmur, gallop or rub  Pulm- Clear to auscultation bilateral without wheeze or rhonchi  GI- Soft, normoactive bowel sounds, non-tender  Ext- Without edema,   Neuro- CN II- XII grossly intact, tongue midline, voice normal.    Labs/Imaging:  No imaging was performed in the office today.    Assessment / Plan:  Mrs. Saskia Groves is a pleasant 49 y.o. female with a history of hypertension, hyperlipidemia, GERD, diabetes mellitus, coronary artery disease, COPD, carotid artery stenosis and peripheral vascular disease status post bilateral femoral endarterectomy with femoral-femoral bypass 1/15/2020 per Dr. Oseguera with subsequent return to the OR for bronchoscopy, right chest tube placement, and removal of femoral-femoral bypass bilateral common femoral artery pericardial patch 2/28/20 per Dr. Oseguera secondary to infection with same day right and left sartorius muscle flaps to cover bilateral groins per Dr. Whittaker who returns the office today for follow-up exam.  She continues to follow with Dr. Whittaker and Dr. Fisher.  Bilateral incision sites are healing well.  At this time, we will plan to see the patient back in 3 months for reevaluation in the [place] office.  Should she have any questions or concerns in the interval, she may contact the office.    Follow Up:  3 months    Please note, this document was produced using voice recognition software.    LUIS Wall  [facility] Cardiothoracic Surgery  "

## 2020-07-08 ENCOUNTER — TELEPHONE (OUTPATIENT)
Dept: CARDIAC SURGERY | Facility: CLINIC | Age: 50
End: 2020-07-08

## 2020-07-08 NOTE — TELEPHONE ENCOUNTER
Says she was sitting in her yard approximately 3 days ago pulling weeds and has been unable to walk unassisted since then, has been using a wheelchair.  Complains of pain in back and groin area.  Advised her to go to ER or contact PCP to be seen, may not even be vascular related.  She is going to be seen today 7/8/20 at 2:00 by local MD.

## 2020-07-10 ENCOUNTER — OFFICE VISIT (OUTPATIENT)
Dept: CARDIAC SURGERY | Facility: CLINIC | Age: 50
End: 2020-07-10

## 2020-07-10 VITALS
HEART RATE: 107 BPM | HEIGHT: 63 IN | TEMPERATURE: 97.3 F | SYSTOLIC BLOOD PRESSURE: 135 MMHG | OXYGEN SATURATION: 99 % | BODY MASS INDEX: 20.02 KG/M2 | DIASTOLIC BLOOD PRESSURE: 77 MMHG | WEIGHT: 113 LBS

## 2020-07-10 DIAGNOSIS — F17.200 CURRENT SMOKER: ICD-10-CM

## 2020-07-10 DIAGNOSIS — I73.9 PVD (PERIPHERAL VASCULAR DISEASE) (HCC): ICD-10-CM

## 2020-07-10 DIAGNOSIS — R29.898 LEG WEAKNESS, BILATERAL: Primary | ICD-10-CM

## 2020-07-10 PROCEDURE — 99213 OFFICE O/P EST LOW 20 MIN: CPT | Performed by: NURSE PRACTITIONER

## 2020-07-10 PROCEDURE — 93923 UPR/LXTR ART STDY 3+ LVLS: CPT | Performed by: THORACIC SURGERY (CARDIOTHORACIC VASCULAR SURGERY)

## 2020-07-10 NOTE — PROGRESS NOTES
[facility] Cardiothoracic Surgery Follow-Up Note    Name:  Saskia Groves  MRN Number:  [id number]  Date of Encounter:  07/10/2020    Referred By:  No ref. provider found  PCP:  Linh Guerin PA    Chief Complaint:    Chief Complaint   Patient presents with   • Follow-up     Follow up for peripheral vascular disease,complains of bilateral leg pain and PCP could not find pulses in feet.   • Peripheral Vascular Disease       History of Present Illness:    Ms. Saskia Groves is a pleasant 49 y.o. female with a history of  hypertension, hyperlipidemia, GERD, diabetes mellitus, coronary artery disease, COPD, carotid artery stenosis and peripheral vascular disease status post bilateral femoral endarterectomy with femoral-femoral bypass 1/15/2020 per Dr. Oseguera with subsequent return to the OR for bronchoscopy, right chest tube placement, and removal of femoral-femoral bypass bilateral common femoral artery pericardial patch 2/28/20 per Dr. Oseguera secondary to infection with same day right and left sartorius muscle flaps to cover bilateral groins per Dr. Whittaker who returns the office today for follow-up exam.  Since the removal of her femoral-femoral bypass graft, the patient has had an increase in claudication symptoms.  She has had difficulty walking since the time of her last surgery.  She reports approximately 3 days ago she was pulling weeds and has not been able to walk since.  She describes the pain as a pulling sensation in her right upper inner thigh.  She went to see her primary care physician who referred her back to us as they stated they were unable to find palpable pulses in the office.    Review of Systems:  Review of Systems   Constitution: Positive for malaise/fatigue and night sweats. Negative for chills, fever and weight loss.   HENT: Positive for hearing loss. Negative for odynophagia and sore throat.    Eyes: Negative.    Cardiovascular: Positive for claudication, dyspnea on exertion  and leg swelling (feet). Negative for chest pain, orthopnea and palpitations.   Respiratory: Negative.  Negative for cough and hemoptysis.    Endocrine: Negative.  Negative for cold intolerance, heat intolerance, polydipsia, polyphagia and polyuria.   Hematologic/Lymphatic: Negative.  Does not bruise/bleed easily.   Skin: Positive for poor wound healing. Negative for itching and rash.   Musculoskeletal: Positive for back pain, joint pain, muscle cramps and muscle weakness. Negative for joint swelling and myalgias.   Gastrointestinal: Positive for diarrhea and dysphagia. Negative for abdominal pain, constipation, hematemesis, hematochezia, melena, nausea and vomiting.   Genitourinary: Positive for hematuria. Negative for dysuria and frequency.   Neurological: Positive for headaches and loss of balance. Negative for focal weakness, numbness and seizures.   Psychiatric/Behavioral: Negative.  Negative for suicidal ideas.   Allergic/Immunologic: Positive for environmental allergies.   All other systems reviewed and are negative.      Past Medical History:    Past Medical History:   Diagnosis Date   • Anxiety    • Arthritis    • Asthma    • Carotid artery stenosis    • Chronic bronchitis (CMS/HCC)    • COPD (chronic obstructive pulmonary disease) (CMS/HCC)    • Coronary artery disease     2 vessels with 50% blockage.  Sees Dr. Donaldson   • Depression    • Diabetes mellitus (CMS/HCC)    • Dyslipidemia    • GERD (gastroesophageal reflux disease)    • Hiatal hernia    • Hyperlipidemia    • Hypertension    • Infectious viral hepatitis    • Lower back pain    • Migraine    • Multinodular goiter    • S/P thyroid biopsy     11/2008, 01/2013, 04/2015, and 07/15/2019 at [facility], right lobe nodules - all cytologies were benign   • Sleep apnea     can't tolerate the cpap mask   • Subclinical hyperthyroidism        Past Surgical History:    Past Surgical History:   Procedure Laterality Date   • ANTERIOR CERVICAL DISCECTOMY W/ FUSION        • BACK SURGERY      lumbar   •  SECTION  19940    x 2    • CHOLECYSTECTOMY     • COLONOSCOPY     • ENDOSCOPY     • FEMORAL ARTERY CUTDOWN Bilateral 2020    Procedure: FEMORAL ARTERY CUTDOWN WITH REMOVAL OF FEMORAL FEMORAL BYPASS GRAFT BILATERAL;  Surgeon: Deandre Oseguera MD;  Location:  [facility];  Service: Vascular;  Laterality: Bilateral;   • FEMORAL ENDARTERECTOMY Bilateral 1/15/2020    Procedure: FEMORAL ENDARTERECTOMY BILATERAL;  Surgeon: Deandre Oseguera MD;  Location:  [facility];  Service: Vascular   • FEMORAL FEMORAL BYPASS Right 1/15/2020    Procedure: FEMORAL FEMORAL BYPASS;  Surgeon: Deandre Oseguera MD;  Location:  [facility];  Service: Vascular   • HYSTERECTOMY     • KNEE SURGERY Left    • LEG EXCISION LESION/CYST Left 2020    Procedure: GROIN MUSCLE FLAP BILATERAL;  Surgeon: Modesto Whittaker MD;  Location:  [facility];  Service: Plastics;  Laterality: Left;   • WRIST SURGERY Left        Patient Active Problem List   Diagnosis   • PVD (peripheral vascular disease) (CMS/HCC)   • Lumbar radiculopathy   • Paresthesia   • Causalgia of lower limb   • Hiatal hernia   • Current smoker   • Bilateral carotid artery stenosis   • COPD (chronic obstructive pulmonary disease) (CMS/HCC)   • Coronary artery disease   • Hypertension   • Hyperlipidemia   • Diabetes mellitus (CMS/HCC)   • Sepsis (CMS/HCC)   • Pleural effusion - bilateral mod/large pleural effusions on CT chest 2020.    • Acute pulmonary edema (CMS/HCC)   • Acute hypoxemic respiratory failure (CMS/HCC)   • Infected prosthetic vascular graft, initial encounter (CMS/[facility])   • Status post Lt and Rt Sartorious muscle flap      Social History     Tobacco Use   • Smoking status: Current Every Day Smoker     Packs/day: 1.00     Years: 35.00     Pack years: 35.00     Types: Cigarettes   • Smokeless tobacco: Never Used   Substance Use Topics   • Alcohol use: Not Currently   • Drug use: No     Family History   Problem Relation Age of  Onset   • Diabetes Mother    • Hypertension Mother    • Arthritis Mother    • Hyperlipidemia Mother    • Migraines Mother    • Thyroid disease Mother    • Hypertension Father    • Lung cancer Father    • Cancer Father    • Stroke Other    • Migraines Maternal Aunt    • Thyroid disease Maternal Aunt    • Heart attack Maternal Aunt    • Osteoporosis Maternal Aunt    • Cancer Maternal Uncle    • Heart attack Maternal Uncle    • Stroke Maternal Uncle    • Hyperlipidemia Maternal Grandmother    • Cancer Maternal Grandmother    • Obesity Maternal Grandmother    • Thyroid disease Daughter    • Obesity Daughter        Medications:      Current Outpatient Medications:   •  acetaminophen (TYLENOL) 325 MG tablet, Take 2 tablets by mouth Every 4 (Four) Hours As Needed for Mild Pain ., Disp: , Rfl:   •  albuterol (PROVENTIL) (2.5 MG/3ML) 0.083% nebulizer solution, Take  by nebulization Every 6 (Six) Hours As Needed., Disp: , Rfl:   •  ALBUTEROL SULFATE HFA IN, Inhale 1 puff Every 6 (Six) Hours As Needed (as needed for wheezing and sob)., Disp: , Rfl:   •  amitriptyline (ELAVIL) 10 MG tablet, Take 10 mg by mouth Every Night., Disp: , Rfl:   •  aspirin 81 MG tablet, Take 1 tablet by mouth daily., Disp: , Rfl:   •  bacitracin 500 UNIT/GM ointment, Apply  topically to the appropriate area as directed Every 12 (Twelve) Hours. Apply to the left groin incision as instructed, Disp: 1 each, Rfl: 0  •  benzonatate (TESSALON) 100 MG capsule, Take 100 mg by mouth 3 (Three) Times a Day As Needed., Disp: , Rfl:   •  clopidogrel (PLAVIX) 75 MG tablet, TAKE ONE TABLET BY MOUTH DAILY, Disp: 30 tablet, Rfl: 5  •  cyanocobalamin 1000 MCG/ML injection, 1,000 mcg Every 28 (Twenty-Eight) Days., Disp: , Rfl:   •  fluticasone (FLONASE) 50 MCG/ACT nasal spray, 2 sprays into the nostril(s) as directed by provider Daily. Use as directed, Disp: , Rfl:   •  fluticasone-salmeterol (ADVAIR) 250-50 MCG/DOSE DISKUS, Inhale 1 puff 2 (Two) Times a Day., Disp: ,  "Rfl:   •  HYDROcodone-acetaminophen (LORTAB)  MG per tablet, Take 7.5 tablets by mouth Every 8 (Eight) Hours As Needed., Disp: , Rfl:   •  hydrOXYzine (ATARAX) 25 MG tablet, Take 25 mg by mouth Every Night., Disp: , Rfl:   •  insulin detemir (LEVEMIR) 100 UNIT/ML injection, Inject 12 Units under the skin into the appropriate area as directed Every 12 (Twelve) Hours. Formulary substitution allowed, Disp: 10 mL, Rfl: 12  •  Insulin Glargine (BASAGLAR KWIKPEN) 100 UNIT/ML injection pen, Inject 12 Units under the skin into the appropriate area as directed 2 (Two) Times a Day., Disp: 15 mL, Rfl: 2  •  Insulin Lispro (ADMELOG SOLOSTAR) 100 UNIT/ML injection pen, Inject 3 Units under the skin into the appropriate area as directed 3 (Three) Times a Day With Meals., Disp: 15 mL, Rfl: 1  •  Insulin Syringe-Needle U-100 30G X 5/16\" 0.5 ML misc, Uses X 4/day, Disp: 200 each, Rfl: 5  •  isosorbide mononitrate (IMDUR) 30 MG 24 hr tablet, Take 30 mg by mouth Daily., Disp: , Rfl:   •  ketotifen (ZADITOR) 0.025 % ophthalmic solution, Administer 1 drop to both eyes 2 (Two) Times a Day., Disp: , Rfl:   •  lactulose (CHRONULAC) 10 GM/15ML solution, Take 10 g by mouth Daily., Disp: , Rfl:   •  Lancets (ONETOUCH DELICA PLUS [id number]) misc, , Disp: , Rfl:   •  levocetirizine (XYZAL) 5 MG tablet, Take 5 mg by mouth Every Evening., Disp: , Rfl:   •  lovastatin (MEVACOR) 40 MG tablet, Take 40 mg by mouth Every Night., Disp: , Rfl:   •  metFORMIN (GLUCOPHAGE) 500 MG tablet, Take 1 tablet by mouth 2 (Two) Times a Day With Meals., Disp: , Rfl:   •  metoprolol tartrate (LOPRESSOR) 50 MG tablet, Take 1 tablet by mouth 2 (Two) Times a Day. (Patient taking differently: Take 50 mg by mouth Daily.), Disp: 60 tablet, Rfl: 0  •  montelukast (SINGULAIR) 10 MG tablet, Take 10 mg by mouth Every Night., Disp: , Rfl:   •  omega-3 acid ethyl esters (LOVAZA) 1 g capsule, 2 g 2 (Two) Times a Day., Disp: , Rfl:   •  ONE TOUCH ULTRA TEST test strip, , " "Disp: , Rfl:   •  pantoprazole (PROTONIX) 40 MG EC tablet, Take 40 mg by mouth Daily., Disp: , Rfl:   •  pregabalin (LYRICA) 150 MG capsule, Take 150 mg by mouth 3 (Three) Times a Day., Disp: , Rfl:   •  raNITIdine (ZANTAC) 150 MG tablet, Take 200 mg by mouth 2 (Two) Times a Day., Disp: , Rfl:   •  rOPINIRole (REQUIP) 4 MG tablet, Take 4 mg by mouth Every Night., Disp: , Rfl:   •  SPIRIVA RESPIMAT 2.5 MCG/ACT aerosol solution inhaler, Inhale 2 puffs Daily., Disp: , Rfl:   •  tiZANidine (ZANAFLEX) 4 MG tablet, Take 4 mg by mouth Every 8 (Eight) Hours As Needed., Disp: , Rfl:   •  vitamin D (ERGOCALCIFEROL) 1.25 MG (19873 UT) capsule capsule, 50,000 Units 1 (One) Time Per Week. wednesdays, Disp: , Rfl:     Allergies:  Allergies   Allergen Reactions   • Levaquin [Levofloxacin] Nausea And Vomiting   • Penicillins Nausea And Vomiting     Has tolerated cefepime 2/2020   • Codeine Nausea Only   • Flagyl [Metronidazole] Nausea And Vomiting       Physical Exam:  Vital Signs:    Vitals:    07/10/20 0844   BP: 135/77   BP Location: Right arm   Patient Position: Sitting   Pulse: 107   Temp: 97.3 °F (36.3 °C)   SpO2: 99%   Weight: 51.3 kg (113 lb)   Height: 160 cm (63\")       Physical Exam   Gen- NAD, pleasant, cooperative  CV- Regular rate and rhythm, no murmur, gallop or rub  Pulm- Clear to auscultation bilateral without wheeze or rhonchi  GI- Soft, normoactive bowel sounds, non-tender  Ext- Without edema  Pulses-  Right-DP and PT pulses positive per Doppler   Incision-AT and PT pulses positive per Doppler  Neuro- CN II- XII grossly intact, tongue midline, voice normal.    Labs/Imaging:  PVR arterial, [facility], 7/10/2020  Impression:  Right MARIO:  0.70  Left MARIO:  0.47    Assessment / Plan:  Ms. Saskia Groves is a pleasant 49 y.o. female with a history of  hypertension, hyperlipidemia, GERD, diabetes mellitus, coronary artery disease, COPD, carotid artery stenosis and peripheral vascular disease status post " bilateral femoral endarterectomy with femoral-femoral bypass 1/15/2020 per Dr. Oseguera with subsequent return to the OR for bronchoscopy, right chest tube placement, and removal of femoral-femoral bypass bilateral common femoral artery pericardial patch 2/28/20 per Dr. Oseguera secondary to infection with same day right and left sartorius muscle flaps to cover bilateral groins per Dr. Whittaker who returns the office today for follow-up exam.  Since the removal of her femoral-femoral bypass graft, the patient has had an increase in claudication symptoms.  She has had difficulty walking since the time of her last surgery.  She reports approximately 3 days ago she was pulling weeds and has not been able to walk since.  She describes the pain as a pulling sensation in her right upper inner thigh.  She went to see her primary care physician who referred her back to us as they stated they were unable to find palpable pulses in the office.  The patient's pulses were dopplered and she does have strong Doppler signals in her right DP and PT pulses in her left AT and PT pulses.  PVR arterial performed in the office today revealing right MARIO of 0.70 and left MARIO of 0.47.  The patient's feet are warm and viable.  I discussed this case with Dr. Tadeo and with the patient that I do not feel her immediate problem she is experiencing originates from her peripheral vascular disease.  I have encouraged her to follow-up with her primary care physician.  She states that she does see a back doctor for spine injections.  I have encouraged her to keep that appointment.  I have discussed with her the need for smoking cessation as she continues to smoke approximately 1 pack/day.  I have encouraged the patient to walk as much as possible to help improve the blood flow in her legs.  She is currently on aspirin and Plavix.  She has an appointment to follow-up with Dr. Oseguera in [place] next month.    Please note, this document was produced  using voice recognition software.    LUIS Wall  [facility] Cardiothoracic Surgery

## 2020-10-14 ENCOUNTER — OFFICE VISIT (OUTPATIENT)
Dept: CARDIAC SURGERY | Facility: CLINIC | Age: 50
End: 2020-10-14

## 2020-10-14 VITALS
HEIGHT: 63 IN | TEMPERATURE: 97.6 F | WEIGHT: 124 LBS | SYSTOLIC BLOOD PRESSURE: 147 MMHG | OXYGEN SATURATION: 99 % | DIASTOLIC BLOOD PRESSURE: 80 MMHG | BODY MASS INDEX: 21.97 KG/M2 | HEART RATE: 101 BPM

## 2020-10-14 DIAGNOSIS — I73.9 PERIPHERAL VASCULAR DISEASE (HCC): Primary | ICD-10-CM

## 2020-10-14 PROCEDURE — 99212 OFFICE O/P EST SF 10 MIN: CPT | Performed by: THORACIC SURGERY (CARDIOTHORACIC VASCULAR SURGERY)

## 2020-10-14 RX ORDER — ESTRADIOL 0.5 MG/1
TABLET ORAL
COMMUNITY
Start: 2020-08-15

## 2020-10-14 RX ORDER — AMLODIPINE BESYLATE 2.5 MG/1
TABLET ORAL
COMMUNITY
Start: 2020-07-16

## 2020-10-14 RX ORDER — DOXYCYCLINE 100 MG/1
CAPSULE ORAL
COMMUNITY
Start: 2020-07-27

## 2020-10-14 RX ORDER — ASPIRIN 81 MG/1
TABLET ORAL
COMMUNITY
Start: 2020-07-22 | End: 2021-04-13 | Stop reason: SDUPTHER

## 2020-10-14 RX ORDER — METRONIDAZOLE 500 MG/1
TABLET ORAL
COMMUNITY
Start: 2020-07-16 | End: 2021-04-13

## 2020-10-14 RX ORDER — METHENAMINE HIPPURATE 1000 MG/1
TABLET ORAL
COMMUNITY
Start: 2020-10-12

## 2020-10-14 RX ORDER — CONJUGATED ESTROGENS 0.62 MG/G
CREAM VAGINAL
COMMUNITY
Start: 2020-10-12

## 2020-10-14 RX ORDER — BISMUTH SUBSALICYLATE 525 MG/15ML
SUSPENSION ORAL
COMMUNITY
Start: 2020-10-01 | End: 2021-10-04

## 2020-10-14 RX ORDER — CEFDINIR 300 MG/1
CAPSULE ORAL
COMMUNITY
Start: 2020-08-10 | End: 2021-04-13

## 2020-10-14 NOTE — PROGRESS NOTES
10/14/2020  Patient Information  Saskia Groves                                                                                          [address] [facility] [address]   1970  'PCP/Referring Physician'  Linh Guerin PA  [phone]  No ref. provider found    Chief Complaint   Patient presents with   • Follow-up     3 month f/u for PVD       History of Present Illness:  The patient returns today for follow up of peripheral vascular disease. Since her last visit she has continued to have some pain in her legs. Basically she is able to get around with a wheelchair and a walker. She has had no fever or chills.      Patient Active Problem List   Diagnosis   • Peripheral vascular disease (CMS/HCC)   • Lumbar radiculopathy   • Paresthesia   • Causalgia of lower limb   • Hiatal hernia   • Current smoker   • Bilateral carotid artery stenosis   • COPD (chronic obstructive pulmonary disease) (CMS/HCC)   • Coronary artery disease   • Hypertension   • Hyperlipidemia   • Diabetes mellitus (CMS/HCC)   • Sepsis (CMS/HCC)   • Pleural effusion - bilateral mod/large pleural effusions on CT chest 2/27/2020.    • Acute pulmonary edema (CMS/HCC)   • Acute hypoxemic respiratory failure (CMS/HCC)   • Infected prosthetic vascular graft, initial encounter (CMS/HCC)   • Status post Lt and Rt Sartorious muscle flap      Past Medical History:   Diagnosis Date   • Anxiety    • Arthritis    • Asthma    • Carotid artery stenosis    • Chronic bronchitis (CMS/HCC)    • COPD (chronic obstructive pulmonary disease) (CMS/HCC)    • Coronary artery disease     2 vessels with 50% blockage.  Sees Dr. Donaldson   • Depression    • Diabetes mellitus (CMS/HCC)    • Dyslipidemia    • GERD (gastroesophageal reflux disease)    • Hiatal hernia    • Hyperlipidemia    • Hypertension    • Infectious viral hepatitis    • Lower back pain    • Migraine    • Multinodular goiter    • S/P thyroid biopsy     11/2008, 01/2013, 04/2015, and 07/15/2019 at  [facility], right lobe nodules - all cytologies were benign   • Sleep apnea     can't tolerate the cpap mask   • Subclinical hyperthyroidism    • UTI (urinary tract infection)      Past Surgical History:   Procedure Laterality Date   • ANTERIOR CERVICAL DISCECTOMY W/ FUSION     • BACK SURGERY      lumbar   •  SECTION  19940    x 2    • CHOLECYSTECTOMY     • COLONOSCOPY     • ENDOSCOPY     • FEMORAL ARTERY CUTDOWN Bilateral 2020    Procedure: FEMORAL ARTERY CUTDOWN WITH REMOVAL OF FEMORAL FEMORAL BYPASS GRAFT BILATERAL;  Surgeon: Deandre Oseguera MD;  Location:  [facility];  Service: Vascular;  Laterality: Bilateral;   • FEMORAL ENDARTERECTOMY Bilateral 1/15/2020    Procedure: FEMORAL ENDARTERECTOMY BILATERAL;  Surgeon: Deandre Oseguera MD;  Location:  [facility];  Service: Vascular   • FEMORAL FEMORAL BYPASS Right 1/15/2020    Procedure: FEMORAL FEMORAL BYPASS;  Surgeon: Deandre Oseguera MD;  Location:  [facility];  Service: Vascular   • HYSTERECTOMY     • KNEE SURGERY Left    • LEG EXCISION LESION/CYST Left 2020    Procedure: GROIN MUSCLE FLAP BILATERAL;  Surgeon: Modesto Whittaker MD;  Location:  [facility];  Service: Plastics;  Laterality: Left;   • WRIST SURGERY Left        Current Outpatient Medications:   •  acetaminophen (TYLENOL) 325 MG tablet, Take 2 tablets by mouth Every 4 (Four) Hours As Needed for Mild Pain ., Disp: , Rfl:   •  albuterol (PROVENTIL) (2.5 MG/3ML) 0.083% nebulizer solution, Take  by nebulization Every 6 (Six) Hours As Needed., Disp: , Rfl:   •  ALBUTEROL SULFATE HFA IN, Inhale 1 puff Every 6 (Six) Hours As Needed (as needed for wheezing and sob)., Disp: , Rfl:   •  amitriptyline (ELAVIL) 10 MG tablet, Take 10 mg by mouth Every Night., Disp: , Rfl:   •  amLODIPine (NORVASC) 2.5 MG tablet, , Disp: , Rfl:   •  Anti-Diarrheal 2 MG tablet, , Disp: , Rfl:   •  aspirin 81 MG tablet, Take 1 tablet by mouth daily., Disp: , Rfl:   •  Aspirin Adult Low Strength 81 MG EC tablet, , Disp: ,  "Rfl:   •  benzonatate (TESSALON) 100 MG capsule, Take 100 mg by mouth 3 (Three) Times a Day As Needed., Disp: , Rfl:   •  cefdinir (OMNICEF) 300 MG capsule, , Disp: , Rfl:   •  clopidogrel (PLAVIX) 75 MG tablet, TAKE ONE TABLET BY MOUTH DAILY, Disp: 30 tablet, Rfl: 5  •  cyanocobalamin 1000 MCG/ML injection, 1,000 mcg Every 28 (Twenty-Eight) Days., Disp: , Rfl:   •  doxycycline (MONODOX) 100 MG capsule, , Disp: , Rfl:   •  estradiol (ESTRACE) 0.5 MG tablet, , Disp: , Rfl:   •  fluticasone (FLONASE) 50 MCG/ACT nasal spray, 2 sprays into the nostril(s) as directed by provider Daily. Use as directed, Disp: , Rfl:   •  fluticasone-salmeterol (ADVAIR) 250-50 MCG/DOSE DISKUS, Inhale 1 puff 2 (Two) Times a Day., Disp: , Rfl:   •  HYDROcodone-acetaminophen (LORTAB)  MG per tablet, Take 7.5 tablets by mouth Every 8 (Eight) Hours As Needed., Disp: , Rfl:   •  hydrOXYzine (ATARAX) 25 MG tablet, Take 25 mg by mouth Every Night., Disp: , Rfl:   •  insulin detemir (LEVEMIR) 100 UNIT/ML injection, Inject 12 Units under the skin into the appropriate area as directed Every 12 (Twelve) Hours. Formulary substitution allowed, Disp: 10 mL, Rfl: 12  •  Insulin Glargine (BASAGLAR KWIKPEN) 100 UNIT/ML injection pen, Inject 12 Units under the skin into the appropriate area as directed 2 (Two) Times a Day., Disp: 15 mL, Rfl: 2  •  Insulin Lispro (ADMELOG SOLOSTAR) 100 UNIT/ML injection pen, Inject 3 Units under the skin into the appropriate area as directed 3 (Three) Times a Day With Meals., Disp: 15 mL, Rfl: 1  •  Insulin Syringe-Needle U-100 30G X 5/16\" 0.5 ML misc, Uses X 4/day, Disp: 200 each, Rfl: 5  •  isosorbide mononitrate (IMDUR) 30 MG 24 hr tablet, Take 30 mg by mouth Daily., Disp: , Rfl:   •  ketotifen (ZADITOR) 0.025 % ophthalmic solution, Administer 1 drop to both eyes 2 (Two) Times a Day., Disp: , Rfl:   •  lactulose (CHRONULAC) 10 GM/15ML solution, Take 10 g by mouth Daily., Disp: , Rfl:   •  Lancets (ONETOUCH DELICA " PLUS [id number]) misc, , Disp: , Rfl:   •  levocetirizine (XYZAL) 5 MG tablet, Take 5 mg by mouth Every Evening., Disp: , Rfl:   •  lovastatin (MEVACOR) 40 MG tablet, Take 40 mg by mouth Every Night., Disp: , Rfl:   •  methenamine (HIPREX) 1 g tablet, , Disp: , Rfl:   •  metoprolol tartrate (LOPRESSOR) 50 MG tablet, Take 1 tablet by mouth 2 (Two) Times a Day. (Patient taking differently: Take 50 mg by mouth Daily.), Disp: 60 tablet, Rfl: 0  •  metroNIDAZOLE (FLAGYL) 500 MG tablet, , Disp: , Rfl:   •  montelukast (SINGULAIR) 10 MG tablet, Take 10 mg by mouth Every Night., Disp: , Rfl:   •  omega-3 acid ethyl esters (LOVAZA) 1 g capsule, 2 g 2 (Two) Times a Day., Disp: , Rfl:   •  ONE TOUCH ULTRA TEST test strip, , Disp: , Rfl:   •  pantoprazole (PROTONIX) 40 MG EC tablet, Take 40 mg by mouth Daily., Disp: , Rfl:   •  pregabalin (LYRICA) 150 MG capsule, Take 150 mg by mouth 3 (Three) Times a Day., Disp: , Rfl:   •  Premarin 0.625 MG/GM vaginal cream, , Disp: , Rfl:   •  raNITIdine (ZANTAC) 150 MG tablet, Take 200 mg by mouth 2 (Two) Times a Day., Disp: , Rfl:   •  rOPINIRole (REQUIP) 4 MG tablet, Take 4 mg by mouth Every Night., Disp: , Rfl:   •  SPIRIVA RESPIMAT 2.5 MCG/ACT aerosol solution inhaler, Inhale 2 puffs Daily., Disp: , Rfl:   •  tiZANidine (ZANAFLEX) 4 MG tablet, Take 4 mg by mouth Every 8 (Eight) Hours As Needed., Disp: , Rfl:   •  vitamin D (ERGOCALCIFEROL) 1.25 MG (63595 UT) capsule capsule, 50,000 Units 1 (One) Time Per Week. wednesdays, Disp: , Rfl:   •  bacitracin 500 UNIT/GM ointment, Apply  topically to the appropriate area as directed Every 12 (Twelve) Hours. Apply to the left groin incision as instructed, Disp: 1 each, Rfl: 0  •  metFORMIN (GLUCOPHAGE) 500 MG tablet, Take 1 tablet by mouth 2 (Two) Times a Day With Meals., Disp: , Rfl:   Allergies   Allergen Reactions   • Levaquin [Levofloxacin] Nausea And Vomiting   • Penicillins Nausea And Vomiting     Has tolerated cefepime 2/2020   • Codeine  Nausea Only   • Flagyl [Metronidazole] Nausea And Vomiting     Social History     Socioeconomic History   • Marital status:      Spouse name: Not on file   • Number of children: 2   • Years of education: Not on file   • Highest education level: Not on file   Occupational History   • 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     Employer: DISABLED     Comment: Back and Leg Pain   Tobacco Use   • Smoking status: Current Every Day Smoker     Packs/day: 1.00     Years: 35.00     Pack years: 35.00     Types: Cigarettes   • Smokeless tobacco: Never Used   Substance and Sexual Activity   • Alcohol use: Not Currently   • Drug use: No   • Sexual activity: Yes     Partners: Male     Comment:    Social History Narrative    Lives in [facility]     Family History   Problem Relation Age of Onset   • Diabetes Mother    • Hypertension Mother    • Arthritis Mother    • Hyperlipidemia Mother    • Migraines Mother    • Thyroid disease Mother    • Hypertension Father    • Lung cancer Father    • Cancer Father    • Stroke Other    • Migraines Maternal Aunt    • Thyroid disease Maternal Aunt    • Heart attack Maternal Aunt    • Osteoporosis Maternal Aunt    • Cancer Maternal Uncle    • Heart attack Maternal Uncle    • Stroke Maternal Uncle    • Hyperlipidemia Maternal Grandmother    • Cancer Maternal Grandmother    • Obesity Maternal Grandmother    • Thyroid disease Daughter    • Obesity Daughter      Review of Systems   Constitution: Negative for chills, fever, malaise/fatigue, night sweats and weight loss.   HENT: Positive for hearing loss. Negative for congestion, nosebleeds and odynophagia.    Cardiovascular: Positive for claudication, dyspnea on exertion and leg swelling. Negative for chest pain, orthopnea, palpitations and syncope.   Respiratory: Positive for cough. Negative for hemoptysis, shortness of breath and wheezing.    Endocrine: Negative for cold intolerance, heat intolerance, polydipsia, polyphagia and polyuria.  "  Hematologic/Lymphatic: Does not bruise/bleed easily.   Skin: Negative for itching, poor wound healing and rash.   Musculoskeletal: Positive for back pain and muscle cramps. Negative for arthritis, joint pain, joint swelling and myalgias.   Gastrointestinal: Positive for abdominal pain and diarrhea. Negative for constipation, hematemesis, melena, nausea and vomiting.   Genitourinary: Positive for frequency. Negative for dysuria, hematuria, nocturia and urgency.        Pt now has a UTI   Neurological: Positive for headaches. Negative for dizziness, light-headedness, loss of balance and numbness.   Psychiatric/Behavioral: Positive for depression. Negative for suicidal ideas. The patient is nervous/anxious.    Allergic/Immunologic: Positive for environmental allergies. Negative for HIV exposure.     Vitals:    10/14/20 1339   BP: 147/80   Pulse: 101   Temp: 97.6 °F (36.4 °C)   SpO2: 99%   Weight: 56.2 kg (124 lb)   Height: 160 cm (63\")      Physical Exam   CONSTITUTIONAL:  Oriented x3, well-nourished, well developed, in no acute distress.  EYES:    Eyes anicteric.  EOMI.  PERRLA.   ENT:                Good dentition.  NECK:    Supple without masses or thyromegaly.  LUNGS:           Decreased in the bases; no wheezing, rales or rhonchi.  CARDIOVASCULAR:  Regular rate and rhythm without murmur, rub or gallop.  There are no carotid bruits.  GI:     Abdomen is soft, nontender, nondistended with normal bowel sounds.  No hepatosplenomegaly and no masses.  EXTREMITIES:   No cyanosis, clubbing or edema. She has decrease pulses in her feet.  SKIN:   No ulcerations, petechiae or bruising.  MUSCULOSKELETAL:  Full range of motion with no deformity of extremities.  NEURO:  Cranial nerves II-XII, motor and sensory exams are intact.  PSYCH:  Alert and oriented; mood and affect good.    The past medical history, surgical history, family history, social history, review of systems and vitals were reviewed by myself and corrected as " needed.      Assessment/Plan:  Ms. Groves returns today for follow up of peripheral vascular disease. Her groin incisions are well healed, but she does have decreased pulses in her feet but no ulcerations noted at this time. At this point I will continue conservative measures. I spent 3 to 5 minutes talking with her about the importance of smoking cessation and the consequences thereof. She appears to completely understand and she is trying to cut back with smoking only a half/pack per day.     Patient Active Problem List   Diagnosis   • Peripheral vascular disease (CMS/HCC)   • Lumbar radiculopathy   • Paresthesia   • Causalgia of lower limb   • Hiatal hernia   • Current smoker   • Bilateral carotid artery stenosis   • COPD (chronic obstructive pulmonary disease) (CMS/HCC)   • Coronary artery disease   • Hypertension   • Hyperlipidemia   • Diabetes mellitus (CMS/HCC)   • Sepsis (CMS/HCC)   • Pleural effusion - bilateral mod/large pleural effusions on CT chest 2/27/2020.    • Acute pulmonary edema (CMS/HCC)   • Acute hypoxemic respiratory failure (CMS/HCC)   • Infected prosthetic vascular graft, initial encounter (CMS/HCC)   • Status post Lt and Rt Sartorious muscle flap        CC: PEDRO Wood, transcribing for Deandre Oseguera MD    I, Deandre Oseguera MD, have read and agree with the editing done by Bernice Dorsey, .

## 2021-01-18 ENCOUNTER — HOSPITAL ENCOUNTER (EMERGENCY)
Facility: HOSPITAL | Age: 51
Discharge: HOME | End: 2021-01-18
Attending: EMERGENCY MEDICINE | Admitting: EMERGENCY MEDICINE

## 2021-01-18 VITALS
DIASTOLIC BLOOD PRESSURE: 94 MMHG | HEIGHT: 63 IN | RESPIRATION RATE: 16 BRPM | BODY MASS INDEX: 20.02 KG/M2 | OXYGEN SATURATION: 99 % | TEMPERATURE: 96.9 F | HEART RATE: 111 BPM | WEIGHT: 113 LBS | SYSTOLIC BLOOD PRESSURE: 159 MMHG

## 2021-01-18 DIAGNOSIS — E11.9 DIABETES MELLITUS TYPE 2 IN NONOBESE (HCC): ICD-10-CM

## 2021-01-18 DIAGNOSIS — I73.9 PERIPHERAL VASCULAR DISEASE (HCC): ICD-10-CM

## 2021-01-18 DIAGNOSIS — Z72.0 TOBACCO USE: ICD-10-CM

## 2021-01-18 DIAGNOSIS — Z87.440 HISTORY OF RECURRENT URINARY TRACT INFECTION: Primary | ICD-10-CM

## 2021-01-18 PROCEDURE — 99282 EMERGENCY DEPT VISIT SF MDM: CPT

## 2021-01-18 NOTE — DISCHARGE INSTRUCTIONS
Call your urologist and infectious disease in the morning to see if appropriate arrangements can be made for PICC line placement.  If not able to make this happen, return to the ER for full evaluation to determine if admission is indicated.

## 2021-01-18 NOTE — ED PROVIDER NOTES
Subjective   Saskia Groves is a 50 year old female who presents to the ED in desire for a PICC line to treat chronic urinary tract infection. The patient is under the care of Dr. Yonatan James, urology, in [place], and she reports that Dr. James suggested she have a PICC line administered in [place]. However, upon arrival at [place], the doctor was not present, so the patient is now here today to have a picc line administered.  The patient reports some upset stomach and fullness with foul-smelling urine but otherwise has no other symptoms.  This is been an issue ongoing for quite some time.      History provided by:  Patient      Review of Systems   Respiratory: Negative.    Cardiovascular: Negative.    Gastrointestinal:        Abdominal discomfort.   Genitourinary:        Recurrent urinary tract infections with foul-smelling urine.   Neurological: Positive for weakness.   Psychiatric/Behavioral: Negative.        Past Medical History:   Diagnosis Date   • Anxiety    • Arthritis    • Asthma    • Carotid artery stenosis    • Chronic bronchitis (CMS/HCC)    • COPD (chronic obstructive pulmonary disease) (CMS/HCC)    • Coronary artery disease     2 vessels with 50% blockage.  Sees Dr. Donaldson   • Depression    • Diabetes mellitus (CMS/HCC)    • Dyslipidemia    • GERD (gastroesophageal reflux disease)    • Hiatal hernia    • Hyperlipidemia    • Hypertension    • Infectious viral hepatitis    • Lower back pain    • Migraine    • Multinodular goiter    • S/P thyroid biopsy     11/2008, 01/2013, 04/2015, and 07/15/2019 at [facility], right lobe nodules - all cytologies were benign   • Sleep apnea     can't tolerate the cpap mask   • Subclinical hyperthyroidism    • UTI (urinary tract infection)        Allergies   Allergen Reactions   • Levaquin [Levofloxacin] Nausea And Vomiting   • Penicillins Nausea And Vomiting     Has tolerated cefepime 2/2020   • Codeine Nausea Only   • Flagyl [Metronidazole] Nausea And Vomiting        Past Surgical History:   Procedure Laterality Date   • ANTERIOR CERVICAL DISCECTOMY W/ FUSION     • BACK SURGERY      lumbar   •  SECTION  19940    x 2    • CHOLECYSTECTOMY     • COLONOSCOPY     • ENDOSCOPY     • FEMORAL ARTERY CUTDOWN Bilateral 2020    Procedure: FEMORAL ARTERY CUTDOWN WITH REMOVAL OF FEMORAL FEMORAL BYPASS GRAFT BILATERAL;  Surgeon: Deandre Oseguera MD;  Location:  [facility];  Service: Vascular;  Laterality: Bilateral;   • FEMORAL ENDARTERECTOMY Bilateral 1/15/2020    Procedure: FEMORAL ENDARTERECTOMY BILATERAL;  Surgeon: Deandre Oseguera MD;  Location:  [facility];  Service: Vascular   • FEMORAL FEMORAL BYPASS Right 1/15/2020    Procedure: FEMORAL FEMORAL BYPASS;  Surgeon: Deandre Oseguera MD;  Location:  [facility];  Service: Vascular   • HYSTERECTOMY     • KNEE SURGERY Left    • LEG EXCISION LESION/CYST Left 2020    Procedure: GROIN MUSCLE FLAP BILATERAL;  Surgeon: Modesto Whittaker MD;  Location:  [facility];  Service: Plastics;  Laterality: Left;   • WRIST SURGERY Left        Family History   Problem Relation Age of Onset   • Diabetes Mother    • Hypertension Mother    • Arthritis Mother    • Hyperlipidemia Mother    • Migraines Mother    • Thyroid disease Mother    • Hypertension Father    • Lung cancer Father    • Cancer Father    • Stroke Other    • Migraines Maternal Aunt    • Thyroid disease Maternal Aunt    • Heart attack Maternal Aunt    • Osteoporosis Maternal Aunt    • Cancer Maternal Uncle    • Heart attack Maternal Uncle    • Stroke Maternal Uncle    • Hyperlipidemia Maternal Grandmother    • Cancer Maternal Grandmother    • Obesity Maternal Grandmother    • Thyroid disease Daughter    • Obesity Daughter        Social History     Socioeconomic History   • Marital status:      Spouse name: Not on file   • Number of children: 2   • Years of education: Not on file   • Highest education level: Not on file   Occupational History   • Occupation: Phone Cable  Work     Employer: DISABLED     Comment: Back and Leg Pain   Tobacco Use   • Smoking status: Current Every Day Smoker     Packs/day: 1.00     Years: 35.00     Pack years: 35.00     Types: Cigarettes   • Smokeless tobacco: Never Used   Substance and Sexual Activity   • Alcohol use: Not Currently   • Drug use: No   • Sexual activity: Yes     Partners: Male     Comment:    Social History Narrative    Lives in [place], [place] alone         Objective   Physical Exam  Vitals signs and nursing note reviewed.   Constitutional:       General: She is not in acute distress.     Appearance: Normal appearance. She is not ill-appearing or toxic-appearing.   Cardiovascular:      Rate and Rhythm: Regular rhythm. Tachycardia present.      Pulses: Normal pulses.   Pulmonary:      Effort: Pulmonary effort is normal.      Breath sounds: Normal breath sounds.   Abdominal:      Palpations: Abdomen is soft.      Tenderness: There is no abdominal tenderness. There is no guarding.   Skin:     General: Skin is warm and dry.   Neurological:      General: No focal deficit present.      Mental Status: She is alert and oriented to person, place, and time. Mental status is at baseline.   Psychiatric:         Mood and Affect: Mood normal.         Behavior: Behavior normal.         Procedures         ED Course  ED Course as of Jan 18 1646   Mon Jan 18, 2021   9417 Talked to Ruba Melendez, her primary care provider who is unaware of the need and recommended the urologist.      [RS]   1554 I talked with Dr. James's office with urology and they had referred the patient to Dr. Hollis with infectious disease.  They advised they did not order the PICC line but infectious disease may have.    [RS]   1553 I talked with the infectious disease physician, Dr. Hollis, he has not seen the patient since August 2020 and is unaware of any order.  Thus at this time I am finding no indication that she is actually been sent or recommended for a PICC line.  I  will discharge the patient home to follow-up with her infectious disease specialist for further planning.    [RS]   1317 I talked with the patient and she is not willing to have a full evaluation and consideration of admission at this time.  I advised that with the pathway for us to get infectious disease consultation as well as PICC line placement here.  Patient states she has an appointment in the morning with an eye doctor and cannot miss that.  We talked about the potential complications of leaving.  I did advise that if she is not able to make the appropriate arrangements with her urologist and with infectious disease then she can return here to the emergency department for evaluation and consideration for admission as indicated.  Patient voiced understanding and is agreeable with this plan.  The patient's daughter was on the phone as well and heard the conversation and agrees.    [RS]      ED Course User Index  [RS] Clive Rogers MD                                            MDM  Number of Diagnoses or Management Options  Diabetes mellitus type 2 in nonobese (CMS/[facility]):   History of recurrent urinary tract infection:   Peripheral vascular disease (CMS/[facility]):   Tobacco use:   Diagnosis management comments: I did talk with the patient's providers and attempted to arrange for the PICC line placement.  Unfortunately, is unable to confirm an order for placement.  I did offer the patient full evaluation and suggested this evaluation with consideration of admission secondary to the recurrent urinary tract infections.  I advised that with this plan we could have her see our infectious disease specialist and consideration for PICC line as indicated.  However, the patient states she has injections in her eyes tomorrow and cannot miss that appointment and does not want to be admitted or evaluated at this time.  She states she will call her doctor tomorrow.  I did advise and she is agreeable to return tomorrow if  unable to make appropriate arrangements and is still having symptoms.  At that point, we will consider further evaluation and admission if indicated.       Amount and/or Complexity of Data Reviewed  Review and summarize past medical records: yes  Discuss the patient with other providers: yes        Final diagnoses:   History of recurrent urinary tract infection   Diabetes mellitus type 2 in nonobese (CMS/[facility])   Tobacco use   Peripheral vascular disease (CMS/[facility])       Documentation assistance provided by scribe Lauren K Collett.  Information recorded by the scribe was done at my direction and has been verified and validated by me.     Collett, Lauren K  01/18/21 [id number]       Clive Rogers MD  01/18/21 [id number]

## 2021-02-09 RX ORDER — INSULIN LISPRO 100 U/ML
3 INJECTION, SOLUTION SUBCUTANEOUS
Qty: 15 ML | Refills: 3 | Status: SHIPPED | OUTPATIENT
Start: 2021-02-09

## 2021-02-09 RX ORDER — INSULIN GLARGINE 100 [IU]/ML
12 INJECTION, SOLUTION SUBCUTANEOUS 2 TIMES DAILY
Qty: 15 ML | Refills: 3 | Status: SHIPPED | OUTPATIENT
Start: 2021-02-09

## 2021-02-09 NOTE — TELEPHONE ENCOUNTER
Can you call pt to see if she needs refills from me. I never saw her for diabetes. Only saw her for her MNG

## 2021-03-23 ENCOUNTER — OFFICE VISIT (OUTPATIENT)
Dept: ENDOCRINOLOGY | Facility: CLINIC | Age: 51
End: 2021-03-23

## 2021-03-23 VITALS
SYSTOLIC BLOOD PRESSURE: 120 MMHG | WEIGHT: 125 LBS | DIASTOLIC BLOOD PRESSURE: 70 MMHG | HEIGHT: 63 IN | HEART RATE: 108 BPM | BODY MASS INDEX: 22.15 KG/M2 | OXYGEN SATURATION: 99 %

## 2021-03-23 DIAGNOSIS — IMO0002 DIABETES MELLITUS TYPE 2, UNCONTROLLED, WITH COMPLICATIONS: Primary | ICD-10-CM

## 2021-03-23 LAB
EXPIRATION DATE: ABNORMAL
GLUCOSE BLDC GLUCOMTR-MCNC: 286 MG/DL (ref 70–130)
HBA1C MFR BLD: 13.4 %
Lab: ABNORMAL

## 2021-03-23 PROCEDURE — 99214 OFFICE O/P EST MOD 30 MIN: CPT | Performed by: INTERNAL MEDICINE

## 2021-03-23 PROCEDURE — 83036 HEMOGLOBIN GLYCOSYLATED A1C: CPT | Performed by: INTERNAL MEDICINE

## 2021-03-23 PROCEDURE — 82947 ASSAY GLUCOSE BLOOD QUANT: CPT | Performed by: INTERNAL MEDICINE

## 2021-04-13 RX ORDER — FLURBIPROFEN SODIUM 0.3 MG/ML
SOLUTION/ DROPS OPHTHALMIC
COMMUNITY
Start: 2021-02-08

## 2021-04-13 RX ORDER — PREGABALIN 200 MG/1
200 CAPSULE ORAL 3 TIMES DAILY
COMMUNITY
Start: 2021-02-22

## 2021-04-13 RX ORDER — METOPROLOL SUCCINATE 200 MG/1
TABLET, EXTENDED RELEASE ORAL
COMMUNITY
Start: 2021-01-11

## 2021-04-13 RX ORDER — PRAMIPEXOLE DIHYDROCHLORIDE 1 MG/1
TABLET ORAL
COMMUNITY
Start: 2021-01-18

## 2021-05-12 ENCOUNTER — OFFICE VISIT (OUTPATIENT)
Dept: CARDIAC SURGERY | Facility: CLINIC | Age: 51
End: 2021-05-12

## 2021-05-12 VITALS
BODY MASS INDEX: 21.44 KG/M2 | HEART RATE: 112 BPM | OXYGEN SATURATION: 99 % | DIASTOLIC BLOOD PRESSURE: 70 MMHG | HEIGHT: 63 IN | SYSTOLIC BLOOD PRESSURE: 110 MMHG | WEIGHT: 121 LBS | TEMPERATURE: 98.2 F

## 2021-05-12 DIAGNOSIS — I73.9 PERIPHERAL VASCULAR DISEASE (HCC): Primary | ICD-10-CM

## 2021-05-12 PROCEDURE — 99212 OFFICE O/P EST SF 10 MIN: CPT | Performed by: THORACIC SURGERY (CARDIOTHORACIC VASCULAR SURGERY)

## 2021-05-12 NOTE — PROGRESS NOTES
05/12/2021  Patient Information  Saskia Groves                                                                                          [address] [facility] [address]   1970  'PCP/Referring Physician'  NoraGilliane, APRN  [phone]  No ref. provider found    Chief Complaint   Patient presents with   • Follow-up     6 MO FU for PVD       History of Present Illness:   The patient is a 50-year-old female who returns today for follow-up for peripheral vascular disease.  She continues to have claudication in her legs with walking short distances.  She continues to smoke again despite all my recommendations and discussions with her.  She has no new problems.      Patient Active Problem List   Diagnosis   • Peripheral vascular disease (CMS/HCC)   • Lumbar radiculopathy   • Paresthesia   • Causalgia of lower limb   • Hiatal hernia   • Current smoker   • Bilateral carotid artery stenosis   • COPD (chronic obstructive pulmonary disease) (CMS/HCC)   • Coronary artery disease   • Hypertension   • Hyperlipidemia   • Diabetes mellitus (CMS/HCC)   • Sepsis (CMS/HCC)   • Pleural effusion - bilateral mod/large pleural effusions on CT chest 2/27/2020.    • Acute pulmonary edema (CMS/HCC)   • Acute hypoxemic respiratory failure (CMS/HCC)   • Infected prosthetic vascular graft, initial encounter (CMS/HCC)   • Status post Lt and Rt Sartorious muscle flap      Past Medical History:   Diagnosis Date   • Anxiety    • Arthritis    • Asthma    • Carotid artery stenosis    • Chronic bronchitis (CMS/HCC)    • COPD (chronic obstructive pulmonary disease) (CMS/HCC)    • Coronary artery disease     2 vessels with 50% blockage.  Sees Dr. Donaldson   • Depression    • Diabetes mellitus (CMS/HCC)    • Dyslipidemia    • GERD (gastroesophageal reflux disease)    • Hiatal hernia    • Hyperlipidemia    • Hypertension    • Infectious viral hepatitis    • Lower back pain    • Migraine    • Multinodular goiter    • S/P thyroid biopsy      2008, 2013, 2015, and 07/15/2019 at [facility], right lobe nodules - all cytologies were benign   • Sleep apnea     can't tolerate the cpap mask   • Subclinical hyperthyroidism    • UTI (urinary tract infection)      Past Surgical History:   Procedure Laterality Date   • ANTERIOR CERVICAL DISCECTOMY W/ FUSION     • BACK SURGERY      lumbar   •  SECTION  19940    x 2    • CHOLECYSTECTOMY     • COLONOSCOPY     • ENDOSCOPY     • FEMORAL ARTERY CUTDOWN Bilateral 2020    Procedure: FEMORAL ARTERY CUTDOWN WITH REMOVAL OF FEMORAL FEMORAL BYPASS GRAFT BILATERAL;  Surgeon: Deandre Oseguera MD;  Location:  [facility];  Service: Vascular;  Laterality: Bilateral;   • FEMORAL ENDARTERECTOMY Bilateral 1/15/2020    Procedure: FEMORAL ENDARTERECTOMY BILATERAL;  Surgeon: Deandre Oseguera MD;  Location:  [facility];  Service: Vascular   • FEMORAL FEMORAL BYPASS Right 1/15/2020    Procedure: FEMORAL FEMORAL BYPASS;  Surgeon: Deandre Oseguera MD;  Location:  [facility];  Service: Vascular   • HYSTERECTOMY     • KNEE SURGERY Left    • LEG EXCISION LESION/CYST Left 2020    Procedure: GROIN MUSCLE FLAP BILATERAL;  Surgeon: Modesto Whittaker MD;  Location:  [facility];  Service: Plastics;  Laterality: Left;   • WRIST SURGERY Left        Current Outpatient Medications:   •  acetaminophen (TYLENOL) 325 MG tablet, Take 2 tablets by mouth Every 4 (Four) Hours As Needed for Mild Pain ., Disp: , Rfl:   •  albuterol (PROVENTIL) (2.5 MG/3ML) 0.083% nebulizer solution, Take  by nebulization Every 6 (Six) Hours As Needed., Disp: , Rfl:   •  ALBUTEROL SULFATE HFA IN, Inhale 1 puff Every 6 (Six) Hours As Needed (as needed for wheezing and sob)., Disp: , Rfl:   •  amitriptyline (ELAVIL) 10 MG tablet, Take 10 mg by mouth Every Night., Disp: , Rfl:   •  amLODIPine (NORVASC) 2.5 MG tablet, , Disp: , Rfl:   •  Anti-Diarrheal 2 MG tablet, , Disp: , Rfl:   •  aspirin 81 MG tablet, Take 1 tablet by mouth daily., Disp: , Rfl:   •  B-D UF  "III MINI PEN NEEDLES 31G X 5 MM misc, , Disp: , Rfl:   •  bacitracin 500 UNIT/GM ointment, Apply  topically to the appropriate area as directed Every 12 (Twelve) Hours. Apply to the left groin incision as instructed, Disp: 1 each, Rfl: 0  •  benzonatate (TESSALON) 100 MG capsule, Take 100 mg by mouth 3 (Three) Times a Day As Needed., Disp: , Rfl:   •  clopidogrel (PLAVIX) 75 MG tablet, TAKE ONE TABLET BY MOUTH DAILY, Disp: 30 tablet, Rfl: 5  •  cyanocobalamin 1000 MCG/ML injection, 1,000 mcg Every 28 (Twenty-Eight) Days., Disp: , Rfl:   •  doxycycline (MONODOX) 100 MG capsule, , Disp: , Rfl:   •  estradiol (ESTRACE) 0.5 MG tablet, , Disp: , Rfl:   •  fluticasone (FLONASE) 50 MCG/ACT nasal spray, 2 sprays into the nostril(s) as directed by provider Daily. Use as directed, Disp: , Rfl:   •  fluticasone-salmeterol (ADVAIR) 250-50 MCG/DOSE DISKUS, Inhale 1 puff 2 (Two) Times a Day., Disp: , Rfl:   •  HYDROcodone-acetaminophen (LORTAB)  MG per tablet, Take 7.5 tablets by mouth Every 8 (Eight) Hours As Needed. 7.5 mg, Disp: , Rfl:   •  hydrOXYzine (ATARAX) 25 MG tablet, Take 25 mg by mouth Every Night., Disp: , Rfl:   •  Insulin Glargine (BASAGLAR KWIKPEN) 100 UNIT/ML injection pen, Inject 12 Units under the skin into the appropriate area as directed 2 (Two) Times a Day., Disp: 15 mL, Rfl: 3  •  Insulin Lispro (ADMELOG SOLOSTAR) 100 UNIT/ML injection pen, Inject 3 Units under the skin into the appropriate area as directed 3 (Three) Times a Day With Meals. (Patient taking differently: Inject 2 Units under the skin into the appropriate area as directed 3 (Three) Times a Day With Meals.), Disp: 15 mL, Rfl: 3  •  Insulin Syringe-Needle U-100 30G X 5/16\" 0.5 ML misc, Uses X 4/day, Disp: 200 each, Rfl: 5  •  isosorbide mononitrate (IMDUR) 30 MG 24 hr tablet, Take 30 mg by mouth Daily., Disp: , Rfl:   •  ketotifen (ZADITOR) 0.025 % ophthalmic solution, Administer 1 drop to both eyes 2 (Two) Times a Day., Disp: , Rfl:   •  " lactulose (CHRONULAC) 10 GM/15ML solution, Take 10 g by mouth Daily., Disp: , Rfl:   •  Lancets (ONETOUCH DELICA PLUS [id number]) misc, , Disp: , Rfl:   •  levocetirizine (XYZAL) 5 MG tablet, Take 5 mg by mouth Every Evening., Disp: , Rfl:   •  lovastatin (MEVACOR) 40 MG tablet, Take 40 mg by mouth Every Night., Disp: , Rfl:   •  methenamine (HIPREX) 1 g tablet, , Disp: , Rfl:   •  metoprolol succinate XL (TOPROL-XL) 200 MG 24 hr tablet, , Disp: , Rfl:   •  montelukast (SINGULAIR) 10 MG tablet, Take 10 mg by mouth Every Night., Disp: , Rfl:   •  omega-3 acid ethyl esters (LOVAZA) 1 g capsule, 2 g 2 (Two) Times a Day., Disp: , Rfl:   •  ONE TOUCH ULTRA TEST test strip, , Disp: , Rfl:   •  pantoprazole (PROTONIX) 40 MG EC tablet, Take 40 mg by mouth Daily., Disp: , Rfl:   •  pramipexole (MIRAPEX) 1 MG tablet, , Disp: , Rfl:   •  pregabalin (LYRICA) 200 MG capsule, Take 200 mg by mouth 3 (Three) Times a Day. 150 mg, Disp: , Rfl:   •  Premarin 0.625 MG/GM vaginal cream, , Disp: , Rfl:   •  rOPINIRole (REQUIP) 4 MG tablet, Take 4 mg by mouth Every Night., Disp: , Rfl:   •  SPIRIVA RESPIMAT 2.5 MCG/ACT aerosol solution inhaler, Inhale 2 puffs Daily., Disp: , Rfl:   •  tiZANidine (ZANAFLEX) 4 MG tablet, Take 4 mg by mouth Every 8 (Eight) Hours As Needed., Disp: , Rfl:   •  vitamin D (ERGOCALCIFEROL) 1.25 MG (68854 UT) capsule capsule, 50,000 Units 1 (One) Time Per Week. wednesdays, Disp: , Rfl:   Allergies   Allergen Reactions   • Levaquin [Levofloxacin] Nausea And Vomiting   • Penicillins Nausea And Vomiting     Has tolerated cefepime 2/2020   • Codeine Nausea Only   • Flagyl [Metronidazole] Nausea And Vomiting     Social History     Socioeconomic History   • Marital status:      Spouse name: Not on file   • Number of children: 2   • Years of education: Not on file   • Highest education level: Not on file   Tobacco Use   • Smoking status: Current Every Day Smoker     Packs/day: 1.00     Years: 35.00     Pack years:  35.00     Types: Cigarettes   • Smokeless tobacco: Never Used   Substance and Sexual Activity   • Alcohol use: Not Currently   • Drug use: No   • Sexual activity: Yes     Partners: Male     Comment:      Family History   Problem Relation Age of Onset   • Diabetes Mother    • Hypertension Mother    • Arthritis Mother    • Hyperlipidemia Mother    • Migraines Mother    • Thyroid disease Mother    • Hypertension Father    • Lung cancer Father    • Cancer Father    • Stroke Other    • Migraines Maternal Aunt    • Thyroid disease Maternal Aunt    • Heart attack Maternal Aunt    • Osteoporosis Maternal Aunt    • Cancer Maternal Uncle    • Heart attack Maternal Uncle    • Stroke Maternal Uncle    • Hyperlipidemia Maternal Grandmother    • Cancer Maternal Grandmother    • Obesity Maternal Grandmother    • Thyroid disease Daughter    • Obesity Daughter      Review of Systems   Constitutional: Positive for malaise/fatigue. Negative for chills, fever, night sweats and weight loss.   HENT: Negative for hearing loss, odynophagia and sore throat.    Cardiovascular: Positive for claudication (bilateral) and dyspnea on exertion. Negative for chest pain, leg swelling, orthopnea and palpitations.   Respiratory: Positive for cough, shortness of breath and wheezing. Negative for hemoptysis.    Endocrine: Negative for cold intolerance, heat intolerance, polydipsia, polyphagia and polyuria.   Skin: Negative for itching and rash.   Musculoskeletal: Positive for arthritis, back pain and joint pain. Negative for joint swelling and myalgias.   Gastrointestinal: Positive for abdominal pain and diarrhea. Negative for constipation, hematemesis, hematochezia, melena, nausea and vomiting.   Genitourinary: Positive for hematuria. Negative for dysuria and frequency.   Neurological: Negative for focal weakness, headaches, numbness and seizures.   Psychiatric/Behavioral: Negative for suicidal ideas.   All other systems reviewed and are  "negative.    Vitals:    05/12/21 0918   BP: 110/70   BP Location: Right arm   Patient Position: Sitting   Pulse: 112   Temp: 98.2 °F (36.8 °C)   SpO2: 99%   Weight: 54.9 kg (121 lb)   Height: 160 cm (63\")      Physical Exam  Vitals and nursing note reviewed.   Cardiovascular:      Rate and Rhythm: Normal rate and regular rhythm.      Pulses:           Dorsalis pedis pulses are 0 on the right side and 0 on the left side.        Posterior tibial pulses are 0 on the right side and 0 on the left side.      Heart sounds: Normal heart sounds.   Pulmonary:      Effort: Pulmonary effort is normal.      Breath sounds: Normal breath sounds.   Abdominal:      General: Abdomen is flat. Bowel sounds are normal.      Palpations: Abdomen is soft.   Musculoskeletal:      Cervical back: Normal range of motion and neck supple.         The ROS, past medical history, surgical history, family history, social history and vitals were reviewed by myself and corrected as needed.      Assessment/Plan:   The patient is 50-year-old  female who has peripheral vascular disease.  Her wounds have all healed up.  She continues to smoke despite all my admonition.  She does not appear motivated to stop.  She is aware of the risks and the disease process associated with this.  I have spent 3 to 5 minutes talking with her about this.  I will see her back in approximately 6 months with a PV arterial to assess her legs again.    Patient Active Problem List   Diagnosis   • Peripheral vascular disease (CMS/HCC)   • Lumbar radiculopathy   • Paresthesia   • Causalgia of lower limb   • Hiatal hernia   • Current smoker   • Bilateral carotid artery stenosis   • COPD (chronic obstructive pulmonary disease) (CMS/HCC)   • Coronary artery disease   • Hypertension   • Hyperlipidemia   • Diabetes mellitus (CMS/HCC)   • Sepsis (CMS/HCC)   • Pleural effusion - bilateral mod/large pleural effusions on CT chest 2/27/2020.    • Acute pulmonary edema (CMS/HCC)   • " Acute hypoxemic respiratory failure (CMS/HCC)   • Infected prosthetic vascular graft, initial encounter (CMS/HCC)   • Status post Lt and Rt Sartorious muscle flap        CC: LUIS Rice editing for Deandre Oseguera MD      I, Deandre Oseguera MD, have read and agree with the editing done by Bernice Dorsey, .

## 2021-05-25 ENCOUNTER — TELEPHONE (OUTPATIENT)
Dept: CARDIAC SURGERY | Facility: CLINIC | Age: 51
End: 2021-05-25

## 2021-05-25 NOTE — TELEPHONE ENCOUNTER
Pts daughter called the office and stated the patient had fallen and now has a knot on her groin area from falling. She also stated her leg is swelling and has not been able to walk around with weight on her leg due to pain.     Spoke with her daughter and made an appt for her to come to our [facility] clinic on 5/26/21 @ 912 with FERNANDO.

## 2021-05-26 ENCOUNTER — OFFICE VISIT (OUTPATIENT)
Dept: CARDIAC SURGERY | Facility: CLINIC | Age: 51
End: 2021-05-26

## 2021-05-26 VITALS
HEART RATE: 98 BPM | SYSTOLIC BLOOD PRESSURE: 101 MMHG | HEIGHT: 63 IN | BODY MASS INDEX: 20.91 KG/M2 | OXYGEN SATURATION: 99 % | TEMPERATURE: 97.6 F | DIASTOLIC BLOOD PRESSURE: 72 MMHG | WEIGHT: 118 LBS

## 2021-05-26 DIAGNOSIS — M79.604 RIGHT LEG PAIN: ICD-10-CM

## 2021-05-26 DIAGNOSIS — I73.9 PERIPHERAL VASCULAR DISEASE (HCC): Primary | ICD-10-CM

## 2021-05-26 PROCEDURE — 99214 OFFICE O/P EST MOD 30 MIN: CPT | Performed by: THORACIC SURGERY (CARDIOTHORACIC VASCULAR SURGERY)

## 2021-05-26 NOTE — PROGRESS NOTES
05/26/2021  Patient Information  Saskia Groves                                                                                          [address] [facility] [address]   1970  'PCP/Referring Physician'  Ruba Melendez, APRN  [phone]  No ref. provider found    Chief Complaint   Patient presents with   • Follow-up     pt fell the day before yeestrday , now has a knot in the RT groin area and and RT leg pain with swelling. Pt stated she can not walk due to the pain she as with that leg.        History of Present Illness:  The patient is a 50-year-old female who is here for evaluation for right leg pain with swelling.  She fell a couple of days ago.  She has been having pain in her right leg.  She was seen at the [facility] Emergency Department and they x-rayed her neck, chest and leg and found no fractures.  She continues to have some pain and has not been able to really stand on that right leg very well.      Patient Active Problem List   Diagnosis   • Peripheral vascular disease (CMS/HCC)   • Lumbar radiculopathy   • Paresthesia   • Causalgia of lower limb   • Hiatal hernia   • Current smoker   • Bilateral carotid artery stenosis   • COPD (chronic obstructive pulmonary disease) (CMS/HCC)   • Coronary artery disease   • Hypertension   • Hyperlipidemia   • Diabetes mellitus (CMS/HCC)   • Sepsis (CMS/HCC)   • Pleural effusion - bilateral mod/large pleural effusions on CT chest 2/27/2020.    • Acute pulmonary edema (CMS/HCC)   • Acute hypoxemic respiratory failure (CMS/HCC)   • Infected prosthetic vascular graft, initial encounter (CMS/HCC)   • Status post Lt and Rt Sartorious muscle flap    • Right leg pain     Past Medical History:   Diagnosis Date   • Anxiety    • Arthritis    • Asthma    • Carotid artery stenosis    • Chronic bronchitis (CMS/HCC)    • COPD (chronic obstructive pulmonary disease) (CMS/HCC)    • Coronary artery disease     2 vessels with 50% blockage.  Sees Dr. Donaldson   • Depression    •  Diabetes mellitus (CMS/HCC)    • Dyslipidemia    • GERD (gastroesophageal reflux disease)    • Hiatal hernia    • Hyperlipidemia    • Hypertension    • Infectious viral hepatitis    • Lower back pain    • Migraine    • Multinodular goiter    • S/P thyroid biopsy     2008, 2013, 2015, and 07/15/2019 at [facility], right lobe nodules - all cytologies were benign   • Sleep apnea     can't tolerate the cpap mask   • Subclinical hyperthyroidism    • UTI (urinary tract infection)      Past Surgical History:   Procedure Laterality Date   • ANTERIOR CERVICAL DISCECTOMY W/ FUSION     • BACK SURGERY      lumbar   •  SECTION  19940    x 2    • CHOLECYSTECTOMY     • COLONOSCOPY     • ENDOSCOPY     • FEMORAL ARTERY CUTDOWN Bilateral 2020    Procedure: FEMORAL ARTERY CUTDOWN WITH REMOVAL OF FEMORAL FEMORAL BYPASS GRAFT BILATERAL;  Surgeon: Deandre Oseguera MD;  Location:  [facility];  Service: Vascular;  Laterality: Bilateral;   • FEMORAL ENDARTERECTOMY Bilateral 1/15/2020    Procedure: FEMORAL ENDARTERECTOMY BILATERAL;  Surgeon: Deandre Oseguera MD;  Location:  [facility];  Service: Vascular   • FEMORAL FEMORAL BYPASS Right 1/15/2020    Procedure: FEMORAL FEMORAL BYPASS;  Surgeon: Deandre Oseguera MD;  Location:  [facility];  Service: Vascular   • HYSTERECTOMY     • KNEE SURGERY Left    • LEG EXCISION LESION/CYST Left 2020    Procedure: GROIN MUSCLE FLAP BILATERAL;  Surgeon: Modesto Whittaker MD;  Location:  [facility];  Service: Plastics;  Laterality: Left;   • WRIST SURGERY Left        Current Outpatient Medications:   •  acetaminophen (TYLENOL) 325 MG tablet, Take 2 tablets by mouth Every 4 (Four) Hours As Needed for Mild Pain ., Disp: , Rfl:   •  albuterol (PROVENTIL) (2.5 MG/3ML) 0.083% nebulizer solution, Take  by nebulization Every 6 (Six) Hours As Needed., Disp: , Rfl:   •  ALBUTEROL SULFATE HFA IN, Inhale 1 puff Every 6 (Six) Hours As Needed (as needed for wheezing and sob)., Disp: , Rfl:   •   "amitriptyline (ELAVIL) 10 MG tablet, Take 10 mg by mouth Every Night., Disp: , Rfl:   •  amLODIPine (NORVASC) 2.5 MG tablet, , Disp: , Rfl:   •  Anti-Diarrheal 2 MG tablet, , Disp: , Rfl:   •  aspirin 81 MG tablet, Take 1 tablet by mouth daily., Disp: , Rfl:   •  B-D UF III MINI PEN NEEDLES 31G X 5 MM misc, , Disp: , Rfl:   •  bacitracin 500 UNIT/GM ointment, Apply  topically to the appropriate area as directed Every 12 (Twelve) Hours. Apply to the left groin incision as instructed, Disp: 1 each, Rfl: 0  •  benzonatate (TESSALON) 100 MG capsule, Take 100 mg by mouth 3 (Three) Times a Day As Needed., Disp: , Rfl:   •  clopidogrel (PLAVIX) 75 MG tablet, TAKE ONE TABLET BY MOUTH DAILY, Disp: 30 tablet, Rfl: 5  •  cyanocobalamin 1000 MCG/ML injection, 1,000 mcg Every 28 (Twenty-Eight) Days., Disp: , Rfl:   •  doxycycline (MONODOX) 100 MG capsule, , Disp: , Rfl:   •  estradiol (ESTRACE) 0.5 MG tablet, , Disp: , Rfl:   •  fluticasone (FLONASE) 50 MCG/ACT nasal spray, 2 sprays into the nostril(s) as directed by provider Daily. Use as directed, Disp: , Rfl:   •  fluticasone-salmeterol (ADVAIR) 250-50 MCG/DOSE DISKUS, Inhale 1 puff 2 (Two) Times a Day., Disp: , Rfl:   •  HYDROcodone-acetaminophen (LORTAB)  MG per tablet, Take 7.5 tablets by mouth Every 8 (Eight) Hours As Needed. 7.5 mg, Disp: , Rfl:   •  hydrOXYzine (ATARAX) 25 MG tablet, Take 25 mg by mouth Every Night., Disp: , Rfl:   •  Insulin Glargine (BASAGLAR KWIKPEN) 100 UNIT/ML injection pen, Inject 12 Units under the skin into the appropriate area as directed 2 (Two) Times a Day., Disp: 15 mL, Rfl: 3  •  Insulin Lispro (ADMELOG SOLOSTAR) 100 UNIT/ML injection pen, Inject 3 Units under the skin into the appropriate area as directed 3 (Three) Times a Day With Meals. (Patient taking differently: Inject 2 Units under the skin into the appropriate area as directed 3 (Three) Times a Day With Meals.), Disp: 15 mL, Rfl: 3  •  Insulin Syringe-Needle U-100 30G X 5/16\" " 0.5 ML misc, Uses X 4/day, Disp: 200 each, Rfl: 5  •  isosorbide mononitrate (IMDUR) 30 MG 24 hr tablet, Take 30 mg by mouth Daily., Disp: , Rfl:   •  ketotifen (ZADITOR) 0.025 % ophthalmic solution, Administer 1 drop to both eyes 2 (Two) Times a Day., Disp: , Rfl:   •  lactulose (CHRONULAC) 10 GM/15ML solution, Take 10 g by mouth Daily., Disp: , Rfl:   •  Lancets (ONETOUCH DELICA PLUS NLROSJ97I) misc, , Disp: , Rfl:   •  levocetirizine (XYZAL) 5 MG tablet, Take 5 mg by mouth Every Evening., Disp: , Rfl:   •  lovastatin (MEVACOR) 40 MG tablet, Take 40 mg by mouth Every Night., Disp: , Rfl:   •  methenamine (HIPREX) 1 g tablet, , Disp: , Rfl:   •  metoprolol succinate XL (TOPROL-XL) 200 MG 24 hr tablet, , Disp: , Rfl:   •  montelukast (SINGULAIR) 10 MG tablet, Take 10 mg by mouth Every Night., Disp: , Rfl:   •  omega-3 acid ethyl esters (LOVAZA) 1 g capsule, 2 g 2 (Two) Times a Day., Disp: , Rfl:   •  ONE TOUCH ULTRA TEST test strip, , Disp: , Rfl:   •  pantoprazole (PROTONIX) 40 MG EC tablet, Take 40 mg by mouth Daily., Disp: , Rfl:   •  pramipexole (MIRAPEX) 1 MG tablet, , Disp: , Rfl:   •  pregabalin (LYRICA) 200 MG capsule, Take 200 mg by mouth 3 (Three) Times a Day. 150 mg, Disp: , Rfl:   •  Premarin 0.625 MG/GM vaginal cream, , Disp: , Rfl:   •  rOPINIRole (REQUIP) 4 MG tablet, Take 4 mg by mouth Every Night., Disp: , Rfl:   •  SPIRIVA RESPIMAT 2.5 MCG/ACT aerosol solution inhaler, Inhale 2 puffs Daily., Disp: , Rfl:   •  tiZANidine (ZANAFLEX) 4 MG tablet, Take 4 mg by mouth Every 8 (Eight) Hours As Needed., Disp: , Rfl:   •  vitamin D (ERGOCALCIFEROL) 1.25 MG (73929 UT) capsule capsule, 50,000 Units 1 (One) Time Per Week. wednesdays, Disp: , Rfl:   Allergies   Allergen Reactions   • Levaquin [Levofloxacin] Nausea And Vomiting   • Penicillins Nausea And Vomiting     Has tolerated cefepime 2/2020   • Codeine Nausea Only   • Flagyl [Metronidazole] Nausea And Vomiting     Social History     Socioeconomic History    • Marital status:      Spouse name: Not on file   • Number of children: 2   • Years of education: Not on file   • Highest education level: Not on file   Tobacco Use   • Smoking status: Current Every Day Smoker     Packs/day: 1.00     Years: 35.00     Pack years: 35.00     Types: Cigarettes   • Smokeless tobacco: Never Used   Substance and Sexual Activity   • Alcohol use: Not Currently   • Drug use: No   • Sexual activity: Yes     Partners: Male     Comment:      Family History   Problem Relation Age of Onset   • Diabetes Mother    • Hypertension Mother    • Arthritis Mother    • Hyperlipidemia Mother    • Migraines Mother    • Thyroid disease Mother    • Hypertension Father    • Lung cancer Father    • Cancer Father    • Stroke Other    • Migraines Maternal Aunt    • Thyroid disease Maternal Aunt    • Heart attack Maternal Aunt    • Osteoporosis Maternal Aunt    • Cancer Maternal Uncle    • Heart attack Maternal Uncle    • Stroke Maternal Uncle    • Hyperlipidemia Maternal Grandmother    • Cancer Maternal Grandmother    • Obesity Maternal Grandmother    • Thyroid disease Daughter    • Obesity Daughter      Review of Systems   Constitutional: Negative for chills, fever, malaise/fatigue, night sweats and weight loss.   HENT: Negative for hearing loss, odynophagia and sore throat.    Cardiovascular: Positive for leg swelling ( along with leg pain in RT leg ). Negative for chest pain, dyspnea on exertion, orthopnea and palpitations.   Respiratory: Negative for cough and shortness of breath.    Hematologic/Lymphatic: Does not bruise/bleed easily.   Skin: Negative for itching and rash.   Musculoskeletal: Negative for arthritis, joint pain, joint swelling and myalgias.   Gastrointestinal: Negative for abdominal pain, constipation, diarrhea, hematemesis, hematochezia, nausea and vomiting.   Genitourinary: Negative for dysuria, frequency and hematuria.   Neurological: Positive for focal weakness ( with RT  "foot ) and loss of balance. Negative for headaches, numbness and seizures.   Psychiatric/Behavioral: Negative for suicidal ideas.     Vitals:    05/26/21 0811   BP: 101/72   BP Location: Left arm   Pulse: 98   Temp: 97.6 °F (36.4 °C)   SpO2: 99%   Weight: 53.5 kg (118 lb)   Height: 160 cm (63\")      Physical Exam  Vitals and nursing note reviewed.   Constitutional:       General: She is not in acute distress.     Appearance: She is well-developed.   HENT:      Head: Normocephalic.   Eyes:      Conjunctiva/sclera: Conjunctivae normal.      Pupils: Pupils are equal, round, and reactive to light.   Neck:      Thyroid: No thyroid mass or thyromegaly.   Cardiovascular:      Rate and Rhythm: Normal rate.      Pulses:           Dorsalis pedis pulses are 0 on the right side and 0 on the left side.        Posterior tibial pulses are 0 on the right side and 0 on the left side.      Heart sounds: No murmur heard.   No friction rub. No gallop.    Pulmonary:      Breath sounds: No wheezing, rhonchi or rales.   Abdominal:      General: Bowel sounds are normal. There is no distension.      Palpations: Abdomen is soft. There is no mass.      Tenderness: There is no abdominal tenderness.   Musculoskeletal:         General: Tenderness present. No deformity or signs of injury. Normal range of motion.      Cervical back: Normal range of motion.   Skin:     Findings: No petechiae.      Nails: There is no clubbing.   Neurological:      Mental Status: She is oriented to person, place, and time.      Cranial Nerves: No cranial nerve deficit.      Sensory: No sensory deficit.   Psychiatric:         Behavior: Behavior normal.         The ROS, past medical history, surgical history, family history, social history and vitals were reviewed by myself and corrected as needed.      Assessment/Plan:   The patient is a 50-year-old female who presents at this time with right thigh pain and groin pain after a fall 2 days ago.  I have examined her in " detail.  I see no masses in her groin or evidence of hematoma or other things that could be related to blood vessel trauma.  She was x-rayed at [facility] and had no fractures.  This appears to be consistent with, perhaps, a muscle tear or pull.  I have recommended for her to put heat or cold compresses on it and let it rest.  If she continues to have pain, then we might consider an orthopedic doctor.    Patient Active Problem List   Diagnosis   • Peripheral vascular disease (CMS/HCC)   • Lumbar radiculopathy   • Paresthesia   • Causalgia of lower limb   • Hiatal hernia   • Current smoker   • Bilateral carotid artery stenosis   • COPD (chronic obstructive pulmonary disease) (CMS/HCC)   • Coronary artery disease   • Hypertension   • Hyperlipidemia   • Diabetes mellitus (CMS/HCC)   • Sepsis (CMS/HCC)   • Pleural effusion - bilateral mod/large pleural effusions on CT chest 2/27/2020.    • Acute pulmonary edema (CMS/HCC)   • Acute hypoxemic respiratory failure (CMS/HCC)   • Infected prosthetic vascular graft, initial encounter (CMS/[facility])   • Status post Lt and Rt Sartorious muscle flap    • Right leg pain       CC: LUIS Rice editing for Deandre Oseguera MD    I, Deandre Oseguera MD, have read and agree with the editing done by Bernice Dorsey, .

## 2021-09-21 ENCOUNTER — TELEPHONE (OUTPATIENT)
Dept: CARDIAC SURGERY | Facility: CLINIC | Age: 51
End: 2021-09-21

## 2021-09-21 DIAGNOSIS — I73.9 PERIPHERAL VASCULAR DISEASE (HCC): Primary | ICD-10-CM

## 2021-09-21 NOTE — TELEPHONE ENCOUNTER
Patient's daughter, Virginia, called in regards to recent PVR study that patient had done. She stated that the podiatrist, Dr. Rodríguez, told them that the PVR had critical results and that the patient needed to come in to see Dr. Oumar APARICIO. Patient is having numbness and heavy feeling in her legs, but no pain/swelling/discoloration. I let her know that I would have to call and get the results and have Dr. Oseguera or one of his APRNs review it and call her back and let her know what they wanted to do. She did not seem satisfied with this and stated that she wanted her mother to be seen immediately as she did not want her to lose her leg. Told her that we understood her concerns and that I would have someone look at the results as soon as they come through our fax.     Received results. Lainey Cancino, APRN reviewed results and asked that we get CTA abd aorta with runoff scheduled and then have patient follow up in office.     Called Virginia back and relayed this information. She stated that they do not want to see a APRN when they come in to follow up they wanted to see Dr. Oseguera so that they can discuss why this is happening to the patient. I told her that even if we put her on APRN schedule that if they come in on a Monday, Dr. Oseguera would be able to come in and see the patient with the APRN. She agreed to this and knows we will call her with CTA and follow up appt.     I did advise that if patient's symptoms were to worsen (cold extremity, severe pain, discoloration) to have patient report to local ED or [facility] ED immediately. She verbalized understanding and agreed.

## 2021-10-01 ENCOUNTER — TELEPHONE (OUTPATIENT)
Dept: CARDIAC SURGERY | Facility: CLINIC | Age: 51
End: 2021-10-01

## 2021-10-04 ENCOUNTER — OFFICE VISIT (OUTPATIENT)
Dept: CARDIAC SURGERY | Facility: CLINIC | Age: 51
End: 2021-10-04

## 2021-10-04 VITALS
BODY MASS INDEX: 20.73 KG/M2 | HEIGHT: 63 IN | HEART RATE: 111 BPM | TEMPERATURE: 98.6 F | DIASTOLIC BLOOD PRESSURE: 82 MMHG | OXYGEN SATURATION: 98 % | WEIGHT: 117 LBS | SYSTOLIC BLOOD PRESSURE: 129 MMHG

## 2021-10-04 DIAGNOSIS — T82.7XXS INFECTED PROSTHETIC VASCULAR GRAFT, SEQUELA: ICD-10-CM

## 2021-10-04 DIAGNOSIS — I73.9 PERIPHERAL VASCULAR DISEASE (HCC): Primary | ICD-10-CM

## 2021-10-04 PROCEDURE — 99215 OFFICE O/P EST HI 40 MIN: CPT | Performed by: NURSE PRACTITIONER

## 2021-10-04 RX ORDER — METRONIDAZOLE 500 MG/1
500 TABLET ORAL 2 TIMES DAILY
COMMUNITY

## 2021-10-04 NOTE — PROGRESS NOTES
"     [facility] Cardiothoracic Surgery Office Follow Up Note     Date of Encounter: 10/04/2021     Name: Saskia Groves  : 1970     Referred By: No ref. provider found  PCP: Ruba Melendez APRN    Chief Complaint:    Chief Complaint   Patient presents with   • Follow-up     Follow up  to discuss CTA  abdominal aorta.   • Peripheral Vascular Disease       Subjective      History of Present Illness:    Saskia Groves is a 50 y.o. female current smoker with hx of COPD, HTN, HLD on statin therapy, uncontrolled insulin-dependent DM (HA1C 13.4), CAD, carotid disease, and PVD on DAPT s/p bilateral femoral endarterectomy with femoral-femoral bypass with Morenci-Slade graft 1/15/2020 by Dr. Oseguera.  Patient experienced subsequent infected femorofemoral graft with removal of bypass and bilateral common femoral artery pericardial patch repair on 2020 Dr. Oseguera.  Patient's podiatrist Dr. Rodríguez performed a PVR and told patient and daughter that she had \"critical results\".  They contacted our office distraught about results demanding to see Dr. Oseguera.  Imaging was reviewed by our clinical staff and CTA with runoff was pursued with appointment scheduled to discuss results.  Patient has chronic intermittent claudication symptoms with numbness and heaviness to her BLEs.  She also endorses rest pain and weakness that results from falling.  She does not currently have any BLE wounds/ulcers.     Review of Systems:  Review of Systems   Constitutional: Positive for malaise/fatigue and weight loss. Negative for chills, decreased appetite, diaphoresis, fever, night sweats and weight gain.   HENT: Negative.  Negative for hoarse voice.    Eyes: Positive for blurred vision. Negative for double vision and visual disturbance.   Cardiovascular: Positive for claudication, dyspnea on exertion and leg swelling (ankles). Negative for chest pain, irregular heartbeat, near-syncope, orthopnea, palpitations, paroxysmal nocturnal " dyspnea and syncope.   Respiratory: Positive for cough. Negative for hemoptysis, shortness of breath, sputum production and wheezing.    Hematologic/Lymphatic: Negative.  Negative for adenopathy and bleeding problem. Does not bruise/bleed easily.   Skin: Negative.  Negative for color change, nail changes, poor wound healing and rash.   Musculoskeletal: Positive for back pain and muscle cramps. Negative for falls.   Gastrointestinal: Positive for diarrhea. Negative for abdominal pain, dysphagia and heartburn.   Genitourinary: Negative.  Negative for flank pain.   Neurological: Positive for numbness. Negative for brief paralysis, disturbances in coordination, dizziness, focal weakness, headaches, light-headedness, loss of balance, paresthesias, sensory change, vertigo and weakness.   Psychiatric/Behavioral: Negative.  Negative for depression and suicidal ideas.   Allergic/Immunologic: Negative.  Negative for persistent infections.       I have reviewed the following portions of the patient's history: allergies, current medications, past family history, past medical history, past social history, past surgical history and problem list and confirm it's accurate.    Allergies:  Allergies   Allergen Reactions   • Levaquin [Levofloxacin] Nausea And Vomiting   • Penicillins Nausea And Vomiting     Has tolerated cefepime 2/2020   • Codeine Nausea Only   • Flagyl [Metronidazole] Nausea And Vomiting       Medications:      Current Outpatient Medications:   •  acetaminophen (TYLENOL) 325 MG tablet, Take 2 tablets by mouth Every 4 (Four) Hours As Needed for Mild Pain ., Disp: , Rfl:   •  albuterol (PROVENTIL) (2.5 MG/3ML) 0.083% nebulizer solution, Take  by nebulization Every 6 (Six) Hours As Needed., Disp: , Rfl:   •  ALBUTEROL SULFATE HFA IN, Inhale 1 puff Every 6 (Six) Hours As Needed (as needed for wheezing and sob)., Disp: , Rfl:   •  amitriptyline (ELAVIL) 10 MG tablet, Take 10 mg by mouth Every Night., Disp: , Rfl:   •   "amLODIPine (NORVASC) 2.5 MG tablet, , Disp: , Rfl:   •  aspirin 81 MG tablet, Take 1 tablet by mouth daily., Disp: , Rfl:   •  B-D UF III MINI PEN NEEDLES 31G X 5 MM misc, , Disp: , Rfl:   •  benzonatate (TESSALON) 100 MG capsule, Take 100 mg by mouth 3 (Three) Times a Day As Needed., Disp: , Rfl:   •  clopidogrel (PLAVIX) 75 MG tablet, TAKE ONE TABLET BY MOUTH DAILY, Disp: 30 tablet, Rfl: 5  •  cyanocobalamin 1000 MCG/ML injection, 1,000 mcg Every 28 (Twenty-Eight) Days., Disp: , Rfl:   •  doxycycline (MONODOX) 100 MG capsule, , Disp: , Rfl:   •  estradiol (ESTRACE) 0.5 MG tablet, , Disp: , Rfl:   •  fluticasone (FLONASE) 50 MCG/ACT nasal spray, 2 sprays into the nostril(s) as directed by provider Daily. Use as directed, Disp: , Rfl:   •  fluticasone-salmeterol (ADVAIR) 250-50 MCG/DOSE DISKUS, Inhale 1 puff 2 (Two) Times a Day., Disp: , Rfl:   •  HYDROcodone-acetaminophen (LORTAB)  MG per tablet, Take 7.5 tablets by mouth Every 8 (Eight) Hours As Needed. 7.5 mg, Disp: , Rfl:   •  hydrOXYzine (ATARAX) 25 MG tablet, Take 25 mg by mouth Every Night., Disp: , Rfl:   •  Insulin Glargine (BASAGLAR KWIKPEN) 100 UNIT/ML injection pen, Inject 12 Units under the skin into the appropriate area as directed 2 (Two) Times a Day., Disp: 15 mL, Rfl: 3  •  Insulin Lispro (ADMELOG SOLOSTAR) 100 UNIT/ML injection pen, Inject 3 Units under the skin into the appropriate area as directed 3 (Three) Times a Day With Meals. (Patient taking differently: Inject 2 Units under the skin into the appropriate area as directed 3 (Three) Times a Day With Meals.), Disp: 15 mL, Rfl: 3  •  Insulin Syringe-Needle U-100 30G X 5/16\" 0.5 ML misc, Uses X 4/day, Disp: 200 each, Rfl: 5  •  isosorbide mononitrate (IMDUR) 30 MG 24 hr tablet, Take 30 mg by mouth Daily., Disp: , Rfl:   •  ketotifen (ZADITOR) 0.025 % ophthalmic solution, Administer 1 drop to both eyes 2 (Two) Times a Day., Disp: , Rfl:   •  lactulose (CHRONULAC) 10 GM/15ML solution, Take 10 " g by mouth Daily., Disp: , Rfl:   •  Lancets (ONETOUCH DELICA PLUS [id number]) misc, , Disp: , Rfl:   •  levocetirizine (XYZAL) 5 MG tablet, Take 5 mg by mouth Every Evening., Disp: , Rfl:   •  lovastatin (MEVACOR) 40 MG tablet, Take 40 mg by mouth Every Night., Disp: , Rfl:   •  methenamine (HIPREX) 1 g tablet, , Disp: , Rfl:   •  metoprolol succinate XL (TOPROL-XL) 200 MG 24 hr tablet, , Disp: , Rfl:   •  metroNIDAZOLE (FLAGYL) 500 MG tablet, Take 500 mg by mouth 2 (Two) Times a Day., Disp: , Rfl:   •  montelukast (SINGULAIR) 10 MG tablet, Take 10 mg by mouth Every Night., Disp: , Rfl:   •  omega-3 acid ethyl esters (LOVAZA) 1 g capsule, 2 g 2 (Two) Times a Day., Disp: , Rfl:   •  ONE TOUCH ULTRA TEST test strip, , Disp: , Rfl:   •  pantoprazole (PROTONIX) 40 MG EC tablet, Take 40 mg by mouth Daily., Disp: , Rfl:   •  pramipexole (MIRAPEX) 1 MG tablet, , Disp: , Rfl:   •  pregabalin (LYRICA) 200 MG capsule, Take 200 mg by mouth 3 (Three) Times a Day. 150 mg, Disp: , Rfl:   •  Premarin 0.625 MG/GM vaginal cream, , Disp: , Rfl:   •  rOPINIRole (REQUIP) 4 MG tablet, Take 4 mg by mouth Every Night., Disp: , Rfl:   •  SPIRIVA RESPIMAT 2.5 MCG/ACT aerosol solution inhaler, Inhale 2 puffs Daily., Disp: , Rfl:   •  tiZANidine (ZANAFLEX) 4 MG tablet, Take 4 mg by mouth Every 8 (Eight) Hours As Needed., Disp: , Rfl:   •  vitamin D (ERGOCALCIFEROL) 1.25 MG (39223 UT) capsule capsule, 50,000 Units 1 (One) Time Per Week. wednesdays, Disp: , Rfl:     History:   Past Medical History:   Diagnosis Date   • Anxiety    • Arthritis    • Asthma    • Carotid artery stenosis    • Chronic bronchitis (HCC)    • COPD (chronic obstructive pulmonary disease) (HCC)    • Coronary artery disease     2 vessels with 50% blockage.  Sees Dr. Donaldson   • Depression    • Diabetes mellitus (HCC)    • Dyslipidemia    • GERD (gastroesophageal reflux disease)    • Hiatal hernia    • Hyperlipidemia    • Hypertension    • Infectious viral hepatitis    •  Lower back pain    • Migraine    • Multinodular goiter    • S/P thyroid biopsy     2008, 2013, 2015, and 07/15/2019 at [facility], right lobe nodules - all cytologies were benign   • Sleep apnea     can't tolerate the cpap mask   • Subclinical hyperthyroidism    • UTI (urinary tract infection)        Past Surgical History:   Procedure Laterality Date   • ANTERIOR CERVICAL DISCECTOMY W/ FUSION     • BACK SURGERY      lumbar   •  SECTION  19940    x 2    • CHOLECYSTECTOMY     • COLONOSCOPY     • ENDOSCOPY     • FEMORAL ARTERY CUTDOWN Bilateral 2020    Procedure: FEMORAL ARTERY CUTDOWN WITH REMOVAL OF FEMORAL FEMORAL BYPASS GRAFT BILATERAL;  Surgeon: Deandre Oseguera MD;  Location:  [facility];  Service: Vascular;  Laterality: Bilateral;   • FEMORAL ENDARTERECTOMY Bilateral 1/15/2020    Procedure: FEMORAL ENDARTERECTOMY BILATERAL;  Surgeon: Deandre Oseguera MD;  Location:  [facility];  Service: Vascular   • FEMORAL FEMORAL BYPASS Right 1/15/2020    Procedure: FEMORAL FEMORAL BYPASS;  Surgeon: Deandre Oseguera MD;  Location:  [facility];  Service: Vascular   • HYSTERECTOMY     • KNEE SURGERY Left    • LEG EXCISION LESION/CYST Left 2020    Procedure: GROIN MUSCLE FLAP BILATERAL;  Surgeon: Modesto Whittaker MD;  Location:  [facility];  Service: Plastics;  Laterality: Left;   • WRIST SURGERY Left        Social History     Socioeconomic History   • Marital status:      Spouse name: Not on file   • Number of children: 2   • Years of education: Not on file   • Highest education level: Not on file   Tobacco Use   • Smoking status: Current Every Day Smoker     Packs/day: 1.00     Years: 35.00     Pack years: 35.00     Types: Cigarettes   • Smokeless tobacco: Never Used   Substance and Sexual Activity   • Alcohol use: Not Currently   • Drug use: No   • Sexual activity: Yes     Partners: Male     Comment:         Family History   Problem Relation Age of Onset   • Diabetes Mother    • Hypertension  "Mother    • Arthritis Mother    • Hyperlipidemia Mother    • Migraines Mother    • Thyroid disease Mother    • Hypertension Father    • Lung cancer Father    • Cancer Father    • Stroke Other    • Migraines Maternal Aunt    • Thyroid disease Maternal Aunt    • Heart attack Maternal Aunt    • Osteoporosis Maternal Aunt    • Cancer Maternal Uncle    • Heart attack Maternal Uncle    • Stroke Maternal Uncle    • Hyperlipidemia Maternal Grandmother    • Cancer Maternal Grandmother    • Obesity Maternal Grandmother    • Thyroid disease Daughter    • Obesity Daughter        Objective     Physical Exam:  Vitals:    10/04/21 1416   BP: 129/82   BP Location: Right arm   Patient Position: Sitting   Pulse: 111   Temp: 98.6 °F (37 °C)   SpO2: 98%   Weight: 53.1 kg (117 lb)   Height: 160 cm (63\")      Body mass index is 20.73 kg/m².    Physical Exam  Vitals and nursing note reviewed.   Constitutional:       Appearance: Normal appearance. She is well-developed.      Comments: Wheelchair with BLE atrophy   HENT:      Head: Normocephalic and atraumatic.   Eyes:      Pupils: Pupils are equal, round, and reactive to light.   Neck:      Vascular: No carotid bruit.   Cardiovascular:      Rate and Rhythm: Normal rate and regular rhythm.      Pulses:           Dorsalis pedis pulses are detected w/ Doppler on the right side and detected w/ Doppler on the left side.        Posterior tibial pulses are detected w/ Doppler on the right side and detected w/ Doppler on the left side.      Heart sounds: Normal heart sounds, S1 normal and S2 normal. No murmur heard.        Comments: dopplerable popliteal bilaterally   Pulmonary:      Effort: Pulmonary effort is normal.      Breath sounds: Normal breath sounds.   Abdominal:      Palpations: Abdomen is soft.   Musculoskeletal:         General: No swelling.      Cervical back: Neck supple.      Right lower leg: No edema.      Left lower leg: No edema.   Skin:     General: Skin is warm and dry.      " Capillary Refill: Capillary refill takes less than 2 seconds.      Findings: No bruising.   Neurological:      General: No focal deficit present.      Mental Status: She is alert and oriented to person, place, and time. Mental status is at baseline.      GCS: GCS eye subscore is 4. GCS verbal subscore is 5. GCS motor subscore is 6.      Motor: Motor function is intact.      Coordination: Coordination is intact.      Gait: Gait is intact.   Psychiatric:         Mood and Affect: Mood normal.         Speech: Speech normal.         Behavior: Behavior normal. Behavior is cooperative.         Cognition and Memory: Cognition normal.       Imaging/Labs:  CTA abdomen and pelvis with bilateral lower extremity runoff ([place])-result date 9/30/2021:  Impression: Occluded right common femoral artery shortly beyond its origin and partially occluded right superficial femoral artery, which reconstitutes distally to the right thigh.  Partially occluded left external iliac artery with reconstitution of the left common femoral artery in the left groin.  Multifocal stenosis of the left superficial femoral artery.  Abnormal urinary bladder with air within the lumen and pockets of air in the bladder wall which is thickened.  Findings suggest infection with a gas-forming organism.  Moderate hepatomegaly and fatty liver.  Stable left adrenal nodule.    Assessment / Plan      Assessment / Plan:  Diagnoses and all orders for this visit:    1. Peripheral vascular disease (HCC) (Primary)    2. Infected prosthetic vascular graft s/p removal 2/28/2020    · Patient continues complain of chronic BLE pain, present at rest, weakness with exertion resulting in frequent falling.  · Doppler pulses throughout with Dr Oseguera  · Imaging and case reviewed with Dr. Oseguera.  · Dr. Oseguera present for encounter to discuss patient circumstance.  With patient's continued smoking, uncontrolled diabetes, and previous graft complication she is a  high risk surgical candidate. Dr Oseguera is not recommending intervention because of this but if patient wanted to move forward the first step would be formal arteriogram for surgical planning. Even peripheral arteriogram is not without risk as pt has bilateral pericardial patches from her fem-fem graft removal which could cause access problems. He encouraged second opinion as patient and daughter are disgruntled.   · Pt and daughter encouraged to go home and think about their options. Daughter states she will decide and be contacting our office to inform us of her decision  · Of note, CTA with abnormal urinary bladder air concerning for gas-forming organism. Imaging sent to PCP and PCP personally contacted recommending further work-up. Pt has told staff she already has appointment with urology    Patient Education: Saskia Groves  reports that she has been smoking cigarettes. She has a 35.00 pack-year smoking history. She has never used smokeless tobacco.. I have educated her on the risk of diseases from using tobacco products such as cancer, COPD and heart disease. I advised her to quit and she is not willing to quit. I spent 7 minutes counseling the patient.    Follow Up:   No follow-ups on file.   Or sooner for any further concerns or worsening sign and symptoms. If unable to reach us in the office please dial 911 or go to the nearest emergency department.      Keyonna Rondon APRCOLETTE  [facility] Cardiothoracic Surgery    Time Spent: I spent 52 minutes caring for Saskia on this date of service. This time includes time spent by me in the following activities: preparing for the visit, reviewing tests, obtaining and/or reviewing a separately obtained history, performing a medically appropriate examination and/or evaluation, counseling and educating the patient/family/caregiver, referring and communicating with other health care professionals, documenting information in the medical record, independently  interpreting results and communicating that information with the patient/family/caregiver and care coordination.

## 2021-10-05 DIAGNOSIS — Z00.6 EXAMINATION FOR NORMAL COMPARISON FOR CLINICAL RESEARCH: Primary | ICD-10-CM

## 2021-11-10 ENCOUNTER — OFFICE VISIT (OUTPATIENT)
Dept: CARDIAC SURGERY | Facility: CLINIC | Age: 51
End: 2021-11-10

## 2021-11-10 VITALS
BODY MASS INDEX: 20.73 KG/M2 | DIASTOLIC BLOOD PRESSURE: 80 MMHG | HEART RATE: 103 BPM | WEIGHT: 117 LBS | OXYGEN SATURATION: 99 % | HEIGHT: 63 IN | SYSTOLIC BLOOD PRESSURE: 152 MMHG | TEMPERATURE: 97.1 F

## 2021-11-10 DIAGNOSIS — I73.9 PERIPHERAL VASCULAR DISEASE (HCC): Primary | ICD-10-CM

## 2021-11-10 PROCEDURE — 99212 OFFICE O/P EST SF 10 MIN: CPT | Performed by: THORACIC SURGERY (CARDIOTHORACIC VASCULAR SURGERY)

## 2021-11-10 NOTE — PROGRESS NOTES
11/10/2021  Patient Information  Saskia Groves                                                                                          [address] [facility] [address]   1970  'PCP/Referring Physician'  Gillian Melendeze, APRN  [phone]  No ref. provider found    Chief Complaint   Patient presents with   • Peripheral Vascular Disease     Follow-up to discuss surgical options for PVD. Patient has claudication        History of Present Illness:  The patient is a 50-year-old female who returns today for follow-up of peripheral vascular disease.  Overall, she has been doing reasonably well.  She continues to smoke.  She is not describing any rest pain or ulceration on her feet at this current time.  She appears to be content with the situation.  Her  is with her and he also appears to be content.      Patient Active Problem List   Diagnosis   • Peripheral vascular disease (HCC)   • Lumbar radiculopathy   • Paresthesia   • Causalgia of lower limb   • Hiatal hernia   • Current smoker   • Bilateral carotid artery stenosis   • COPD (chronic obstructive pulmonary disease) ([facility])   • Coronary artery disease   • Hypertension   • Hyperlipidemia   • Diabetes mellitus (HCC)   • Sepsis ([facility])   • Pleural effusion - bilateral mod/large pleural effusions on CT chest 2/27/2020.    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  Sees Dr. Donaldson   • Depression    • Diabetes mellitus (HCC)    • Dyslipidemia    • GERD (gastroesophageal reflux disease)    • Hiatal hernia    • Hyperlipidemia    • Hypertension     • Infectious viral hepatitis    • Lower back pain    • Migraine    • Multinodular goiter    • S/P thyroid biopsy     2008, 2013, 2015, and 07/15/2019 at [facility], right lobe nodules - all cytologies were benign   • Sleep apnea     can't tolerate the cpap mask   • Subclinical hyperthyroidism    • UTI (urinary tract infection)      Past Surgical History:   Procedure Laterality Date   • ANTERIOR CERVICAL DISCECTOMY W/ FUSION     • BACK SURGERY      lumbar   •  SECTION  19940    x 2    • CHOLECYSTECTOMY     • COLONOSCOPY     • ENDOSCOPY     • FEMORAL ARTERY CUTDOWN Bilateral 2020    Procedure: FEMORAL ARTERY CUTDOWN WITH REMOVAL OF FEMORAL FEMORAL BYPASS GRAFT BILATERAL;  Surgeon: Deandre Oseguera MD;  Location:  [facility];  Service: Vascular;  Laterality: Bilateral;   • FEMORAL ENDARTERECTOMY Bilateral 1/15/2020    Procedure: FEMORAL ENDARTERECTOMY BILATERAL;  Surgeon: Deandre Oseguera MD;  Location:  [facility];  Service: Vascular   • FEMORAL FEMORAL BYPASS Right 1/15/2020    Procedure: FEMORAL FEMORAL BYPASS;  Surgeon: Deandre Oseguera MD;  Location:  [facility];  Service: Vascular   • HYSTERECTOMY     • KNEE SURGERY Left    • LEG EXCISION LESION/CYST Left 2020    Procedure: GROIN MUSCLE FLAP BILATERAL;  Surgeon: Modesto Whittaker MD;  Location:  [facility];  Service: Plastics;  Laterality: Left;   • WRIST SURGERY Left        Current Outpatient Medications:   •  acetaminophen (TYLENOL) 325 MG tablet, Take 2 tablets by mouth Every 4 (Four) Hours As Needed for Mild Pain ., Disp: , Rfl:   •  albuterol (PROVENTIL) (2.5 MG/3ML) 0.083% nebulizer solution, Take  by nebulization Every 6 (Six) Hours As Needed., Disp: , Rfl:   •  ALBUTEROL SULFATE HFA IN, Inhale 1 puff Every 6 (Six) Hours As Needed (as needed for wheezing and sob)., Disp: , Rfl:   •  amitriptyline (ELAVIL) 10 MG tablet, Take 10 mg by mouth Every Night., Disp: , Rfl:   •  amLODIPine (NORVASC) 2.5 MG tablet, , Disp: , Rfl:   •  aspirin  "81 MG tablet, Take 1 tablet by mouth daily., Disp: , Rfl:   •  B-D UF III MINI PEN NEEDLES 31G X 5 MM misc, , Disp: , Rfl:   •  benzonatate (TESSALON) 100 MG capsule, Take 100 mg by mouth 3 (Three) Times a Day As Needed., Disp: , Rfl:   •  clopidogrel (PLAVIX) 75 MG tablet, TAKE ONE TABLET BY MOUTH DAILY, Disp: 30 tablet, Rfl: 5  •  cyanocobalamin 1000 MCG/ML injection, 1,000 mcg Every 28 (Twenty-Eight) Days., Disp: , Rfl:   •  doxycycline (MONODOX) 100 MG capsule, , Disp: , Rfl:   •  estradiol (ESTRACE) 0.5 MG tablet, , Disp: , Rfl:   •  fluticasone (FLONASE) 50 MCG/ACT nasal spray, 2 sprays into the nostril(s) as directed by provider Daily. Use as directed, Disp: , Rfl:   •  fluticasone-salmeterol (ADVAIR) 250-50 MCG/DOSE DISKUS, Inhale 1 puff 2 (Two) Times a Day., Disp: , Rfl:   •  HYDROcodone-acetaminophen (LORTAB)  MG per tablet, Take 7.5 tablets by mouth Every 8 (Eight) Hours As Needed. 7.5 mg, Disp: , Rfl:   •  hydrOXYzine (ATARAX) 25 MG tablet, Take 25 mg by mouth Every Night., Disp: , Rfl:   •  Insulin Glargine (BASAGLAR KWIKPEN) 100 UNIT/ML injection pen, Inject 12 Units under the skin into the appropriate area as directed 2 (Two) Times a Day., Disp: 15 mL, Rfl: 3  •  Insulin Lispro (ADMELOG SOLOSTAR) 100 UNIT/ML injection pen, Inject 3 Units under the skin into the appropriate area as directed 3 (Three) Times a Day With Meals. (Patient taking differently: Inject 2 Units under the skin into the appropriate area as directed 3 (Three) Times a Day With Meals.), Disp: 15 mL, Rfl: 3  •  Insulin Syringe-Needle U-100 30G X 5/16\" 0.5 ML misc, Uses X 4/day, Disp: 200 each, Rfl: 5  •  isosorbide mononitrate (IMDUR) 30 MG 24 hr tablet, Take 30 mg by mouth Daily., Disp: , Rfl:   •  ketotifen (ZADITOR) 0.025 % ophthalmic solution, Administer 1 drop to both eyes 2 (Two) Times a Day., Disp: , Rfl:   •  lactulose (CHRONULAC) 10 GM/15ML solution, Take 10 g by mouth Daily., Disp: , Rfl:   •  Lancets (ONETOUCH DELICA " PLUS [id number]) misc, , Disp: , Rfl:   •  levocetirizine (XYZAL) 5 MG tablet, Take 5 mg by mouth Every Evening., Disp: , Rfl:   •  lovastatin (MEVACOR) 40 MG tablet, Take 40 mg by mouth Every Night., Disp: , Rfl:   •  methenamine (HIPREX) 1 g tablet, , Disp: , Rfl:   •  metoprolol succinate XL (TOPROL-XL) 200 MG 24 hr tablet, , Disp: , Rfl:   •  metroNIDAZOLE (FLAGYL) 500 MG tablet, Take 500 mg by mouth 2 (Two) Times a Day., Disp: , Rfl:   •  montelukast (SINGULAIR) 10 MG tablet, Take 10 mg by mouth Every Night., Disp: , Rfl:   •  omega-3 acid ethyl esters (LOVAZA) 1 g capsule, 2 g 2 (Two) Times a Day., Disp: , Rfl:   •  ONE TOUCH ULTRA TEST test strip, , Disp: , Rfl:   •  pantoprazole (PROTONIX) 40 MG EC tablet, Take 40 mg by mouth Daily., Disp: , Rfl:   •  pramipexole (MIRAPEX) 1 MG tablet, , Disp: , Rfl:   •  pregabalin (LYRICA) 200 MG capsule, Take 200 mg by mouth 3 (Three) Times a Day. 150 mg, Disp: , Rfl:   •  Premarin 0.625 MG/GM vaginal cream, , Disp: , Rfl:   •  rOPINIRole (REQUIP) 4 MG tablet, Take 4 mg by mouth Every Night., Disp: , Rfl:   •  SPIRIVA RESPIMAT 2.5 MCG/ACT aerosol solution inhaler, Inhale 2 puffs Daily., Disp: , Rfl:   •  tiZANidine (ZANAFLEX) 4 MG tablet, Take 4 mg by mouth Every 8 (Eight) Hours As Needed., Disp: , Rfl:   •  vitamin D (ERGOCALCIFEROL) 1.25 MG (68630 UT) capsule capsule, 50,000 Units 1 (One) Time Per Week. wednesdays, Disp: , Rfl:   Allergies   Allergen Reactions   • Levaquin [Levofloxacin] Nausea And Vomiting   • Penicillins Nausea And Vomiting     Has tolerated cefepime 2/2020   • Codeine Nausea Only   • Flagyl [Metronidazole] Nausea And Vomiting     Social History     Socioeconomic History   • Marital status:    • Number of children: 2   Tobacco Use   • Smoking status: Current Every Day Smoker     Packs/day: 1.00     Years: 35.00     Pack years: 35.00     Types: Cigarettes   • Smokeless tobacco: Never Used   Substance and Sexual Activity   • Alcohol use: Not  "Currently   • Drug use: No   • Sexual activity: Yes     Partners: Male     Comment:      Family History   Problem Relation Age of Onset   • Diabetes Mother    • Hypertension Mother    • Arthritis Mother    • Hyperlipidemia Mother    • Migraines Mother    • Thyroid disease Mother    • Hypertension Father    • Lung cancer Father    • Cancer Father    • Stroke Other    • Migraines Maternal Aunt    • Thyroid disease Maternal Aunt    • Heart attack Maternal Aunt    • Osteoporosis Maternal Aunt    • Cancer Maternal Uncle    • Heart attack Maternal Uncle    • Stroke Maternal Uncle    • Hyperlipidemia Maternal Grandmother    • Cancer Maternal Grandmother    • Obesity Maternal Grandmother    • Thyroid disease Daughter    • Obesity Daughter      Review of Systems   Constitutional: Negative for chills, fever, malaise/fatigue, night sweats and weight loss.   HENT: Negative for hearing loss, odynophagia and sore throat.    Cardiovascular: Positive for claudication. Negative for chest pain, dyspnea on exertion, leg swelling, orthopnea and palpitations.   Respiratory: Negative for cough and hemoptysis.    Endocrine: Negative for cold intolerance, heat intolerance, polydipsia, polyphagia and polyuria.   Hematologic/Lymphatic: Does not bruise/bleed easily.   Skin: Negative for itching and rash.   Musculoskeletal: Positive for back pain and joint pain. Negative for joint swelling and myalgias.   Gastrointestinal: Positive for diarrhea. Negative for abdominal pain, constipation, hematemesis, hematochezia, melena, nausea and vomiting.   Genitourinary: Negative for dysuria, frequency and hematuria.   Neurological: Negative for focal weakness, headaches, numbness and seizures.   Psychiatric/Behavioral: Negative for suicidal ideas.   All other systems reviewed and are negative.    Vitals:    11/10/21 0800   BP: 152/80   Pulse: 103   Temp: 97.1 °F (36.2 °C)   SpO2: 99%   Weight: 53.1 kg (117 lb)   Height: 160 cm (63\")      Physical " Exam  Vitals and nursing note reviewed.   Cardiovascular:      Rate and Rhythm: Normal rate and regular rhythm.      Pulses:           Dorsalis pedis pulses are 0 on the right side and 0 on the left side.        Posterior tibial pulses are 0 on the right side and 0 on the left side.      Heart sounds: Normal heart sounds.   Pulmonary:      Effort: Pulmonary effort is normal.      Breath sounds: Normal breath sounds.   Abdominal:      General: Abdomen is flat. Bowel sounds are normal.      Palpations: Abdomen is soft.   Musculoskeletal:      Cervical back: Normal range of motion and neck supple.         The ROS, past medical history, surgical history, family history, social history and vitals were reviewed by myself and corrected as needed.        Assessment/Plan:   The patient is a 50-year-old female who returns today for follow-up of peripheral vascular disease.  Overall, she is doing reasonably well. However, she continues to smoke despite all my recommendations.  She is not having rest pain and is resting good.  She has no femoral pulses.  At this point, she and her  appear to understand the nature of the situation.  I would continue conservative measures.  She apparently had a cousin who lost both legs recently.  Her grandfather lost his leg.  At this point, I think conservative measures are indicated.  She will contact me if she gets acquire tissue loss or rest pain.  Otherwise I will see her back in 6 months.    Patient Active Problem List   Diagnosis   • Peripheral vascular disease (HCC)   • Lumbar radiculopathy   • Paresthesia   • Causalgia of lower limb   • Hiatal hernia   • Current smoker   • Bilateral carotid artery stenosis   • COPD (chronic obstructive pulmonary disease) ([facility])   • Coronary artery disease   • Hypertension   • Hyperlipidemia   • Diabetes mellitus ([facility])   • Sepsis ([facility])   • Pleural effusion - bilateral mod/large pleural effusions on CT chest 2/27/2020.    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

## 2021-12-20 ENCOUNTER — OFFICE VISIT (OUTPATIENT)
Dept: CARDIAC SURGERY | Facility: CLINIC | Age: 51
End: 2021-12-20

## 2021-12-20 VITALS
HEART RATE: 103 BPM | OXYGEN SATURATION: 99 % | BODY MASS INDEX: 20.38 KG/M2 | WEIGHT: 115 LBS | HEIGHT: 63 IN | TEMPERATURE: 97.6 F | SYSTOLIC BLOOD PRESSURE: 155 MMHG | DIASTOLIC BLOOD PRESSURE: 82 MMHG

## 2021-12-20 DIAGNOSIS — I73.9 PERIPHERAL VASCULAR DISEASE (HCC): Primary | ICD-10-CM

## 2021-12-20 PROCEDURE — 99213 OFFICE O/P EST LOW 20 MIN: CPT | Performed by: NURSE PRACTITIONER

## 2021-12-20 RX ORDER — NITROFURANTOIN 25; 75 MG/1; MG/1
CAPSULE ORAL
COMMUNITY
Start: 2021-12-15

## 2021-12-20 NOTE — PROGRESS NOTES
"     [facility] Cardiothoracic Surgery Office Follow Up Note     Date of Encounter: 2021     Name: Saskia Groves  : 1970     Referred By: No ref. provider found  PCP: Ruba Melendez APRN    Chief Complaint:    Chief Complaint   Patient presents with   • Follow-up     appt per piodatrist for , Dr. Rodríguez , for lower extremity eval - skin breakdown on toes       Subjective      History of Present Illness:    Saskia Groves is a 50 y.o. female current smoker with hx of COPD, HTN, HLD on statin therapy, uncontrolled insulin-dependent DM (HA1C 13.4), CAD, carotid disease, and PVD on DAPT s/p bilateral femoral endarterectomy with femoral-femoral bypass with Star-Slade graft 1/15/2020 by Dr. Oseguera. Patient experienced subsequent infected femorofemoral graft with removal of bypass and bilateral common femoral artery pericardial patch repair on 2020 Dr. Oseguera. Patient has been seen by both SOFIA SMITH and Dr. Oseguera recently for chronic, intermittent claudication symptoms with numbness and heaviness to her BLEs. She has had CTA with runoff performed after podiatrist Dr. Rodríguez performed PVR with \"critical results\" (data deficient). CTA with runoff has since been obtained with results consistent with advanced disease. At this point, Dr. Oseguera recommended conservative measures due to her high surgical risk. She was scheduled to follow up with our office around May 2022. Her appointment contacted our office for appointment sooner to evaluate new skin breakdown on toes. Patient presents today for evaluation. Patient continues to deny rest pain. She has chronic stable intermittent claudication symptoms with numbness and heaviness to BLEs but has been stable as she has not been walking much recently. When asking the patient and daughter what Dr. Rodríguez was concerned about, they report he was concerned about \"discoloration to her toenails.\" She continues to smoke daily and is unsure what her most " recent A1C is. Of note, her most recent CTA showed abnormal urinary bladder air concerning for gas forming organism. She has been seen by urology with referral to infectious disease pending.     Review of Systems:  Review of Systems   Constitutional: Negative for chills, decreased appetite, diaphoresis, fever, malaise/fatigue, night sweats, weight gain and weight loss.   HENT: Negative for hoarse voice.    Eyes: Negative for blurred vision, double vision and visual disturbance.   Cardiovascular: Negative for chest pain, claudication, dyspnea on exertion, irregular heartbeat, leg swelling, near-syncope, orthopnea, palpitations, paroxysmal nocturnal dyspnea and syncope.   Respiratory: Negative for cough, hemoptysis, shortness of breath, sputum production and wheezing.    Hematologic/Lymphatic: Negative for adenopathy and bleeding problem. Does not bruise/bleed easily.   Skin: Positive for poor wound healing (toes are black and broke down sklin ). Negative for color change, nail changes and rash.   Musculoskeletal: Negative for back pain, falls and muscle cramps.   Gastrointestinal: Negative for abdominal pain, dysphagia and heartburn.   Genitourinary: Negative for flank pain.   Neurological: Negative for brief paralysis, disturbances in coordination, dizziness, focal weakness, headaches, light-headedness, loss of balance, numbness, paresthesias, sensory change, vertigo and weakness.   Psychiatric/Behavioral: Negative for depression and suicidal ideas.   Allergic/Immunologic: Negative for persistent infections.       I have reviewed the following portions of the patient's history: allergies, current medications, past family history, past medical history, past social history, past surgical history, problem list and ROS and confirm it's accurate.    Allergies:  Allergies   Allergen Reactions   • Levaquin [Levofloxacin] Nausea And Vomiting   • Penicillins Nausea And Vomiting     Has tolerated cefepime 2/2020   • Codeine  Nausea Only   • Flagyl [Metronidazole] Nausea And Vomiting       Medications:      Current Outpatient Medications:   •  acetaminophen (TYLENOL) 325 MG tablet, Take 2 tablets by mouth Every 4 (Four) Hours As Needed for Mild Pain ., Disp: , Rfl:   •  albuterol (PROVENTIL) (2.5 MG/3ML) 0.083% nebulizer solution, Take  by nebulization Every 6 (Six) Hours As Needed., Disp: , Rfl:   •  ALBUTEROL SULFATE HFA IN, Inhale 1 puff Every 6 (Six) Hours As Needed (as needed for wheezing and sob)., Disp: , Rfl:   •  amitriptyline (ELAVIL) 10 MG tablet, Take 10 mg by mouth Every Night., Disp: , Rfl:   •  amLODIPine (NORVASC) 2.5 MG tablet, , Disp: , Rfl:   •  aspirin 81 MG tablet, Take 1 tablet by mouth daily., Disp: , Rfl:   •  B-D UF III MINI PEN NEEDLES 31G X 5 MM misc, , Disp: , Rfl:   •  benzonatate (TESSALON) 100 MG capsule, Take 100 mg by mouth 3 (Three) Times a Day As Needed., Disp: , Rfl:   •  clopidogrel (PLAVIX) 75 MG tablet, TAKE ONE TABLET BY MOUTH DAILY, Disp: 30 tablet, Rfl: 5  •  cyanocobalamin 1000 MCG/ML injection, 1,000 mcg Every 28 (Twenty-Eight) Days., Disp: , Rfl:   •  doxycycline (MONODOX) 100 MG capsule, , Disp: , Rfl:   •  estradiol (ESTRACE) 0.5 MG tablet, , Disp: , Rfl:   •  fluticasone (FLONASE) 50 MCG/ACT nasal spray, 2 sprays into the nostril(s) as directed by provider Daily. Use as directed, Disp: , Rfl:   •  fluticasone-salmeterol (ADVAIR) 250-50 MCG/DOSE DISKUS, Inhale 1 puff 2 (Two) Times a Day., Disp: , Rfl:   •  HYDROcodone-acetaminophen (LORTAB)  MG per tablet, Take 7.5 tablets by mouth Every 8 (Eight) Hours As Needed. 7.5 mg, Disp: , Rfl:   •  hydrOXYzine (ATARAX) 25 MG tablet, Take 25 mg by mouth Every Night., Disp: , Rfl:   •  Insulin Glargine (BASAGLAR KWIKPEN) 100 UNIT/ML injection pen, Inject 12 Units under the skin into the appropriate area as directed 2 (Two) Times a Day., Disp: 15 mL, Rfl: 3  •  Insulin Lispro (ADMELOG SOLOSTAR) 100 UNIT/ML injection pen, Inject 3 Units under the  "skin into the appropriate area as directed 3 (Three) Times a Day With Meals. (Patient taking differently: Inject 2 Units under the skin into the appropriate area as directed 3 (Three) Times a Day With Meals.), Disp: 15 mL, Rfl: 3  •  Insulin Syringe-Needle U-100 30G X 5/16\" 0.5 ML misc, Uses X 4/day, Disp: 200 each, Rfl: 5  •  isosorbide mononitrate (IMDUR) 30 MG 24 hr tablet, Take 30 mg by mouth Daily., Disp: , Rfl:   •  ketotifen (ZADITOR) 0.025 % ophthalmic solution, Administer 1 drop to both eyes 2 (Two) Times a Day., Disp: , Rfl:   •  lactulose (CHRONULAC) 10 GM/15ML solution, Take 10 g by mouth Daily., Disp: , Rfl:   •  Lancets (ONETOUCH DELICA PLUS OGRRNU21Q) misc, , Disp: , Rfl:   •  levocetirizine (XYZAL) 5 MG tablet, Take 5 mg by mouth Every Evening., Disp: , Rfl:   •  lovastatin (MEVACOR) 40 MG tablet, Take 40 mg by mouth Every Night., Disp: , Rfl:   •  methenamine (HIPREX) 1 g tablet, , Disp: , Rfl:   •  metoprolol succinate XL (TOPROL-XL) 200 MG 24 hr tablet, , Disp: , Rfl:   •  metroNIDAZOLE (FLAGYL) 500 MG tablet, Take 500 mg by mouth 2 (Two) Times a Day., Disp: , Rfl:   •  montelukast (SINGULAIR) 10 MG tablet, Take 10 mg by mouth Every Night., Disp: , Rfl:   •  nitrofurantoin, macrocrystal-monohydrate, (MACROBID) 100 MG capsule, , Disp: , Rfl:   •  omega-3 acid ethyl esters (LOVAZA) 1 g capsule, 2 g 2 (Two) Times a Day., Disp: , Rfl:   •  ONE TOUCH ULTRA TEST test strip, , Disp: , Rfl:   •  pantoprazole (PROTONIX) 40 MG EC tablet, Take 40 mg by mouth Daily., Disp: , Rfl:   •  pramipexole (MIRAPEX) 1 MG tablet, , Disp: , Rfl:   •  pregabalin (LYRICA) 200 MG capsule, Take 200 mg by mouth 3 (Three) Times a Day. 150 mg, Disp: , Rfl:   •  Premarin 0.625 MG/GM vaginal cream, , Disp: , Rfl:   •  rOPINIRole (REQUIP) 4 MG tablet, Take 4 mg by mouth Every Night., Disp: , Rfl:   •  SPIRIVA RESPIMAT 2.5 MCG/ACT aerosol solution inhaler, Inhale 2 puffs Daily., Disp: , Rfl:   •  tiZANidine (ZANAFLEX) 4 MG tablet, " Take 4 mg by mouth Every 8 (Eight) Hours As Needed., Disp: , Rfl:   •  vitamin D (ERGOCALCIFEROL) 1.25 MG (84676 UT) capsule capsule, 50,000 Units 1 (One) Time Per Week. , Disp: , Rfl:     History:   Past Medical History:   Diagnosis Date   • Anxiety    • Arthritis    • Asthma    • Carotid artery stenosis    • Chronic bronchitis (HCC)    • COPD (chronic obstructive pulmonary disease) (HCC)    • Coronary artery disease     2 vessels with 50% blockage.  Sees Dr. Donaldson   • Depression    • Diabetes mellitus (HCC)    • Dyslipidemia    • GERD (gastroesophageal reflux disease)    • Hiatal hernia    • Hyperlipidemia    • Hypertension    • Infectious viral hepatitis    • Lower back pain    • Migraine    • Multinodular goiter    • S/P thyroid biopsy     2008, 2013, 2015, and 07/15/2019 at [facility], right lobe nodules - all cytologies were benign   • Sleep apnea     can't tolerate the cpap mask   • Subclinical hyperthyroidism    • UTI (urinary tract infection)        Past Surgical History:   Procedure Laterality Date   • ANTERIOR CERVICAL DISCECTOMY W/ FUSION     • BACK SURGERY      lumbar   •  SECTION  19940    x 2    • CHOLECYSTECTOMY     • COLONOSCOPY     • ENDOSCOPY     • FEMORAL ARTERY CUTDOWN Bilateral 2020    Procedure: FEMORAL ARTERY CUTDOWN WITH REMOVAL OF FEMORAL FEMORAL BYPASS GRAFT BILATERAL;  Surgeon: Deandre Oseguera MD;  Location:  [facility];  Service: Vascular;  Laterality: Bilateral;   • FEMORAL ENDARTERECTOMY Bilateral 1/15/2020    Procedure: FEMORAL ENDARTERECTOMY BILATERAL;  Surgeon: Deandre Oseguera MD;  Location:  [facility];  Service: Vascular   • FEMORAL FEMORAL BYPASS Right 1/15/2020    Procedure: FEMORAL FEMORAL BYPASS;  Surgeon: Deandre Oseguera MD;  Location:  [facility];  Service: Vascular   • HYSTERECTOMY     • KNEE SURGERY Left    • LEG EXCISION LESION/CYST Left 2020    Procedure: GROIN MUSCLE FLAP BILATERAL;  Surgeon: Modesto Whittaker MD;  Location:  [facility]  "OR;  Service: Plastics;  Laterality: Left;   • WRIST SURGERY Left        Social History     Socioeconomic History   • Marital status:    • Number of children: 2   Tobacco Use   • Smoking status: Current Every Day Smoker     Packs/day: 1.00     Years: 35.00     Pack years: 35.00     Types: Cigarettes   • Smokeless tobacco: Never Used   Substance and Sexual Activity   • Alcohol use: Not Currently   • Drug use: No   • Sexual activity: Yes     Partners: Male     Comment:         Family History   Problem Relation Age of Onset   • Diabetes Mother    • Hypertension Mother    • Arthritis Mother    • Hyperlipidemia Mother    • Migraines Mother    • Thyroid disease Mother    • Hypertension Father    • Lung cancer Father    • Cancer Father    • Stroke Other    • Migraines Maternal Aunt    • Thyroid disease Maternal Aunt    • Heart attack Maternal Aunt    • Osteoporosis Maternal Aunt    • Cancer Maternal Uncle    • Heart attack Maternal Uncle    • Stroke Maternal Uncle    • Hyperlipidemia Maternal Grandmother    • Cancer Maternal Grandmother    • Obesity Maternal Grandmother    • Thyroid disease Daughter    • Obesity Daughter        Objective     Physical Exam:  Vitals:    12/20/21 1208   BP: 155/82   BP Location: Left arm   Pulse: 103   Temp: 97.6 °F (36.4 °C)   SpO2: 99%   Weight: 52.2 kg (115 lb)   Height: 160 cm (63\")      Body mass index is 20.37 kg/m².    Physical Exam  Vitals and nursing note reviewed.   Constitutional:       Appearance: Normal appearance. She is well-developed.   HENT:      Head: Normocephalic and atraumatic.   Eyes:      Pupils: Pupils are equal, round, and reactive to light.   Neck:      Vascular: No carotid bruit.   Cardiovascular:      Pulses:           Dorsalis pedis pulses are detected w/ Doppler on the right side and detected w/ Doppler on the left side.        Posterior tibial pulses are detected w/ Doppler on the right side and detected w/ Doppler on the left side.      Heart " sounds: No murmur heard.      Abdominal:      Palpations: Abdomen is soft.   Musculoskeletal:         General: No swelling.      Cervical back: Neck supple.      Right lower leg: No edema.      Left lower leg: No edema.   Skin:     General: Skin is warm and dry.      Capillary Refill: Capillary refill takes less than 2 seconds.      Findings: No bruising.      Comments: Scattered abrasions to BL shins, but no wounds, ulcers present to patient's feet. See image below:    Neurological:      General: No focal deficit present.      Mental Status: She is alert and oriented to person, place, and time. Mental status is at baseline.      GCS: GCS eye subscore is 4. GCS verbal subscore is 5. GCS motor subscore is 6.      Motor: Motor function is intact.      Coordination: Coordination is intact.      Gait: Gait is intact.   Psychiatric:         Mood and Affect: Mood normal.         Speech: Speech normal.         Behavior: Behavior normal. Behavior is cooperative.         Cognition and Memory: Cognition normal.                     Imaging/Labs:    CTA abdomen and pelvis with bilateral lower extremity runoff ([place])-result date 9/30/2021:  Impression: Occluded right common femoral artery shortly beyond its origin and partially occluded right superficial femoral artery, which reconstitutes distally to the right thigh.  Partially occluded left external iliac artery with reconstitution of the left common femoral artery in the left groin.  Multifocal stenosis of the left superficial femoral artery.  Abnormal urinary bladder with air within the lumen and pockets of air in the bladder wall which is thickened.  Findings suggest infection with a gas-forming organism.  Moderate hepatomegaly and fatty liver.  Stable left adrenal nodule.         Assessment / Plan      Assessment / Plan:  Diagnoses and all orders for this visit:    1. Peripheral vascular disease (HCC) (Primary)       1. PVD s/p bilateral femoral  endarterectomy with femoral-femoral bypass with [place]-Slade graft 1/15/2020 by Dr. Oseguera with subsequent infected femorofemoral graft with removal of bypass and bilateral common femoral artery pericardial patch repair on 2/28/2020 Dr. Oseguera: I could not appreciate any skin breakdown on patient's toes at time of visit on any surface. She does have a couple of small abrasions to her right shin that are slow healing but without any evidence of infection at this time. Patient with stable PVD symptoms and reiterated continued conservative measures per Dr. Oseguera recommendations from his last note. Patient is following closely with her PCP, urology and infectious disease for further evaluation of urinary infection. I instructed the patient to contact our office for new, worsening nonhealing ulcerations to her BLE or worsening claudication pain. Will plan to see the patient back at previously scheduled follow up appointment.     Patient Education:  Saskia Groves  reports that she has been smoking cigarettes. She has a 35.00 pack-year smoking history. She has never used smokeless tobacco.. I have educated her on the risk of diseases from using tobacco products such as cancer, COPD and heart disease. I advised her to quit and she is not willing to quit. I spent 5 minutes counseling the patient.    Follow Up:   Return for Next scheduled follow up.   Or sooner for any further concerns or worsening sign and symptoms. If unable to reach us in the office please dial 911 or go to the nearest emergency department.      Josefa SMITH  [facility] Cardiothoracic Surgery    Time Spent: I spent 28 minutes caring for Saskia on this date of service. This time includes time spent by me in the following activities: preparing for the visit, reviewing tests, obtaining and/or reviewing a separately obtained history, performing a medically appropriate examination and/or evaluation, counseling and educating the  patient/family/caregiver, ordering medications, tests, or procedures, documenting information in the medical record and independently interpreting results and communicating that information with the patient/family/caregiver.

## 2022-02-14 ENCOUNTER — OFFICE VISIT (OUTPATIENT)
Dept: CARDIAC SURGERY | Facility: CLINIC | Age: 52
End: 2022-02-14

## 2022-02-14 VITALS
WEIGHT: 116 LBS | TEMPERATURE: 97.8 F | OXYGEN SATURATION: 99 % | HEART RATE: 125 BPM | BODY MASS INDEX: 20.55 KG/M2 | HEIGHT: 63 IN | SYSTOLIC BLOOD PRESSURE: 129 MMHG | DIASTOLIC BLOOD PRESSURE: 77 MMHG

## 2022-02-14 DIAGNOSIS — I73.9 PERIPHERAL VASCULAR DISEASE: Primary | ICD-10-CM

## 2022-02-14 PROCEDURE — 99214 OFFICE O/P EST MOD 30 MIN: CPT | Performed by: NURSE PRACTITIONER

## 2022-02-14 RX ORDER — SULFAMETHOXAZOLE AND TRIMETHOPRIM 800; 160 MG/1; MG/1
1 TABLET ORAL 2 TIMES DAILY
COMMUNITY

## 2022-02-14 NOTE — PROGRESS NOTES
"     [facility] Cardiothoracic Surgery Office Follow Up Note     Date of Encounter: 2022     Name: Saskia Groves  : 1970     Referred By: No ref. provider found  PCP: Ruba Melendez APRN    Chief Complaint:    Chief Complaint   Patient presents with   • Follow-up     Follow up for peripheral vascular disease,complains of right ankle swelling.   • Peripheral Vascular Disease       Subjective      History of Present Illness:    Saskia Groves is a 51 y.o. female current smoker with hx of COPD, HTN, HLD on statin therapy, uncontrolled insulin-dependent DM (HA1C 13.4), CAD, carotid disease, and PVD on DAPT s/p bilateral femoral endarterectomy with femoral-femoral bypass with [place]-Slade graft 1/15/2020 by Dr. Oseguera. Patient experienced subsequent infected femorofemoral graft with removal of bypass and bilateral common femoral artery pericardial patch repair on 2020 Dr. Oseguera. Patient was last seen in clinic by me on 21 for discussion of her PVD symptoms. At that time, Dr. Oseguera was not recommending any surgical intervention due to high risk. Patient contacted our office for complaints of RLE swelling that started in the last month. She has noticed RLE swelling especially in the evening and when dependent. Patient reporting progressive claudication symptoms to BLE (R=L). She now reports pain from her BL thighs down to her toes with ambulation of even short distances ~ 50 feet and at rest, especially at night. She does have some previous ulcerations to her BLE which are stable/scarred at this time. She still requiring use of cane/wheelchair for long distances.  She reports she is following with urology Dr. James in [place], and was last seen 2 days ago in clinic.  She was told she still has \"active bladder infection\" (data deficient).  He is referring her back to infectious disease Dr. Hollis in [place] for evaluation.  Patient is frustrated because she has already been seen " "by Dr. Hollis who \"told me he could not do anything for me.\" Patient reports her FSBS are labile but her last A1C was 10.0 (data deficient).       Review of Systems:  Review of Systems   Constitutional: Positive for malaise/fatigue. Negative for chills, decreased appetite, diaphoresis, fever, night sweats and weight loss.   HENT: Negative for congestion, hoarse voice, sore throat and stridor.    Cardiovascular: Positive for claudication, dyspnea on exertion and leg swelling (right ankle). Negative for chest pain, irregular heartbeat, near-syncope, orthopnea, palpitations, paroxysmal nocturnal dyspnea and syncope.   Respiratory: Negative.  Negative for cough, hemoptysis, shortness of breath, sleep disturbances due to breathing, snoring, sputum production and wheezing.    Hematologic/Lymphatic: Negative.  Negative for adenopathy and bleeding problem. Does not bruise/bleed easily.   Skin: Positive for color change. Negative for dry skin, itching, poor wound healing and rash.   Musculoskeletal: Positive for back pain and muscle cramps. Negative for arthritis, falls and muscle weakness.   Gastrointestinal: Positive for abdominal pain and diarrhea. Negative for anorexia, constipation, hematochezia, melena, nausea and vomiting.   Neurological: Positive for headaches and loss of balance. Negative for difficulty with concentration, disturbances in coordination, dizziness, numbness, seizures, vertigo and weakness.   Psychiatric/Behavioral: Negative for altered mental status, depression, memory loss and substance abuse. The patient has insomnia. The patient is not nervous/anxious.    Allergic/Immunologic: Negative for persistent infections.       I have reviewed the following portions of the patient's history: allergies, current medications, past family history, past medical history, past social history, past surgical history, problem list and ROS and confirm it's accurate.    Allergies:  Allergies   Allergen Reactions   • " Levaquin [Levofloxacin] Nausea And Vomiting   • Penicillins Nausea And Vomiting     Has tolerated cefepime 2/2020   • Codeine Nausea Only   • Flagyl [Metronidazole] Nausea And Vomiting       Medications:      Current Outpatient Medications:   •  acetaminophen (TYLENOL) 325 MG tablet, Take 2 tablets by mouth Every 4 (Four) Hours As Needed for Mild Pain ., Disp: , Rfl:   •  albuterol (PROVENTIL) (2.5 MG/3ML) 0.083% nebulizer solution, Take  by nebulization Every 6 (Six) Hours As Needed., Disp: , Rfl:   •  ALBUTEROL SULFATE HFA IN, Inhale 1 puff Every 6 (Six) Hours As Needed (as needed for wheezing and sob)., Disp: , Rfl:   •  amitriptyline (ELAVIL) 10 MG tablet, Take 10 mg by mouth Every Night., Disp: , Rfl:   •  amLODIPine (NORVASC) 2.5 MG tablet, , Disp: , Rfl:   •  aspirin 81 MG tablet, Take 1 tablet by mouth daily., Disp: , Rfl:   •  B-D UF III MINI PEN NEEDLES 31G X 5 MM misc, , Disp: , Rfl:   •  benzonatate (TESSALON) 100 MG capsule, Take 100 mg by mouth 3 (Three) Times a Day As Needed., Disp: , Rfl:   •  clopidogrel (PLAVIX) 75 MG tablet, TAKE ONE TABLET BY MOUTH DAILY, Disp: 30 tablet, Rfl: 5  •  cyanocobalamin 1000 MCG/ML injection, 1,000 mcg Every 28 (Twenty-Eight) Days., Disp: , Rfl:   •  estradiol (ESTRACE) 0.5 MG tablet, , Disp: , Rfl:   •  fluticasone (FLONASE) 50 MCG/ACT nasal spray, 2 sprays into the nostril(s) as directed by provider Daily. Use as directed, Disp: , Rfl:   •  fluticasone-salmeterol (ADVAIR) 250-50 MCG/DOSE DISKUS, Inhale 1 puff 2 (Two) Times a Day., Disp: , Rfl:   •  HYDROcodone-acetaminophen (LORTAB)  MG per tablet, Take 7.5 tablets by mouth Every 8 (Eight) Hours As Needed. 7.5 mg, Disp: , Rfl:   •  hydrOXYzine (ATARAX) 25 MG tablet, Take 25 mg by mouth Every Night., Disp: , Rfl:   •  Insulin Glargine (BASAGLAR KWIKPEN) 100 UNIT/ML injection pen, Inject 12 Units under the skin into the appropriate area as directed 2 (Two) Times a Day., Disp: 15 mL, Rfl: 3  •  Insulin Lispro  "(ADMELOG SOLOSTAR) 100 UNIT/ML injection pen, Inject 3 Units under the skin into the appropriate area as directed 3 (Three) Times a Day With Meals. (Patient taking differently: Inject 2 Units under the skin into the appropriate area as directed 3 (Three) Times a Day With Meals.), Disp: 15 mL, Rfl: 3  •  Insulin Syringe-Needle U-100 30G X 5/16\" 0.5 ML misc, Uses X 4/day, Disp: 200 each, Rfl: 5  •  isosorbide mononitrate (IMDUR) 30 MG 24 hr tablet, Take 30 mg by mouth Daily., Disp: , Rfl:   •  ketotifen (ZADITOR) 0.025 % ophthalmic solution, Administer 1 drop to both eyes 2 (Two) Times a Day., Disp: , Rfl:   •  Lancets (ONETOUCH DELICA PLUS SZXCHM01Y) misc, , Disp: , Rfl:   •  levocetirizine (XYZAL) 5 MG tablet, Take 5 mg by mouth Every Evening., Disp: , Rfl:   •  lovastatin (MEVACOR) 40 MG tablet, Take 40 mg by mouth Every Night., Disp: , Rfl:   •  methenamine (HIPREX) 1 g tablet, , Disp: , Rfl:   •  metoprolol succinate XL (TOPROL-XL) 200 MG 24 hr tablet, , Disp: , Rfl:   •  montelukast (SINGULAIR) 10 MG tablet, Take 10 mg by mouth Every Night., Disp: , Rfl:   •  nitrofurantoin, macrocrystal-monohydrate, (MACROBID) 100 MG capsule, , Disp: , Rfl:   •  omega-3 acid ethyl esters (LOVAZA) 1 g capsule, 2 g 2 (Two) Times a Day., Disp: , Rfl:   •  ONE TOUCH ULTRA TEST test strip, , Disp: , Rfl:   •  pantoprazole (PROTONIX) 40 MG EC tablet, Take 40 mg by mouth Daily., Disp: , Rfl:   •  pramipexole (MIRAPEX) 1 MG tablet, , Disp: , Rfl:   •  pregabalin (LYRICA) 200 MG capsule, Take 200 mg by mouth 3 (Three) Times a Day. 150 mg, Disp: , Rfl:   •  Premarin 0.625 MG/GM vaginal cream, , Disp: , Rfl:   •  rOPINIRole (REQUIP) 4 MG tablet, Take 4 mg by mouth Every Night., Disp: , Rfl:   •  SPIRIVA RESPIMAT 2.5 MCG/ACT aerosol solution inhaler, Inhale 2 puffs Daily., Disp: , Rfl:   •  sulfamethoxazole-trimethoprim (BACTRIM DS,SEPTRA DS) 800-160 MG per tablet, Take 1 tablet by mouth 2 (Two) Times a Day., Disp: , Rfl:   •  tiZANidine " (ZANAFLEX) 4 MG tablet, Take 4 mg by mouth Every 8 (Eight) Hours As Needed., Disp: , Rfl:   •  vitamin D (ERGOCALCIFEROL) 1.25 MG (08864 UT) capsule capsule, 50,000 Units 1 (One) Time Per Week. , Disp: , Rfl:   •  doxycycline (MONODOX) 100 MG capsule, , Disp: , Rfl:   •  lactulose (CHRONULAC) 10 GM/15ML solution, Take 10 g by mouth Daily. (Patient not taking: Reported on 2022), Disp: , Rfl:   •  metroNIDAZOLE (FLAGYL) 500 MG tablet, Take 500 mg by mouth 2 (Two) Times a Day. (Patient not taking: Reported on 2022), Disp: , Rfl:     History:   Past Medical History:   Diagnosis Date   • Anxiety    • Arthritis    • Asthma    • Carotid artery stenosis    • Chronic bronchitis (HCC)    • COPD (chronic obstructive pulmonary disease) (HCC)    • Coronary artery disease     2 vessels with 50% blockage.  Sees Dr. Donaldson   • Depression    • Diabetes mellitus (HCC)    • Dyslipidemia    • GERD (gastroesophageal reflux disease)    • Hiatal hernia    • Hyperlipidemia    • Hypertension    • Infectious viral hepatitis    • Lower back pain    • Migraine    • Multinodular goiter    • S/P thyroid biopsy     2008, 2013, 2015, and 07/15/2019 at [facility], right lobe nodules - all cytologies were benign   • Sleep apnea     can't tolerate the cpap mask   • Subclinical hyperthyroidism    • UTI (urinary tract infection)        Past Surgical History:   Procedure Laterality Date   • ANTERIOR CERVICAL DISCECTOMY W/ FUSION     • BACK SURGERY      lumbar   •  SECTION  19940    x 2    • CHOLECYSTECTOMY     • COLONOSCOPY     • ENDOSCOPY     • FEMORAL ARTERY CUTDOWN Bilateral 2020    Procedure: FEMORAL ARTERY CUTDOWN WITH REMOVAL OF FEMORAL FEMORAL BYPASS GRAFT BILATERAL;  Surgeon: Deandre Oseguera MD;  Location: [facility] OR;  Service: Vascular;  Laterality: Bilateral;   • FEMORAL ENDARTERECTOMY Bilateral 1/15/2020    Procedure: FEMORAL ENDARTERECTOMY BILATERAL;  Surgeon: Deandre Oseguera MD;  Location:   "[facility] OR;  Service: Vascular   • FEMORAL FEMORAL BYPASS Right 1/15/2020    Procedure: FEMORAL FEMORAL BYPASS;  Surgeon: Deandre Oseguera MD;  Location:  [facility] OR;  Service: Vascular   • HYSTERECTOMY     • KNEE SURGERY Left    • LEG EXCISION LESION/CYST Left 2/28/2020    Procedure: GROIN MUSCLE FLAP BILATERAL;  Surgeon: Modesto Whittaker MD;  Location:  [facility] OR;  Service: Plastics;  Laterality: Left;   • WRIST SURGERY Left        Social History     Socioeconomic History   • Marital status:    • Number of children: 2   Tobacco Use   • Smoking status: Current Every Day Smoker     Packs/day: 1.00     Years: 35.00     Pack years: 35.00     Types: Cigarettes   • Smokeless tobacco: Never Used   Substance and Sexual Activity   • Alcohol use: Not Currently   • Drug use: No   • Sexual activity: Yes     Partners: Male     Comment:         Family History   Problem Relation Age of Onset   • Diabetes Mother    • Hypertension Mother    • Arthritis Mother    • Hyperlipidemia Mother    • Migraines Mother    • Thyroid disease Mother    • Hypertension Father    • Lung cancer Father    • Cancer Father    • Stroke Other    • Migraines Maternal Aunt    • Thyroid disease Maternal Aunt    • Heart attack Maternal Aunt    • Osteoporosis Maternal Aunt    • Cancer Maternal Uncle    • Heart attack Maternal Uncle    • Stroke Maternal Uncle    • Hyperlipidemia Maternal Grandmother    • Cancer Maternal Grandmother    • Obesity Maternal Grandmother    • Thyroid disease Daughter    • Obesity Daughter        Objective     Physical Exam:  Vitals:    02/14/22 1252   BP: 129/77   BP Location: Right arm   Patient Position: Sitting   Pulse: (!) 125   Temp: 97.8 °F (36.6 °C)   SpO2: 99%   Weight: 52.6 kg (116 lb)   Height: 160 cm (63\")      Body mass index is 20.55 kg/m².    Physical Exam  Vitals and nursing note reviewed.   Constitutional:       Appearance: Normal appearance. She is well-developed.   HENT:      Head: Normocephalic and " atraumatic.   Eyes:      Pupils: Pupils are equal, round, and reactive to light.   Neck:      Vascular: No carotid bruit.   Cardiovascular:      Rate and Rhythm: Normal rate and regular rhythm.      Pulses:           Dorsalis pedis pulses are detected w/ Doppler on the right side and 0 on the left side.        Posterior tibial pulses are detected w/ Doppler on the right side and detected w/ Doppler on the left side.      Heart sounds: Normal heart sounds, S1 normal and S2 normal. No murmur heard.       Comments: Unable to detect DP pulse with doppler.  Pulmonary:      Effort: Pulmonary effort is normal.      Breath sounds: Normal breath sounds.   Abdominal:      Palpations: Abdomen is soft.   Musculoskeletal:         General: No swelling.      Cervical back: Neck supple.      Right lower leg: No edema.      Left lower leg: No edema.   Skin:     General: Skin is warm and dry.      Capillary Refill: Capillary refill takes less than 2 seconds.      Findings: No bruising.      Comments: Scattered abrasions to BL shins, but no wounds, ulcers present to patient's feet  Toes to left foot cold, right foot cool   Neurological:      General: No focal deficit present.      Mental Status: She is alert and oriented to person, place, and time. Mental status is at baseline.      GCS: GCS eye subscore is 4. GCS verbal subscore is 5. GCS motor subscore is 6.      Motor: Motor function is intact.      Coordination: Coordination is intact.      Gait: Gait is intact.   Psychiatric:         Mood and Affect: Mood normal.         Speech: Speech normal.         Behavior: Behavior normal. Behavior is cooperative.         Cognition and Memory: Cognition normal.         Imaging/Labs:      Assessment / Plan      Assessment / Plan:  Diagnoses and all orders for this visit:    1. Peripheral vascular disease (HCC) (Primary)       1. PVD s/p bilateral femoral endarterectomy with femoral-femoral bypass with [place]-Slade graft 1/15/2020 by Dr. Oseguera  with subsequent infected femorofemoral graft with removal of bypass and bilateral common femoral artery pericardial patch repair on 2/28/2020 Dr. Oseguera: Patient with complaints of RLE ankle swelling. I could not appreciate any swelling today during exam. Patient did have a picture of RLE ankle edema on her phone which is most likely venous stasis in nature. I encouraged her to elevate her legs when sitting for prolonged period of time. Patient does not ambulate much which can worsen edema. Patient has symptoms of progressive PVD. She is reporting worsening BLE claudication pain from her thighs to her feet. Her left foot is cold and I am unable to doppler a DP pulse on her today.  With patient's continued smoking, uncontrolled diabetes, and previous graft complication, patient is a high risk surgical candidate.  Dr. Oseguera has not recommended intervention at prior visits.  Patient is reporting she is also still being treated for current bladder infection by urology/infectious disease which would be further contraindication for surgical procedure.  We will obtain records from urology Dr. James's office in [place] and infectious disease Dr. Hollis's office in [place] for review. Will continue to follow patient for her wounds on her LE which are stable at this time.    Patient Education:  Saskia Groves  reports that she has been smoking cigarettes. She has a 35.00 pack-year smoking history. She has never used smokeless tobacco.. I have educated her on the risk of diseases from using tobacco products such as cancer, COPD and heart disease.     I advised her to quit and she is not willing to quit.    I spent 5 minutes counseling the patient.      Follow Up:   Return in about 3 months (around 5/14/2022).   Or sooner for any further concerns or worsening sign and symptoms. If unable to reach us in the office please dial 911 or go to the nearest emergency department.      Josefa SMITH  Religion  [facility] Cardiothoracic Surgery    Time Spent: I spent 34 minutes caring for Saskia on this date of service. This time includes time spent by me in the following activities: preparing for the visit, reviewing tests, obtaining and/or reviewing a separately obtained history, performing a medically appropriate examination and/or evaluation, referring and communicating with other health care professionals, documenting information in the medical record, independently interpreting results and communicating that information with the patient/family/caregiver and care coordination.

## 2022-03-03 ENCOUNTER — TELEPHONE (OUTPATIENT)
Dept: CARDIAC SURGERY | Facility: CLINIC | Age: 52
End: 2022-03-03

## 2022-03-03 NOTE — TELEPHONE ENCOUNTER
"I called patient to update her after I reviewed her urology records which included their plans for referral to ID and cystoscopy for continued bladder infection and hematuria. Patient reports she was seen again by ID Dr. Hollis who gave her 5 day course of abx and stated \"no need to see me anymore\" (data deficient). Patient has follow up with her urologist on 3/9. In review of multiple previous encounters with myself, SOFIA SMITH and Dr. Oseguera, Dr. Oseguera is not recommending any revascularization options for her PVD due to extensive disease and poor surgical candidate as well as continued smoking despite multiple attempts to quit. He recommended conservative therapy including amputation for continued pain. I reiterated these options again with patient. Regardless, patient is not a surgical candidate with continued active infection. I instructed patient to contact our office for when her bladder infection is treated or concerns for acute ischemia to BLE. She verbalized understanding.   "
Standing/Toileting/Walking

## 2022-04-29 ENCOUNTER — TELEPHONE (OUTPATIENT)
Dept: CARDIAC SURGERY | Facility: CLINIC | Age: 52
End: 2022-04-29

## 2022-04-29 NOTE — TELEPHONE ENCOUNTER
Patient appointment moved up due to continue swelling and pain in legs. LUIS Daniels reviewed patient chart and asked that I called patient to reiterate plan of care (see telephone enc by Josefa from 3/3/22). Due to patient being poor surgical candidate, her extensive vascular disease, and continued smoking, Dr. Oseguera is not recommending surgical revascularization at this time. He recommended conservative measures including amputation for patient's continued pain.     Called patient and relayed this information and stated that she would not need appointment with us on Monday as Dr. Oseguera is still not recommending surgical revascularization but we could have her come in on Wednesday to see Dr. Sam to discuss amputation. Patient did not say anything and hung up the phone.     LUIS Daniels made aware.

## 2022-05-02 ENCOUNTER — TELEPHONE (OUTPATIENT)
Dept: CARDIAC SURGERY | Facility: CLINIC | Age: 52
End: 2022-05-02

## 2022-05-02 NOTE — TELEPHONE ENCOUNTER
Does patient need a follow-up appointment with [facility]?   See telephone encounter- as patient is not a surgical candidate for revascularization.

## 2023-02-10 ENCOUNTER — TELEPHONE (OUTPATIENT)
Dept: CARDIAC SURGERY | Facility: CLINIC | Age: 53
End: 2023-02-10
Payer: COMMERCIAL

## 2023-02-10 NOTE — TELEPHONE ENCOUNTER
Mrs. Groves called stating she was just diagnosed with CHF. She was recently discharged from the ER today for hypertension and CHF. She states she has bilateral leg swelling and is wanting to know if this could be caused by CHF. She can barely bend her knee.  I explained to the patient that CHF can cause leg swelling. She would need to contact her PCP ASAP to discuss treatment and care for her new diagnosis. She verbalized understanding.

## (undated) DEVICE — SUT VIC 4/0 RB1 27IN VCP214H

## (undated) DEVICE — SUT SILK 3/0 TIES 18IN A184H

## (undated) DEVICE — SUCTION CANISTER, 2500CC, RIGID: Brand: DEROYAL

## (undated) DEVICE — SKIN AFFIX SURG ADHESIVE 72/CS 0.55ML: Brand: MEDLINE

## (undated) DEVICE — PK MINOR SPLT 10

## (undated) DEVICE — SUT MNCRYL PLS ANTIB UD 4/0 PS2 18IN

## (undated) DEVICE — DECANT BG O JET

## (undated) DEVICE — SUT PROLN 6/0 C1 D/A 30IN 8706H

## (undated) DEVICE — 3M™ IOBAN™ 2 ANTIMICROBIAL INCISE DRAPE 6651EZ: Brand: IOBAN™ 2

## (undated) DEVICE — APPL CHLORAPREP W/TINT 26ML BLU

## (undated) DEVICE — SUT GUT PLN FAST ABS 5/0 PC1 18IN 1915G

## (undated) DEVICE — NDL HYPO ECLPS SFTY 18G 1 1/2IN

## (undated) DEVICE — SUT MONOCRYL PLS ANTIB UND 3/0  PS1 27IN

## (undated) DEVICE — SUT SILK 2/0 TIES 18IN A185H

## (undated) DEVICE — SUT VIC 5/0 P1 18IN J490G

## (undated) DEVICE — SUT SILK 0 TIES 30IN A306H

## (undated) DEVICE — SPNG GZ WOVN 4X4IN 12PLY 10/BX STRL

## (undated) DEVICE — GLV SURG PREMIERPRO MIC LTX PF SZ7 BRN

## (undated) DEVICE — COVER,LIGHT HANDLE,FLX,1/PK: Brand: MEDLINE INDUSTRIES, INC.

## (undated) DEVICE — TOTAL TRAY, 16FR 10ML SIL FOLEY, URN: Brand: MEDLINE

## (undated) DEVICE — ANTIBACTERIAL UNDYED BRAIDED (POLYGLACTIN 910), SYNTHETIC ABSORBABLE SUTURE: Brand: COATED VICRYL

## (undated) DEVICE — SUT SILK 2/0 PS 18IN 1588H

## (undated) DEVICE — CONTAINER,SPECIMEN,OR STERILE,4OZ: Brand: MEDLINE

## (undated) DEVICE — FOGARTY ARTERIAL EMBOLECTOMY CATHETER 4F 80CM: Brand: FOGARTY

## (undated) DEVICE — PK VASC 10

## (undated) DEVICE — CLTH CLENS READYCLEANSE PERI CARE PK/5

## (undated) DEVICE — INTENDED USE FOR SURGICAL MARKING ON INTACT SKIN, ALSO PROVIDES A PERMANENT METHOD OF IDENTIFYING OBJECTS IN THE OPERATING ROOM: Brand: WRITESITE® REGULAR TIP SKIN MARKER

## (undated) DEVICE — CVR HNDL LT SURG ACCSSRY BLU STRL

## (undated) DEVICE — SAFESECURE,SECUREMENT,FOLEY CATH,STERILE: Brand: MEDLINE

## (undated) DEVICE — FOGARTY IRRIGATION CATHETER 4F, 80CM: Brand: FOGARTY

## (undated) DEVICE — PREVENA DUO PEEL & PLACE SYSTEM KIT- 13 CM: Brand: PREVENA DUO™ PEEL & PLACE™

## (undated) DEVICE — SUT ETHLN PLSTC P1 6/0 18IN 697H

## (undated) DEVICE — PREMIUM DRY TRAY LF: Brand: MEDLINE INDUSTRIES, INC.

## (undated) DEVICE — SYR CONTRL LUERLOK 10CC

## (undated) DEVICE — NDL HYPO SFTY PROEDGE 27G 1 1/4IN GRY

## (undated) DEVICE — SUT PDS 5/0 P3 18IN CLR PDP493G

## (undated) DEVICE — TRAP,MUCUS SPECIMEN,40CC: Brand: MEDLINE

## (undated) DEVICE — TRY DRN PNEUMO WAYNE ST 14F 19SD/PRT 29CM W/SUT